# Patient Record
Sex: MALE | Race: WHITE | NOT HISPANIC OR LATINO | Employment: FULL TIME | URBAN - METROPOLITAN AREA
[De-identification: names, ages, dates, MRNs, and addresses within clinical notes are randomized per-mention and may not be internally consistent; named-entity substitution may affect disease eponyms.]

---

## 2017-05-05 ENCOUNTER — OFFICE VISIT (OUTPATIENT)
Dept: DERMATOLOGY | Facility: CLINIC | Age: 67
End: 2017-05-05
Payer: COMMERCIAL

## 2017-05-05 VITALS — WEIGHT: 170 LBS | BODY MASS INDEX: 24.39 KG/M2

## 2017-05-05 DIAGNOSIS — D48.5 NEOPLASM OF UNCERTAIN BEHAVIOR OF SKIN: Primary | ICD-10-CM

## 2017-05-05 DIAGNOSIS — L57.0 ACTINIC KERATOSIS: ICD-10-CM

## 2017-05-05 DIAGNOSIS — L82.1 SEBORRHEIC KERATOSES: ICD-10-CM

## 2017-05-05 DIAGNOSIS — C44.91 BCC (BASAL CELL CARCINOMA OF SKIN): ICD-10-CM

## 2017-05-05 PROCEDURE — 17000 DESTRUCT PREMALG LESION: CPT | Mod: 59,S$GLB,, | Performed by: DERMATOLOGY

## 2017-05-05 PROCEDURE — 1159F MED LIST DOCD IN RCRD: CPT | Mod: S$GLB,,, | Performed by: DERMATOLOGY

## 2017-05-05 PROCEDURE — 1160F RVW MEDS BY RX/DR IN RCRD: CPT | Mod: S$GLB,,, | Performed by: DERMATOLOGY

## 2017-05-05 PROCEDURE — 11100 PR BIOPSY OF SKIN LESION: CPT | Mod: 59,S$GLB,, | Performed by: DERMATOLOGY

## 2017-05-05 PROCEDURE — 88305 TISSUE EXAM BY PATHOLOGIST: CPT | Performed by: PATHOLOGY

## 2017-05-05 PROCEDURE — 11301 SHAVE SKIN LESION 0.6-1.0 CM: CPT | Mod: 59,S$GLB,, | Performed by: DERMATOLOGY

## 2017-05-05 PROCEDURE — 99213 OFFICE O/P EST LOW 20 MIN: CPT | Mod: 25,S$GLB,, | Performed by: DERMATOLOGY

## 2017-05-05 PROCEDURE — 99999 PR PBB SHADOW E&M-EST. PATIENT-LVL II: CPT | Mod: PBBFAC,,, | Performed by: DERMATOLOGY

## 2017-05-05 NOTE — MR AVS SNAPSHOT
[facility]   [facility]  [place] [address]  Phone: [phone]  Fax: [phone]                  Juan Hobson   2017 1:40 PM   Office Visit    Description:  Male : 1950   Provider:  Mary Jane Walker MD   Department:  [facility]           Reason for Visit     Lesion           Diagnoses this Visit        Comments    Neoplasm of uncertain behavior of skin    -  Primary     Actinic keratosis         Seborrheic keratoses                To Do List           Goals (5 Years of Data)     None      Follow-Up and Disposition     Return in about 3 months (around 2017).      [facility]     [facility] -  Assistance  Unless otherwise directed by your provider, please contact [facility], our nurse care line that is available for  assistance.     Registered nurses in the [facility] provide: appointment scheduling, clinical advisement, health education, and other advisory services.  Call: [phone] (toll free)               Medications           Message regarding Medications     Verify the changes and/or additions to your medication regime listed below are the same as discussed with your clinician today.  If any of these changes or additions are incorrect, please notify your healthcare provider.             Verify that the below list of medications is an accurate representation of the medications you are currently taking.  If none reported, the list may be blank. If incorrect, please contact your healthcare provider. Carry this list with you in case of emergency.           Current Medications     multivitamin capsule Take 1 capsule by mouth once daily.    ondansetron (ZOFRAN-ODT) 4 MG TbDL Take 1 tablet (4 mg total) by mouth every 6 (six) hours as needed.           Clinical Reference Information           Your Vitals Were     Weight BMI             77.1 kg (170 lb) 24.39 kg/m2         Allergies as of 2017      No Known Allergies      Immunizations Administered on Date of Encounter - 5/5/2017     None      Orders Placed During Today's Visit      Normal Orders This Visit    Tissue Specimen To Pathology, Dermatology     Tissue Specimen To Pathology, Dermatology       [facility] Sign-Up     Activating your [facility] account is as easy as 1-2-3!     1) Visit [email or web address], select Sign Up Now, enter this activation code and your date of birth, then select Next.  [email or web address]  Expires: 6/19/2017  1:22 PM      2) Create a username and password to use when you visit [facility] in the future and select a security question in case you lose your password and select Next.    3) Enter your e-mail address and click Sign Up!    Additional Information  If you have questions, please e-mail [email or web address] or call [phone] to talk to our [facility] staff. Remember, [facility] is NOT to be used for urgent needs. For medical emergencies, dial 911.         Language Assistance Services     ATTENTION: Language assistance services are available, free of charge. Please call [phone].      ATENCIÓN: Si habla español, tiene a ruggiero disposición servicios gratuitos de asistencia lingüística. Llame al [phone].     CHÚ Ý: N?u b?n nói Ti?ng Vi?t, có các d?ch v? h? tr? ngôn ng? mi?n phí dành cho b?n. G?i s? [phone].         [facility] complies with applicable Federal civil rights laws and does not discriminate on the basis of race, color, national origin, age, disability, or sex.

## 2017-05-05 NOTE — PROGRESS NOTES
"  Subjective:       Patient ID:  Juan Hobson is a 66 y.o. male who presents for   Chief Complaint   Patient presents with    Lesion     right ear     HPI Comments: History of Present Illness: The patient presents with chief complaint of lesion.  Location: right ear  Duration: " months"  Signs/Symptoms: bleeds    Prior treatments: none      Lesion         Review of Systems   Constitutional: Negative for fever.   Skin: Negative for itching and rash.   Hematologic/Lymphatic: Does not bruise/bleed easily.        Objective:    Physical Exam   Constitutional: He appears well-developed and well-nourished. No distress.   Neurological: He is alert and oriented to person, place, and time. He is not disoriented.   Psychiatric: He has a normal mood and affect.   Skin:   Areas Examined (abnormalities noted in diagram):   Scalp / Hair Palpated and Inspected  Head / Face Inspection Performed  Neck Inspection Performed  Chest / Axilla Inspection Performed  Abdomen Inspection Performed  Back Inspection Performed  RUE Inspected  LUE Inspection Performed                   Diagram Legend     Erythematous scaling macule/papule c/w actinic keratosis         Pigmented verrucoid papule/plaque c/w seborrheic keratosis         Pink pearly papule mid back  c/w basal cell carcinoma       See annotation      Assessment / Plan:      Pathology Orders:      Normal Orders This Visit    Tissue Specimen To Pathology, Dermatology     Questions:    Directional Terms:  Other(comment)    Clinical information:  scc vs bcc    Specific Site:  right ear    Tissue Specimen To Pathology, Dermatology     Questions:    Directional Terms:  Other(comment)    Clinical information:  bcc    Specific Site:  mid back        Neoplasm of uncertain behavior of skin  -     Tissue Specimen To Pathology, Dermatology  -     Tissue Specimen To Pathology, Dermatology\  Right ear  Shave biopsy performed after verbal consent including risk of infection, scar, recurrence, " need for additional treatment of site. Area prepped with alcohol, anesthetized with  1% lidocaine with epinephrine. . Hemostasis achieved with  monsels. No complications. Dressing applied. Wound care explained. Will need further rx if + ca        Shave removal procedure note:  Mid back  Shave removal performed after verbal consent including risk of infection, scar, recurrence, need for additional treatment of site. Area prepped with alcohol, anesthetized 1% lidocaine with epinephrine. Lesional tissue shaved  followed by cautery and hyfrecation x 3. Lesion defect size 7mm No complications. Dressing applied. Wound care explained.        Actinic keratosis   Cryosurgery Procedure Note    Verbal consent from the patient is obtained and the patient is aware of the precancerous quality and need for treatment of these lesions. Liquid nitrogen cryosurgery is applied to the 1 actinic keratoses, as detailed in the physical exam, to produce a freeze injury.    seborrheic keratoses  reassurance             No Follow-up on file.

## 2017-05-30 ENCOUNTER — INITIAL CONSULT (OUTPATIENT)
Dept: DERMATOLOGY | Facility: CLINIC | Age: 67
End: 2017-05-30
Payer: COMMERCIAL

## 2017-05-30 VITALS
HEART RATE: 65 BPM | HEIGHT: 70 IN | DIASTOLIC BLOOD PRESSURE: 84 MMHG | BODY MASS INDEX: 24.34 KG/M2 | WEIGHT: 170 LBS | SYSTOLIC BLOOD PRESSURE: 128 MMHG

## 2017-05-30 DIAGNOSIS — C44.212 BASAL CELL CARCINOMA, EAR, RIGHT: Primary | ICD-10-CM

## 2017-05-30 PROCEDURE — 99999 PR PBB SHADOW E&M-EST. PATIENT-LVL III: CPT | Mod: PBBFAC,,, | Performed by: DERMATOLOGY

## 2017-05-30 PROCEDURE — 1126F AMNT PAIN NOTED NONE PRSNT: CPT | Mod: S$GLB,,, | Performed by: DERMATOLOGY

## 2017-05-30 PROCEDURE — 99214 OFFICE O/P EST MOD 30 MIN: CPT | Mod: S$GLB,,, | Performed by: DERMATOLOGY

## 2017-05-30 PROCEDURE — 1159F MED LIST DOCD IN RCRD: CPT | Mod: S$GLB,,, | Performed by: DERMATOLOGY

## 2017-05-30 RX ORDER — AMOXICILLIN 500 MG
2 CAPSULE ORAL DAILY
Status: ON HOLD | COMMUNITY
End: 2020-09-03

## 2017-05-30 NOTE — PROGRESS NOTES
REFERRING MD:  Mary Jane Walker M.D.    CHIEF COMPLAINT:  New patient being consulted for Mohs' surgery evaluation.    HISTORY OF PRESENT ILLNESS:  66 y.o. male presents with a 1 year(s) history of growth on the R ear. Pt states it was removed 10 years ago here at [facility]. Upon researching his medical records in [employer] [facility] [employer], he last saw Dr. Walker on  11/2/2009 and at that office visit, Dr. Walker did a bx on his R superior helix with path as inflammatory crust and deeper biopsy recommended. A phone note on 11/13/2009 shows Dr. Walker's staff unable to reach pt and results letter mailed to pt with request for pt to contact the office.    Positive for scabbing.  Positive for crusting.  Positive for bleeding.  Negative for itching.    Biopsy consistent with basal cell carcinoma.     Prior treatment excision per patient    Pacemaker: No  Defibrillator: No  Artificial joints: No  Artificial heart valves: No    PAST MEDICAL HISTORY:  Past Medical History:   Diagnosis Date    Basal cell carcinoma     right helix    Skin cancer of arm        PAST SURGICAL HISTORY:  History reviewed. No pertinent surgical history.     SOCIAL HISTORY:  Dependencies:  never smoked    PERTINENT MEDICATIONS:  See medications list.  fish oil    ALLERGIES:  Review of patient's allergies indicates no known allergies.    ROS:  Skin: See HPI  Constitutional: No fatigue, fever, malaise, weight loss, or night sweats.  Cardiovascular: No chest pain, palpitations, or edema.  Respiratory: No coughing, wheezing, SOB, or sputum production.    Physical Exam   HENT:   Ears:          General: Mood and affect normal. Alert and orient X3. Normal appearance.  Eyelids:  no suspicious lesions  Head/Face: R superior helix with a 15 x 25 mm pink nodule.   Lips/Teeth/Gums:  no suspicious lesions     IMPRESSION:  Biopsy proven basal cell carcinoma, R ear, path# [id number].    PLAN:  The diagnosis and the pathology report were discussed in detail with the patient. Treatment  options were reviewed, including Mohs Micrographic Surgery, radiation, topical therapy, and standard excision.  After careful review of patient's history and physical exam, and after discussion of treatment options, the decision was made to perform Mohs micrographic surgery.    Will schedule patient for Mohs Micrographic Surgery and will coordinate reconstruction with Dr. Mcgarry in ENT Plastics. Risks, benefits, and alternatives of Mohs' surgery discussed with the patient. Discussed repair options including complex closure, skin flap, skin graft and second intention healing with the patient. Pre-operative instructions provided to the patient.     Spent 30 minutes in coordination of care and/or consultation with patient discussing diagnosis, treatment options, risks and benefits of each. All questions answered.

## 2017-05-31 ENCOUNTER — TELEPHONE (OUTPATIENT)
Dept: DERMATOLOGY | Facility: CLINIC | Age: 67
End: 2017-05-31

## 2017-05-31 NOTE — TELEPHONE ENCOUNTER
Called patient to schedule surgery for Mohs BCC R ear but was unable to leave message due to answering service not being set up.

## 2017-05-31 NOTE — TELEPHONE ENCOUNTER
----- Message from Osiris Orantes RN sent at 5/31/2017  9:49 AM CDT -----  Good morning,    I have been out for 2 days with the stomach flu so I apologize for the delay in getting back to you. Please see dates below as long as we do not have another mohs the same day with yall.    My current available dates are:  6/27 6/29 7/6 7/7 7/11 7/13 7/14 7/18 7/20 7/21

## 2017-05-31 NOTE — TELEPHONE ENCOUNTER
Spoke to patient about scheduling Mohs surgery for BCC right ear on 6/26 at 8:00 am.  Informed him Dr Loaiza office will be contacting him with the details of the repair surgery scheduled for  6/27.

## 2017-06-15 DIAGNOSIS — H61.111 MOHS DEFECT OF HELIX OF RIGHT EAR: Primary | ICD-10-CM

## 2017-06-15 DIAGNOSIS — C44.91 BASAL CELL CARCINOMA: ICD-10-CM

## 2017-06-15 DIAGNOSIS — Z01.818 PREOP TESTING: Primary | ICD-10-CM

## 2017-06-15 DIAGNOSIS — Z01.818 PREOP TESTING: ICD-10-CM

## 2017-06-15 DIAGNOSIS — Z98.890 STATUS POST MOHS SURGERY: ICD-10-CM

## 2017-06-26 ENCOUNTER — HOSPITAL ENCOUNTER (EMERGENCY)
Facility: HOSPITAL | Age: 67
Discharge: HOME | End: 2017-06-26
Attending: OTOLARYNGOLOGY | Admitting: OTOLARYNGOLOGY
Payer: COMMERCIAL

## 2017-06-26 ENCOUNTER — HOSPITAL ENCOUNTER (OUTPATIENT)
Dept: CARDIOLOGY | Facility: CLINIC | Age: 67
Discharge: HOME | End: 2017-06-26
Payer: COMMERCIAL

## 2017-06-26 ENCOUNTER — ANESTHESIA EVENT (OUTPATIENT)
Dept: SURGERY | Facility: HOSPITAL | Age: 67
End: 2017-06-26
Payer: COMMERCIAL

## 2017-06-26 ENCOUNTER — PROCEDURE VISIT (OUTPATIENT)
Dept: DERMATOLOGY | Facility: CLINIC | Age: 67
End: 2017-06-26
Payer: COMMERCIAL

## 2017-06-26 ENCOUNTER — OFFICE VISIT (OUTPATIENT)
Dept: OTOLARYNGOLOGY | Facility: CLINIC | Age: 67
End: 2017-06-26
Payer: COMMERCIAL

## 2017-06-26 ENCOUNTER — TELEPHONE (OUTPATIENT)
Dept: OTOLARYNGOLOGY | Facility: CLINIC | Age: 67
End: 2017-06-26

## 2017-06-26 VITALS
BODY MASS INDEX: 24.34 KG/M2 | WEIGHT: 170 LBS | HEIGHT: 70 IN | HEART RATE: 54 BPM | DIASTOLIC BLOOD PRESSURE: 83 MMHG | SYSTOLIC BLOOD PRESSURE: 144 MMHG

## 2017-06-26 VITALS
SYSTOLIC BLOOD PRESSURE: 151 MMHG | HEART RATE: 88 BPM | OXYGEN SATURATION: 100 % | HEIGHT: 70 IN | WEIGHT: 175 LBS | BODY MASS INDEX: 25.05 KG/M2 | TEMPERATURE: 98 F | DIASTOLIC BLOOD PRESSURE: 88 MMHG | RESPIRATION RATE: 20 BRPM

## 2017-06-26 DIAGNOSIS — H61.111 MOHS DEFECT OF HELIX OF RIGHT EAR: ICD-10-CM

## 2017-06-26 DIAGNOSIS — H95.89: Primary | ICD-10-CM

## 2017-06-26 DIAGNOSIS — C44.212 BASAL CELL CARCINOMA OF RIGHT EAR: Primary | ICD-10-CM

## 2017-06-26 DIAGNOSIS — Z85.828 HISTORY OF MOH'S MICROGRAPHIC SURGERY FOR SKIN CANCER: Primary | ICD-10-CM

## 2017-06-26 DIAGNOSIS — Z98.890 STATUS POST MOHS SURGERY: ICD-10-CM

## 2017-06-26 DIAGNOSIS — Z98.890 HISTORY OF MOH'S MICROGRAPHIC SURGERY FOR SKIN CANCER: Primary | ICD-10-CM

## 2017-06-26 DIAGNOSIS — Z01.818 PREOP TESTING: ICD-10-CM

## 2017-06-26 DIAGNOSIS — C44.91 BASAL CELL CARCINOMA: ICD-10-CM

## 2017-06-26 PROCEDURE — 17315 MOHS SURG ADDL BLOCK: CPT | Mod: S$GLB,,, | Performed by: DERMATOLOGY

## 2017-06-26 PROCEDURE — 93000 ELECTROCARDIOGRAM COMPLETE: CPT | Mod: S$GLB,,, | Performed by: INTERNAL MEDICINE

## 2017-06-26 PROCEDURE — 99204 OFFICE O/P NEW MOD 45 MIN: CPT | Mod: 57,S$GLB,, | Performed by: OTOLARYNGOLOGY

## 2017-06-26 PROCEDURE — 17312 MOHS ADDL STAGE: CPT | Mod: S$GLB,,, | Performed by: DERMATOLOGY

## 2017-06-26 PROCEDURE — 25000003 PHARM REV CODE 250: Performed by: NURSE PRACTITIONER

## 2017-06-26 PROCEDURE — 99283 EMERGENCY DEPT VISIT LOW MDM: CPT

## 2017-06-26 PROCEDURE — 99499 UNLISTED E&M SERVICE: CPT | Mod: S$GLB,,, | Performed by: DERMATOLOGY

## 2017-06-26 PROCEDURE — 1159F MED LIST DOCD IN RCRD: CPT | Mod: S$GLB,,, | Performed by: OTOLARYNGOLOGY

## 2017-06-26 PROCEDURE — 17311 MOHS 1 STAGE H/N/HF/G: CPT | Mod: S$GLB,,, | Performed by: DERMATOLOGY

## 2017-06-26 PROCEDURE — 99999 PR PBB SHADOW E&M-EST. PATIENT-LVL II: CPT | Mod: PBBFAC,,, | Performed by: OTOLARYNGOLOGY

## 2017-06-26 RX ORDER — OXYCODONE AND ACETAMINOPHEN 5; 325 MG/1; MG/1
1 TABLET ORAL
Qty: 20 TABLET | Refills: 0 | Status: ON HOLD | OUTPATIENT
Start: 2017-06-26 | End: 2017-06-27

## 2017-06-26 RX ADMIN — GELATIN ABSORBABLE SPONGE SIZE 100 1 APPLICATOR: MISC at 06:06

## 2017-06-26 NOTE — ED PROVIDER NOTES
"Encounter Date: 6/26/2017    SCRIBE #1 NOTE: I, Eloy Rod, am scribing for, and in the presence of,  Agnes Kee NP. I have scribed the following portions of the note - Other sections scribed: HPI and ROS.       History     Chief Complaint   Patient presents with    Post-op Problem     Pt reports bleeding from right ear x 30 minutes following surgery today. Pt was home for an hour then realized dressing was saturated.      CC: Post-op Problem     HPI: This 66 y.o M with basal cell carcinoma of the right helix and skin cancer of arm presents to the ED for emergent evaluation of a post-op problem. Pt reports intermittent R ear bleeding secondary to a pre-op appointment today with his Otolaryngologist at [facility]. Pt states "they carved my ear up, and today was the easy part." The pt states he noticed his dressing was saturated after being home for an hour. The pt states his bleeding stopped for about an hour, but then began again and worsened. Pt has saturated his dressing and a towel. Pt is currently bleeding. Pt denies taking blood thinners and lightheadedness. No prior tx.     The pt is scheduled to return surgery tomorrow at 1030.     Pt's Otolaryngology is [place] LUCAS Mcgarry III, MD.      The history is provided by the patient. No  was used.     Review of patient's allergies indicates:  No Known Allergies  Past Medical History:   Diagnosis Date    Basal cell carcinoma     right helix    Skin cancer of arm      History reviewed. No pertinent surgical history.  History reviewed. No pertinent family history.  Social History   Substance Use Topics    Smoking status: Never Smoker    Smokeless tobacco: Never Used    Alcohol use Yes      Comment: sometimes     Review of Systems   Constitutional: Negative for chills and fever.   HENT: Negative for facial swelling and sore throat.         (+) R ear bleeding/ post-op problem   Eyes: Negative for visual disturbance.   Respiratory: " Negative for shortness of breath.    Cardiovascular: Negative for chest pain.   Gastrointestinal: Negative for nausea and vomiting.   Genitourinary: Negative for dysuria.   Musculoskeletal: Negative for back pain and neck pain.   Skin: Negative for rash.   Neurological: Negative for weakness and light-headedness.       Physical Exam     Initial Vitals [06/26/17 1727]   BP Pulse Resp Temp SpO2   (!) 162/78 80 16 98 °F (36.7 °C) 98 %      MAP       106         Physical Exam    Nursing note and vitals reviewed.  Constitutional: He appears well-developed and well-nourished.   HENT:   Head: Normocephalic.   Right external ear with the superior auricle removed has mild active bleeding.   Eyes: Conjunctivae are normal.   Neck: Normal range of motion. Neck supple.   Musculoskeletal: Normal range of motion.   Neurological: He is alert and oriented to person, place, and time.   Skin: Skin is warm and dry. Capillary refill takes less than 2 seconds.   Psychiatric: He has a normal mood and affect.         ED Course   Procedures  Labs Reviewed - No data to display          Medical Decision Making:   Initial Assessment:   58-year-old male presents with continued bleeding status post basal cell cancer removal earlier today.  Differential Diagnosis:   Postop complication  Surgical site bleeding  Trauma  ED Management:  Pts exam was positive for mild active bleeding to the surgical site of the right ear.  pt does not appear ill or toxic, he has normal vital signs.  Gelfoam used for homeostasis and dressing applied.  I have wrapped with Coban and for added pressure.  I have instructed patient that after an hour he can loosen the dressing.    Patient been instructed to follow up as planned tomorrow with his ENT, he has been instructed to sleep sitting upright this evening in a recliner chair if possible.  He's been instructed not to remove the dressing, and to return to emergency department if the dressing becomes saturated.    Pt  verbalizes understanding of d/c instructions and will return for worsening condition.    Case discussed with attending who agrees with assessment and plan.               Scribe Attestation:   Scribe #1: I performed the above scribed service and the documentation accurately describes the services I performed. I attest to the accuracy of the note.    Attending Attestation:     Physician Attestation Statement for NP/PA:   I discussed this assessment and plan of this patient with the NP/PA, but I did not personally examine the patient. The face to face encounter was performed by the NP/PA.    Other NP/PA Attestation Additions:      Medical Decision Making: Agree with assessment and plan.       Physician Attestation for Scribe:  Physician Attestation Statement for Scribe #1: I, Agnes Kee NP, reviewed documentation, as scribed by Eloy Rod in my presence, and it is both accurate and complete.                 ED Course     Clinical Impression:   The primary encounter diagnosis was Postoperative surgical complication involving ear associated with ear procedure, unspecified complication. Diagnoses of Basal cell carcinoma, Preop testing, Status post Mohs surgery, and Mohs defect of helix of right ear were also pertinent to this visit.    Disposition:   Disposition: Discharged  Condition: Stable                        Agnes Kee NP  06/26/17 2112       Xavi Gibson MD  06/27/17 1110

## 2017-06-26 NOTE — PROGRESS NOTES
PROCEDURE: Mohs' Micrographic Surgery    INDICATION: Location in mask areas of face including central face, nose, eyelids, eyebrows, lips, chin, preauricular, temple, and ear. Size > 1.0 cm on face. Biopsy-proven skin cancer of cosmetically and functionally important areas, including head, neck, genital, hand, foot, or areas known for having difficulty in healing, such as the lower anterior legs. Tumors with aggressive clinical behavior (rapidly growing, greater than 1 cm in diameter). Tumor with ill-defined borders.    REFERRING MD: Mary Jane Walker M.D.    CASE NUMBER:     ANESTHETIC: 6.5 cc 0.5% Lidocaine with Epi 1:200,000 mixed 1:1 with 0.5% Bupivacaine    SURGICAL PREP: Betadine    SURGEON: Ines Salcedo MD    ASSISTANTS: Chana Mcnair PA-C and Alicia Lugo, Surg Tech    PREOPERATIVE DIAGNOSIS: basal cell carcinoma    POSTOPERATIVE DIAGNOSIS: basal cell carcinoma    PATHOLOGIC DIAGNOSIS: basal cell carcinoma- nodular, infiltrating    HISTOLOGY OF SPECIMENS IN FIRST STAGE:   Tumor Type: Tumor seen. Epidermal margin missing.   Depth of Invasion: epidermis, dermis, subcutaneous tissue, muscle and cartilage  Perineural Invasion: No    HISTOLOGY OF SPECIMENS IN SUBSEQUENT STAGES:  · Tumor Type: No tumor seen.    STAGES OF MOHS' SURGERY PERFORMED: 2    TUMOR-FREE PLANE ACHIEVED: Yes- with cartilage removal.    HEMOSTASIS: electrocoagulation and 4-0 vicryl suture ties     SPECIMENS: 10 (9 in stage A and 1 in stage B)    LOCATION: right ear (R superior helix). Patient verified location.    INITIAL LESION SIZE: 1.2 x 3.0 cm    FINAL DEFECT SIZE: 3.0 x 3.5 cm    WOUND REPAIR/DISPOSITION: The patient tolerated Mohs' Micrographic Surgery for a basal cell carcinoma very well. When the tumor was completely removed, the patient was referred to Dr. Mcgarry in ENT Plastics for repair of the surgical defect. Patient was placed on Percocet 5 prn postop pain.    Vitals:    06/26/17 0751 06/26/17 1108   BP: 122/79 (!) 144/83   BP  "Location: Right arm Left arm   Patient Position: Sitting Sitting   BP Method: Automatic Automatic   Pulse: 64 (!) 54   Weight: 77.1 kg (170 lb)    Height: 5' 10" (1.778 m)            "

## 2017-06-27 ENCOUNTER — ANESTHESIA (OUTPATIENT)
Dept: SURGERY | Facility: HOSPITAL | Age: 67
End: 2017-06-27
Payer: COMMERCIAL

## 2017-06-27 ENCOUNTER — HOSPITAL ENCOUNTER (OUTPATIENT)
Facility: HOSPITAL | Age: 67
Discharge: HOME | End: 2017-06-27
Attending: OTOLARYNGOLOGY | Admitting: OTOLARYNGOLOGY
Payer: COMMERCIAL

## 2017-06-27 VITALS
WEIGHT: 175 LBS | HEART RATE: 61 BPM | OXYGEN SATURATION: 97 % | SYSTOLIC BLOOD PRESSURE: 123 MMHG | TEMPERATURE: 98 F | BODY MASS INDEX: 25.05 KG/M2 | DIASTOLIC BLOOD PRESSURE: 60 MMHG | HEIGHT: 70 IN | RESPIRATION RATE: 18 BRPM

## 2017-06-27 DIAGNOSIS — H61.111 MOHS DEFECT OF AURICLE OF RIGHT EAR: Primary | ICD-10-CM

## 2017-06-27 DIAGNOSIS — C44.212 BASAL CELL CARCINOMA OF RIGHT EAR: ICD-10-CM

## 2017-06-27 PROCEDURE — 63600175 PHARM REV CODE 636 W HCPCS

## 2017-06-27 PROCEDURE — 25000003 PHARM REV CODE 250: Performed by: NURSE ANESTHETIST, CERTIFIED REGISTERED

## 2017-06-27 PROCEDURE — D9220A PRA ANESTHESIA: Mod: CRNA,,, | Performed by: NURSE ANESTHETIST, CERTIFIED REGISTERED

## 2017-06-27 PROCEDURE — 25000003 PHARM REV CODE 250: Performed by: OTOLARYNGOLOGY

## 2017-06-27 PROCEDURE — D9220A PRA ANESTHESIA: Mod: ANES,,, | Performed by: ANESTHESIOLOGY

## 2017-06-27 PROCEDURE — 63600175 PHARM REV CODE 636 W HCPCS: Performed by: NURSE ANESTHETIST, CERTIFIED REGISTERED

## 2017-06-27 PROCEDURE — 71000039 HC RECOVERY, EACH ADD'L HOUR: Performed by: OTOLARYNGOLOGY

## 2017-06-27 PROCEDURE — 37000008 HC ANESTHESIA 1ST 15 MINUTES: Performed by: OTOLARYNGOLOGY

## 2017-06-27 PROCEDURE — 37000009 HC ANESTHESIA EA ADD 15 MINS: Performed by: OTOLARYNGOLOGY

## 2017-06-27 PROCEDURE — 36000707: Performed by: OTOLARYNGOLOGY

## 2017-06-27 PROCEDURE — 71000015 HC POSTOP RECOV 1ST HR: Performed by: OTOLARYNGOLOGY

## 2017-06-27 PROCEDURE — 27000221 HC OXYGEN, UP TO 24 HOURS

## 2017-06-27 PROCEDURE — 71000033 HC RECOVERY, INTIAL HOUR: Performed by: OTOLARYNGOLOGY

## 2017-06-27 PROCEDURE — 36000706: Performed by: OTOLARYNGOLOGY

## 2017-06-27 RX ORDER — SODIUM CHLORIDE 9 MG/ML
INJECTION, SOLUTION INTRAVENOUS CONTINUOUS
Status: DISCONTINUED | OUTPATIENT
Start: 2017-06-27 | End: 2017-06-27 | Stop reason: HOSPADM

## 2017-06-27 RX ORDER — FENTANYL CITRATE 50 UG/ML
INJECTION, SOLUTION INTRAMUSCULAR; INTRAVENOUS
Status: DISCONTINUED | OUTPATIENT
Start: 2017-06-27 | End: 2017-06-27

## 2017-06-27 RX ORDER — PROPOFOL 10 MG/ML
VIAL (ML) INTRAVENOUS
Status: DISCONTINUED | OUTPATIENT
Start: 2017-06-27 | End: 2017-06-27

## 2017-06-27 RX ORDER — CEFAZOLIN SODIUM 2 G/50ML
2 SOLUTION INTRAVENOUS
Status: DISCONTINUED | OUTPATIENT
Start: 2017-06-27 | End: 2017-06-27 | Stop reason: HOSPADM

## 2017-06-27 RX ORDER — LIDOCAINE HCL/PF 100 MG/5ML
SYRINGE (ML) INTRAVENOUS
Status: DISCONTINUED | OUTPATIENT
Start: 2017-06-27 | End: 2017-06-27

## 2017-06-27 RX ORDER — NEOSTIGMINE METHYLSULFATE 1 MG/ML
INJECTION, SOLUTION INTRAVENOUS
Status: DISCONTINUED | OUTPATIENT
Start: 2017-06-27 | End: 2017-06-27

## 2017-06-27 RX ORDER — LIDOCAINE HYDROCHLORIDE 10 MG/ML
1 INJECTION, SOLUTION EPIDURAL; INFILTRATION; INTRACAUDAL; PERINEURAL ONCE
Status: COMPLETED | OUTPATIENT
Start: 2017-06-27 | End: 2017-06-27

## 2017-06-27 RX ORDER — LIDOCAINE HYDROCHLORIDE AND EPINEPHRINE 10; 10 MG/ML; UG/ML
INJECTION, SOLUTION INFILTRATION; PERINEURAL
Status: DISCONTINUED | OUTPATIENT
Start: 2017-06-27 | End: 2017-06-27 | Stop reason: HOSPADM

## 2017-06-27 RX ORDER — GLYCOPYRROLATE 0.2 MG/ML
INJECTION INTRAMUSCULAR; INTRAVENOUS
Status: DISCONTINUED | OUTPATIENT
Start: 2017-06-27 | End: 2017-06-27

## 2017-06-27 RX ORDER — BACITRACIN ZINC 500 UNIT/G
OINTMENT (GRAM) TOPICAL
Status: DISCONTINUED | OUTPATIENT
Start: 2017-06-27 | End: 2017-06-27 | Stop reason: HOSPADM

## 2017-06-27 RX ORDER — MIDAZOLAM HYDROCHLORIDE 5 MG/ML
INJECTION INTRAMUSCULAR; INTRAVENOUS
Status: DISCONTINUED | OUTPATIENT
Start: 2017-06-27 | End: 2017-06-27

## 2017-06-27 RX ORDER — ONDANSETRON 4 MG/1
4 TABLET, ORALLY DISINTEGRATING ORAL EVERY 6 HOURS PRN
Qty: 20 TABLET | Refills: 0 | Status: SHIPPED | OUTPATIENT
Start: 2017-06-27 | End: 2017-07-12

## 2017-06-27 RX ORDER — OXYCODONE AND ACETAMINOPHEN 5; 325 MG/1; MG/1
1 TABLET ORAL
Qty: 20 TABLET | Refills: 0 | Status: SHIPPED | OUTPATIENT
Start: 2017-06-27 | End: 2017-07-12

## 2017-06-27 RX ORDER — ONDANSETRON HYDROCHLORIDE 2 MG/ML
INJECTION, SOLUTION INTRAMUSCULAR; INTRAVENOUS
Status: DISCONTINUED | OUTPATIENT
Start: 2017-06-27 | End: 2017-06-27

## 2017-06-27 RX ORDER — ROCURONIUM BROMIDE 10 MG/ML
INJECTION, SOLUTION INTRAVENOUS
Status: DISCONTINUED | OUTPATIENT
Start: 2017-06-27 | End: 2017-06-27

## 2017-06-27 RX ORDER — CEPHALEXIN 500 MG/1
500 CAPSULE ORAL EVERY 12 HOURS
Qty: 14 CAPSULE | Refills: 0 | Status: SHIPPED | OUTPATIENT
Start: 2017-06-27 | End: 2017-07-04

## 2017-06-27 RX ADMIN — LIDOCAINE HYDROCHLORIDE 60 MG: 20 INJECTION, SOLUTION INTRAVENOUS at 02:06

## 2017-06-27 RX ADMIN — SODIUM CHLORIDE: 0.9 INJECTION, SOLUTION INTRAVENOUS at 01:06

## 2017-06-27 RX ADMIN — CEFAZOLIN SODIUM 2 G: 2 SOLUTION INTRAVENOUS at 02:06

## 2017-06-27 RX ADMIN — GLYCOPYRROLATE 0.6 MG: 0.2 INJECTION, SOLUTION INTRAMUSCULAR; INTRAVENOUS at 04:06

## 2017-06-27 RX ADMIN — NEOSTIGMINE METHYLSULFATE 5 MG: 1 INJECTION INTRAVENOUS at 04:06

## 2017-06-27 RX ADMIN — EPHEDRINE SULFATE 10 MG: 50 INJECTION, SOLUTION INTRAMUSCULAR; INTRAVENOUS; SUBCUTANEOUS at 03:06

## 2017-06-27 RX ADMIN — MIDAZOLAM 2 MG: 5 INJECTION INTRAMUSCULAR; INTRAVENOUS at 02:06

## 2017-06-27 RX ADMIN — EPHEDRINE SULFATE 5 MG: 50 INJECTION, SOLUTION INTRAMUSCULAR; INTRAVENOUS; SUBCUTANEOUS at 03:06

## 2017-06-27 RX ADMIN — ONDANSETRON 4 MG: 2 INJECTION, SOLUTION INTRAMUSCULAR; INTRAVENOUS at 04:06

## 2017-06-27 RX ADMIN — LIDOCAINE HYDROCHLORIDE 10 MG: 10 INJECTION, SOLUTION EPIDURAL; INFILTRATION; INTRACAUDAL; PERINEURAL at 01:06

## 2017-06-27 RX ADMIN — ROCURONIUM BROMIDE 40 MG: 10 INJECTION, SOLUTION INTRAVENOUS at 02:06

## 2017-06-27 RX ADMIN — PROPOFOL 100 MG: 10 INJECTION, EMULSION INTRAVENOUS at 02:06

## 2017-06-27 RX ADMIN — SODIUM CHLORIDE, SODIUM GLUCONATE, SODIUM ACETATE, POTASSIUM CHLORIDE, MAGNESIUM CHLORIDE, SODIUM PHOSPHATE, DIBASIC, AND POTASSIUM PHOSPHATE: .53; .5; .37; .037; .03; .012; .00082 INJECTION, SOLUTION INTRAVENOUS at 03:06

## 2017-06-27 RX ADMIN — FENTANYL CITRATE 200 MCG: 50 INJECTION, SOLUTION INTRAMUSCULAR; INTRAVENOUS at 02:06

## 2017-06-27 NOTE — TRANSFER OF CARE
"Anesthesia Transfer of Care Note    Patient: Juan Hobson    Procedure(s) Performed: Procedure(s) (LRB):  REPAIR-MOHS DEFECT (Right)    Patient location: PACU    Anesthesia Type: general    Transport from OR: Transported from OR on 6-10 L/min O2 by face mask with adequate spontaneous ventilation    Post pain: adequate analgesia    Post assessment: no apparent anesthetic complications and tolerated procedure well    Post vital signs: stable    Level of consciousness: awake and alert    Nausea/Vomiting: no nausea/vomiting    Complications: none    Transfer of care protocol was followed      Last vitals:   Visit Vitals  BP (!) 170/71 (BP Location: Left arm, Patient Position: Lying, BP Method: Automatic)   Pulse 70   Temp 36.8 °C (98.2 °F) (Axillary)   Resp 16   Ht 5' 10" (1.778 m)   Wt 79.4 kg (175 lb)   SpO2 98%   BMI 25.11 kg/m²     "

## 2017-06-27 NOTE — BRIEF OP NOTE
[facility]  Brief Operative Note     SUMMARY     Surgery Date: 6/27/2017     Surgeon(s) and Role:     * [place] LUCAS Mcgarry III, MD - Primary     * Geovani Figueroa MD - Resident - Assisting        Pre-op Diagnosis:  Basal cell carcinoma [C44.91]  Preop testing [Z01.818]  Status post Mohs surgery [Z98.890]  Mohs defect of helix of right ear [H61.111]    Post-op Diagnosis:  Post-Op Diagnosis Codes:     * Basal cell carcinoma [C44.91]     * Preop testing [Z01.818]     * Status post Mohs surgery [Z98.890]     * Mohs defect of helix of right ear [H61.111]    Procedure(s) (LRB):  REPAIR-MOHS DEFECT (Right)    Anesthesia: General    Description of the findings of the procedure: mohs repeair right periauricular defect with wide undermining and tissue arrangement    Findings/Key Components: see op note    Estimated Blood Loss: * No values recorded between 6/27/2017  3:10 PM and 6/27/2017  4:28 PM *         Specimens:   Specimen (12h ago through future)    None          Discharge Note    SUMMARY     Admit Date: 6/27/2017    Discharge Date and Time:  06/27/2017 4:29 PM    Hospital Course (synopsis of major diagnoses, care, treatment, and services provided during the course of the hospital stay): Following completion of an electively scheduled procedure, the patient was transferred to the PACU for postoperative monitoring.   His hospital course was uneventful and noted for adequate pain control and PO intake following surgery.  He is discharged home in good condition and will follow-up with Dr. Mcgarry as scheduled.          Final Diagnosis: Post-Op Diagnosis Codes:     * Basal cell carcinoma [C44.91]     * Preop testing [Z01.818]     * Status post Mohs surgery [Z98.890]     * Mohs defect of helix of right ear [H61.111]    Disposition: Home or Self Care    Follow Up/Patient Instructions: May remove dressing 6/28 afternoon; otherwise keep clean and dry and apply Bacitracin Antibiotic ointment three times a day to  help promote wound healing    Medications:  Reconciled Home Medications:   Current Discharge Medication List      START taking these medications    Details   cephALEXin (KEFLEX) 500 MG capsule Take 1 capsule (500 mg total) by mouth every 12 (twelve) hours.  Qty: 14 capsule, Refills: 0         CONTINUE these medications which have CHANGED    Details   ondansetron (ZOFRAN-ODT) 4 MG TbDL Take 1 tablet (4 mg total) by mouth every 6 (six) hours as needed.  Qty: 20 tablet, Refills: 0      oxycodone-acetaminophen (PERCOCET) 5-325 mg per tablet Take 1 tablet by mouth every 4 to 6 hours as needed for Pain.  Qty: 20 tablet, Refills: 0    Associated Diagnoses: Basal cell carcinoma of right ear         CONTINUE these medications which have NOT CHANGED    Details   fish oil-omega-3 fatty acids 300-1,000 mg capsule Take 2 g by mouth once daily.      multivitamin capsule Take 1 capsule by mouth once daily.             Discharge Procedure Orders  Diet general     Activity as tolerated   Order Comments: Light activity only. No heavy lifting, straining, stooping, exercising.    May remove dressing 6/28 afternoon; otherwise keep clean and dry and apply Bacitracin Antibiotic ointment three times a day to help promote wound healing     Call MD for:  temperature >100.4     Call MD for:  persistent nausea and vomiting or diarrhea     Call MD for:  severe uncontrolled pain     Call MD for:  redness, tenderness, or signs of infection (pain, swelling, redness, odor or green/yellow discharge around incision site)     Call MD for:  difficulty breathing or increased cough     Call MD for:  severe persistent headache     Call MD for:  worsening rash     Call MD for:  persistent dizziness, light-headedness, or visual disturbances     Call MD for:  increased confusion or weakness     Change dressing (specify)   Order Comments: May remove dressing 6/28 afternoon; otherwise keep clean and dry and apply Bacitracin Antibiotic ointment three times a  day to help promote wound healing       Follow-up Information     H Parviz Mcgarry MD On 7/5/2017.    Specialty:  Otolaryngology  Why:  For wound re-check and suture removal  Contact information:  [place] [address] HER[place]  [facility] [id number] [phone]

## 2017-06-27 NOTE — ANESTHESIA POSTPROCEDURE EVALUATION
"Anesthesia Post Evaluation    Patient: Juan Hobson    Procedure(s) Performed: Procedure(s) (LRB):  REPAIR-MOHS DEFECT (Right)    Final Anesthesia Type: general  Patient location during evaluation: PACU  Patient participation: Yes- Able to Participate  Level of consciousness: awake and alert  Post-procedure vital signs: reviewed and stable  Pain management: adequate  Airway patency: patent  PONV status at discharge: No PONV  Anesthetic complications: no      Cardiovascular status: blood pressure returned to baseline and hemodynamically stable  Respiratory status: unassisted, spontaneous ventilation and room air  Hydration status: euvolemic  Follow-up not needed.        Visit Vitals  /60   Pulse 64   Temp 36.8 °C (98.2 °F) (Axillary)   Resp 14   Ht 5' 10" (1.778 m)   Wt 79.4 kg (175 lb)   SpO2 96%   BMI 25.11 kg/m²       Pain/Margarito Score: Pain Assessment Performed: Yes (6/27/2017  4:45 PM)  Presence of Pain: denies (6/27/2017  4:45 PM)      "

## 2017-06-27 NOTE — DISCHARGE INSTRUCTIONS
Understanding Mohs Surgery  Mohs surgery is done to help treat skin cancer. During the surgery, a thin section of the tumor and a small area around it are removed. The removed tissue is looked at right away under a microscope. The process is then repeated. Sections of tissue continue to be removed and looked at under a microscope until no more cancer cells are found.  During standard tumor removal, the tumor and a margin of healthy tissue around it are removed all at once. Mohs surgery removes the tumor in portions that are examined. This makes it more likely that all the cancer is removed. There is a very low chance of it coming back. Also, less healthy tissue often needs to be removed than with standard tumor removal.  After the tumor has been removed, the surgeon can often fix the area during the same surgery.  Why Mohs surgery is done  Mohs surgery is most often used for skin cancer that is:  · Large or fast-growing  · Likely to spread  · Likely to come back, or has come back  · On an area where it is important to remove as little tissue as possible (such as the nose, lips, ears, eyelids, or hands)  · Very aggressive  How Mohs surgery is done  The surgery is most often done in an office or surgery center. It is done on an outpatient basis. This means you go home after the surgery is done. The procedure can take up to 4 hours or longer. During the surgery:  · You change into a patient gown or stay in your regular clothes.  · The area around the tumor is injected with medicine. This numbs the area and prevents pain.  · The surgeon removes the first layer of tissue. The area is covered with a small bandage.  · You move to a waiting area.  · The tissue is examined. This can take up to an hour or longer. During this time, you can read or watch TV.  · You return to the surgical room to have more of the tumor removed.  This process is repeated until the removed tissue shows no more cancer cells. Once the tumor is  removed, you and your surgeon can decide how best to repair the surgery site. The repair may be done right away. Or the repair surgery may be scheduled for another day.  Risks of Mohs surgery  · Bleeding  · Infection at the incision site  · Nerve damage  · Pain  · Problems with reconstruction  · Recurrence of the skin cancer and need for further treatment  · Reopening of the incision after surgery  · Scarring  · Severe bruising  Date Last Reviewed: 5/1/2016  © 1925-9795 [employer]. [address] [place] [address], [place] [address]. All rights reserved. This information is not intended as a substitute for professional medical care. Always follow your healthcare professional's instructions.      Procedural Sedation (Adult)  You have been given medicine by vein to make you sleep during your surgery. This may have included both a pain medicine and sleeping medicine. Most of the effects have worn off. But you may still have some drowsiness for the next 6 to 8 hours.  Home care  Follow these guidelines when you get home:  · For the next 8 hours, you should be watched by a responsible adult. This person should make sure your condition is not getting worse.  · Don't take any medicine by mouth for pain or for sleep during the next 4 hours. These might react with the medicines you were given in the hospital. This could cause a much stronger response than usual.  · Don't drink any alcohol for the next 24 hours.  · Don't drive, operate dangerous machinery, or make important business or personal decisions during the next 24 hours.  Follow-up care  Follow up with your healthcare provider if you are not alert and back to your usual level of activity within 12 hours.  When to seek medical advice  Call your healthcare provider right away if any of these occur:  · Drowsiness gets worse  · Weakness or dizziness gets worse  · Repeated vomiting  · You cannot be awakened   Date Last Reviewed: 10/18/2016  © 0488-2658 [employer] [address] [place] [address], [place] [address]. All rights reserved. This information is not intended as a substitute for professional medical care. Always follow your healthcare professional's instructions.

## 2017-06-27 NOTE — PLAN OF CARE
Patient states they are ready to be discharged. Instructions and prescriptions given to patient. Both verbalize understanding. Patient tolerating po liquids with no difficulty. Patient denies pain. Anesthesia consent and surgical consent in chart upon patient's discharge from [facility].

## 2017-06-27 NOTE — H&P
Mr. Hobson presents referred by Dr. Salcedo for consultation.     VITAL SIGNS:  Per nurses' notes.     CHIEF COMPLAINT:  Status post Mohs of right ear.     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  He presents now with an approximately 3.5 to 4 cm defect of the   skin of the upper preauricular region as well as the right anterior helix with   some cartilage loss.  He presents for information on reconstruction.  He has no   other medical issues.  He states he is remarkably healthy.  No previous   surgeries.     REVIEW OF SYSTEMS:  CONSTITUTIONAL: Weight loss or weight gain: Negative.  ALLERGY/IMMUNOLOGIC: Negative.  ENT/Mouth:  Hearing Loss/Dizziness/Tinnitus: Negative.  Ear Infections/Otalgia: Negative.  Rhinitis/Sinusitis/Epistaxis: Negative.  Headache/Facial Pain: Negative.  Nasal Obstruction/Snoring/RUBIN: Negative.  Throat: Infections/Pain: Negative.  Hoarseness/Speech Disturbance: Negative.  Salivary Glands Disorder: Negative.  Trauma: Hx: Negative.     Cardiovascular:  MI/Angina: Negative.  Hypertension: Negative.  Endo: DM/Steroids: Negative.  Eyes: Negative.  GI: Dysphagia/Reflux: Negative.  : GYN Pregnancy: Negative.                          Renal: Dialysis: Negative.  Lymph: Neck Mass/Lymphadenopathy: Negative.  Musculoskeletal: Negative.  Hem: Bleeding Disorders/Anemia: Negative.  Neuro: Cranial/Neuralgia: Negative.  Pulm: Asthma/SOB/Cough: Negative.  Skin/Breast: Negative.     PAST MEDICAL/FAMILY/SOCIAL HISTORY:     Past Medical History                       ENT Surgery: Negative.                       Occupational Exposure: Negative.                        Problems: Negative.                       Cancer: Negative.                       Only previous skin cancer of his arm as a surgical intervention.     Past Family History                       Family history  hearing loss: Negative.                       Family history cancer: Negative.                       No family history listed.     Past Social History                       Tobacco: He is a nonsmoker.                       Alcohol: Occasional social drinker.     MEDICATIONS:  Per Med[employer].     ALLERGIES:  Per Med[employer].     EXAMINATION:  General Appearance:  Well-developed, well-nourished 66-year-old white male in no   apparent distress.  Communication Ability:  Good.  EARS, NOSE, THROAT, MOUTH;  EARS:  He has the previously mentioned Mohs defect of the right superior   preauricular region extending onto his anterior and upper helix.                       External auditory canals: Clear.                       Hearing: Grossly Intact.                       Tympanic membranes: Clear.  NOSE:                       External: Grossly normal.                       Intranasal:  MOUTH:                       Intraorally: Lips, teeth and gums: Normal.                       Oropharynx: Normal.                       Mucosa: Normal.  THROAT:                       Tongue: Normal.                       Palate: Normal.                       Tonsils: Normal.                       Posterior pharynx: Normal.  HEAD/FACE INSPECTION: Normal and atraumatic.                       Palpation/Percussion:  Non tender.                       Facial Strength: Normal and symmetric.                       Salivary glands: Normal.     NECK: Supple.  THYROID: No masses.  LYMPHATICS: No nodes.  RESPIRATORY:                       Effort: Normal.  EYES:                       Ocular Mobility: Normal.                       Vision: Grossly intact.  NEURO/PSYCH:                       Cranial nerves: 2-12 grossly intact.                       Orientation: x3.                       Mood/Affect: Normal.     IMPRESSION:  A 66-year-old white male status post Mohs excision of skin   carcinoma of the right preauricular area and ear.     RECOMMENDATION:  I have  discussed my findings with him in detail as well as my   recommendations for treatment.  My recommendation would be for repair of this   with flap and/or graft and we have discussed this in detail.  We will proceed   with surgery scheduling for tomorrow.  __________________________________________________  H&P completed on 6/26/17 has been reviewed, the patient has been examined and:  I concur with the findings and no changes have occurred since H&P was written.    Active Hospital Problems    Diagnosis  POA    Mohs defect of auricle of right ear [H61.111]  Yes      Resolved Hospital Problems    Diagnosis Date Resolved POA   No resolved problems to display.

## 2017-06-27 NOTE — ANESTHESIA PREPROCEDURE EVALUATION
06/27/2017  Juan Hobson is a 66 y.o., male.    Anesthesia Evaluation    I have reviewed the Patient Summary Reports.     I have reviewed the Medications.     Review of Systems  Anesthesia Hx:  History of prior surgery of interest to airway management or planning:  Denies Personal Hx of Anesthesia complications.   Social:  Non-Smoker, No Alcohol Use    Hematology/Oncology:  Hematology Normal   Oncology Normal     EENT/Dental:EENT/Dental Normal   Cardiovascular:  Cardiovascular Normal Exercise tolerance: good     Pulmonary:  Pulmonary Normal    Renal/:  Renal/ Normal     Hepatic/GI:  Hepatic/GI Normal    Musculoskeletal:  Musculoskeletal Normal    Neurological:  Neurology Normal    Dermatological:   R ear defect, s/p Moh's   Psych:  Psychiatric Normal           Physical Exam  General:  Well nourished    Airway/Jaw/Neck:  Airway Findings: Mouth Opening: Normal Tongue: Normal  General Airway Assessment: Adult, Good  Mallampati: III  TM Distance: Normal, at least 6 cm     Eyes/Ears/Nose:  EYES/EARS/NOSE FINDINGS: Normal   Dental:  Dental Findings: In tact   Chest/Lungs:  Chest/Lungs Findings: Clear to auscultation, Normal Respiratory Rate     Heart/Vascular:  Heart Findings: Rate: Normal  Rhythm: Regular Rhythm  Sounds: Normal  Heart murmur: negative       Mental Status:  Mental Status Findings:  Cooperative, Alert and Oriented         Anesthesia Plan  Type of Anesthesia, risks & benefits discussed:  Anesthesia Type:  general  Patient's Preference:   Intra-op Monitoring Plan:   Intra-op Monitoring Plan Comments:   Post Op Pain Control Plan:   Post Op Pain Control Plan Comments:   Induction:   IV  Beta Blocker:  Patient is not currently on a Beta-Blocker (No further documentation required).       Informed Consent: Patient understands risks and agrees with Anesthesia plan.  Questions answered. Anesthesia  consent signed with patient.  ASA Score: 1     Day of Surgery Review of History & Physical:    H&P update referred to the surgeon.     Anesthesia Plan Notes:   66M healthy, R ear Moh's defect for repair under GETA        Ready For Surgery From Anesthesia Perspective.

## 2017-06-27 NOTE — CONSULTS
Mr. Hobson presents referred by Dr. Salcedo for consultation.    VITAL SIGNS:  Per nurses' notes.    CHIEF COMPLAINT:  Status post Mohs of right ear.    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  He presents now with an approximately 3.5 to 4 cm defect of the   skin of the upper preauricular region as well as the right anterior helix with   some cartilage loss.  He presents for information on reconstruction.  He has no   other medical issues.  He states he is remarkably healthy.  No previous   surgeries.    REVIEW OF SYSTEMS:  CONSTITUTIONAL: Weight loss or weight gain: Negative.  ALLERGY/IMMUNOLOGIC: Negative.  ENT/Mouth:  Hearing Loss/Dizziness/Tinnitus: Negative.  Ear Infections/Otalgia: Negative.  Rhinitis/Sinusitis/Epistaxis: Negative.  Headache/Facial Pain: Negative.  Nasal Obstruction/Snoring/RUBIN: Negative.  Throat: Infections/Pain: Negative.  Hoarseness/Speech Disturbance: Negative.  Salivary Glands Disorder: Negative.  Trauma: Hx: Negative.    Cardiovascular:  MI/Angina: Negative.  Hypertension: Negative.  Endo: DM/Steroids: Negative.  Eyes: Negative.  GI: Dysphagia/Reflux: Negative.  : GYN Pregnancy: Negative.      Renal: Dialysis: Negative.  Lymph: Neck Mass/Lymphadenopathy: Negative.  Musculoskeletal: Negative.  Hem: Bleeding Disorders/Anemia: Negative.  Neuro: Cranial/Neuralgia: Negative.  Pulm: Asthma/SOB/Cough: Negative.  Skin/Breast: Negative.    PAST MEDICAL/FAMILY/SOCIAL HISTORY:    Past Medical History   ENT Surgery: Negative.   Occupational Exposure: Negative.    Problems: Negative.   Cancer: Negative.   Only previous skin cancer of his arm as a surgical intervention.    Past Family History   Family history hearing loss: Negative.   Family history cancer: Negative.   No family history listed.    Past Social History   Tobacco: He is a nonsmoker.   Alcohol:  Occasional social drinker.    MEDICATIONS:  Per MedCard.    ALLERGIES:  Per MedCard.    EXAMINATION:  General Appearance:  Well-developed, well-nourished 66-year-old white male in no   apparent distress.  Communication Ability:  Good.  EARS, NOSE, THROAT, MOUTH;  EARS:  He has the previously mentioned Mohs defect of the right superior   preauricular region extending onto his anterior and upper helix.   External auditory canals: Clear.   Hearing: Grossly Intact.   Tympanic membranes: Clear.  NOSE:   External: Grossly normal.   Intranasal:  MOUTH:   Intraorally: Lips, teeth and gums: Normal.   Oropharynx: Normal.   Mucosa: Normal.  THROAT:   Tongue: Normal.   Palate: Normal.   Tonsils: Normal.   Posterior pharynx: Normal.  HEAD/FACE INSPECTION: Normal and atraumatic.   Palpation/Percussion:  Non tender.   Facial Strength: Normal and symmetric.   Salivary glands: Normal.    NECK: Supple.  THYROID: No masses.  LYMPHATICS: No nodes.  RESPIRATORY:   Effort: Normal.  EYES:   Ocular Mobility: Normal.   Vision: Grossly intact.  NEURO/PSYCH:   Cranial nerves: 2-12 grossly intact.   Orientation: x3.   Mood/Affect: Normal.    IMPRESSION:  A 66-year-old white male status post Mohs excision of skin   carcinoma of the right preauricular area and ear.    RECOMMENDATION:  I have discussed my findings with him in detail as well as my   recommendations for treatment.  My recommendation would be for repair of this   with flap and/or graft and we have discussed this in detail.  We will proceed   with surgery scheduling for tomorrow.      HG/HN  dd: 06/26/2017 14:54:43 (CDT)  td: 06/27/2017 06:25:31 (CDT)  Doc ID   #[id number]  Job ID #[id number]    CC:

## 2017-06-27 NOTE — OP NOTE
[facility]  ENT Department  Operative Note    SUMMARY     Date of Procedure: 6/27/2017     Procedure: Procedure(s) (LRB):  REPAIR-MOHS DEFECT (Right) ear with advancement flap 8 x 3 cm - CPT 58805  Site prep [id number]    Surgeon(s) and Role:     * Jeremías Mcgarry III, MD - Primary     * Geovani Figueroa MD - Resident - Assisting      Pre-Operative Diagnosis: Basal cell carcinoma [C44.91]  Preop testing [Z01.818]  Status post Mohs surgery [Z98.890]  Mohs defect of helix of right ear [H61.111]    Post-Operative Diagnosis: Post-Op Diagnosis Codes:     * Basal cell carcinoma [C44.91]     * Preop testing [Z01.818]     * Status post Mohs surgery [Z98.890]     * Mohs defect of helix of right ear [H61.111]    Anesthesia: General    Technical Procedures Used: Patient consent obtained preoperatively. He was taken to the operating room and remained on OR stretcher in supine position.  General endotracheal intubation was done without complications. Head of bed was rotated 90 degreed. The wound was prepped and draped in usual sterile fashion.5 cc Local lidocaine with epi 1:100,000 was injected around the periauricular moh's defect. Sharp debridement of devitalized and non-viable tissue was performed around the periphery of the surgical wound and wound bed in order to properly prepare the wound for approximation/repair. Wide undermining was then performed circumferentially approx 8 x 3 cm in total. A postauricular incision was made vertically and a separate back cut was made horizontally to allow proper rotation of surrounding tissue. These maneuvers allowed primary closure of the wound posteriorly and superiorly including the contact point of the helix and the post-auricular skin. The anterior portion of the wound was extended along a preauricular skin crease and burrow's triangle removed to allow for cosmetically appealing primary closure.  Closure of the deep layers of the incision was then carried out with a  combination of 3-0 and 5-0 monocryl for initial approximation which decreased wound tension.  This was followed by approximation of wound superficial edges using 6-0 nylon anteriorly in running locking fashion, as well as 6-0 absorbable suture near the auricle/post-auricular junction. The wound was coated in bacitracin and covered with telfa and small pressure dressing. He was handed over to anesthesia for extubation which was done without complications.    Description of the Findings of the Procedure: defect approx 2x3 cm; wide undermining involving 8 x 3 cm;       Complications: No    Estimated Blood Loss (EBL): * No values recorded between 6/27/2017  3:10 PM and 6/27/2017  4:30 PM *           Implants: * No implants in log *    Specimens:   Specimen (12h ago through future)    None                  Condition: Good    Disposition: PACU - hemodynamically stable.    Attestation: Dr. Mcgarry conducted the entirety of the procedure.

## 2017-06-27 NOTE — ANESTHESIA RELEASE NOTE
"Anesthesia Release from PACU Note    Patient: Juan Hobson    Procedure(s) Performed: Procedure(s) (LRB):  REPAIR-MOHS DEFECT (Right)    Anesthesia type: general    Post pain: Adequate analgesia    Post assessment: no apparent anesthetic complications, tolerated procedure well and no evidence of recall    Last Vitals:   Visit Vitals  /60   Pulse 64   Temp 36.8 °C (98.2 °F) (Axillary)   Resp 14   Ht 5' 10" (1.778 m)   Wt 79.4 kg (175 lb)   SpO2 96%   BMI 25.11 kg/m²       Post vital signs: stable    Level of consciousness: awake and alert     Nausea/Vomiting: no nausea/no vomiting    Complications: none    Airway Patency: patent    Respiratory: unassisted, spontaneous ventilation, room air    Cardiovascular: stable and blood pressure at baseline    Hydration: euvolemic  "

## 2017-06-28 ENCOUNTER — TELEPHONE (OUTPATIENT)
Dept: OTOLARYNGOLOGY | Facility: CLINIC | Age: 67
End: 2017-06-28

## 2017-07-03 ENCOUNTER — TELEPHONE (OUTPATIENT)
Dept: OTOLARYNGOLOGY | Facility: CLINIC | Age: 67
End: 2017-07-03

## 2017-07-03 ENCOUNTER — OFFICE VISIT (OUTPATIENT)
Dept: OTOLARYNGOLOGY | Facility: CLINIC | Age: 67
End: 2017-07-03
Payer: COMMERCIAL

## 2017-07-03 VITALS
HEIGHT: 70 IN | DIASTOLIC BLOOD PRESSURE: 76 MMHG | SYSTOLIC BLOOD PRESSURE: 117 MMHG | WEIGHT: 174.19 LBS | BODY MASS INDEX: 24.94 KG/M2

## 2017-07-03 DIAGNOSIS — C44.91 BASAL CELL CARCINOMA: ICD-10-CM

## 2017-07-03 DIAGNOSIS — H61.111 MOHS DEFECT OF HELIX OF RIGHT EAR: ICD-10-CM

## 2017-07-03 DIAGNOSIS — H61.111 MOHS DEFECT OF AURICLE OF RIGHT EAR: ICD-10-CM

## 2017-07-03 DIAGNOSIS — Z09 S/P EAR SURGERY, FOLLOW-UP EXAM: Primary | ICD-10-CM

## 2017-07-03 PROCEDURE — 99024 POSTOP FOLLOW-UP VISIT: CPT | Mod: S$GLB,,, | Performed by: NURSE PRACTITIONER

## 2017-07-03 PROCEDURE — 99999 PR PBB SHADOW E&M-EST. PATIENT-LVL III: CPT | Mod: PBBFAC,,, | Performed by: NURSE PRACTITIONER

## 2017-07-03 NOTE — PROGRESS NOTES
The patient presents back approximately one week S/P repair of Mohs defect of helix of right ear with flap repair.     Flap shows good viability and is healing well. Sutures are removed with out difficulty.     Wound care instructions are reviewed.    Plan: RTC in 1 week for follow up with Dr. Mcgarry.

## 2017-07-12 ENCOUNTER — OFFICE VISIT (OUTPATIENT)
Dept: OTOLARYNGOLOGY | Facility: CLINIC | Age: 67
End: 2017-07-12
Payer: COMMERCIAL

## 2017-07-12 DIAGNOSIS — Z85.828 HISTORY OF MOH'S MICROGRAPHIC SURGERY FOR SKIN CANCER: Primary | ICD-10-CM

## 2017-07-12 DIAGNOSIS — Z98.890 POST-OPERATIVE STATE: ICD-10-CM

## 2017-07-12 DIAGNOSIS — Z98.890 HISTORY OF MOH'S MICROGRAPHIC SURGERY FOR SKIN CANCER: Primary | ICD-10-CM

## 2017-07-12 PROCEDURE — 99024 POSTOP FOLLOW-UP VISIT: CPT | Mod: S$GLB,,, | Performed by: OTOLARYNGOLOGY

## 2017-07-12 PROCEDURE — 99999 PR PBB SHADOW E&M-EST. PATIENT-LVL II: CPT | Mod: PBBFAC,,, | Performed by: OTOLARYNGOLOGY

## 2017-07-12 NOTE — PROGRESS NOTES
The patient presents back approximately two weeks S/P repair of Mohs defect of helix of right ear with flap repair.      Flap shows good viability and is healing well.       Wound care instructions are reviewed again.     Plan: RTC in 6 weeks for reevaluation and possible plan for 2nd stage skin grafting.

## 2017-08-23 ENCOUNTER — OFFICE VISIT (OUTPATIENT)
Dept: OTOLARYNGOLOGY | Facility: CLINIC | Age: 67
End: 2017-08-23
Payer: COMMERCIAL

## 2017-08-23 VITALS — HEART RATE: 58 BPM | DIASTOLIC BLOOD PRESSURE: 85 MMHG | SYSTOLIC BLOOD PRESSURE: 144 MMHG

## 2017-08-23 DIAGNOSIS — Z98.890 HISTORY OF MOH'S MICROGRAPHIC SURGERY FOR SKIN CANCER: ICD-10-CM

## 2017-08-23 DIAGNOSIS — H61.111 MOHS DEFECT OF AURICLE OF RIGHT EAR: Primary | ICD-10-CM

## 2017-08-23 DIAGNOSIS — Z98.890 POST-OPERATIVE STATE: ICD-10-CM

## 2017-08-23 DIAGNOSIS — Z85.828 HISTORY OF MOH'S MICROGRAPHIC SURGERY FOR SKIN CANCER: ICD-10-CM

## 2017-08-23 PROCEDURE — 99024 POSTOP FOLLOW-UP VISIT: CPT | Mod: S$GLB,,, | Performed by: OTOLARYNGOLOGY

## 2017-08-23 PROCEDURE — 99999 PR PBB SHADOW E&M-EST. PATIENT-LVL II: CPT | Mod: PBBFAC,,, | Performed by: OTOLARYNGOLOGY

## 2017-08-23 NOTE — PROGRESS NOTES
Two months S/P repair of Mohs defect of helix of right ear with flap repair.    Flap shows good viability and has healed well.     Discussed options for second stage repair but he is happy with results now and has no problem wearing his glasses.  Plan: RTC @ 1 year.

## 2019-05-11 ENCOUNTER — HOSPITAL ENCOUNTER (EMERGENCY)
Facility: HOSPITAL | Age: 69
Discharge: HOME | End: 2019-05-11
Attending: EMERGENCY MEDICINE
Payer: COMMERCIAL

## 2019-05-11 VITALS
DIASTOLIC BLOOD PRESSURE: 95 MMHG | WEIGHT: 170 LBS | HEIGHT: 70 IN | HEART RATE: 93 BPM | SYSTOLIC BLOOD PRESSURE: 143 MMHG | TEMPERATURE: 98 F | OXYGEN SATURATION: 99 % | RESPIRATION RATE: 18 BRPM | BODY MASS INDEX: 24.34 KG/M2

## 2019-05-11 DIAGNOSIS — L03.319 CELLULITIS AND ABSCESS OF TRUNK: Primary | ICD-10-CM

## 2019-05-11 DIAGNOSIS — L02.219 CELLULITIS AND ABSCESS OF TRUNK: Primary | ICD-10-CM

## 2019-05-11 PROCEDURE — 25000003 PHARM REV CODE 250: Performed by: PHYSICIAN ASSISTANT

## 2019-05-11 PROCEDURE — 99283 EMERGENCY DEPT VISIT LOW MDM: CPT

## 2019-05-11 PROCEDURE — 10061 I&D ABSCESS COMP/MULTIPLE: CPT

## 2019-05-11 RX ORDER — LIDOCAINE HYDROCHLORIDE AND EPINEPHRINE 10; 10 MG/ML; UG/ML
10 INJECTION, SOLUTION INFILTRATION; PERINEURAL
Status: COMPLETED | OUTPATIENT
Start: 2019-05-11 | End: 2019-05-11

## 2019-05-11 RX ORDER — CLINDAMYCIN HYDROCHLORIDE 300 MG/1
300 CAPSULE ORAL 3 TIMES DAILY
Qty: 21 CAPSULE | Refills: 0 | Status: SHIPPED | OUTPATIENT
Start: 2019-05-11 | End: 2019-05-18

## 2019-05-11 RX ADMIN — BACITRACIN, NEOMYCIN, POLYMYXIN B 1 EACH: 400; 3.5; 5 OINTMENT TOPICAL at 11:05

## 2019-05-11 RX ADMIN — LIDOCAINE HYDROCHLORIDE AND EPINEPHRINE 10 ML: 10; 10 INJECTION, SOLUTION INFILTRATION; PERINEURAL at 11:05

## 2019-05-11 NOTE — DISCHARGE INSTRUCTIONS
Please keep your wound clean and dry. Do not submergeThe wound under water.  You will need to remove the gauze packing within 24 hr of your visit; around noon tomorrow (on Sunday 5/12/19).    Wash gently with soap and water and apply antibiotic ointment (bacitracin, neosporin, etc.) over the wound after washing.     Take antibiotics three times daily for 7 days as directed.    You can take Ibuprofen for pain.    Please watch for signs of infection including: increased\spreading redness or a fever greater than 100.4F uncontrolled by Tylenol and/or Ibuprofen. If you experience any of these, please contact your Primary Care Doctor or Return to the Emergency Department for a wound check.

## 2019-05-11 NOTE — ED PROVIDER NOTES
Encounter Date: 5/11/2019    SCRIBE #1 NOTE: Joanne CLARKE am scribing for, and in the presence of,  Amina Reed PA-C . I have scribed the following portions of the note - Other sections scribed: HPI, ROS.       History     Chief Complaint   Patient presents with    Abscess     CC: Abscess    HPI: This 69 y/o male with a medical history of Basal cell carcinoma and Skin cancer of arm presents to the ED for evaluation of abscess to L shoulder for x5 days. Patient reports he recently took Amoxicillin 500 mg for dental abscess x2 weeks ago but notes not completing the entire dosage once signs of improvement were shown. Patient also notes taking Ibuprofen 800 mg, with no improvments made. Patient denies history of DM or any known allergies. Patient also denies fever, chills, SOB, cognestion, leg swelling, or N/V/D. No alleviating or exacerbating factors reported.       The history is provided by the patient. No  was used.     Review of patient's allergies indicates:  No Known Allergies  Past Medical History:   Diagnosis Date    Basal cell carcinoma     right helix    Skin cancer of arm      Past Surgical History:   Procedure Laterality Date    REPAIR-MOHS DEFECT Right 6/27/2017    Performed by Jeremías Mcgarry III, MD at [facility] OR [address] [place] [address]     No family history on file.  Social History     Tobacco Use    Smoking status: Never Smoker    Smokeless tobacco: Never Used   Substance Use Topics    Alcohol use: Yes     Comment: sometimes    Drug use: No     Review of Systems   Constitutional: Negative for chills and fever.   HENT: Negative for congestion, ear discharge, ear pain, nosebleeds, rhinorrhea and sore throat.    Eyes: Negative for pain.   Respiratory: Negative for cough and chest tightness.    Cardiovascular: Negative for chest pain.   Gastrointestinal: Negative for abdominal pain, constipation, diarrhea, nausea and vomiting.   Genitourinary: Negative for dysuria, flank pain, hematuria  and urgency.   Musculoskeletal: Negative for back pain, myalgias and neck pain.   Skin: Negative for rash.        (+) abscess to L shoulder    Neurological: Negative for dizziness, weakness, light-headedness, numbness and headaches.   Psychiatric/Behavioral: Negative for agitation.       Physical Exam     Initial Vitals [05/11/19 1007]   BP Pulse Resp Temp SpO2   (!) 146/85 93 18 99.3 °F (37.4 °C) 99 %      MAP       --         Physical Exam    Nursing note and vitals reviewed.  Constitutional: Vital signs are normal. He appears well-developed and well-nourished. He is not diaphoretic. He is cooperative.  Non-toxic appearance. He does not have a sickly appearance. He does not appear ill. No distress.   HENT:   Head: Normocephalic and atraumatic.   Right Ear: External ear normal.   Left Ear: External ear normal.   Nose: Nose normal.   Mouth/Throat: Uvula is midline, oropharynx is clear and moist and mucous membranes are normal. No oral lesions. No trismus in the jaw. Abnormal dentition (several missing teeth). No dental abscesses or uvula swelling. No oropharyngeal exudate, posterior oropharyngeal edema, posterior oropharyngeal erythema or tonsillar abscesses.   Eyes: Conjunctivae, EOM and lids are normal. Pupils are equal, round, and reactive to light.   Neck: Trachea normal, normal range of motion, full passive range of motion without pain and phonation normal. Neck supple. No spinous process tenderness and no muscular tenderness present. Normal range of motion present. No neck rigidity.   Cardiovascular: Normal rate, regular rhythm, normal heart sounds and intact distal pulses. Exam reveals no gallop and no friction rub.    No murmur heard.  Pulmonary/Chest: Effort normal and breath sounds normal. No respiratory distress. He has no decreased breath sounds. He has no wheezes. He has no rhonchi. He has no rales.   Abdominal: Soft. Normal appearance and bowel sounds are normal. He exhibits no distension. There is no  tenderness.   Musculoskeletal: Normal range of motion.   Full ROM of all extremities. Patient ambulates without assistance.    Lymphadenopathy:     He has no cervical adenopathy.   Neurological: He is alert and oriented to person, place, and time. He has normal strength.   Skin: Skin is warm and dry. Capillary refill takes less than 2 seconds. Abscess noted. No abrasion, no bruising, no burn, no ecchymosis, no laceration, no lesion and no rash noted. No erythema.        Psychiatric: He has a normal mood and affect. His speech is normal and behavior is normal. Judgment and thought content normal. Cognition and memory are normal.         ED Course   I & D - Incision and Drainage  Date/Time: 5/11/2019 6:39 PM  Performed by: Amina Reed PA-C  Authorized by: Xavi Gibson MD   Consent Done: Yes  Consent: Verbal consent obtained.  Risks and benefits: risks, benefits and alternatives were discussed  Consent given by: patient  Patient understanding: patient states understanding of the procedure being performed  Patient consent: the patient's understanding of the procedure matches consent given  Patient identity confirmed: verbally with patient  Type: abscess  Body area: trunk  Location details: back  Anesthesia: local infiltration    Anesthesia:  Local Anesthetic: lidocaine 1% with epinephrine  Anesthetic total: 8 mL  Scalpel size: 11  Incision type: single straight  Complexity: complex  Drainage: purulent and  bloody  Drainage amount: copious  Wound treatment: incision,  drainage,  sebum removal,  removal of cyst capsule and  wound packed  Packing material: 1/4 in gauze  Patient tolerance: Patient tolerated the procedure well with no immediate complications        Labs Reviewed - No data to display       Imaging Results    None                APC / Resident Notes:   This is an emergent evaluation of a 68 y.o. male presenting to the ED for abscess. Denies further symptoms. Afebrile. Patient is non-toxic appearing  and in no acute distress. Presentation consistent with soft tissue abscess 2/2 inflamed sebaceous cyst. Cellulitis present. No symptoms for systemic infection or evidence of necrotizing fasciitis.     Fluid collection drained and packed. Discharged with antibiotics and wound care insturctions. Instructed patient to remove packing within the next 24 hours.    I discussed with the patient the diagnosis, treatment plan, indications for return to the emergency department, and for expected follow-up. The patient verbalized an understanding. The patient is asked if there are any questions or concerns. We discuss the case, until all issues are addressed to the patients satisfaction. Patient understands and is agreeable to the plan.     Amina Reed PA-C                 Scribe Attestation:   Scribe #1: I performed the above scribed service and the documentation accurately describes the services I performed. I attest to the accuracy of the note.    Attending Attestation:           Physician Attestation for Scribe:  Physician Attestation Statement for Scribe #1: I, Amina Reed PA-C , reviewed documentation, as scribed by Joanne Salgado in my presence, and it is both accurate and complete.                    Clinical Impression:       ICD-10-CM ICD-9-CM   1. Cellulitis and abscess of trunk L03.319 682.2    L02.219          Disposition:   Disposition: Discharged  Condition: Stable                        Amina Reed PA-C  05/11/19 1841

## 2019-05-11 NOTE — ED TRIAGE NOTES
Reports having pain to right side of mouth with reported swelling  To right side of neck . Reports having abscess to left  For past 5 days. Denies fever, an chills

## 2019-05-12 ENCOUNTER — HOSPITAL ENCOUNTER (EMERGENCY)
Facility: HOSPITAL | Age: 69
Discharge: HOME | End: 2019-05-12
Attending: EMERGENCY MEDICINE
Payer: COMMERCIAL

## 2019-05-12 VITALS
RESPIRATION RATE: 18 BRPM | OXYGEN SATURATION: 96 % | SYSTOLIC BLOOD PRESSURE: 143 MMHG | DIASTOLIC BLOOD PRESSURE: 74 MMHG | HEIGHT: 70 IN | HEART RATE: 84 BPM | TEMPERATURE: 100 F | BODY MASS INDEX: 23.62 KG/M2 | WEIGHT: 165 LBS

## 2019-05-12 DIAGNOSIS — Z51.89 VISIT FOR WOUND CHECK: Primary | ICD-10-CM

## 2019-05-12 PROCEDURE — 99283 EMERGENCY DEPT VISIT LOW MDM: CPT

## 2019-05-12 PROCEDURE — 25000003 PHARM REV CODE 250: Performed by: PHYSICIAN ASSISTANT

## 2019-05-12 RX ORDER — MUPIROCIN 20 MG/G
1 OINTMENT TOPICAL
Status: COMPLETED | OUTPATIENT
Start: 2019-05-12 | End: 2019-05-12

## 2019-05-12 RX ADMIN — MUPIROCIN 22 G: 20 OINTMENT TOPICAL at 12:05

## 2019-05-12 NOTE — DISCHARGE INSTRUCTIONS
Please keep your wound clean and dry. Do not submerge your wound under water until it is fully healed.  Wash gently with soap and water and apply antibiotic ointment (bacitracin, neosporin, etc.) over the wound after washing.     It it is important that you take the antibiotics as directed.     Please watch for signs of infection including: increased\spreading redness, swelling, pus-like discharge, or a fever greater than 100.4F. If you experience any of these, please contact your Primary Care Doctor or Return to the Emergency Department for a wound check.       Follow-up with your primary care doctor for a check-up.

## 2019-05-12 NOTE — ED PROVIDER NOTES
Encounter Date: 5/12/2019    SCRIBE #1 NOTE: IRamon am scribing for, and in the presence of,  Amina Reed PA-C. I have scribed the following portions of the note - Other sections scribed: HPI/ROS.       History     Chief Complaint   Patient presents with    Wound Check     Here to have packing removed from lt shoulder abscess.     CC: Wound Check      HPI: This 68 y.o. male presents to the ED for a wound check. Pt just needs packing removed. Reports he picked up antibiotic today from pharmacy but has not taken a dose yet. Denies fever, chills, n/v/d or further symptoms.     The history is provided by the patient. No  was used.     Review of patient's allergies indicates:  No Known Allergies  Past Medical History:   Diagnosis Date    Basal cell carcinoma     right helix    Skin cancer of arm      Past Surgical History:   Procedure Laterality Date    REPAIR-MOHS DEFECT Right 6/27/2017    Performed by Jeremías Mcgarry III, MD at [facility] OR [address] [place] [address]     History reviewed. No pertinent family history.  Social History     Tobacco Use    Smoking status: Never Smoker    Smokeless tobacco: Never Used   Substance Use Topics    Alcohol use: Yes     Comment: sometimes    Drug use: No     Review of Systems   Constitutional: Negative for chills and fever.   HENT: Negative for congestion, ear pain, rhinorrhea and sore throat.    Eyes: Negative for pain and visual disturbance.   Respiratory: Negative for cough and shortness of breath.    Cardiovascular: Negative for chest pain.   Gastrointestinal: Negative for abdominal pain, constipation, diarrhea, nausea and vomiting.   Genitourinary: Negative for decreased urine volume and dysuria.   Musculoskeletal: Negative for arthralgias, back pain, gait problem, joint swelling, myalgias, neck pain and neck stiffness.   Skin: Positive for wound (healing, left upper back). Negative for color change, pallor and rash.   Neurological: Negative for  headaches.   Psychiatric/Behavioral: Negative for confusion.   All other systems reviewed and are negative.      Physical Exam     Initial Vitals [05/12/19 1200]   BP Pulse Resp Temp SpO2   117/70 80 16 98.9 °F (37.2 °C) 97 %      MAP       --         Physical Exam    Nursing note and vitals reviewed.  Constitutional: Vital signs are normal. He appears well-developed and well-nourished. He is not diaphoretic. He is cooperative.  Non-toxic appearance. He does not have a sickly appearance. He does not appear ill. No distress.   HENT:   Head: Normocephalic and atraumatic.   Right Ear: External ear normal.   Left Ear: External ear normal.   Nose: Nose normal.   Mouth/Throat: Uvula is midline, oropharynx is clear and moist and mucous membranes are normal.   Eyes: Conjunctivae, EOM and lids are normal. Pupils are equal, round, and reactive to light.   Neck: Trachea normal, normal range of motion, full passive range of motion without pain and phonation normal. Neck supple.   Cardiovascular: Normal rate, regular rhythm, normal heart sounds and intact distal pulses. Exam reveals no gallop and no friction rub.    No murmur heard.  Pulmonary/Chest: Effort normal and breath sounds normal. No respiratory distress. He has no decreased breath sounds. He has no wheezes. He has no rhonchi. He has no rales.   Abdominal: Soft. Normal appearance and bowel sounds are normal. He exhibits no distension. There is no tenderness.   Musculoskeletal: Normal range of motion.   Full ROM of all extremities. Peripheral pulses intact. Peripheral sensation and strength intact. Patient ambulatory without assistance.   Neurological: He is alert and oriented to person, place, and time. He has normal strength. He displays no atrophy. No cranial nerve deficit or sensory deficit. Coordination and gait normal. GCS eye subscore is 4. GCS verbal subscore is 5. GCS motor subscore is 6.   Skin: Skin is warm and dry. Capillary refill takes less than 2 seconds.  No abrasion, no bruising, no burn, no ecchymosis, no laceration, no lesion, no rash and no abscess noted. No erythema.        2 cm linear wound which appears to be healing without discharge. Mild surrounding erythema and mild tenderness, without warmth or necrosis.    Psychiatric: He has a normal mood and affect. His speech is normal and behavior is normal. Judgment and thought content normal. Cognition and memory are normal.         ED Course   Procedures  Labs Reviewed - No data to display       Imaging Results    None                APC / Resident Notes:   68 y.o. Male presents for wound check. Patient has I&D of abscess of the left upper back performed by myself yesterday. Patient had Clindamycin which he picked up from pharmacy this morning, but has not taken a dose yet. The packing was removed; wound appears to be healing well without discharge or necrosis. Mild erythema appears to be improving when compared to yesterday. Mupirocin ointment and a new dressing was placed. I have encouraged patient to take Clindamycin as directed. I have also discussed wound care with patient, and he verbalized understanding. I will recommend follow-up with primary care. I feel this patient is stable for discharge. Instructional sheet given regarding diagnosis and treatment plan.    Amina Reed PA-C         Scribe Attestation:   Scribe #1: I performed the above scribed service and the documentation accurately describes the services I performed. I attest to the accuracy of the note.    Attending Attestation:           Physician Attestation for Scribe:  Physician Attestation Statement for Scribe #1: I, Amina Reed PA-C, reviewed documentation, as scribed by Ramon Zabala in my presence, and it is both accurate and complete.                    Clinical Impression:       ICD-10-CM ICD-9-CM   1. Visit for wound check Z51.89 V58.89         Disposition:   Disposition: Discharged  Condition: Stable                         Amina Reed PA-C  05/12/19 2711

## 2019-05-12 NOTE — ED TRIAGE NOTES
Patient here for packing removal from left shoulder. Seen on 5/12/19 for abscess to area, I&D performed.

## 2019-05-17 ENCOUNTER — HOSPITAL ENCOUNTER (EMERGENCY)
Facility: HOSPITAL | Age: 69
Discharge: HOME | End: 2019-05-17
Attending: EMERGENCY MEDICINE
Payer: COMMERCIAL

## 2019-05-17 VITALS
SYSTOLIC BLOOD PRESSURE: 154 MMHG | BODY MASS INDEX: 23.62 KG/M2 | DIASTOLIC BLOOD PRESSURE: 73 MMHG | TEMPERATURE: 98 F | RESPIRATION RATE: 18 BRPM | HEIGHT: 70 IN | HEART RATE: 61 BPM | WEIGHT: 165 LBS

## 2019-05-17 DIAGNOSIS — Z51.89 VISIT FOR WOUND CHECK: Primary | ICD-10-CM

## 2019-05-17 DIAGNOSIS — T14.90XD HEALING WOUND: ICD-10-CM

## 2019-05-17 PROCEDURE — 99281 EMR DPT VST MAYX REQ PHY/QHP: CPT

## 2019-05-17 NOTE — DISCHARGE INSTRUCTIONS
Please change dressing daily.  You may use the antibiotic cream on the site daily.  Finish the full course of antibiotics.  Watch for signs of worsening infection including redness, swelling, drainage, fever chills.  Follow up with family doctor provided this week.

## 2019-05-17 NOTE — ED PROVIDER NOTES
Encounter Date: 5/17/2019       History     Chief Complaint   Patient presents with    Abscess     Pt reports having an abscess drained on his left shoulder last week, today c/o the abscess still draining small amounts of blood, denies pain to the site, denies fever or chills     Patient is a 68-year-old male presenting to the ER for evaluation of an abscess.  Patient was recently seen in the ED 5/11/19 for incision drainage of abscess.  He was prescribed home on clindamycin which he states he is compliant with.  Patient states that the site has improved in regards to swelling and redness but he has noticed mild bleeding at site.  He had denies any fevers or chills at home.  Denies any worsening pain. States that he is changing the dressing daily.    The history is provided by the patient.     Review of patient's allergies indicates:  No Known Allergies  Past Medical History:   Diagnosis Date    Basal cell carcinoma     right helix    Skin cancer of arm      Past Surgical History:   Procedure Laterality Date    REPAIR-MOHS DEFECT Right 6/27/2017    Performed by Jeremías Mcgarry III, MD at [facility] OR [address] [place] [address]     History reviewed. No pertinent family history.  Social History     Tobacco Use    Smoking status: Never Smoker    Smokeless tobacco: Never Used   Substance Use Topics    Alcohol use: Yes     Comment: sometimes    Drug use: No     Review of Systems   Constitutional: Negative for chills and fever.   HENT: Negative for congestion.    Respiratory: Negative for shortness of breath.    Cardiovascular: Negative for chest pain.   Skin: Positive for wound.   Allergic/Immunologic: Negative for immunocompromised state.   Neurological: Negative for weakness.   Hematological: Does not bruise/bleed easily.   Psychiatric/Behavioral: Negative for confusion.       Physical Exam     Initial Vitals [05/17/19 1704]   BP Pulse Resp Temp SpO2   139/81 71 18 98.9 °F (37.2 °C) 99 %      MAP       --         Physical  Exam    Vitals reviewed.  Constitutional: He appears well-developed and well-nourished. He is not diaphoretic. No distress.   HENT:   Head: Normocephalic and atraumatic.   Eyes: Conjunctivae and EOM are normal.   Neck: Neck supple.   Cardiovascular: Normal rate, regular rhythm and normal heart sounds.   Pulmonary/Chest: Breath sounds normal.   Neurological: He is alert and oriented to person, place, and time.   Skin: Skin is warm and dry. There is erythema.              ED Course   Procedures  Labs Reviewed - No data to display       Imaging Results    None                APC / Resident Notes:   Patient seen in the ER promptly upon arrival.  He is afebrile, no acute distress.  Physical examination reveals an area, approximately 3 cm of erythema.  There is an old incision scar which appears to be healing well and scabbing over. There is no induration or fluctuance.  There is no evidence of necrosis.  There was serosanguineous fluid on the dressing but upon expressing the site there was no discharge or bleeding.  The wound appears to be healing well. There is no evidence of sepsis or septic joint.    Patient advised to finish the full course of antibiotics he was prescribed.  He was also advised to use the antibiotic cream daily with dressing changes.  Patient advised to follow up with family doctor this week for recheck of wound.  Patient agreeable to this treatment plan.  He was reassured and is stable for discharge at this time.                 Clinical Impression:       ICD-10-CM ICD-9-CM   1. Visit for wound check Z51.89 V58.89   2. Healing wound T14.90XD 959.9         Disposition:   Disposition: Discharged  Condition: Stable                        Viktoria Walker PA-C  05/17/19 8865

## 2019-05-17 NOTE — ED TRIAGE NOTES
Pt c/o abscess to LEFT shoulder x11 days. Pt seen in this ED on 5/11/2019 for same complaint and had abscess drained, packing removed the next day. Pt reports the swelling has gone down but there is small amount of drainage still coming out. Pt rx clindamycin and states he has been taking it at home

## 2019-08-06 ENCOUNTER — OFFICE VISIT (OUTPATIENT)
Dept: UROLOGY | Facility: CLINIC | Age: 69
End: 2019-08-06
Payer: COMMERCIAL

## 2019-08-06 VITALS
BODY MASS INDEX: 22.09 KG/M2 | DIASTOLIC BLOOD PRESSURE: 76 MMHG | SYSTOLIC BLOOD PRESSURE: 110 MMHG | WEIGHT: 154.31 LBS | HEART RATE: 85 BPM | HEIGHT: 70 IN

## 2019-08-06 DIAGNOSIS — N48.89 PENILE PAIN: ICD-10-CM

## 2019-08-06 DIAGNOSIS — N48.89 PENILE SWELLING: Primary | ICD-10-CM

## 2019-08-06 PROCEDURE — 1101F PT FALLS ASSESS-DOCD LE1/YR: CPT | Mod: CPTII,S$GLB,, | Performed by: NURSE PRACTITIONER

## 2019-08-06 PROCEDURE — 99999 PR PBB SHADOW E&M-EST. PATIENT-LVL III: CPT | Mod: PBBFAC,,, | Performed by: NURSE PRACTITIONER

## 2019-08-06 PROCEDURE — 99203 OFFICE O/P NEW LOW 30 MIN: CPT | Mod: S$GLB,,, | Performed by: NURSE PRACTITIONER

## 2019-08-06 PROCEDURE — 99203 PR OFFICE/OUTPT VISIT, NEW, LEVL III, 30-44 MIN: ICD-10-PCS | Mod: S$GLB,,, | Performed by: NURSE PRACTITIONER

## 2019-08-06 PROCEDURE — 99999 PR PBB SHADOW E&M-EST. PATIENT-LVL III: ICD-10-PCS | Mod: PBBFAC,,, | Performed by: NURSE PRACTITIONER

## 2019-08-06 PROCEDURE — 1101F PR PT FALLS ASSESS DOC 0-1 FALLS W/OUT INJ PAST YR: ICD-10-PCS | Mod: CPTII,S$GLB,, | Performed by: NURSE PRACTITIONER

## 2019-08-06 RX ORDER — SULFAMETHOXAZOLE AND TRIMETHOPRIM 800; 160 MG/1; MG/1
1 TABLET ORAL 2 TIMES DAILY
Qty: 28 TABLET | Refills: 0 | Status: SHIPPED | OUTPATIENT
Start: 2019-08-06 | End: 2019-08-20

## 2019-08-06 NOTE — PROGRESS NOTES
"CHIEF COMPLAINT:    Mr. Hobson is a 68 y.o. male presenting for penile pain and swelling.  PRESENTING ILLNESS:    Juan Hobson is a 68 y.o. male who presents for penile pain and swelling. This is his initial clinic visit.    Pt reports he was bit on penis ~ 2 months ago as a "friendly gesture". Initially he started having pain and swelling to penis that would come and go. He did not have it evaluated or treated. Pain and swelling have gradually worsened and he now has constant pain and worsening swelling and redness. He is uncircumcised and unable to retract foreskin. Pain is described as 5/10, aching pain but does worsen throughout the daytime. Denies any urinary complaints. FOS strong. Denies frequency, nocturia, urgency or incontinence. Denies scrotal swelling or testicle pain. Denies fever or chills. Denies dysuria, hematuria or flank pain.    REVIEW OF SYSTEMS:    Review of Systems    Constitutional: Negative for fever and chills.   HENT: Negative for hearing loss.   Eyes: Negative for visual disturbance.   Respiratory: Negative for shortness of breath.   Cardiovascular: Negative for chest pain.   Gastrointestinal: Negative for nausea, vomiting, and constipation.   Genitourinary:  See above  Neurological: Negative for dizziness.   Hematological: Does not bruise/bleed easily.   Psychiatric/Behavioral: Negative for confusion.       PATIENT HISTORY:    Past Medical History:   Diagnosis Date    Basal cell carcinoma     right helix    Skin cancer of arm        Past Surgical History:   Procedure Laterality Date    REPAIR-MOHS DEFECT Right 6/27/2017    Performed by Jeremías Mcgarry III, MD at [facility] OR [address] [place] [address]       History reviewed. No pertinent family history.    Social History     Socioeconomic History    Marital status: Single     Spouse name: Not on file    Number of children: Not on file    Years of education: Not on file    Highest education level: Not on file   Occupational History    Not on file "   Social Needs    Financial resource strain: Not on file    Food insecurity:     Worry: Not on file     Inability: Not on file    Transportation needs:     Medical: Not on file     Non-medical: Not on file   Tobacco Use    Smoking status: Never Smoker    Smokeless tobacco: Never Used   Substance and Sexual Activity    Alcohol use: Yes     Comment: sometimes    Drug use: No    Sexual activity: Not on file   Lifestyle    Physical activity:     Days per week: Not on file     Minutes per session: Not on file    Stress: Not on file   Relationships    Social connections:     Talks on phone: Not on file     Gets together: Not on file     Attends Church service: Not on file     Active member of club or organization: Not on file     Attends meetings of clubs or organizations: Not on file     Relationship status: Not on file   Other Topics Concern    Not on file   Social History Narrative    Not on file       Allergies:  Patient has no known allergies.    Medications:    Current Outpatient Medications:     fish oil-omega-3 fatty acids 300-1,000 mg capsule, Take 2 g by mouth once daily., Disp: , Rfl:     multivitamin capsule, Take 1 capsule by mouth once daily., Disp: , Rfl:     sulfamethoxazole-trimethoprim 800-160mg (BACTRIM DS) 800-160 mg Tab, Take 1 tablet by mouth 2 (two) times daily. for 14 days, Disp: 28 tablet, Rfl: 0    PHYSICAL EXAMINATION:    Constitutional: He is oriented to person, place, and time. He appears well-developed and well-nourished.  He is in no apparent distress.    Neck: Normal ROM.     Cardiovascular: Normal rate.      Pulmonary/Chest: Effort normal. No respiratory distress.     Abdominal:  He exhibits no distension.  There is no CVA tenderness.     Lymphadenopathy:        Right: No supraclavicular adenopathy present.        Left: No supraclavicular adenopathy present.     Neurological: He is alert and oriented to person, place, and time.     Skin: Skin is warm and dry.      Extremities: Normal ROM    Psych: Cooperative with normal affect.    Genitourinary: The penis is uncircumcised with evidence of phimosis. Erythema, warmth, and swelling noted to penis.    The testes, epididymides, and cord structures are normal in size and contour bilaterally. The scrotum is normal in size and contour.  The prostate is 35 g.  Prostate is smooth, non tender with no nodules noted.    Physical Exam      LABS:    N/A      IMPRESSION:    Encounter Diagnoses   Name Primary?    Penile swelling Yes    Penile pain          PLAN:  -Dr. Armando assessed patient during clinic visit.  -Will start patient on bactrim.   Discussed side effects, indications, and MOA for bactrim. Prescription sent to the pharmacy. Pt verbalized understanding.  -Need to keep penis elevated; have penis pointed toward abdomen to allow drainage  -If no improvement with bactrim, Dr. Armando recommended referral to ID. May need circumcision in future.  -RTC 1 week to reassess    I spent 35 minutes with the patient of which more than half was spent in coordinating the patient's care as well as in direct consultation with the patient in regards to our treatment and plan.

## 2019-08-13 ENCOUNTER — OFFICE VISIT (OUTPATIENT)
Dept: UROLOGY | Facility: CLINIC | Age: 69
End: 2019-08-13
Payer: COMMERCIAL

## 2019-08-13 VITALS
WEIGHT: 152.56 LBS | BODY MASS INDEX: 21.89 KG/M2 | SYSTOLIC BLOOD PRESSURE: 125 MMHG | DIASTOLIC BLOOD PRESSURE: 81 MMHG | HEART RATE: 67 BPM

## 2019-08-13 DIAGNOSIS — N48.89 PENILE PAIN: Primary | ICD-10-CM

## 2019-08-13 DIAGNOSIS — N48.89 PENILE SWELLING: ICD-10-CM

## 2019-08-13 PROCEDURE — 99999 PR PBB SHADOW E&M-EST. PATIENT-LVL II: ICD-10-PCS | Mod: PBBFAC,,, | Performed by: NURSE PRACTITIONER

## 2019-08-13 PROCEDURE — 99213 OFFICE O/P EST LOW 20 MIN: CPT | Mod: S$GLB,,, | Performed by: NURSE PRACTITIONER

## 2019-08-13 PROCEDURE — 1101F PR PT FALLS ASSESS DOC 0-1 FALLS W/OUT INJ PAST YR: ICD-10-PCS | Mod: CPTII,S$GLB,, | Performed by: NURSE PRACTITIONER

## 2019-08-13 PROCEDURE — 99999 PR PBB SHADOW E&M-EST. PATIENT-LVL II: CPT | Mod: PBBFAC,,, | Performed by: NURSE PRACTITIONER

## 2019-08-13 PROCEDURE — 99213 PR OFFICE/OUTPT VISIT, EST, LEVL III, 20-29 MIN: ICD-10-PCS | Mod: S$GLB,,, | Performed by: NURSE PRACTITIONER

## 2019-08-13 PROCEDURE — 1101F PT FALLS ASSESS-DOCD LE1/YR: CPT | Mod: CPTII,S$GLB,, | Performed by: NURSE PRACTITIONER

## 2019-08-13 NOTE — PROGRESS NOTES
"CHIEF COMPLAINT:    Mr. Hobson is a 68 y.o. male presenting for f/u penile pain and swelling.  PRESENTING ILLNESS:    Juan Hobson is a 68 y.o. male who presents for f/u penile pain and swelling. His last clinic visit was 8/6/19.    Pt was started on bactrim at last clinic visit for penile pain, erythema and swelling to penis. Pt reports huge improvement since being on bactrim. Swelling and erythema have reduced and he is no longer experiencing penile pain. He has not been elevating penis toward abdomen. He is not able to retract foreskin but it is not as tight as it was last clinic visit. He is not having any urinary issues. FOS strong. Denies frequency, nocturia, urgency or incontinence. Denies scrotal swelling or testicle pain. Denies fever or chills. Denies dysuria, hematuria or flank pain.    Initial visit:  Pt reports he was bit on penis ~ 2 months ago as a "friendly gesture". Initially he started having pain and swelling to penis that would come and go. He did not have it evaluated or treated. Pain and swelling have gradually worsened and he now has constant pain and worsening swelling and redness. He is uncircumcised and unable to retract foreskin. Pain is described as 5/10, aching pain but does worsen throughout the daytime. Denies any urinary complaints. FOS strong. Denies frequency, nocturia, urgency or incontinence. Denies scrotal swelling or testicle pain. Denies fever or chills. Denies dysuria, hematuria or flank pain.    REVIEW OF SYSTEMS:    Review of Systems    Constitutional: Negative for fever and chills.   HENT: Negative for hearing loss.   Eyes: Negative for visual disturbance.   Respiratory: Negative for shortness of breath.   Cardiovascular: Negative for chest pain.   Gastrointestinal: Negative for nausea, vomiting, and constipation.   Genitourinary:  See above  Neurological: Negative for dizziness.   Hematological: Does not bruise/bleed easily.   Psychiatric/Behavioral: Negative for " confusion.       PATIENT HISTORY:    Past Medical History:   Diagnosis Date    Basal cell carcinoma     right helix    Skin cancer of arm        Past Surgical History:   Procedure Laterality Date    REPAIR-MOHS DEFECT Right 6/27/2017    Performed by Jeremías Mcgarry III, MD at [facility] OR [address] [place] [address]       History reviewed. No pertinent family history.    Social History     Socioeconomic History    Marital status: Single     Spouse name: Not on file    Number of children: Not on file    Years of education: Not on file    Highest education level: Not on file   Occupational History    Not on file   Social Needs    Financial resource strain: Not on file    Food insecurity:     Worry: Not on file     Inability: Not on file    Transportation needs:     Medical: Not on file     Non-medical: Not on file   Tobacco Use    Smoking status: Never Smoker    Smokeless tobacco: Never Used   Substance and Sexual Activity    Alcohol use: Yes     Comment: sometimes    Drug use: No    Sexual activity: Not on file   Lifestyle    Physical activity:     Days per week: Not on file     Minutes per session: Not on file    Stress: Not on file   Relationships    Social connections:     Talks on phone: Not on file     Gets together: Not on file     Attends Yarsanism service: Not on file     Active member of club or organization: Not on file     Attends meetings of clubs or organizations: Not on file     Relationship status: Not on file   Other Topics Concern    Not on file   Social History Narrative    Not on file       Allergies:  Patient has no known allergies.    Medications:    Current Outpatient Medications:     multivitamin capsule, Take 1 capsule by mouth once daily., Disp: , Rfl:     sulfamethoxazole-trimethoprim 800-160mg (BACTRIM DS) 800-160 mg Tab, Take 1 tablet by mouth 2 (two) times daily. for 14 days, Disp: 28 tablet, Rfl: 0    fish oil-omega-3 fatty acids 300-1,000 mg capsule, Take 2 g by mouth once daily., Disp:  , Rfl:     PHYSICAL EXAMINATION:    Constitutional: He is oriented to person, place, and time. He appears well-developed and well-nourished.  He is in no apparent distress.    Neck: Normal ROM.     Cardiovascular: Normal rate.      Pulmonary/Chest: Effort normal. No respiratory distress.     Abdominal:  He exhibits no distension.  There is no CVA tenderness.     Lymphadenopathy:        Right: No supraclavicular adenopathy present.        Left: No supraclavicular adenopathy present.     Neurological: He is alert and oriented to person, place, and time.     Skin: Skin is warm and dry.     Extremities: Normal ROM    Psych: Cooperative with normal affect.    Genitourinary: The penis is uncircumcised with evidence of phimosis. Erythema and swelling have improved since last visit. No warmth.   The testes, epididymides, and cord structures are normal in size and contour bilaterally. The scrotum is normal in size and contour.      Physical Exam      LABS:    N/A      IMPRESSION:    Encounter Diagnoses   Name Primary?    Penile pain Yes    Penile swelling          PLAN:  -Continue bactrim   -Need to keep penis elevated; have penis pointed toward abdomen to allow drainage  -As swelling improves, should be able to retract foreskin easier.  -RTC 1 week to reassess.    I spent 20 minutes with the patient of which more than half was spent in coordinating the patient's care as well as in direct consultation with the patient in regards to our treatment and plan.

## 2019-08-22 ENCOUNTER — OFFICE VISIT (OUTPATIENT)
Dept: UROLOGY | Facility: CLINIC | Age: 69
End: 2019-08-22
Payer: COMMERCIAL

## 2019-08-22 VITALS
SYSTOLIC BLOOD PRESSURE: 117 MMHG | HEART RATE: 73 BPM | WEIGHT: 152.13 LBS | BODY MASS INDEX: 21.78 KG/M2 | HEIGHT: 70 IN | DIASTOLIC BLOOD PRESSURE: 73 MMHG

## 2019-08-22 DIAGNOSIS — S39.94XS INJURY TO PENIS, SEQUELA: ICD-10-CM

## 2019-08-22 DIAGNOSIS — N48.89 PENILE PAIN: ICD-10-CM

## 2019-08-22 DIAGNOSIS — N48.89 SWELLING OF PENIS: Primary | ICD-10-CM

## 2019-08-22 PROCEDURE — 99213 PR OFFICE/OUTPT VISIT, EST, LEVL III, 20-29 MIN: ICD-10-PCS | Mod: S$GLB,,, | Performed by: NURSE PRACTITIONER

## 2019-08-22 PROCEDURE — 99213 OFFICE O/P EST LOW 20 MIN: CPT | Mod: S$GLB,,, | Performed by: NURSE PRACTITIONER

## 2019-08-22 PROCEDURE — 1101F PT FALLS ASSESS-DOCD LE1/YR: CPT | Mod: CPTII,S$GLB,, | Performed by: NURSE PRACTITIONER

## 2019-08-22 PROCEDURE — 1101F PR PT FALLS ASSESS DOC 0-1 FALLS W/OUT INJ PAST YR: ICD-10-PCS | Mod: CPTII,S$GLB,, | Performed by: NURSE PRACTITIONER

## 2019-08-22 PROCEDURE — 99999 PR PBB SHADOW E&M-EST. PATIENT-LVL III: ICD-10-PCS | Mod: PBBFAC,,, | Performed by: NURSE PRACTITIONER

## 2019-08-22 PROCEDURE — 99999 PR PBB SHADOW E&M-EST. PATIENT-LVL III: CPT | Mod: PBBFAC,,, | Performed by: NURSE PRACTITIONER

## 2019-08-22 RX ORDER — SULFAMETHOXAZOLE AND TRIMETHOPRIM 800; 160 MG/1; MG/1
1 TABLET ORAL 2 TIMES DAILY
Qty: 28 TABLET | Refills: 0 | Status: SHIPPED | OUTPATIENT
Start: 2019-08-22 | End: 2019-09-05

## 2019-08-22 NOTE — PROGRESS NOTES
"CHIEF COMPLAINT:    Mr. Hobson is a 68 y.o. male presenting for f/u penile pain and swelling.  PRESENTING ILLNESS:    Juan Hobson is a 68 y.o. male who presents for f/u penile pain and swelling. His last clinic visit was 8/13/19.    Pt has complete 2 week course of bactrim for penile pain, erythema and swelling to penis. Pt reports pain has resolved completely. Swelling and erythema have improved and are almost resolved. He has not been elevating penis toward abdomen. He is not able to retract foreskin. He is not having any urinary issues. FOS strong. Denies frequency, nocturia, urgency or incontinence. Denies scrotal swelling or testicle pain. Denies fever or chills. Denies dysuria, hematuria or flank pain.    Initial visit:  Pt reports he was bit on penis ~ 2 months ago as a "friendly gesture". Initially he started having pain and swelling to penis that would come and go. He did not have it evaluated or treated. Pain and swelling have gradually worsened and he now has constant pain and worsening swelling and redness. He is uncircumcised and unable to retract foreskin. Pain is described as 5/10, aching pain but does worsen throughout the daytime. Denies any urinary complaints. FOS strong. Denies frequency, nocturia, urgency or incontinence. Denies scrotal swelling or testicle pain. Denies fever or chills. Denies dysuria, hematuria or flank pain.    REVIEW OF SYSTEMS:    Review of Systems    Constitutional: Negative for fever and chills.   HENT: Negative for hearing loss.   Eyes: Negative for visual disturbance.   Respiratory: Negative for shortness of breath.   Cardiovascular: Negative for chest pain.   Gastrointestinal: Negative for nausea, vomiting, and constipation.   Genitourinary:  See above  Neurological: Negative for dizziness.   Hematological: Does not bruise/bleed easily.   Psychiatric/Behavioral: Negative for confusion.       PATIENT HISTORY:    Past Medical History:   Diagnosis Date    Basal cell " carcinoma     right helix    Skin cancer of arm        Past Surgical History:   Procedure Laterality Date    REPAIR-MOHS DEFECT Right 6/27/2017    Performed by Magnolia LUCAS Mcgarry III, MD at [facility] OR [address] [place] [address]       History reviewed. No pertinent family history.    Social History     Socioeconomic History    Marital status: Single     Spouse name: Not on file    Number of children: Not on file    Years of education: Not on file    Highest education level: Not on file   Occupational History    Not on file   Social Needs    Financial resource strain: Not on file    Food insecurity:     Worry: Not on file     Inability: Not on file    Transportation needs:     Medical: Not on file     Non-medical: Not on file   Tobacco Use    Smoking status: Never Smoker    Smokeless tobacco: Never Used   Substance and Sexual Activity    Alcohol use: Yes     Comment: sometimes    Drug use: No    Sexual activity: Not on file   Lifestyle    Physical activity:     Days per week: Not on file     Minutes per session: Not on file    Stress: Not on file   Relationships    Social connections:     Talks on phone: Not on file     Gets together: Not on file     Attends Rastafarian service: Not on file     Active member of club or organization: Not on file     Attends meetings of clubs or organizations: Not on file     Relationship status: Not on file   Other Topics Concern    Not on file   Social History Narrative    Not on file       Allergies:  Patient has no known allergies.    Medications:    Current Outpatient Medications:     fish oil-omega-3 fatty acids 300-1,000 mg capsule, Take 2 g by mouth once daily., Disp: , Rfl:     multivitamin capsule, Take 1 capsule by mouth once daily., Disp: , Rfl:     sulfamethoxazole-trimethoprim 800-160mg (BACTRIM DS) 800-160 mg Tab, Take 1 tablet by mouth 2 (two) times daily. for 14 days, Disp: 28 tablet, Rfl: 0    PHYSICAL EXAMINATION:    Constitutional: He is oriented to person, place, and  time. He appears well-developed and well-nourished.  He is in no apparent distress.    Neck: Normal ROM.     Cardiovascular: Normal rate.      Pulmonary/Chest: Effort normal. No respiratory distress.     Abdominal:  He exhibits no distension.  There is no CVA tenderness.     Lymphadenopathy:        Right: No supraclavicular adenopathy present.        Left: No supraclavicular adenopathy present.     Neurological: He is alert and oriented to person, place, and time.     Skin: Skin is warm and dry.     Extremities: Normal ROM    Psych: Cooperative with normal affect.    Genitourinary: The penis is uncircumcised with evidence of phimosis. Erythema and swelling have improved since last visit. No warmth.   The testes, epididymides, and cord structures are normal in size and contour bilaterally. The scrotum is normal in size and contour.      Physical Exam      LABS:      IMPRESSION:    Encounter Diagnoses   Name Primary?    Swelling of penis Yes    Injury to penis, sequela     Penile pain          PLAN:  -Discussed patient with Dr. Armando who initially evaluated patient with me.  Will continue bactrim for another 2 weeks and f/u in 1 month.   If phimosis is present in 1 month, will proceed with circumcision.  -Pt agrees to plan  -RTC 1 month to reassess with Dr. Armando    I spent 20 minutes with the patient of which more than half was spent in coordinating the patient's care as well as in direct consultation with the patient in regards to our treatment and plan.

## 2019-09-19 ENCOUNTER — OFFICE VISIT (OUTPATIENT)
Dept: UROLOGY | Facility: CLINIC | Age: 69
End: 2019-09-19
Payer: COMMERCIAL

## 2019-09-19 VITALS
WEIGHT: 154.31 LBS | HEIGHT: 70 IN | SYSTOLIC BLOOD PRESSURE: 141 MMHG | BODY MASS INDEX: 22.09 KG/M2 | DIASTOLIC BLOOD PRESSURE: 84 MMHG | HEART RATE: 65 BPM

## 2019-09-19 DIAGNOSIS — N48.9 PENILE LESION: Primary | ICD-10-CM

## 2019-09-19 PROCEDURE — 99999 PR PBB SHADOW E&M-EST. PATIENT-LVL III: ICD-10-PCS | Mod: PBBFAC,,, | Performed by: UROLOGY

## 2019-09-19 PROCEDURE — 87077 CULTURE AEROBIC IDENTIFY: CPT

## 2019-09-19 PROCEDURE — 99214 OFFICE O/P EST MOD 30 MIN: CPT | Mod: S$GLB,,, | Performed by: UROLOGY

## 2019-09-19 PROCEDURE — 87186 SC STD MICRODIL/AGAR DIL: CPT

## 2019-09-19 PROCEDURE — 99214 PR OFFICE/OUTPT VISIT, EST, LEVL IV, 30-39 MIN: ICD-10-PCS | Mod: S$GLB,,, | Performed by: UROLOGY

## 2019-09-19 PROCEDURE — 99999 PR PBB SHADOW E&M-EST. PATIENT-LVL III: CPT | Mod: PBBFAC,,, | Performed by: UROLOGY

## 2019-09-19 PROCEDURE — 1101F PR PT FALLS ASSESS DOC 0-1 FALLS W/OUT INJ PAST YR: ICD-10-PCS | Mod: CPTII,S$GLB,, | Performed by: UROLOGY

## 2019-09-19 PROCEDURE — 87070 CULTURE OTHR SPECIMN AEROBIC: CPT

## 2019-09-19 PROCEDURE — 1101F PT FALLS ASSESS-DOCD LE1/YR: CPT | Mod: CPTII,S$GLB,, | Performed by: UROLOGY

## 2019-09-19 RX ORDER — AMOXICILLIN AND CLAVULANATE POTASSIUM 875; 125 MG/1; MG/1
1 TABLET, FILM COATED ORAL 2 TIMES DAILY
Qty: 60 TABLET | Refills: 1 | Status: SHIPPED | OUTPATIENT
Start: 2019-09-19 | End: 2019-11-27

## 2019-09-19 NOTE — PROGRESS NOTES
Subjective:       Patient ID: Juan Hobson is a 68 y.o. male.    Chief Complaint: No chief complaint on file.    HPI   Juan Hobson is a 68 y.o. male with a PMHx of basal cell carcinoma who presents to the clinic for evaluation of genital damage. He has recently been on Bactrim. He was bit on the penis about three months ago and started having penile pain and swelling. His AUA Sx score is a 0. He has been seeing Yulia Valle. He notes his swelling has gone down and symptoms have improved but are not completely gone. He also notes he can no longer pull back his foreskin. Patient is not allergic to penicillin.        Past Medical History:   Diagnosis Date    Basal cell carcinoma     right helix    Skin cancer of arm        Past Surgical History:   Procedure Laterality Date    REPAIR-MOHS DEFECT Right 6/27/2017    Performed by Jeremías Mcgarry III, MD at [facility] OR [address] [place] [address]       History reviewed. No pertinent family history.    Social History     Socioeconomic History    Marital status: Single     Spouse name: Not on file    Number of children: Not on file    Years of education: Not on file    Highest education level: Not on file   Occupational History    Not on file   Social Needs    Financial resource strain: Not on file    Food insecurity:     Worry: Not on file     Inability: Not on file    Transportation needs:     Medical: Not on file     Non-medical: Not on file   Tobacco Use    Smoking status: Never Smoker    Smokeless tobacco: Never Used   Substance and Sexual Activity    Alcohol use: Yes     Comment: sometimes    Drug use: No    Sexual activity: Not on file   Lifestyle    Physical activity:     Days per week: Not on file     Minutes per session: Not on file    Stress: Not on file   Relationships    Social connections:     Talks on phone: Not on file     Gets together: Not on file     Attends [employer] service: Not on file     Active member of club or organization: Not on file      Attends meetings of clubs or organizations: Not on file     Relationship status: Not on file   Other Topics Concern    Not on file   Social History Narrative    Not on file       Allergies:  Patient has no known allergies.    Medications:    Current Outpatient Medications:     fish oil-omega-3 fatty acids 300-1,000 mg capsule, Take 2 g by mouth once daily., Disp: , Rfl:     multivitamin capsule, Take 1 capsule by mouth once daily., Disp: , Rfl:     amoxicillin-clavulanate 875-125mg (AUGMENTIN) 875-125 mg per tablet, Take 1 tablet by mouth 2 (two) times daily., Disp: 60 tablet, Rfl: 1    Review of Systems   Constitutional: Negative for activity change, appetite change, chills, diaphoresis, fatigue, fever and unexpected weight change.   HENT: Negative for congestion, dental problem, hearing loss, mouth sores, postnasal drip, rhinorrhea, sinus pressure and trouble swallowing.    Eyes: Negative for pain, discharge and itching.   Respiratory: Negative for apnea, cough, choking, chest tightness, shortness of breath and wheezing.    Cardiovascular: Negative for chest pain, palpitations and leg swelling.   Gastrointestinal: Negative for abdominal distention, abdominal pain, anal bleeding, blood in stool, constipation, diarrhea, nausea, rectal pain and vomiting.   Endocrine: Negative for polydipsia and polyuria.   Genitourinary: Positive for penile pain and penile swelling. Negative for decreased urine volume, difficulty urinating, discharge, dysuria, enuresis, flank pain, frequency, genital sores, hematuria, scrotal swelling and urgency.   Musculoskeletal: Negative for arthralgias and back pain.   Skin: Negative for color change, rash and wound.   Neurological: Negative for dizziness, syncope, speech difficulty, light-headedness and headaches.   Hematological: Negative for adenopathy. Does not bruise/bleed easily.   Psychiatric/Behavioral: Negative for behavioral problems and confusion.       Objective:      Physical  Exam   Constitutional: He appears well-developed.   HENT:   Head: Normocephalic.   Neck: Neck supple.   Cardiovascular: Normal rate.    Pulmonary/Chest: Effort normal.   Abdominal: Soft.   Genitourinary:   Genitourinary Comments: 1 cm phlegmon observed; no pus but clear fluid draining   Neurological: He is alert.   Skin: Skin is warm.     Psychiatric: He has a normal mood and affect.       Assessment:       1. Penile lesion        Plan:       Diagnoses and all orders for this visit:    Penile lesion    Other orders  -     amoxicillin-clavulanate 875-125mg (AUGMENTIN) 875-125 mg per tablet; Take 1 tablet by mouth 2 (two) times daily.          Send phlegmon specimen for culture  Prescribe Augmentin  RTC in 4 weeks    ILanette, am acting as a scribe on this patient encounter in the presence and under the supervision of Dr. Armando.    09/19/2019 9:29 AM    I, Dr. Armando, personally performed the services described in this documentation.   All medical record entries made by the scribe were at my direction and in my presence.   I have reviewed the chart and agree that the record is accurate and complete.   Daniele Armando MD.  9:29 AM 09/19/2019

## 2019-09-19 NOTE — LETTER
September 19, 2019      Mehnaz Barillas MD  [address] [facility] [address]           [facility] - Urology 4th Floor  [address]  [place] [address]  Phone: [phone]          Patient: Juan Hobson   MR Number: [id number]   YOB: 1950   Date of Visit: 9/19/2019       Dear Dr. Mehnaz Barillas:    Thank you for referring Juan Hobson to me for evaluation. Attached you will find relevant portions of my assessment and plan of care.    If you have questions, please do not hesitate to call me. I look forward to following Juan Hobson along with you.    Sincerely,    Daniele Armando Jr., MD    Enclosure  CC:  No Recipients    If you would like to receive this communication electronically, please contact [email or web address] or [phone] to request more information on [employer] Link access.    For providers and/or their staff who would like to refer a patient to [facility], please contact us through our one-stop-shop provider referral line, [facility], at [phone].    If you feel you have received this communication in error or would no longer like to receive these types of communications, please e-mail [email or web address]

## 2019-09-24 LAB — BACTERIA SPEC AEROBE CULT: ABNORMAL

## 2019-09-25 ENCOUNTER — TELEPHONE (OUTPATIENT)
Dept: UROLOGY | Facility: CLINIC | Age: 69
End: 2019-09-25

## 2019-09-25 RX ORDER — CIPROFLOXACIN 500 MG/1
500 TABLET ORAL 2 TIMES DAILY
Qty: 60 TABLET | Refills: 1 | Status: SHIPPED | OUTPATIENT
Start: 2019-09-25 | End: 2019-10-25

## 2019-09-25 NOTE — TELEPHONE ENCOUNTER
----- Message from Daniele Armando Jr., MD sent at 9/25/2019  7:23 AM CDT -----  Poss cs  Switch to cipro

## 2019-11-27 ENCOUNTER — OFFICE VISIT (OUTPATIENT)
Dept: UROLOGY | Facility: CLINIC | Age: 69
End: 2019-11-27
Payer: COMMERCIAL

## 2019-11-27 VITALS
DIASTOLIC BLOOD PRESSURE: 79 MMHG | HEIGHT: 70 IN | WEIGHT: 162.94 LBS | HEART RATE: 67 BPM | SYSTOLIC BLOOD PRESSURE: 134 MMHG | BODY MASS INDEX: 23.33 KG/M2

## 2019-11-27 DIAGNOSIS — N48.9 PENILE LESION: Primary | ICD-10-CM

## 2019-11-27 LAB
BACTERIA #/AREA URNS AUTO: NORMAL /HPF
BILIRUB SERPL-MCNC: NORMAL MG/DL
BLOOD URINE, POC: 50
COLOR, POC UA: NORMAL
GLUCOSE UR QL STRIP: NORMAL
KETONES UR QL STRIP: NORMAL
LEUKOCYTE ESTERASE URINE, POC: NORMAL
MICROSCOPIC COMMENT: NORMAL
NITRITE, POC UA: NORMAL
PH, POC UA: 5
PROTEIN, POC: NORMAL
RBC #/AREA URNS AUTO: 3 /HPF (ref 0–4)
SPECIFIC GRAVITY, POC UA: 1.01
UROBILINOGEN, POC UA: NORMAL
WBC #/AREA URNS AUTO: 1 /HPF (ref 0–5)

## 2019-11-27 PROCEDURE — 99999 PR PBB SHADOW E&M-EST. PATIENT-LVL III: ICD-10-PCS | Mod: PBBFAC,,, | Performed by: NURSE PRACTITIONER

## 2019-11-27 PROCEDURE — 81002 POCT URINE DIPSTICK WITHOUT MICROSCOPE: ICD-10-PCS | Mod: S$GLB,,, | Performed by: NURSE PRACTITIONER

## 2019-11-27 PROCEDURE — 81002 URINALYSIS NONAUTO W/O SCOPE: CPT | Mod: S$GLB,,, | Performed by: NURSE PRACTITIONER

## 2019-11-27 PROCEDURE — 88112 PR  CYTOPATH, CELL ENHANCE TECH: ICD-10-PCS | Mod: 26,,, | Performed by: PATHOLOGY

## 2019-11-27 PROCEDURE — 88112 CYTOPATH CELL ENHANCE TECH: CPT | Performed by: PATHOLOGY

## 2019-11-27 PROCEDURE — 99999 PR PBB SHADOW E&M-EST. PATIENT-LVL III: CPT | Mod: PBBFAC,,, | Performed by: NURSE PRACTITIONER

## 2019-11-27 PROCEDURE — 1126F AMNT PAIN NOTED NONE PRSNT: CPT | Mod: S$GLB,,, | Performed by: NURSE PRACTITIONER

## 2019-11-27 PROCEDURE — 81001 URINALYSIS AUTO W/SCOPE: CPT

## 2019-11-27 PROCEDURE — 1101F PT FALLS ASSESS-DOCD LE1/YR: CPT | Mod: CPTII,S$GLB,, | Performed by: NURSE PRACTITIONER

## 2019-11-27 PROCEDURE — 1126F PR PAIN SEVERITY QUANTIFIED, NO PAIN PRESENT: ICD-10-PCS | Mod: S$GLB,,, | Performed by: NURSE PRACTITIONER

## 2019-11-27 PROCEDURE — 1159F PR MEDICATION LIST DOCUMENTED IN MEDICAL RECORD: ICD-10-PCS | Mod: S$GLB,,, | Performed by: NURSE PRACTITIONER

## 2019-11-27 PROCEDURE — 1159F MED LIST DOCD IN RCRD: CPT | Mod: S$GLB,,, | Performed by: NURSE PRACTITIONER

## 2019-11-27 PROCEDURE — 1101F PR PT FALLS ASSESS DOC 0-1 FALLS W/OUT INJ PAST YR: ICD-10-PCS | Mod: CPTII,S$GLB,, | Performed by: NURSE PRACTITIONER

## 2019-11-27 PROCEDURE — 88112 CYTOPATH CELL ENHANCE TECH: CPT | Mod: 26,,, | Performed by: PATHOLOGY

## 2019-11-27 PROCEDURE — 99214 PR OFFICE/OUTPT VISIT, EST, LEVL IV, 30-39 MIN: ICD-10-PCS | Mod: 25,S$GLB,, | Performed by: NURSE PRACTITIONER

## 2019-11-27 PROCEDURE — 99214 OFFICE O/P EST MOD 30 MIN: CPT | Mod: 25,S$GLB,, | Performed by: NURSE PRACTITIONER

## 2019-11-27 PROCEDURE — 87086 URINE CULTURE/COLONY COUNT: CPT

## 2019-11-27 NOTE — LETTER
November 27, 2019      Mehnaz Barillas MD  [address] [facility] [address]           [facility] - Urology 4th Floor  [address]  [place] [address]  Phone: [phone]          Patient: Juan Hobson   MR Number: [id number]   YOB: 1950   Date of Visit: 11/27/2019       Dear Dr. Mehnaz Barillas:    Thank you for referring Juan Hobson to me for evaluation. Attached you will find relevant portions of my assessment and plan of care.    If you have questions, please do not hesitate to call me. I look forward to following Juan Hobson along with you.    Sincerely,    Yulia Valle, AMY    Enclosure  CC:  No Recipients    If you would like to receive this communication electronically, please contact [email or web address] or [phone] to request more information on [employer] Link access.    For providers and/or their staff who would like to refer a patient to [facility], please contact us through our one-stop-shop provider referral line, [facility], at [phone].    If you feel you have received this communication in error or would no longer like to receive these types of communications, please e-mail [email or web address]

## 2019-11-27 NOTE — PROGRESS NOTES
CHIEF COMPLAINT:    Mr. Hobson is a 69 y.o. male presenting for penile lesion.  PRESENTING ILLNESS:    Juan Hobson is a 69 y.o. male with a PMH of basal cell carcinoma who presents for penile lesion. His last clinic visit was 9/19/19 with Dr. Armando.    Pt was recently treated for penile lesion with Bactrim and Cipro.    Today patient presents to clinic for penile lesion. Patient reports he continues to have lesion after completing cipro. He denies penile pain or swelling. No drainage from lesion. He continues to have issues retracting his foreskin. He is able to minimally retract foreskin enough to see urethral meatus but unable to retract further. Denies issues with urination. Denies dysuria, hematuria or flank pain. Denies fever or chills.      REVIEW OF SYSTEMS:    Review of Systems    Constitutional: Negative for fever and chills.   HENT: Negative for hearing loss.   Eyes: Negative for visual disturbance.   Respiratory: Negative for shortness of breath.   Cardiovascular: Negative for chest pain.   Gastrointestinal: Negative for nausea, vomiting, and constipation.   Genitourinary:  See HPI  Neurological: Negative for dizziness.   Hematological: Does not bruise/bleed easily.   Psychiatric/Behavioral: Negative for confusion.       PATIENT HISTORY:    Past Medical History:   Diagnosis Date    Basal cell carcinoma     right helix    Skin cancer of arm        History reviewed. No pertinent surgical history.    History reviewed. No pertinent family history.    Social History     Socioeconomic History    Marital status: Single     Spouse name: Not on file    Number of children: Not on file    Years of education: Not on file    Highest education level: Not on file   Occupational History    Not on file   Social Needs    Financial resource strain: Not on file    Food insecurity:     Worry: Not on file     Inability: Not on file    Transportation needs:     Medical: Not on file     Non-medical: Not on file    Tobacco Use    Smoking status: Never Smoker    Smokeless tobacco: Never Used   Substance and Sexual Activity    Alcohol use: Yes     Comment: sometimes    Drug use: No    Sexual activity: Not on file   Lifestyle    Physical activity:     Days per week: Not on file     Minutes per session: Not on file    Stress: Not on file   Relationships    Social connections:     Talks on phone: Not on file     Gets together: Not on file     Attends U[employer] service: Not on file     Active member of club or organization: Not on file     Attends meetings of clubs or organizations: Not on file     Relationship status: Not on file   Other Topics Concern    Not on file   Social History Narrative    Not on file       Allergies:  Patient has no known allergies.    Medications:    Current Outpatient Medications:     fish oil-omega-3 fatty acids 300-1,000 mg capsule, Take 2 g by mouth once daily., Disp: , Rfl:     multivitamin capsule, Take 1 capsule by mouth once daily., Disp: , Rfl:     PHYSICAL EXAMINATION:    Constitutional: He is oriented to person, place, and time. He appears well-developed and well-nourished.  He is in no apparent distress.    Neck: Normal ROM.     Cardiovascular: Normal rate.      Pulmonary/Chest: Effort normal. No respiratory distress.     Abdominal:  He exhibits no distension.  There is no CVA tenderness.     Lymphadenopathy:        Right: No supraclavicular adenopathy present.        Left: No supraclavicular adenopathy present.     Neurological: He is alert and oriented to person, place, and time.     Skin: Skin is warm and dry.     Extremities: Normal ROM    Psych: Cooperative with normal affect.    Genitourinary: The penis is uncircumcised with evidence of phimosis. The urethral meatus is normal. The testes, epididymides, and cord structures are normal in size and contour bilaterally. The scrotum is normal in size and contour.    Lesion to penis noted, healing. No drainage, erythema, or  swelling.    Physical Exam      LABS:    U/a: sp grav 1.015, pH 5, trace protein, 50 blood otherwise negative    No results found for: PSA, PSADIAG, PSATOTAL, PSAFREE, PSAFREEPCT  Lab Results   Component Value Date    CREATININE 0.8 06/26/2017         IMPRESSION:    Encounter Diagnoses   Name Primary?    Penile lesion Yes         PLAN:  -Will further discuss phimosis and lesion with Dr. Armando when he is available in clinic.  Will call patient after I discuss with Dr. Armando.  -Urine specimen sent for urine culture, urine cytology and microscopic UA  If UA results RBC 3 or more, will proceed with hematuria work up.  -RTC based on urine results    I spent 25 minutes with the patient of which more than half was spent in coordinating the patient's care as well as in direct consultation with the patient in regards to our treatment and plan.

## 2019-11-28 LAB — BACTERIA UR CULT: NO GROWTH

## 2019-12-02 LAB — FINAL PATHOLOGIC DIAGNOSIS: NORMAL

## 2019-12-03 ENCOUNTER — TELEPHONE (OUTPATIENT)
Dept: UROLOGY | Facility: CLINIC | Age: 69
End: 2019-12-03

## 2019-12-03 DIAGNOSIS — R31.29 MICROSCOPIC HEMATURIA: Primary | ICD-10-CM

## 2019-12-03 RX ORDER — LIDOCAINE HYDROCHLORIDE 20 MG/ML
JELLY TOPICAL ONCE
Status: CANCELLED | OUTPATIENT
Start: 2019-12-03 | End: 2019-12-03

## 2019-12-03 RX ORDER — CIPROFLOXACIN 250 MG/1
500 TABLET, FILM COATED ORAL ONCE
Status: CANCELLED | OUTPATIENT
Start: 2019-12-03 | End: 2019-12-03

## 2019-12-03 NOTE — PATIENT INSTRUCTIONS
Cystoscopy    Cystoscopy is a procedure that lets your doctor look directly inside your urethra and bladder. It can be used to:  · Help diagnose a problem with your urethra, bladder, or kidneys.  · Take a sample (biopsy) of bladder or urethral tissue.  · Treat certain problems (such as removing kidney stones).  · Place a stent to bypass an obstruction.  · Take special X-rays of the kidneys.  Based on the findings, your doctor may recommend other tests or treatments.  What is a cystoscope?  A cystoscope is a telescope-like instrument that contains lenses and fiberoptics (small glass wires that make bright light). The cystoscope may be straight and rigid, or flexible to bend around curves in the urethra. The doctor may look directly into the cystoscope, or project the image onto a monitor.  Getting ready  · Ask your doctor if you should stop taking any medicines before the procedure.  · Ask whether you should avoid eating or drinking anything after midnight before the procedure.  · Follow any other instructions your doctor gives you.  Tell your doctor before the exam if you:  · Take any medicines, such as aspirin or blood thinners  · Have allergies to any medicines  · Are pregnant   The procedure  Cystoscopy is done in the doctors office, surgery center, or hospital. The doctor and a nurse are present during the procedure. It takes only a few minutes, longer if a biopsy, X-ray, or treatment needs to be done.  During the procedure:  · You lie on an exam table on your back, knees bent and legs apart. You are covered with a drape.  · Your urethra and the area around it are washed. Anesthetic jelly may be applied to numb the urethra. Other pain medicine is usually not needed. In some cases, you may be offered a mild sedative to help you relax. If a more extensive procedure is to be done, such as a biopsy or kidney stone removal, general anesthesia may be needed.  · The cystoscope is inserted. A sterile fluid is put  into the bladder to expand it. You may feel pressure from this fluid.  · When the procedure is done, the cystoscope is removed.  After the procedure  If you had a sedative, general anesthesia, or spinal anesthesia, you must have someone drive you home. Once youre home:  · Drink plenty of fluids.  · You may have burning or light bleeding when you urinate--this is normal.  · Medicines may be prescribed to ease any discomfort or prevent infection. Take these as directed.  · Call your doctor if you have heavy bleeding or blood clots, burning that lasts more than a day, a fever over 100°F  (38° C), or trouble urinating.  Date Last Reviewed: 1/1/2017  © 8013-1848 [employer]. [address] [place] [address], [place] [address]. All rights reserved. This information is not intended as a substitute for professional medical care. Always follow your healthcare professional's instructions.

## 2019-12-03 NOTE — TELEPHONE ENCOUNTER
Spoke with pt and scheduled tests. Pt agreed to date and time of appts. Reminders mailed        ----- Message from Yulia Valle NP sent at 12/3/2019  8:25 AM CST -----  Regarding: hematuria work up  Please call and schedule pt for CT urogram, creat prior, and cysto with Dr. Armando.    Thanks

## 2019-12-03 NOTE — TELEPHONE ENCOUNTER
Spoke with patient regarding urine results.  Microscopic UA resulted RBC 3  No growth on urine culture  Urine cytology- Squamous cells and reactive urothelial cells with focally degenerative features and scattered acute inflammation    Discussed microscopic hematuria with patient. Will proceed with work up.  Patient will discuss circumcision with Dr. Armando since unable to retract foreskin.

## 2019-12-10 ENCOUNTER — HOSPITAL ENCOUNTER (OUTPATIENT)
Dept: RADIOLOGY | Facility: HOSPITAL | Age: 69
Discharge: HOME | End: 2019-12-10
Attending: NURSE PRACTITIONER
Payer: COMMERCIAL

## 2019-12-10 DIAGNOSIS — R31.29 MICROSCOPIC HEMATURIA: ICD-10-CM

## 2019-12-10 PROCEDURE — 74178 CT ABD&PLV WO CNTR FLWD CNTR: CPT | Mod: TC

## 2019-12-10 PROCEDURE — 25500020 PHARM REV CODE 255: Performed by: NURSE PRACTITIONER

## 2019-12-10 PROCEDURE — 74178 CT ABD&PLV WO CNTR FLWD CNTR: CPT | Mod: 26,,, | Performed by: RADIOLOGY

## 2019-12-10 PROCEDURE — 74178 CT UROGRAM ABD PELVIS W WO: ICD-10-PCS | Mod: 26,,, | Performed by: RADIOLOGY

## 2019-12-10 RX ADMIN — IOHEXOL 125 ML: 350 INJECTION, SOLUTION INTRAVENOUS at 03:12

## 2019-12-12 ENCOUNTER — TELEPHONE (OUTPATIENT)
Dept: UROLOGY | Facility: CLINIC | Age: 69
End: 2019-12-12

## 2019-12-12 NOTE — TELEPHONE ENCOUNTER
Called patient regarding CT urogram results.  No answer  Unable to leave voicemail, mailbox is full

## 2019-12-16 ENCOUNTER — HOSPITAL ENCOUNTER (OUTPATIENT)
Dept: UROLOGY | Facility: HOSPITAL | Age: 69
Discharge: HOME | End: 2019-12-16
Attending: UROLOGY
Payer: COMMERCIAL

## 2019-12-16 VITALS
TEMPERATURE: 98 F | BODY MASS INDEX: 22.91 KG/M2 | WEIGHT: 160.06 LBS | RESPIRATION RATE: 16 BRPM | DIASTOLIC BLOOD PRESSURE: 63 MMHG | HEIGHT: 70 IN | SYSTOLIC BLOOD PRESSURE: 134 MMHG | HEART RATE: 75 BPM

## 2019-12-16 DIAGNOSIS — R31.29 MICROSCOPIC HEMATURIA: ICD-10-CM

## 2019-12-16 PROCEDURE — 52000 CYSTOURETHROSCOPY: CPT | Mod: ,,, | Performed by: UROLOGY

## 2019-12-16 PROCEDURE — 52000 PR CYSTOURETHROSCOPY: ICD-10-PCS | Mod: ,,, | Performed by: UROLOGY

## 2019-12-16 PROCEDURE — 52000 CYSTOURETHROSCOPY: CPT

## 2019-12-16 RX ORDER — LIDOCAINE HYDROCHLORIDE 20 MG/ML
JELLY TOPICAL ONCE
Status: COMPLETED | OUTPATIENT
Start: 2019-12-16 | End: 2019-12-16

## 2019-12-16 RX ORDER — CIPROFLOXACIN 500 MG/1
500 TABLET ORAL ONCE
Status: COMPLETED | OUTPATIENT
Start: 2019-12-16 | End: 2019-12-16

## 2019-12-16 RX ADMIN — LIDOCAINE HYDROCHLORIDE: 20 JELLY TOPICAL at 01:12

## 2019-12-16 RX ADMIN — CIPROFLOXACIN 500 MG: 500 TABLET ORAL at 02:12

## 2019-12-16 NOTE — PROCEDURES
* No surgery found *     Procedures: Flexible cystourethroscopy      Pre Procedure Diagnosis: microhematuria   * No surgery found *     Post Procedure Diagnosis: BPH with OO    Surgeon: Daniele Armando MD    Anesthesia: 2% uro-jet lidocaine jelly for local analgesia     Flexible cysto-urethroscopy was performed after consent was obtained.  The risks and benefits were explained.    2% lidocaine urojet was used for local analgesia.  The genitalia was prepped and draped in the sterile fashion.  The flexible scope was advanced into the urethra and into the bladder.  Bilateral ureteral orifice were evaluated and noted to be normal with clear efflux.  The bladder was completely surveyed in a systematic fashion.   No bladder tumors or lesions were seen.  No strictures were noted.  The prostate showed Mild hypertrophy.  There was No median lobe present.    The patient tolerated the procedure well without complication.    They will follow up in 3 month(s)  Patient had an area of induration in the distal penis he is status post bite injury with subsequent infection which seems to be improving.  There was a slight narrowing of the distal urethra by external compression but the cystoscope went through easily

## 2019-12-16 NOTE — PATIENT INSTRUCTIONS
Cystoscopy    Cystoscopy is a procedure that lets your doctor look directly inside your urethra and bladder. It can be used to:  · Help diagnose a problem with your urethra, bladder, or kidneys.  · Take a sample (biopsy) of bladder or urethral tissue.  · Treat certain problems (such as removing kidney stones).  · Place a stent to bypass an obstruction.  · Take special X-rays of the kidneys.  Based on the findings, your doctor may recommend other tests or treatments.  What is a cystoscope?  A cystoscope is a telescope-like instrument that contains lenses and fiberoptics (small glass wires that make bright light). The cystoscope may be straight and rigid, or flexible to bend around curves in the urethra. The doctor may look directly into the cystoscope, or project the image onto a monitor.  Getting ready  · Ask your doctor if you should stop taking any medicines before the procedure.  · Ask whether you should avoid eating or drinking anything after midnight before the procedure.  · Follow any other instructions your doctor gives you.  Tell your doctor before the exam if you:  · Take any medicines, such as aspirin or blood thinners  · Have allergies to any medicines  · Are pregnant   The procedure  Cystoscopy is done in the doctors office, surgery center, or hospital. The doctor and a nurse are present during the procedure. It takes only a few minutes, longer if a biopsy, X-ray, or treatment needs to be done.  During the procedure:  · You lie on an exam table on your back, knees bent and legs apart. You are covered with a drape.  · Your urethra and the area around it are washed. Anesthetic jelly may be applied to numb the urethra. Other pain medicine is usually not needed. In some cases, you may be offered a mild sedative to help you relax. If a more extensive procedure is to be done, such as a biopsy or kidney stone removal, general anesthesia may be needed.  · The cystoscope is inserted. A sterile fluid is put  into the bladder to expand it. You may feel pressure from this fluid.  · When the procedure is done, the cystoscope is removed.  After the procedure  If you had a sedative, general anesthesia, or spinal anesthesia, you must have someone drive you home. Once youre home:  · Drink plenty of fluids.  · You may have burning or light bleeding when you urinate--this is normal.  · Medicines may be prescribed to ease any discomfort or prevent infection. Take these as directed.  · Call your doctor if you have heavy bleeding or blood clots, burning that lasts more than a day, a fever over 100°F  (38° C), or trouble urinating.  Date Last Reviewed: 1/1/2017  © 2892-5295 [employer], [facility]. [address] [place] [address], [place] [address]. All rights reserved. This information is not intended as a substitute for professional medical care. Always follow your healthcare professional's instructions.

## 2020-01-31 ENCOUNTER — OFFICE VISIT (OUTPATIENT)
Dept: INTERNAL MEDICINE | Facility: CLINIC | Age: 70
End: 2020-01-31
Payer: COMMERCIAL

## 2020-01-31 ENCOUNTER — LAB VISIT (OUTPATIENT)
Dept: LAB | Facility: HOSPITAL | Age: 70
End: 2020-01-31
Attending: INTERNAL MEDICINE
Payer: COMMERCIAL

## 2020-01-31 VITALS
HEART RATE: 68 BPM | OXYGEN SATURATION: 99 % | DIASTOLIC BLOOD PRESSURE: 80 MMHG | SYSTOLIC BLOOD PRESSURE: 140 MMHG | WEIGHT: 169.06 LBS | BODY MASS INDEX: 24.2 KG/M2 | HEIGHT: 70 IN

## 2020-01-31 DIAGNOSIS — Z11.59 NEED FOR HEPATITIS C SCREENING TEST: ICD-10-CM

## 2020-01-31 DIAGNOSIS — Z00.00 ROUTINE HISTORY AND PHYSICAL EXAMINATION OF ADULT: Primary | ICD-10-CM

## 2020-01-31 DIAGNOSIS — Z00.00 ROUTINE HISTORY AND PHYSICAL EXAMINATION OF ADULT: ICD-10-CM

## 2020-01-31 LAB
ALBUMIN SERPL BCP-MCNC: 4.4 G/DL (ref 3.5–5.2)
ALP SERPL-CCNC: 67 U/L (ref 55–135)
ALT SERPL W/O P-5'-P-CCNC: 25 U/L (ref 10–44)
ANION GAP SERPL CALC-SCNC: 10 MMOL/L (ref 8–16)
AST SERPL-CCNC: 25 U/L (ref 10–40)
BASOPHILS # BLD AUTO: 0.08 K/UL (ref 0–0.2)
BASOPHILS NFR BLD: 1.3 % (ref 0–1.9)
BILIRUB SERPL-MCNC: 0.5 MG/DL (ref 0.1–1)
BUN SERPL-MCNC: 15 MG/DL (ref 8–23)
CALCIUM SERPL-MCNC: 9.5 MG/DL (ref 8.7–10.5)
CHLORIDE SERPL-SCNC: 104 MMOL/L (ref 95–110)
CHOLEST SERPL-MCNC: 192 MG/DL (ref 120–199)
CHOLEST/HDLC SERPL: 2.6 {RATIO} (ref 2–5)
CO2 SERPL-SCNC: 27 MMOL/L (ref 23–29)
CREAT SERPL-MCNC: 0.9 MG/DL (ref 0.5–1.4)
DIFFERENTIAL METHOD: ABNORMAL
EOSINOPHIL # BLD AUTO: 0.2 K/UL (ref 0–0.5)
EOSINOPHIL NFR BLD: 3.7 % (ref 0–8)
ERYTHROCYTE [DISTWIDTH] IN BLOOD BY AUTOMATED COUNT: 12 % (ref 11.5–14.5)
EST. GFR  (AFRICAN AMERICAN): >60 ML/MIN/1.73 M^2
EST. GFR  (NON AFRICAN AMERICAN): >60 ML/MIN/1.73 M^2
GLUCOSE SERPL-MCNC: 84 MG/DL (ref 70–110)
HCT VFR BLD AUTO: 44.9 % (ref 40–54)
HDLC SERPL-MCNC: 74 MG/DL (ref 40–75)
HDLC SERPL: 38.5 % (ref 20–50)
HGB BLD-MCNC: 14.6 G/DL (ref 14–18)
IMM GRANULOCYTES # BLD AUTO: 0.01 K/UL (ref 0–0.04)
IMM GRANULOCYTES NFR BLD AUTO: 0.2 % (ref 0–0.5)
LDLC SERPL CALC-MCNC: 108.2 MG/DL (ref 63–159)
LYMPHOCYTES # BLD AUTO: 1.8 K/UL (ref 1–4.8)
LYMPHOCYTES NFR BLD: 29.5 % (ref 18–48)
MCH RBC QN AUTO: 32.1 PG (ref 27–31)
MCHC RBC AUTO-ENTMCNC: 32.5 G/DL (ref 32–36)
MCV RBC AUTO: 99 FL (ref 82–98)
MONOCYTES # BLD AUTO: 0.5 K/UL (ref 0.3–1)
MONOCYTES NFR BLD: 8.2 % (ref 4–15)
NEUTROPHILS # BLD AUTO: 3.6 K/UL (ref 1.8–7.7)
NEUTROPHILS NFR BLD: 57.1 % (ref 38–73)
NONHDLC SERPL-MCNC: 118 MG/DL
NRBC BLD-RTO: 0 /100 WBC
PLATELET # BLD AUTO: 254 K/UL (ref 150–350)
PMV BLD AUTO: 9.9 FL (ref 9.2–12.9)
POTASSIUM SERPL-SCNC: 4 MMOL/L (ref 3.5–5.1)
PROT SERPL-MCNC: 7.4 G/DL (ref 6–8.4)
RBC # BLD AUTO: 4.55 M/UL (ref 4.6–6.2)
SODIUM SERPL-SCNC: 141 MMOL/L (ref 136–145)
TRIGL SERPL-MCNC: 49 MG/DL (ref 30–150)
WBC # BLD AUTO: 6.23 K/UL (ref 3.9–12.7)

## 2020-01-31 PROCEDURE — 99999 PR PBB SHADOW E&M-EST. PATIENT-LVL III: ICD-10-PCS | Mod: PBBFAC,,, | Performed by: INTERNAL MEDICINE

## 2020-01-31 PROCEDURE — 83036 HEMOGLOBIN GLYCOSYLATED A1C: CPT

## 2020-01-31 PROCEDURE — 80053 COMPREHEN METABOLIC PANEL: CPT

## 2020-01-31 PROCEDURE — 85025 COMPLETE CBC W/AUTO DIFF WBC: CPT

## 2020-01-31 PROCEDURE — 99387 PR PREVENTIVE VISIT,NEW,65 & OVER: ICD-10-PCS | Mod: S$GLB,,, | Performed by: INTERNAL MEDICINE

## 2020-01-31 PROCEDURE — 99387 INIT PM E/M NEW PAT 65+ YRS: CPT | Mod: S$GLB,,, | Performed by: INTERNAL MEDICINE

## 2020-01-31 PROCEDURE — 86803 HEPATITIS C AB TEST: CPT

## 2020-01-31 PROCEDURE — 99999 PR PBB SHADOW E&M-EST. PATIENT-LVL III: CPT | Mod: PBBFAC,,, | Performed by: INTERNAL MEDICINE

## 2020-01-31 PROCEDURE — 36415 COLL VENOUS BLD VENIPUNCTURE: CPT

## 2020-01-31 PROCEDURE — 80061 LIPID PANEL: CPT

## 2020-01-31 NOTE — PROGRESS NOTES
"    CHIEF COMPLAINT     Chief Complaint   Patient presents with    [facility]    Annual Exam       HPI     Juan Hobson is a 69 y.o. male here today for [facility]    Patient has not seen many doctors over course of his life. However, had several acute medical issues that brought him back to care.   reports having episode of basal cell carcinoma that was removed, had an abscess that was I and D.     denies any acute medical complaints today   still works as a  at the   [employer]  He is working to improve his diet but reducing his meat consumption. Reports getting some physical activity. Lives by himself, maintains him home.    Currently following with urology for microscopic hematuria. He underwent CT urogram and cystoscopy without lesions concerning for malignancy.      Personally Reviewed Patient's Medical, surgical, family and social hx. Changes updated in [employer].  Care Team updated in Epic    Review of Systems:  Review of Systems   Constitutional: Negative for fever and unexpected weight change.   Respiratory: Negative for cough.    Gastrointestinal: Negative for blood in stool and diarrhea.       Health Maintenance:   Reviewed with patient  Due for the following:   colonoscopy, will see when his last 1    immunizations declined- aversion to needles    PHYSICAL EXAM     BP (!) 140/80 (BP Location: Left arm, Patient Position: Sitting, BP Method: Medium (Manual))   Pulse 68   Ht 5' 10" (1.778 m)   Wt 76.7 kg (169 lb 1.5 oz)   SpO2 99%   BMI 24.26 kg/m²     Gen: Well Appearing, NAD  HEENT: PERR, EOMI  Neck: FROM, no thyromegaly, no cervical adenopathy  CVD: RRR, no M/R/G  Pulm: Normal work of breathing, CTAB, no wheezing  Abd:  Soft, NT, ND non TTP, no mass  MSK: no LE edema  Neuro: A&Ox3, gait normal, speech normal  Mood; Mood normal, behavior normal, thought process linear   Skin sun damage skin face.    LABS     Labs reviewed; ordered today    ASSESSMENT     1. Routine history " and physical examination of adult  CBC auto differential    Comprehensive metabolic panel    Lipid panel    Lipid Panel (1 year)    Comprehensive metabolic panel (1 year)    CBC auto differential (1 year)    Hemoglobin A1c   2. Need for hepatitis C screening test  Hepatitis C antibody           Plan     Juan Hobson is a 69 y.o. male with without any chronic medical conditions, however, has not been engaged in care so have issues that have yet to be identified.  Will check basic labs screening for hyperlipidemia and diabetes.  Discussed health maintenance patient declined immunizations to needle a version and needs to double check when he had his last colonoscopy to see when he is due.  Will get labs today and reassess need.  1. Routine history and physical examination of adult  Updated problem list, medical history, care team and discussed HM.     - CBC auto differential; Future  - Comprehensive metabolic panel; Future  - Lipid panel; Future  - Lipid Panel (1 year); Future  - Comprehensive metabolic panel (1 year); Future  - CBC auto differential (1 year); Future  - Hemoglobin A1c; Future    2. Need for hepatitis C screening test  - Hepatitis C antibody; Future   one time screen based on birth year.    Stalin Joshi MD

## 2020-02-01 LAB
ESTIMATED AVG GLUCOSE: 94 MG/DL (ref 68–131)
HBA1C MFR BLD HPLC: 4.9 % (ref 4–5.6)

## 2020-02-03 LAB — HCV AB SERPL QL IA: NEGATIVE

## 2020-05-28 ENCOUNTER — OFFICE VISIT (OUTPATIENT)
Dept: DERMATOLOGY | Facility: CLINIC | Age: 70
End: 2020-05-28
Payer: COMMERCIAL

## 2020-05-28 DIAGNOSIS — Z85.828 PERSONAL HISTORY OF SKIN CANCER: ICD-10-CM

## 2020-05-28 DIAGNOSIS — D48.5 NEOPLASM OF UNCERTAIN BEHAVIOR OF SKIN: ICD-10-CM

## 2020-05-28 DIAGNOSIS — L82.1 SEBORRHEIC KERATOSES: ICD-10-CM

## 2020-05-28 DIAGNOSIS — L90.5 SCAR: ICD-10-CM

## 2020-05-28 DIAGNOSIS — L21.9 SEBORRHEIC DERMATITIS: Primary | ICD-10-CM

## 2020-05-28 PROCEDURE — 88305 TISSUE EXAM BY PATHOLOGIST: CPT | Performed by: PATHOLOGY

## 2020-05-28 PROCEDURE — 99999 PR PBB SHADOW E&M-EST. PATIENT-LVL III: ICD-10-PCS | Mod: PBBFAC,,, | Performed by: DERMATOLOGY

## 2020-05-28 PROCEDURE — 99999 PR PBB SHADOW E&M-EST. PATIENT-LVL III: CPT | Mod: PBBFAC,,, | Performed by: DERMATOLOGY

## 2020-05-28 PROCEDURE — 99214 PR OFFICE/OUTPT VISIT, EST, LEVL IV, 30-39 MIN: ICD-10-PCS | Mod: S$GLB,,, | Performed by: DERMATOLOGY

## 2020-05-28 PROCEDURE — 99214 OFFICE O/P EST MOD 30 MIN: CPT | Mod: S$GLB,,, | Performed by: DERMATOLOGY

## 2020-05-28 PROCEDURE — 88305 TISSUE EXAM BY PATHOLOGIST: ICD-10-PCS | Mod: 26,,, | Performed by: PATHOLOGY

## 2020-05-28 PROCEDURE — 88305 TISSUE EXAM BY PATHOLOGIST: CPT | Mod: 26,,, | Performed by: PATHOLOGY

## 2020-05-28 RX ORDER — KETOCONAZOLE 20 MG/ML
SHAMPOO, SUSPENSION TOPICAL
Qty: 120 ML | Refills: 5 | Status: ON HOLD | OUTPATIENT
Start: 2020-05-28 | End: 2020-09-03

## 2020-05-28 NOTE — PROGRESS NOTES
Subjective:       Patient ID:  Juan Hobson is a 69 y.o. male who presents for   Chief Complaint   Patient presents with    Dry Skin     face    Spot     face     HPI     Pt present today with dry skin and spots on face, X 1 month, brown in color, spreading, denies Tx  He reports the flaky skin is a nuisance and improves with washing with soap. He is concerned by the brown bumps around his neck. He feels well overall.     He has a crusty bump on the tip of his nose that has been present for a year. He notes it is always there and is not tender.    History of BCC on right ear s/p Mohs with Dr. Salcedo in 2017.     Review of Systems   Constitutional: Negative for fever, chills, weight loss, weight gain, fatigue, night sweats and malaise.   Skin: Negative for daily sunscreen use, activity-related sunscreen use and wears hat.      Objective:    Physical Exam   Constitutional: He appears well-developed and well-nourished.   Neurological: He is alert and oriented to person, place, and time.   Psychiatric: He has a normal mood and affect.   Skin:                   Diagram Legend     Erythematous scaling macule/papule c/w actinic keratosis       Vascular papule c/w angioma      Pigmented verrucoid papule/plaque c/w seborrheic keratosis      Yellow umbilicated papule c/w sebaceous hyperplasia      Irregularly shaped tan macule c/w lentigo     1-2 mm smooth white papules consistent with Milia      Movable subcutaneous cyst with punctum c/w epidermal inclusion cyst      Subcutaneous movable cyst c/w pilar cyst      Firm pink to brown papule c/w dermatofibroma      Pedunculated fleshy papule(s) c/w skin tag(s)      Evenly pigmented macule c/w junctional nevus     Mildly variegated pigmented, slightly irregular-bordered macule c/w mildly atypical nevus      Flesh colored to evenly pigmented papule c/w intradermal nevus       Pink pearly papule/plaque c/w basal cell carcinoma      Erythematous hyperkeratotic cursted plaque  c/w SCC      Surgical scar with no sign of skin cancer recurrence      Open and closed comedones      Inflammatory papules and pustules      Verrucoid papule consistent consistent with wart     Erythematous eczematous patches and plaques     Dystrophic onycholytic nail with subungual debris c/w onychomycosis     Umbilicated papule    Erythematous-base heme-crusted tan verrucoid plaque consistent with inflamed seborrheic keratosis     Erythematous Silvery Scaling Plaque c/w Psoriasis     See annotation      Assessment / Plan:      Seborrheic dermatitis  - Discussed chronic nature of the condition  - Start ketoconazole 2% shampoo 3-5x weekly to scalp, face and trunk    Seborrheic keratoses  - These are benign inherited growths without a malignant potential. Reassurance given to patient. No treatment is necessary.     Neoplasm of uncertain behavior of skin  - Patient with erythematous heme crusted papule with telangiectasias on the tip of the nose concerning for BCC. If neoplasm, will refer to Mohs.  - Shave biopsy procedure note:  Shave biopsy performed after verbal consent including risk of infection, scar, recurrence, need for additional treatment of site. Area prepped with alcohol, anesthetized with approximately 1.0cc of 1% lidocaine with epinephrine. Lesional tissue shaved with razor blade. Hemostasis achieved with application of aluminum chloride followed by hyfrecation. No complications. Dressing applied. Wound care explained.      Scar and hx of skin cancer to ear  -Previous scar examined without signs of reoccurrence of malignancy. Continue to monitor.              No follow-ups on file.

## 2020-05-28 NOTE — PATIENT INSTRUCTIONS

## 2020-06-02 LAB
FINAL PATHOLOGIC DIAGNOSIS: NORMAL
GROSS: NORMAL
MICROSCOPIC EXAM: NORMAL

## 2020-06-25 ENCOUNTER — PROCEDURE VISIT (OUTPATIENT)
Dept: DERMATOLOGY | Facility: CLINIC | Age: 70
End: 2020-06-25
Payer: COMMERCIAL

## 2020-06-25 VITALS
SYSTOLIC BLOOD PRESSURE: 137 MMHG | BODY MASS INDEX: 22.9 KG/M2 | DIASTOLIC BLOOD PRESSURE: 79 MMHG | WEIGHT: 160 LBS | HEIGHT: 70 IN | HEART RATE: 60 BPM

## 2020-06-25 DIAGNOSIS — C44.311 BASAL CELL CARCINOMA OF NASAL TIP: Primary | ICD-10-CM

## 2020-06-25 PROCEDURE — 17311 MOHS 1 STAGE H/N/HF/G: CPT | Mod: S$GLB,,, | Performed by: DERMATOLOGY

## 2020-06-25 PROCEDURE — 99499 NO LOS: ICD-10-PCS | Mod: S$GLB,,, | Performed by: DERMATOLOGY

## 2020-06-25 PROCEDURE — 17312: ICD-10-PCS | Mod: S$GLB,,, | Performed by: DERMATOLOGY

## 2020-06-25 PROCEDURE — 13152 CMPLX RPR E/N/E/L 2.6-7.5 CM: CPT | Mod: 51,S$GLB,, | Performed by: DERMATOLOGY

## 2020-06-25 PROCEDURE — 17311: ICD-10-PCS | Mod: S$GLB,,, | Performed by: DERMATOLOGY

## 2020-06-25 PROCEDURE — 13152 PR RECMPL WND LID,NOS,EAR 2.6-7.5 CM: ICD-10-PCS | Mod: 51,S$GLB,, | Performed by: DERMATOLOGY

## 2020-06-25 PROCEDURE — 17312 MOHS ADDL STAGE: CPT | Mod: S$GLB,,, | Performed by: DERMATOLOGY

## 2020-06-25 PROCEDURE — 99499 UNLISTED E&M SERVICE: CPT | Mod: S$GLB,,, | Performed by: DERMATOLOGY

## 2020-06-25 RX ORDER — CEPHALEXIN 500 MG/1
500 CAPSULE ORAL 3 TIMES DAILY
Qty: 21 CAPSULE | Refills: 0 | Status: SHIPPED | OUTPATIENT
Start: 2020-06-25 | End: 2020-07-02

## 2020-06-25 RX ORDER — HYDROCODONE BITARTRATE AND ACETAMINOPHEN 5; 325 MG/1; MG/1
1 TABLET ORAL EVERY 6 HOURS PRN
Qty: 10 TABLET | Refills: 0 | Status: ON HOLD | OUTPATIENT
Start: 2020-06-25 | End: 2020-09-03

## 2020-06-25 NOTE — PROGRESS NOTES
PROCEDURE: Mohs' Micrographic Surgery    INDICATION: Location in mask areas of face including central face, nose, eyelids, eyebrows, lips, chin, preauricular, temple, and ear. Biopsy-proven skin cancer of cosmetically and functionally important areas, including head, neck, genital, hand, foot, or areas known for having difficulty in healing, such as the lower anterior legs. Tumor with ill-defined borders. Aggressive histopathology including sclerosing, morpheaform/infiltrating, micronodular, superficial multicentric, poorly differentiated, basosquamous, or perineural invasion.    REFERRING MD: Tamiko Bray MD    CASE NUMBER:     ANESTHETIC: 4 cc 0.5% Lidocaine with Epi 1:200,000 mixed 1:1 with 0.5% Bupivacaine    SURGICAL PREP: Hibiclens    SURGEON: Ines Salcedo MD    ASSISTANTS: Chana Mcnair PA-C and Alicia Lugo, Surg Tech    PREOPERATIVE DIAGNOSIS: basal cell carcinoma    POSTOPERATIVE DIAGNOSIS: basal cell carcinoma    PATHOLOGIC DIAGNOSIS: basal cell carcinoma- nodular, infiltrating    HISTOLOGY OF SPECIMENS IN FIRST STAGE:   Tumor Type: Tumor seen. Nodular basal cell carcinoma: Nodular tumor in dermis composed of basaloid cells exhibiting peripheral palisading and retraction artifact.   Depth of Invasion: epidermis, dermis and subcutaneous tissue  Perineural Invasion: No    HISTOLOGY OF SPECIMENS IN SUBSEQUENT STAGES:  · Tumor Type: No tumor seen.    STAGES OF MOHS' SURGERY PERFORMED: 2    TUMOR-FREE PLANE ACHIEVED: Yes    HEMOSTASIS: electrocoagulation     SPECIMENS: 6 (2 in stage A and 4 in stage B)    LOCATION: tip of nose. Patient verified location with hand held mirror.    INITIAL LESION SIZE: 0.7 x 0.8 cm    FINAL DEFECT SIZE: 1.2 x 1.3 cm    WOUND REPAIR/DISPOSITION: The patient tolerated Mohs' Micrographic Surgery for a basal cell carcinoma very well. When the tumor was completely removed, a repair of the surgical defect was undertaken.       PROCEDURE: Complex Linear Repair    INDICATION:  "Status post Mohs' Micrographic Surgery for basal cell carcinoma.    CASE NUMBER:     SURGEON: Ines Salcedo MD    ASSISTANTS: Emily Waldrop    ANESTHETIC: 3 cc 1% Lidocaine with Epinephrine 1:100,000    SURGICAL PREP: Hibiclens, prepped by Emily Waldrop    LOCATION: tip of nose    DEFECT SIZE: 1.2 x 1.3 cm    WOUND REPAIR/DISPOSITION:  After the patient's carcinoma had been completely removed with Mohs' Micrographic Surgery, a repair of the surgical defect was undertaken. The patient was returned to the operating suite where the area of tip of nose was prepped, draped, and anesthetized in the usual sterile fashion. The wound was widely undermined in all directions. Then, electrocoagulation was used to obtain meticulous hemostasis. 4-0 Vicryl buried vertical mattress sutures were placed into the subcutaneous and dermal plane to close the wound and luann the cutaneous wound edge. Bilateral dog ears were identified and were removed by a standard Burow's triangle technique. The cutaneous wound edges were closed using interrupted 5-0 Prolene suture.    The patient tolerated the procedure well.    The area was cleaned and dressed appropriately and the patient was given wound care instructions, as well as appointment for follow-up evaluation. Patient was placed on Norco 5-325 mg prn postop pain and Keflex 500 mg TID x 7 days.    LENGTH OF REPAIR: 3.3 cm    Vitals:    06/25/20 0835 06/25/20 1128   BP: 139/79 137/79   BP Location: Left arm Left arm   Patient Position: Sitting Sitting   BP Method: Small (Automatic) Small (Automatic)   Pulse: 66 60   Weight: 72.6 kg (160 lb)    Height: 5' 10" (1.778 m)        "

## 2020-07-02 ENCOUNTER — OFFICE VISIT (OUTPATIENT)
Dept: DERMATOLOGY | Facility: CLINIC | Age: 70
End: 2020-07-02
Payer: COMMERCIAL

## 2020-07-02 ENCOUNTER — TELEPHONE (OUTPATIENT)
Dept: DERMATOLOGY | Facility: CLINIC | Age: 70
End: 2020-07-02

## 2020-07-02 DIAGNOSIS — Z09 POSTOP CHECK: Primary | ICD-10-CM

## 2020-07-02 PROCEDURE — 99024 PR POST-OP FOLLOW-UP VISIT: ICD-10-PCS | Mod: S$GLB,,, | Performed by: DERMATOLOGY

## 2020-07-02 PROCEDURE — 99024 POSTOP FOLLOW-UP VISIT: CPT | Mod: S$GLB,,, | Performed by: DERMATOLOGY

## 2020-07-02 NOTE — PROGRESS NOTES
69 y.o. male patient is here for suture removal following Mohs' surgery.    Patient reports no problems.    WOUND PE:  The tip of nose sutures intact. Wound healing well. Good skin edges. No signs or symptoms of infection.    IMPRESSION:  Healing operative site from Mohs' surgery BCC, tip of nose s/p Mohs with CLC, postop day # 7.    PLAN:  Sutures removed today. Steri-strips applied.  D/Continue wound care.  Keep moist with Aquaphor.  Call if any concern arises.     RTC:  In 3-6 months with Tamiko Bray for skin check or sooner if new concern arises.

## 2020-07-09 ENCOUNTER — TELEPHONE (OUTPATIENT)
Dept: NEUROLOGY | Facility: CLINIC | Age: 70
End: 2020-07-09

## 2020-07-09 NOTE — TELEPHONE ENCOUNTER
Called and spoke with pt to reschedule appt. He stated he has an appt he thinks outside of [facility]. He is going to have to find it. He requested I call him back in a week. Instructed I will keep the appt but will call him back.

## 2020-09-02 ENCOUNTER — HOSPITAL ENCOUNTER (OUTPATIENT)
Facility: HOSPITAL | Age: 70
Discharge: PSYCHIATRIC OR SUBSTANCE USE FACILITY | End: 2020-09-06
Attending: EMERGENCY MEDICINE | Admitting: INTERNAL MEDICINE
Payer: COMMERCIAL

## 2020-09-02 DIAGNOSIS — N48.9 PENILE LESION: ICD-10-CM

## 2020-09-02 DIAGNOSIS — R31.9 URINARY TRACT INFECTION WITH HEMATURIA, SITE UNSPECIFIED: ICD-10-CM

## 2020-09-02 DIAGNOSIS — N48.89 PENILE PAIN: ICD-10-CM

## 2020-09-02 DIAGNOSIS — N47.1 PHIMOSIS: ICD-10-CM

## 2020-09-02 DIAGNOSIS — T85.9XXA: ICD-10-CM

## 2020-09-02 DIAGNOSIS — N48.89 PENILE SWELLING: ICD-10-CM

## 2020-09-02 DIAGNOSIS — N39.0 URINARY TRACT INFECTION WITH HEMATURIA, SITE UNSPECIFIED: ICD-10-CM

## 2020-09-02 DIAGNOSIS — F23 ACUTE PSYCHOSIS: ICD-10-CM

## 2020-09-02 DIAGNOSIS — R94.31 QT PROLONGATION: ICD-10-CM

## 2020-09-02 DIAGNOSIS — R07.9 CHEST PAIN: ICD-10-CM

## 2020-09-02 DIAGNOSIS — R41.82 ALTERED MENTAL STATUS, UNSPECIFIED ALTERED MENTAL STATUS TYPE: Primary | ICD-10-CM

## 2020-09-02 DIAGNOSIS — N34.2 URETHRITIS: ICD-10-CM

## 2020-09-02 LAB
ALBUMIN SERPL BCP-MCNC: 3.3 G/DL (ref 3.5–5.2)
ALP SERPL-CCNC: 98 U/L (ref 55–135)
ALT SERPL W/O P-5'-P-CCNC: 22 U/L (ref 10–44)
AMORPH CRY UR QL COMP ASSIST: ABNORMAL
AMPHET+METHAMPHET UR QL: NEGATIVE
ANION GAP SERPL CALC-SCNC: 9 MMOL/L (ref 8–16)
APAP SERPL-MCNC: <3 UG/ML (ref 10–20)
AST SERPL-CCNC: 20 U/L (ref 10–40)
BACTERIA #/AREA URNS AUTO: ABNORMAL /HPF
BARBITURATES UR QL SCN>200 NG/ML: NEGATIVE
BASOPHILS # BLD AUTO: 0.03 K/UL (ref 0–0.2)
BASOPHILS NFR BLD: 0.3 % (ref 0–1.9)
BENZODIAZ UR QL SCN>200 NG/ML: NEGATIVE
BILIRUB SERPL-MCNC: 0.7 MG/DL (ref 0.1–1)
BILIRUB UR QL STRIP: NEGATIVE
BILIRUB UR QL STRIP: NEGATIVE
BUN SERPL-MCNC: 14 MG/DL (ref 8–23)
BZE UR QL SCN: NEGATIVE
CALCIUM SERPL-MCNC: 9.2 MG/DL (ref 8.7–10.5)
CANNABINOIDS UR QL SCN: NEGATIVE
CHLORIDE SERPL-SCNC: 101 MMOL/L (ref 95–110)
CLARITY UR REFRACT.AUTO: ABNORMAL
CLARITY UR REFRACT.AUTO: ABNORMAL
CO2 SERPL-SCNC: 27 MMOL/L (ref 23–29)
COLOR UR AUTO: ABNORMAL
COLOR UR AUTO: ABNORMAL
CREAT SERPL-MCNC: 0.9 MG/DL (ref 0.5–1.4)
CREAT UR-MCNC: 284 MG/DL (ref 23–375)
DIFFERENTIAL METHOD: ABNORMAL
EOSINOPHIL # BLD AUTO: 0 K/UL (ref 0–0.5)
EOSINOPHIL NFR BLD: 0.3 % (ref 0–8)
ERYTHROCYTE [DISTWIDTH] IN BLOOD BY AUTOMATED COUNT: 12.4 % (ref 11.5–14.5)
EST. GFR  (AFRICAN AMERICAN): >60 ML/MIN/1.73 M^2
EST. GFR  (NON AFRICAN AMERICAN): >60 ML/MIN/1.73 M^2
ETHANOL SERPL-MCNC: <10 MG/DL
GLUCOSE SERPL-MCNC: 110 MG/DL (ref 70–110)
GLUCOSE UR QL STRIP: NEGATIVE
GLUCOSE UR QL STRIP: NEGATIVE
GRAM STN SPEC: NORMAL
HCT VFR BLD AUTO: 40.2 % (ref 40–54)
HGB BLD-MCNC: 13.1 G/DL (ref 14–18)
HGB UR QL STRIP: ABNORMAL
HGB UR QL STRIP: ABNORMAL
HYALINE CASTS UR QL AUTO: 0 /LPF
IMM GRANULOCYTES # BLD AUTO: 0.1 K/UL (ref 0–0.04)
IMM GRANULOCYTES NFR BLD AUTO: 0.9 % (ref 0–0.5)
KETONES UR QL STRIP: NEGATIVE
KETONES UR QL STRIP: NEGATIVE
LEUKOCYTE ESTERASE UR QL STRIP: ABNORMAL
LEUKOCYTE ESTERASE UR QL STRIP: ABNORMAL
LYMPHOCYTES # BLD AUTO: 0.8 K/UL (ref 1–4.8)
LYMPHOCYTES NFR BLD: 7.2 % (ref 18–48)
MCH RBC QN AUTO: 31.3 PG (ref 27–31)
MCHC RBC AUTO-ENTMCNC: 32.6 G/DL (ref 32–36)
MCV RBC AUTO: 96 FL (ref 82–98)
METHADONE UR QL SCN>300 NG/ML: NEGATIVE
MICROSCOPIC COMMENT: ABNORMAL
MONOCYTES # BLD AUTO: 0.9 K/UL (ref 0.3–1)
MONOCYTES NFR BLD: 7.6 % (ref 4–15)
NEUTROPHILS # BLD AUTO: 9.8 K/UL (ref 1.8–7.7)
NEUTROPHILS NFR BLD: 83.7 % (ref 38–73)
NITRITE UR QL STRIP: NEGATIVE
NITRITE UR QL STRIP: NEGATIVE
NRBC BLD-RTO: 0 /100 WBC
OPIATES UR QL SCN: NEGATIVE
PCP UR QL SCN>25 NG/ML: NEGATIVE
PH UR STRIP: 5 [PH] (ref 5–8)
PH UR STRIP: 5 [PH] (ref 5–8)
PLATELET # BLD AUTO: 314 K/UL (ref 150–350)
PMV BLD AUTO: 9.8 FL (ref 9.2–12.9)
POTASSIUM SERPL-SCNC: 3.7 MMOL/L (ref 3.5–5.1)
PROT SERPL-MCNC: 7.4 G/DL (ref 6–8.4)
PROT UR QL STRIP: ABNORMAL
PROT UR QL STRIP: ABNORMAL
RBC # BLD AUTO: 4.18 M/UL (ref 4.6–6.2)
RBC #/AREA URNS AUTO: 25 /HPF (ref 0–4)
SALICYLATES SERPL-MCNC: <5 MG/DL (ref 15–30)
SARS-COV-2 RDRP RESP QL NAA+PROBE: NEGATIVE
SODIUM SERPL-SCNC: 137 MMOL/L (ref 136–145)
SP GR UR STRIP: 1.02 (ref 1–1.03)
SP GR UR STRIP: 1.02 (ref 1–1.03)
SQUAMOUS #/AREA URNS AUTO: 3 /HPF
TOXICOLOGY INFORMATION: NORMAL
TSH SERPL DL<=0.005 MIU/L-ACNC: 0.68 UIU/ML (ref 0.4–4)
URN SPEC COLLECT METH UR: ABNORMAL
URN SPEC COLLECT METH UR: ABNORMAL
VIT B12 SERPL-MCNC: 1150 PG/ML (ref 210–950)
WBC # BLD AUTO: 11.73 K/UL (ref 3.9–12.7)
WBC #/AREA URNS AUTO: >100 /HPF (ref 0–5)
WBC CLUMPS UR QL AUTO: ABNORMAL

## 2020-09-02 PROCEDURE — G0378 HOSPITAL OBSERVATION PER HR: HCPCS

## 2020-09-02 PROCEDURE — 63700000 PHARM REV CODE 250 ALT 637 W/O HCPCS: Performed by: STUDENT IN AN ORGANIZED HEALTH CARE EDUCATION/TRAINING PROGRAM

## 2020-09-02 PROCEDURE — 87070 CULTURE OTHR SPECIMN AEROBIC: CPT | Mod: 59

## 2020-09-02 PROCEDURE — 99285 EMERGENCY DEPT VISIT HI MDM: CPT | Mod: 25

## 2020-09-02 PROCEDURE — 84425 ASSAY OF VITAMIN B-1: CPT

## 2020-09-02 PROCEDURE — 80307 DRUG TEST PRSMV CHEM ANLYZR: CPT

## 2020-09-02 PROCEDURE — 99214 OFFICE O/P EST MOD 30 MIN: CPT | Mod: ,,, | Performed by: UROLOGY

## 2020-09-02 PROCEDURE — 93010 ELECTROCARDIOGRAM REPORT: CPT | Mod: ,,, | Performed by: INTERNAL MEDICINE

## 2020-09-02 PROCEDURE — 87529 HSV DNA AMP PROBE: CPT

## 2020-09-02 PROCEDURE — 87086 URINE CULTURE/COLONY COUNT: CPT

## 2020-09-02 PROCEDURE — 87102 FUNGUS ISOLATION CULTURE: CPT

## 2020-09-02 PROCEDURE — 25000003 PHARM REV CODE 250: Performed by: STUDENT IN AN ORGANIZED HEALTH CARE EDUCATION/TRAINING PROGRAM

## 2020-09-02 PROCEDURE — 87075 CULTR BACTERIA EXCEPT BLOOD: CPT | Mod: 59

## 2020-09-02 PROCEDURE — 99219 PR INITIAL OBSERVATION CARE,LEVL II: CPT | Mod: ,,, | Performed by: INTERNAL MEDICINE

## 2020-09-02 PROCEDURE — 99214 PR OFFICE/OUTPT VISIT, EST, LEVL IV, 30-39 MIN: ICD-10-PCS | Mod: ,,, | Performed by: UROLOGY

## 2020-09-02 PROCEDURE — 63600175 PHARM REV CODE 636 W HCPCS: Performed by: PHYSICIAN ASSISTANT

## 2020-09-02 PROCEDURE — 99223 1ST HOSP IP/OBS HIGH 75: CPT | Mod: ,,, | Performed by: PSYCHIATRY & NEUROLOGY

## 2020-09-02 PROCEDURE — U0002 COVID-19 LAB TEST NON-CDC: HCPCS

## 2020-09-02 PROCEDURE — 86780 TREPONEMA PALLIDUM: CPT

## 2020-09-02 PROCEDURE — 85025 COMPLETE CBC W/AUTO DIFF WBC: CPT

## 2020-09-02 PROCEDURE — 80053 COMPREHEN METABOLIC PANEL: CPT

## 2020-09-02 PROCEDURE — 80320 DRUG SCREEN QUANTALCOHOLS: CPT

## 2020-09-02 PROCEDURE — 96365 THER/PROPH/DIAG IV INF INIT: CPT

## 2020-09-02 PROCEDURE — 99285 PR EMERGENCY DEPT VISIT,LEVEL V: ICD-10-PCS | Mod: ,,, | Performed by: EMERGENCY MEDICINE

## 2020-09-02 PROCEDURE — 87491 CHLMYD TRACH DNA AMP PROBE: CPT

## 2020-09-02 PROCEDURE — 93005 ELECTROCARDIOGRAM TRACING: CPT

## 2020-09-02 PROCEDURE — 87591 N.GONORRHOEAE DNA AMP PROB: CPT

## 2020-09-02 PROCEDURE — 99285 EMERGENCY DEPT VISIT HI MDM: CPT | Mod: ,,, | Performed by: EMERGENCY MEDICINE

## 2020-09-02 PROCEDURE — 99223 PR INITIAL HOSPITAL CARE,LEVL III: ICD-10-PCS | Mod: ,,, | Performed by: PSYCHIATRY & NEUROLOGY

## 2020-09-02 PROCEDURE — 87076 CULTURE ANAEROBE IDENT EACH: CPT

## 2020-09-02 PROCEDURE — 82607 VITAMIN B-12: CPT

## 2020-09-02 PROCEDURE — 84443 ASSAY THYROID STIM HORMONE: CPT

## 2020-09-02 PROCEDURE — 99219 PR INITIAL OBSERVATION CARE,LEVL II: ICD-10-PCS | Mod: ,,, | Performed by: INTERNAL MEDICINE

## 2020-09-02 PROCEDURE — 80329 ANALGESICS NON-OPIOID 1 OR 2: CPT

## 2020-09-02 PROCEDURE — 93010 EKG 12-LEAD: ICD-10-PCS | Mod: ,,, | Performed by: INTERNAL MEDICINE

## 2020-09-02 PROCEDURE — 80074 ACUTE HEPATITIS PANEL: CPT

## 2020-09-02 PROCEDURE — 87205 SMEAR GRAM STAIN: CPT

## 2020-09-02 PROCEDURE — 86592 SYPHILIS TEST NON-TREP QUAL: CPT

## 2020-09-02 PROCEDURE — 81001 URINALYSIS AUTO W/SCOPE: CPT

## 2020-09-02 RX ORDER — IBUPROFEN 200 MG
24 TABLET ORAL
Status: DISCONTINUED | OUTPATIENT
Start: 2020-09-02 | End: 2020-09-06 | Stop reason: HOSPADM

## 2020-09-02 RX ORDER — IBUPROFEN 200 MG
16 TABLET ORAL
Status: DISCONTINUED | OUTPATIENT
Start: 2020-09-02 | End: 2020-09-06 | Stop reason: HOSPADM

## 2020-09-02 RX ORDER — SODIUM CHLORIDE 0.9 % (FLUSH) 0.9 %
10 SYRINGE (ML) INJECTION
Status: DISCONTINUED | OUTPATIENT
Start: 2020-09-02 | End: 2020-09-06 | Stop reason: HOSPADM

## 2020-09-02 RX ORDER — GLUCAGON 1 MG
1 KIT INJECTION
Status: DISCONTINUED | OUTPATIENT
Start: 2020-09-02 | End: 2020-09-06 | Stop reason: HOSPADM

## 2020-09-02 RX ORDER — AZITHROMYCIN 250 MG/1
1000 TABLET, FILM COATED ORAL ONCE
Status: COMPLETED | OUTPATIENT
Start: 2020-09-02 | End: 2020-09-02

## 2020-09-02 RX ORDER — ACETAMINOPHEN 325 MG/1
650 TABLET ORAL EVERY 6 HOURS PRN
Status: DISCONTINUED | OUTPATIENT
Start: 2020-09-02 | End: 2020-09-06 | Stop reason: HOSPADM

## 2020-09-02 RX ADMIN — CEFTRIAXONE 2 G: 2 INJECTION, SOLUTION INTRAVENOUS at 01:09

## 2020-09-02 RX ADMIN — ACETAMINOPHEN 650 MG: 325 TABLET ORAL at 11:09

## 2020-09-02 RX ADMIN — AZITHROMYCIN DIHYDRATE 1000 MG: 250 TABLET, FILM COATED ORAL at 08:09

## 2020-09-02 NOTE — ED NOTES
Pt resting comfortably and independently repositioned in stretcher with bed locked in lowest position for safety. NAD noted at this time. Respirations even and unlabored and visible chest rise noted.  Patient offered bathroom assistance and denies need at this time. Pt instructed to call if assistance is needed. Sitter at bedside maintaining 1:1 observation. No needs at this time. Will continue to monitor.

## 2020-09-02 NOTE — ED NOTES
Pt resting comfortably and independently repositioned in stretcher with bed locked in lowest position for safety. NAD noted at this time. Respirations even and unlabored and visible chest rise noted.  Patient offered bathroom assistance and denies need at this time. Urinal at bedside. Pt instructed to call if assistance is needed. Sitter at bedside maintain 1:1 observation. Pt updated on POC. No needs at this time. Will continue to monitor.

## 2020-09-02 NOTE — ED NOTES
Pt resting comfortably and independently repositioned in stretcher with bed locked in lowest position for safety. NAD noted at this time. Respirations even and unlabored and visible chest rise noted.  Patient offered bathroom assistance and denies need at this time. Pt instructed to call if assistance is needed. Call light within reach. Sitter at bedside maintaining 1:1 observation. No belongings at bedside. No needs at this time. Will continue to monitor.

## 2020-09-02 NOTE — ED NOTES
Pt belongings: 1 shirt, pants, belt, socks, underwear. In belongings closet.                          Security envelope # [id number] $ 281.00 in bills, $ 2.11 in loose change. 4 keys, debit card, [place] ID, work ID, and multiple insurance cards. Locked in [facility] ED safe.

## 2020-09-02 NOTE — SUBJECTIVE & OBJECTIVE
Past Medical History:   Diagnosis Date    Basal cell carcinoma     right helix    Hematuria     Seborrheic dermatitis     Skin cancer of arm        Past Surgical History:   Procedure Laterality Date    INCISION AND DRAINAGE INTRA ORAL ABSCESS         Review of patient's allergies indicates:  No Known Allergies    Family History     None          Tobacco Use    Smoking status: Never Smoker    Smokeless tobacco: Never Used   Substance and Sexual Activity    Alcohol use: Yes     Frequency: Monthly or less     Drinks per session: 1 or 2     Comment: sometimes    Drug use: Never    Sexual activity: Not Currently     Partners: Female       Review of Systems   Unable to perform ROS: Mental status change       Objective:     Temp:  [98.8 °F (37.1 °C)-98.9 °F (37.2 °C)] 98.8 °F (37.1 °C)  Pulse:  [94-99] 99  Resp:  [16] 16  SpO2:  [98 %-100 %] 100 %  BP: (118-120)/(80-82) 120/82     Body mass index is 21.45 kg/m².    Date 09/02/20 0700 - 09/03/20 0659   Shift 3284-1294 1576-4283 3121-1455 24 Hour Total   INTAKE   IV Piggyback 50   50   Shift Total(mL/kg) 50(0.7)   50(0.7)   OUTPUT   Shift Total(mL/kg)       Weight (kg) 67.8 67.8 67.8 67.8          Drains     None                 Physical Exam   Nursing note and vitals reviewed.  HENT:   Head: Normocephalic and atraumatic.   Mouth/Throat: Mucous membranes are moist.   Eyes: Pupils are equal, round, and reactive to light.   Cardiovascular: Normal rate.    Pulmonary/Chest: Effort normal.   Abdominal: Normal appearance. He exhibits no distension. There is no abdominal tenderness.   Genitourinary:    Genitourinary Comments: The penis is uncircumcised, unable to retract foreskin due to phimosis. The urethral meatus appears normal with purulent discharge. There is an ulcer on the right lateral aspect of the penile shaft midway down draining bloody purulent fluid with underlying induration, no fluctuance. There is nodularity along the penile shaft and glans. There is  erythema of the penis.    Right testis, epididymis, and cord structures are normal in size and contour with no tenderness. Left testis and cord structures are normal in size and contour with no tenderness. Left-sided spermatocele.    Bilateral inguinal adenopathy (R > L)     Neurological: He is alert. He is disoriented.   Skin: Skin is warm and dry.     Psychiatric: Mood normal.       Significant Labs:    BMP:  Recent Labs   Lab 09/02/20  1229      K 3.7      CO2 27   BUN 14   CREATININE 0.9   CALCIUM 9.2       CBC:  Recent Labs   Lab 09/02/20  1229   WBC 11.73   HGB 13.1*   HCT 40.2          All pertinent labs results from the past 24 hours have been reviewed.    Significant Imaging:  All pertinent imaging results/findings from the past 24 hours have been reviewed.

## 2020-09-02 NOTE — ED NOTES
Pt resting comfortably and independently repositioned in stretcher with bed locked in lowest position for safety. NAD noted at this time. Respirations even and unlabored and visible chest rise noted.  Patient offered bathroom assistance and denies need at this time. Pt instructed to call if assistance is needed. Sitter at bedside maintaining 1:1 observation. No belongings at bedside. No needs at this time. Will continue to monitor.

## 2020-09-02 NOTE — ED PROVIDER NOTES
"Encounter Date: 9/2/2020       History     Chief Complaint   Patient presents with    Groin Pain     Pt reports groin pain hematuria.     The patient is a 69 year old male, who has a past medical history of basal cell carcinoma, seborrheic dermatitis, and hematuria. 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 He lives alone. He drove himself to the ER today for an emergent evaluation due to penile pain, swelling, and bloody discharge. He states that "creatures attack his penis" in the middle of the night while he is in bed. He states that the first "attack" happened about 2 years ago and that the "creature" lacerated his penis. He describes the creatures as shadowy and hard to see clearly in the dark. He states that there are multiple creatures. He states that the attacks have been worse recently. He states that he has blood draining from his penis onto his bedding and clothing. He states that his penis is "very big and hot". He states that he does not have any testicle pain or swelling. He states that he does not have any trouble urinating. He denies any fever or chills. He denies using drugs or alcohol. He states that he has not been sexually active in over 2 years. He denies any history of mental illness.         Review of patient's allergies indicates:  No Known Allergies  Past Medical History:   Diagnosis Date    Basal cell carcinoma     right helix    Hematuria     Seborrheic dermatitis     Skin cancer of arm      Past Surgical History:   Procedure Laterality Date    INCISION AND DRAINAGE INTRA ORAL ABSCESS       No family history on file.  Social History     Tobacco Use    Smoking status: Never Smoker    Smokeless tobacco: Never Used   Substance Use Topics    Alcohol use: Yes     Frequency: Monthly or less     Drinks per session: 1 or 2     Comment: sometimes    Drug use: Never     Review of Systems   Constitutional: Negative for appetite change, " chills and fever.   Respiratory: Negative for cough and shortness of breath.    Cardiovascular: Negative for chest pain.   Gastrointestinal: Negative for abdominal distention, abdominal pain, diarrhea, nausea and vomiting.   Genitourinary: Positive for discharge, genital sores, penile pain and penile swelling. Negative for difficulty urinating, flank pain, frequency, scrotal swelling and urgency.   Musculoskeletal: Negative for arthralgias, back pain, gait problem and joint swelling.   Skin: Positive for color change.   Allergic/Immunologic: Negative for immunocompromised state.   Neurological: Negative for dizziness, tremors, seizures, syncope, facial asymmetry, speech difficulty, light-headedness, numbness and headaches.   Psychiatric/Behavioral: Positive for confusion and hallucinations. Negative for agitation and behavioral problems.       Physical Exam     Initial Vitals [09/02/20 1119]   BP Pulse Resp Temp SpO2   118/80 94 16 98.9 °F (37.2 °C) 98 %      MAP       --         Physical Exam    Nursing note and vitals reviewed.  Constitutional: He appears well-developed and well-nourished. He is not diaphoretic. No distress.   He is alert and ambulatory. He is dressed in his work uniform with name badge on, but states that he has not been to work this week.    HENT:   Head: Normocephalic and atraumatic.   Mouth/Throat: Oropharynx is clear and moist.   Eyes: Conjunctivae and EOM are normal. Pupils are equal, round, and reactive to light.   Neck: Neck supple.   Cardiovascular: Normal rate.   Pulmonary/Chest: No respiratory distress.   Abdominal: Soft. He exhibits no distension. There is no abdominal tenderness. There is no rebound.   Genitourinary:    Genitourinary Comments: No pain or swelling of scrotum or testicles. No inguinal adenopathy. No Doyle's gangrene.   Uncircumcised penis with pointing abscess or lesion to shaft - appears fluctuant. Prepuce edematous. Phimosis. There is copious bloody purulent  "discharge from meatus.        Musculoskeletal: Normal range of motion. No tenderness.   Neurological: He is alert.   Normal speech. Normal gait. No facial droop. 5/5 strength extremities x 4. He is disoriented to day, month, and year.    Skin: Skin is warm and dry.   Psychiatric:   Acute psychosis suspected. Pt reports seeing "creatures" at night that "attack his penis" while he is in bed. He is wearing his work uniform and name badge, but has not been to work this week.          ED Course   Procedures  Labs Reviewed   URINALYSIS, REFLEX TO URINE CULTURE - Abnormal; Notable for the following components:       Result Value    Appearance, UA Cloudy (*)     Protein, UA 1+ (*)     Occult Blood UA 3+ (*)     Leukocytes, UA 3+ (*)     All other components within normal limits    Narrative:     Specimen Source->Urine   CBC W/ AUTO DIFFERENTIAL - Abnormal; Notable for the following components:    RBC 4.18 (*)     Hemoglobin 13.1 (*)     Mean Corpuscular Hemoglobin 31.3 (*)     Immature Granulocytes 0.9 (*)     Gran # (ANC) 9.8 (*)     Immature Grans (Abs) 0.10 (*)     Lymph # 0.8 (*)     Gran% 83.7 (*)     Lymph% 7.2 (*)     All other components within normal limits   COMPREHENSIVE METABOLIC PANEL - Abnormal; Notable for the following components:    Albumin 3.3 (*)     All other components within normal limits   URINALYSIS, REFLEX TO URINE CULTURE - Abnormal; Notable for the following components:    Appearance, UA Cloudy (*)     Protein, UA 1+ (*)     Occult Blood UA 3+ (*)     Leukocytes, UA 3+ (*)     All other components within normal limits    Narrative:     Specimen Source->Urine   ACETAMINOPHEN LEVEL - Abnormal; Notable for the following components:    Acetaminophen (Tylenol), Serum <3.0 (*)     All other components within normal limits   SALICYLATE LEVEL - Abnormal; Notable for the following components:    Salicylate Lvl <5.0 (*)     All other components within normal limits   URINALYSIS MICROSCOPIC - Abnormal; " Notable for the following components:    RBC, UA 25 (*)     WBC, UA >100 (*)     All other components within normal limits    Narrative:     Specimen Source->Urine   VITAMIN B12 - Abnormal; Notable for the following components:    Vitamin B-12 1150 (*)     All other components within normal limits    Narrative:     Sources by Resulting Lab:->[facility]  ADD-ON B12 #[id number]; FTA #[id number] PER RAJAN DUTTA MD 13:22    09/02/2020    C. TRACHOMATIS/N. GONORRHOEAE BY AMP DNA   CULTURE, URINE   TSH   DRUG SCREEN PANEL, URINE EMERGENCY    Narrative:     Specimen Source->Urine   ALCOHOL,MEDICAL (ETHANOL)   SARS-COV-2 RNA AMPLIFICATION, QUAL   VITAMIN B12   FTA ANTIBODIES, IGG AND IGM   RPR   FTA ANTIBODIES, IGG AND IGM   VITAMIN B1     Results for orders placed or performed during the hospital encounter of 09/02/20   Urinalysis, Reflex to Urine Culture Urine, Clean Catch    Specimen: Urine   Result Value Ref Range    Specimen UA Urine, Clean Catch     Color, UA Winter Yellow, Straw, Winter    Appearance, UA Cloudy (A) Clear    pH, UA 5.0 5.0 - 8.0    Specific Gravity, UA 1.020 1.005 - 1.030    Protein, UA 1+ (A) Negative    Glucose, UA Negative Negative    Ketones, UA Negative Negative    Bilirubin (UA) Negative Negative    Occult Blood UA 3+ (A) Negative    Nitrite, UA Negative Negative    Leukocytes, UA 3+ (A) Negative   CBC auto differential   Result Value Ref Range    WBC 11.73 3.90 - 12.70 K/uL    RBC 4.18 (L) 4.60 - 6.20 M/uL    Hemoglobin 13.1 (L) 14.0 - 18.0 g/dL    Hematocrit 40.2 40.0 - 54.0 %    Mean Corpuscular Volume 96 82 - 98 fL    Mean Corpuscular Hemoglobin 31.3 (H) 27.0 - 31.0 pg    Mean Corpuscular Hemoglobin Conc 32.6 32.0 - 36.0 g/dL    RDW 12.4 11.5 - 14.5 %    Platelets 314 150 - 350 K/uL    MPV 9.8 9.2 - 12.9 fL    Immature Granulocytes 0.9 (H) 0.0 - 0.5 %    Gran # (ANC) 9.8 (H) 1.8 - 7.7 K/uL    Immature Grans (Abs) 0.10 (H) 0.00 - 0.04 K/uL    Lymph # 0.8 (L) 1.0 - 4.8 K/uL    Mono # 0.9 0.3 -  1.0 K/uL    Eos # 0.0 0.0 - 0.5 K/uL    Baso # 0.03 0.00 - 0.20 K/uL    nRBC 0 0 /100 WBC    Gran% 83.7 (H) 38.0 - 73.0 %    Lymph% 7.2 (L) 18.0 - 48.0 %    Mono% 7.6 4.0 - 15.0 %    Eosinophil% 0.3 0.0 - 8.0 %    Basophil% 0.3 0.0 - 1.9 %    Differential Method Automated    Comprehensive metabolic panel   Result Value Ref Range    Sodium 137 136 - 145 mmol/L    Potassium 3.7 3.5 - 5.1 mmol/L    Chloride 101 95 - 110 mmol/L    CO2 27 23 - 29 mmol/L    Glucose 110 70 - 110 mg/dL    BUN, Bld 14 8 - 23 mg/dL    Creatinine 0.9 0.5 - 1.4 mg/dL    Calcium 9.2 8.7 - 10.5 mg/dL    Total Protein 7.4 6.0 - 8.4 g/dL    Albumin 3.3 (L) 3.5 - 5.2 g/dL    Total Bilirubin 0.7 0.1 - 1.0 mg/dL    Alkaline Phosphatase 98 55 - 135 U/L    AST 20 10 - 40 U/L    ALT 22 10 - 44 U/L    Anion Gap 9 8 - 16 mmol/L    eGFR if African American >60.0 >60 mL/min/1.73 m^2    eGFR if non African American >60.0 >60 mL/min/1.73 m^2   TSH   Result Value Ref Range    TSH 0.684 0.400 - 4.000 uIU/mL   Urinalysis, Reflex to Urine Culture Urine, Clean Catch    Specimen: Urine   Result Value Ref Range    Specimen UA Urine, Clean Catch     Color, UA Winter Yellow, Straw, Winter    Appearance, UA Cloudy (A) Clear    pH, UA 5.0 5.0 - 8.0    Specific Gravity, UA 1.020 1.005 - 1.030    Protein, UA 1+ (A) Negative    Glucose, UA Negative Negative    Ketones, UA Negative Negative    Bilirubin (UA) Negative Negative    Occult Blood UA 3+ (A) Negative    Nitrite, UA Negative Negative    Leukocytes, UA 3+ (A) Negative   Drug screen panel, emergency   Result Value Ref Range    Benzodiazepines Negative     Methadone metabolites Negative     Cocaine (Metab.) Negative     Opiate Scrn, Ur Negative     Barbiturate Screen, Ur Negative     Amphetamine Screen, Ur Negative     THC Negative     Phencyclidine Negative     Creatinine, Random Ur 284.0 23.0 - 375.0 mg/dL    Toxicology Information SEE COMMENT    Ethanol   Result Value Ref Range    Alcohol, Medical, Serum <10 <10 mg/dL    Acetaminophen level   Result Value Ref Range    Acetaminophen (Tylenol), Serum <3.0 (L) 10.0 - 20.0 ug/mL   COVID-19 Rapid Screening   Result Value Ref Range    SARS-CoV-2 RNA, Amplification, Qual Negative Negative   Salicylate level   Result Value Ref Range    Salicylate Lvl <5.0 (L) 15.0 - 30.0 mg/dL   Urinalysis Microscopic   Result Value Ref Range    RBC, UA 25 (H) 0 - 4 /hpf    WBC, UA >100 (H) 0 - 5 /hpf    WBC Clumps, UA Rare None-Rare    Bacteria Occasional None-Occ /hpf    Squam Epithel, UA 3 /hpf    Hyaline Casts, UA 0 0-1/lpf /lpf    Amorphous, UA Few None-Moderate    Microscopic Comment SEE COMMENT    Vitamin B12   Result Value Ref Range    Vitamin B-12 1150 (H) 210 - 950 pg/mL            Imaging Results          CT Head Without Contrast (Final result)  Result time 09/02/20 15:10:24    Final result by Heath Nunez DO (09/02/20 15:10:24)                 Impression:      Unremarkable noncontrast CT head as detailed above specifically without evidence for acute intracranial hemorrhage.  Clinical correlation and further evaluation as warranted.    Electronically signed by resident: Eli Whaley  Date:    09/02/2020  Time:    14:32    Electronically signed by: Heath Nunez DO  Date:    09/02/2020  Time:    15:10             Narrative:    EXAMINATION:  CT HEAD WITHOUT CONTRAST    CLINICAL HISTORY:  Altered mental status;    TECHNIQUE:  Multiple sequential 5 mm axial images of the head without contrast.  Coronal and sagittal reformatted imaging from the axial acquisition.    COMPARISON:  None    FINDINGS:  Mild generalized cerebral volume loss. There is no evidence for acute intracranial hemorrhage or sulcal effacement.  The ventricles are normal in size without hydrocephalus.  There is no midline shift or mass effect.  Visualized paranasal sinuses and mastoid air cells are clear.                                 Medical Decision Making:   History:   Old Medical Records: I decided to obtain old medical  "records.  Initial Assessment:   Pt here c/o "night creatures attacking his penis" for the past 2 years. He is disoriented to day/date. No known previous psychiatric history. He does have penile swelling, penile lesion, phimosis, and purulent/bloody penile discharge on exam. He has urology notes in [facility], but denies ever having been to a urologist. He is wearing his work uniform and ID badge, but has not been to work this week.   Differential Diagnosis:   Psychosis, UTI, urethritis, balanitis, Penile abscess/cellulitis, Neuro-Syphilis, Drug abuse, electrolyte abnormality, Infection, Psychiatric illness, STD, Dementia, Delusions, CVA, Encephalopathy, etc   Clinical Tests:   Lab Tests: Ordered and Reviewed  Radiological Study: Ordered and Reviewed  ED Management:  I discussed the case in detail with the ER attending physician, who also examined the patient   Pt with vivid descriptions of "night creatures" that have been "attacking his penis". PEC filled out and signed.     Altered mental status  - acute symptoms of psychosis - CT brain unremarkable; labs unremarkable thus far - psychiatry consult ordered     Penile infection - there is copious bloody purulent discharge - due to phimosis unable to distinguish between balanitis and urethral discharge. The penis is moderately swollen - consistent with prepuce edema and no definite induration appreciated. Penile lesion is pointing and feels fluctuant with skin intact, but perhaps is a chancre. Abscess, cellulitis, balanitis, Syphilis, urethritis, in DDx. Urology consult ordered.       requests observation     I discussed the case with hospital medicine - requests admission to Dr Arroyo                                         Clinical Impression:       ICD-10-CM ICD-9-CM   1. Altered mental status, unspecified altered mental status type  R41.82 780.97   2. Urethritis  N34.2 597.80   3. Urinary tract infection with hematuria, site unspecified  N39.0 599.0    R31.9 " 599.70   4. Acute psychosis  F23 298.9   5. Penile pain  N48.89 607.9   6. Phimosis  N47.1 605   7. Penile lesion  N48.9 607.89   8. Penile swelling  N48.89 607.83             ED Disposition Condition    Observation                                  Isacc Forman PA-C  09/02/20 2268

## 2020-09-02 NOTE — ED TRIAGE NOTES
"Juan Hobson, a 69 y.o. male presents to the ED w/ complaint of groin pain. Pt reports "a creature assaulting" his penis starting 2 years ago. Pt has never seen the creature as it attacks at night while pt is sleeping. Last episode happened around 2 weeks ago per pt. Pt states when the "creature is done" he feels pain and "stuff comes out" of his penis. No psych hx. Has been seen by multiple MD's regarding  problems but does not remember being seen previously. Pt is speaking well but not oriented to place or time. Poor historian. Unable to report medical hx or medication use. Denies alcohol and drug use. Not sexually active. Pt states he still works at the [employer] but has not been to work in a week. Denies any other symptoms besides pain in penis.     Triage note:  Chief Complaint   Patient presents with    Groin Pain     Pt reports groin pain hematuria.     Review of patient's allergies indicates:  No Known Allergies  Past Medical History:   Diagnosis Date    Basal cell carcinoma     right helix    Skin cancer of arm      Patient identifiers verified and correct for Juan Hobson.    LOC: The patient is awake, alert and aware of environment with an appropriate affect, the patient is oriented to person but not place and time and speaking appropriately.  APPEARANCE: Patient resting comfortably and in no acute distress, patient is clean and well groomed, patient's clothing is properly fastened.  SKIN: The skin is warm and dry, color consistent with ethnicity, patient has normal skin turgor and moist mucus membranes, skin intact, no breakdown or bruising noted.  MUSCULOSKELETAL: Patient moving all extremities spontaneously, no obvious swelling or deformities noted.  RESPIRATORY: Airway is open and patent, respirations are spontaneous, patient has a normal effort and rate, no accessory muscle use noted.  CARDIAC: Patient has a normal rate and regular rhythm, no periphreal edema noted, capillary " refill < 3 seconds.  ABDOMEN: Soft and non tender to palpation, no distention noted, denies n/v/d  : Penis is swollen with serosanguinous discharge present, unable to retract foreskin d/t swelling. Denies dysuria but does report hematuria.   NEUROLOGIC: PERRL, 3 mm bilaterally, eyes open spontaneously, behavior appropriate to situation, follows commands, facial expression symmetrical, bilateral hand grasp equal and even, purposeful motor response noted, normal sensation in all extremities when touched with a finger.

## 2020-09-02 NOTE — ED NOTES
Dinner tray ordered. Pt resting comfortably and independently repositioned in stretcher with bed locked in lowest position for safety. NAD noted at this time. Respirations even and unlabored and visible chest rise noted.  Patient offered bathroom assistance and denies need at this time. Pt instructed to call if assistance is needed. Sitter at bedside maintaining 1:1 observation. No needs at this time. Will continue to monitor.

## 2020-09-02 NOTE — HPI
"Juan Hobson is a 69 y.o. male who [place] [facility] [place] due to a draining penile lesion. He does not have a psychiatric history but states that a "creature" attacked his penis while in bed last night. Patient states that he has had penile swelling coupled with erythema for the past 2 years. He was initially followed by ESSIE Valle NP and Dr. Armando in 2019.  Dr. Armando noted a 1 cm phlegmon on the penis draining clear fluid in 9/2019. Culture was obtained which grew Acinetobacter and patient completed a course of cipro. He states that the lesion has not healed since that time. He has had increasing penile pain coupled with purulent drainage from the lesion and urethra which brought him to the ED. He denies fevers, chills, or LUTS. He denies drainage of urine from the lesion while voiding. In the ED, he is afebrile and his vitals are stable. He has a normal WBC and Cr. No smoking history.  "

## 2020-09-02 NOTE — ED NOTES
Medicine team at bedside discussing POC. Pt resting comfortably and independently repositioned in stretcher with bed locked in lowest position for safety. NAD noted at this time. Respirations even and unlabored and visible chest rise noted.  Patient offered bathroom assistance and denies need at this time. Pt instructed to call if assistance is needed. Sitter at bedside maintaining 1:1 contact. No needs at this time. Will continue to monitor.

## 2020-09-02 NOTE — FIRST PROVIDER EVALUATION
" Emergency Department TeleTRIAGE Encounter Note      CHIEF COMPLAINT    Chief Complaint   Patient presents with    Groin Pain     Pt reports groin pain hematuria.       VITAL SIGNS   Initial Vitals [09/02/20 1119]   BP Pulse Resp Temp SpO2   118/80 94 16 98.9 °F (37.2 °C) 98 %      MAP       --            ALLERGIES    Review of patient's allergies indicates:  No Known Allergies    PROVIDER TRIAGE NOTE  Patient presents to the ER with chief complaint of wound on the groin due to a "creature getting into his room" while he was sleeping.  He says this happened 2 years ago.  He denies bleeding today.  Patient denies new symptoms starting today.    Will order UA pending ED provider evaluation.        ORDERS  Labs Reviewed - No data to display    ED Orders (720h ago, onward)    None            Virtual Visit Note: The provider triage portion of this emergency department evaluation and documentation was performed via [employer], a HIPAA-compliant telemedicine application, in concert with a tele-presenter in the room. A face to face patient evaluation with one of my colleagues will occur once the patient is placed in an emergency department room.      DISCLAIMER: This note was prepared with [employer] voice recognition transcription software. Garbled syntax, mangled pronouns, and other bizarre constructions may be attributed to that software system.  "

## 2020-09-03 LAB
ALBUMIN SERPL BCP-MCNC: 2.6 G/DL (ref 3.5–5.2)
ALP SERPL-CCNC: 124 U/L (ref 55–135)
ALT SERPL W/O P-5'-P-CCNC: 28 U/L (ref 10–44)
ANION GAP SERPL CALC-SCNC: 10 MMOL/L (ref 8–16)
AST SERPL-CCNC: 35 U/L (ref 10–40)
BACTERIA UR CULT: NORMAL
BACTERIA UR CULT: NORMAL
BASOPHILS # BLD AUTO: 0.05 K/UL (ref 0–0.2)
BASOPHILS NFR BLD: 0.4 % (ref 0–1.9)
BILIRUB SERPL-MCNC: 0.4 MG/DL (ref 0.1–1)
BUN SERPL-MCNC: 14 MG/DL (ref 8–23)
CALCIUM SERPL-MCNC: 8.8 MG/DL (ref 8.7–10.5)
CHLORIDE SERPL-SCNC: 105 MMOL/L (ref 95–110)
CO2 SERPL-SCNC: 24 MMOL/L (ref 23–29)
CREAT SERPL-MCNC: 0.7 MG/DL (ref 0.5–1.4)
DIFFERENTIAL METHOD: ABNORMAL
EOSINOPHIL # BLD AUTO: 0.1 K/UL (ref 0–0.5)
EOSINOPHIL NFR BLD: 1.2 % (ref 0–8)
ERYTHROCYTE [DISTWIDTH] IN BLOOD BY AUTOMATED COUNT: 12.5 % (ref 11.5–14.5)
EST. GFR  (AFRICAN AMERICAN): >60 ML/MIN/1.73 M^2
EST. GFR  (NON AFRICAN AMERICAN): >60 ML/MIN/1.73 M^2
GLUCOSE SERPL-MCNC: 88 MG/DL (ref 70–110)
HAV IGM SERPL QL IA: NEGATIVE
HBV CORE IGM SERPL QL IA: NEGATIVE
HBV SURFACE AG SERPL QL IA: NEGATIVE
HCT VFR BLD AUTO: 36.4 % (ref 40–54)
HCV AB SERPL QL IA: NEGATIVE
HGB BLD-MCNC: 11.7 G/DL (ref 14–18)
IMM GRANULOCYTES # BLD AUTO: 0.08 K/UL (ref 0–0.04)
IMM GRANULOCYTES NFR BLD AUTO: 0.7 % (ref 0–0.5)
LYMPHOCYTES # BLD AUTO: 1.6 K/UL (ref 1–4.8)
LYMPHOCYTES NFR BLD: 14.1 % (ref 18–48)
MAGNESIUM SERPL-MCNC: 2.2 MG/DL (ref 1.6–2.6)
MCH RBC QN AUTO: 31 PG (ref 27–31)
MCHC RBC AUTO-ENTMCNC: 32.1 G/DL (ref 32–36)
MCV RBC AUTO: 96 FL (ref 82–98)
MONOCYTES # BLD AUTO: 0.9 K/UL (ref 0.3–1)
MONOCYTES NFR BLD: 7.7 % (ref 4–15)
NEUTROPHILS # BLD AUTO: 8.5 K/UL (ref 1.8–7.7)
NEUTROPHILS NFR BLD: 75.9 % (ref 38–73)
NRBC BLD-RTO: 0 /100 WBC
PHOSPHATE SERPL-MCNC: 4.1 MG/DL (ref 2.7–4.5)
PLATELET # BLD AUTO: 324 K/UL (ref 150–350)
PLATELET BLD QL SMEAR: ABNORMAL
PMV BLD AUTO: 10.5 FL (ref 9.2–12.9)
POTASSIUM SERPL-SCNC: 4 MMOL/L (ref 3.5–5.1)
PROT SERPL-MCNC: 6.3 G/DL (ref 6–8.4)
RBC # BLD AUTO: 3.78 M/UL (ref 4.6–6.2)
RPR SER QL: NORMAL
SODIUM SERPL-SCNC: 139 MMOL/L (ref 136–145)
TOXIC GRANULES BLD QL SMEAR: PRESENT
WBC # BLD AUTO: 11.23 K/UL (ref 3.9–12.7)

## 2020-09-03 PROCEDURE — 80053 COMPREHEN METABOLIC PANEL: CPT

## 2020-09-03 PROCEDURE — 99214 OFFICE O/P EST MOD 30 MIN: CPT | Mod: ,,, | Performed by: PSYCHIATRY & NEUROLOGY

## 2020-09-03 PROCEDURE — 99214 PR OFFICE/OUTPT VISIT, EST, LEVL IV, 30-39 MIN: ICD-10-PCS | Mod: ,,, | Performed by: PSYCHIATRY & NEUROLOGY

## 2020-09-03 PROCEDURE — 63600175 PHARM REV CODE 636 W HCPCS: Performed by: STUDENT IN AN ORGANIZED HEALTH CARE EDUCATION/TRAINING PROGRAM

## 2020-09-03 PROCEDURE — G0378 HOSPITAL OBSERVATION PER HR: HCPCS

## 2020-09-03 PROCEDURE — 99226 PR SUBSEQUENT OBSERVATION CARE,LEVEL III: ICD-10-PCS | Mod: ,,, | Performed by: HOSPITALIST

## 2020-09-03 PROCEDURE — 84100 ASSAY OF PHOSPHORUS: CPT

## 2020-09-03 PROCEDURE — 99226 PR SUBSEQUENT OBSERVATION CARE,LEVEL III: CPT | Mod: ,,, | Performed by: HOSPITALIST

## 2020-09-03 PROCEDURE — 96376 TX/PRO/DX INJ SAME DRUG ADON: CPT

## 2020-09-03 PROCEDURE — 36415 COLL VENOUS BLD VENIPUNCTURE: CPT

## 2020-09-03 PROCEDURE — 83735 ASSAY OF MAGNESIUM: CPT

## 2020-09-03 PROCEDURE — 85025 COMPLETE CBC W/AUTO DIFF WBC: CPT

## 2020-09-03 PROCEDURE — 87491 CHLMYD TRACH DNA AMP PROBE: CPT

## 2020-09-03 RX ADMIN — CEFTRIAXONE 2 G: 2 INJECTION, SOLUTION INTRAVENOUS at 04:09

## 2020-09-03 NOTE — SUBJECTIVE & OBJECTIVE
"    Family History     None        Tobacco Use    Smoking status: Never Smoker    Smokeless tobacco: Never Used   Substance and Sexual Activity    Alcohol use: Yes     Frequency: Monthly or less     Drinks per session: 1 or 2     Comment: sometimes    Drug use: Never    Sexual activity: Not Currently     Partners: Female     Psychotherapeutics (From admission, onward)    None           Review of Systems  Objective:     Vital Signs (Most Recent):  Temp: 98.3 °F (36.8 °C) (09/03/20 1207)  Pulse: 71 (09/03/20 1207)  Resp: 18 (09/03/20 1207)  BP: 133/83 (09/03/20 1207)  SpO2: 97 % (09/03/20 1207) Vital Signs (24h Range):  Temp:  [97.4 °F (36.3 °C)-100.9 °F (38.3 °C)] 98.3 °F (36.8 °C)  Pulse:  [56-99] 71  Resp:  [16-18] 18  SpO2:  [95 %-100 %] 97 %  BP: (114-133)/(59-83) 133/83     Height: 5' 10" (177.8 cm)  Weight: 66.7 kg (147 lb 0.8 oz)  Body mass index is 21.1 kg/m².      Intake/Output Summary (Last 24 hours) at 9/3/2020 1241  Last data filed at 9/3/2020 1100  Gross per 24 hour   Intake 50 ml   Output --   Net 50 ml       Physical Exam  Psychiatric:      Comments: Mental Status Exam:  Appearance: white to grey hair, balding, moustache, no acute distress  Behavior/Cooperation: cooperative, calm, fair eye contact  Speech: normal rate, normal volume  Mood: "alright"  Affect: restricted  Thought Process: superficially linear, becomes perseverative at times  Thought Content: denies suicidal ideation,denies homicidal ideation, denies AVH, no paranoia elicited.  Continues to endorse fixed bizarre delusion regarding "small creatures," attacking him several years ago resulting in penile lesions and discharge   Orientation: person, place  Memory: impaired, able to register 1/3 and recall 0/3 at 5 minutes  Attention Span/Concentration: impaired  Insight: poor  Judgment: poor            Significant Labs: All pertinent labs within the past 24 hours have been reviewed.    Significant Imaging: MRI abdomen/pelvis to be " completed

## 2020-09-03 NOTE — SUBJECTIVE & OBJECTIVE
Interval History: Patient was placed on PEC overnight after wanting to leave. Amenable after discussion with overnight intern. Hemodynamically stable but had low-grade fever.     Review of Systems   Constitutional: Negative for appetite change, chills, fatigue and fever.   HENT: Negative for congestion, nosebleeds, rhinorrhea, sinus pain and sore throat.    Eyes: Negative for photophobia and visual disturbance.   Respiratory: Negative for cough, chest tightness and shortness of breath.    Cardiovascular: Negative for chest pain and leg swelling.   Gastrointestinal: Negative for abdominal distention, abdominal pain, anal bleeding, blood in stool, diarrhea, nausea and vomiting.   Genitourinary: Positive for discharge, genital sores, penile pain and penile swelling. Negative for dysuria, frequency, hematuria, scrotal swelling, testicular pain and urgency.   Musculoskeletal: Negative for back pain.   Skin: Positive for wound. Negative for rash.   Allergic/Immunologic: Negative for environmental allergies, food allergies and immunocompromised state.   Neurological: Negative for dizziness, weakness and headaches.   Hematological: Does not bruise/bleed easily.   Psychiatric/Behavioral: Positive for confusion, decreased concentration and hallucinations. Negative for suicidal ideas.     Objective:     Vital Signs (Most Recent):  Temp: 98.5 °F (36.9 °C) (09/03/20 1518)  Pulse: 66 (09/03/20 1518)  Resp: 20 (09/03/20 1518)  BP: 138/70 (09/03/20 1518)  SpO2: 98 % (09/03/20 1518) Vital Signs (24h Range):  Temp:  [97.4 °F (36.3 °C)-100.9 °F (38.3 °C)] 98.5 °F (36.9 °C)  Pulse:  [56-84] 66  Resp:  [16-20] 20  SpO2:  [95 %-98 %] 98 %  BP: (114-138)/(59-83) 138/70     Weight: 66.7 kg (147 lb 0.8 oz)  Body mass index is 21.1 kg/m².    Intake/Output Summary (Last 24 hours) at 9/3/2020 1632  Last data filed at 9/3/2020 1100  Gross per 24 hour   Intake 0 ml   Output --   Net 0 ml      Physical Exam  Constitutional:       General: He is  not in acute distress.     Appearance: Normal appearance. He is normal weight. He is not ill-appearing, toxic-appearing or diaphoretic.   HENT:      Head: Normocephalic and atraumatic.      Nose: No congestion or rhinorrhea.      Mouth/Throat:      Mouth: Mucous membranes are moist.      Pharynx: No oropharyngeal exudate or posterior oropharyngeal erythema.   Eyes:      General: No scleral icterus.        Right eye: No discharge.         Left eye: No discharge.   Cardiovascular:      Rate and Rhythm: Normal rate and regular rhythm.      Pulses: Normal pulses.      Heart sounds: Normal heart sounds. No murmur. No friction rub. No gallop.    Pulmonary:      Effort: Pulmonary effort is normal. No respiratory distress.      Breath sounds: Normal breath sounds. No stridor. No wheezing, rhonchi or rales.   Chest:      Chest wall: No tenderness.   Abdominal:      General: Abdomen is flat. Bowel sounds are normal. There is no distension.      Palpations: Abdomen is soft. There is no mass.      Tenderness: There is no abdominal tenderness. There is no guarding.   Genitourinary:     Comments: Penis with phimosis, penile shaft with erythematous ulceration draining bloody/purulent fluid when expressed. Fluctuance beneath ulcer.  Skin:     General: Skin is warm and dry.      Coloration: Skin is not jaundiced.   Neurological:      General: No focal deficit present.      Mental Status: He is alert and oriented to person, place, and time.         Significant Labs: All pertinent labs within the past 24 hours have been reviewed.    Significant Imaging: I have reviewed all pertinent imaging results/findings within the past 24 hours.

## 2020-09-03 NOTE — HOSPITAL COURSE
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 He is being treated with ceftriaxone and azithromycin. MRI pelvis pending. On admission patient endorsed delusions regarding his ulcer related to "creatures" which were "attacking him". He was seen by psychiatry and found that it is likely related to dementia after getting collateral from family/friends. Patient was PEC'd the night of admission after wanting to leave and concern for poor self care. Plan for discharge to geriatric psychiatry facility.   "

## 2020-09-03 NOTE — ASSESSMENT & PLAN NOTE
Juan Hobson is a 69 y.o. male who presents to the [facility] on 9/2/20 due to a draining penile lesion coupled with swelling and erythema. History and physical exam findings are concerning for possible penile cancer.     - Will obtain MRI abdomen/pelvis w/ and w/o IV contrast to delineate patient's anatomy.  - Culture swab obtained from purulent drainage from patient's penile lesion.   - Follow-up urine and wound cultures and tailor abx to susceptibilities.  - Recommend empiric ceftriaxone based on prior culture which grew Acinetobacter.  - Obtain post-void residual to ensure patient is emptying bladder.

## 2020-09-03 NOTE — CONSULTS
"[facility]  Psychiatry  Consult Note    Patient Name: Juan Hobson  MRN: [id number]   Code Status: Full Code  Admission Date: 9/2/2020  Hospital Length of Stay: 0 days  Attending Physician: No att. providers found  Primary Care Provider: Mehnaz Barillas MD    Current Legal Status: Uncontested    Patient information was obtained from patient and ER records.   Inpatient consult to Psychiatry  Consult performed by: Sky Jung MD  Consult ordered by: Isacc Forman PA-C        Subjective:     Principal Problem:<principal problem not specified>    Chief Complaint:  Penile lesion, delusion     HPI:   Emergency Psychiatry Consult Note     9/2/2020 6:15 PM  Juan Hobson  MRN: [id number]     Chief Complaint / Reason for Consult: Psychosis      SUBJECTIVE      History of Present Illness:   Juan Hobson is a 69 y.o. male with no past psychiatric history, medical hx of basal cell carcinoma, seborrheic dermatitis, penile lesion currently presenting with conintued complaints of penile lesion and delusions of "a creature assaulting his penis."   Emergency Psychiatry was originally consulted to address the patient's symptoms of psychosis.      Per ED RN(s):  Juan Hobson, a 69 y.o. male presents to the ED w/ complaint of groin pain. Pt reports "a creature assaulting" his penis starting 2 years ago. Pt has never seen the creature as it attacks at night while pt is sleeping. Last episode happened around 2 weeks ago per pt. Pt states when the "creature is done" he feels pain and "stuff comes out" of his penis. No psych hx. Has been seen by multiple MD's regarding  problems but does not remember being seen previously. Pt is speaking well but not oriented to place or time. Poor historian. Unable to report medical hx or medication use. Denies alcohol and drug use. Not sexually active. Pt states he still works at the [employer] but has not been to work in a week. Denies any other " "symptoms besides pain in penis.      Per ED MD:  The patient is a 69 year old male, who has a past medical history of basal cell carcinoma, seborrheic dermatitis, and hematuria. 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 He lives alone. He drove himself to the ER today for an emergent evaluation due to penile pain, swelling, and bloody discharge. He states that "creatures attack his penis" in the middle of the night while he is in bed. He states that the first "attack" happened about 2 years ago and that the "creature" lacerated his penis. He describes the creatures as shadowy and hard to see clearly in the dark. He states that there are multiple creatures. He states that the attacks have been worse recently. He states that he has blood draining from his penis onto his bedding and clothing. He states that his penis is "very big and hot". He states that he does not have any testicle pain or swelling. He states that he does not have any trouble urinating. He denies any fever or chills. He denies using drugs or alcohol. He states that he has not been sexually active in over 2 years. He denies any history of mental illness.      Per Psychiatry:  Upon initiation of interview, pt was resting in bed, calm, cooperative, pleasant.  Patient has obvious gross cognitive deficits.  He is only oriented to self, providing first and last name.  He is unable to state that he is in a hospital or the name of the hospital.  Reports year is "22022," unable to names month or day.  He is unable to provide names for any friends or family members whom we may contact.  He is unable to provide 911 as an option to call should he be in trouble at home.  Unable to name the president, [facility] and only able to immediately recall 1/3 on memory testing.  Pt admits he has been getting lost and forgetting things at home more frequently lately.         Patient reports driving himself to the hospital to " "get help with his penile lesion which has been present "off/on for 2 years."  [facility] the past for this issue (notes from 12/2019 with penile lesion and phimosis but no delusions mentioned).    Patient has fixed delusion regarding the cause of his lesion.  He reports "creatures" that come into his home at night and attack him which have caused the lesions on his penis.  This has been going on/off every few months for the past 2 years.  These "creatures" alive under his home and enter through holes in the walls and floors.  He frequently tries to hit them away but they are "usually too quick."  Majority of the time, they attack him while he is asleep.  Despite this, lamar feels safe at home and would like to return their once he is treated.       He lives home alone.  He reports never having been , no children of close family members.  Unable to provide names for any neighbors or friends.  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.  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  No psych history.  Denies previous medication trials or hospitalizations. Denies major head trauma, accidents or injuries.  His responses are largely appropriate and logical to the questions asked despite his memory deficits.  He denies any previous diagnosis of dementia.  He does not endorse any psychiatric symptoms otherwise, no complaints otherwise.  Does not endorse any further delusions or paranoia.  He is behaving appropriately at this time, no anger, hospitality or agitation.   He is not RIS.   He denies substance abuse, utox negative.         Psychiatric Review of Systems:  sleep: no  appetite: no  weight: no  energy/anergy: no  interest/pleasure/anhedonia: " no  somatic symptoms: no  libido: no  anxiety/panic: no  guilty/hopelessness: no  concentration: no  S.I.B.s/risky behavior: no  any drugs: no  alcohol: no      Medical Review Of Systems:  Pertinent items noted in HPI     Psychiatric History:  Diagnose(s): No  Previous Medication Trials: No  Previous Psychiatric Hospitalizations: No  Family Psychiatric History: No  Outpatient Psychiatrist: No  Outpatient Therapist: No     Suicide/Violence Risk Assessment:  Current/active suicidal ideation/plan/intent: No  Previous suicide attempts: No  Current/active homicidal ideation/plan/intent: No  History of threats/arrests associated with violent conduct - No  Access to firearms/lethal weapons - No     Social History:  Marital Status: single  Children: 0   Employment Status: currently employed  Education: college graduate  Special Ed: no  Housing Status: Yes - alone, house  Developmental History: No  History of Abuse: No     Substance Abuse History:  Recreational Drugs: denies  Use of Alcohol: denied  Rehab History: No  Tobacco Use: No  Use of Caffeine: No  Use of OTC: No  Is the patient aware of the biomedical complications associated with substance abuse and mental illness? yes  Legal consequences of chemical use: Yes      Legal History:  Past Charges/Incarcerations: No  Pending Charges: No     Psychosocial Factors:  Stressors: health.   Functioning Relationships: alone & isolated     Collateral:   No     Scheduled Meds:      Psychotherapeutics (From admission, onward)     None          PRN Meds:  sodium chloride 0.9%  Home Meds:          Prior to Admission medications    Medication Sig Start Date End Date Taking? Authorizing Provider   fish oil-omega-3 fatty acids 300-1,000 mg capsule Take 2 g by mouth once daily.       Historical Provider, MD   HYDROcodone-acetaminophen (NORCO) 5-325 mg per tablet Take 1 tablet by mouth every 6 (six) hours as needed for Pain. 6/25/20     Chana Mcnair PA-C   ketoconazole (NIZORAL) 2 %  "shampoo Apply to scalp and face 3-5x weekly. Let sit on scalp for 5-10 minutes before rinsing  Patient not taking: Reported on 6/25/2020 5/28/20     Tamiko Bray MD   multivitamin capsule Take 1 capsule by mouth once daily.       Historical Provider, MD          Psychotherapeutics (From admission, onward)     None          Allergies:  Patient has no known allergies.  Past Medical/Surgical History:       Past Medical History:   Diagnosis Date    Basal cell carcinoma       right helix    Hematuria      Seborrheic dermatitis      Skin cancer of arm              Past Surgical History:   Procedure Laterality Date    INCISION AND DRAINAGE INTRA ORAL ABSCESS              Hospital Course: No notes on file    OBJECTIVE      Vital Signs:  Temp:  [98.8 °F (37.1 °C)-98.9 °F (37.2 °C)]   Pulse:  [94-99]   Resp:  [16]   BP: (118-120)/(80-82)   SpO2:  [98 %-100 %]      Mental Status Exam:  Appearance: unremarkable, age appropriate, disheveled, thin & gaunt looking  Level of Consciousness: alert  Behavior/Cooperation: normal, cooperative  Psychomotor: unremarkable   Speech: normal tone, normal rate, normal pitch, normal volume  Language: english, fluid  Orientation: oriented to self only  Attention Span/Concentration: poor  Memory: Remote impaired, Recent impaired and immediate recall 1/3,  Delayed Recall 0/3  Mood: "fine"  Affect: normal  Thought Process: linear, concrete  Associations: intact  Thought Content: delusions: yes, no si/hi/avh.  Persecutory delusions   Fund of Knowledge: Impaired  Abstraction: proverbs were concrete  Insight: poor  Judgment: poor     Laboratory Data:  Recent Results         Recent Results (from the past 48 hour(s))   CBC auto differential     Collection Time: 09/02/20 12:29 PM   Result Value Ref Range     WBC 11.73 3.90 - 12.70 K/uL     RBC 4.18 (L) 4.60 - 6.20 M/uL     Hemoglobin 13.1 (L) 14.0 - 18.0 g/dL     Hematocrit 40.2 40.0 - 54.0 %     Mean Corpuscular Volume 96 82 - 98 fL     Mean " Corpuscular Hemoglobin 31.3 (H) 27.0 - 31.0 pg     Mean Corpuscular Hemoglobin Conc 32.6 32.0 - 36.0 g/dL     RDW 12.4 11.5 - 14.5 %     Platelets 314 150 - 350 K/uL     MPV 9.8 9.2 - 12.9 fL     Immature Granulocytes 0.9 (H) 0.0 - 0.5 %     Gran # (ANC) 9.8 (H) 1.8 - 7.7 K/uL     Immature Grans (Abs) 0.10 (H) 0.00 - 0.04 K/uL     Lymph # 0.8 (L) 1.0 - 4.8 K/uL     Mono # 0.9 0.3 - 1.0 K/uL     Eos # 0.0 0.0 - 0.5 K/uL     Baso # 0.03 0.00 - 0.20 K/uL     nRBC 0 0 /100 WBC     Gran% 83.7 (H) 38.0 - 73.0 %     Lymph% 7.2 (L) 18.0 - 48.0 %     Mono% 7.6 4.0 - 15.0 %     Eosinophil% 0.3 0.0 - 8.0 %     Basophil% 0.3 0.0 - 1.9 %     Differential Method Automated     Comprehensive metabolic panel     Collection Time: 09/02/20 12:29 PM   Result Value Ref Range     Sodium 137 136 - 145 mmol/L     Potassium 3.7 3.5 - 5.1 mmol/L     Chloride 101 95 - 110 mmol/L     CO2 27 23 - 29 mmol/L     Glucose 110 70 - 110 mg/dL     BUN, Bld 14 8 - 23 mg/dL     Creatinine 0.9 0.5 - 1.4 mg/dL     Calcium 9.2 8.7 - 10.5 mg/dL     Total Protein 7.4 6.0 - 8.4 g/dL     Albumin 3.3 (L) 3.5 - 5.2 g/dL     Total Bilirubin 0.7 0.1 - 1.0 mg/dL     Alkaline Phosphatase 98 55 - 135 U/L     AST 20 10 - 40 U/L     ALT 22 10 - 44 U/L     Anion Gap 9 8 - 16 mmol/L     eGFR if African American >60.0 >60 mL/min/1.73 m^2     eGFR if non African American >60.0 >60 mL/min/1.73 m^2   TSH     Collection Time: 09/02/20 12:29 PM   Result Value Ref Range     TSH 0.684 0.400 - 4.000 uIU/mL   Drug screen panel, emergency     Collection Time: 09/02/20 12:29 PM   Result Value Ref Range     Benzodiazepines Negative       Methadone metabolites Negative       Cocaine (Metab.) Negative       Opiate Scrn, Ur Negative       Barbiturate Screen, Ur Negative       Amphetamine Screen, Ur Negative       THC Negative       Phencyclidine Negative       Creatinine, Random Ur 284.0 23.0 - 375.0 mg/dL     Toxicology Information SEE COMMENT     Ethanol     Collection Time: 09/02/20  12:29 PM   Result Value Ref Range     Alcohol, Medical, Serum <10 <10 mg/dL   Acetaminophen level     Collection Time: 09/02/20 12:29 PM   Result Value Ref Range     Acetaminophen (Tylenol), Serum <3.0 (L) 10.0 - 20.0 ug/mL   Salicylate level     Collection Time: 09/02/20 12:29 PM   Result Value Ref Range     Salicylate Lvl <5.0 (L) 15.0 - 30.0 mg/dL   Vitamin B12     Collection Time: 09/02/20 12:29 PM   Result Value Ref Range     Vitamin B-12 1150 (H) 210 - 950 pg/mL   Urinalysis, Reflex to Urine Culture Urine, Clean Catch     Collection Time: 09/02/20 12:30 PM     Specimen: Urine   Result Value Ref Range     Specimen UA Urine, Clean Catch       Color, UA Winter Yellow, Straw, Winter     Appearance, UA Cloudy (A) Clear     pH, UA 5.0 5.0 - 8.0     Specific Gravity, UA 1.020 1.005 - 1.030     Protein, UA 1+ (A) Negative     Glucose, UA Negative Negative     Ketones, UA Negative Negative     Bilirubin (UA) Negative Negative     Occult Blood UA 3+ (A) Negative     Nitrite, UA Negative Negative     Leukocytes, UA 3+ (A) Negative   Urinalysis, Reflex to Urine Culture Urine, Clean Catch     Collection Time: 09/02/20 12:30 PM     Specimen: Urine   Result Value Ref Range     Specimen UA Urine, Clean Catch       Color, UA Winter Yellow, Straw, Winter     Appearance, UA Cloudy (A) Clear     pH, UA 5.0 5.0 - 8.0     Specific Gravity, UA 1.020 1.005 - 1.030     Protein, UA 1+ (A) Negative     Glucose, UA Negative Negative     Ketones, UA Negative Negative     Bilirubin (UA) Negative Negative     Occult Blood UA 3+ (A) Negative     Nitrite, UA Negative Negative     Leukocytes, UA 3+ (A) Negative   Urinalysis Microscopic     Collection Time: 09/02/20 12:30 PM   Result Value Ref Range     RBC, UA 25 (H) 0 - 4 /hpf     WBC, UA >100 (H) 0 - 5 /hpf     WBC Clumps, UA Rare None-Rare     Bacteria Occasional None-Occ /hpf     Squam Epithel, UA 3 /hpf     Hyaline Casts, UA 0 0-1/lpf /lpf     Amorphous, UA Few None-Moderate     Microscopic  "Comment SEE COMMENT     COVID-19 Rapid Screening     Collection Time: 09/02/20 12:37 PM   Result Value Ref Range     SARS-CoV-2 RNA, Amplification, Qual Negative Negative         No results found for: PHENYTOIN, PHENOBARB, VALPROATE, CBMZ  Imaging:      Imaging Results                   CT Head Without Contrast (Final result)  Result time 09/02/20 15:10:24                Final result by Heath Nunez DO (09/02/20 15:10:24)                               Impression:        Unremarkable noncontrast CT head as detailed above specifically without evidence for acute intracranial hemorrhage.  Clinical correlation and further evaluation as warranted.     Electronically signed by resident: Eli Whaley  Date:                                            09/02/2020  Time:                                            14:32     Electronically signed by:     Heath Nunez DO  Date:                                            09/02/2020  Time:                                            15:10                         Narrative:     EXAMINATION:  CT HEAD WITHOUT CONTRAST     CLINICAL HISTORY:  Altered mental status;     TECHNIQUE:  Multiple sequential 5 mm axial images of the head without contrast.  Coronal and sagittal reformatted imaging from the axial acquisition.     COMPARISON:  None     FINDINGS:  Mild generalized cerebral volume loss. There is no evidence for acute intracranial hemorrhage or sulcal effacement.  The ventricles are normal in size without hydrocephalus.  There is no midline shift or mass effect.  Visualized paranasal sinuses and mastoid air cells are clear.                                              Assessment/Plan:     Altered mental status     ASSESSMENT      Juan Hobson is a 69 y.o. male with no past psychiatric history, currently presenting for phimosis, penile lesion with fixed persecutory delusions regarding the cause of these lesions as being "attacks by creatures."  Emergency Psychiatry was " originally consulted to address the patient's symptoms of delusion.     Do not suspect patients delusion is psychiatric in nature.  Pt with gross cognitive deficits on memory testing.  Oriented to self only.  Unclear timeline regarding current delusions and cognitive decline as unable to reach collateral at this time.  May be difficult as patient lives alone with little to no family.  Patient without psychiatric history per interview and chart review.  Pt without any signs of psychiatric pathology with the exception of this single, fixed delusions revolving around his presenting medical complaints.       No indication for PEC at this time as he is not an acute danger to himself or others.  Given his gross memory deficits, suspect patient suffering from dementia.  Agree with continued workup by primary to rule out other medical causes.              IMPRESSION    Delusional Disorder  Major Neurocognitive Disorder with fixed delusion, dementia suspected  R/o Delusional Disorder 2/2 other medical condition      RECOMMENDATION(S)       Legal Status/Precaution(s):  Patient does not meet criteria for PEC or inpatient psychiatric admission at this time.  Patient is not currently an imminent danger to self or others.  [facility] psychiatry will continue to follow.        Medication Recommendations:  Zyprexa 2.5mg PO/IM q4h prn.        Recommend baseline EKG.  If patient begins requiring zyprexa prns, recommend follow-up EKG's for QTC monitoring.       Recommend delirium precautions to avoid sundowning.          In cases of emergency, daily coverage provided by Acute/ED Psych MD, NP, or SW, with associated contact numbers listed in the [facility] On Call Schedule.     Case discussed with emergency psychiatry staff: Dr. Palafox           Total Time:  60 minutes      Sky Jung MD   Psychiatry PGY-2  [facility]

## 2020-09-03 NOTE — PROGRESS NOTES
"[facility] Hospital Medicine  Progress Note    Patient Name: Juan Hobson  MRN: [id number]  Patient Class: OP- Observation   Admission Date: 9/2/2020  Length of Stay: 0 days  Attending Physician: Adriana Calabrese MD  Primary Care Provider: Mehnaz Barillas MD    [facility] Medicine Team: [facility] HOSP MED 1 Eddie Clay MD    Subjective:     Principal Problem:Penile lesion    HPI:  Juan Hobson is a 69 year old male with history of basal cell carcinoma and microscopic hematuria who presents with penile swelling and ulcer. He states he has had ongoing swelling of his penis for 2 years which began with significant swelling and subsequently developed an ulcer which is draining light red colored discharge. Discharge often soak through his clothing and onto his bedding. Patient denies pain at this time but states "it does hurt at times".  He states that "creatures attacked his penis" and wants us to "get them". He states that he these "creatures are difficult to see in the dark". When asked what prompted him to come in today, patient states "it has been going on for some time and has been painful, it was time to take care of it". He denies recent or new sexual contacts, history of sexually transmitted disease, scrotal or testicular pain, dysuria, changes in frequency or urgency, fevers, or chills. He denies history of psychiatric disease.    Patient was previously seen in urology clinic in 2019 with Dr. Armando for penile discharge and phimosis. He was treated with ciprofloxacin, augmentin, and bactrim. A 1 cm fluctuance was found to grow acinetobacter resistant to bactrim. He was seen in June 2020 for skin lesion found to be basal cell carcinoma s/p Moh's surgery.     Patient states he works at the [employer] as a  and lives alone. He states he is a nonsmoker and rarely uses alcohol.    Overview/Hospital Course:  Juan Hobson is a 69 year old male with history of basal cell " "carcinoma and microscopic hematuria who presents with penile swelling and ulcer for 2 years.Patient being seen by urology, concerns for penile cancer with longstanding ulcer. He is being treated with ceftriaxone and azithromycin. MRI pelvis pending. On admission patient endorsed delusions regarding his ulcer related to "creatures" which were "attacking him". He was seen by psychiatry and found that it is likely related to dementia after getting collateral from family/friends. Patient was PEC'd the night of admission after wanting to leave and concern for poor self care. Plan for discharge to geriatric psychiatry facility.     Interval History: Patient was placed on PEC overnight after wanting to leave. Amenable after discussion with overnight intern. Hemodynamically stable but had low-grade fever.     Review of Systems   Constitutional: Negative for appetite change, chills, fatigue and fever.   HENT: Negative for congestion, nosebleeds, rhinorrhea, sinus pain and sore throat.    Eyes: Negative for photophobia and visual disturbance.   Respiratory: Negative for cough, chest tightness and shortness of breath.    Cardiovascular: Negative for chest pain and leg swelling.   Gastrointestinal: Negative for abdominal distention, abdominal pain, anal bleeding, blood in stool, diarrhea, nausea and vomiting.   Genitourinary: Positive for discharge, genital sores, penile pain and penile swelling. Negative for dysuria, frequency, hematuria, scrotal swelling, testicular pain and urgency.   Musculoskeletal: Negative for back pain.   Skin: Positive for wound. Negative for rash.   Allergic/Immunologic: Negative for environmental allergies, food allergies and immunocompromised state.   Neurological: Negative for dizziness, weakness and headaches.   Hematological: Does not bruise/bleed easily.   Psychiatric/Behavioral: Positive for confusion, decreased concentration and hallucinations. Negative for suicidal ideas.     Objective: "     Vital Signs (Most Recent):  Temp: 98.5 °F (36.9 °C) (09/03/20 1518)  Pulse: 66 (09/03/20 1518)  Resp: 20 (09/03/20 1518)  BP: 138/70 (09/03/20 1518)  SpO2: 98 % (09/03/20 1518) Vital Signs (24h Range):  Temp:  [97.4 °F (36.3 °C)-100.9 °F (38.3 °C)] 98.5 °F (36.9 °C)  Pulse:  [56-84] 66  Resp:  [16-20] 20  SpO2:  [95 %-98 %] 98 %  BP: (114-138)/(59-83) 138/70     Weight: 66.7 kg (147 lb 0.8 oz)  Body mass index is 21.1 kg/m².    Intake/Output Summary (Last 24 hours) at 9/3/2020 1632  Last data filed at 9/3/2020 1100  Gross per 24 hour   Intake 0 ml   Output --   Net 0 ml      Physical Exam  Constitutional:       General: He is not in acute distress.     Appearance: Normal appearance. He is normal weight. He is not ill-appearing, toxic-appearing or diaphoretic.   HENT:      Head: Normocephalic and atraumatic.      Nose: No congestion or rhinorrhea.      Mouth/Throat:      Mouth: Mucous membranes are moist.      Pharynx: No oropharyngeal exudate or posterior oropharyngeal erythema.   Eyes:      General: No scleral icterus.        Right eye: No discharge.         Left eye: No discharge.   Cardiovascular:      Rate and Rhythm: Normal rate and regular rhythm.      Pulses: Normal pulses.      Heart sounds: Normal heart sounds. No murmur. No friction rub. No gallop.    Pulmonary:      Effort: Pulmonary effort is normal. No respiratory distress.      Breath sounds: Normal breath sounds. No stridor. No wheezing, rhonchi or rales.   Chest:      Chest wall: No tenderness.   Abdominal:      General: Abdomen is flat. Bowel sounds are normal. There is no distension.      Palpations: Abdomen is soft. There is no mass.      Tenderness: There is no abdominal tenderness. There is no guarding.   Genitourinary:     Comments: Penis with phimosis, penile shaft with erythematous ulceration draining bloody/purulent fluid when expressed. Fluctuance beneath ulcer.  Skin:     General: Skin is warm and dry.      Coloration: Skin is not  "jaundiced.   Neurological:      General: No focal deficit present.      Mental Status: He is alert and oriented to person, place, and time.         Significant Labs: All pertinent labs within the past 24 hours have been reviewed.    Significant Imaging: I have reviewed all pertinent imaging results/findings within the past 24 hours.      Assessment/Plan:      * Penile lesion  Jaun Hobson is a 69 year old male who presents with penile swelling and ulceration for the last two years. Although patient describes no sexual history over the last 2 years, there is concern for sexually transmitted disease ie syphilis particularly with his mental status change and delusion regarding "creatures" as the etiology of his ulcer. Additionally, nonhealing ulcer for this period of time is concerning for penile malignancy.     Plan:  - Started ceftriaxone and azithromycin  - Follow HIV, RPR, Chlamydia/Gonorrhea, HSV, cultures of drainage  - Urology consulted  - MRI pending      Altered mental status  Patient with delusions regarding the etiology of his penile ulcer and no known history of psychiatric illness. He was seen by psychiatry who found gross cognitive memory deficits and only a single fixed delusion. Patient PEC on 9/3.    Plan:  - Zyprexa 2.5 mg PRN      VTE Risk Mitigation (From admission, onward)         Ordered     IP VTE LOW RISK PATIENT  Once      09/02/20 1659                Discharge Planning   ALFRED: 9/3/2020     Code Status: Full Code   Is the patient medically ready for discharge?:     Reason for patient still in hospital (select all that apply): Treatment                     Eddie Clay MD  Department of Hospital Medicine   [facility]    "

## 2020-09-03 NOTE — ASSESSMENT & PLAN NOTE
"   ASSESSMENT      Juan Hobson is a 69 y.o. male with no past psychiatric history, currently presenting for phimosis, penile lesion with fixed bizarre delusions regarding the cause of these lesions as being "attacks by creatures."  Emergency Psychiatry was originally consulted to address the patient's symptoms of delusion.     Do not suspect patients delusion is psychiatric in nature.  Pt with gross cognitive deficits on memory testing.  Oriented to self only.  Unclear timeline regarding current delusions, though cognitive decline has been notable over the past year with most significant decline occurring over the past month, per collateral.  Patient has no known living family, with only confirmed support system being coworkers.  Patient without psychiatric history per interview, chart review, and collateral.  Pt without any signs of psychiatric pathology with the exception of this single, fixed delusions revolving around his presenting medical complaints.  Patient is gravely disabled and was PEC'd on 9/3/20 at 2:20 AM when he tried to leave the hospital AMA.                 IMPRESSION     Major Neurocognitive Disorder with fixed delusion, suspect dementia         RECOMMENDATION(S)       Legal Status/Precaution(s):  PEC'd 9/3/20 at 2:20 AM as patient is gravely disabled       Recommendations:    - Discussed patient with CM with their plan to interview patient and attempt to obtain further collateral today to address his needs.  CM also working towards having patient's cats at home fed/cared for while he is hospitalized  - Recommend zyprexa 2.5mg PO/IM q4h prn for nonredirectable agitation  - Will attempt to obtain MOCA tomorrow, if patient agreeable     - Recommend baseline EKG.  If patient begins requiring zyprexa prns, recommend follow-up EKG's for QTC monitoring.    - Recommend delirium precautions to avoid sundowning.    - Patient will need inpatient Geriatric Psychiatric Unit admission once medically " stabilized.  - [facility] Psychiatry will continue to follow.

## 2020-09-03 NOTE — PLAN OF CARE
Patient arrived to floor without incident.  Admission performed without issues.  Patient remained free of falls and injuries during shift.  Patient took medications without incident.  Patient attempted to leave the hospital to, in his words, check on his cats.  Medical team informed and patient placed on PEC.  No other concerns noted.

## 2020-09-03 NOTE — HPI
"Emergency Psychiatry Consult Note     9/2/2020 6:15 PM  Juan Hobson  MRN: [id number]     Chief Complaint / Reason for Consult: Psychosis      SUBJECTIVE      History of Present Illness:   Juan Hobson is a 69 y.o. male with no past psychiatric history, medical hx of basal cell carcinoma, seborrheic dermatitis, penile lesion currently presenting with conintued complaints of penile lesion and delusions of "a creature assaulting his penis."   Emergency Psychiatry was originally consulted to address the patient's symptoms of psychosis.      Per ED RN(s):  Juan Hobson, a 69 y.o. male presents to the ED w/ complaint of groin pain. Pt reports "a creature assaulting" his penis starting 2 years ago. Pt has never seen the creature as it attacks at night while pt is sleeping. Last episode happened around 2 weeks ago per pt. Pt states when the "creature is done" he feels pain and "stuff comes out" of his penis. No psych hx. Has been seen by multiple MD's regarding  problems but does not remember being seen previously. Pt is speaking well but not oriented to place or time. Poor historian. Unable to report medical hx or medication use. Denies alcohol and drug use. Not sexually active. Pt states he still works at the [employer] but has not been to work in a week. Denies any other symptoms besides pain in penis.      Per ED MD:  The patient is a 69 year old male, who has a past medical history of basal cell carcinoma, seborrheic dermatitis, and hematuria. 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 He lives alone. He drove himself to the ER today for an emergent evaluation due to penile pain, swelling, and bloody discharge. He states that "creatures attack his penis" in the middle of the night while he is in bed. He states that the first "attack" happened about 2 years ago and that the "creature" lacerated his penis. He describes the creatures as shadowy and " "hard to see clearly in the dark. He states that there are multiple creatures. He states that the attacks have been worse recently. He states that he has blood draining from his penis onto his bedding and clothing. He states that his penis is "very big and hot". He states that he does not have any testicle pain or swelling. He states that he does not have any trouble urinating. He denies any fever or chills. He denies using drugs or alcohol. He states that he has not been sexually active in over 2 years. He denies any history of mental illness.      Per Psychiatry:  Upon initiation of interview, pt was resting in bed, calm, cooperative, pleasant.  Patient has obvious gross cognitive deficits.  He is only oriented to self, providing first and last name.  He is unable to state that he is in a hospital or the name of the hospital.  Reports year is "[id number]," unable to names month or day.  He is unable to provide names for any friends or family members whom we may contact.  He is unable to provide 911 as an option to call should he be in trouble at home.  Unable to name the president, city and only able to immediately recall 1/3 on memory testing.  Pt admits he has been getting lost and forgetting things at home more frequently lately.         Patient reports driving himself to the hospital to get help with his penile lesion which has been present "off/on for 2 years."  He endorses seeing [facility] in the past for this issue (notes from 12/2019 with penile lesion and phimosis but no delusions mentioned).    Patient has fixed delusion regarding the cause of his lesion.  He reports "creatures" that come into his home at night and attack him which have caused the lesions on his penis.  This has been going on/off every few months for the past 2 years.  These "creatures" alive under his home and enter through holes in the walls and floors.  He frequently tries to hit them away but they are "usually too quick."  Majority " "of the time, they attack him while he is asleep.  Despite this, lamar feels safe at home and would like to return their once he is treated.       He lives home alone.  He reports never having been , no children of close family members.  Unable to provide names for any neighbors or friends.  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  No psych history.  Denies previous medication trials or hospitalizations. Denies major head trauma, accidents or injuries.  His responses are largely appropriate and logical to the questions asked despite his memory deficits.  He denies any previous diagnosis of dementia.  He does not endorse any psychiatric symptoms otherwise, no complaints otherwise.  Does not endorse any further delusions or paranoia.  He is behaving appropriately at this time, no anger, hospitality or agitation.   He is not RIS.   He denies substance abuse, utox negative.         Psychiatric Review of Systems:  sleep: no  appetite: no  weight: no  energy/anergy: no  interest/pleasure/anhedonia: no  somatic symptoms: no  libido: no  anxiety/panic: no  guilty/hopelessness: no  concentration: no  S.I.B.s/risky behavior: no  any drugs: no  alcohol: no      Medical Review Of Systems:  Pertinent items noted in HPI     Psychiatric History:  Diagnose(s): No  Previous Medication Trials: No  Previous Psychiatric Hospitalizations: No  Family Psychiatric History: No  Outpatient Psychiatrist: No  Outpatient Therapist: No     Suicide/Violence Risk Assessment:  Current/active suicidal ideation/plan/intent: No  Previous suicide attempts: No  Current/active homicidal ideation/plan/intent: No  History of threats/arrests associated with " violent conduct - No  Access to firearms/lethal weapons - No     Social History:  Marital Status: single  Children: 0   Employment Status: currently employed  Education: college graduate  Special Ed: no  Housing Status: Yes - alone, house  Developmental History: No  History of Abuse: No     Substance Abuse History:  Recreational Drugs: denies  Use of Alcohol: denied  Rehab History: No  Tobacco Use: No  Use of Caffeine: No  Use of OTC: No  Is the patient aware of the biomedical complications associated with substance abuse and mental illness? yes  Legal consequences of chemical use: Yes      Legal History:  Past Charges/Incarcerations: No  Pending Charges: No     Psychosocial Factors:  Stressors: health.   Functioning Relationships: alone & isolated     Collateral:   No     Scheduled Meds:      Psychotherapeutics (From admission, onward)     None          PRN Meds:  sodium chloride 0.9%  Home Meds:          Prior to Admission medications    Medication Sig Start Date End Date Taking? Authorizing Provider   fish oil-omega-3 fatty acids 300-1,000 mg capsule Take 2 g by mouth once daily.       Historical Provider, MD   HYDROcodone-acetaminophen (NORCO) 5-325 mg per tablet Take 1 tablet by mouth every 6 (six) hours as needed for Pain. 6/25/20     Chana Mcnair PA-C   ketoconazole (NIZORAL) 2 % shampoo Apply to scalp and face 3-5x weekly. Let sit on scalp for 5-10 minutes before rinsing  Patient not taking: Reported on 6/25/2020 5/28/20     Tamiko Bray MD   multivitamin capsule Take 1 capsule by mouth once daily.       Historical Provider, MD          Psychotherapeutics (From admission, onward)     None          Allergies:  Patient has no known allergies.  Past Medical/Surgical History:       Past Medical History:   Diagnosis Date    Basal cell carcinoma       right helix    Hematuria      Seborrheic dermatitis      Skin cancer of arm              Past Surgical History:   Procedure Laterality Date    INCISION  AND DRAINAGE INTRA ORAL ABSCESS

## 2020-09-03 NOTE — PROGRESS NOTES
"[facility]  Psychiatry  Progress Note    Patient Name: Juan Hobson  MRN: [id number]   Code Status: Full Code  Admission Date: 9/2/2020  Hospital Length of Stay: 0 days  Expected Discharge Date: 9/3/2020  Attending Physician: Adriana Calabrese MD  Primary Care Provider: Mehnaz Barillas MD    Current Legal Status: Physician's Emergency Certificate (PEC)    Patient information was obtained from patient, past medical records, friend/coworker and ER records.     Subjective:     Principal Problem:Penile lesion    Chief Complaint: memory deficits and fixed delusion    HPI:   Emergency Psychiatry Consult Note     9/2/2020 6:15 PM  Juan Hobson  MRN: [id number]     Chief Complaint / Reason for Consult: Psychosis      SUBJECTIVE      History of Present Illness:   Juan Hobson is a 69 y.o. male with no past psychiatric history, medical hx of basal cell carcinoma, seborrheic dermatitis, penile lesion currently presenting with conintued complaints of penile lesion and delusions of "a creature assaulting his penis."   Emergency Psychiatry was originally consulted to address the patient's symptoms of psychosis.      Per ED RN(s):  Juan Hobson, a 69 y.o. male presents to the ED w/ complaint of groin pain. Pt reports "a creature assaulting" his penis starting 2 years ago. Pt has never seen the creature as it attacks at night while pt is sleeping. Last episode happened around 2 weeks ago per pt. Pt states when the "creature is done" he feels pain and "stuff comes out" of his penis. No psych hx. Has been seen by multiple MD's regarding  problems but does not remember being seen previously. Pt is speaking well but not oriented to place or time. Poor historian. Unable to report medical hx or medication use. Denies alcohol and drug use. Not sexually active. Pt states he still works at the [employer] but has not been to work in a week. Denies any other symptoms besides pain in penis.      Per ED " "MD:  The patient is a 69 year old male, who has a past medical history of basal cell carcinoma, seborrheic dermatitis, and hematuria. 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 He lives alone. He drove himself to the ER today for an emergent evaluation due to penile pain, swelling, and bloody discharge. He states that "creatures attack his penis" in the middle of the night while he is in bed. He states that the first "attack" happened about 2 years ago and that the "creature" lacerated his penis. He describes the creatures as shadowy and hard to see clearly in the dark. He states that there are multiple creatures. He states that the attacks have been worse recently. He states that he has blood draining from his penis onto his bedding and clothing. He states that his penis is "very big and hot". He states that he does not have any testicle pain or swelling. He states that he does not have any trouble urinating. He denies any fever or chills. He denies using drugs or alcohol. He states that he has not been sexually active in over 2 years. He denies any history of mental illness.      Per Psychiatry:  Upon initiation of interview, pt was resting in bed, calm, cooperative, pleasant.  Patient has obvious gross cognitive deficits.  He is only oriented to self, providing first and last name.  He is unable to state that he is in a hospital or the name of the hospital.  Reports year is "22022," unable to names month or day.  He is unable to provide names for any friends or family members whom we may contact.  He is unable to provide 911 as an option to call should he be in trouble at home.  Unable to name the president, [facility] and only able to immediately recall 1/3 on memory testing.  Pt admits he has been getting lost and forgetting things at home more frequently lately.         Patient reports driving himself to the hospital to get help with his penile lesion which has " "been present "off/on for 2 years."  He endorses seeing [facility] in the past for this issue (notes from 12/2019 with penile lesion and phimosis but no delusions mentioned).    Patient has fixed delusion regarding the cause of his lesion.  He reports "creatures" that come into his home at night and attack him which have caused the lesions on his penis.  This has been going on/off every few months for the past 2 years.  These "creatures" alive under his home and enter through holes in the walls and floors.  He frequently tries to hit them away but they are "usually too quick."  Majority of the time, they attack him while he is asleep.  Despite this, lamar feels safe at home and would like to return their once he is treated.       He lives home alone.  He reports never having been , no children of close family members.  Unable to provide names for any neighbors or friends.  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  No psych history.  Denies previous medication trials or hospitalizations. Denies major head trauma, accidents or injuries.  His responses are largely appropriate and logical to the questions asked despite his memory deficits.  He denies any previous diagnosis of dementia.  He does not endorse any psychiatric symptoms otherwise, no complaints otherwise.  Does not endorse any further delusions or paranoia.  He is behaving appropriately at this time, no anger, hospitality or agitation.   He is not RIS.   He denies substance abuse, utox negative.         Psychiatric Review of Systems:  sleep: no  appetite: no  weight: no  energy/anergy: no  interest/pleasure/anhedonia: no  somatic symptoms: no  libido: " no  anxiety/panic: no  guilty/hopelessness: no  concentration: no  S.I.B.s/risky behavior: no  any drugs: no  alcohol: no      Medical Review Of Systems:  Pertinent items noted in HPI     Psychiatric History:  Diagnose(s): No  Previous Medication Trials: No  Previous Psychiatric Hospitalizations: No  Family Psychiatric History: No  Outpatient Psychiatrist: No  Outpatient Therapist: No     Suicide/Violence Risk Assessment:  Current/active suicidal ideation/plan/intent: No  Previous suicide attempts: No  Current/active homicidal ideation/plan/intent: No  History of threats/arrests associated with violent conduct - No  Access to firearms/lethal weapons - No     Social History:  Marital Status: single  Children: 0   Employment Status: currently employed  Education: college graduate  Special Ed: no  Housing Status: Yes - alone, house  Developmental History: No  History of Abuse: No     Substance Abuse History:  Recreational Drugs: denies  Use of Alcohol: denied  Rehab History: No  Tobacco Use: No  Use of Caffeine: No  Use of OTC: No  Is the patient aware of the biomedical complications associated with substance abuse and mental illness? yes  Legal consequences of chemical use: Yes      Legal History:  Past Charges/Incarcerations: No  Pending Charges: No     Psychosocial Factors:  Stressors: health.   Functioning Relationships: alone & isolated     Collateral:   No     Scheduled Meds:      Psychotherapeutics (From admission, onward)     None          PRN Meds:  sodium chloride 0.9%  Home Meds:          Prior to Admission medications    Medication Sig Start Date End Date Taking? Authorizing Provider   fish oil-omega-3 fatty acids 300-1,000 mg capsule Take 2 g by mouth once daily.       Historical Provider, MD   HYDROcodone-acetaminophen (NORCO) 5-325 mg per tablet Take 1 tablet by mouth every 6 (six) hours as needed for Pain. 6/25/20     Chana Mcnair PA-C   ketoconazole (NIZORAL) 2 % shampoo Apply to scalp and face 3-5x  "weekly. Let sit on scalp for 5-10 minutes before rinsing  Patient not taking: Reported on 6/25/2020 5/28/20     Tamiko Bray MD   multivitamin capsule Take 1 capsule by mouth once daily.       Historical Provider, MD          Psychotherapeutics (From admission, onward)     None          Allergies:  Patient has no known allergies.  Past Medical/Surgical History:       Past Medical History:   Diagnosis Date    Basal cell carcinoma       right helix    Hematuria      Seborrheic dermatitis      Skin cancer of arm              Past Surgical History:   Procedure Laterality Date    INCISION AND DRAINAGE INTRA ORAL ABSCESS              Hospital Course: 09/03/2020   Per chart review, patient attempted to leave AMA overnight claiming he needed to go home to care for his cats, and was PEC'd secondary to grave disability.  Patient did not receive any PRNs overnight for agitation, and has remained mostly calm and cooperative.  On interview this morning, patient found lying in bed wearing face mask with sitter at bedside.  States "when you get old you don't remember things as fast."  Repeatedly admits to having memory problems throughout interview.  Reports he is originally from [place], and attended college at [employer].  Patient cannot recall the reason he presented to the ED yesterday.  When reminded of his current discharge and penile lesions, patient then explains "these creatures made it [penis] bleed in 2 spots.  They did that to me when I was all covered up with blankets at home 2 or 3 years ago."  Patient claims "creatures" have never returned, but attributes his penile lesions and discharge to "those little monsters."  Denies suicidal ideation, denies homicidal ideation, and denies AVH.  Patient admits to only eating food at home when he "brings something back," typically from work or orders out.  States he has "friends," who look out for him.  States he has continued to work for [employer] as " "a .  Patient is oriented to self and place, but is disoriented to time and situation.  He states the year "has some 2s and 0s in it, but I can't remember, things get fuzzy."  When asked the month, he responds "we just started a new one."  He admits his work office is nearby the hospital, but does not recall the name of the hospital.  States he has lived in [place] for the past 30 years.      Collateral obtained from coworker Ms. Juice Martinez at [phone]  Ms. Martinez is patient's coworker, and has known patient for the past 6 years.  States patient has been working for [employer] as a  for the past 10-11 years.  Explains she and her coworkers are patient's only real support system as he "has nobody, no family, nothing."  Per coworker, patient has been showing signs of cognitive decline and memory deficits over the past year, with significant worsening over the past month.  States "we have all noticed his memory getting worse and worse for the past year, but he's so private, it could have been going on longer than that."  Reports secondary to his rapid decline, she scheduled an appointment with Neurology for him later this month.   States patient at times forgets "his days," thinking he is off of work, and does not show up.  Explains last week he forget how to clock out, so she helped him. Coworker becomes tearful during conversation, stating she has been doing everything she can to help as "he has nobody."  States he is private, and she has never intruded on his finances, but she worries that someone could easily take advantage of him.  States he at times shows up to work poorly groomed and with poor hygiene.  Admits she and coworkers have called him in the past to remind him to shower.  She expresses concern that he is not eating at home, as she constantly tries to send him home with extra from lunch at work.  Also reports 30-40 pound weight loss over the past 1 year, with " "all of patient's clothing being too big.  Also reports she brought patient to have his cell phone service turned on, as he "forgot how to pay the bill online."  States she is willing to do anything to help him as she feels "so sorry," for him, as she admits he is "really nice, but to himself."  Denies patient having any known psychiatric history.  Reports she has called around and discussed possible assisted living with patient in the past, but admits "he can be headstrong."  Claims "he has had some good days at work, but lately things are just getting worse."        Family History     None        Tobacco Use    Smoking status: Never Smoker    Smokeless tobacco: Never Used   Substance and Sexual Activity    Alcohol use: Yes     Frequency: Monthly or less     Drinks per session: 1 or 2     Comment: sometimes    Drug use: Never    Sexual activity: Not Currently     Partners: Female     Psychotherapeutics (From admission, onward)    None           Review of Systems  Objective:     Vital Signs (Most Recent):  Temp: 98.3 °F (36.8 °C) (09/03/20 1207)  Pulse: 71 (09/03/20 1207)  Resp: 18 (09/03/20 1207)  BP: 133/83 (09/03/20 1207)  SpO2: 97 % (09/03/20 1207) Vital Signs (24h Range):  Temp:  [97.4 °F (36.3 °C)-100.9 °F (38.3 °C)] 98.3 °F (36.8 °C)  Pulse:  [56-99] 71  Resp:  [16-18] 18  SpO2:  [95 %-100 %] 97 %  BP: (114-133)/(59-83) 133/83     Height: 5' 10" (177.8 cm)  Weight: 66.7 kg (147 lb 0.8 oz)  Body mass index is 21.1 kg/m².      Intake/Output Summary (Last 24 hours) at 9/3/2020 1241  Last data filed at 9/3/2020 1100  Gross per 24 hour   Intake 50 ml   Output --   Net 50 ml       Physical Exam  Psychiatric:      Comments: Mental Status Exam:  Appearance: white to grey hair, balding, moustache, no acute distress  Behavior/Cooperation: cooperative, calm, fair eye contact  Speech: normal rate, normal volume  Mood: "alright"  Affect: restricted  Thought Process: superficially linear, becomes perseverative at " "times  Thought Content: denies suicidal ideation,denies homicidal ideation, denies AVH, no paranoia elicited.  Continues to endorse fixed bizarre delusion regarding "small creatures," attacking him several years ago resulting in penile lesions and discharge   Orientation: person, place  Memory: impaired, able to register 1/3 and recall 0/3 at 5 minutes  Attention Span/Concentration: impaired  Insight: poor  Judgment: poor            Significant Labs: All pertinent labs within the past 24 hours have been reviewed.    Significant Imaging: MRI abdomen/pelvis to be completed    Assessment/Plan:     Altered mental status     ASSESSMENT      Juan Hobson is a 69 y.o. male with no past psychiatric history, currently presenting for phimosis, penile lesion with fixed bizarre delusions regarding the cause of these lesions as being "attacks by creatures."  Emergency Psychiatry was originally consulted to address the patient's symptoms of delusion.     Do not suspect patients delusion is psychiatric in nature.  Pt with gross cognitive deficits on memory testing.  Oriented to self only.  Unclear timeline regarding current delusions, though cognitive decline has been notable over the past year with most significant decline occurring over the past month, per collateral.  Patient has no known living family, with only confirmed support system being coworkers.  Patient without psychiatric history per interview, chart review, and collateral.  Pt without any signs of psychiatric pathology with the exception of this single, fixed delusions revolving around his presenting medical complaints.  Patient is gravely disabled and was PEC'd on 9/3/20 at 2:20 AM when he tried to leave the hospital AMA.                 IMPRESSION     Major Neurocognitive Disorder with fixed delusion, suspect dementia         RECOMMENDATION(S)       Legal Status/Precaution(s):  PEC'd 9/3/20 at 2:20 AM as patient is gravely disabled       Recommendations:    - " Discussed patient with CM with their plan to interview patient and attempt to obtain further collateral today to address his needs.  CM also working towards having patient's cats at home fed/cared for while he is hospitalized  - Recommend zyprexa 2.5mg PO/IM q4h prn for nonredirectable agitation  - Will attempt to obtain MOCA tomorrow, if patient agreeable     - Recommend baseline EKG.  If patient begins requiring zyprexa prns, recommend follow-up EKG's for QTC monitoring.    - Recommend delirium precautions to avoid sundowning.    - Patient will need inpatient Geriatric Psychiatric Unit admission once medically stabilized.  - [facility] Psychiatry will continue to follow.                   Anticipated Disposition: Inpatient Geriatric Psychiatry Unit once medically cleared    Total time:  35 with greater than 50% of this time spent in counseling and/or coordination of care.       Cuca Walker MD   Psychiatry  [facility]

## 2020-09-03 NOTE — HOSPITAL COURSE
"09/03/2020   Per chart review, patient attempted to leave AMA overnight claiming he needed to go home to care for his cats, and was PEC'd secondary to grave disability.  Patient did not receive any PRNs overnight for agitation, and has remained mostly calm and cooperative.  On interview this morning, patient found lying in bed wearing face mask with sitter at bedside.  States "when you get old you don't remember things as fast."  Repeatedly admits to having memory problems throughout interview.  Reports he is originally from [place], and attended college at [employer].  Patient cannot recall the reason he presented to the ED yesterday.  When reminded of his current discharge and penile lesions, patient then explains "these creatures made it [penis] bleed in 2 spots.  They did that to me when I was all covered up with blankets at home 2 or 3 years ago."  Patient claims "creatures" have never returned, but attributes his penile lesions and discharge to "those little monsters."  Denies suicidal ideation, denies homicidal ideation, and denies AVH.  Patient admits to only eating food at home when he "brings something back," typically from work or orders out.  States he has "friends," who look out for him.  States he has continued to work for [employer] as a .  Patient is oriented to self and place, but is disoriented to time and situation.  He states the year "has some 2s and 0s in it, but I can't remember, things get fuzzy."  When asked the month, he responds "we just started a new one."  He admits his work office is nearby the hospital, but does not recall the name of the hospital.  States he has lived in [place] for the past 30 years.      Collateral obtained from coworker MsAlessandra Juice Martinez at [phone]  Ms. Martinez is patient's coworker, and has known patient for the past 6 years.  States patient has been working for [employer] as a  for the past 10-11 years.  " "Explains she and her coworkers are patient's only real support system as he "has nobody, no family, nothing."  Per coworker, patient has been showing signs of cognitive decline and memory deficits over the past year, with significant worsening over the past month.  States "we have all noticed his memory getting worse and worse for the past year, but he's so private, it could have been going on longer than that."  Reports secondary to his rapid decline, she scheduled an appointment with Neurology for him later this month.   States patient at times forgets "his days," thinking he is off of work, and does not show up.  Explains last week he forget how to clock out, so she helped him. Coworker becomes tearful during conversation, stating she has been doing everything she can to help as "he has nobody."  States he is private, and she has never intruded on his finances, but she worries that someone could easily take advantage of him.  States he at times shows up to work poorly groomed and with poor hygiene.  Admits she and coworkers have called him in the past to remind him to shower.  She expresses concern that he is not eating at home, as she constantly tries to send him home with extra from lunch at work.  Also reports 30-40 pound weight loss over the past 1 year, with all of patient's clothing being too big.  Also reports she brought patient to have his cell phone service turned on, as he "forgot how to pay the bill online."  States she is willing to do anything to help him as she feels "so sorry," for him, as she admits he is "really nice, but to himself."  Denies patient having any known psychiatric history.  Reports she has called around and discussed possible assisted living with patient in the past, but admits "he can be headstrong."  Claims "he has had some good days at work, but lately things are just getting worse."    09/04/2020  Per chart review, patient continues to be cooperative with treatment team " "regarding continued workup of penile lesions.  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  Urology planning outpatient follow-up at this time.  Patient interviewed this afternoon.  He recalls having imaging completed this morning, stating "it was loud, but it was fun."  Patient does not recall the reason for the MRI of his pelvis.  He cannot recall the reason he initially presented to the hospital.  Continues to perseverate about going home and caring for his cats.  Patient is oriented to person, place (hospital), though is unable to name the city, [place], no name of hospital.  He is oriented to year, though claims it is "July 2020."  He is disoriented to situation.  Denies suicidal ideation, homicidal ideation, and AVH.  Patient continues to have fixed chronic delusion regarding creatures attacking/causing his penile lesions and discharge.  Reports feeling safe in the hospital.      09/04/2020  This morning patient is seen lying in bed. He is linear, organized, and cooperative and engaged in interview. He states his memory problems have been going on for about a year and he states that his friends and family have even noticed. He is well educated and a  so his memory problems are distressing and frustrating to him. He state his mood is "goood" and he has been eating and sleeping well. He is oriented to name, place, month, and president, but not year (1111). He often has increased latency of response with memory and concentration tasks and appear to be thinking hard and slightly frustrated at himself. He can spell WORLD forwards and backwards with some difficulty. He cannot perform math in a way that he should for his education level (can do 2x3 and 2x6, but no higher than that). CAM-ICU 2 (failed orientation and SAVEAHAART).  "

## 2020-09-03 NOTE — ASSESSMENT & PLAN NOTE
"   ASSESSMENT      Juan Hobson is a 69 y.o. male with no past psychiatric history, currently presenting for phimosis, penile lesion with fixed persecutory delusions regarding the cause of these lesions as being "attacks by creatures."  Emergency Psychiatry was originally consulted to address the patient's symptoms of delusion.     Do not suspect patients delusion is psychiatric in nature.  Pt with gross cognitive deficits on memory testing.  Oriented to self only.  Unclear timeline regarding current delusions and cognitive decline as unable to reach collateral at this time.  May be difficult as patient lives alone with little to no family.  Patient without psychiatric history per interview and chart review.  Pt without any signs of psychiatric pathology with the exception of this single, fixed delusions revolving around his presenting medical complaints.       No indication for PEC at this time as he is not an acute danger to himself or others.            IMPRESSION     Major Neurocognitive Disorder with fixed delusion, suspect dementia         RECOMMENDATION(S)       Legal Status/Precaution(s):  Patient does not meet criteria for PEC or inpatient psychiatric admission at this time.  Patient is not currently an imminent danger to self or others.  CL psychiatry will continue to follow.        Medication Recommendations:  Zyprexa 2.5mg PO/IM q4h prn.        Recommend baseline EKG.  If patient begins requiring zyprexa prns, recommend follow-up EKG's for QTC monitoring.       Recommend delirium precautions to avoid sundowning.          In cases of emergency, daily coverage provided by Acute/ED Psych MD, NP, or SW, with associated contact numbers listed in the [facility] On Call Schedule.     Case discussed with emergency psychiatry staff: Dr. Palafox    "

## 2020-09-03 NOTE — ASSESSMENT & PLAN NOTE
"Juan Hobson is a 69 year old male who presents with penile swelling and ulceration for the last two years. Although patient describes no sexual history over the last 2 years, there is concern for sexually transmitted disease ie syphilis particularly with his mental status change and delusion regarding "creatures" as the etiology of his ulcer. Additionally, nonhealing ulcer for this period of time is concerning for penile malignancy.     Plan:  - Started ceftriaxone and azithromycin  - Ordered HIV, RPR, Chlamydia/Gonorrhea, HSV, cultures of drainage  - Consulted urology for further assessment    "

## 2020-09-03 NOTE — HPI
"Juan Hobson is a 69 year old male with history of basal cell carcinoma and microscopic hematuria who presents with penile swelling and ulcer. He states he has had ongoing swelling of his penis for 2 years which began with significant swelling and subsequently developed an ulcer which is draining light red colored discharge. Discharge often soak through his clothing and onto his bedding. Patient denies pain at this time but states "it does hurt at times".  He states that "creatures attacked his penis" and wants us to "get them". He states that he these "creatures are difficult to see in the dark". When asked what prompted him to come in today, patient states "it has been going on for some time and has been painful, it was time to take care of it". He denies recent or new sexual contacts, history of sexually transmitted disease, scrotal or testicular pain, dysuria, changes in frequency or urgency, fevers, or chills. He denies history of psychiatric disease.    Patient was previously seen in urology clinic in 2019 with Dr. Armando for penile discharge and phimosis. He was treated with ciprofloxacin, augmentin, and bactrim. A 1 cm fluctuance was found to grow acinetobacter resistant to bactrim. He was seen in June 2020 for skin lesion found to be basal cell carcinoma s/p Moh's surgery.     Patient states he works at the [employer] as a  and lives alone. He states he is a nonsmoker and rarely uses alcohol.  "

## 2020-09-03 NOTE — SUBJECTIVE & OBJECTIVE
Past Medical History:   Diagnosis Date    Basal cell carcinoma     right helix    Hematuria     Seborrheic dermatitis     Skin cancer of arm        Past Surgical History:   Procedure Laterality Date    INCISION AND DRAINAGE INTRA ORAL ABSCESS         Review of patient's allergies indicates:  No Known Allergies    No current facility-administered medications on file prior to encounter.      Current Outpatient Medications on File Prior to Encounter   Medication Sig    fish oil-omega-3 fatty acids 300-1,000 mg capsule Take 2 g by mouth once daily.    HYDROcodone-acetaminophen (NORCO) 5-325 mg per tablet Take 1 tablet by mouth every 6 (six) hours as needed for Pain.    ketoconazole (NIZORAL) 2 % shampoo Apply to scalp and face 3-5x weekly. Let sit on scalp for 5-10 minutes before rinsing (Patient not taking: Reported on 6/25/2020)    multivitamin capsule Take 1 capsule by mouth once daily.     Family History     None        Tobacco Use    Smoking status: Never Smoker    Smokeless tobacco: Never Used   Substance and Sexual Activity    Alcohol use: Yes     Frequency: Monthly or less     Drinks per session: 1 or 2     Comment: sometimes    Drug use: Never    Sexual activity: Not Currently     Partners: Female     Review of Systems   Constitutional: Negative for appetite change, chills, fatigue and fever.   HENT: Negative for congestion, nosebleeds, rhinorrhea, sinus pain and sore throat.    Eyes: Negative for photophobia and visual disturbance.   Respiratory: Negative for cough, chest tightness and shortness of breath.    Cardiovascular: Negative for chest pain and leg swelling.   Gastrointestinal: Negative for abdominal distention, abdominal pain, anal bleeding, blood in stool, diarrhea, nausea and vomiting.   Genitourinary: Positive for discharge, genital sores, penile pain and penile swelling. Negative for dysuria, frequency, hematuria, scrotal swelling, testicular pain and urgency.   Musculoskeletal:  Negative for back pain.   Skin: Positive for wound. Negative for rash.   Allergic/Immunologic: Negative for environmental allergies, food allergies and immunocompromised state.   Neurological: Negative for dizziness, weakness and headaches.   Hematological: Does not bruise/bleed easily.   Psychiatric/Behavioral: Positive for confusion, decreased concentration and hallucinations. Negative for suicidal ideas.     Objective:     Vital Signs (Most Recent):  Temp: 98.3 °F (36.8 °C) (09/02/20 2048)  Pulse: 81 (09/02/20 2048)  Resp: 16 (09/02/20 2048)  BP: 126/61 (09/02/20 2048)  SpO2: 97 % (09/02/20 2048) Vital Signs (24h Range):  Temp:  [98.3 °F (36.8 °C)-98.9 °F (37.2 °C)] 98.3 °F (36.8 °C)  Pulse:  [81-99] 81  Resp:  [16] 16  SpO2:  [97 %-100 %] 97 %  BP: (118-126)/(61-82) 126/61     Weight: 67.8 kg (149 lb 7.6 oz)  Body mass index is 21.45 kg/m².    Physical Exam  Constitutional:       General: He is not in acute distress.     Appearance: Normal appearance. He is normal weight. He is not ill-appearing, toxic-appearing or diaphoretic.   HENT:      Head: Normocephalic and atraumatic.      Nose: No congestion or rhinorrhea.      Mouth/Throat:      Mouth: Mucous membranes are moist.      Pharynx: No oropharyngeal exudate or posterior oropharyngeal erythema.   Eyes:      General: No scleral icterus.        Right eye: No discharge.         Left eye: No discharge.   Cardiovascular:      Rate and Rhythm: Normal rate and regular rhythm.      Pulses: Normal pulses.      Heart sounds: Normal heart sounds. No murmur. No friction rub. No gallop.    Pulmonary:      Effort: Pulmonary effort is normal. No respiratory distress.      Breath sounds: Normal breath sounds. No stridor. No wheezing, rhonchi or rales.   Chest:      Chest wall: No tenderness.   Abdominal:      General: Abdomen is flat. Bowel sounds are normal. There is no distension.      Palpations: Abdomen is soft. There is no mass.      Tenderness: There is no abdominal  "tenderness. There is no guarding.   Genitourinary:     Comments: Penis with phimosis, penile shaft with erythematous ulceration draining bloody/purulent fluid when expressed. Fluctuance beneath ulcer.  Skin:     General: Skin is warm and dry.      Coloration: Skin is not jaundiced.   Neurological:      General: No focal deficit present.      Mental Status: He is alert.      Comments: Oriented to only self   Psychiatric:      Comments: Patient with hallucination/dellusions regarding "bugs attacking penis" which he believes caused his penile ulcer             Significant Labs: All pertinent labs within the past 24 hours have been reviewed.    Significant Imaging: I have reviewed all pertinent imaging results/findings within the past 24 hours.  "

## 2020-09-03 NOTE — PHARMACY MED REC
"Admission Medication Reconciliation - Pharmacy Consult Note    The home medication history was taken by Zakia Estrella, Pharmacy Technician.  Based on information gathered and subsequent review by the clinical pharmacist, the items below may need attention.    You may go to "Admission" then "Reconcile Home Medications" tabs to review and/or act upon these items.    No issues noted with the medication reconciliation.    Please address this information as you see fit.  Feel free to contact us if you have any questions or require assistance.    Ebony Kennedy, PharmD, BCPS  [id number]     No medications prior to admission.     .    .          "

## 2020-09-03 NOTE — ASSESSMENT & PLAN NOTE
Patient with delusions regarding the etiology of his penile ulcer and no known history of psychiatric illness. He was seen by psychiatry who found gross cognitive memory deficits and only a single fixed delusion. Patient PEC on 9/3.    Plan:  - Zyprexa 2.5 mg PRN

## 2020-09-03 NOTE — SUBJECTIVE & OBJECTIVE
Interval History: NAEON. AFVSS.  Patient resting comfortably in bed.  Gram stain from scrotal wound with GNR, GPC, and WBC.  On rocephin.  Has not received penile MRI yet.    Objective:     Temp:  [97.4 °F (36.3 °C)-100.9 °F (38.3 °C)] 97.4 °F (36.3 °C)  Pulse:  [56-99] 56  Resp:  [16-18] 18  SpO2:  [95 %-100 %] 95 %  BP: (114-126)/(59-82) 120/76     Body mass index is 21.1 kg/m².      Bladder Scan Volume (mL): (S) 0 mL(post void residual scan) (09/02/20 1931)    Drains     None                 Physical Exam   Nursing note and vitals reviewed.  HENT:   Head: Normocephalic and atraumatic.   Mouth/Throat: Mucous membranes are moist.   Eyes: Pupils are equal, round, and reactive to light.   Cardiovascular: Normal rate.    Pulmonary/Chest: Effort normal.   Abdominal: Normal appearance. He exhibits no distension. There is no abdominal tenderness.   Genitourinary:    Genitourinary Comments: The penis is uncircumcised, unable to retract foreskin due to phimosis. The urethral meatus appears normal with purulent discharge. There is an ulcer on the right lateral aspect of the penile shaft midway down draining bloody purulent fluid with underlying induration, no fluctuance. There is nodularity along the penile shaft and glans. There is erythema of the penis.    Right testis, epididymis, and cord structures are normal in size and contour with no tenderness. Left testis and cord structures are normal in size and contour with no tenderness. Left-sided spermatocele.    Bilateral inguinal adenopathy (R > L)     Neurological: He is alert. He is disoriented.   Skin: Skin is warm and dry.     Psychiatric: Mood normal.       Significant Labs:    BMP:  Recent Labs   Lab 09/02/20  1229 09/03/20  0435    139   K 3.7 4.0    105   CO2 27 24   BUN 14 14   CREATININE 0.9 0.7   CALCIUM 9.2 8.8       CBC:   Recent Labs   Lab 09/02/20  1229 09/03/20  0435   WBC 11.73 11.23   HGB 13.1* 11.7*   HCT 40.2 36.4*    324       Urine  Culture: No results for input(s): LABURIN in the last 168 hours.  Urine Studies:   Recent Labs   Lab 09/02/20  1230   COLORU Winter  Winter   APPEARANCEUA Cloudy*  Cloudy*   PHUR 5.0  5.0   SPECGRAV 1.020  1.020   PROTEINUA 1+*  1+*   GLUCUA Negative  Negative   KETONESU Negative  Negative   BILIRUBINUA Negative  Negative   OCCULTUA 3+*  3+*   NITRITE Negative  Negative   LEUKOCYTESUR 3+*  3+*   RBCUA 25*   WBCUA >100*   BACTERIA Occasional   SQUAMEPITHEL 3   HYALINECASTS 0       Significant Imaging:  All pertinent imaging results/findings from the past 24 hours have been reviewed.

## 2020-09-03 NOTE — ASSESSMENT & PLAN NOTE
Juan Hobson is a 69 y.o. male who presents to the [facility] [place] on 9/2/20 due to a draining penile lesion coupled with swelling and erythema. History and physical exam findings are concerning for possible penile cancer.     - Will obtain MRI abdomen/pelvis w/ and w/o IV contrast to delineate patient's anatomy.  - Culture swab obtained from purulent drainage from patient's penile lesion. Gram stain with GNR and GPC  - Follow-up urine and wound cultures and tailor abx to susceptibilities.  - Continue empiric ceftriaxone based on prior culture which grew Acinetobacter.  - Remainder of care per primary

## 2020-09-03 NOTE — ASSESSMENT & PLAN NOTE
Patient with delusions regarding the etiology of his penile ulcer and no known history of psychiatric illness. He was seen by psychiatry who found gross cognitive memory deficits and only a single fixed delusion. They found no indication at this time for PEC as he is amenable to treatment.    Plan:  - Zyprexa 2.5 mg PRN

## 2020-09-03 NOTE — CONSULTS
"[facility]  Urology  Consult Note    Patient Name: Juan Hobosn  MRN: [id number]  Admission Date: 9/2/2020  Hospital Length of Stay: 0   Code Status: Full Code   Attending Provider: Karen Amezcua MD   Consulting Provider: Jay Mackay MD  Primary Care Physician: Mehnaz Barillas MD  Principal Problem:<principal problem not specified>    Inpatient consult to Urology  Consult performed by: Jay Mackay MD  Consult ordered by: Isacc Forman PA-C          Subjective:     HPI:  Juan Hobson is a 69 y.o. male who presents to the [facility] [place] on 9/2/20 due to a draining penile lesion. He does not have a psychiatric history but states that a "creature" attacked his penis while in bed last night. Patient states that he has had penile swelling coupled with erythema for the past 2 years. He was initially followed by ESSIE Valle NP and Dr. Armando in 2019.  Dr. Armando noted a 1 cm phlegmon on the penis draining clear fluid in 9/2019. Culture was obtained which grew Acinetobacter and patient completed a course of cipro. He states that the lesion has not healed since that time. He has had increasing penile pain coupled with purulent drainage from the lesion and urethra which brought him to the ED. He denies fevers, chills, or LUTS. He denies drainage of urine from the lesion while voiding. In the ED, he is afebrile and his vitals are stable. He has a normal WBC and Cr. No smoking history.    Past Medical History:   Diagnosis Date    Basal cell carcinoma     right helix    Hematuria     Seborrheic dermatitis     Skin cancer of arm        Past Surgical History:   Procedure Laterality Date    INCISION AND DRAINAGE INTRA ORAL ABSCESS         Review of patient's allergies indicates:  No Known Allergies    Family History     None          Tobacco Use    Smoking status: Never Smoker    Smokeless tobacco: Never Used   Substance and Sexual Activity    Alcohol use: Yes     Frequency: " Monthly or less     Drinks per session: 1 or 2     Comment: sometimes    Drug use: Never    Sexual activity: Not Currently     Partners: Female       Review of Systems   Unable to perform ROS: Mental status change       Objective:     Temp:  [98.8 °F (37.1 °C)-98.9 °F (37.2 °C)] 98.8 °F (37.1 °C)  Pulse:  [94-99] 99  Resp:  [16] 16  SpO2:  [98 %-100 %] 100 %  BP: (118-120)/(80-82) 120/82     Body mass index is 21.45 kg/m².    Date 09/02/20 0700 - 09/03/20 0659   Shift 6417-9961 4720-3980 8952-7186 24 Hour Total   INTAKE   IV Piggyback 50   50   Shift Total(mL/kg) 50(0.7)   50(0.7)   OUTPUT   Shift Total(mL/kg)       Weight (kg) 67.8 67.8 67.8 67.8          Drains     None                 Physical Exam   Nursing note and vitals reviewed.  HENT:   Head: Normocephalic and atraumatic.   Mouth/Throat: Mucous membranes are moist.   Eyes: Pupils are equal, round, and reactive to light.   Cardiovascular: Normal rate.    Pulmonary/Chest: Effort normal.   Abdominal: Normal appearance. He exhibits no distension. There is no abdominal tenderness.   Genitourinary:    Genitourinary Comments: The penis is uncircumcised, unable to retract foreskin due to phimosis. The urethral meatus appears normal with purulent discharge. There is an ulcer on the right lateral aspect of the penile shaft midway down draining bloody purulent fluid with underlying induration, no fluctuance. There is nodularity along the penile shaft and glans. There is erythema of the penis.    Right testis, epididymis, and cord structures are normal in size and contour with no tenderness. Left testis and cord structures are normal in size and contour with no tenderness. Left-sided spermatocele.    Bilateral inguinal adenopathy (R > L)     Neurological: He is alert. He is disoriented.   Skin: Skin is warm and dry.     Psychiatric: Mood normal.       Significant Labs:    BMP:  Recent Labs   Lab 09/02/20  1229      K 3.7      CO2 27   BUN 14   CREATININE  0.9   CALCIUM 9.2       CBC:  Recent Labs   Lab 09/02/20  1229   WBC 11.73   HGB 13.1*   HCT 40.2          All pertinent labs results from the past 24 hours have been reviewed.    Significant Imaging:  All pertinent imaging results/findings from the past 24 hours have been reviewed.      Assessment and Plan:     Penile lesion  Juan Hobson is a 69 y.o. male who presents to the [facility] [place] on [place]2/20 due to a draining penile lesion coupled with swelling and erythema. History and physical exam findings are concerning for possible penile cancer.     - Will obtain MRI abdomen/pelvis w/ and w/o IV contrast to delineate patient's anatomy.  - Culture swab obtained from purulent drainage from patient's penile lesion.   - Follow-up urine and wound cultures and tailor abx to susceptibilities.  - Recommend empiric ceftriaxone based on prior culture which grew Acinetobacter.  - Obtain post-void residual to ensure patient is emptying bladder.  - NPO after midnight.        VTE Risk Mitigation (From admission, onward)         Ordered     IP VTE LOW RISK PATIENT  Once      09/02/20 8226                Thank you for your consult. I will follow-up with patient. Please contact us if you have any additional questions.    Jay Mackay MD  Urology  [facility]    I have reviewed the notes, assessments, and/or procedures performed by Dr. Mackay, I concur with her/his documentation of Juan Hobson.  Plan for MRI of pelvis to evaluate for possible penile cancer.

## 2020-09-03 NOTE — PLAN OF CARE
Patient medicated per orders. Patient free of falls. Patient awaiting MRI to be completed. Nurse called MRI for update on MRI, MRI staff voices he will get it tonight.

## 2020-09-03 NOTE — H&P
"[facility] Hospital Medicine  History & Physical    Patient Name: Juan Hobson  MRN: [id number]  Admission Date: 9/2/2020  Attending Physician: No att. providers found   Primary Care Provider: Mehnaz Barillas MD    [facility] Medicine Team: [facility] MED 1 Eddie Clay MD     Patient information was obtained from patient and ER records.     Subjective:     Principal Problem:Penile lesion    Chief Complaint:   Chief Complaint   Patient presents with    Groin Pain     Pt reports groin pain hematuria.        HPI: Juan Hobson is a 69 year old male with history of basal cell carcinoma and microscopic hematuria who presents with penile swelling and ulcer. He states he has had ongoing swelling of his penis for 2 years which began with significant swelling and subsequently developed an ulcer which is draining light red colored discharge. Discharge often soak through his clothing and onto his bedding. Patient denies pain at this time but states "it does hurt at times".  He states that "creatures attacked his penis" and wants us to "get them". He states that he these "creatures are difficult to see in the dark". When asked what prompted him to come in today, patient states "it has been going on for some time and has been painful, it was time to take care of it". He denies recent or new sexual contacts, history of sexually transmitted disease, scrotal or testicular pain, dysuria, changes in frequency or urgency, fevers, or chills. He denies history of psychiatric disease.    Patient was previously seen in urology clinic in 2019 with Dr. Armando for penile discharge and phimosis. He was treated with ciprofloxacin, augmentin, and bactrim. A 1 cm fluctuance was found to grow acinetobacter resistant to bactrim. He was seen in June 2020 for skin lesion found to be basal cell carcinoma s/p Moh's surgery.     Patient states he works at the [employer] as a  and lives alone. He states he " is a nonsmoker and rarely uses alcohol.                Past Medical History:   Diagnosis Date    Basal cell carcinoma     right helix    Hematuria     Seborrheic dermatitis     Skin cancer of arm        Past Surgical History:   Procedure Laterality Date    INCISION AND DRAINAGE INTRA ORAL ABSCESS         Review of patient's allergies indicates:  No Known Allergies    No current facility-administered medications on file prior to encounter.      Current Outpatient Medications on File Prior to Encounter   Medication Sig    fish oil-omega-3 fatty acids 300-1,000 mg capsule Take 2 g by mouth once daily.    HYDROcodone-acetaminophen (NORCO) 5-325 mg per tablet Take 1 tablet by mouth every 6 (six) hours as needed for Pain.    ketoconazole (NIZORAL) 2 % shampoo Apply to scalp and face 3-5x weekly. Let sit on scalp for 5-10 minutes before rinsing (Patient not taking: Reported on 6/25/2020)    multivitamin capsule Take 1 capsule by mouth once daily.     Family History     None        Tobacco Use    Smoking status: Never Smoker    Smokeless tobacco: Never Used   Substance and Sexual Activity    Alcohol use: Yes     Frequency: Monthly or less     Drinks per session: 1 or 2     Comment: sometimes    Drug use: Never    Sexual activity: Not Currently     Partners: Female     Review of Systems   Constitutional: Negative for appetite change, chills, fatigue and fever.   HENT: Negative for congestion, nosebleeds, rhinorrhea, sinus pain and sore throat.    Eyes: Negative for photophobia and visual disturbance.   Respiratory: Negative for cough, chest tightness and shortness of breath.    Cardiovascular: Negative for chest pain and leg swelling.   Gastrointestinal: Negative for abdominal distention, abdominal pain, anal bleeding, blood in stool, diarrhea, nausea and vomiting.   Genitourinary: Positive for discharge, genital sores, penile pain and penile swelling. Negative for dysuria, frequency, hematuria, scrotal  swelling, testicular pain and urgency.   Musculoskeletal: Negative for back pain.   Skin: Positive for wound. Negative for rash.   Allergic/Immunologic: Negative for environmental allergies, food allergies and immunocompromised state.   Neurological: Negative for dizziness, weakness and headaches.   Hematological: Does not bruise/bleed easily.   Psychiatric/Behavioral: Positive for confusion, decreased concentration and hallucinations. Negative for suicidal ideas.     Objective:     Vital Signs (Most Recent):  Temp: 98.3 °F (36.8 °C) (09/02/20 2048)  Pulse: 81 (09/02/20 2048)  Resp: 16 (09/02/20 2048)  BP: 126/61 (09/02/20 2048)  SpO2: 97 % (09/02/20 2048) Vital Signs (24h Range):  Temp:  [98.3 °F (36.8 °C)-98.9 °F (37.2 °C)] 98.3 °F (36.8 °C)  Pulse:  [81-99] 81  Resp:  [16] 16  SpO2:  [97 %-100 %] 97 %  BP: (118-126)/(61-82) 126/61     Weight: 67.8 kg (149 lb 7.6 oz)  Body mass index is 21.45 kg/m².    Physical Exam  Constitutional:       General: He is not in acute distress.     Appearance: Normal appearance. He is normal weight. He is not ill-appearing, toxic-appearing or diaphoretic.   HENT:      Head: Normocephalic and atraumatic.      Nose: No congestion or rhinorrhea.      Mouth/Throat:      Mouth: Mucous membranes are moist.      Pharynx: No oropharyngeal exudate or posterior oropharyngeal erythema.   Eyes:      General: No scleral icterus.        Right eye: No discharge.         Left eye: No discharge.   Cardiovascular:      Rate and Rhythm: Normal rate and regular rhythm.      Pulses: Normal pulses.      Heart sounds: Normal heart sounds. No murmur. No friction rub. No gallop.    Pulmonary:      Effort: Pulmonary effort is normal. No respiratory distress.      Breath sounds: Normal breath sounds. No stridor. No wheezing, rhonchi or rales.   Chest:      Chest wall: No tenderness.   Abdominal:      General: Abdomen is flat. Bowel sounds are normal. There is no distension.      Palpations: Abdomen is soft.  "There is no mass.      Tenderness: There is no abdominal tenderness. There is no guarding.   Genitourinary:     Comments: Penis with phimosis, penile shaft with erythematous ulceration draining bloody/purulent fluid when expressed. Fluctuance beneath ulcer.  Skin:     General: Skin is warm and dry.      Coloration: Skin is not jaundiced.   Neurological:      General: No focal deficit present.      Mental Status: He is alert.      Comments: Oriented to only self   Psychiatric:      Comments: Patient with hallucination/dellusions regarding "bugs attacking penis" which he believes caused his penile ulcer             Significant Labs: All pertinent labs within the past 24 hours have been reviewed.    Significant Imaging: I have reviewed all pertinent imaging results/findings within the past 24 hours.    Assessment/Plan:     * Penile lesion  Juan Hobson is a 69 year old male who presents with penile swelling and ulceration for the last two years. Although patient describes no sexual history over the last 2 years, there is concern for sexually transmitted disease ie syphilis particularly with his mental status change and delusion regarding "creatures" as the etiology of his ulcer. Additionally, nonhealing ulcer for this period of time is concerning for penile malignancy.     Plan:  - Started ceftriaxone and azithromycin  - Ordered HIV, RPR, Chlamydia/Gonorrhea, HSV, cultures of drainage  - Consulted urology for further assessment      Altered mental status  Patient with delusions regarding the etiology of his penile ulcer and no known history of psychiatric illness. He was seen by psychiatry who found gross cognitive memory deficits and only a single fixed delusion. They found no indication at this time for PEC as he is amenable to treatment.    Plan:  - Zyprexa 2.5 mg PRN      VTE Risk Mitigation (From admission, onward)         Ordered     IP VTE LOW RISK PATIENT  Once      09/02/20 Kassy Clay, " MD  Department of Hospital Medicine   [facility]

## 2020-09-03 NOTE — ASSESSMENT & PLAN NOTE
"Juan Hobson is a 69 year old male who presents with penile swelling and ulceration for the last two years. Although patient describes no sexual history over the last 2 years, there is concern for sexually transmitted disease ie syphilis particularly with his mental status change and delusion regarding "creatures" as the etiology of his ulcer. Additionally, nonhealing ulcer for this period of time is concerning for penile malignancy.     Plan:  - Started ceftriaxone and azithromycin  - Follow HIV, RPR, Chlamydia/Gonorrhea, HSV, cultures of drainage  - Urology consulted  - MRI pending    "

## 2020-09-03 NOTE — PROGRESS NOTES
"[facility]  Urology  Progress Note    Patient Name: Juan Hobson  MRN: [id number]  Admission Date: 9/2/2020  Hospital Length of Stay: 0 days  Code Status: Full Code   Attending Provider: Adriana Calabrese MD   Primary Care Physician: Mehnaz Barillas MD    Subjective:     HPI:  Juan Hobson is a 69 y.o. male who presents to the [facility] on [place]/20 due to a draining penile lesion. He does not have a psychiatric history but states that a "creature" attacked his penis while in bed last night. Patient states that he has had penile swelling coupled with erythema for the past 2 years. He was initially followed by ESSIE Valle NP and Dr. Armando in 2019.  Dr. Armando noted a 1 cm phlegmon on the penis draining clear fluid in 9/2019. Culture was obtained which grew Acinetobacter and patient completed a course of cipro. He states that the lesion has not healed since that time. He has had increasing penile pain coupled with purulent drainage from the lesion and urethra which brought him to the ED. He denies fevers, chills, or LUTS. He denies drainage of urine from the lesion while voiding. In the ED, he is afebrile and his vitals are stable. He has a normal WBC and Cr. No smoking history.    Interval History: NAEON. AFVSS.  Patient resting comfortably in bed.  Gram stain from scrotal wound with GNR, GPC, and WBC.  On rocephin.  Has not received penile MRI yet.    Objective:     Temp:  [97.4 °F (36.3 °C)-100.9 °F (38.3 °C)] 97.4 °F (36.3 °C)  Pulse:  [56-99] 56  Resp:  [16-18] 18  SpO2:  [95 %-100 %] 95 %  BP: (114-126)/(59-82) 120/76     Body mass index is 21.1 kg/m².      Bladder Scan Volume (mL): (S) 0 mL(post void residual scan) (09/02/20 1931)    Drains     None                 Physical Exam   Nursing note and vitals reviewed.  HENT:   Head: Normocephalic and atraumatic.   Mouth/Throat: Mucous membranes are moist.   Eyes: Pupils are equal, round, and reactive to light.   Cardiovascular: Normal rate.  "   Pulmonary/Chest: Effort normal.   Abdominal: Normal appearance. He exhibits no distension. There is no abdominal tenderness.   Genitourinary:    Genitourinary Comments: The penis is uncircumcised, unable to retract foreskin due to phimosis. The urethral meatus appears normal with purulent discharge. There is an ulcer on the right lateral aspect of the penile shaft midway down draining bloody purulent fluid with underlying induration, no fluctuance. There is nodularity along the penile shaft and glans. There is erythema of the penis.    Right testis, epididymis, and cord structures are normal in size and contour with no tenderness. Left testis and cord structures are normal in size and contour with no tenderness. Left-sided spermatocele.    Bilateral inguinal adenopathy (R > L)     Neurological: He is alert. He is disoriented.   Skin: Skin is warm and dry.     Psychiatric: Mood normal.       Significant Labs:    BMP:  Recent Labs   Lab 09/02/20  1229 09/03/20  0435    139   K 3.7 4.0    105   CO2 27 24   BUN 14 14   CREATININE 0.9 0.7   CALCIUM 9.2 8.8       CBC:   Recent Labs   Lab 09/02/20  1229 09/03/20  0435   WBC 11.73 11.23   HGB 13.1* 11.7*   HCT 40.2 36.4*    324       Urine Culture: No results for input(s): LABURIN in the last 168 hours.  Urine Studies:   Recent Labs   Lab 09/02/20  1230   COLORU Winter  Winter   APPEARANCEUA Cloudy*  Cloudy*   PHUR 5.0  5.0   SPECGRAV 1.020  1.020   PROTEINUA 1+*  1+*   GLUCUA Negative  Negative   KETONESU Negative  Negative   BILIRUBINUA Negative  Negative   OCCULTUA 3+*  3+*   NITRITE Negative  Negative   LEUKOCYTESUR 3+*  3+*   RBCUA 25*   WBCUA >100*   BACTERIA Occasional   SQUAMEPITHEL 3   HYALINECASTS 0       Significant Imaging:  All pertinent imaging results/findings from the past 24 hours have been reviewed.                  Assessment/Plan:     * Penile lesion  Juan Hobson is a 69 y.o. male who [facility] due  to a draining penile lesion coupled with swelling and erythema. History and physical exam findings are concerning for possible penile cancer.     - Will obtain MRI abdomen/pelvis w/ and w/o IV contrast to delineate patient's anatomy.  - Culture swab obtained from purulent drainage from patient's penile lesion. Gram stain with GNR and GPC  - Follow-up urine and wound cultures and tailor abx to susceptibilities.  - Continue empiric ceftriaxone based on prior culture which grew Acinetobacter.  - Remainder of care per primary        VTE Risk Mitigation (From admission, onward)         Ordered     IP VTE LOW RISK PATIENT  Once      09/02/20 1448                Dilan Mir MD  Urology  [facility]

## 2020-09-03 NOTE — ED NOTES
All patient belongings from closet and safe returned to patient. Seal intact on [employer] envelope #[id number].

## 2020-09-03 NOTE — SUBJECTIVE & OBJECTIVE
"OBJECTIVE      Vital Signs:  Temp:  [98.8 °F (37.1 °C)-98.9 °F (37.2 °C)]   Pulse:  [94-99]   Resp:  [16]   BP: (118-120)/(80-82)   SpO2:  [98 %-100 %]      Mental Status Exam:  Appearance: unremarkable, age appropriate, disheveled, thin & gaunt looking  Level of Consciousness: alert  Behavior/Cooperation: normal, cooperative  Psychomotor: unremarkable   Speech: normal tone, normal rate, normal pitch, normal volume  Language: english, fluid  Orientation: oriented to self only  Attention Span/Concentration: poor  Memory: Remote impaired, Recent impaired and immediate recall 1/3,  Delayed Recall 0/3  Mood: "fine"  Affect: normal  Thought Process: linear, concrete  Associations: intact  Thought Content: delusions: yes, no si/hi/avh.  Persecutory delusions   Fund of Knowledge: Impaired  Abstraction: proverbs were concrete  Insight: poor  Judgment: poor     Laboratory Data:  Recent Results         Recent Results (from the past 48 hour(s))   CBC auto differential     Collection Time: 09/02/20 12:29 PM   Result Value Ref Range     WBC 11.73 3.90 - 12.70 K/uL     RBC 4.18 (L) 4.60 - 6.20 M/uL     Hemoglobin 13.1 (L) 14.0 - 18.0 g/dL     Hematocrit 40.2 40.0 - 54.0 %     Mean Corpuscular Volume 96 82 - 98 fL     Mean Corpuscular Hemoglobin 31.3 (H) 27.0 - 31.0 pg     Mean Corpuscular Hemoglobin Conc 32.6 32.0 - 36.0 g/dL     RDW 12.4 11.5 - 14.5 %     Platelets 314 150 - 350 K/uL     MPV 9.8 9.2 - 12.9 fL     Immature Granulocytes 0.9 (H) 0.0 - 0.5 %     Gran # (ANC) 9.8 (H) 1.8 - 7.7 K/uL     Immature Grans (Abs) 0.10 (H) 0.00 - 0.04 K/uL     Lymph # 0.8 (L) 1.0 - 4.8 K/uL     Mono # 0.9 0.3 - 1.0 K/uL     Eos # 0.0 0.0 - 0.5 K/uL     Baso # 0.03 0.00 - 0.20 K/uL     nRBC 0 0 /100 WBC     Gran% 83.7 (H) 38.0 - 73.0 %     Lymph% 7.2 (L) 18.0 - 48.0 %     Mono% 7.6 4.0 - 15.0 %     Eosinophil% 0.3 0.0 - 8.0 %     Basophil% 0.3 0.0 - 1.9 %     Differential Method Automated     Comprehensive metabolic panel     Collection Time: " 09/02/20 12:29 PM   Result Value Ref Range     Sodium 137 136 - 145 mmol/L     Potassium 3.7 3.5 - 5.1 mmol/L     Chloride 101 95 - 110 mmol/L     CO2 27 23 - 29 mmol/L     Glucose 110 70 - 110 mg/dL     BUN, Bld 14 8 - 23 mg/dL     Creatinine 0.9 0.5 - 1.4 mg/dL     Calcium 9.2 8.7 - 10.5 mg/dL     Total Protein 7.4 6.0 - 8.4 g/dL     Albumin 3.3 (L) 3.5 - 5.2 g/dL     Total Bilirubin 0.7 0.1 - 1.0 mg/dL     Alkaline Phosphatase 98 55 - 135 U/L     AST 20 10 - 40 U/L     ALT 22 10 - 44 U/L     Anion Gap 9 8 - 16 mmol/L     eGFR if African American >60.0 >60 mL/min/1.73 m^2     eGFR if non African American >60.0 >60 mL/min/1.73 m^2   TSH     Collection Time: 09/02/20 12:29 PM   Result Value Ref Range     TSH 0.684 0.400 - 4.000 uIU/mL   Drug screen panel, emergency     Collection Time: 09/02/20 12:29 PM   Result Value Ref Range     Benzodiazepines Negative       Methadone metabolites Negative       Cocaine (Metab.) Negative       Opiate Scrn, Ur Negative       Barbiturate Screen, Ur Negative       Amphetamine Screen, Ur Negative       THC Negative       Phencyclidine Negative       Creatinine, Random Ur 284.0 23.0 - 375.0 mg/dL     Toxicology Information SEE COMMENT     Ethanol     Collection Time: 09/02/20 12:29 PM   Result Value Ref Range     Alcohol, Medical, Serum <10 <10 mg/dL   Acetaminophen level     Collection Time: 09/02/20 12:29 PM   Result Value Ref Range     Acetaminophen (Tylenol), Serum <3.0 (L) 10.0 - 20.0 ug/mL   Salicylate level     Collection Time: 09/02/20 12:29 PM   Result Value Ref Range     Salicylate Lvl <5.0 (L) 15.0 - 30.0 mg/dL   Vitamin B12     Collection Time: 09/02/20 12:29 PM   Result Value Ref Range     Vitamin B-12 1150 (H) 210 - 950 pg/mL   Urinalysis, Reflex to Urine Culture Urine, Clean Catch     Collection Time: 09/02/20 12:30 PM     Specimen: Urine   Result Value Ref Range     Specimen UA Urine, Clean Catch       Color, UA Winter Yellow, Straw, Winter     Appearance, UA Cloudy (A)  Clear     pH, UA 5.0 5.0 - 8.0     Specific Gravity, UA 1.020 1.005 - 1.030     Protein, UA 1+ (A) Negative     Glucose, UA Negative Negative     Ketones, UA Negative Negative     Bilirubin (UA) Negative Negative     Occult Blood UA 3+ (A) Negative     Nitrite, UA Negative Negative     Leukocytes, UA 3+ (A) Negative   Urinalysis, Reflex to Urine Culture Urine, Clean Catch     Collection Time: 09/02/20 12:30 PM     Specimen: Urine   Result Value Ref Range     Specimen UA Urine, Clean Catch       Color, UA Winter Yellow, Straw, Winter     Appearance, UA Cloudy (A) Clear     pH, UA 5.0 5.0 - 8.0     Specific Gravity, UA 1.020 1.005 - 1.030     Protein, UA 1+ (A) Negative     Glucose, UA Negative Negative     Ketones, UA Negative Negative     Bilirubin (UA) Negative Negative     Occult Blood UA 3+ (A) Negative     Nitrite, UA Negative Negative     Leukocytes, UA 3+ (A) Negative   Urinalysis Microscopic     Collection Time: 09/02/20 12:30 PM   Result Value Ref Range     RBC, UA 25 (H) 0 - 4 /hpf     WBC, UA >100 (H) 0 - 5 /hpf     WBC Clumps, UA Rare None-Rare     Bacteria Occasional None-Occ /hpf     Squam Epithel, UA 3 /hpf     Hyaline Casts, UA 0 0-1/lpf /lpf     Amorphous, UA Few None-Moderate     Microscopic Comment SEE COMMENT     COVID-19 Rapid Screening     Collection Time: 09/02/20 12:37 PM   Result Value Ref Range     SARS-CoV-2 RNA, Amplification, Qual Negative Negative         No results found for: PHENYTOIN, PHENOBARB, VALPROATE, CBMZ  Imaging:      Imaging Results                   CT Head Without Contrast (Final result)  Result time 09/02/20 15:10:24          Final result by Heath Nunez DO (09/02/20 15:10:24)                               Impression:        Unremarkable noncontrast CT head as detailed above specifically without evidence for acute intracranial hemorrhage.  Clinical correlation and further evaluation as warranted.     Electronically signed by resident: Eli Whaley  Date:                                             09/02/2020  Time:                                            14:32     Electronically signed by:     Heath Nunez DO  Date:                                            09/02/2020  Time:                                            15:10                         Narrative:     EXAMINATION:  CT HEAD WITHOUT CONTRAST     CLINICAL HISTORY:  Altered mental status;     TECHNIQUE:  Multiple sequential 5 mm axial images of the head without contrast.  Coronal and sagittal reformatted imaging from the axial acquisition.     COMPARISON:  None     FINDINGS:  Mild generalized cerebral volume loss. There is no evidence for acute intracranial hemorrhage or sulcal effacement.  The ventricles are normal in size without hydrocephalus.  There is no midline shift or mass effect.  Visualized paranasal sinuses and mastoid air cells are clear.

## 2020-09-03 NOTE — NURSING
Pt PEC again  faxed. Pants shirt belt shoes underwear keys Drivers license cell phone. 281 dollars in cash and 2 dollars in change in envelope [id number] from ER already sealed from previous PEC that was rescinded. Placed in PEC closet. Sitter at bedside.   [General Appearance - Well Developed] : well developed [General Appearance - Well Nourished] : well nourished [Normal Conjunctiva] : the conjunctiva exhibited no abnormalities [Elongated Uvula] : elongated uvula [Neck Appearance] : the appearance of the neck was normal [Jugular Venous Distention Increased] : there was no jugular-venous distention [Thyroid Diffuse Enlargement] : the thyroid was not enlarged [Heart Sounds] : normal S1 and S2 [Arterial Pulses Normal] : the arterial pulses were normal [Edema] : no peripheral edema present [Respiration, Rhythm And Depth] : normal respiratory rhythm and effort [Auscultation Breath Sounds / Voice Sounds] : lungs were clear to auscultation bilaterally [Lungs Percussion] : the lungs were normal to percussion [Bowel Sounds] : normal bowel sounds [Abdomen Soft] : soft [Abdomen Tenderness] : non-tender [Abnormal Walk] : normal gait [No Focal Deficits] : no focal deficits [Oriented To Time, Place, And Person] : oriented to person, place, and time [Impaired Insight] : insight and judgment were intact [Affect] : the affect was normal [Memory Recent] : recent memory was not impaired [Skin Turgor] : normal skin turgor [] : no rash

## 2020-09-03 NOTE — ED NOTES
Pt identifiers Juan Hobson checked and correct  LOC: The patient is awake, alert, aware of environment with an appropriate affect. Oriented to self, place. Disoriented to time, situation.   APPEARANCE: Pt resting comfortably, in no acute distress,   SKIN: Skin warm, dry and intact, normal skin turgor, moist mucus membranes  RESPIRATORY: Airway is open and patent, respirations are spontaneous, even and unlabored, normal effort and rate  CARDIAC: Normal rate and rhythm, no peripheral edema noted, capillary refill < 3 seconds, bilateral radial pulses 2+  ABDOMEN: Soft, nontender, nondistended.   : bloody drainage noted, + penile swelling and pain  NEUROLOGIC: PERRL, facial expression is symmetrical, patient moving all extremities spontaneously, normal sensation in all extremities when touched with a finger.  Follows all commands appropriately  MUSCULOSKELETAL: No obvious deformities.

## 2020-09-04 PROBLEM — C44.91 BASAL CELL CARCINOMA: Status: ACTIVE | Noted: 2017-06-26

## 2020-09-04 LAB
ALBUMIN SERPL BCP-MCNC: 2.9 G/DL (ref 3.5–5.2)
ALP SERPL-CCNC: 89 U/L (ref 55–135)
ALT SERPL W/O P-5'-P-CCNC: 23 U/L (ref 10–44)
ANION GAP SERPL CALC-SCNC: 12 MMOL/L (ref 8–16)
AST SERPL-CCNC: 21 U/L (ref 10–40)
BACTERIA SPEC AEROBE CULT: NORMAL
BASOPHILS # BLD AUTO: 0.07 K/UL (ref 0–0.2)
BASOPHILS NFR BLD: 1 % (ref 0–1.9)
BILIRUB SERPL-MCNC: 0.5 MG/DL (ref 0.1–1)
BUN SERPL-MCNC: 12 MG/DL (ref 8–23)
CALCIUM SERPL-MCNC: 9.1 MG/DL (ref 8.7–10.5)
CHLORIDE SERPL-SCNC: 107 MMOL/L (ref 95–110)
CO2 SERPL-SCNC: 22 MMOL/L (ref 23–29)
CREAT SERPL-MCNC: 0.7 MG/DL (ref 0.5–1.4)
DIFFERENTIAL METHOD: ABNORMAL
EOSINOPHIL # BLD AUTO: 0.2 K/UL (ref 0–0.5)
EOSINOPHIL NFR BLD: 2.9 % (ref 0–8)
ERYTHROCYTE [DISTWIDTH] IN BLOOD BY AUTOMATED COUNT: 12.3 % (ref 11.5–14.5)
EST. GFR  (AFRICAN AMERICAN): >60 ML/MIN/1.73 M^2
EST. GFR  (NON AFRICAN AMERICAN): >60 ML/MIN/1.73 M^2
GLUCOSE SERPL-MCNC: 83 MG/DL (ref 70–110)
HCT VFR BLD AUTO: 36.8 % (ref 40–54)
HGB BLD-MCNC: 12.5 G/DL (ref 14–18)
HSV-1 DNA BY PCR: NEGATIVE
HSV-2 DNA BY PCR: NEGATIVE
IMM GRANULOCYTES # BLD AUTO: 0.07 K/UL (ref 0–0.04)
IMM GRANULOCYTES NFR BLD AUTO: 1 % (ref 0–0.5)
LYMPHOCYTES # BLD AUTO: 1.7 K/UL (ref 1–4.8)
LYMPHOCYTES NFR BLD: 24.4 % (ref 18–48)
MAGNESIUM SERPL-MCNC: 2.1 MG/DL (ref 1.6–2.6)
MCH RBC QN AUTO: 31.6 PG (ref 27–31)
MCHC RBC AUTO-ENTMCNC: 34 G/DL (ref 32–36)
MCV RBC AUTO: 93 FL (ref 82–98)
MONOCYTES # BLD AUTO: 0.6 K/UL (ref 0.3–1)
MONOCYTES NFR BLD: 8.7 % (ref 4–15)
NEUTROPHILS # BLD AUTO: 4.2 K/UL (ref 1.8–7.7)
NEUTROPHILS NFR BLD: 62 % (ref 38–73)
NRBC BLD-RTO: 0 /100 WBC
PHOSPHATE SERPL-MCNC: 4 MG/DL (ref 2.7–4.5)
PLATELET # BLD AUTO: 434 K/UL (ref 150–350)
PMV BLD AUTO: 9.7 FL (ref 9.2–12.9)
POTASSIUM SERPL-SCNC: 3.9 MMOL/L (ref 3.5–5.1)
PROT SERPL-MCNC: 6.7 G/DL (ref 6–8.4)
RBC # BLD AUTO: 3.95 M/UL (ref 4.6–6.2)
SODIUM SERPL-SCNC: 141 MMOL/L (ref 136–145)
WBC # BLD AUTO: 6.8 K/UL (ref 3.9–12.7)

## 2020-09-04 PROCEDURE — 96376 TX/PRO/DX INJ SAME DRUG ADON: CPT

## 2020-09-04 PROCEDURE — 99225 PR SUBSEQUENT OBSERVATION CARE,LEVEL II: CPT | Mod: ,,, | Performed by: HOSPITALIST

## 2020-09-04 PROCEDURE — 97161 PT EVAL LOW COMPLEX 20 MIN: CPT

## 2020-09-04 PROCEDURE — 25500020 PHARM REV CODE 255: Performed by: HOSPITALIST

## 2020-09-04 PROCEDURE — 83735 ASSAY OF MAGNESIUM: CPT

## 2020-09-04 PROCEDURE — G0378 HOSPITAL OBSERVATION PER HR: HCPCS

## 2020-09-04 PROCEDURE — 97535 SELF CARE MNGMENT TRAINING: CPT

## 2020-09-04 PROCEDURE — A9585 GADOBUTROL INJECTION: HCPCS | Performed by: HOSPITALIST

## 2020-09-04 PROCEDURE — 99225 PR SUBSEQUENT OBSERVATION CARE,LEVEL II: ICD-10-PCS | Mod: ,,, | Performed by: HOSPITALIST

## 2020-09-04 PROCEDURE — 80053 COMPREHEN METABOLIC PANEL: CPT

## 2020-09-04 PROCEDURE — 85025 COMPLETE CBC W/AUTO DIFF WBC: CPT

## 2020-09-04 PROCEDURE — 63600175 PHARM REV CODE 636 W HCPCS: Performed by: STUDENT IN AN ORGANIZED HEALTH CARE EDUCATION/TRAINING PROGRAM

## 2020-09-04 PROCEDURE — 97165 OT EVAL LOW COMPLEX 30 MIN: CPT

## 2020-09-04 PROCEDURE — 36415 COLL VENOUS BLD VENIPUNCTURE: CPT

## 2020-09-04 PROCEDURE — 99214 PR OFFICE/OUTPT VISIT, EST, LEVL IV, 30-39 MIN: ICD-10-PCS | Mod: ,,, | Performed by: PSYCHIATRY & NEUROLOGY

## 2020-09-04 PROCEDURE — 84100 ASSAY OF PHOSPHORUS: CPT

## 2020-09-04 PROCEDURE — 99214 OFFICE O/P EST MOD 30 MIN: CPT | Mod: ,,, | Performed by: PSYCHIATRY & NEUROLOGY

## 2020-09-04 RX ORDER — GADOBUTROL 604.72 MG/ML
7 INJECTION INTRAVENOUS
Status: COMPLETED | OUTPATIENT
Start: 2020-09-04 | End: 2020-09-04

## 2020-09-04 RX ORDER — CEPHALEXIN 500 MG/1
500 CAPSULE ORAL EVERY 8 HOURS
Qty: 9 CAPSULE | Refills: 0
Start: 2020-09-04 | End: 2020-09-05 | Stop reason: HOSPADM

## 2020-09-04 RX ADMIN — GADOBUTROL 7 ML: 604.72 INJECTION INTRAVENOUS at 09:09

## 2020-09-04 RX ADMIN — CEFTRIAXONE 2 G: 2 INJECTION, SOLUTION INTRAVENOUS at 04:09

## 2020-09-04 NOTE — PT/OT/SLP EVAL
Occupational Therapy   Evaluation and Discharge Note    Name: Juan Hobson  MRN: [id number]  Admitting Diagnosis:  Penile lesion      Recommendations:     Discharge Recommendations: home (with supervision)  Discharge Equipment Recommendations:  none  Barriers to discharge:  None    Assessment:     Juan Hobson is a 69 y.o. male with a medical diagnosis of Penile lesion. At this time, patient is functioning at their prior level of function and does not require further acute OT services.     Plan:     During this hospitalization, patient does not require further acute OT services.  Please re-consult if situation changes.    · Plan of Care Reviewed with: patient    Subjective     Occupational Profile:  Living Environment: Pt lives alone in 1 story with slab entrance. Pt works full-time at [employer].   Prior to admission, patients level of function was Independent .  Equipment used at home: none.  DME owned (not currently used): none.  Upon discharge, patient will have assistance from no one.    Pain/Comfort:  · Pain Rating 1: 0/10    Patients cultural, spiritual, Sikhism conflicts given the current situation:      Objective:     Communicated with: rn prior to session.  Patient found supine with   upon OT entry to room.    General Precautions: Standard, fall   Orthopedic Precautions:    Braces:       Occupational Performance:    Bed Mobility:    · Independent    Functional Mobility/Transfers:  · Patient completed Sit <> Stand Transfer with supervision  with  no assistive device   · Functional Mobility: (S) with no AD for safety.    Activities of Daily Living:  · Setup - (I)  · (S) for oral care in standing    Cognitive/Visual Perceptual:  Cognitive/Psychosocial Skills:     -       Oriented to: Person   -       Safety awareness/insight to disability: impaired     Physical Exam:  BUE AROM/MMT: WNL    AMPAC 6 Click ADL:  AMPAC Total Score: 22    Treatment & Education:  UE ROM/MMT  Bed mobility  training / assessment  Functional mobility assessment  Sit/standing balance assessment  Educated on importance of sitting OOB in bedside chair to promote increased strength, endurance & breathing.  Discussed D/C of OT  Education:    Patient left supine with all lines intact, call button in reach and sitter present    GOALS:   Multidisciplinary Problems     Occupational Therapy Goals     Not on file          Multidisciplinary Problems (Resolved)        Problem: Occupational Therapy Goal    Goal Priority Disciplines Outcome Interventions   Occupational Therapy Goal   (Resolved)     OT, PT/OT Met                    History:     Past Medical History:   Diagnosis Date    Basal cell carcinoma     right helix    Hematuria     Seborrheic dermatitis     Skin cancer of arm        Past Surgical History:   Procedure Laterality Date    INCISION AND DRAINAGE INTRA ORAL ABSCESS         Time Tracking:     OT Date of Treatment: 09/04/20  OT Start Time: 1112  OT Stop Time: 1125  OT Total Time (min): 13 min    Billable Minutes:Evaluation 5  Self Care/Home Management 8    VANESSA Lee  9/4/2020

## 2020-09-04 NOTE — PLAN OF CARE
CM met with patient, confused at the time.  CM called patient friend, Juice Martinez [phone], for discharge planning.  Juice states the patient lives alone in a one story house with no steps to enter.  She states he has no spouse, no kids and only a half brother that she knows of that lives in [place], [place] and they are not on good terms.  Juice helps patient with his bills at times and she states they had been talking to patient about going to live in an [place].  Patient has enough leave at work to cover like 9 or 10 months per Juice.  Patient owns his home and mock with [employer].  Patient has been having great difficulty with memory according to Juice and she states there is no one to step in to take care of patient.  Patient normally ambulates and is independent with ADL's.  Plan is to discharge to [facility] facility.    Pharmacy:    [id number] [place] - [address] [facility] [address]  [address]  [place] [address]  Phone: [phone] Fax: [phone]    PCP:  Mehnaz Barillas MD    Payor: UNITED HEALTHCARE / Plan: UNITED HEALTHCARE CHOICE / Product Type: Commercial /      09/04/20 1403   Discharge Assessment   Assessment Type Discharge Planning Assessment   Confirmed/corrected address and phone number on facesheet? Yes   Assessment information obtained from? Other  (Friend Juice Martinez)   Prior to hospitilization cognitive status: Not Oriented to Place   Prior to hospitalization functional status: Independent   Current cognitive status: Not Oriented to Place   Current Functional Status: Assistive Equipment   Facility Arrived From: Emergency room   Lives With alone   Able to Return to Prior Arrangements no   Is patient able to care for self after discharge? Unable to determine at this time (comments)   Patient's perception of discharge disposition psychiatric hospital   Readmission Within the Last 30 Days no previous admission in last 30 days    Patient currently being followed by outpatient case management? No   Patient currently receives any other outside agency services? No   Equipment Currently Used at Home none   Do you have any problems affording any of your prescribed medications? No   Is the patient taking medications as prescribed? yes   Does the patient have transportation home? Yes   Transportation Anticipated family or friend will provide   Does the patient receive services at the [facility]? No   Discharge Plan A Psychiatric hospital   Discharge Plan B Skilled Nursing Facility   DME Needed Upon Discharge  none   Patient/Family in Agreement with Plan yes

## 2020-09-04 NOTE — NURSING
Departed unit by stretcher to MRI unit. AAOx4. Security and sitter accompanied. No acute issues present.

## 2020-09-04 NOTE — PT/OT/SLP EVAL
Physical Therapy Evaluation/Discharge    Patient Name:  Juan Hobson   MRN:  [id number]    Recommendations:     Discharge Recommendations:  (24 hour supervisioin due to decreased confusion and decreased cognition)   Discharge Equipment Recommendations: none   Barriers to discharge: pt is confused and has decreased cognition. Pt is a danger to himself to discharge without 24 hour supervision.     Assessment:     Juan Hobson is a 69 y.o. male admitted with a medical diagnosis of Penile lesion.  He presents with the following impairments/functional limitations:  impaired cognition, decreased safety awareness pt doreen treatment well physically but is confused and with decreased cognition. Pt does not need skilled PT and is safe to ambulate with RN staff. Pt will need 24 hours assistance for safety with discharge.     Rehab Prognosis: Good; patient would benefit from acute skilled PT services to address these deficits and reach maximum level of function.    Recent Surgery: * No surgery found *      Plan:     During this hospitalization, patient to be seen (pt is being discharged from PT) to address the identified rehab impairments via (pt is being discharged from PT) and progress toward the following goals:    · Plan of Care Expires:  09/04/20    Subjective     Chief Complaint: pt c/o slight pain during treatment.   Patient/Family Comments/goals: to get better  Pain/Comfort:  · Pain Rating 1: 1/10(penis)  · Pain Rating Post-Intervention 1: 1/10    Patients cultural, spiritual, Faith conflicts given the current situation: no    Living Environment:  Pt lives alone in 1 story with slab entrance. Pt works full-time at [employer].   Prior to admission, patients level of function was Independent .  Equipment used at home: none.  DME owned (not currently used): none.  Upon discharge, patient will have assistance from no one.    Objective:     Communicated with nurse prior to session.  Patient found  supine with (hep lock IV)  upon PT entry to room. Pt had hospital sitter in room.     General Precautions: Standard, fall   Orthopedic Precautions:    Braces:       Exams:  · Cognitive Exam:  Patient is oriented to Person and Situation  · RLE ROM: WFL  · RLE Strength: WFL  · LLE ROM: WFL  · LLE Strength: WFL    Functional Mobility:  · Bed Mobility:     · Rolling Right: independence  · Supine to Sit: independence  · Sit to Supine: independence  ·   · Transfers:     · Sit to Stand:  supervision with hand-held assist  ·   · Gait: pt received gait training ~ 250 ft with supervision and mask on.     Therapeutic Activities and Exercises:   pt received verbal instruction in role of PT and POC. Pt verbally expressed understanding of such.     AM-PAC 6 CLICK MOBILITY  Total Score:24     Patient left supine with all lines intact, call button in reach and hospital sitter  present.    GOALS:   Multidisciplinary Problems     Physical Therapy Goals     Not on file                History:     Past Medical History:   Diagnosis Date    Basal cell carcinoma     right helix    Hematuria     Seborrheic dermatitis     Skin cancer of arm        Past Surgical History:   Procedure Laterality Date    INCISION AND DRAINAGE INTRA ORAL ABSCESS         Time Tracking:     PT Received On: 09/04/20  PT Start Time: 1112     PT Stop Time: 1126  PT Total Time (min): 14 min     Billable Minutes: Evaluation 14 min      Jen Ramirez, PT  09/04/2020

## 2020-09-04 NOTE — PLAN OF CARE
Problem: Fall Injury Risk  Goal: Absence of Fall and Fall-Related Injury  Outcome: Ongoing, Progressing     Problem: Adult Inpatient Plan of Care  Goal: Plan of Care Review  Outcome: Ongoing, Progressing  Goal: Patient-Specific Goal (Individualization)  Outcome: Ongoing, Progressing   Pt alert and verbal; pt oriented to self only; all meds given as prescribed; PEC'd status continues and sitter remains at bedside

## 2020-09-04 NOTE — SUBJECTIVE & OBJECTIVE
Interval History: HDS, NAEO; reports drainage from penile ulcer improving. S/p MRI today with Urology following.     Review of Systems   Constitutional: Negative for appetite change, chills, fatigue and fever.   HENT: Negative for congestion, nosebleeds, rhinorrhea, sinus pain and sore throat.    Eyes: Negative for photophobia and visual disturbance.   Respiratory: Negative for cough, chest tightness and shortness of breath.    Cardiovascular: Negative for chest pain and leg swelling.   Gastrointestinal: Negative for abdominal distention, abdominal pain, anal bleeding, blood in stool, diarrhea, nausea and vomiting.   Genitourinary: Positive for discharge, genital sores, penile pain and penile swelling. Negative for dysuria, frequency, hematuria, scrotal swelling, testicular pain and urgency.   Musculoskeletal: Negative for back pain.   Skin: Positive for wound. Negative for rash.   Allergic/Immunologic: Negative for environmental allergies, food allergies and immunocompromised state.   Neurological: Negative for dizziness, weakness and headaches.   Hematological: Does not bruise/bleed easily.   Psychiatric/Behavioral: Positive for confusion, decreased concentration and hallucinations. Negative for suicidal ideas.     Objective:     Vital Signs (Most Recent):  Temp: 98.2 °F (36.8 °C) (09/04/20 1605)  Pulse: 75 (09/04/20 1605)  Resp: 18 (09/04/20 1605)  BP: 131/68 (09/04/20 1605)  SpO2: 97 % (09/04/20 1605) Vital Signs (24h Range):  Temp:  [97.8 °F (36.6 °C)-98.2 °F (36.8 °C)] 98.2 °F (36.8 °C)  Pulse:  [65-83] 75  Resp:  [16-19] 18  SpO2:  [96 %-98 %] 97 %  BP: (124-143)/(68-71) 131/68     Weight: 66.7 kg (147 lb 0.8 oz)  Body mass index is 21.1 kg/m².    Intake/Output Summary (Last 24 hours) at 9/4/2020 1650  Last data filed at 9/4/2020 0400  Gross per 24 hour   Intake 0 ml   Output 300 ml   Net -300 ml      Physical Exam  Constitutional:       General: He is not in acute distress.     Appearance: Normal appearance.  He is normal weight. He is not ill-appearing, toxic-appearing or diaphoretic.   HENT:      Head: Normocephalic and atraumatic.      Nose: No congestion or rhinorrhea.      Mouth/Throat:      Mouth: Mucous membranes are moist.      Pharynx: No oropharyngeal exudate or posterior oropharyngeal erythema.   Eyes:      General: No scleral icterus.        Right eye: No discharge.         Left eye: No discharge.   Cardiovascular:      Rate and Rhythm: Normal rate and regular rhythm.      Pulses: Normal pulses.      Heart sounds: Normal heart sounds. No murmur. No friction rub. No gallop.    Pulmonary:      Effort: Pulmonary effort is normal. No respiratory distress.      Breath sounds: Normal breath sounds. No stridor. No wheezing, rhonchi or rales.   Chest:      Chest wall: No tenderness.   Abdominal:      General: Abdomen is flat. Bowel sounds are normal. There is no distension.      Palpations: Abdomen is soft. There is no mass.      Tenderness: There is no abdominal tenderness. There is no guarding.   Genitourinary:     Comments: Penis with phimosis, penile shaft with erythematous ulceration draining bloody/purulent fluid when expressed. Fluctuance beneath ulcer.  Skin:     General: Skin is warm and dry.      Coloration: Skin is not jaundiced.   Neurological:      General: No focal deficit present.      Mental Status: He is alert and oriented to person, place, and time.         Significant Labs: All pertinent labs within the past 24 hours have been reviewed.    Significant Imaging: I have reviewed all pertinent imaging results/findings within the past 24 hours.

## 2020-09-04 NOTE — PROGRESS NOTES
"[facility] Hospital Medicine  Progress Note    Patient Name: Juan Hobson  MRN: [id number]  Patient Class: OP- Observation   Admission Date: 9/2/2020  Length of Stay: 0 days  Attending Physician: Adriana Calabrese MD  Primary Care Provider: Mehnaz Barillas MD    [facility] Medicine Team: [facility] 1 Ainsley Santacruz MD    Subjective:     Principal Problem:Penile lesion        HPI:  Juan Hobson is a 69 year old male with history of basal cell carcinoma and microscopic hematuria who presents with penile swelling and ulcer. He states he has had ongoing swelling of his penis for 2 years which began with significant swelling and subsequently developed an ulcer which is draining light red colored discharge. Discharge often soak through his clothing and onto his bedding. Patient denies pain at this time but states "it does hurt at times".  He states that "creatures attacked his penis" and wants us to "get them". He states that he these "creatures are difficult to see in the dark". When asked what prompted him to come in today, patient states "it has been going on for some time and has been painful, it was time to take care of it". He denies recent or new sexual contacts, history of sexually transmitted disease, scrotal or testicular pain, dysuria, changes in frequency or urgency, fevers, or chills. He denies history of psychiatric disease.    Patient was previously seen in urology clinic in 2019 with Dr. Armando for penile discharge and phimosis. He was treated with ciprofloxacin, augmentin, and bactrim. A 1 cm fluctuance was found to grow acinetobacter resistant to bactrim. He was seen in June 2020 for skin lesion found to be basal cell carcinoma s/p Moh's surgery.     Patient states he works at the [employer] as a  and lives alone. He states he is a nonsmoker and rarely uses alcohol.    Overview/Hospital Course:  Juan Hobson is a 69 year old male with history of basal cell " "carcinoma and microscopic hematuria who presents with penile swelling and ulcer for 2 years.Patient being seen by urology, concerns for penile cancer with longstanding ulcer. He is being treated with ceftriaxone and azithromycin. MRI pelvis pending. On admission patient endorsed delusions regarding his ulcer related to "creatures" which were "attacking him". He was seen by psychiatry and found that it is likely related to dementia after getting collateral from family/friends. Patient was PEC'd the night of admission after wanting to leave and concern for poor self care. Plan for discharge to geriatric psychiatry facility.     Interval History: HDS, NAEO; reports drainage from penile ulcer improving. S/p MRI today with Urology following.     Review of Systems   Constitutional: Negative for appetite change, chills, fatigue and fever.   HENT: Negative for congestion, nosebleeds, rhinorrhea, sinus pain and sore throat.    Eyes: Negative for photophobia and visual disturbance.   Respiratory: Negative for cough, chest tightness and shortness of breath.    Cardiovascular: Negative for chest pain and leg swelling.   Gastrointestinal: Negative for abdominal distention, abdominal pain, anal bleeding, blood in stool, diarrhea, nausea and vomiting.   Genitourinary: Positive for discharge, genital sores, penile pain and penile swelling. Negative for dysuria, frequency, hematuria, scrotal swelling, testicular pain and urgency.   Musculoskeletal: Negative for back pain.   Skin: Positive for wound. Negative for rash.   Allergic/Immunologic: Negative for environmental allergies, food allergies and immunocompromised state.   Neurological: Negative for dizziness, weakness and headaches.   Hematological: Does not bruise/bleed easily.   Psychiatric/Behavioral: Positive for confusion, decreased concentration and hallucinations. Negative for suicidal ideas.     Objective:     Vital Signs (Most Recent):  Temp: 98.2 °F (36.8 °C) (09/04/20 " 1605)  Pulse: 75 (09/04/20 1605)  Resp: 18 (09/04/20 1605)  BP: 131/68 (09/04/20 1605)  SpO2: 97 % (09/04/20 1605) Vital Signs (24h Range):  Temp:  [97.8 °F (36.6 °C)-98.2 °F (36.8 °C)] 98.2 °F (36.8 °C)  Pulse:  [65-83] 75  Resp:  [16-19] 18  SpO2:  [96 %-98 %] 97 %  BP: (124-143)/(68-71) 131/68     Weight: 66.7 kg (147 lb 0.8 oz)  Body mass index is 21.1 kg/m².    Intake/Output Summary (Last 24 hours) at 9/4/2020 1650  Last data filed at 9/4/2020 0400  Gross per 24 hour   Intake 0 ml   Output 300 ml   Net -300 ml      Physical Exam  Constitutional:       General: He is not in acute distress.     Appearance: Normal appearance. He is normal weight. He is not ill-appearing, toxic-appearing or diaphoretic.   HENT:      Head: Normocephalic and atraumatic.      Nose: No congestion or rhinorrhea.      Mouth/Throat:      Mouth: Mucous membranes are moist.      Pharynx: No oropharyngeal exudate or posterior oropharyngeal erythema.   Eyes:      General: No scleral icterus.        Right eye: No discharge.         Left eye: No discharge.   Cardiovascular:      Rate and Rhythm: Normal rate and regular rhythm.      Pulses: Normal pulses.      Heart sounds: Normal heart sounds. No murmur. No friction rub. No gallop.    Pulmonary:      Effort: Pulmonary effort is normal. No respiratory distress.      Breath sounds: Normal breath sounds. No stridor. No wheezing, rhonchi or rales.   Chest:      Chest wall: No tenderness.   Abdominal:      General: Abdomen is flat. Bowel sounds are normal. There is no distension.      Palpations: Abdomen is soft. There is no mass.      Tenderness: There is no abdominal tenderness. There is no guarding.   Genitourinary:     Comments: Penis with phimosis, penile shaft with erythematous ulceration draining bloody/purulent fluid when expressed. Fluctuance beneath ulcer.  Skin:     General: Skin is warm and dry.      Coloration: Skin is not jaundiced.   Neurological:      General: No focal deficit  "present.      Mental Status: He is alert and oriented to person, place, and time.         Significant Labs: All pertinent labs within the past 24 hours have been reviewed.    Significant Imaging: I have reviewed all pertinent imaging results/findings within the past 24 hours.      Assessment/Plan:      * Penile lesion  Juan Hobson is a 69 year old male who presents with penile swelling and ulceration for the last two years. Although patient describes no sexual history over the last 2 years, there is concern for sexually transmitted disease ie syphilis particularly with his mental status change and delusion regarding "creatures" as the etiology of his ulcer. Additionally, nonhealing ulcer for this period of time is concerning for penile malignancy. RPR negative.   S/p completion of Gonorrheal & chlamydial urethritis treatment.     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  Urethral involvement not definitively excluded given suspected invasion of the right dorsolateral corpus spongiosum.    Urology following and has discussed above findings with patient and scheduled followup. Appreciate help in his care.     Plan:  - Urology appointment scheduled for next Friday  - Follow HIV, Chlamydia/Gonorrhea, HSV, cultures of drainage  - MRI pending      Altered mental status  Patient with delusions regarding the etiology of his penile ulcer and no known history of psychiatric illness. He was seen by psychiatry who found gross cognitive memory deficits and only a single fixed delusion. Patient PEC on 9/3.    Plan:  - Zyprexa 2.5 mg PRN    Basal cell carcinoma  History of BCC        VTE Risk Mitigation (From admission, onward)         Ordered     IP VTE LOW RISK PATIENT  Once      09/02/20 5756                Discharge Planning   ALFRED: 9/4/2020     Code Status: Full Code   Is the " patient medically ready for discharge?:     Reason for patient still in hospital (select all that apply): Other (specify) Pending [facility] inpatient unit placement  Discharge Plan A: Psychiatric hospital            Ainsley Santacruz MD  Department of Hospital Medicine   [facility]

## 2020-09-04 NOTE — NURSING
[facility] Accompanied by security and sitter. No acute issues present.

## 2020-09-04 NOTE — PLAN OF CARE
Patient had no falls or injuries this shift. Bed remains locked and at its lowest point. Call bell within reach. Vital signs monitored per order. 1-1 sitter remains at bedside. Will continue to monitor.

## 2020-09-04 NOTE — ASSESSMENT & PLAN NOTE
"Juan Hobson is a 69 year old male who presents with penile swelling and ulceration for the last two years. Although patient describes no sexual history over the last 2 years, there is concern for sexually transmitted disease ie syphilis particularly with his mental status change and delusion regarding "creatures" as the etiology of his ulcer. Additionally, nonhealing ulcer for this period of time is concerning for penile malignancy. RPR negative.   S/p completion of Gonorrheal & chlamydial urethritis treatment.     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  Urethral involvement not definitively excluded given suspected invasion of the right dorsolateral corpus spongiosum.    Urology following and has discussed above findings with patient and scheduled followup. Appreciate help in his care.     Plan:  - Urology appointment scheduled for next Friday  - Follow HIV, Chlamydia/Gonorrhea, HSV, cultures of drainage  - MRI pending    "

## 2020-09-04 NOTE — PROGRESS NOTES
Patient supervisor from work called this nurse and was asking about patient status. This nurse notified patient supervisor she could not tell him any information about patient. Supervisor voices he understood and just wanted to let this nurse know some information about patient. Supervisor told this nurse patient lives alone and has gotten more confused in the past few months. Patient supervisor said he was really concerned for patient and would like patient to get help. Patient supervisor told this nurse patient lives alone, not , has no children, and has no family in this state. Patient supervisor also told this nurse patient mother  about 2 years ago. Patient supervisor gave this nurse his work phone if the MD or Social or  wanted to contact him about patient. Fay Go [phone].

## 2020-09-04 NOTE — PROGRESS NOTES
"[facility]  Psychiatry  Progress Note    Patient Name: Juan Hobson  MRN: [id number]   Code Status: Full Code  Admission Date: 9/2/2020  Hospital Length of Stay: 0 days  Expected Discharge Date: 9/4/2020  Attending Physician: Adriana Calabrese MD  Primary Care Provider: Mehnaz Barillas MD    Current Legal Status: Physician's Emergency Certificate (PEC)    Patient information was obtained from patient and ER records.     Subjective:     Principal Problem:Penile lesion    Chief Complaint: penile lesions, delusions    HPI:   Emergency Psychiatry Consult Note     9/2/2020 6:15 PM  Juan Hobson  MRN: [id number]     Chief Complaint / Reason for Consult: Psychosis      SUBJECTIVE      History of Present Illness:   Juan Hobson is a 69 y.o. male with no past psychiatric history, medical hx of basal cell carcinoma, seborrheic dermatitis, penile lesion currently presenting with conintued complaints of penile lesion and delusions of "a creature assaulting his penis."   Emergency Psychiatry was originally consulted to address the patient's symptoms of psychosis.      Per ED RN(s):  Juan Hobson, a 69 y.o. male presents to the ED w/ complaint of groin pain. Pt reports "a creature assaulting" his penis starting 2 years ago. Pt has never seen the creature as it attacks at night while pt is sleeping. Last episode happened around 2 weeks ago per pt. Pt states when the "creature is done" he feels pain and "stuff comes out" of his penis. No psych hx. Has been seen by multiple MD's regarding  problems but does not remember being seen previously. Pt is speaking well but not oriented to place or time. Poor historian. Unable to report medical hx or medication use. Denies alcohol and drug use. Not sexually active. Pt states he still works at the [employer] but has not been to work in a week. Denies any other symptoms besides pain in penis.      Per ED MD:  The patient is a 69 year old male, who has a " "past medical history of basal cell carcinoma, seborrheic dermatitis, and hematuria. 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 He lives alone. He drove himself to the ER today for an emergent evaluation due to penile pain, swelling, and bloody discharge. He states that "creatures attack his penis" in the middle of the night while he is in bed. He states that the first "attack" happened about 2 years ago and that the "creature" lacerated his penis. He describes the creatures as shadowy and hard to see clearly in the dark. He states that there are multiple creatures. He states that the attacks have been worse recently. He states that he has blood draining from his penis onto his bedding and clothing. He states that his penis is "very big and hot". He states that he does not have any testicle pain or swelling. He states that he does not have any trouble urinating. He denies any fever or chills. He denies using drugs or alcohol. He states that he has not been sexually active in over 2 years. He denies any history of mental illness.      Per Psychiatry:  Upon initiation of interview, pt was resting in bed, calm, cooperative, pleasant.  Patient has obvious gross cognitive deficits.  He is only oriented to self, providing first and last name.  He is unable to state that he is in a hospital or the name of the hospital.  Reports year is "22022," unable to names month or day.  He is unable to provide names for any friends or family members whom we may contact.  He is unable to provide 911 as an option to call should he be in trouble at home.  Unable to name the president, [facility] and only able to immediately recall 1/3 on memory testing.  Pt admits he has been getting lost and forgetting things at home more frequently lately.         Patient reports driving himself to the hospital to get help with his penile lesion which has been present "off/on for 2 years."  He endorses " "seeing [facility] in the past for this issue (notes from 12/2019 with penile lesion and phimosis but no delusions mentioned).    Patient has fixed delusion regarding the cause of his lesion.  He reports "creatures" that come into his home at night and attack him which have caused the lesions on his penis.  This has been going on/off every few months for the past 2 years.  These "creatures" alive under his home and enter through holes in the walls and floors.  He frequently tries to hit them away but they are "usually too quick."  Majority of the time, they attack him while he is asleep.  Despite this, lamar feels safe at home and would like to return their once he is treated.       He lives home alone.  He reports never having been , no children of close family members.  Unable to provide names for any neighbors or friends.  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  No psych history.  Denies previous medication trials or hospitalizations. Denies major head trauma, accidents or injuries.  His responses are largely appropriate and logical to the questions asked despite his memory deficits.  He denies any previous diagnosis of dementia.  He does not endorse any psychiatric symptoms otherwise, no complaints otherwise.  Does not endorse any further delusions or paranoia.  He is behaving appropriately at this time, no anger, hospitality or agitation.   He is not RIS.   He denies substance abuse, utox negative.         Psychiatric Review of Systems:  sleep: no  appetite: no  weight: no  energy/anergy: no  interest/pleasure/anhedonia: no  somatic symptoms: no  libido: no  anxiety/panic: no  guilty/hopelessness: " no  concentration: no  S.I.B.s/risky behavior: no  any drugs: no  alcohol: no      Medical Review Of Systems:  Pertinent items noted in HPI     Psychiatric History:  Diagnose(s): No  Previous Medication Trials: No  Previous Psychiatric Hospitalizations: No  Family Psychiatric History: No  Outpatient Psychiatrist: No  Outpatient Therapist: No     Suicide/Violence Risk Assessment:  Current/active suicidal ideation/plan/intent: No  Previous suicide attempts: No  Current/active homicidal ideation/plan/intent: No  History of threats/arrests associated with violent conduct - No  Access to firearms/lethal weapons - No     Social History:  Marital Status: single  Children: 0   Employment Status: currently employed  Education: college graduate  Special Ed: no  Housing Status: Yes - alone, house  Developmental History: No  History of Abuse: No     Substance Abuse History:  Recreational Drugs: denies  Use of Alcohol: denied  Rehab History: No  Tobacco Use: No  Use of Caffeine: No  Use of OTC: No  Is the patient aware of the biomedical complications associated with substance abuse and mental illness? yes  Legal consequences of chemical use: Yes      Legal History:  Past Charges/Incarcerations: No  Pending Charges: No     Psychosocial Factors:  Stressors: health.   Functioning Relationships: alone & isolated     Collateral:   No     Scheduled Meds:      Psychotherapeutics (From admission, onward)     None          PRN Meds:  sodium chloride 0.9%  Home Meds:          Prior to Admission medications    Medication Sig Start Date End Date Taking? Authorizing Provider   fish oil-omega-3 fatty acids 300-1,000 mg capsule Take 2 g by mouth once daily.       Historical Provider, MD   HYDROcodone-acetaminophen (NORCO) 5-325 mg per tablet Take 1 tablet by mouth every 6 (six) hours as needed for Pain. 6/25/20     Chana Mcnair PA-C   ketoconazole (NIZORAL) 2 % shampoo Apply to scalp and face 3-5x weekly. Let sit on scalp for 5-10 minutes  "before rinsing  Patient not taking: Reported on 6/25/2020 5/28/20     Tamiko Bray MD   multivitamin capsule Take 1 capsule by mouth once daily.       Historical Provider, MD          Psychotherapeutics (From admission, onward)     None          Allergies:  Patient has no known allergies.  Past Medical/Surgical History:       Past Medical History:   Diagnosis Date    Basal cell carcinoma       right helix    Hematuria      Seborrheic dermatitis      Skin cancer of arm              Past Surgical History:   Procedure Laterality Date    INCISION AND DRAINAGE INTRA ORAL ABSCESS              Hospital Course: 09/03/2020   Per chart review, patient attempted to leave AMA overnight claiming he needed to go home to care for his cats, and was PEC'd secondary to grave disability.  Patient did not receive any PRNs overnight for agitation, and has remained mostly calm and cooperative.  On interview this morning, patient found lying in bed wearing face mask with sitter at bedside.  States "when you get old you don't remember things as fast."  Repeatedly admits to having memory problems throughout interview.  Reports he is originally from [place], and attended college at [employer].  Patient cannot recall the reason he presented to the ED yesterday.  When reminded of his current discharge and penile lesions, patient then explains "these creatures made it [penis] bleed in 2 spots.  They did that to me when I was all covered up with blankets at home 2 or 3 years ago."  Patient claims "creatures" have never returned, but attributes his penile lesions and discharge to "those little monsters."  Denies suicidal ideation, denies homicidal ideation, and denies AVH.  Patient admits to only eating food at home when he "brings something back," typically from work or orders out.  States he has "friends," who look out for him.  States he has continued to work for [employer] as a .  Patient is oriented to self " "and place, but is disoriented to time and situation.  He states the year "has some 2s and 0s in it, but I can't remember, things get fuzzy."  When asked the month, he responds "we just started a new one."  He admits his work office is nearby the hospital, but does not recall the name of the hospital.  States he has lived in [place] for the past 30 years.      Collateral obtained from coworker Ms. Juice Martinez at [phone]  Ms. Martinez is patient's coworker, and has known patient for the past 6 years.  States patient has been working for [employer] as a  for the past 10-11 years.  Explains she and her coworkers are patient's only real support system as he "has nobody, no family, nothing."  Per coworker, patient has been showing signs of cognitive decline and memory deficits over the past year, with significant worsening over the past month.  States "we have all noticed his memory getting worse and worse for the past year, but he's so private, it could have been going on longer than that."  Reports secondary to his rapid decline, she scheduled an appointment with Neurology for him later this month.   States patient at times forgets "his days," thinking he is off of work, and does not show up.  Explains last week he forget how to clock out, so she helped him. Coworker becomes tearful during conversation, stating she has been doing everything she can to help as "he has nobody."  States he is private, and she has never intruded on his finances, but she worries that someone could easily take advantage of him.  States he at times shows up to work poorly groomed and with poor hygiene.  Admits she and coworkers have called him in the past to remind him to shower.  She expresses concern that he is not eating at home, as she constantly tries to send him home with extra from lunch at work.  Also reports 30-40 pound weight loss over the past 1 year, with all of patient's clothing being too big. " " Also reports she brought patient to have his cell phone service turned on, as he "forgot how to pay the bill online."  States she is willing to do anything to help him as she feels "so sorry," for him, as she admits he is "really nice, but to himself."  Denies patient having any known psychiatric history.  Reports she has called around and discussed possible assisted living with patient in the past, but admits "he can be headstrong."  Claims "he has had some good days at work, but lately things are just getting worse."    09/04/2020  Per chart review, patient continues to be cooperative with treatment team regarding continued workup of penile lesions.  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  Urology planning outpatient follow-up at this time.  Patient interviewed this afternoon.  He recalls having imaging completed this morning, stating "it was loud, but it was fun."  Patient does not recall the reason for the MRI of his pelvis.  He cannot recall the reason he initially presented to the hospital.  Continues to perseverate about going home and caring for his cats.  Patient is oriented to person, place (hospital), though is unable to name the city, [place], no name of hospital.  He is oriented to year, though claims it is "July 2020."  He is disoriented to situation.  Denies suicidal ideation, homicidal ideation, and AVH.  Patient continues to have fixed chronic delusion regarding creatures attacking/causing his penile lesions and discharge.  Reports feeling safe in the hospital.        Family History     None        Tobacco Use    Smoking status: Never Smoker    Smokeless tobacco: Never Used   Substance and Sexual Activity    Alcohol use: Yes     Frequency: Monthly or less     Drinks per session: 1 or 2     Comment: sometimes    Drug use: Never    Sexual activity: Not Currently     Partners: Female     Psychotherapeutics (From admission, onward)    None           Review of " "Systems  Objective:     Vital Signs (Most Recent):  Temp: 98.2 °F (36.8 °C) (09/04/20 1605)  Pulse: 75 (09/04/20 1605)  Resp: 18 (09/04/20 1605)  BP: 131/68 (09/04/20 1605)  SpO2: 97 % (09/04/20 1605) Vital Signs (24h Range):  Temp:  [97.8 °F (36.6 °C)-98.2 °F (36.8 °C)] 98.2 °F (36.8 °C)  Pulse:  [65-83] 75  Resp:  [16-19] 18  SpO2:  [96 %-98 %] 97 %  BP: (124-143)/(68-71) 131/68     Height: 5' 10" (177.8 cm)  Weight: 66.7 kg (147 lb 0.8 oz)  Body mass index is 21.1 kg/m².      Intake/Output Summary (Last 24 hours) at 9/4/2020 1705  Last data filed at 9/4/2020 0400  Gross per 24 hour   Intake 0 ml   Output 300 ml   Net -300 ml       Physical Exam  Psychiatric:      Comments: Mental Status Exam:  Appearance: age appropriate, white balding hair and moustache, poor hygiene, no acute distress, sitting upright in bed  Behavior/Cooperation: cooperative, polite, good eye contact, pleasantly confused  Speech: normal rate, normal volume  Mood: "good"  Affect: mood congruent, euthymic  Thought Process: superficially linear, concrete, perseverates  Thought Content: no suicidality, no homicidality or paranoia.  +fixed bizarre delusion regarding creatures causing his penile lesions and discharge   Orientation: person, place (hospital) year  Memory: impaired, registers 3/3, at 5 minutes recalls 0/3  Attention Span/Concentration: impaired  Insight: poor  Judgment: poor            Significant Labs: All pertinent labs within the past 24 hours have been reviewed.    Significant Imaging: I have reviewed all pertinent imaging results/findings within the past 24 hours.     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  Urethral involvement not definitively excluded given suspected invasion of the right dorsolateral corpus spongiosum.     Numerous normal size nonspecific inguinal lymph nodes, not " "significantly changed from prior CT dated December 2019.     Left hydrocele.    Assessment/Plan:     Altered mental status     ASSESSMENT      Juan Hobson is a 69 y.o. male with no past psychiatric history, currently presenting for phimosis, penile lesion with fixed bizarre delusions regarding the cause of these lesions as being "attacks by creatures."  Psychiatry was originally consulted to address the patient's symptoms of delusion.     Do not suspect patients delusion is psychiatric in nature.  Pt with gross cognitive deficits on memory testing.  Oriented to self only.  Unclear timeline regarding current delusions, though cognitive decline has been notable over the past year with most significant decline occurring over the past month, per collateral.  Patient has no known living family, with only confirmed support system being coworkers.  Patient without psychiatric history per interview, chart review, and collateral.  Pt without any signs of psychiatric pathology with the exception of this single, fixed delusions revolving around his presenting medical complaints.  Patient is gravely disabled and was PEC'd on 9/3/20 at 2:20 AM when he tried to leave the hospital AMA.                 IMPRESSION     Major Neurocognitive Disorder with fixed delusion, suspect dementia         RECOMMENDATION(S)       Legal Status/Precaution(s):  PEC'd 9/3/20 at 2:20 AM as patient is gravely disabled       Recommendations:    - Discussed patient with CM with their plan to interview patient and attempt to obtain further collateral today to address his needs.  CM also working towards having patient's cats at home fed/cared for while he is hospitalized  - Recommend zyprexa 2.5mg PO/IM q4h prn for nonredirectable agitation  - Will attempt to obtain MOCA, if patient agreeable     - Recommend baseline EKG.  If patient begins requiring zyprexa prns, recommend follow-up EKG's for QTC monitoring.    - Recommend delirium precautions to " avoid sundowning.    - Patient will need inpatient Geriatric Psychiatric Unit admission once medically stabilized.  Disposition depending on prognosis/treatment plan for newly diagnosed penile tumor.  Patient remains gravely disabled and is unsafe for discharge home.     - C-L Psychiatry will continue to follow.                  Total time:  25 with greater than 50% of this time spent in counseling and/or coordination of care.       Cuca Walker MD   Psychiatry  [facility]

## 2020-09-04 NOTE — SUBJECTIVE & OBJECTIVE
"  Family History     None        Tobacco Use    Smoking status: Never Smoker    Smokeless tobacco: Never Used   Substance and Sexual Activity    Alcohol use: Yes     Frequency: Monthly or less     Drinks per session: 1 or 2     Comment: sometimes    Drug use: Never    Sexual activity: Not Currently     Partners: Female     Psychotherapeutics (From admission, onward)    None           Review of Systems  Objective:     Vital Signs (Most Recent):  Temp: 98.2 °F (36.8 °C) (09/04/20 1605)  Pulse: 75 (09/04/20 1605)  Resp: 18 (09/04/20 1605)  BP: 131/68 (09/04/20 1605)  SpO2: 97 % (09/04/20 1605) Vital Signs (24h Range):  Temp:  [97.8 °F (36.6 °C)-98.2 °F (36.8 °C)] 98.2 °F (36.8 °C)  Pulse:  [65-83] 75  Resp:  [16-19] 18  SpO2:  [96 %-98 %] 97 %  BP: (124-143)/(68-71) 131/68     Height: 5' 10" (177.8 cm)  Weight: 66.7 kg (147 lb 0.8 oz)  Body mass index is 21.1 kg/m².      Intake/Output Summary (Last 24 hours) at 9/4/2020 1705  Last data filed at 9/4/2020 0400  Gross per 24 hour   Intake 0 ml   Output 300 ml   Net -300 ml       Physical Exam  Psychiatric:      Comments: Mental Status Exam:  Appearance: age appropriate, white balding hair and moustache, poor hygiene, no acute distress, sitting upright in bed  Behavior/Cooperation: cooperative, polite, good eye contact, pleasantly confused  Speech: normal rate, normal volume  Mood: "good"  Affect: mood congruent, euthymic  Thought Process: superficially linear, concrete, perseverates  Thought Content: no suicidality, no homicidality or paranoia.  +fixed bizarre delusion regarding creatures causing his penile lesions and discharge   Orientation: person, place (hospital) year  Memory: impaired, registers 3/3, at 5 minutes recalls 0/3  Attention Span/Concentration: impaired  Insight: poor  Judgment: poor            Significant Labs: All pertinent labs within the past 24 hours have been reviewed.    Significant Imaging: I have reviewed all pertinent imaging " results/findings within the past 24 hours.     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  Urethral involvement not definitively excluded given suspected invasion of the right dorsolateral corpus spongiosum.     Numerous normal size nonspecific inguinal lymph nodes, not significantly changed from prior CT dated December 2019.     Left hydrocele.

## 2020-09-04 NOTE — PROGRESS NOTES
Urology Note:    Primary to discharge patient today.   Talked to patient at length about his MRI findings and the highly concerning lesion likely to be malignant  Patient is scheduled for appointment with us next Friday.   We will follow up with him then to discuss management. Importance of coming to appointment conveyed.   He understands and all questions are answered.       Isela Alexander,   PGY2

## 2020-09-04 NOTE — ASSESSMENT & PLAN NOTE
"   ASSESSMENT      Juan Hobson is a 69 y.o. male with no past psychiatric history, currently presenting for phimosis, penile lesion with fixed bizarre delusions regarding the cause of these lesions as being "attacks by creatures."  Psychiatry was originally consulted to address the patient's symptoms of delusion.     Do not suspect patients delusion is psychiatric in nature.  Pt with gross cognitive deficits on memory testing.  Oriented to self only.  Unclear timeline regarding current delusions, though cognitive decline has been notable over the past year with most significant decline occurring over the past month, per collateral.  Patient has no known living family, with only confirmed support system being coworkers.  Patient without psychiatric history per interview, chart review, and collateral.  Pt without any signs of psychiatric pathology with the exception of this single, fixed delusions revolving around his presenting medical complaints.  Patient is gravely disabled and was PEC'd on 9/3/20 at 2:20 AM when he tried to leave the hospital AMA.                 IMPRESSION     Major Neurocognitive Disorder with fixed delusion, suspect dementia         RECOMMENDATION(S)       Legal Status/Precaution(s):  PEC'd 9/3/20 at 2:20 AM as patient is gravely disabled       Recommendations:    - Discussed patient with CM with their plan to interview patient and attempt to obtain further collateral today to address his needs.  CM also working towards having patient's cats at home fed/cared for while he is hospitalized  - Recommend zyprexa 2.5mg PO/IM q4h prn for nonredirectable agitation  - Will attempt to obtain MOCA, if patient agreeable     - Recommend baseline EKG.  If patient begins requiring zyprexa prns, recommend follow-up EKG's for QTC monitoring.    - Recommend delirium precautions to avoid sundowning.    - Patient will need inpatient Geriatric Psychiatric Unit admission once medically stabilized.  Disposition " depending on prognosis/treatment plan for newly diagnosed penile tumor.  Patient remains gravely disabled and is unsafe for discharge home.     - [facility] Psychiatry will continue to follow.

## 2020-09-05 LAB
ALBUMIN SERPL BCP-MCNC: 3 G/DL (ref 3.5–5.2)
ALP SERPL-CCNC: 86 U/L (ref 55–135)
ALT SERPL W/O P-5'-P-CCNC: 21 U/L (ref 10–44)
ANION GAP SERPL CALC-SCNC: 6 MMOL/L (ref 8–16)
AST SERPL-CCNC: 18 U/L (ref 10–40)
BASOPHILS # BLD AUTO: 0.07 K/UL (ref 0–0.2)
BASOPHILS NFR BLD: 1.2 % (ref 0–1.9)
BILIRUB SERPL-MCNC: 0.4 MG/DL (ref 0.1–1)
BUN SERPL-MCNC: 18 MG/DL (ref 8–23)
CALCIUM SERPL-MCNC: 9.1 MG/DL (ref 8.7–10.5)
CHLORIDE SERPL-SCNC: 105 MMOL/L (ref 95–110)
CO2 SERPL-SCNC: 28 MMOL/L (ref 23–29)
CREAT SERPL-MCNC: 0.7 MG/DL (ref 0.5–1.4)
DIFFERENTIAL METHOD: ABNORMAL
EOSINOPHIL # BLD AUTO: 0.1 K/UL (ref 0–0.5)
EOSINOPHIL NFR BLD: 2.3 % (ref 0–8)
ERYTHROCYTE [DISTWIDTH] IN BLOOD BY AUTOMATED COUNT: 12.4 % (ref 11.5–14.5)
EST. GFR  (AFRICAN AMERICAN): >60 ML/MIN/1.73 M^2
EST. GFR  (NON AFRICAN AMERICAN): >60 ML/MIN/1.73 M^2
GLUCOSE SERPL-MCNC: 91 MG/DL (ref 70–110)
HCT VFR BLD AUTO: 39 % (ref 40–54)
HGB BLD-MCNC: 12.9 G/DL (ref 14–18)
IMM GRANULOCYTES # BLD AUTO: 0.07 K/UL (ref 0–0.04)
IMM GRANULOCYTES NFR BLD AUTO: 1.2 % (ref 0–0.5)
LYMPHOCYTES # BLD AUTO: 1.2 K/UL (ref 1–4.8)
LYMPHOCYTES NFR BLD: 19.4 % (ref 18–48)
MAGNESIUM SERPL-MCNC: 2.2 MG/DL (ref 1.6–2.6)
MCH RBC QN AUTO: 31.2 PG (ref 27–31)
MCHC RBC AUTO-ENTMCNC: 33.1 G/DL (ref 32–36)
MCV RBC AUTO: 94 FL (ref 82–98)
MONOCYTES # BLD AUTO: 0.5 K/UL (ref 0.3–1)
MONOCYTES NFR BLD: 8 % (ref 4–15)
NEUTROPHILS # BLD AUTO: 4.1 K/UL (ref 1.8–7.7)
NEUTROPHILS NFR BLD: 67.9 % (ref 38–73)
NRBC BLD-RTO: 0 /100 WBC
PHOSPHATE SERPL-MCNC: 3.3 MG/DL (ref 2.7–4.5)
PLATELET # BLD AUTO: 539 K/UL (ref 150–350)
PMV BLD AUTO: 9.5 FL (ref 9.2–12.9)
POTASSIUM SERPL-SCNC: 3.8 MMOL/L (ref 3.5–5.1)
PROT SERPL-MCNC: 6.8 G/DL (ref 6–8.4)
RBC # BLD AUTO: 4.13 M/UL (ref 4.6–6.2)
SODIUM SERPL-SCNC: 139 MMOL/L (ref 136–145)
WBC # BLD AUTO: 6.03 K/UL (ref 3.9–12.7)

## 2020-09-05 PROCEDURE — 83735 ASSAY OF MAGNESIUM: CPT

## 2020-09-05 PROCEDURE — 99214 OFFICE O/P EST MOD 30 MIN: CPT | Mod: ,,, | Performed by: PSYCHIATRY & NEUROLOGY

## 2020-09-05 PROCEDURE — 99217 PR OBSERVATION CARE DISCHARGE: CPT | Mod: ,,, | Performed by: HOSPITALIST

## 2020-09-05 PROCEDURE — 36415 COLL VENOUS BLD VENIPUNCTURE: CPT

## 2020-09-05 PROCEDURE — 80053 COMPREHEN METABOLIC PANEL: CPT

## 2020-09-05 PROCEDURE — 85025 COMPLETE CBC W/AUTO DIFF WBC: CPT

## 2020-09-05 PROCEDURE — 99214 PR OFFICE/OUTPT VISIT, EST, LEVL IV, 30-39 MIN: ICD-10-PCS | Mod: ,,, | Performed by: PSYCHIATRY & NEUROLOGY

## 2020-09-05 PROCEDURE — G0378 HOSPITAL OBSERVATION PER HR: HCPCS

## 2020-09-05 PROCEDURE — 99217 PR OBSERVATION CARE DISCHARGE: ICD-10-PCS | Mod: ,,, | Performed by: HOSPITALIST

## 2020-09-05 PROCEDURE — 25000003 PHARM REV CODE 250: Performed by: STUDENT IN AN ORGANIZED HEALTH CARE EDUCATION/TRAINING PROGRAM

## 2020-09-05 PROCEDURE — 84100 ASSAY OF PHOSPHORUS: CPT

## 2020-09-05 PROCEDURE — 94761 N-INVAS EAR/PLS OXIMETRY MLT: CPT

## 2020-09-05 RX ORDER — CEPHALEXIN 500 MG/1
500 CAPSULE ORAL EVERY 8 HOURS
Qty: 15 CAPSULE | Refills: 0 | Status: SHIPPED | OUTPATIENT
Start: 2020-09-05 | End: 2020-09-10

## 2020-09-05 RX ORDER — CEPHALEXIN 250 MG/1
500 CAPSULE ORAL EVERY 6 HOURS
Status: DISCONTINUED | OUTPATIENT
Start: 2020-09-05 | End: 2020-09-05

## 2020-09-05 RX ORDER — CEPHALEXIN 250 MG/1
500 CAPSULE ORAL EVERY 8 HOURS
Status: DISCONTINUED | OUTPATIENT
Start: 2020-09-05 | End: 2020-09-06 | Stop reason: HOSPADM

## 2020-09-05 RX ADMIN — CEPHALEXIN 500 MG: 250 CAPSULE ORAL at 01:09

## 2020-09-05 RX ADMIN — CEPHALEXIN 500 MG: 250 CAPSULE ORAL at 09:09

## 2020-09-05 NOTE — PLAN OF CARE
Dr. Calabrese requested an update on the patient's [facility] placement. CM was informed by the Centralized Placement Dept ([phone]) that they have sent referrals & are awaiting acceptance. CM informed Dr. Calabrese of above.

## 2020-09-05 NOTE — PROGRESS NOTES
"[facility] Hospital Medicine  Progress Note    Patient Name: Juan Hobson  MRN: [id number]  Patient Class: OP- Observation   Admission Date: 9/2/2020  Length of Stay: 0 days  Attending Physician: Adriana Calabrese MD  Primary Care Provider: Mehnaz Barillas MD    [facility] Medicine Team: [facility] 1 Eddie Clay MD    Subjective:     Principal Problem:Penile lesion        HPI:  Juan Hobson is a 69 year old male with history of basal cell carcinoma and microscopic hematuria who presents with penile swelling and ulcer. He states he has had ongoing swelling of his penis for 2 years which began with significant swelling and subsequently developed an ulcer which is draining light red colored discharge. Discharge often soak through his clothing and onto his bedding. Patient denies pain at this time but states "it does hurt at times".  He states that "creatures attacked his penis" and wants us to "get them". He states that he these "creatures are difficult to see in the dark". When asked what prompted him to come in today, patient states "it has been going on for some time and has been painful, it was time to take care of it". He denies recent or new sexual contacts, history of sexually transmitted disease, scrotal or testicular pain, dysuria, changes in frequency or urgency, fevers, or chills. He denies history of psychiatric disease.    Patient was previously seen in urology clinic in 2019 with Dr. Armando for penile discharge and phimosis. He was treated with ciprofloxacin, augmentin, and bactrim. A 1 cm fluctuance was found to grow acinetobacter resistant to bactrim. He was seen in June 2020 for skin lesion found to be basal cell carcinoma s/p Moh's surgery.     Patient states he works at the [employer] as a  and lives alone. He states he is a nonsmoker and rarely uses alcohol.    Overview/Hospital Course:  Juan Hobson is a 69 year old male with history of basal cell " "carcinoma and microscopic hematuria who presents with penile swelling and ulcer for 2 years.Patient being seen by urology, concerns for penile cancer with longstanding ulcer. He is being treated with ceftriaxone and azithromycin. MRI pelvis pending. On admission patient endorsed delusions regarding his ulcer related to "creatures" which were "attacking him". He was seen by psychiatry and found that it is likely related to dementia after getting collateral from family/friends. Patient was PEC'd the night of admission after wanting to leave and concern for poor self care. Plan for discharge to geriatric psychiatry facility.     Interval History: No acute events overnight and hemodynamically stable. Patient seems more confused today than previous.    Review of Systems   Constitutional: Negative for appetite change, chills, fatigue and fever.   HENT: Negative for congestion, nosebleeds, rhinorrhea, sinus pain and sore throat.    Eyes: Negative for photophobia and visual disturbance.   Respiratory: Negative for cough, chest tightness and shortness of breath.    Cardiovascular: Negative for chest pain and leg swelling.   Gastrointestinal: Negative for abdominal distention, abdominal pain, anal bleeding, blood in stool, diarrhea, nausea and vomiting.   Genitourinary: Positive for discharge, genital sores, penile pain and penile swelling. Negative for dysuria, frequency, hematuria, scrotal swelling, testicular pain and urgency.   Musculoskeletal: Negative for back pain.   Skin: Positive for wound. Negative for rash.   Allergic/Immunologic: Negative for environmental allergies, food allergies and immunocompromised state.   Neurological: Negative for dizziness, weakness and headaches.   Hematological: Does not bruise/bleed easily.   Psychiatric/Behavioral: Positive for confusion, decreased concentration and hallucinations. Negative for suicidal ideas.     Objective:     Vital Signs (Most Recent):  Temp: 98.3 °F (36.8 °C) " (09/05/20 0837)  Pulse: 74 (09/05/20 0837)  Resp: 18 (09/05/20 0837)  BP: 138/80 (09/05/20 0837)  SpO2: 98 % (09/05/20 0837) Vital Signs (24h Range):  Temp:  [96.4 °F (35.8 °C)-98.3 °F (36.8 °C)] 98.3 °F (36.8 °C)  Pulse:  [66-83] 74  Resp:  [18] 18  SpO2:  [95 %-98 %] 98 %  BP: (104-143)/(64-80) 138/80     Weight: 66.7 kg (147 lb 0.8 oz)  Body mass index is 21.1 kg/m².    Intake/Output Summary (Last 24 hours) at 9/5/2020 0850  Last data filed at 9/4/2020 1700  Gross per 24 hour   Intake 240 ml   Output --   Net 240 ml      Physical Exam  Constitutional:       General: He is not in acute distress.     Appearance: Normal appearance. He is normal weight. He is not ill-appearing, toxic-appearing or diaphoretic.   HENT:      Head: Normocephalic and atraumatic.      Nose: No congestion or rhinorrhea.      Mouth/Throat:      Mouth: Mucous membranes are moist.      Pharynx: No oropharyngeal exudate or posterior oropharyngeal erythema.   Eyes:      General: No scleral icterus.        Right eye: No discharge.         Left eye: No discharge.   Cardiovascular:      Rate and Rhythm: Normal rate and regular rhythm.      Pulses: Normal pulses.      Heart sounds: Normal heart sounds. No murmur. No friction rub. No gallop.    Pulmonary:      Effort: Pulmonary effort is normal. No respiratory distress.      Breath sounds: Normal breath sounds. No stridor. No wheezing, rhonchi or rales.   Chest:      Chest wall: No tenderness.   Abdominal:      General: Abdomen is flat. Bowel sounds are normal. There is no distension.      Palpations: Abdomen is soft. There is no mass.      Tenderness: There is no abdominal tenderness. There is no guarding.   Genitourinary:     Comments: Penis with phimosis, penile shaft with erythematous ulceration draining bloody/purulent fluid when expressed. Fluctuance beneath ulcer.  Skin:     General: Skin is warm and dry.      Coloration: Skin is not jaundiced.   Neurological:      General: No focal deficit  "present.      Mental Status: He is alert and oriented to person, place, and time.         Significant Labs: All pertinent labs within the past 24 hours have been reviewed.    Significant Imaging: I have reviewed all pertinent imaging results/findings within the past 24 hours.      Assessment/Plan:      * Penile lesion  Juan Hobson is a 69 year old male who presents with penile swelling and ulceration for the last two years. Although patient describes no sexual history over the last 2 years, there is concern for sexually transmitted disease ie syphilis particularly with his mental status change and delusion regarding "creatures" as the etiology of his ulcer. Additionally, nonhealing ulcer for this period of time is concerning for penile malignancy. RPR, HIV, HSV negative. negativeS/p completion of Gonorrheal & chlamydial urethritis treatment.     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  Urethral involvement not definitively excluded given suspected invasion of the right dorsolateral corpus spongiosum.     Plan:  - Urology follow up scheduled for next Friday      Altered mental status  Patient with delusions regarding the etiology of his penile ulcer and no known history of psychiatric illness. He was seen by psychiatry who found gross cognitive memory deficits and only a single fixed delusion. Patient PEC on 9/3.    Plan:  - Zyprexa 2.5 mg PRN    Basal cell carcinoma  History of basal cell carcinoma on nose s/p Mohs surgery        VTE Risk Mitigation (From admission, onward)         Ordered     IP VTE LOW RISK PATIENT  Once      09/02/20 1659                Discharge Planning   ALFRED: 9/4/2020     Code Status: Full Code   Is the patient medically ready for discharge?:     Reason for patient still in hospital (select all that apply): Other (specify) Placement  Discharge " Plan A: Psychiatric hospital        Eddie Clay MD  Department of Hospital Medicine   [facility]

## 2020-09-05 NOTE — ASSESSMENT & PLAN NOTE
"Juan Hobson is a 69 year old male who presents with penile swelling and ulceration for the last two years. Although patient describes no sexual history over the last 2 years, there is concern for sexually transmitted disease ie syphilis particularly with his mental status change and delusion regarding "creatures" as the etiology of his ulcer. Additionally, nonhealing ulcer for this period of time is concerning for penile malignancy. RPR, HIV, HSV negative. negativeS/p completion of Gonorrheal & chlamydial urethritis treatment.     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  Urethral involvement not definitively excluded given suspected invasion of the right dorsolateral corpus spongiosum.     Plan:  - Urology follow up scheduled for next Friday    "

## 2020-09-05 NOTE — SUBJECTIVE & OBJECTIVE
Interval History: No acute events overnight and hemodynamically stable. Patient seems more confused today than previous.    Review of Systems   Constitutional: Negative for appetite change, chills, fatigue and fever.   HENT: Negative for congestion, nosebleeds, rhinorrhea, sinus pain and sore throat.    Eyes: Negative for photophobia and visual disturbance.   Respiratory: Negative for cough, chest tightness and shortness of breath.    Cardiovascular: Negative for chest pain and leg swelling.   Gastrointestinal: Negative for abdominal distention, abdominal pain, anal bleeding, blood in stool, diarrhea, nausea and vomiting.   Genitourinary: Positive for discharge, genital sores, penile pain and penile swelling. Negative for dysuria, frequency, hematuria, scrotal swelling, testicular pain and urgency.   Musculoskeletal: Negative for back pain.   Skin: Positive for wound. Negative for rash.   Allergic/Immunologic: Negative for environmental allergies, food allergies and immunocompromised state.   Neurological: Negative for dizziness, weakness and headaches.   Hematological: Does not bruise/bleed easily.   Psychiatric/Behavioral: Positive for confusion, decreased concentration and hallucinations. Negative for suicidal ideas.     Objective:     Vital Signs (Most Recent):  Temp: 98.3 °F (36.8 °C) (09/05/20 0837)  Pulse: 74 (09/05/20 0837)  Resp: 18 (09/05/20 0837)  BP: 138/80 (09/05/20 0837)  SpO2: 98 % (09/05/20 0837) Vital Signs (24h Range):  Temp:  [96.4 °F (35.8 °C)-98.3 °F (36.8 °C)] 98.3 °F (36.8 °C)  Pulse:  [66-83] 74  Resp:  [18] 18  SpO2:  [95 %-98 %] 98 %  BP: (104-143)/(64-80) 138/80     Weight: 66.7 kg (147 lb 0.8 oz)  Body mass index is 21.1 kg/m².    Intake/Output Summary (Last 24 hours) at 9/5/2020 0850  Last data filed at 9/4/2020 1700  Gross per 24 hour   Intake 240 ml   Output --   Net 240 ml      Physical Exam  Constitutional:       General: He is not in acute distress.     Appearance: Normal  appearance. He is normal weight. He is not ill-appearing, toxic-appearing or diaphoretic.   HENT:      Head: Normocephalic and atraumatic.      Nose: No congestion or rhinorrhea.      Mouth/Throat:      Mouth: Mucous membranes are moist.      Pharynx: No oropharyngeal exudate or posterior oropharyngeal erythema.   Eyes:      General: No scleral icterus.        Right eye: No discharge.         Left eye: No discharge.   Cardiovascular:      Rate and Rhythm: Normal rate and regular rhythm.      Pulses: Normal pulses.      Heart sounds: Normal heart sounds. No murmur. No friction rub. No gallop.    Pulmonary:      Effort: Pulmonary effort is normal. No respiratory distress.      Breath sounds: Normal breath sounds. No stridor. No wheezing, rhonchi or rales.   Chest:      Chest wall: No tenderness.   Abdominal:      General: Abdomen is flat. Bowel sounds are normal. There is no distension.      Palpations: Abdomen is soft. There is no mass.      Tenderness: There is no abdominal tenderness. There is no guarding.   Genitourinary:     Comments: Penis with phimosis, penile shaft with erythematous ulceration draining bloody/purulent fluid when expressed. Fluctuance beneath ulcer.  Skin:     General: Skin is warm and dry.      Coloration: Skin is not jaundiced.   Neurological:      General: No focal deficit present.      Mental Status: He is alert and oriented to person, place, and time.         Significant Labs: All pertinent labs within the past 24 hours have been reviewed.    Significant Imaging: I have reviewed all pertinent imaging results/findings within the past 24 hours.

## 2020-09-05 NOTE — PLAN OF CARE
Problem: Fall Injury Risk  Goal: Absence of Fall and Fall-Related Injury  Outcome: Ongoing, Not Progressing     Problem: Adult Inpatient Plan of Care  Goal: Plan of Care Review  Outcome: Ongoing, Not Progressing  Goal: Patient-Specific Goal (Individualization)  Outcome: Ongoing, Not Progressing  Goal: Absence of Hospital-Acquired Illness or Injury  Outcome: Ongoing, Not Progressing  Goal: Optimal Comfort and Wellbeing  Outcome: Ongoing, Not Progressing  Goal: Readiness for Transition of Care  Outcome: Ongoing, Not Progressing  Goal: Rounds/Family Conference  Outcome: Ongoing, Not Progressing   Pt remains free of falls, bed locked and in lowest position, call light within reach.

## 2020-09-05 NOTE — PROGRESS NOTES
"[facility]  Psychiatry  Progress Note    Patient Name: Juan Hobson  MRN: [id number]   Code Status: Full Code  Admission Date: 9/2/2020  Hospital Length of Stay: 0 days  Expected Discharge Date: 9/4/2020  Attending Physician: Adriana Calabrese MD  Primary Care Provider: Mehnaz Barillas MD    Current Legal Status: Physician's Emergency Certificate (PEC)    Patient information was obtained from patient.     Subjective:     Principal Problem:Penile lesion    Chief Complaint: "Memory problems"     SUBJECTIVE      History of Present Illness:   Juan Hobson is a 69 y.o. male with no past psychiatric history, medical hx of basal cell carcinoma, seborrheic dermatitis, penile lesion currently presenting with conintued complaints of penile lesion and delusions of "a creature assaulting his penis."   Emergency Psychiatry was originally consulted to address the patient's symptoms of psychosis.      Per ED RN(s):  Juan Hobson, a 69 y.o. male presents to the ED w/ complaint of groin pain. Pt reports "a creature assaulting" his penis starting 2 years ago. Pt has never seen the creature as it attacks at night while pt is sleeping. Last episode happened around 2 weeks ago per pt. Pt states when the "creature is done" he feels pain and "stuff comes out" of his penis. No psych hx. Has been seen by multiple MD's regarding  problems but does not remember being seen previously. Pt is speaking well but not oriented to place or time. Poor historian. Unable to report medical hx or medication use. Denies alcohol and drug use. Not sexually active. Pt states he still works at the [employer] but has not been to work in a week. Denies any other symptoms besides pain in penis.      Per ED MD:  The patient is a 69 year old male, who has a past medical history of basal cell carcinoma, seborrheic dermatitis, and hematuria. 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 He lives alone. He drove himself to the ER today for an emergent evaluation due to penile pain, swelling, and bloody discharge. He states that "creatures attack his penis" in the middle of the night while he is in bed. He states that the first "attack" happened about 2 years ago and that the "creature" lacerated his penis. He describes the creatures as shadowy and hard to see clearly in the dark. He states that there are multiple creatures. He states that the attacks have been worse recently. He states that he has blood draining from his penis onto his bedding and clothing. He states that his penis is "very big and hot". He states that he does not have any testicle pain or swelling. He states that he does not have any trouble urinating. He denies any fever or chills. He denies using drugs or alcohol. He states that he has not been sexually active in over 2 years. He denies any history of mental illness.      Per Psychiatry:  Upon initiation of interview, pt was resting in bed, calm, cooperative, pleasant.  Patient has obvious gross cognitive deficits.  He is only oriented to self, providing first and last name.  He is unable to state that he is in a hospital or the name of the hospital.  Reports year is "22022," unable to names month or day.  He is unable to provide names for any friends or family members whom we may contact.  He is unable to provide 911 as an option to call should he be in trouble at home.  Unable to name the president, city and only able to immediately recall 1/3 on memory testing.  Pt admits he has been getting lost and forgetting things at home more frequently lately.         Patient reports driving himself to the hospital to get help with his penile lesion which has been present "off/on for 2 years."  He endorses seeing [facility] in the past for this issue (notes from 12/2019 with penile lesion and phimosis but no delusions mentioned).    Patient has fixed delusion regarding the " "cause of his lesion.  He reports "creatures" that come into his home at night and attack him which have caused the lesions on his penis.  This has been going on/off every few months for the past 2 years.  These "creatures" alive under his home and enter through holes in the walls and floors.  He frequently tries to hit them away but they are "usually too quick."  Majority of the time, they attack him while he is asleep.  Despite this, lamar feels safe at home and would like to return their once he is treated.       He lives home alone.  He reports never having been , no children of close family members.  Unable to provide names for any neighbors or friends.  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  No psych history.  Denies previous medication trials or hospitalizations. Denies major head trauma, accidents or injuries.  His responses are largely appropriate and logical to the questions asked despite his memory deficits.  He denies any previous diagnosis of dementia.  He does not endorse any psychiatric symptoms otherwise, no complaints otherwise.  Does not endorse any further delusions or paranoia.  He is behaving appropriately at this time, no anger, hospitality or agitation.   He is not RIS.   He denies substance abuse, utox negative.         Psychiatric Review of Systems:  sleep: no  appetite: no  weight: no  energy/anergy: no  interest/pleasure/anhedonia: no  somatic symptoms: no  libido: no  anxiety/panic: no  guilty/hopelessness: no  concentration: no  S.I.B.s/risky behavior: no  any drugs: no  alcohol: no      Medical Review Of Systems:  Pertinent items noted in HPI     Psychiatric History:  Diagnose(s): " No  Previous Medication Trials: No  Previous Psychiatric Hospitalizations: No  Family Psychiatric History: No  Outpatient Psychiatrist: No  Outpatient Therapist: No     Suicide/Violence Risk Assessment:  Current/active suicidal ideation/plan/intent: No  Previous suicide attempts: No  Current/active homicidal ideation/plan/intent: No  History of threats/arrests associated with violent conduct - No  Access to firearms/lethal weapons - No     Social History:  Marital Status: single  Children: 0   Employment Status: currently employed  Education: college graduate  Special Ed: no  Housing Status: Yes - alone, house  Developmental History: No  History of Abuse: No     Substance Abuse History:  Recreational Drugs: denies  Use of Alcohol: denied  Rehab History: No  Tobacco Use: No  Use of Caffeine: No  Use of OTC: No  Is the patient aware of the biomedical complications associated with substance abuse and mental illness? yes  Legal consequences of chemical use: Yes      Legal History:  Past Charges/Incarcerations: No  Pending Charges: No     Psychosocial Factors:  Stressors: health.   Functioning Relationships: alone & isolated     Collateral:   No     Scheduled Meds:      Psychotherapeutics (From admission, onward)     None          PRN Meds:  sodium chloride 0.9%  Home Meds:          Prior to Admission medications    Medication Sig Start Date End Date Taking? Authorizing Provider   fish oil-omega-3 fatty acids 300-1,000 mg capsule Take 2 g by mouth once daily.       Historical Provider, MD   HYDROcodone-acetaminophen (NORCO) 5-325 mg per tablet Take 1 tablet by mouth every 6 (six) hours as needed for Pain. 6/25/20     Chana Mcnair PA-C   ketoconazole (NIZORAL) 2 % shampoo Apply to scalp and face 3-5x weekly. Let sit on scalp for 5-10 minutes before rinsing  Patient not taking: Reported on 6/25/2020 5/28/20     Tamiko Bray MD   multivitamin capsule Take 1 capsule by mouth once daily.       Historical Provider, MD  "         Psychotherapeutics (From admission, onward)     None          Allergies:  Patient has no known allergies.  Past Medical/Surgical History:       Past Medical History:   Diagnosis Date    Basal cell carcinoma       right helix    Hematuria      Seborrheic dermatitis      Skin cancer of arm              Past Surgical History:   Procedure Laterality Date    INCISION AND DRAINAGE INTRA ORAL ABSCESS              Hospital Course: 09/03/2020   Per chart review, patient attempted to leave AMA overnight claiming he needed to go home to care for his cats, and was PEC'd secondary to grave disability.  Patient did not receive any PRNs overnight for agitation, and has remained mostly calm and cooperative.  On interview this morning, patient found lying in bed wearing face mask with sitter at bedside.  States "when you get old you don't remember things as fast."  Repeatedly admits to having memory problems throughout interview.  Reports he is originally from [place], and attended college at [employer].  Patient cannot recall the reason he presented to the ED yesterday.  When reminded of his current discharge and penile lesions, patient then explains "these creatures made it [penis] bleed in 2 spots.  They did that to me when I was all covered up with blankets at home 2 or 3 years ago."  Patient claims "creatures" have never returned, but attributes his penile lesions and discharge to "those little monsters."  Denies suicidal ideation, denies homicidal ideation, and denies AVH.  Patient admits to only eating food at home when he "brings something back," typically from work or orders out.  States he has "friends," who look out for him.  States he has continued to work for [employer] as a .  Patient is oriented to self and place, but is disoriented to time and situation.  He states the year "has some 2s and 0s in it, but I can't remember, things get fuzzy."  When asked the month, he responds "we " "just started a new one."  He admits his work office is nearby the hospital, but does not recall the name of the hospital.  States he has lived in [place] for the past 30 years.      Collateral obtained from coworker Ms. Juice Martinez at [phone]  Ms. Martinez is patient's coworker, and has known patient for the past 6 years.  States patient has been working for [employer] as a  for the past 10-11 years.  Explains she and her coworkers are patient's only real support system as he "has nobody, no family, nothing."  Per coworker, patient has been showing signs of cognitive decline and memory deficits over the past year, with significant worsening over the past month.  States "we have all noticed his memory getting worse and worse for the past year, but he's so private, it could have been going on longer than that."  Reports secondary to his rapid decline, she scheduled an appointment with Neurology for him later this month.   States patient at times forgets "his days," thinking he is off of work, and does not show up.  Explains last week he forget how to clock out, so she helped him. Coworker becomes tearful during conversation, stating she has been doing everything she can to help as "he has nobody."  States he is private, and she has never intruded on his finances, but she worries that someone could easily take advantage of him.  States he at times shows up to work poorly groomed and with poor hygiene.  Admits she and coworkers have called him in the past to remind him to shower.  She expresses concern that he is not eating at home, as she constantly tries to send him home with extra from lunch at work.  Also reports 30-40 pound weight loss over the past 1 year, with all of patient's clothing being too big.  Also reports she brought patient to have his cell phone service turned on, as he "forgot how to pay the bill online."  States she is willing to do anything to help him as she " "feels "so sorry," for him, as she admits he is "really nice, but to himself."  Denies patient having any known psychiatric history.  Reports she has called around and discussed possible assisted living with patient in the past, but admits "he can be headstrong."  Claims "he has had some good days at work, but lately things are just getting worse."    09/04/2020  Per chart review, patient continues to be cooperative with treatment team regarding continued workup of penile lesions.  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  Urology planning outpatient follow-up at this time.  Patient interviewed this afternoon.  He recalls having imaging completed this morning, stating "it was loud, but it was fun."  Patient does not recall the reason for the MRI of his pelvis.  He cannot recall the reason he initially presented to the hospital.  Continues to perseverate about going home and caring for his cats.  Patient is oriented to person, place (hospital), though is unable to name the city, [place], no name of hospital.  He is oriented to year, though claims it is "July 2020."  He is disoriented to situation.  Denies suicidal ideation, homicidal ideation, and AVH.  Patient continues to have fixed chronic delusion regarding creatures attacking/causing his penile lesions and discharge.  Reports feeling safe in the hospital.      09/04/2020  This morning patient is seen lying in bed. He is linear, organized, and cooperative and engaged in interview. He states his memory problems have been going on for about a year and he states that his friends and family have even noticed. He is well educated and a  so his memory problems are distressing and frustrating to him. He state his mood is "goood" and he has been eating and sleeping well. He is oriented to name, place, month, and president, but not year (1111). He often has increased latency of response with memory and concentration tasks and appear to be thinking " "hard and slightly frustrated at himself. He can spell WORLD forwards and backwards with some difficulty. He cannot perform math in a way that he should for his education level (can do 2x3 and 2x6, but no higher than that). CAM-ICU 2 (failed orientation and SAVEAHAART).    No new subjective & objective note has been filed under this hospital service since the last note was generated.      ASSESSMENT      Juan Hobson is a 69 y.o. male with no past psychiatric history, currently presenting for phimosis, penile lesion with fixed bizarre delusions regarding the cause of these lesions as being "attacks by creatures."  Psychiatry was originally consulted to address the patient's symptoms of delusion.     Do not suspect patients delusion is psychiatric in nature.  Pt with gross cognitive deficits on memory testing.  Oriented to self only.  Unclear timeline regarding current delusions, though cognitive decline has been notable over the past year with most significant decline occurring over the past month, per collateral.  Patient has no known living family, with only confirmed support system being coworkers.  Patient without psychiatric history per interview, chart review, and collateral.  Pt without any signs of psychiatric pathology with the exception of this single, fixed delusions revolving around his presenting medical complaints.  Patient is gravely disabled and was PEC'd on 9/3/20 at 2:20 AM when he tried to leave the hospital AMA.                 IMPRESSION     Dementia     RECOMMENDATION(S)       Legal Status/Precaution(s):  PEC'd 9/3/20 at 2:20 AM as patient is gravely disabled       Recommendations:    - Discussed patient with CM with their plan to interview patient and attempt to obtain further collateral today to address his needs.  CM also working towards having patient's cats at home fed/cared for while he is hospitalized  - Recommend zyprexa 2.5mg PO/IM q4h prn for nonredirectable agitation  - Will " attempt to obtain MOCA, if patient agreeable     - Recommend baseline EKG.  If patient begins requiring zyprexa prns, recommend follow-up EKG's for QTC monitoring.    - Recommend delirium precautions to avoid sundowning.    - Patient will need inpatient Geriatric Psychiatric Unit admission once medically stabilized.  Disposition depending on prognosis/treatment plan for newly diagnosed penile tumor.  Patient remains gravely disabled and is unsafe for discharge home.     - [facility] Psychiatry will continue to follow.        Need for Continued Hospitalization:   Yes, patient remains gravely disabled    Anticipated Disposition: Geriatric Psychiatric Unit    Total time:  25 with greater than 50% of this time spent in counseling and/or coordination of care.       Nataliya Hills MD   Psychiatry  [facility]

## 2020-09-06 VITALS
RESPIRATION RATE: 18 BRPM | DIASTOLIC BLOOD PRESSURE: 83 MMHG | TEMPERATURE: 98 F | SYSTOLIC BLOOD PRESSURE: 138 MMHG | HEIGHT: 70 IN | BODY MASS INDEX: 21.05 KG/M2 | OXYGEN SATURATION: 96 % | WEIGHT: 147.06 LBS | HEART RATE: 106 BPM

## 2020-09-06 LAB
C TRACH RRNA SPEC QL NAA+PROBE: NEGATIVE
N GONORRHOEA RRNA SPEC QL NAA+PROBE: NEGATIVE

## 2020-09-06 PROCEDURE — 25000003 PHARM REV CODE 250: Performed by: STUDENT IN AN ORGANIZED HEALTH CARE EDUCATION/TRAINING PROGRAM

## 2020-09-06 PROCEDURE — G0378 HOSPITAL OBSERVATION PER HR: HCPCS

## 2020-09-06 RX ADMIN — CEPHALEXIN 500 MG: 250 CAPSULE ORAL at 05:09

## 2020-09-06 NOTE — PLAN OF CARE
"  Problem: Fall Injury Risk  Goal: Absence of Fall and Fall-Related Injury  Outcome: Ongoing, Progressing     Problem: Adult Inpatient Plan of Care  Goal: Plan of Care Review  Outcome: Ongoing, Progressing  Goal: Patient-Specific Goal (Individualization)  Outcome: Ongoing, Progressing  Goal: Absence of Hospital-Acquired Illness or Injury  Outcome: Ongoing, Progressing  Goal: Optimal Comfort and Wellbeing  Outcome: Ongoing, Progressing  Goal: Readiness for Transition of Care  Outcome: Ongoing, Progressing  Goal: Rounds/Family Conference  Outcome: Ongoing, Progressing    Pt AAO to self only. Pt free of falls/trauma/injuries. Vital signs are in stable condition. Bed in low position, wheels locked, and call light within reach. Skin integrity remains unchanged. Small discolored [place] on penis. Pt states Lesion on penis is "not hurting/ itching and it was caused by an animal attack 2 years ago." Pt turned/ambulated Q2 hours. Pt did have an problem at 3pm. Pt stated "he needed to go check on his house and make sure it was still there." Pt also stated that he "had been in the hospital for 3 months"; I explained to the pt he has been here for 3 days. Pt got annoyed with me and up the blankets over his head. Sitter at bedside. No distress noted/reported at this time. WCTM.      " 14-Jun-2018 13:51

## 2020-09-06 NOTE — ASSESSMENT & PLAN NOTE
"Jaun Hobson is a 69 year old male who presents with penile swelling and ulceration for the last two years. Although patient describes no sexual history over the last 2 years, there is concern for sexually transmitted disease ie syphilis particularly with his mental status change and delusion regarding "creatures" as the etiology of his ulcer. Additionally, nonhealing ulcer for this period of time is concerning for penile malignancy. RPR, HIV, HSV negative. negativeS/p completion of Gonorrheal & chlamydial urethritis treatment.     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  Urethral involvement not definitively excluded given suspected invasion of the right dorsolateral corpus spongiosum.     Plan:  - Urology follow up at [facility] on 9/11/2020   - Continue cephalexin 500 mg PO q8 for 5 days (end 9/10/20)  "

## 2020-09-06 NOTE — DISCHARGE SUMMARY
"[facility] Hospital Medicine  Discharge Summary      Patient Name: Juan Hobson  MRN: [id number]  Admission Date: 9/2/2020  Hospital Length of Stay: 0 days  Discharge Date and Time:  09/06/2020 6:33 AM  Attending Physician: Adriana Calabrese MD   Discharging Provider: Eddie Clay MD  Primary Care Provider: Mehnaz Barillas MD  [facility] Medicine Team: [facility] HOSP MED 1 Eddie Clay MD    HPI:   Juan Hobson is a 69 year old male with history of basal cell carcinoma and microscopic hematuria who presents with penile swelling and ulcer. He states he has had ongoing swelling of his penis for 2 years which began with significant swelling and subsequently developed an ulcer which is draining light red colored discharge. Discharge often soak through his clothing and onto his bedding. Patient denies pain at this time but states "it does hurt at times".  He states that "creatures attacked his penis" and wants us to "get them". He states that he these "creatures are difficult to see in the dark". When asked what prompted him to come in today, patient states "it has been going on for some time and has been painful, it was time to take care of it". He denies recent or new sexual contacts, history of sexually transmitted disease, scrotal or testicular pain, dysuria, changes in frequency or urgency, fevers, or chills. He denies history of psychiatric disease.    Patient was previously seen in urology clinic in 2019 with Dr. Armando for penile discharge and phimosis. He was treated with ciprofloxacin, augmentin, and bactrim. A 1 cm fluctuance was found to grow acinetobacter resistant to bactrim. He was seen in June 2020 for skin lesion found to be basal cell carcinoma s/p Moh's surgery.     Patient states he works at the [employer] as a  and lives alone. He states he is a nonsmoker and rarely uses alcohol.    * No surgery found *      Hospital Course:   Juan Hobson is a 69 year old " "male with history of basal cell carcinoma and microscopic hematuria who presents with penile swelling and ulcer for 2 years.Patient being seen by urology, concerns for penile cancer with longstanding ulcer. He is being treated with ceftriaxone and azithromycin. MRI pelvis pending. On admission patient endorsed delusions regarding his ulcer related to "creatures" which were "attacking him". He was seen by psychiatry and found that it is likely related to dementia after getting collateral from family/friends. Patient was PEC'd the night of admission after wanting to leave and concern for poor self care. Plan for discharge to geriatric psychiatry facility.    [facility] Hospital Medicine  Progress Note    Patient Name: Juan Hobson  MRN: [id number]  Patient Class: OP- Observation   Admission Date: 9/2/2020  Length of Stay: 0 days  Attending Physician: Adriana Calabrese MD  Primary Care Provider: Mehnaz Barillas MD    [facility] Medicine Team: [facility] MED 1 Eddie Clay MD    Subjective:     Principal Problem:Penile lesion        HPI:  Juan Hobson is a 69 year old male with history of basal cell carcinoma and microscopic hematuria who presents with penile swelling and ulcer. He states he has had ongoing swelling of his penis for 2 years which began with significant swelling and subsequently developed an ulcer which is draining light red colored discharge. Discharge often soak through his clothing and onto his bedding. Patient denies pain at this time but states "it does hurt at times".  He states that "creatures attacked his penis" and wants us to "get them". He states that he these "creatures are difficult to see in the dark". When asked what prompted him to come in today, patient states "it has been going on for some time and has been painful, it was time to take care of it". He denies recent or new sexual contacts, history of sexually transmitted disease, scrotal or testicular pain, dysuria, " "changes in frequency or urgency, fevers, or chills. He denies history of psychiatric disease.    Patient was previously seen in urology clinic in 2019 with Dr. Armando for penile discharge and phimosis. He was treated with ciprofloxacin, augmentin, and bactrim. A 1 cm fluctuance was found to grow acinetobacter resistant to bactrim. He was seen in June 2020 for skin lesion found to be basal cell carcinoma s/p Moh's surgery.     Patient states he works at the [employer] as a  and lives alone. He states he is a nonsmoker and rarely uses alcohol.    Overview/Hospital Course:  Juan Hobson is a 69 year old male with history of basal cell carcinoma and microscopic hematuria who presents with penile swelling and ulcer for 2 years.Patient being seen by urology, concerns for penile cancer with longstanding ulcer. He is being treated with ceftriaxone and azithromycin. MRI pelvis pending. On admission patient endorsed delusions regarding his ulcer related to "creatures" which were "attacking him". He was seen by psychiatry and found that it is likely related to dementia after getting collateral from family/friends. Patient was PEC'd the night of admission after wanting to leave and concern for poor self care. Plan for discharge to geriatric psychiatry facility.     Review of Systems   Constitutional: Negative for appetite change, chills, fatigue and fever.   HENT: Negative for congestion, nosebleeds, rhinorrhea, sinus pain and sore throat.    Eyes: Negative for photophobia and visual disturbance.   Respiratory: Negative for cough, chest tightness and shortness of breath.    Cardiovascular: Negative for chest pain and leg swelling.   Gastrointestinal: Negative for abdominal distention, abdominal pain, anal bleeding, blood in stool, diarrhea, nausea and vomiting.   Genitourinary: Positive for discharge, genital sores, penile pain and penile swelling. Negative for dysuria, frequency, hematuria, scrotal " swelling, testicular pain and urgency.   Musculoskeletal: Negative for back pain.   Skin: Positive for wound. Negative for rash.   Allergic/Immunologic: Negative for environmental allergies, food allergies and immunocompromised state.   Neurological: Negative for dizziness, weakness and headaches.   Hematological: Does not bruise/bleed easily.   Psychiatric/Behavioral: Positive for confusion, decreased concentration and hallucinations. Negative for suicidal ideas.      Objective:      Vitals:    09/06/20 0552   BP: 138/83   Pulse: 106   Resp: 18   Temp: 98.2 °F (36.8 °C)     Weight: 66.7 kg (147 lb 0.8 oz)  Body mass index is 21.1 kg/m².     Intake/Output Summary (Last 24 hours) at 9/5/2020 0850  Last data filed at 9/4/2020 1700      Gross per 24 hour   Intake 240 ml   Output --   Net 240 ml      Physical Exam  Constitutional:       General: He is not in acute distress.     Appearance: Normal appearance. He is normal weight. He is not ill-appearing, toxic-appearing or diaphoretic.   HENT:      Head: Normocephalic and atraumatic.      Nose: No congestion or rhinorrhea.      Mouth/Throat:      Mouth: Mucous membranes are moist.      Pharynx: No oropharyngeal exudate or posterior oropharyngeal erythema.   Eyes:      General: No scleral icterus.        Right eye: No discharge.         Left eye: No discharge.   Cardiovascular:      Rate and Rhythm: Normal rate and regular rhythm.      Pulses: Normal pulses.      Heart sounds: Normal heart sounds. No murmur. No friction rub. No gallop.    Pulmonary:      Effort: Pulmonary effort is normal. No respiratory distress.      Breath sounds: Normal breath sounds. No stridor. No wheezing, rhonchi or rales.   Chest:      Chest wall: No tenderness.   Abdominal:      General: Abdomen is flat. Bowel sounds are normal. There is no distension.      Palpations: Abdomen is soft. There is no mass.      Tenderness: There is no abdominal tenderness. There is no guarding.   Genitourinary:      "Comments: Penis with phimosis, penile shaft with erythematous ulceration draining bloody/purulent fluid when expressed. Fluctuance beneath ulcer.  Skin:     General: Skin is warm and dry.      Coloration: Skin is not jaundiced.   Neurological:      General: No focal deficit present.      Mental Status: He is alert and oriented to person, place, and time.            Significant Labs: All pertinent labs within the past 24 hours have been reviewed.     Significant Imaging: I have reviewed all pertinent imaging results/findings within the past 24 hours.       Assessment/Plan:      * Penile lesion  Juan Hobson is a 69 year old male who presents with penile swelling and ulceration for the last two years. Although patient describes no sexual history over the last 2 years, there is concern for sexually transmitted disease ie syphilis particularly with his mental status change and delusion regarding "creatures" as the etiology of his ulcer. Additionally, nonhealing ulcer for this period of time is concerning for penile malignancy. RPR, HIV, HSV negative. negativeS/p completion of Gonorrheal & chlamydial urethritis treatment.     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  Urethral involvement not definitively excluded given suspected invasion of the right dorsolateral corpus spongiosum.     Plan:  - Urology follow up at [facility] on 9/11/2020   - Continue cephalexin 500 mg PO q8 for 5 days (end 9/10/20)    Altered mental status  Patient with delusions regarding the etiology of his penile ulcer and no known history of psychiatric illness. He was seen by psychiatry who found gross cognitive memory deficits and only a single fixed delusion likely due to underlying dementia. Collateral obtained by psychiatry indicated patient has recent history of poor self care. Patient PEC on 9/3. " "Discharge to geriatric psychiatry.    Basal cell carcinoma  History of basal cell carcinoma on nose s/p Mohs surgery      VTE Risk Mitigation (From admission, onward)         Ordered     IP VTE LOW RISK PATIENT  Once      09/02/20 1659                Discharge Planning   ALFRED: 9/4/2020     Code Status: Full Code   Is the patient medically ready for discharge?:     Reason for patient still in hospital (select all that apply): Other (specify) Placement  Discharge Plan A: [facility]        Eddie Clay MD  Department of Hospital Medicine   [facility]  Consults:   Consults (From admission, onward)        Status Ordering Provider     Inpatient consult to Psychiatry  Once     Provider:  (Not yet assigned)    Completed LUIS ENRIQUE FARRIS     Inpatient consult to Urology  Once     Provider:  (Not yet assigned)    Completed LUIS ENRIQUE FARRIS          * Penile lesion  Juan Hobson is a 69 year old male who presents with penile swelling and ulceration for the last two years. Although patient describes no sexual history over the last 2 years, there is concern for sexually transmitted disease ie syphilis particularly with his mental status change and delusion regarding "creatures" as the etiology of his ulcer. Additionally, nonhealing ulcer for this period of time is concerning for penile malignancy. RPR, HIV, HSV negative. negativeS/p completion of Gonorrheal & chlamydial urethritis treatment.     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  Urethral involvement not definitively excluded given suspected invasion of the right dorsolateral corpus spongiosum.     Plan:  - Urology follow up at [facility] on 9/11/2020   - Continue cephalexin 500 mg PO q8 for 5 days (end 9/10/20)    Altered mental status  Patient with delusions regarding the etiology of his " penile ulcer and no known history of psychiatric illness. He was seen by psychiatry who found gross cognitive memory deficits and only a single fixed delusion likely due to underlying dementia. Collateral obtained by psychiatry indicated patient has recent history of poor self care. Patient PEC on 9/3. Discharge to geriatric psychiatry.    Basal cell carcinoma  History of basal cell carcinoma on nose s/p Mohs surgery      Final Active Diagnoses:    Diagnosis Date Noted POA    PRINCIPAL PROBLEM:  Penile lesion [N48.9] 09/02/2020 Yes    Altered mental status [R41.82] 09/02/2020 Yes    Basal cell carcinoma [C44.91] 06/26/2017 Yes      Problems Resolved During this Admission:       Discharged Condition: fair    Disposition: Psychiatric Hospital    Follow Up:    Patient Instructions:   No discharge procedures on file.    Significant Diagnostic Studies: Labs: All labs within the past 24 hours have been reviewed    Pending Diagnostic Studies:     Procedure Component Value Units Date/Time    Vitamin B1 [[id number]] Collected: 09/02/20 1453    Order Status: Sent Lab Status: In process Updated: 09/02/20 8634    Specimen: Blood          Medications:  Reconciled Home Medications:      Medication List      START taking these medications    cephALEXin 500 MG capsule  Commonly known as: KEFLEX  Take 1 capsule (500 mg total) by mouth every 8 (eight) hours. for 5 days            Indwelling Lines/Drains at time of discharge:   Lines/Drains/Airways     None               Time spent on the discharge of patient: 35 minutes  Patient was seen and examined on the date of discharge and determined to be suitable for discharge.    Eddie Clay MD  Department of Hospital Medicine  [facility]

## 2020-09-06 NOTE — ASSESSMENT & PLAN NOTE
Patient with delusions regarding the etiology of his penile ulcer and no known history of psychiatric illness. He was seen by psychiatry who found gross cognitive memory deficits and only a single fixed delusion likely due to underlying dementia. Collateral obtained by psychiatry indicated patient has recent history of poor self care. Patient PEC on 9/3. Discharge to geriatric psychiatry.

## 2020-09-08 NOTE — PLAN OF CARE
Patient discharged to [facility].       09/08/20 0724   Final Note   Assessment Type Final Discharge Note   Anticipated Discharge Disposition Psych   Right Care Referral Info   Post Acute Recommendation Other   Referral Type Psychiatric Hospital   Facility Name [facility]

## 2020-09-09 LAB
BACTERIA SPEC ANAEROBE CULT: NORMAL
T PALLIDUM AB SER QL IF: NORMAL

## 2020-09-14 LAB — VIT B1 BLD-MCNC: 43 UG/L (ref 38–122)

## 2020-09-28 ENCOUNTER — LAB VISIT (OUTPATIENT)
Dept: LAB | Facility: HOSPITAL | Age: 70
End: 2020-09-28
Attending: PSYCHIATRY & NEUROLOGY
Payer: COMMERCIAL

## 2020-09-28 ENCOUNTER — TELEPHONE (OUTPATIENT)
Dept: NEUROLOGY | Facility: CLINIC | Age: 70
End: 2020-09-28

## 2020-09-28 ENCOUNTER — OFFICE VISIT (OUTPATIENT)
Dept: NEUROLOGY | Facility: CLINIC | Age: 70
End: 2020-09-28
Payer: COMMERCIAL

## 2020-09-28 VITALS
WEIGHT: 147 LBS | HEART RATE: 64 BPM | HEIGHT: 70 IN | BODY MASS INDEX: 21.05 KG/M2 | DIASTOLIC BLOOD PRESSURE: 75 MMHG | SYSTOLIC BLOOD PRESSURE: 133 MMHG

## 2020-09-28 DIAGNOSIS — F02.80 FRONTOTEMPORAL DEMENTIA: Primary | ICD-10-CM

## 2020-09-28 DIAGNOSIS — G31.09 FRONTOTEMPORAL DEMENTIA: Primary | ICD-10-CM

## 2020-09-28 DIAGNOSIS — C60.9 PENILE CANCER: ICD-10-CM

## 2020-09-28 DIAGNOSIS — F02.80 FRONTOTEMPORAL DEMENTIA: ICD-10-CM

## 2020-09-28 DIAGNOSIS — G31.09 FRONTOTEMPORAL DEMENTIA: ICD-10-CM

## 2020-09-28 PROCEDURE — 1159F PR MEDICATION LIST DOCUMENTED IN MEDICAL RECORD: ICD-10-PCS | Mod: S$GLB,,, | Performed by: PSYCHIATRY & NEUROLOGY

## 2020-09-28 PROCEDURE — 3008F BODY MASS INDEX DOCD: CPT | Mod: CPTII,S$GLB,, | Performed by: PSYCHIATRY & NEUROLOGY

## 2020-09-28 PROCEDURE — 36415 COLL VENOUS BLD VENIPUNCTURE: CPT

## 2020-09-28 PROCEDURE — 99205 OFFICE O/P NEW HI 60 MIN: CPT | Mod: S$GLB,,, | Performed by: PSYCHIATRY & NEUROLOGY

## 2020-09-28 PROCEDURE — 1101F PR PT FALLS ASSESS DOC 0-1 FALLS W/OUT INJ PAST YR: ICD-10-PCS | Mod: CPTII,S$GLB,, | Performed by: PSYCHIATRY & NEUROLOGY

## 2020-09-28 PROCEDURE — 1101F PT FALLS ASSESS-DOCD LE1/YR: CPT | Mod: CPTII,S$GLB,, | Performed by: PSYCHIATRY & NEUROLOGY

## 2020-09-28 PROCEDURE — 99999 PR PBB SHADOW E&M-EST. PATIENT-LVL V: CPT | Mod: PBBFAC,,, | Performed by: PSYCHIATRY & NEUROLOGY

## 2020-09-28 PROCEDURE — 99205 PR OFFICE/OUTPT VISIT, NEW, LEVL V, 60-74 MIN: ICD-10-PCS | Mod: S$GLB,,, | Performed by: PSYCHIATRY & NEUROLOGY

## 2020-09-28 PROCEDURE — 83519 RIA NONANTIBODY: CPT | Mod: 59

## 2020-09-28 PROCEDURE — 1126F AMNT PAIN NOTED NONE PRSNT: CPT | Mod: S$GLB,,, | Performed by: PSYCHIATRY & NEUROLOGY

## 2020-09-28 PROCEDURE — 1159F MED LIST DOCD IN RCRD: CPT | Mod: S$GLB,,, | Performed by: PSYCHIATRY & NEUROLOGY

## 2020-09-28 PROCEDURE — 3008F PR BODY MASS INDEX (BMI) DOCUMENTED: ICD-10-PCS | Mod: CPTII,S$GLB,, | Performed by: PSYCHIATRY & NEUROLOGY

## 2020-09-28 PROCEDURE — 1126F PR PAIN SEVERITY QUANTIFIED, NO PAIN PRESENT: ICD-10-PCS | Mod: S$GLB,,, | Performed by: PSYCHIATRY & NEUROLOGY

## 2020-09-28 PROCEDURE — 99999 PR PBB SHADOW E&M-EST. PATIENT-LVL V: ICD-10-PCS | Mod: PBBFAC,,, | Performed by: PSYCHIATRY & NEUROLOGY

## 2020-09-28 PROCEDURE — 86255 FLUORESCENT ANTIBODY SCREEN: CPT | Mod: 59

## 2020-09-28 NOTE — Clinical Note
Gualberto Champion,  I'm completing some work up but this patient is new diagnosis of dementia and likely cancer as well who lives alone with no family and will need assistance with long term planning.  Fortunately, his two friends from work are very supportive but don't have POA or anything.  Would you be able to assist with long term planning?  CV

## 2020-09-28 NOTE — TELEPHONE ENCOUNTER
Spoke with BENNY Novoa at home health who said that they cannot move pt up any sooner because they have a waiting list until 10/23. Dr palmer notified.

## 2020-09-28 NOTE — PROGRESS NOTES
"[facility] - NEUROLOGY [facility]TH [place] [facility] [place]    Date: September 28, 2020   Patient Name: Juan Hobson   MRN: [id number]   PCP: Mehnaz Barillas  Referring Provider: Self, Aaareferral    Assessment:      This is Juan Hobson, 69 y.o. 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     Plan:      -  MRI brain  -  Paraneoplastic panel  -  Referral to urology, case management, and home health    Follow up 1-2 months       I discussed side effects of the medications. I asked the patient to stop the medication if he notices serious adverse effects as we discussed and to seek immediate medical attention at an ER.     Santos Ernandez MD  [facility]   Department of Neurology    Subjective:      HPI:   Mr. Juan Hobson is a 69 y.o. 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  Coworkers note that he has always been a very punctual and reliable employee but did not show up to work on Sept 2 and instead presented to [facility] reporting  "creatures attacked his penis".  He was noted to have enlarged, ulcerated penis with MRI and urology consult showing concern for penile cancer but not biopsy confirmed at this time.   He was treated for infection and discharged to geriatric psych facility where he was held for two weeks prior to discharge home with home health orders for an agency that does not accept his insurance.    When asked about memory difficulties, he cites difficulty connecting objects to words for them.  Co worker retrospectively note his clocking in/out has " "been inconsistent over the past several months.  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  Co workers note that over the past several months he has been making some errors in recording measurements.  He continues to drive and live in his own home.     PAST MEDICAL HISTORY:  Past Medical History:   Diagnosis Date    Basal cell carcinoma     right helix    Hematuria     Seborrheic dermatitis     Skin cancer of arm        PAST SURGICAL HISTORY:  Past Surgical History:   Procedure Laterality Date    INCISION AND DRAINAGE INTRA ORAL ABSCESS         CURRENT MEDS:  No current outpatient medications on file.     No current facility-administered medications for this visit.        ALLERGIES:  Review of patient's allergies indicates:  No Known Allergies    FAMILY HISTORY:  History reviewed. No pertinent family history.    SOCIAL HISTORY:  Social History     Tobacco Use    Smoking status: Never Smoker    Smokeless tobacco: Never Used   Substance Use Topics    Alcohol use: Yes     Frequency: Monthly or less     Drinks per session: 1 or 2     Comment: sometimes    Drug use: Never       Review of Systems:  12 review of systems is negative except for the symptoms mentioned in HPI.        Objective:     Vitals:    09/28/20 1407   BP: 133/75   Pulse: 64   Weight: 66.7 kg (147 lb)   Height: 5' 10" (1.778 m)       General: NAD, well nourished   Eyes: no tearing, discharge, no erythema   ENT: moist mucous membranes of the oral cavity, nares patent    Neck: Supple, full range of motion  Cardiovascular: Warm and well perfused, pulses equal and symmetrical  Lungs: Normal work of breathing, normal chest wall excursions  Skin: No rash, lesions, or breakdown on exposed skin  Psychiatry: Mood and affect are appropriate   Abdomen: soft, non tender, non distended  Extremeties: No cyanosis, clubbing or edema.    Neurological   MENTAL STATUS: MOCA 15/30, noted use of word " substitutions  CRANIAL NERVES: Visual fields intact. PERRL. EOMI. Facial sensation intact. Face symmetrical. Hearing grossly intact. Full shoulder shrug bilaterally. Tongue protrudes midline   SENSORY: Sensation is intact to light touch throughout.  Negative Romberg.   MOTOR: Normal bulk and tone. No pronator drift.    REFLEXES: Symmetric and 2+ throughout.   CEREBELLAR/COORDINATION/GAIT: Gait steady with normal arm swing and stride length.  Heel to shin intact. Finger to nose intact. Normal rapid alternating movements.

## 2020-09-29 ENCOUNTER — TELEPHONE (OUTPATIENT)
Dept: NEUROLOGY | Facility: CLINIC | Age: 70
End: 2020-09-29

## 2020-09-29 ENCOUNTER — TELEPHONE (OUTPATIENT)
Dept: UROLOGY | Facility: CLINIC | Age: 70
End: 2020-09-29

## 2020-09-29 ENCOUNTER — DOCUMENTATION ONLY (OUTPATIENT)
Dept: UROLOGY | Facility: CLINIC | Age: 70
End: 2020-09-29

## 2020-09-29 ENCOUNTER — SSC ENCOUNTER (OUTPATIENT)
Dept: ADMINISTRATIVE | Facility: OTHER | Age: 70
End: 2020-09-29

## 2020-09-29 NOTE — TELEPHONE ENCOUNTER
----- Message from Lianne Machado RN sent at 9/29/2020  3:12 PM CDT -----  Regarding: FW: Home Health  Hi Yvonne,  I am the new  oncology navigator in the [facility].  In my attempt to get this patient scheduled for urology referral, I noticed the multitude of barriers and thought I might reach out to the Cancer center  in the department for further advice. Please see below. Perhaps we could look into external referral for HH as another option?    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  We are attempting to schedule the patient this week for the resident's clinic and seek assistance from one of the additional contacts in his chart to bring him in for the appointment. Please keep us informed of any changes if possible.    Thank you,  Lianne Machado RN  ----- Message -----  From: Lianne German LCSW  Sent: 9/29/2020   2:51 PM CDT  To: Lianne Machado RN  Subject: Home Health                                      Gualberto Abdalla,    I reviewed some of the recent notes in the chart, and saw where this patient was discharged from [facility] to a [facility] and home health was set up for his discharge from there to home but with an agency that couldn't accept his insurance so Dr. Ernandez entered home health orders as an Internal Referral to [facility]. [facility] has been having staffing issues and hospital discharge patients take priority therefore clinic referrals have longer waits for start of care, which appears to be this case.  Dr. Ernandez/staff could call the [facility] liason nurse Mohini at ext. [phone] for assistance, and may want to ask to speak to the Director; or staff could search in the [facility] site for other in-network home health agencies and refer to these outside agencies to see if they can start care sooner. It would be best for this patient to be seen ASAP from what I've gathered on his case from my review today.   Thanks  for reaching out to me.     Lianne  ----- Message -----  From: Lianne Machado, LEYDI  Sent: 9/28/2020   4:49 PM CDT  To: Lianne German [facility]JJ Abdalla,  This patient has a referral in for urology to be seen for penile cancer. As I was reading through his chart, I saw this message regarding a greater than 2 week wait for HH services. I was wondering if you could assist as I will be attempting to have this patient seen by Dr. Higgins in the coming week.  Let's discuss further tomorrow.    Lianne      _______________________________________________________________________________  Conversation  (Newest Message First)  Yvonne Philip RN         9/28/20 4:20 PM  Note       Spoke with BENNY Novoa at [facility] who said that they cannot move pt up any sooner because they have a waiting list until 10/23. Dr ernandez notified.    Santos Ernandez MD  to Yvonne Philip RN          9/28/20 3:35 PM  Patient has a new diagnosis dementia and very limited support.  Could you see if they could expedite this at all?   Yvonne Philip, RN  to Santos Ernandez MD          9/28/20 3:14 PM   start of care date   Received: Today   Message Contents   BENNY Garcia MD; PAL Lewis           Juan Hobson   mrn: [id number]     Thank you for the referral for HH Services.     Our soonest Start of care date is:   ##10/22/2020     Please advise if this is okay.     BENNY Novoa   [facility]   Intake dept

## 2020-09-29 NOTE — TELEPHONE ENCOUNTER
I spoke with patient's co-worker/friend , who stated she will bring patient to his appt on 10-2-2020 to see the residents.

## 2020-09-29 NOTE — TELEPHONE ENCOUNTER
----- Message from Lianne Machado RN sent at 9/29/2020  2:28 PM CDT -----  This patient is scheduled for [facility] on 10/6. His previous appointment was scheduled with the residents clinic earlier last month which was canceled due to him being transferred to an inpatient facility. Are you able to reschedule with them? 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  Adding to the barriers, he has been diagnosed with new onset dementia so I've enlisted assistance from social work.   Lianne

## 2020-09-29 NOTE — PROGRESS NOTES
Please note the following patient's information has been forwarded to [facility] for case management or .    Please contact Outpatient Care Management with any questions (ext. [phone])    Thank you,    Jamila Mi, [facility]  Outpatient Case Mgmnt  [phone]

## 2020-09-29 NOTE — NURSING
Nurse navigator coordinated  referral for patient to be seen in resident's clinic for likely penile malignancy. Patient consulted by Dr. Isela Alexander while inpatient earlier in the month. Friend of patient to assist during scheduled visit. Confirmed and scheduled by Nurse Akers. Message sent to Dr. Ernandez's nurse concerning HH orders. Patient will likely need close follow up due to newly diagnosed dementia and lack of social support.

## 2020-09-30 ENCOUNTER — OUTPATIENT CASE MANAGEMENT (OUTPATIENT)
Dept: NEUROLOGY | Facility: CLINIC | Age: 70
End: 2020-09-30

## 2020-09-30 NOTE — TELEPHONE ENCOUNTER
----- Message from Karen Lerma sent at 9/30/2020 12:25 PM CDT -----  Regarding: FW: Home Health  Apologies for confusion on this pt.  A lot of cooks in the kitchen.  We'll follow up on dementia resources.  Could you put a new referral in to     [facility]  Fax [phone]    They do not have a wait list and his insurance is accepted    ----- Message -----  From: Jaycee Hurtado LCSW  Sent: 9/30/2020  11:20 AM CDT  To: Lianne German LCSW, Karen Lerma, #  Subject: RE: Home Health                                  I'm passing your question on to our [facility] staff, Karen Lerma and Amina Luong.  ----- Message -----  From: Lianne German LCSW  Sent: 9/30/2020  10:52 AM CDT  To: Lianne Machado RN, Jaycee Hurtado LCSW  Subject: RE: Home Health                                  Hi Jaycee,    Can you/the staff in [facility] that you referred patient to assist with the home health arrangements and other appropriate resources for this patient?   Please see below -  Dr. Ernandez ordered home health and the staff with [facility] replied back that 10/22 is the soonest start date.   Patient was referred by Dr. Ernandez for URO clinic and our Navigator Lianne Machado is following up on that and an appointment will be scheduled. She reached out to me for advice/suggestions to give to Dr. Ernandez and staff. However, we cannot arrange home services for him at this point.  He needs home health and resource information related to the dementia diagnosis.    Thanks,           Lianne    ----- Message -----  From: Lianne Machado RN  Sent: 9/30/2020  10:39 AM CDT  To: Lianne German LCSW  Subject: FW: Home Health                                    ----- Message -----  From: Lianne German LCSW  Sent: 9/29/2020   5:35 PM CDT  To: Lianne Machado RN  Subject: RE: Home Health                                  Great! Will follow with you.  ----- Message -----  From: Lianne Machado RN  Sent: 9/29/2020   3:12 PM CDT  To: Kayy HARVEY  BENNY Akers, Lianne German LCSW, #  Subject: FW: Home Health                                  Hi Yvonne,  I am the new  oncology navigator in the [facility].  In my attempt to get this patient scheduled for urology referral, I noticed the multitude of barriers and thought I might reach out to the Cancer center  in the department for further advice. Please see below. Perhaps we could look into external referral for HH as another option?    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  We are attempting to schedule the patient this week for the resident's clinic and seek assistance from one of the additional contacts in his chart to bring him in for the appointment. Please keep us informed of any changes if possible.    Thank you,  Lianne Machado RN  ----- Message -----  From: Lianne German LCSW  Sent: 9/29/2020   2:51 PM CDT  To: Lianne Machado RN  Subject: Home Health                                      Hi Lianne,    I reviewed some of the recent notes in the chart, and saw where this patient was discharged from [facility] to a [facility] and home health was set up for his discharge from there to home but with an agency that couldn't accept his insurance so Dr. Ernandez entered home health orders as an Internal Referral to [facility]. [facility] has been having staffing issues and hospital discharge patients take priority therefore clinic referrals have longer waits for start of care, which appears to be this case.  Dr. Ernandez/staff could call the [facility] liason nurse Mohini at ext. [phone] for assistance, and may want to ask to speak to the Director; or staff could search in the [facility] site for other in-network home health agencies and refer to these outside agencies to see if they can start care sooner. It would be best for this patient to be seen ASAP from what I've gathered on his case from my review today.   Thanks for  reaching out to me.     Lianne  ----- Message -----  From: Lianne Machado, LEYDI  Sent: 9/28/2020   4:49 PM CDT  To: Lianne German [facility] Abdalla,  This patient has a referral in for urology to be seen for penile cancer. As I was reading through his chart, I saw this message regarding a greater than 2 week wait for HH services. I was wondering if you could assist as I will be attempting to have this patient seen by Dr. Higgins in the coming week.  Let's discuss further tomorrow.    Lianne      _______________________________________________________________________________  Conversation  (Newest Message First)  Yvonne Philip RN         9/28/20 4:20 PM  Note       Spoke with BENNY Novoa at [facility] who said that they cannot move pt up any sooner because they have a waiting list until 10/23. Dr ernandez notified.    Santos Ernandez MD  to Yvonne Philip RN          9/28/20 3:35 PM  Patient has a new diagnosis dementia and very limited support.  Could you see if they could expedite this at all?   Yvonne Philip, RN  to Santos Ernandez MD          9/28/20 3:14 PM   start of care date   Received: Today   Message Contents   BENNY Garcia MD; PAL Lweis           Juan Hobson   mrn: [id number]     Thank you for the referral for HH Services.     Our soonest Start of care date is:   ##10/22/2020     Please advise if this is okay.     BENNY Novoa   [facility]   Intake dept

## 2020-09-30 NOTE — PROGRESS NOTES
Received referral for dementia care .  [facility] and [facility] are handling case.  Will follow up in one week to assist with managing dementia symptoms if needed.     9/30/20 follow up msg from SEVERIANO Abdalla stating pt needed help acquiring home health.  Call placed to [facility] and qualifying home health near pt.    [facility] in [place] has no waitlist  Ph:   Fax [phone]  Msg sent to Dr. Ernandez to send new referral    Option 2  [facility]  Ph: [phone]    Option 3  [facility]  Ph: [phone]    Will still f/u in one week to check on eligibility for care ecosystem research study for dementia management services following urology appt.

## 2020-10-02 ENCOUNTER — HOSPITAL ENCOUNTER (INPATIENT)
Facility: HOSPITAL | Age: 70
LOS: 48 days | Discharge: NURSING, REHAB OR LONG-TERM CARE | DRG: 989 | End: 2020-11-23
Attending: EMERGENCY MEDICINE | Admitting: INTERNAL MEDICINE
Payer: COMMERCIAL

## 2020-10-02 ENCOUNTER — PATIENT MESSAGE (OUTPATIENT)
Dept: NEUROLOGY | Facility: CLINIC | Age: 70
End: 2020-10-02

## 2020-10-02 ENCOUNTER — OFFICE VISIT (OUTPATIENT)
Dept: UROLOGY | Facility: CLINIC | Age: 70
End: 2020-10-02
Payer: COMMERCIAL

## 2020-10-02 ENCOUNTER — DOCUMENTATION ONLY (OUTPATIENT)
Dept: HEMATOLOGY/ONCOLOGY | Facility: CLINIC | Age: 70
End: 2020-10-02

## 2020-10-02 VITALS
HEIGHT: 70 IN | HEART RATE: 64 BPM | DIASTOLIC BLOOD PRESSURE: 82 MMHG | BODY MASS INDEX: 22.28 KG/M2 | WEIGHT: 155.63 LBS | SYSTOLIC BLOOD PRESSURE: 144 MMHG

## 2020-10-02 DIAGNOSIS — C60.9 PENILE CANCER: ICD-10-CM

## 2020-10-02 DIAGNOSIS — G93.41 ENCEPHALOPATHY, METABOLIC: ICD-10-CM

## 2020-10-02 DIAGNOSIS — R41.82 ALTERED MENTAL STATUS: ICD-10-CM

## 2020-10-02 DIAGNOSIS — Z71.89 ENCOUNTER FOR HOME SAFETY REVIEW FOR INJURY PREVENTION: ICD-10-CM

## 2020-10-02 DIAGNOSIS — R62.7 FAILURE TO THRIVE IN ADULT: ICD-10-CM

## 2020-10-02 DIAGNOSIS — G93.40 ACUTE ENCEPHALOPATHY: ICD-10-CM

## 2020-10-02 DIAGNOSIS — R41.0 CONFUSION: ICD-10-CM

## 2020-10-02 DIAGNOSIS — R41.89 COGNITIVE IMPAIRMENT: Primary | ICD-10-CM

## 2020-10-02 DIAGNOSIS — Z85.49 HISTORY OF PENILE CANCER: ICD-10-CM

## 2020-10-02 DIAGNOSIS — G93.40 ENCEPHALOPATHY: ICD-10-CM

## 2020-10-02 PROBLEM — F03.90 DEMENTIA WITHOUT BEHAVIORAL DISTURBANCE: Status: ACTIVE | Noted: 2020-10-02

## 2020-10-02 PROBLEM — R53.81 DEBILITY: Status: ACTIVE | Noted: 2020-10-02

## 2020-10-02 PROBLEM — Z01.818 PREOP TESTING: Status: RESOLVED | Noted: 2017-06-26 | Resolved: 2020-10-02

## 2020-10-02 LAB
ALBUMIN SERPL BCP-MCNC: 4.2 G/DL (ref 3.5–5.2)
ALP SERPL-CCNC: 59 U/L (ref 55–135)
ALT SERPL W/O P-5'-P-CCNC: 14 U/L (ref 10–44)
AMMONIA PLAS-SCNC: 37 UMOL/L (ref 10–50)
AMPHET+METHAMPHET UR QL: NEGATIVE
ANION GAP SERPL CALC-SCNC: 12 MMOL/L (ref 8–16)
AST SERPL-CCNC: 19 U/L (ref 10–40)
BARBITURATES UR QL SCN>200 NG/ML: NEGATIVE
BASOPHILS # BLD AUTO: 0.05 K/UL (ref 0–0.2)
BASOPHILS NFR BLD: 1 % (ref 0–1.9)
BENZODIAZ UR QL SCN>200 NG/ML: NEGATIVE
BILIRUB SERPL-MCNC: 0.5 MG/DL (ref 0.1–1)
BILIRUB UR QL STRIP: NEGATIVE
BUN SERPL-MCNC: 12 MG/DL (ref 8–23)
BZE UR QL SCN: NEGATIVE
CALCIUM SERPL-MCNC: 9.3 MG/DL (ref 8.7–10.5)
CANNABINOIDS UR QL SCN: NEGATIVE
CHLORIDE SERPL-SCNC: 105 MMOL/L (ref 95–110)
CLARITY UR REFRACT.AUTO: CLEAR
CO2 SERPL-SCNC: 26 MMOL/L (ref 23–29)
COLOR UR AUTO: YELLOW
CREAT SERPL-MCNC: 0.9 MG/DL (ref 0.5–1.4)
CREAT UR-MCNC: 211 MG/DL (ref 23–375)
CTP QC/QA: YES
DIFFERENTIAL METHOD: ABNORMAL
EOSINOPHIL # BLD AUTO: 0.2 K/UL (ref 0–0.5)
EOSINOPHIL NFR BLD: 3.9 % (ref 0–8)
ERYTHROCYTE [DISTWIDTH] IN BLOOD BY AUTOMATED COUNT: 13.2 % (ref 11.5–14.5)
EST. GFR  (AFRICAN AMERICAN): >60 ML/MIN/1.73 M^2
EST. GFR  (NON AFRICAN AMERICAN): >60 ML/MIN/1.73 M^2
ETHANOL UR-MCNC: <10 MG/DL
FOLATE SERPL-MCNC: 15.6 NG/ML (ref 4–24)
GLUCOSE SERPL-MCNC: 88 MG/DL (ref 70–110)
GLUCOSE UR QL STRIP: NEGATIVE
HCT VFR BLD AUTO: 38.8 % (ref 40–54)
HGB BLD-MCNC: 12.6 G/DL (ref 14–18)
HGB UR QL STRIP: ABNORMAL
HIV1+2 IGG SERPL QL IA.RAPID: NORMAL
IMM GRANULOCYTES # BLD AUTO: 0.01 K/UL (ref 0–0.04)
IMM GRANULOCYTES NFR BLD AUTO: 0.2 % (ref 0–0.5)
KETONES UR QL STRIP: ABNORMAL
LEUKOCYTE ESTERASE UR QL STRIP: NEGATIVE
LYMPHOCYTES # BLD AUTO: 1.7 K/UL (ref 1–4.8)
LYMPHOCYTES NFR BLD: 32.9 % (ref 18–48)
MCH RBC QN AUTO: 31.4 PG (ref 27–31)
MCHC RBC AUTO-ENTMCNC: 32.5 G/DL (ref 32–36)
MCV RBC AUTO: 97 FL (ref 82–98)
METHADONE UR QL SCN>300 NG/ML: NEGATIVE
MICROSCOPIC COMMENT: ABNORMAL
MONOCYTES # BLD AUTO: 0.5 K/UL (ref 0.3–1)
MONOCYTES NFR BLD: 8.9 % (ref 4–15)
NEUTROPHILS # BLD AUTO: 2.7 K/UL (ref 1.8–7.7)
NEUTROPHILS NFR BLD: 53.1 % (ref 38–73)
NITRITE UR QL STRIP: NEGATIVE
NRBC BLD-RTO: 0 /100 WBC
OPIATES UR QL SCN: NEGATIVE
PCP UR QL SCN>25 NG/ML: NEGATIVE
PH UR STRIP: 5 [PH] (ref 5–8)
PLATELET # BLD AUTO: 222 K/UL (ref 150–350)
PMV BLD AUTO: 10.4 FL (ref 9.2–12.9)
POCT GLUCOSE: 85 MG/DL (ref 70–110)
POTASSIUM SERPL-SCNC: 4 MMOL/L (ref 3.5–5.1)
PROT SERPL-MCNC: 7.2 G/DL (ref 6–8.4)
PROT UR QL STRIP: NEGATIVE
RBC # BLD AUTO: 4.01 M/UL (ref 4.6–6.2)
RBC #/AREA URNS AUTO: 19 /HPF (ref 0–4)
SARS-COV-2 RDRP RESP QL NAA+PROBE: NEGATIVE
SODIUM SERPL-SCNC: 143 MMOL/L (ref 136–145)
SP GR UR STRIP: 1.02 (ref 1–1.03)
SQUAMOUS #/AREA URNS AUTO: 1 /HPF
TOXICOLOGY INFORMATION: NORMAL
TSH SERPL DL<=0.005 MIU/L-ACNC: 0.91 UIU/ML (ref 0.4–4)
URN SPEC COLLECT METH UR: ABNORMAL
VIT B12 SERPL-MCNC: 540 PG/ML (ref 210–950)
WBC # BLD AUTO: 5.16 K/UL (ref 3.9–12.7)
WBC #/AREA URNS AUTO: 2 /HPF (ref 0–5)

## 2020-10-02 PROCEDURE — 3008F PR BODY MASS INDEX (BMI) DOCUMENTED: ICD-10-PCS | Mod: CPTII,S$GLB,, | Performed by: UROLOGY

## 2020-10-02 PROCEDURE — 99284 PR EMERGENCY DEPT VISIT,LEVEL IV: ICD-10-PCS | Mod: ,,, | Performed by: EMERGENCY MEDICINE

## 2020-10-02 PROCEDURE — 99285 EMERGENCY DEPT VISIT HI MDM: CPT | Mod: 25

## 2020-10-02 PROCEDURE — 25500020 PHARM REV CODE 255: Performed by: INTERNAL MEDICINE

## 2020-10-02 PROCEDURE — 1101F PR PT FALLS ASSESS DOC 0-1 FALLS W/OUT INJ PAST YR: ICD-10-PCS | Mod: CPTII,S$GLB,, | Performed by: UROLOGY

## 2020-10-02 PROCEDURE — 84425 ASSAY OF VITAMIN B-1: CPT

## 2020-10-02 PROCEDURE — 86592 SYPHILIS TEST NON-TREP QUAL: CPT

## 2020-10-02 PROCEDURE — 82962 GLUCOSE BLOOD TEST: CPT

## 2020-10-02 PROCEDURE — 1126F AMNT PAIN NOTED NONE PRSNT: CPT | Mod: S$GLB,,, | Performed by: UROLOGY

## 2020-10-02 PROCEDURE — 1101F PT FALLS ASSESS-DOCD LE1/YR: CPT | Mod: CPTII,S$GLB,, | Performed by: UROLOGY

## 2020-10-02 PROCEDURE — 99284 EMERGENCY DEPT VISIT MOD MDM: CPT | Mod: ,,, | Performed by: EMERGENCY MEDICINE

## 2020-10-02 PROCEDURE — 86140 C-REACTIVE PROTEIN: CPT

## 2020-10-02 PROCEDURE — 93010 EKG 12-LEAD: ICD-10-PCS | Mod: ,,, | Performed by: INTERNAL MEDICINE

## 2020-10-02 PROCEDURE — G0378 HOSPITAL OBSERVATION PER HR: HCPCS

## 2020-10-02 PROCEDURE — 99999 PR PBB SHADOW E&M-EST. PATIENT-LVL III: ICD-10-PCS | Mod: PBBFAC,,,

## 2020-10-02 PROCEDURE — 1126F PR PAIN SEVERITY QUANTIFIED, NO PAIN PRESENT: ICD-10-PCS | Mod: S$GLB,,, | Performed by: UROLOGY

## 2020-10-02 PROCEDURE — 84207 ASSAY OF VITAMIN B-6: CPT

## 2020-10-02 PROCEDURE — 99215 OFFICE O/P EST HI 40 MIN: CPT | Mod: S$GLB,,, | Performed by: UROLOGY

## 2020-10-02 PROCEDURE — 1159F PR MEDICATION LIST DOCUMENTED IN MEDICAL RECORD: ICD-10-PCS | Mod: S$GLB,,, | Performed by: UROLOGY

## 2020-10-02 PROCEDURE — 82607 VITAMIN B-12: CPT

## 2020-10-02 PROCEDURE — A9585 GADOBUTROL INJECTION: HCPCS | Performed by: INTERNAL MEDICINE

## 2020-10-02 PROCEDURE — 3008F BODY MASS INDEX DOCD: CPT | Mod: CPTII,S$GLB,, | Performed by: UROLOGY

## 2020-10-02 PROCEDURE — 99999 PR PBB SHADOW E&M-EST. PATIENT-LVL III: CPT | Mod: PBBFAC,,,

## 2020-10-02 PROCEDURE — 85025 COMPLETE CBC W/AUTO DIFF WBC: CPT

## 2020-10-02 PROCEDURE — 82140 ASSAY OF AMMONIA: CPT

## 2020-10-02 PROCEDURE — 84443 ASSAY THYROID STIM HORMONE: CPT

## 2020-10-02 PROCEDURE — 99220 PR INITIAL OBSERVATION CARE,LEVL III: CPT | Mod: ,,, | Performed by: HOSPITALIST

## 2020-10-02 PROCEDURE — 80307 DRUG TEST PRSMV CHEM ANLYZR: CPT

## 2020-10-02 PROCEDURE — 99215 PR OFFICE/OUTPT VISIT, EST, LEVL V, 40-54 MIN: ICD-10-PCS | Mod: S$GLB,,, | Performed by: UROLOGY

## 2020-10-02 PROCEDURE — 93010 ELECTROCARDIOGRAM REPORT: CPT | Mod: ,,, | Performed by: INTERNAL MEDICINE

## 2020-10-02 PROCEDURE — 82746 ASSAY OF FOLIC ACID SERUM: CPT

## 2020-10-02 PROCEDURE — U0002 COVID-19 LAB TEST NON-CDC: HCPCS | Performed by: NURSE PRACTITIONER

## 2020-10-02 PROCEDURE — 81001 URINALYSIS AUTO W/SCOPE: CPT

## 2020-10-02 PROCEDURE — 1159F MED LIST DOCD IN RCRD: CPT | Mod: S$GLB,,, | Performed by: UROLOGY

## 2020-10-02 PROCEDURE — 86703 HIV-1/HIV-2 1 RESULT ANTBDY: CPT

## 2020-10-02 PROCEDURE — 93005 ELECTROCARDIOGRAM TRACING: CPT

## 2020-10-02 PROCEDURE — 99220 PR INITIAL OBSERVATION CARE,LEVL III: ICD-10-PCS | Mod: ,,, | Performed by: HOSPITALIST

## 2020-10-02 PROCEDURE — 80053 COMPREHEN METABOLIC PANEL: CPT

## 2020-10-02 RX ORDER — SODIUM CHLORIDE 0.9 % (FLUSH) 0.9 %
5 SYRINGE (ML) INJECTION
Status: DISCONTINUED | OUTPATIENT
Start: 2020-10-02 | End: 2020-11-23 | Stop reason: HOSPADM

## 2020-10-02 RX ORDER — ONDANSETRON 2 MG/ML
8 INJECTION INTRAMUSCULAR; INTRAVENOUS EVERY 8 HOURS PRN
Status: DISCONTINUED | OUTPATIENT
Start: 2020-10-02 | End: 2020-11-23 | Stop reason: HOSPADM

## 2020-10-02 RX ORDER — ENOXAPARIN SODIUM 100 MG/ML
40 INJECTION SUBCUTANEOUS EVERY 24 HOURS
Status: DISCONTINUED | OUTPATIENT
Start: 2020-10-02 | End: 2020-10-12

## 2020-10-02 RX ORDER — TALC
6 POWDER (GRAM) TOPICAL NIGHTLY PRN
Status: DISCONTINUED | OUTPATIENT
Start: 2020-10-02 | End: 2020-11-23 | Stop reason: HOSPADM

## 2020-10-02 RX ORDER — AMOXICILLIN 250 MG
1 CAPSULE ORAL 2 TIMES DAILY PRN
Status: DISCONTINUED | OUTPATIENT
Start: 2020-10-02 | End: 2020-11-23 | Stop reason: HOSPADM

## 2020-10-02 RX ORDER — ACETAMINOPHEN 325 MG/1
650 TABLET ORAL EVERY 4 HOURS PRN
Status: DISCONTINUED | OUTPATIENT
Start: 2020-10-02 | End: 2020-11-23 | Stop reason: HOSPADM

## 2020-10-02 RX ORDER — GADOBUTROL 604.72 MG/ML
7 INJECTION INTRAVENOUS
Status: COMPLETED | OUTPATIENT
Start: 2020-10-02 | End: 2020-10-02

## 2020-10-02 RX ADMIN — GADOBUTROL 7 ML: 604.72 INJECTION INTRAVENOUS at 10:10

## 2020-10-02 NOTE — PROGRESS NOTES
Received consult from Dr. Montreroso re: assessing patient in clinic this afternoon and care recommendations as patient did not seem competent to make decisions and give consent. He has no family and no legal next of kin (POA or guardian).   Met individually with patient, and with his co-worker Janna Go ([phone], ext. [phone]) who brought him to his clinic appointment today. Patient unable to relate basic demographic information or clearly explain other information. For example, when asked his birth date he gave the year [birth year], then said 9, and then said the second to last, it begins with N; he said his street name but couldn't give the house number where he has lived for years; unable to say what type of work or where he worked prior to the [employer] in [facility] for the past 12 years; he described being taken in a truck to that other place (referring to the recent [facility] stay). He has awareness that he is having memory/cognitive issues but stated he can manage for himself at home and that he knows there is a lot of work that needs to be done in his home that he hasn't gotten to. He inherited the home after his mother .      Mr. Go and another coworker Juice Martinez ([phone]) and their supervisor Mei Bell ([phone]) have been trying to help him manage things with his work and personal matters as they have observed his deficits and know he has no one to help him. They have helped him keep up with recent medical appointments and accompanied and transported him.  Mr. Go reports concern for his safety and ability to care for himself after seeing his home with clutter throughout and things piled up, mold and old food in the refrigerator, mold in the toilet, clothes all over, etc. Patient has been driving himself to and from work and to get food at places near his home, but unable to navigate to unfamiliar places. They have observed his cognitive deficits at work  and he has made errors, so they have modified his job and his supervisor has been working with him to try to get FMLA and his leave/penitentiary in place so he doesn't lose his benefits. They have had to help him with paperwork, bill paying, etc., because he can't complete these on his own.     Called Elder[employer] Services ([employer]) at [phone] at 4:03 PM and left a detailed voice mail message; the recording indicated that the office hours are 8 AM to 4:30 PM Monday through Friday however the the staff are tele-working from home due to COVID-19 and to leave a message and they will return all calls during normal business hours; if an emergency to call the local authorities or 911. Unable to complete report for self-neglect/inability  It is unlikely that a return call will be received before next week.   Conferred with my supervisor on the situation.   Discussed with Dr. Monterroso and advised that she reach out to Neuro and IM patient for patient to be readmitted to the hospital.  For further work-up, determination of competency, legal guardianship if indicated, possible placement/alternate living arrangements/supports in place at home for a safe discharge plan.  Dr. Monterroso in agreement and working on hospital admission through the ED.   Discussed this plan with patient, as well as Mr. Go, and they are in agreement.

## 2020-10-02 NOTE — PROGRESS NOTES
Urology - [facility]  Resident Clinic  Staff - Mikki Monterroso MD     SUBJECTIVE:     Chief Complaint: penile cancer     History of Present Illness:  Juan Hobson is a 69 y.o. male who presents to clinic for discussion regarding new diagnosis of penile cancer. Referral from Santos Ernandez MD     He initially presented to the ED on 9/2/20 due to a draining penile lesion. Patient states that he has had penile swelling coupled with erythema for the past 2 years. He was initially followed by ESSIE Valle NP and Dr. Armando in 2019.  Dr. Armando noted a 1 cm phlegmon on the penis draining clear fluid in 9/2019. Culture was obtained which grew Acinetobacter and patient completed a course of cipro at that time. He states that the lesion has not healed since that time. He has had increasing penile pain coupled with purulent drainage from the lesion and urethra which brought him to the ED.    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  Urethral involvement not excluded. Suspected invasion of the right dorsolateral corpus spongiosum. Based on this imaging, he is stage [id number]. However, we do not have a biopsy specimen.     After his admission to the ED, he was discharged to a geriatric psychiatric facility where he stayed for 2 weeks. [facility] is in the process of being arranged.      A friend from work has accompanied him to his visit today.  Per his friend, the patient did not recall his visit today and had to be reminded to come.  He has no power of .  He has no family.  He is becoming increasingly forgetful and claims to be lucid when really is not.  He has noticed a 70 lb weight loss in the past year.  His home is in disarray.  He has mold growing in the fridge - and only old food and beverage.  He believes the patient has  "self neglect.      He presents to our clinic today to discuss his penile malignancy.        Review of patient's allergies indicates:  No Known Allergies    Past Medical History:   Diagnosis Date    Basal cell carcinoma     right helix    Hematuria     Seborrheic dermatitis     Skin cancer of arm      Past Surgical History:   Procedure Laterality Date    INCISION AND DRAINAGE INTRA ORAL ABSCESS       No family history on file.  Social History     Tobacco Use    Smoking status: Never Smoker    Smokeless tobacco: Never Used   Substance Use Topics    Alcohol use: Yes     Frequency: Monthly or less     Drinks per session: 1 or 2     Comment: sometimes    Drug use: Never        Review of Systems   Unable to perform ROS: Mental status change       OBJECTIVE:       Estimated body mass index is 21.09 kg/m² as calculated from the following:    Height as of 9/28/20: 5' 10" (1.778 m).    Weight as of 9/28/20: 66.7 kg (147 lb).    Vital Signs (Most Recent)       Physical Exam   Constitutional: He is oriented to person, place, and time. He appears well-developed. No distress.   HENT:   Head: Normocephalic and atraumatic.   Eyes: No scleral icterus.   Neck: No tracheal deviation present.   Cardiovascular: Normal rate.    Pulmonary/Chest: Effort normal. No respiratory distress.   Abdominal: Soft. He exhibits no distension. There is no abdominal tenderness.   Genitourinary:    Genitourinary Comments: Penis uncircumcised. Firm nodule 2-3cm palpated just proximal to the glans. No drainage apparent. Bilateral inguinal nodes palpated - 1 node on the right and 2 on the left - shoddy, not enlarged.   Scrotal/testicular exam unremarkable.        Musculoskeletal: Normal range of motion.   Neurological: He is alert and oriented to person, place, and time.   Skin: Skin is warm and dry.     Psychiatric: His behavior is normal.       BMP  Lab Results   Component Value Date     09/05/2020    K 3.8 09/05/2020     09/05/2020 "    CO2 28 09/05/2020    BUN 18 09/05/2020    CREATININE 0.7 09/05/2020    CALCIUM 9.1 09/05/2020    ANIONGAP 6 (L) 09/05/2020    ESTGFRAFRICA >60.0 09/05/2020    EGFRNONAA >60.0 09/05/2020       Lab Results   Component Value Date    WBC 6.03 09/05/2020    HGB 12.9 (L) 09/05/2020    HCT 39.0 (L) 09/05/2020    MCV 94 09/05/2020     (H) 09/05/2020       Imaging:  MRI 9/4/20:  Necrotic penile tumor centered within the right distal corpus cavernosa with invasion through the right lateral fascial planes and fistulous communication with the skin.  Urethral involvement not definitively excluded given suspected invasion of the right dorsolateral corpus spongiosum.   Numerous normal size nonspecific inguinal lymph nodes, not significantly changed from prior CT dated December 2019.    ASSESSMENT     1. Penile cancer        PLAN:     Social work was called to the clinic to evaluate the patient in clinic. It is important to have a patient advocate/POA before a biopsy or form of treatment can be initiated as the patient does not seem competent to make decisions in his own best interest. After thorough evaluation, it was deemed unsafe for patient to return to his house alone. Social work recommended admission to the hospital through the ER so that a COVID test could be obtained. Patient and his friend were agreeable to this plan.       Marisol Goff MD     Letter to Santos Ernandez MD     As above.

## 2020-10-02 NOTE — ED NOTES
..  Patient identifiers for Juan Hobson 69 y.o. male checked and correct.  Chief Complaint   Patient presents with    Altered Mental Status     sent from urology clinic for progressing dementia, arrived with friend who states mental status is progressively worsening, here for social work consult, possible placement     Past Medical History:   Diagnosis Date    Basal cell carcinoma     right helix    Hematuria     Seborrheic dermatitis     Skin cancer of arm      Allergies reported: Review of patient's allergies indicates:  No Known Allergies      LOC: Patient is awake, alert, and aware of environment with an appropriate affect.Appears confused. --delay in answering where he is at this time  Arrived with co worker ; APPEARANCE: Patient resting comfortably and in no acute distress. Patient is clean and well groomed, patient's clothing is properly fastened.  HEENT: **Alert --answers simple questions -Co worker brought pt into the ED due to memory loss. Dx with penile CA  SKIN: The skin is warm and dry. Patient has normal skin turgor and moist mucus membranes. Skin is intact; no bruising or breakdown noted.  MUSKULOSKELETAL: Patient is moving all extremities well, no obvious deformities noted. Pulses intact.   RESPIRATORY: Airway is open and patent. Respirations are spontaneous and non-labored with normal effort and rate, BBS=clear  CARDIAC: Patient has a normal rate and rhythm. ** on cardiac monitor,No peripheral edema noted.   ABDOMEN: No distention noted. Bowel sounds active in all 4 quadrants. Soft and non-tender upon palpation.  NEUROLOGICAL: pupils **mm, PERRL. Facial expression is symmetrical. Hand grasps are equal bilaterally. Normal sensation in all extremities when touched with finger.

## 2020-10-02 NOTE — FIRST PROVIDER EVALUATION
Emergency Department TeleTriage Encounter Note      CHIEF COMPLAINT    Chief Complaint   Patient presents with    Altered Mental Status     sent from urology clinic for progressing dementia, arrived with friend who states mental status is progressively worsening, here for social work consult, possible placement       VITAL SIGNS   Initial Vitals [10/02/20 1649]   BP Pulse Resp Temp SpO2   (!) 168/84 (!) 59 16 97.8 °F (36.6 °C) 99 %      MAP       --            ALLERGIES    Review of patient's allergies indicates:  No Known Allergies    PROVIDER TRIAGE NOTE  This is a teletriage evaluation of a 69 y.o. male presenting to the ED with concerns for worsening dementia.  Patient awake, alert.  Unable to place or time patient seen in urology clinic earlier today, so short consult was performed and felt it was necessary to have the patient come to the ER for workup for worsening mental status..  Initial orders will be placed and care will be transferred to an alternate provider when patient is roomed for a full evaluation. Any additional orders and the final disposition will be determined by that provider.         ORDERS  Labs Reviewed - No data to display    ED Orders (720h ago, onward)    Start Ordered     Status Ordering Provider    Unscheduled 10/02/20 1715  POCT COVID-19 Rapid Screening  Once      Ordered MEET FREY    Unscheduled 10/02/20 1715  POCT glucose  Once      Ordered MEET FREY    Unscheduled 10/02/20 1715  CBC auto differential  STAT      Ordered MEET FREY    Unscheduled 10/02/20 1715  Comprehensive metabolic panel  STAT      Ordered MEET FREY    Unscheduled 10/02/20 1715  Urinalysis, Reflex to Urine Culture Urine, Clean Catch  STAT      Ordered MEET FREY    Unscheduled 10/02/20 1715  EKG 12-lead  Once      Ordered MEET FREY            Virtual Visit Note: The provider triage portion of this emergency department evaluation and documentation was performed via  [employer], a HIPAA-compliant telemedicine application, in concert with a tele-presenter in the room. A face to face patient evaluation with one of my colleagues will occur once the patient is placed in an emergency department room.      DISCLAIMER: This note was prepared with [employer] voice recognition transcription software. Garbled syntax, mangled pronouns, and other bizarre constructions may be attributed to that software system.

## 2020-10-02 NOTE — ED PROVIDER NOTES
Encounter Date: 10/2/2020       History     Chief Complaint   Patient presents with    Altered Mental Status     sent from urology clinic for progressing dementia, arrived with friend who states mental status is progressively worsening, here for social work consult, possible placement     HPI   Juan Hobson is a 69-year-old male with a history of basal cell carcinoma and recent diagnosis of penile carcinoma presenting with confusion, progressive dementia and inability care for himself. He was seen today in urology clinic for penile cancer and during his evaluation he was deemed incompetent to make decisions in his best interest.  A co-worker was present with him provided much of his history.  Based on evaluation and discussion with this co-worker, patient lives at home alone in an unsafe environment.  Social work evaluation was conducted in the clinic setting and was deemed that his home was unsafe and was unsafe for him to return home alone.  Patient lives alone and without any family members.  Based on reports, patient has not been eating, has a 70 lb weight loss, living in an unsafe and unclean environment, and a mold growing on his toilets, sink and refrigerator.  Spoiled food in the refrigerator and no clean water.  Risk for falls with multiple obstacles in living spaces.    Review of patient's allergies indicates:  No Known Allergies  Past Medical History:   Diagnosis Date    Basal cell carcinoma     right helix    Hematuria     Seborrheic dermatitis     Skin cancer of arm      Past Surgical History:   Procedure Laterality Date    INCISION AND DRAINAGE INTRA ORAL ABSCESS       No family history on file.  Social History     Tobacco Use    Smoking status: Never Smoker    Smokeless tobacco: Never Used   Substance Use Topics    Alcohol use: Yes     Frequency: Monthly or less     Drinks per session: 1 or 2     Comment: sometimes    Drug use: Never     Review of Systems   Unable to perform ROS: Dementia        Physical Exam     Initial Vitals [10/02/20 1649]   BP Pulse Resp Temp SpO2   (!) 168/84 (!) 59 16 97.8 °F (36.6 °C) 99 %      MAP       --         Physical Exam    Nursing note and vitals reviewed.    Gen/Constitutional: Interactive. No acute distress  Head: Normocephalic, Atraumatic  Neck: supple, no masses or LAD  Eyes: PERRLA, conjunctiva clear  Ears, Nose and Throat: No rhinorrhea or stridor.  Cardiac: Reg Rhythm, No murmur  Pulmonary: CTA Bilat, no wheezes, rhonchi, rales.  GI: Abdomen soft, non-tender, non-distended; no rebound or guarding  : No CVA tenderness.  Musculoskeletal: Extremities warm, well perfused, no erythema, no edema  Skin: No rashes  Neuro: Alert and Oriented x 2; No focal motor or sensory deficits.    Psych: Normal mood and behavior.      ED Course   Procedures  Labs Reviewed   CBC W/ AUTO DIFFERENTIAL - Abnormal; Notable for the following components:       Result Value    RBC 4.01 (*)     Hemoglobin 12.6 (*)     Hematocrit 38.8 (*)     Mean Corpuscular Hemoglobin 31.4 (*)     All other components within normal limits   URINALYSIS, REFLEX TO URINE CULTURE - Abnormal; Notable for the following components:    Ketones, UA Trace (*)     Occult Blood UA 2+ (*)     All other components within normal limits    Narrative:     Specimen Source->Urine   URINALYSIS MICROSCOPIC - Abnormal; Notable for the following components:    RBC, UA 19 (*)     All other components within normal limits    Narrative:     Specimen Source->Urine   COMPREHENSIVE METABOLIC PANEL   VITAMIN B12   FOLATE   AMMONIA   TOXICOLOGY SCREEN, URINE, RANDOM (COMPLIANCE)   TSH   TOXICOLOGY SCREEN, URINE, RANDOM (COMPLIANCE)    Narrative:     Specimen Source->Urine  ADD ON [facility] [phone] PER DR. GALICIA.  10/02/2020  21:36    VITAMIN B1   VITAMIN B6   RPR   SARS-COV-2 RDRP GENE   POCT GLUCOSE   POCT GLUCOSE MONITORING CONTINUOUS     EKG Readings: (Independently Interpreted)   Initial Reading: No STEMI. Previous EKG: Compared with  most recent EKG Rhythm: Sinus Bradycardia. Heart Rate: 54. Conduction: RBBB. ST Segments: Non-Specific ST Segment Depression.     ECG Results          EKG 12-lead (Final result)  Result time 10/03/20 11:01:40    Final result by [facility] (10/03/20 11:01:40)                 Narrative:    Test Reason : R41.82,    Vent. Rate : 054 BPM     Atrial Rate : 054 BPM     P-R Int : 184 ms          QRS Dur : 142 ms      QT Int : 412 ms       P-R-T Axes : 071 067 051 degrees     QTc Int : 390 ms    Sinus bradycardia  Right bundle branch block  Abnormal ECG  When compared with ECG of 02-SEP-2020 20:36,  Vent. rate has decreased BY  34 BPM  QT has shortened  Confirmed by Ash SNOW MD ([id number]) on 10/3/2020 11:01:36 AM    Referred By: MAGNOLIA DAVID           Confirmed By:Ash SNOW MD                            Imaging Results           MRI Brain W WO Contrast (Final result)  Result time 10/03/20 02:09:40    Final result by Brenda Smith MD (10/03/20 02:09:40)                 Impression:      1. Possible subtle increased FLAIR hyperintensity involving the bilateral mesial temporal lobes which can be seen in the setting of autoimmune limbic encephalitis in the appropriate clinical setting.  Clinical correlation and short-term interval follow-up is advised.  2. No evidence of acute intracranial hemorrhage, acute infarct or abnormal enhancing lesion.  This report was flagged in Epic as abnormal.      Electronically signed by: Brenda Smith MD  Date:    10/03/2020  Time:    02:09             Narrative:    EXAMINATION:  MRI BRAIN W WO CONTRAST    CLINICAL HISTORY:  Altered mental status;.    TECHNIQUE:  Multiplanar multisequence MR imaging of the brain was performed before and after the administration of 7 mL Gadavist  intravenous contrast.    COMPARISON:  Head CT 09/02/2020    FINDINGS:  There is no abnormal restricted diffusion to suggest acute infarction.  There is generalized cerebral volume loss.  The ventricles are  within normal limits of size and configuration for age without evidence of hydrocephalus or midline shift.  There is possible subtle increased FLAIR hyperintensity in the bilateral mesial temporal lobes which can be seen in the setting of autoimmune limbic encephalitis in the appropriate clinical setting noting there is no associated restricted diffusion, hemorrhage or postcontrast enhancement within these regions.  Clinical correlation and short-term interval follow-up is advised.  There is no evidence of acute intraparenchymal hemorrhage.  No extra-axial hemorrhage or fluid collections are identified.  No abnormal enhancing lesion is identified.  The craniocervical junction and sellar region are within normal limits.                                 Medical Decision Making:   History:   I obtained history from: someone other than patient and another health care provider.       <> Summary of History: Co-worker and urology physician  Old Medical Records: I decided to obtain old medical records.  Old Records Summarized: records from clinic visits.  Initial Assessment:   Juan Hobson is a 69-year-old male with a history of basal cell carcinoma and recent diagnosis of penile carcinoma presenting with confusion, progressive dementia and inability care for himself.  Differential Diagnosis:   Failure to thrive, social work evaluation, home health evaluation, home safety evaluation  Independently Interpreted Test(s):   I have ordered and independently interpreted EKG Reading(s) - see prior notes  Clinical Tests:   Lab Tests: Ordered and Reviewed  Medical Tests: Ordered and Reviewed  Other:   I have discussed this case with another health care provider.       <> Summary of the Discussion: Urology and hospital medicine    Urgent evaluation of patient presenting with unclear etiology of progressive dementia, progressive decline and concern for encephalopathy.  The main concern from his primary care including social work was  change in cognition, particularly speech and difficulty taking care of himself as noted by the social work review.  He was sent to the emergency department for admission and home health including home safety evaluation.  It is felt that the patient is unable to care forms self and has impaired decision making.  He recently had new diagnosis of penile carcinoma.  I discussed his case with Urology attending who sent the patient to the emergency department, Dr. Monterroso including her account and discussion with social work.  I also discussed his case with hospital medicine who will admit for encephalopathy evaluation and workup.  At this time ECG with no signs of ischemia or STEMI on my read.  IV line was placed, labs were drawn, and MRI will be obtained upon admission.  Patient is admitted to observation status at this time for concern for progressive decline.  He is hemodynamically stable at this time of admission.    Complexity: High - level 5                             Clinical Impression:       ICD-10-CM ICD-9-CM   1. Confusion  R41.0 298.9   2. Altered mental status  R41.82 780.97   3. History of penile cancer  Z85.49 V10.49   4. Failure to thrive in adult  R62.7 783.7   5. Encounter for home safety review for injury prevention  Z71.89 V65.43   6. Acute encephalopathy  G93.40 348.30   7. Encephalopathy  G93.40 348.30                      Disposition:   Disposition: Placed in Observation  Condition: Stable     ED Disposition Condition    Observation              Jorge Alberto Colunga DO, [facility]  Emergency Staff Physician   Dept of Emergency Medicine   [facility]  Spectralink: [id number]                 Jorge Alberto Colunga DO  10/04/20 0646

## 2020-10-02 NOTE — TELEPHONE ENCOUNTER
Spoke with pt who says he doesn't recall calling dr palmer's office for any reason. Pt put dr rodriguez on the phone from urology. Encouraged pt to remain with the doctor there and let them take care of him. Verbalized understanding.

## 2020-10-03 ENCOUNTER — ANESTHESIA EVENT (OUTPATIENT)
Dept: OBSERVATION | Facility: HOSPITAL | Age: 70
DRG: 989 | End: 2020-10-03
Payer: COMMERCIAL

## 2020-10-03 ENCOUNTER — ANESTHESIA (OUTPATIENT)
Dept: OBSERVATION | Facility: HOSPITAL | Age: 70
DRG: 989 | End: 2020-10-03
Payer: COMMERCIAL

## 2020-10-03 PROBLEM — G31.84 MILD COGNITIVE IMPAIRMENT WITH MEMORY LOSS: Status: ACTIVE | Noted: 2020-10-03

## 2020-10-03 PROBLEM — Z78.9 IMPAIRED DECISION MAKING: Status: ACTIVE | Noted: 2020-10-03

## 2020-10-03 PROBLEM — G93.40 ENCEPHALOPATHY: Status: ACTIVE | Noted: 2020-10-03

## 2020-10-03 PROBLEM — R41.89 COGNITIVE DECLINE: Status: ACTIVE | Noted: 2020-10-03

## 2020-10-03 PROBLEM — G31.84 MILD COGNITIVE IMPAIRMENT WITH MEMORY LOSS: Status: RESOLVED | Noted: 2020-10-03 | Resolved: 2020-10-03

## 2020-10-03 LAB
ALBUMIN SERPL BCP-MCNC: 3.6 G/DL (ref 3.5–5.2)
ALP SERPL-CCNC: 55 U/L (ref 55–135)
ALT SERPL W/O P-5'-P-CCNC: 14 U/L (ref 10–44)
ANION GAP SERPL CALC-SCNC: 8 MMOL/L (ref 8–16)
AST SERPL-CCNC: 17 U/L (ref 10–40)
BASOPHILS # BLD AUTO: 0.03 K/UL (ref 0–0.2)
BASOPHILS NFR BLD: 0.7 % (ref 0–1.9)
BILIRUB SERPL-MCNC: 0.9 MG/DL (ref 0.1–1)
BUN SERPL-MCNC: 10 MG/DL (ref 8–23)
C TRACH DNA SPEC QL NAA+PROBE: NOT DETECTED
CALCIUM SERPL-MCNC: 9.1 MG/DL (ref 8.7–10.5)
CHLORIDE SERPL-SCNC: 107 MMOL/L (ref 95–110)
CO2 SERPL-SCNC: 26 MMOL/L (ref 23–29)
CREAT SERPL-MCNC: 0.8 MG/DL (ref 0.5–1.4)
CRP SERPL-MCNC: 0.9 MG/L (ref 0–8.2)
DIFFERENTIAL METHOD: ABNORMAL
EOSINOPHIL # BLD AUTO: 0.1 K/UL (ref 0–0.5)
EOSINOPHIL NFR BLD: 3.1 % (ref 0–8)
ERYTHROCYTE [DISTWIDTH] IN BLOOD BY AUTOMATED COUNT: 13.2 % (ref 11.5–14.5)
ERYTHROCYTE [SEDIMENTATION RATE] IN BLOOD BY WESTERGREN METHOD: <2 MM/HR (ref 0–23)
EST. GFR  (AFRICAN AMERICAN): >60 ML/MIN/1.73 M^2
EST. GFR  (NON AFRICAN AMERICAN): >60 ML/MIN/1.73 M^2
GLUCOSE SERPL-MCNC: 126 MG/DL (ref 70–110)
HCT VFR BLD AUTO: 38.7 % (ref 40–54)
HGB BLD-MCNC: 12.4 G/DL (ref 14–18)
IMM GRANULOCYTES # BLD AUTO: 0.02 K/UL (ref 0–0.04)
IMM GRANULOCYTES NFR BLD AUTO: 0.4 % (ref 0–0.5)
LACTATE SERPL-SCNC: 1.5 MMOL/L (ref 0.5–2.2)
LYMPHOCYTES # BLD AUTO: 0.9 K/UL (ref 1–4.8)
LYMPHOCYTES NFR BLD: 20.4 % (ref 18–48)
MAGNESIUM SERPL-MCNC: 2 MG/DL (ref 1.6–2.6)
MCH RBC QN AUTO: 31.9 PG (ref 27–31)
MCHC RBC AUTO-ENTMCNC: 32 G/DL (ref 32–36)
MCV RBC AUTO: 100 FL (ref 82–98)
MONOCYTES # BLD AUTO: 0.3 K/UL (ref 0.3–1)
MONOCYTES NFR BLD: 6 % (ref 4–15)
N GONORRHOEA DNA SPEC QL NAA+PROBE: NOT DETECTED
NEUTROPHILS # BLD AUTO: 3.1 K/UL (ref 1.8–7.7)
NEUTROPHILS NFR BLD: 69.4 % (ref 38–73)
NRBC BLD-RTO: 0 /100 WBC
PHOSPHATE SERPL-MCNC: 3.2 MG/DL (ref 2.7–4.5)
PLATELET # BLD AUTO: 241 K/UL (ref 150–350)
PMV BLD AUTO: 9.3 FL (ref 9.2–12.9)
POCT GLUCOSE: 102 MG/DL (ref 70–110)
POCT GLUCOSE: 94 MG/DL (ref 70–110)
POTASSIUM SERPL-SCNC: 3.9 MMOL/L (ref 3.5–5.1)
PROT SERPL-MCNC: 6.4 G/DL (ref 6–8.4)
RBC # BLD AUTO: 3.89 M/UL (ref 4.6–6.2)
SODIUM SERPL-SCNC: 141 MMOL/L (ref 136–145)
WBC # BLD AUTO: 4.52 K/UL (ref 3.9–12.7)

## 2020-10-03 PROCEDURE — 84100 ASSAY OF PHOSPHORUS: CPT

## 2020-10-03 PROCEDURE — 85025 COMPLETE CBC W/AUTO DIFF WBC: CPT

## 2020-10-03 PROCEDURE — 96372 THER/PROPH/DIAG INJ SC/IM: CPT | Performed by: EMERGENCY MEDICINE

## 2020-10-03 PROCEDURE — 95813 EEG EXTND MNTR 61-119 MIN: CPT | Mod: 26,,, | Performed by: PSYCHIATRY & NEUROLOGY

## 2020-10-03 PROCEDURE — G0378 HOSPITAL OBSERVATION PER HR: HCPCS

## 2020-10-03 PROCEDURE — 99226 PR SUBSEQUENT OBSERVATION CARE,LEVEL III: CPT | Mod: ,,, | Performed by: PHYSICIAN ASSISTANT

## 2020-10-03 PROCEDURE — 99226 PR SUBSEQUENT OBSERVATION CARE,LEVEL III: ICD-10-PCS | Mod: ,,, | Performed by: PHYSICIAN ASSISTANT

## 2020-10-03 PROCEDURE — 95813 EEG EXTND MNTR 61-119 MIN: CPT

## 2020-10-03 PROCEDURE — 80053 COMPREHEN METABOLIC PANEL: CPT

## 2020-10-03 PROCEDURE — 36415 COLL VENOUS BLD VENIPUNCTURE: CPT

## 2020-10-03 PROCEDURE — 95813 PR EEG, EXTENDED, 61-119 MINS: ICD-10-PCS | Mod: 26,,, | Performed by: PSYCHIATRY & NEUROLOGY

## 2020-10-03 PROCEDURE — 82962 GLUCOSE BLOOD TEST: CPT

## 2020-10-03 PROCEDURE — 63600175 PHARM REV CODE 636 W HCPCS: Performed by: HOSPITALIST

## 2020-10-03 PROCEDURE — 83735 ASSAY OF MAGNESIUM: CPT

## 2020-10-03 PROCEDURE — 83605 ASSAY OF LACTIC ACID: CPT

## 2020-10-03 PROCEDURE — 85652 RBC SED RATE AUTOMATED: CPT

## 2020-10-03 RX ADMIN — ENOXAPARIN SODIUM 40 MG: 40 INJECTION SUBCUTANEOUS at 05:10

## 2020-10-03 NOTE — SUBJECTIVE & OBJECTIVE
Past Medical History:   Diagnosis Date    Basal cell carcinoma     right helix    Hematuria     Seborrheic dermatitis     Skin cancer of arm        Past Surgical History:   Procedure Laterality Date    INCISION AND DRAINAGE INTRA ORAL ABSCESS         Review of patient's allergies indicates:  No Known Allergies      No current facility-administered medications on file prior to encounter.      No current outpatient medications on file prior to encounter.     Family History     None        Tobacco Use    Smoking status: Never Smoker    Smokeless tobacco: Never Used   Substance and Sexual Activity    Alcohol use: Yes     Frequency: Monthly or less     Drinks per session: 1 or 2     Comment: sometimes    Drug use: Never    Sexual activity: Not Currently     Partners: Female     Review of Systems   Constitutional: Positive for activity change, appetite change and unexpected weight change.   HENT: Negative.  Negative for trouble swallowing.    Eyes: Negative.    Respiratory: Negative.    Cardiovascular: Negative.    Gastrointestinal: Negative for nausea and vomiting.   Endocrine: Negative.    Genitourinary: Negative.    Musculoskeletal: Negative.    Skin: Negative.    Neurological: Positive for speech difficulty. Negative for weakness.   Hematological: Negative.    Psychiatric/Behavioral: Positive for behavioral problems, confusion and decreased concentration.     Objective:     Vital Signs (Most Recent):  Temp: 98 °F (36.7 °C) (10/03/20 1524)  Pulse: 61 (10/03/20 1524)  Resp: 16 (10/03/20 1524)  BP: 121/69 (10/03/20 1524)  SpO2: (!) 94 % (10/03/20 1524) Vital Signs (24h Range):  Temp:  [96.1 °F (35.6 °C)-98 °F (36.7 °C)] 98 °F (36.7 °C)  Pulse:  [54-64] 61  Resp:  [16-20] 16  SpO2:  [94 %-100 %] 94 %  BP: (117-168)/(67-76) 121/69     Weight: 70.4 kg (155 lb 3.3 oz)  Body mass index is 22.27 kg/m².    Physical Exam  Vitals signs and nursing note reviewed.   Constitutional:       Appearance: Normal appearance.    HENT:      Head: Normocephalic and atraumatic.   Eyes:      Extraocular Movements: Extraocular movements intact and EOM normal.      Pupils: Pupils are equal, round, and reactive to light.   Neck:      Musculoskeletal: Normal range of motion.   Cardiovascular:      Rate and Rhythm: Normal rate.      Pulses: Normal pulses.   Pulmonary:      Effort: Pulmonary effort is normal.   Abdominal:      General: Abdomen is flat.   Skin:     General: Skin is warm.   Neurological:      Mental Status: He is alert.      Deep Tendon Reflexes:      Reflex Scores:       Bicep reflexes are 1+ on the right side and 1+ on the left side.       Brachioradialis reflexes are 1+ on the right side and 1+ on the left side.       Patellar reflexes are 1+ on the right side and 1+ on the left side.       Achilles reflexes are 1+ on the right side and 1+ on the left side.  Psychiatric:         Speech: Speech normal.         NEUROLOGICAL EXAMINATION:     MENTAL STATUS   Oriented to person.   Oriented to place.   Disoriented to time. Disoriented to month, date, day and season. Oriented to year.   Follows 2 step commands.   Attention: normal. Concentration: normal.   Speech: speech is normal   Level of consciousness: alert  Knowledge: inconsistent with education. Unable to perform simple calculations.   Unable to name object. Able to repeat.        Patient w/ paraphasic errors, inability to identify everyday objects.   Some component of visual agnosia.      CRANIAL NERVES     CN II   Visual fields full to confrontation.     CN III, IV, VI   Pupils are equal, round, and reactive to light.  Extraocular motions are normal.     CN V   Facial sensation intact.     CN VII   Facial expression full, symmetric.     CN VIII   CN VIII normal.     MOTOR EXAM   Muscle bulk: normal  Overall muscle tone: normal  Right arm tone: normal  Left arm tone: normal  Right leg tone: normal  Left leg tone: normal    Strength   Right neck flexion: 5/5  Left neck flexion:  5/5  Right neck extension: 5/5  Left neck extension: 5/5  Right deltoid: 5/5  Left deltoid: 5/5  Right biceps: 5/5  Left biceps: 5/5  Right triceps: 5/5  Left triceps: 5/5  Right wrist flexion: 5/5  Left wrist flexion: 5/5  Right wrist extension: 5/5  Left wrist extension: 5/5  Right interossei: 5/5  Left interossei: 5/5  Right abdominals: 5/5  Left abdominals: 5/5  Right iliopsoas: 5/5  Left iliopsoas: 5/5  Right quadriceps: 5/5  Left quadriceps: 5/5  Right hamstrin/5  Left hamstrin/5  Right glutei: 5/  Left glutei: 5/5  Right anterior tibial: 5  Left anterior tibial: 5  Right posterior tibial: 5  Left posterior tibial: 5  Right peroneal: 5  Left peroneal: 5  Right gastroc: 5  Left gastroc: 5/5    REFLEXES     Reflexes   Right brachioradialis: 1+  Left brachioradialis: 1+  Right biceps: 1+  Left biceps: 1+  Right patellar: 1+  Left patellar: 1+  Right achilles: 1+  Left achilles: 1+    SENSORY EXAM   Light touch normal.     GAIT AND COORDINATION        Deferred        Significant Labs:   Hemoglobin A1c: No results for input(s): HGBA1C in the last 720 hours.  Blood Culture: No results for input(s): LABBLOO in the last 48 hours.  BMP:   Recent Labs   Lab 10/02/20  1842 10/03/20  0837   GLU 88 126*    141   K 4.0 3.9    107   CO2 26 26   BUN 12 10   CREATININE 0.9 0.8   CALCIUM 9.3 9.1   MG  --  2.0     CBC:   Recent Labs   Lab 10/02/20  1842 10/03/20  0837   WBC 5.16 4.52   HGB 12.6* 12.4*   HCT 38.8* 38.7*    241     CMP:   Recent Labs   Lab 10/02/20  1842 10/03/20  0837   GLU 88 126*    141   K 4.0 3.9    107   CO2 26 26   BUN 12 10   CREATININE 0.9 0.8   CALCIUM 9.3 9.1   MG  --  2.0   PROT 7.2 6.4   ALBUMIN 4.2 3.6   BILITOT 0.5 0.9   ALKPHOS 59 55   AST 19 17   ALT 14 14   ANIONGAP 12 8   EGFRNONAA >60.0 >60.0     CSF Culture: No results for input(s): CSFCULTURE in the last 48 hours.  CSF Studies: No results for input(s): ALIQUT, APPEARCSF, COLORCSF, CSFWBC,  CSFRBC, GLUCCSF, LDHCSF, PROTEINCSF, VDRLCSF in the last 48 hours.  Inflammatory Markers:   Recent Labs   Lab 10/02/20  2107 10/03/20  0837   SEDRATE  --  <2   CRP 0.9  --      POCT Glucose:   Recent Labs   Lab 10/02/20  1836 10/03/20  0748 10/03/20  1111   POCTGLUCOSE 85 94 102     Prealbumin: No results for input(s): PREALBUMIN in the last 48 hours.  Respiratory Culture: No results for input(s): GSRESP, RESPIRATORYC in the last 48 hours.  Urine Culture: No results for input(s): LABURIN in the last 48 hours.  Urine Studies:   Recent Labs   Lab 10/02/20  1826   COLORU Yellow   APPEARANCEUA Clear   PHUR 5.0   SPECGRAV 1.025   PROTEINUA Negative   GLUCUA Negative   KETONESU Trace*   BILIRUBINUA Negative   OCCULTUA 2+*   NITRITE Negative   LEUKOCYTESUR Negative   RBCUA 19*   WBCUA 2   SQUAMEPITHEL 1     All pertinent lab results from the past 24 hours have been reviewed.    Significant Imaging: I have reviewed all pertinent imaging results/findings within the past 24 hours.   MRI Brain w/ and w/o contrast  10/02/2020  Possible subtle increased FLAIR hyperintensity involving the bilateral mesial temporal lobes which can be seen in the setting of autoimmune limbic encephalitis in the appropriate clinical setting.  Clinical correlation and short-term interval follow-up is advised.  No evidence of acute intracranial hemorrhage, acute infarct or abnormal enhancing lesion.

## 2020-10-03 NOTE — CONSULTS
"[facility]  Neurology  Consult Note    Patient Name: Juan Hobson  MRN: [id number]  Admission Date: 10/2/2020  Hospital Length of Stay: 0 days  Code Status: Full Code   Attending Provider: Audi Arcos MD   Consulting Provider: Rosemary Ball MD  Primary Care Physician: Mehnaz Barillas MD  Principal Problem:Encephalopathy    Inpatient consult to Neurology  Consult performed by: Rosemary Ball MD  Consult ordered by: Bing Moore PA-C         Subjective:     Chief Complaint:  Progressive, subacute cognitive decline      HPI:   Patient is a 70 yo male w/ past medical history significant for BCC s/p Mohs procedure, recent Dx of penile carcinoma who presented to [facility] on 10/02 from Urology clinic w/ concern for progressive dementia and inability to care for self.  Patient was seen in clinic by Dr. Ernandez on 09/28 for memory complaints and at the time the diagnosis of fronto-temporal dementia was brought up. Per Dr. 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  Coworkers note that he has always been a very punctual and reliable employee but did not show up to work on Sept 2 and instead presented to [facility] reporting  "creatures attacked his penis".  He was noted to have enlarged, ulcerated penis with MRI and urology consult showing concern for penile cancer but not biopsy confirmed at this time.   He was treated for infection and discharged to geriatric [facility] facility where he was held for two weeks prior to discharge home with home health orders for an agency that does not accept his insurance.  When asked about memory difficulties, he cites difficulty connecting objects to words for them.  Co worker retrospectively note his clocking in/out has been inconsistent over the past several months.  His job previously requires operation of complex equipment but has since become " "more automated.  Co workers note that over the past several months he has been making some errors in recording measurements. He continues to drive and live in his own home.   Patient has recently had a 2 week stay in a geriatric psychiatric facility.   Per chart review, patient is a poor historian and some information was provided by the co-worker that accompanied the patient to the ED - patient lives at home alone in an unsafe environment.  Social work evaluation was conducted in the clinic setting and was deemed that his home was unsafe and was unsafe for him to return home alone.  Patient lives alone and without any family members.  Based on reports, patient has not been eating, has a 70 lb weight loss, living in an unsafe and unclean environment, and a mold growing on his toilets, sink and refrigerator.  Spoiled food in the refrigerator and no clean water. Risk for falls with multiple obstacles in living spaces.  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      No current outpatient medications on file prior to encounter.     Family History     None        Tobacco Use    Smoking status: Never Smoker    Smokeless tobacco: Never Used   Substance and Sexual Activity    Alcohol use: Yes     Frequency: Monthly or less     Drinks per session: 1 or 2     Comment: sometimes    Drug use: Never    Sexual activity: Not Currently     Partners: Female     Review of Systems   Constitutional: Positive for activity change, appetite change and " unexpected weight change.   HENT: Negative.  Negative for trouble swallowing.    Eyes: Negative.    Respiratory: Negative.    Cardiovascular: Negative.    Gastrointestinal: Negative for nausea and vomiting.   Endocrine: Negative.    Genitourinary: Negative.    Musculoskeletal: Negative.    Skin: Negative.    Neurological: Positive for speech difficulty. Negative for weakness.   Hematological: Negative.    Psychiatric/Behavioral: Positive for behavioral problems, confusion and decreased concentration.     Objective:     Vital Signs (Most Recent):  Temp: 98 °F (36.7 °C) (10/03/20 1524)  Pulse: 61 (10/03/20 1524)  Resp: 16 (10/03/20 1524)  BP: 121/69 (10/03/20 1524)  SpO2: (!) 94 % (10/03/20 1524) Vital Signs (24h Range):  Temp:  [96.1 °F (35.6 °C)-98 °F (36.7 °C)] 98 °F (36.7 °C)  Pulse:  [54-64] 61  Resp:  [16-20] 16  SpO2:  [94 %-100 %] 94 %  BP: (117-168)/(67-76) 121/69     Weight: 70.4 kg (155 lb 3.3 oz)  Body mass index is 22.27 kg/m².    Physical Exam  Vitals signs and nursing note reviewed.   Constitutional:       Appearance: Normal appearance.   HENT:      Head: Normocephalic and atraumatic.   Eyes:      Extraocular Movements: Extraocular movements intact and EOM normal.      Pupils: Pupils are equal, round, and reactive to light.   Neck:      Musculoskeletal: Normal range of motion.   Cardiovascular:      Rate and Rhythm: Normal rate.      Pulses: Normal pulses.   Pulmonary:      Effort: Pulmonary effort is normal.   Abdominal:      General: Abdomen is flat.   Skin:     General: Skin is warm.   Neurological:      Mental Status: He is alert.      Deep Tendon Reflexes:      Reflex Scores:       Bicep reflexes are 1+ on the right side and 1+ on the left side.       Brachioradialis reflexes are 1+ on the right side and 1+ on the left side.       Patellar reflexes are 1+ on the right side and 1+ on the left side.       Achilles reflexes are 1+ on the right side and 1+ on the left side.  Psychiatric:          Speech: Speech normal.         NEUROLOGICAL EXAMINATION:     MENTAL STATUS   Oriented to person.   Oriented to place.   Disoriented to time. Disoriented to month, date, day and season. Oriented to year.   Follows 2 step commands.   Attention: normal. Concentration: normal.   Speech: speech is normal   Level of consciousness: alert  Knowledge: inconsistent with education. Unable to perform simple calculations.   Unable to name object. Able to repeat.        Patient w/ paraphasic errors, inability to identify everyday objects.   Some component of visual agnosia.      CRANIAL NERVES     CN II   Visual fields full to confrontation.     CN III, IV, VI   Pupils are equal, round, and reactive to light.  Extraocular motions are normal.     CN V   Facial sensation intact.     CN VII   Facial expression full, symmetric.     CN VIII   CN VIII normal.     MOTOR EXAM   Muscle bulk: normal  Overall muscle tone: normal  Right arm tone: normal  Left arm tone: normal  Right leg tone: normal  Left leg tone: normal    Strength   Right neck flexion: 5/5  Left neck flexion: 5/5  Right neck extension: 5/5  Left neck extension: 5/5  Right deltoid: 5/5  Left deltoid: 5/5  Right biceps: 5/5  Left biceps: 5/5  Right triceps: 5/5  Left triceps: 5/5  Right wrist flexion: 5/5  Left wrist flexion: 5/5  Right wrist extension: 5/5  Left wrist extension: 5/5  Right interossei: 5/5  Left interossei: 5/5  Right abdominals: 5/5  Left abdominals: 5/5  Right iliopsoas: 5/5  Left iliopsoas: 5/5  Right quadriceps: 5/5  Left quadriceps: 5/5  Right hamstrin/5  Left hamstrin/5  Right glutei: 5/5  Left glutei: 5/5  Right anterior tibial: 5/5  Left anterior tibial: 5/5  Right posterior tibial: 5/5  Left posterior tibial: 5/5  Right peroneal: 5/5  Left peroneal: 5/5  Right gastroc: 5/5  Left gastroc: 5/5    REFLEXES     Reflexes   Right brachioradialis: 1+  Left brachioradialis: 1+  Right biceps: 1+  Left biceps: 1+  Right patellar: 1+  Left patellar:  1+  Right achilles: 1+  Left achilles: 1+    SENSORY EXAM   Light touch normal.     GAIT AND COORDINATION        Deferred        Significant Labs:   Hemoglobin A1c: No results for input(s): HGBA1C in the last 720 hours.  Blood Culture: No results for input(s): LABBLOO in the last 48 hours.  BMP:   Recent Labs   Lab 10/02/20  1842 10/03/20  0837   GLU 88 126*    141   K 4.0 3.9    107   CO2 26 26   BUN 12 10   CREATININE 0.9 0.8   CALCIUM 9.3 9.1   MG  --  2.0     CBC:   Recent Labs   Lab 10/02/20  1842 10/03/20  0837   WBC 5.16 4.52   HGB 12.6* 12.4*   HCT 38.8* 38.7*    241     CMP:   Recent Labs   Lab 10/02/20  1842 10/03/20  0837   GLU 88 126*    141   K 4.0 3.9    107   CO2 26 26   BUN 12 10   CREATININE 0.9 0.8   CALCIUM 9.3 9.1   MG  --  2.0   PROT 7.2 6.4   ALBUMIN 4.2 3.6   BILITOT 0.5 0.9   ALKPHOS 59 55   AST 19 17   ALT 14 14   ANIONGAP 12 8   EGFRNONAA >60.0 >60.0     CSF Culture: No results for input(s): CSFCULTURE in the last 48 hours.  CSF Studies: No results for input(s): ALIQUT, APPEARCSF, COLORCSF, CSFWBC, CSFRBC, GLUCCSF, LDHCSF, PROTEINCSF, VDRLCSF in the last 48 hours.  Inflammatory Markers:   Recent Labs   Lab 10/02/20  2107 10/03/20  0837   SEDRATE  --  <2   CRP 0.9  --      POCT Glucose:   Recent Labs   Lab 10/02/20  1836 10/03/20  0748 10/03/20  1111   POCTGLUCOSE 85 94 102     Prealbumin: No results for input(s): PREALBUMIN in the last 48 hours.  Respiratory Culture: No results for input(s): GSRESP, RESPIRATORYC in the last 48 hours.  Urine Culture: No results for input(s): LABURIN in the last 48 hours.  Urine Studies:   Recent Labs   Lab 10/02/20  1826   COLORU Yellow   APPEARANCEUA Clear   PHUR 5.0   SPECGRAV 1.025   PROTEINUA Negative   GLUCUA Negative   KETONESU Trace*   BILIRUBINUA Negative   OCCULTUA 2+*   NITRITE Negative   LEUKOCYTESUR Negative   RBCUA 19*   WBCUA 2   SQUAMEPITHEL 1     All pertinent lab results from the past 24 hours have been  reviewed.    Significant Imaging: I have reviewed all pertinent imaging results/findings within the past 24 hours.   MRI Brain w/ and w/o contrast  10/02/2020  Possible subtle increased FLAIR hyperintensity involving the bilateral mesial temporal lobes which can be seen in the setting of autoimmune limbic encephalitis in the appropriate clinical setting.  Clinical correlation and short-term interval follow-up is advised.  No evidence of acute intracranial hemorrhage, acute infarct or abnormal enhancing lesion.      Assessment and Plan:     * Encephalopathy  Patient is a 70 yo male w/ past medical history of BCC s/p Mohs' procedure and new diagnosis of penile malignancy who presents as an admission from Urology clinic for severe cognitive impairment and inability to care for self. The timeline of the events is uncertain as patient lives alone and does not have family who lives near by and the initial issue was noted in the beginning of September by his coworkers.   He has had a progressive decline when it comes to his cognition, particularly speech. He also has had difficulty w/ taking care of himself as was noted by the social work review.   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     - Agree with inpatient admission  - MRI completed - see above  - EEG completed, pending read  - LP attempted by Anesthesia at bedside, but due to DJD, unable to obtain fluid x 2 levels   - Patient will require an IR LP  - Paraneoplastic panel, serum in place from outpatient neurology visit on 09/28  - Pending LP to assess CSF for signs of inflammatory markers  - Possibility for Autoimmune vs Paraneoplastic process in the setting of active malignancy  - Appreciate consult, will follow up w/ patient  - Fall precautions   - Delirium precautions     Impaired decision making  - Appreciate SW and CM assistance with this case  - POA needs to be established     Penile carcinoma  -  Outpatient follow up w/ Urology        VTE Risk Mitigation (From admission, onward)         Ordered     enoxaparin injection 40 mg  Every 24 hours      10/02/20 2042     IP VTE HIGH RISK PATIENT  Once      10/02/20 2042                Thank you for your consult. I will follow-up with patient. Please contact us if you have any additional questions.    Rosemary Ball MD  Neurology  [facility]

## 2020-10-03 NOTE — PLAN OF CARE
CM met with patient at the bedside to discuss discharge planning assessment. Pt has some difficulty answering questions, but is able to verify information. Pt seems to be having memory issues.  Pt lives alone and will  transportation at discharge. Pt doesn't seem to have a network of help, besides some coworkers who have recently started assisting. Pt verified PCP and Pharmacy. CM will continue to follow for discharge needs.         10/03/20 0827   Discharge Assessment   Assessment Type Discharge Planning Assessment   Confirmed/corrected address and phone number on facesheet? Yes   Assessment information obtained from? Patient   Expected Length of Stay (days) 2   Communicated expected length of stay with patient/caregiver yes   Prior to hospitilization cognitive status: Alert/Oriented   Prior to hospitalization functional status: Independent   Current cognitive status: Alert/Oriented   Current Functional Status: Independent   Lives With alone   Able to Return to Prior Arrangements no   Is patient able to care for self after discharge? No   Patient's perception of discharge disposition home health   Readmission Within the Last 30 Days no previous admission in last 30 days   Patient currently being followed by outpatient case management? No   Patient currently receives any other outside agency services? No   Equipment Currently Used at Home none   Do you have any problems affording any of your prescribed medications? No   Is the patient taking medications as prescribed? yes   Does the patient have transportation home? No   Does the patient receive services at the Coumadin Clinic? No   Discharge Plan A Home Health   Discharge Plan B New Nursing Home placement - senior living care facility   DME Needed Upon Discharge  none   Patient/Family in Agreement with Plan unable to assess   Does the patient have transportation to healthcare appointments? Yes

## 2020-10-03 NOTE — CARE UPDATE
Attempted lumbar puncture at L3/L4 and L4/L5 levels without CSF return.      Regina Gelter   Anesthesia

## 2020-10-03 NOTE — HOSPITAL COURSE
Admitted to hospital medicine for worsening cognitive decline and inability to care for self in setting of concern for penile cancer.  In pursuit of workup for altered mental status - labs/infectious work-up grossly unremarkable. Neurology consulted and MRI obtained on admission with concern for encephalitis (increased FLAIR hyperintensity involving the bilateral mesial temporal lobes which can be seen in the setting of autoimmune limbic encephalitis in the appropriate clinical setting). LP recommended and attempted at bedside however not successful 2/2 to D. Interventional radiology consulted, LP performed 10/5 with CSF (1 WBC, 190 RBC, 71 protein, 52 glucose). CSF HSV, ACE negative. EEG 10/3 (1 hr 40 min) with mild regional dysfunction in bilateral temporal lobes, but no electrographic seizures. Serum paraneoplastic panel from 9/28 has resulted negative. MRI brain W WO contrast showing increased FLAIR hyperintensity involving the bilateral mesial temporal lobes. Completed 5 days of empiric IV Solumedrol 1 g qd without notable clinical improvement. Presentation most concerning for dementia   Psychiatry consulted for formal capacity evaluation and concluded that he does not have capacity to make decisions regarding his long term care disposition however does not require PEC at this time as his stay has been non-contested. He has no family, children and have relied on coworkers for assistance. The case was brought to the attention of EPS who have no plans to intervene at this time and recommend since the patient presented to the hospital it is a safe discharge planning issue that would involve [facility] Legal department. The Legal department intend to pursue [place] [employer] to assist with managing affairs. Patient re-engaged with care for likely penile carcinoma with urology.  A determination was made that appropriate treatment for penile tumor was penectomy - patient proceeded to OR on 10/19 with partial  penectomy completed.  Figueredo catheter placed and traumatically self discontinued by patient.  Urology is following.

## 2020-10-03 NOTE — NURSING
Received to room 741 from ED; awake and alert to person and place.  Slightly confused to time but easily reoriented.  Admission assessment initiated; condition stable.

## 2020-10-03 NOTE — H&P
Hospital Medicine  History and Physical  [facility]      Patient Name: Juan Hobson  MRN:  [id number]  [facility] Medicine Team: [facility]OSP MED Y Adriana Badillo MD  Date of Admission:  10/2/2020     Principal Problem:  Acute encephalopathy   Primary Care Physician: Mehnaz Barillas MD       History of Present Illness:    Mr. Juan Hobson is a 69 y.o. male with recently diagnosed penile cancer, who presents to the ER from Urology clinic with concern for self neglect, and need for Neurology evaluation of dementia and possible placement.  The patient mentions that he has been having some issues with his memory.  He mostly orders takeout for meals because it is easier, and still drives to work.  He feels like he is able to take care of himself, and does not need to go to a facility.     Per note from Lianne German LCSW on 10/2:  Received consult from Dr. Monterroso re: assessing patient in clinic this afternoon and care recommendations as patient did not seem competent to make decisions and give consent. He has no family and no legal next of kin (POA or guardian).   Met individually with patient, and with his co-worker Janna Go ([phone], ext. [phone]) who brought him to his clinic appointment today. Patient unable to relate basic demographic information or clearly explain other information. For example, when asked his birth date he gave the year [birth year], then said 9, and then said the second to last, it begins with N; he said his street name but couldn't give the house number where he has lived for years; unable to say what type of work or where he worked prior to the [employer] in [facility] for the past 12 years; he described being taken in a truck to that other place (referring to the recent [facility] stay). He has awareness that he is having memory/cognitive issues but stated he can manage for himself at home and that he knows there is a lot of work that needs to be  done in his home that he hasn't gotten to. He inherited the home after his mother .      Mr. Go and another coworker Juice Martinez ([phone]) and their supervisor Mei Bell ([phone]) have been trying to help him manage things with his work and personal matters as they have observed his deficits and know he has no one to help him. They have helped him keep up with recent medical appointments and accompanied and transported him.  Mr. Go reports concern for his safety and ability to care for himself after seeing his home with clutter throughout and things piled up, mold and old food in the refrigerator, mold in the toilet, clothes all over, etc. Patient has been driving himself to and from work and to get food at places near his home, but unable to navigate to unfamiliar places. They have observed his cognitive deficits at work and he has made errors, so they have modified his job and his supervisor has been working with him to try to get FMLA and his leave/FCI in place so he doesn't lose his benefits. They have had to help him with paperwork, bill paying, etc., because he can't complete these on his own.        Upon arrival to the ER, vitals were temp 97.8F, HR 59 and /84.  Labs were unremarkable  He was admitted to [facility] Medicine for Neurology and SW evaluation.        Review of Systems:  Constitutional: Negative for chills, fever, fatigue, weakness  HENT: Negative for sore throat, trouble swallowing.    Eyes: Negative for photophobia, visual disturbance.   Respiratory: Negative for cough, shortness of breath.    Cardiovascular: Negative for chest pain, palpitations, leg swelling.   Gastrointestinal: Negative for abdominal pain, nausea, vomiting, diarrhea, constipation  Endocrine: Negative for cold intolerance, heat intolerance.   Genitourinary: Negative for dysuria, frequency.   Musculoskeletal: Negative for arthralgias, myalgias.   Skin: Negative for rash, wound, erythema    Neurological: Negative for numbness, paresthesias  Psychiatric/Behavioral: + confusion  All other systems reviewed and are negative.      Past Medical History: Patient has a past medical history of Basal cell carcinoma, Hematuria, Seborrheic dermatitis, and Skin cancer of arm.      Past Surgical History: Patient has a past surgical history that includes Incision and drainage intra oral abscess.      Social History: Patient reports that he has never smoked. He has never used smokeless tobacco. He reports current alcohol use. He reports that he does not use drugs.      Family History: Patient's family history is not on file.      Medications: Scheduled Meds:   enoxaparin  40 mg Subcutaneous Q24H     Continuous Infusions:  PRN Meds:.acetaminophen, melatonin, ondansetron, promethazine (PHENERGAN) IVPB, senna-docusate 8.6-50 mg, sodium chloride 0.9%      Allergies: Patient has No Known Allergies.      Physical Exam:    Temp:  [97.8 °F (36.6 °C)]   Pulse:  [59-64]   Resp:  [16]   BP: (144-168)/(82-84)   SpO2:  [99 %]     Constitutional: Appears well developed and well nourished  Head: Normocephalic and atraumatic.   Mouth/Throat: Oropharynx is clear and moist.   Eyes: EOM are normal. Pupils are equal, round, and reactive to light. No scleral icterus.   Neck: Normal range of motion. Neck supple.   Cardiovascular: Normal heart rate.  Regular heart rhythm.  No murmur heard.  Pulmonary/Chest: Effort normal. No respiratory distress. No wheezes, rales, or rhonchi  Abdominal: Soft. Bowel sounds are normal.  No distension.  No tenderness  Musculoskeletal: Normal range of motion. No edema.   Neurological: Alert and oriented to person, place, and time. He says his birthday is [birth year], but doesn't know the year.  He can not recall the President, but picks him from a list.  Skin: Skin is warm and dry.   Psychiatric: Normal mood and affect. Behavior is normal.       No intake or output data in the 24 hours ending 10/02/20  2052  Recent Labs   Lab 10/02/20  1842   WBC 5.16   HGB 12.6*   HCT 38.8*        Recent Labs   Lab 10/02/20  1842      K 4.0      CO2 26   BUN 12   CREATININE 0.9   GLU 88   CALCIUM 9.3     Recent Labs   Lab 10/02/20  1842   ALKPHOS 59   ALT 14   AST 19   ALBUMIN 4.2   PROT 7.2   BILITOT 0.5      No results for input(s): LACTATE in the last 72 hours.   No results for input(s): CPK, CPKMB, MB, TROPONINI in the last 72 hours.      Assessment and Plan:    Mr. Juan Hobson is a 69 y.o. male who presented to [facility] on 10/2/2020 with acute encephalopathy.    Acute Encephalopathy  Dementia without Behavioral Disturbance  Debility  · Concern that the patient is unable to care for himself and make his own decisions, however the patient feels like he is able to care for himself  · Check MRI brain, Vitamin B1, Vitamin B6, Vitamin B12, HIV, RPR, TSH  · Neurology consult  · CM/SW consult  · Pending evaluation, might need Psych to determine if he is gravely disabled and requiring a PEC plus/minus CXR and PPD    Penile Carcinoma  · Follows with Urology      Diet:  Regular  VTE PPx:  Lovenox  Goals of Care:  Full      Disposition:  Pending Neurology and CM/SW might need placememnt  Discharge Needs:  BLANE Badillo MD  [facility] Medicine  Medical Director, [facility]  Cell:  [phone]  Spectra:  [phone]

## 2020-10-03 NOTE — ASSESSMENT & PLAN NOTE
69 y.o. with progressive cognitive decline and inability to adequately care for self. Presented in September 2020, to the ED stating there were creatures attacking his penis. 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 Prompted  evaluation by psychiatry and found that it is likely related to dementia after getting collateral from family/friends. He was PEC'd the night of admission after wanting to leave and then discharged to a geropsychiatry facility for 2 weeks. Seen in neuro clinic where there was mention of suspected frontotemporal dementia and further work-up ensued. Presented to a urology appt on 10/2 and was advised to present to the ED due to concern for competency and need for patient advocate/POA before a biopsy or form of treatment can be initiated.     - Concern that the patient is unable to care for himself and make his own decisions, however the patient feels like he is able to care for himself  - MRI brain with Possible subtle increased FLAIR hyperintensity involving the bilateral mesial temporal lobes which can be seen in the setting of autoimmune limbic encephalitis  - consulted anesthesia for LP; neuro to place CSF orders   - EEG ordered   - Vitamin B1, Vitamin B6, Vitamin B12, HIV, RPR, TSH ordered   - afebrile, without leukocytosis -- infectious work-up unremarkable   - Neurology consulted  - CM/SW consult  - Pending evaluation, might need Psych to determine if he is gravely disabled and requiring a PEC plus/minus CXR and PPD         DELIRIUM BEHAVIOR MANAGEMENT  - Minimize use of restraints; if physical restraints necessary, please utilize medical/chemical prns for agitation.  - Keep shades open and room lit during day and room dim at night in order to promote healthy circadian rhythms.  - Encourage family at bedside  - Keep whiteboard in patient's room current  with the date and name of the members of patient's team for easy patient self re-orientation.  - Avoid benzodiazepines, antihistamines, anticholinergics, hypnotics, and minimize opiates while controlling for pain as these medications may exacerbate delirium.

## 2020-10-03 NOTE — ASSESSMENT & PLAN NOTE
Patient is a 68 yo male w/ past medical history of BCC s/p Mohs' procedure and new diagnosis of penile malignancy who presents as an admission from Urology clinic for severe cognitive impairment and inability to care for self. The timeline of the events is uncertain as patient lives alone and does not have family who lives near by and the initial issue was noted in the beginning of September by his coworkers.   He has had a progressive decline when it comes to his cognition, particularly speech. He also has had difficulty w/ taking care of himself as was noted by the social work review.   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     - Agree with inpatient admission  - MRI completed - see above  - EEG completed, pending read  - LP attempted by Anesthesia at bedside, but due to DJD, unable to obtain fluid x 2 levels   - Patient will require an IR LP  - Paraneoplastic panel, serum in place from outpatient neurology visit on 09/28  - Pending LP to assess CSF for signs of inflammatory markers  - Possibility for Autoimmune vs Paraneoplastic process in the setting of active malignancy  - Appreciate consult, will follow up w/ patient  - Fall precautions   - Delirium precautions

## 2020-10-03 NOTE — PLAN OF CARE
Problem: Adult Inpatient Plan of Care  Goal: Plan of Care Review  Outcome: Ongoing, Progressing  Flowsheets (Taken 10/3/2020 0622)  Plan of Care Reviewed With: patient   New admission last pm; increasing confusion per pt co-workers report.  Here for evaluation of cognitive status.  Awake and alert but confused at times.  Bed locked and in low position with bed alarm in use for pt safety.  Instructed to call with needs/assistance to bathroom; does not call despite reinforcement.  Plan of care reviewed with pt; reinforcement needed.

## 2020-10-03 NOTE — ASSESSMENT & PLAN NOTE
Presented to a urology appt on 10/2 and was advised to present to the ED due to concern for competency and need for patient advocate/POA before a biopsy or form of treatment can be initiated.     Gina KEVIN 40:[id number]: (9) Upon the inability of any adult to consent for himself and in the absence of any person listed in Paragraphs (2) through (8) of this Subsection, an adult friend of the patient. For purposes of this Subsection to consent, adult friend means an adult who has exhibited special care and concern for the patient, who is generally familiar with the patient's health care views and desires, and who is willing and able to become involved in the patient's health care decisions and to act in the patient's best interest. The adult friend shall sign and date an acknowledgment form provided by the hospital or other health care facility in which the patient is located for placement in the patient's records certifying that he or she meets such criteria.    - Juice Martinez ([phone]) and their supervisor Mei Bell ([phone]) have been trying to help him manage things with his work and personal matters as they have observed his deficits and know he has no one to help him - these individuals may be used for consent   - PEC'd for grave disability during last admission and discharged to [facility] however no formal eval by in-patient psychiatry  - will need formal evaluation

## 2020-10-03 NOTE — PLAN OF CARE
Plan of care reviewed with patient.  Patient verbalized understanding and had no further questions.  Patient had EEG completed during shift.  Patient able to clearly follow commands, but oriented to person only.  Patient now resting comfortably in bed locked in lowest position, side rails up x3, and call bell in reach.  Will continue to monitor.

## 2020-10-03 NOTE — HPI
Per Adriana Badillo MD:   Mr. Juan Hobson is a 69 y.o. male with recently diagnosed penile cancer, who presents to the ER from Urology clinic with concern for self neglect, and need for Neurology evaluation of dementia and possible placement.  The patient mentions that he has been having some issues with his memory.  He mostly orders takeout for meals because it is easier, and still drives to work.  He feels like he is able to take care of himself, and does not need to go to a facility.      Per note from Lianne German LCSW on 10/2:  Received consult from Dr. Monterroso re: assessing patient in clinic this afternoon and care recommendations as patient did not seem competent to make decisions and give consent. He has no family and no legal next of kin (POA or guardian).   Met individually with patient, and with his co-worker Janna Go ([phone] ext. [phone]) who brought him to his clinic appointment today. Patient unable to relate basic demographic information or clearly explain other information. For example, when asked his birth date he gave the year [birth year], then said [birth year], and then said the second to last, it begins with N; he said his street name but couldn't give the house number where he has lived for years; unable to say what type of work or where he worked prior to the [employer] in [facility] for the past 12 years; he described being taken in a truck to that other place (referring to the recent [facility] stay). He has awareness that he is having memory/cognitive issues but stated he can manage for himself at home and that he knows there is a lot of work that needs to be done in his home that he hasn't gotten to. He inherited the home after his mother .      Mr. Go and another coworker Juice Martinez ([phone]) and their supervisor Mei Bell ([phone]) have been trying to help him manage things with his work and personal matters as they have observed his deficits  and know he has no one to help him. They have helped him keep up with recent medical appointments and accompanied and transported him.  Mr. Go reports concern for his safety and ability to care for himself after seeing his home with clutter throughout and things piled up, mold and old food in the refrigerator, mold in the toilet, clothes all over, etc. Patient has been driving himself to and from work and to get food at places near his home, but unable to navigate to unfamiliar places. They have observed his cognitive deficits at work and he has made errors, so they have modified his job and his supervisor has been working with him to try to get FMLA and his leave/correction in place so he doesn't lose his benefits. They have had to help him with paperwork, bill paying, etc., because he can't complete these on his own.         Upon arrival to the ER, vitals were temp 97.8F, HR 59 and /84.  Labs were unremarkable  He was admitted to Hospital Medicine for Neurology and SW evaluation.

## 2020-10-03 NOTE — SUBJECTIVE & OBJECTIVE
"Interval History: Patient resting comfortably in bed, no distress noted. Reports his body is fine but "things have changed in my head." When asked his location he stated "a large building" and was unable to recall they year. He stated the president "was his best friend" who he does not know personally but has orange hair and has "done a lot for the country in the last 4 years." We discussed his MRI findings and need for lumbar puncture. He could recall our discussion and stated he was ok with it. Discussed case with anesthesia who will perform the procedure today.     Review of Systems   Unable to perform ROS: Dementia     Objective:     Vital Signs (Most Recent):  Temp: 98 °F (36.7 °C) (10/03/20 1524)  Pulse: 61 (10/03/20 1524)  Resp: 16 (10/03/20 1524)  BP: 121/69 (10/03/20 1524)  SpO2: (!) 94 % (10/03/20 1524) Vital Signs (24h Range):  Temp:  [96.1 °F (35.6 °C)-98 °F (36.7 °C)] 98 °F (36.7 °C)  Pulse:  [54-64] 61  Resp:  [16-20] 16  SpO2:  [94 %-100 %] 94 %  BP: (117-168)/(67-84) 121/69     Weight: 70.4 kg (155 lb 3.3 oz)  Body mass index is 22.27 kg/m².    Intake/Output Summary (Last 24 hours) at 10/3/2020 1600  Last data filed at 10/3/2020 1300  Gross per 24 hour   Intake 720 ml   Output --   Net 720 ml      Physical Exam  Vitals signs and nursing note reviewed.   Constitutional:       General: He is not in acute distress.     Appearance: He is not ill-appearing.   HENT:      Head: Normocephalic and atraumatic.      Nose: Nose normal.      Mouth/Throat:      Mouth: Mucous membranes are moist.   Eyes:      Extraocular Movements: Extraocular movements intact.      Pupils: Pupils are equal, round, and reactive to light.   Cardiovascular:      Rate and Rhythm: Normal rate and regular rhythm.   Pulmonary:      Effort: Pulmonary effort is normal. No respiratory distress.   Abdominal:      General: Abdomen is flat. There is no distension.      Palpations: Abdomen is soft.   Musculoskeletal: Normal range of motion.    "      General: No swelling.   Skin:     General: Skin is warm and dry.      Coloration: Skin is not jaundiced.   Neurological:      General: No focal deficit present.      Mental Status: He is alert. He is disoriented.      Sensory: No sensory deficit.      Motor: No weakness.   Psychiatric:         Mood and Affect: Mood normal.         Speech: Speech normal.         Behavior: Behavior is cooperative.         Cognition and Memory: Cognition is impaired. Memory is impaired. He exhibits impaired recent memory.         Significant Labs: All pertinent labs within the past 24 hours have been reviewed.    Significant Imaging: I have reviewed all pertinent imaging results/findings within the past 24 hours.

## 2020-10-03 NOTE — ANESTHESIA PROCEDURE NOTES
Spinal    Diagnosis: AMS  Patient location during procedure: floor  Start time: 10/3/2020 4:25 PM  Timeout: 10/3/2020 4:25 PM  End time: 10/3/2020 4:38 PM    Staffing  Authorizing Provider: Regina Rodriguez DO  Performing Provider: Regina Rodriguez DO    Spinal Block  Patient position: sitting  Prep: Betadine  Approach: midline  Location: L3-4  Injection technique: lumbar puncture  Needle  Needle localization: anatomical landmarks  Additional Notes  Attempted lumbar puncture at L3-L4 and L4-L5.  Unfortunately, unable to obtain CSF at either level.

## 2020-10-03 NOTE — PROGRESS NOTES
[facility] Hospital Medicine  Progress Note    Patient Name: Juan Hobson  MRN: [id number]  Patient Class: OP- Observation   Admission Date: 10/2/2020  Length of Stay: 0 days  Attending Physician: Audi Arcos MD  Primary Care Provider: Mehnaz Barillas MD    [facility] Medicine Team: [facility] MED Y Bing Moore PA-C    Subjective:     Principal Problem:Mild cognitive impairment with memory loss        HPI:  Per Adriana Badillo MD:   Mr. Juan Hobson is a 69 y.o. male with recently diagnosed penile cancer, who presents to the ER from Urology clinic with concern for self neglect, and need for Neurology evaluation of dementia and possible placement.  The patient mentions that he has been having some issues with his memory.  He mostly orders takeout for meals because it is easier, and still drives to work.  He feels like he is able to take care of himself, and does not need to go to a facility.      Per note from Lianne German LCSW on 10/2:  Received consult from Dr. Monterroso re: assessing patient in clinic this afternoon and care recommendations as patient did not seem competent to make decisions and give consent. He has no family and no legal next of kin (POA or guardian).   Met individually with patient, and with his co-worker Janna Go ([phone]. [phone]) who brought him to his clinic appointment today. Patient unable to relate basic demographic information or clearly explain other information. For example, when asked his birth date he gave the year [birth year], then said [birth year], and then said the second to last, it begins with N; he said his street name but couldn't give the house number where he has lived for years; unable to say what type of work or where he worked prior to the [employer] in [facility] for the past 12 years; he described being taken in a truck to that other place (referring to the recent [facility] stay). He has awareness that he is having  "memory/cognitive issues but stated he can manage for himself at home and that he knows there is a lot of work that needs to be done in his home that he hasn't gotten to. He inherited the home after his mother .      Mr. Go and another coworker Juice Martinez ([phone]) and their supervisor Mei Bell ([phone]) have been trying to help him manage things with his work and personal matters as they have observed his deficits and know he has no one to help him. They have helped him keep up with recent medical appointments and accompanied and transported him.  Mr. Go reports concern for his safety and ability to care for himself after seeing his home with clutter throughout and things piled up, mold and old food in the refrigerator, mold in the toilet, clothes all over, etc. Patient has been driving himself to and from work and to get food at places near his home, but unable to navigate to unfamiliar places. They have observed his cognitive deficits at work and he has made errors, so they have modified his job and his supervisor has been working with him to try to get FMLA and his leave/MCFP in place so he doesn't lose his benefits. They have had to help him with paperwork, bill paying, etc., because he can't complete these on his own.         Upon arrival to the ER, vitals were temp 97.8F, HR 59 and /84.  Labs were unremarkable  He was admitted to Hospital Medicine for Neurology and SW evaluation.    Overview/Hospital Course:  69 y.o. who was admitted to hospital medicine for worsening cognitive decline and inability to care for self. Neurology consulted and MRI obtained on admission with concern for encephalitis. LP recommended and anesthesia to perform today.     Interval History: Patient resting comfortably in bed, no distress noted. Reports his body is fine but "things have changed in my head." When asked his location he stated "a large building" and was unable to recall they year. " "He stated the president "was his best friend" who he does not know personally but has orange hair and has "done a lot for the country in the last 4 years." We discussed his MRI findings and need for lumbar puncture. He could recall our discussion and stated he was ok with it. Discussed case with anesthesia who will perform the procedure today.     Review of Systems   Unable to perform ROS: Dementia     Objective:     Vital Signs (Most Recent):  Temp: 98 °F (36.7 °C) (10/03/20 1524)  Pulse: 61 (10/03/20 1524)  Resp: 16 (10/03/20 1524)  BP: 121/69 (10/03/20 1524)  SpO2: (!) 94 % (10/03/20 1524) Vital Signs (24h Range):  Temp:  [96.1 °F (35.6 °C)-98 °F (36.7 °C)] 98 °F (36.7 °C)  Pulse:  [54-64] 61  Resp:  [16-20] 16  SpO2:  [94 %-100 %] 94 %  BP: (117-168)/(67-84) 121/69     Weight: 70.4 kg (155 lb 3.3 oz)  Body mass index is 22.27 kg/m².    Intake/Output Summary (Last 24 hours) at 10/3/2020 1600  Last data filed at 10/3/2020 1300  Gross per 24 hour   Intake 720 ml   Output --   Net 720 ml      Physical Exam  Vitals signs and nursing note reviewed.   Constitutional:       General: He is not in acute distress.     Appearance: He is not ill-appearing.   HENT:      Head: Normocephalic and atraumatic.      Nose: Nose normal.      Mouth/Throat:      Mouth: Mucous membranes are moist.   Eyes:      Extraocular Movements: Extraocular movements intact.      Pupils: Pupils are equal, round, and reactive to light.   Cardiovascular:      Rate and Rhythm: Normal rate and regular rhythm.   Pulmonary:      Effort: Pulmonary effort is normal. No respiratory distress.   Abdominal:      General: Abdomen is flat. There is no distension.      Palpations: Abdomen is soft.   Musculoskeletal: Normal range of motion.         General: No swelling.   Skin:     General: Skin is warm and dry.      Coloration: Skin is not jaundiced.   Neurological:      General: No focal deficit present.      Mental Status: He is alert. He is disoriented.      " Sensory: No sensory deficit.      Motor: No weakness.   Psychiatric:         Mood and Affect: Mood normal.         Speech: Speech normal.         Behavior: Behavior is cooperative.         Cognition and Memory: Cognition is impaired. Memory is impaired. He exhibits impaired recent memory.         Significant Labs: All pertinent labs within the past 24 hours have been reviewed.    Significant Imaging: I have reviewed all pertinent imaging results/findings within the past 24 hours.      Assessment/Plan:      * Mild cognitive impairment with memory loss  69 y.o. with progressive cognitive decline and inability to adequately care for self. Presented in September 2020, to the ED stating there were creatures attacking his penis. 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 Prompted  evaluation by psychiatry and found that it is likely related to dementia after getting collateral from family/friends. He was PEC'd the night of admission after wanting to leave and then discharged to a geropsychiatry facility for 2 weeks. Seen in neuro clinic where there was mention of suspected frontotemporal dementia and further work-up ensued. Presented to a urology appt on 10/2 and was advised to present to the ED due to concern for competency and need for patient advocate/POA before a biopsy or form of treatment can be initiated.     - Concern that the patient is unable to care for himself and make his own decisions, however the patient feels like he is able to care for himself  - MRI brain with Possible subtle increased FLAIR hyperintensity involving the bilateral mesial temporal lobes which can be seen in the setting of autoimmune limbic encephalitis  - consulted anesthesia for LP; neuro to place CSF orders   - EEG ordered   - Vitamin B1, Vitamin B6, Vitamin B12, HIV, RPR, TSH ordered   - afebrile,  without leukocytosis -- infectious work-up unremarkable   - Neurology consulted  - CM/SW consult  - Pending evaluation, might need Psych to determine if he is gravely disabled and requiring a PEC plus/minus CXR and PPD         DELIRIUM BEHAVIOR MANAGEMENT  - Minimize use of restraints; if physical restraints necessary, please utilize medical/chemical prns for agitation.  - Keep shades open and room lit during day and room dim at night in order to promote healthy circadian rhythms.  - Encourage family at bedside  - Keep whiteboard in patient's room current with the date and name of the members of patient's team for easy patient self re-orientation.  - Avoid benzodiazepines, antihistamines, anticholinergics, hypnotics, and minimize opiates while controlling for pain as these medications may exacerbate delirium.      Impaired decision making  Presented to a urology appt on 10/2 and was advised to present to the ED due to concern for competency and need for patient advocate/POA before a biopsy or form of treatment can be initiated.     [place] R.S. 40:1299.53: (9) Upon the inability of any adult to consent for himself and in the absence of any person listed in Paragraphs (2) through (8) of this Subsection, an adult friend of the patient. For purposes of this Subsection to consent, adult friend means an adult who has exhibited special care and concern for the patient, who is generally familiar with the patient's health care views and desires, and who is willing and able to become involved in the patient's health care decisions and to act in the patient's best interest. The adult friend shall sign and date an acknowledgment form provided by the hospital or other health care facility in which the patient is located for placement in the patient's records certifying that he or she meets such criteria.    - Juice Martinez ([phone]) and their supervisor Mei Bell ([phone]) have been trying to help him manage  things with his work and personal matters as they have observed his deficits and know he has no one to help him - these individuals may be used for consent   - PEC'd for grave disability during last admission and discharged to [facility] however no formal eval by in-patient psychiatry  - will need formal evaluation       Penile carcinoma  - follows with urology       VTE Risk Mitigation (From admission, onward)         Ordered     enoxaparin injection 40 mg  Every 24 hours      10/02/20 2042     IP VTE HIGH RISK PATIENT  Once      10/02/20 2042                Discharge Planning   ALFRED: 10/4/2020     Code Status: Full Code   Is the patient medically ready for discharge?:     Reason for patient still in hospital (select all that apply): Patient trending condition, Laboratory test, Treatment, Consult recommendations and Pending disposition  Discharge Plan A: Home Health                  Bing Moore PA-C  Department of Hospital Medicine   [facility]

## 2020-10-03 NOTE — HPI
"Patient is a 70 yo male w/ past medical history significant for BCC s/p Mohs procedure, recent Dx of penile carcinoma who [place] [facility] [place]02 from Urology clinic w/ concern for progressive dementia and inability to care for self.  Patient was seen in clinic by Dr. Ernandez on 09/28 for memory complaints and at the time the diagnosis of fronto-temporal dementia was brought up. Per Dr. 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  Coworkers note that he has always been a very punctual and reliable employee but did not show up to work on Sept 2 and instead presented to [facility] [place]  "creatures attacked his penis".  He was noted to have enlarged, ulcerated penis with MRI and urology consult showing concern for penile cancer but not biopsy confirmed at this time.   He was treated for infection and discharged to geriatric psych facility where he was held for two weeks prior to discharge home with home health orders for an agency that does not accept his insurance.  When asked about memory difficulties, he cites difficulty connecting objects to words for them.  Co worker retrospectively note his clocking in/out has been inconsistent over the past several months.  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  Co workers note that over the past several months he has been making some errors in recording measurements. He continues to drive and live in his own home.   Patient has recently had a 2 week stay in a geriatric psychiatric facility.   Per chart review, patient is a poor historian and some information was provided by the co-worker that accompanied the patient to the ED - patient lives at home alone in an unsafe environment.  Social work evaluation was conducted in the clinic setting and was deemed that his home was unsafe and was unsafe " "for him to return home alone.  Patient lives alone and without any family members.  Based on reports, patient has not been eating, has a 70 lb weight loss, living in an unsafe and unclean environment, and a mold growing on his toilets, sink and refrigerator.  Spoiled food in the refrigerator and no clean water. Risk for falls with multiple obstacles in living spaces.  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

## 2020-10-04 PROBLEM — G93.40 ACUTE ENCEPHALOPATHY: Status: RESOLVED | Noted: 2020-10-02 | Resolved: 2020-10-04

## 2020-10-04 PROBLEM — G93.41 ENCEPHALOPATHY, METABOLIC: Status: ACTIVE | Noted: 2020-10-03

## 2020-10-04 PROBLEM — G93.40 ENCEPHALOPATHY: Status: RESOLVED | Noted: 2020-10-03 | Resolved: 2020-10-04

## 2020-10-04 LAB
ALBUMIN SERPL BCP-MCNC: 3.3 G/DL (ref 3.5–5.2)
ALP SERPL-CCNC: 51 U/L (ref 55–135)
ALT SERPL W/O P-5'-P-CCNC: 12 U/L (ref 10–44)
ANION GAP SERPL CALC-SCNC: 7 MMOL/L (ref 8–16)
AST SERPL-CCNC: 14 U/L (ref 10–40)
BASOPHILS # BLD AUTO: 0.05 K/UL (ref 0–0.2)
BASOPHILS NFR BLD: 1 % (ref 0–1.9)
BILIRUB SERPL-MCNC: 0.4 MG/DL (ref 0.1–1)
BUN SERPL-MCNC: 14 MG/DL (ref 8–23)
CALCIUM SERPL-MCNC: 8.2 MG/DL (ref 8.7–10.5)
CHLORIDE SERPL-SCNC: 107 MMOL/L (ref 95–110)
CO2 SERPL-SCNC: 25 MMOL/L (ref 23–29)
CREAT SERPL-MCNC: 0.7 MG/DL (ref 0.5–1.4)
DIFFERENTIAL METHOD: ABNORMAL
EOSINOPHIL # BLD AUTO: 0.2 K/UL (ref 0–0.5)
EOSINOPHIL NFR BLD: 3.5 % (ref 0–8)
ERYTHROCYTE [DISTWIDTH] IN BLOOD BY AUTOMATED COUNT: 13.3 % (ref 11.5–14.5)
EST. GFR  (AFRICAN AMERICAN): >60 ML/MIN/1.73 M^2
EST. GFR  (NON AFRICAN AMERICAN): >60 ML/MIN/1.73 M^2
GLUCOSE SERPL-MCNC: 84 MG/DL (ref 70–110)
HCT VFR BLD AUTO: 36.3 % (ref 40–54)
HGB BLD-MCNC: 11.9 G/DL (ref 14–18)
IMM GRANULOCYTES # BLD AUTO: 0.01 K/UL (ref 0–0.04)
IMM GRANULOCYTES NFR BLD AUTO: 0.2 % (ref 0–0.5)
LYMPHOCYTES # BLD AUTO: 1.7 K/UL (ref 1–4.8)
LYMPHOCYTES NFR BLD: 34.8 % (ref 18–48)
MAGNESIUM SERPL-MCNC: 1.9 MG/DL (ref 1.6–2.6)
MCH RBC QN AUTO: 31.5 PG (ref 27–31)
MCHC RBC AUTO-ENTMCNC: 32.8 G/DL (ref 32–36)
MCV RBC AUTO: 96 FL (ref 82–98)
MONOCYTES # BLD AUTO: 0.4 K/UL (ref 0.3–1)
MONOCYTES NFR BLD: 7.6 % (ref 4–15)
NEUTROPHILS # BLD AUTO: 2.6 K/UL (ref 1.8–7.7)
NEUTROPHILS NFR BLD: 52.9 % (ref 38–73)
NRBC BLD-RTO: 0 /100 WBC
PHOSPHATE SERPL-MCNC: 3.5 MG/DL (ref 2.7–4.5)
PLATELET # BLD AUTO: 224 K/UL (ref 150–350)
PMV BLD AUTO: 9.7 FL (ref 9.2–12.9)
POCT GLUCOSE: 83 MG/DL (ref 70–110)
POTASSIUM SERPL-SCNC: 3.8 MMOL/L (ref 3.5–5.1)
PROT SERPL-MCNC: 5.9 G/DL (ref 6–8.4)
RBC # BLD AUTO: 3.78 M/UL (ref 4.6–6.2)
SODIUM SERPL-SCNC: 139 MMOL/L (ref 136–145)
WBC # BLD AUTO: 4.86 K/UL (ref 3.9–12.7)

## 2020-10-04 PROCEDURE — 99215 OFFICE O/P EST HI 40 MIN: CPT | Mod: ,,, | Performed by: PSYCHIATRY & NEUROLOGY

## 2020-10-04 PROCEDURE — 99226 PR SUBSEQUENT OBSERVATION CARE,LEVEL III: CPT | Mod: ,,, | Performed by: PHYSICIAN ASSISTANT

## 2020-10-04 PROCEDURE — 83735 ASSAY OF MAGNESIUM: CPT

## 2020-10-04 PROCEDURE — 96372 THER/PROPH/DIAG INJ SC/IM: CPT | Performed by: EMERGENCY MEDICINE

## 2020-10-04 PROCEDURE — 99215 PR OFFICE/OUTPT VISIT, EST, LEVL V, 40-54 MIN: ICD-10-PCS | Mod: ,,, | Performed by: PSYCHIATRY & NEUROLOGY

## 2020-10-04 PROCEDURE — 99226 PR SUBSEQUENT OBSERVATION CARE,LEVEL III: ICD-10-PCS | Mod: ,,, | Performed by: PHYSICIAN ASSISTANT

## 2020-10-04 PROCEDURE — 84100 ASSAY OF PHOSPHORUS: CPT

## 2020-10-04 PROCEDURE — 36415 COLL VENOUS BLD VENIPUNCTURE: CPT

## 2020-10-04 PROCEDURE — G0378 HOSPITAL OBSERVATION PER HR: HCPCS

## 2020-10-04 PROCEDURE — 85025 COMPLETE CBC W/AUTO DIFF WBC: CPT

## 2020-10-04 PROCEDURE — 80053 COMPREHEN METABOLIC PANEL: CPT

## 2020-10-04 PROCEDURE — 63600175 PHARM REV CODE 636 W HCPCS: Performed by: HOSPITALIST

## 2020-10-04 RX ADMIN — ENOXAPARIN SODIUM 40 MG: 40 INJECTION SUBCUTANEOUS at 05:10

## 2020-10-04 NOTE — PLAN OF CARE
No acute events overnight. Patient pending LP on 10/05.   EEG read pending.   Full note to follow on 10/05 once more clinical information is available for review.     Rosemary Ball MD  PGY-IV  General Neurology

## 2020-10-04 NOTE — SUBJECTIVE & OBJECTIVE
"Interval History: NAEON. Patient resting in bed, no complaints. He appears saddened as he has learned that his best friend contracted COVID-19 and he is not sure he is doing well (Teddy Sloares). The subject was immediately changed and we discussed his care plan here in the hospital. His repetitive questions regarding his care plan were answered. He reported that he did not like what happened the last time after he was here as he was "just sent through the city" and his "house was left for anyone to take." He states it was a bizarre experience with "big ladies" who gave shots and crazy people that kept yelling. He recalls having his bread cut and was afraid the screaming lady would move and cut his throat. We dicussed who he would want to make his medical decisions and he stated "the lady at work who runs the place." IR contacted for LP and will complete at 9AM tomorrow.    Review of Systems   Constitutional: Positive for activity change, appetite change and unexpected weight change.   HENT: Negative for trouble swallowing.    Eyes: Negative for photophobia and visual disturbance.   Respiratory: Negative for cough and shortness of breath.    Gastrointestinal: Negative for nausea and vomiting.   Genitourinary: Negative for difficulty urinating and dysuria.   Neurological: Positive for speech difficulty. Negative for weakness.   Psychiatric/Behavioral: Positive for behavioral problems, confusion and decreased concentration. Negative for hallucinations and sleep disturbance. The patient is not hyperactive.      Objective:     Vital Signs (Most Recent):  Temp: 98 °F (36.7 °C) (10/04/20 1140)  Pulse: 67 (10/04/20 1140)  Resp: 18 (10/04/20 1140)  BP: 122/67 (10/04/20 1140)  SpO2: (!) 94 % (10/04/20 1140) Vital Signs (24h Range):  Temp:  [96.8 °F (36 °C)-98 °F (36.7 °C)] 98 °F (36.7 °C)  Pulse:  [55-67] 67  Resp:  [16-20] 18  SpO2:  [94 %-97 %] 94 %  BP: (119-128)/(67-72) 122/67     Weight: 70.4 kg (155 lb 3.3 oz)  Body " mass index is 22.27 kg/m².    Intake/Output Summary (Last 24 hours) at 10/4/2020 1345  Last data filed at 10/3/2020 1716  Gross per 24 hour   Intake 480 ml   Output --   Net 480 ml      Physical Exam  Vitals signs and nursing note reviewed.   Constitutional:       General: He is not in acute distress.     Appearance: He is not ill-appearing.   HENT:      Head: Normocephalic and atraumatic.      Nose: Nose normal.      Mouth/Throat:      Mouth: Mucous membranes are moist.   Eyes:      Extraocular Movements: Extraocular movements intact.      Pupils: Pupils are equal, round, and reactive to light.   Cardiovascular:      Rate and Rhythm: Normal rate and regular rhythm.   Pulmonary:      Effort: Pulmonary effort is normal. No respiratory distress.   Abdominal:      General: Abdomen is flat. There is no distension.      Palpations: Abdomen is soft.   Musculoskeletal: Normal range of motion.         General: No swelling.   Skin:     General: Skin is warm and dry.      Coloration: Skin is not jaundiced.   Neurological:      General: No focal deficit present.      Mental Status: He is alert. He is disoriented.      Sensory: No sensory deficit.      Motor: No weakness.   Psychiatric:         Mood and Affect: Mood normal.         Speech: Speech normal.         Behavior: Behavior is cooperative.         Cognition and Memory: Cognition is impaired. Memory is impaired. He exhibits impaired recent memory.         Significant Labs: All pertinent labs within the past 24 hours have been reviewed.    Significant Imaging: I have reviewed all pertinent imaging results/findings within the past 24 hours.

## 2020-10-04 NOTE — PROGRESS NOTES
[facility] Hospital Medicine  Progress Note    Patient Name: Juan Hobson  MRN: [id number]  Patient Class: OP- Observation   Admission Date: 10/2/2020  Length of Stay: 0 days  Attending Physician: Audi Arcos MD  Primary Care Provider: Mehnaz Barillas MD    [facility] Medicine Team: [facility] HOSP MED Y Bing Moore PA-C    Subjective:     Principal Problem:Encephalopathy        HPI:  Per Adriana Badillo MD:   Mr. Juan Hobson is a 69 y.o. male with recently diagnosed penile cancer, who presents to the ER from Urology clinic with concern for self neglect, and need for Neurology evaluation of dementia and possible placement.  The patient mentions that he has been having some issues with his memory.  He mostly orders takeout for meals because it is easier, and still drives to work.  He feels like he is able to take care of himself, and does not need to go to a facility.      Per note from Lianne German LCSW on 10/2:  Received consult from Dr. Monterroso re: assessing patient in clinic this afternoon and care recommendations as patient did not seem competent to make decisions and give consent. He has no family and no legal next of kin (POA or guardian).   Met individually with patient, and with his co-worker Janna Go ([phone], ext. [phone]) who brought him to his clinic appointment today. Patient unable to relate basic demographic information or clearly explain other information. For example, when asked his birth date he gave the year [birth year], then said 9, and then said the second to last, it begins with N; he said his street name but couldn't give the house number where he has lived for years; unable to say what type of work or where he worked prior to the [employer] in [facility] for the past 12 years; he described being taken in a truck to that other place (referring to the recent [facility] stay). He has awareness that he is having memory/cognitive issues but stated  he can manage for himself at home and that he knows there is a lot of work that needs to be done in his home that he hasn't gotten to. He inherited the home after his mother .      Mr. Go and another coworker Juice Martinez ([phone]) and their supervisor Mei Bell ([phone]) have been trying to help him manage things with his work and personal matters as they have observed his deficits and know he has no one to help him. They have helped him keep up with recent medical appointments and accompanied and transported him.  Mr. Go reports concern for his safety and ability to care for himself after seeing his home with clutter throughout and things piled up, mold and old food in the refrigerator, mold in the toilet, clothes all over, etc. Patient has been driving himself to and from work and to get food at places near his home, but unable to navigate to unfamiliar places. They have observed his cognitive deficits at work and he has made errors, so they have modified his job and his supervisor has been working with him to try to get FMLA and his leave/FCI in place so he doesn't lose his benefits. They have had to help him with paperwork, bill paying, etc., because he can't complete these on his own.         Upon arrival to the ER, vitals were temp 97.8F, HR 59 and /84.  Labs were unremarkable  He was admitted to Hospital Medicine for Neurology and SW evaluation.    Overview/Hospital Course:  69 y.o. who was admitted to hospital medicine for worsening cognitive decline and inability to care for self. Labs/infectious work-up grossly unremarkable. Neurology consulted and MRI obtained on admission with concern for encephalitis (increased FLAIR hyperintensity involving the bilateral mesial temporal lobes which can be seen in the setting of autoimmune limbic encephalitis in the appropriate clinical setting). LP recommended and attempted at bedside however not successful 2/2 to KEMI.  "Interventional radiology consulted, procedure scheduled for 9AM tomorrow morning.     Interval History: NAEON. Patient resting in bed, no complaints. He appears saddened as he has learned that his best friend contracted COVID-19 and he is not sure he is doing well (Teddy Solares). The subject was immediately changed and we discussed his care plan here in the hospital. His repetitive questions regarding his care plan were answered. He reported that he did not like what happened the last time after he was here as he was "just sent through the city" and his "house was left for anyone to take." He states it was a bizarre experience with "big ladies" who gave shots and crazy people that kept yelling. He recalls having his bread cut and was afraid the screaming lady would move and cut his throat. We dicussed who he would want to make his medical decisions and he stated "the lady at work who runs the place." IR contacted for LP and will complete at 9AM tomorrow.    Review of Systems   Constitutional: Positive for activity change, appetite change and unexpected weight change.   HENT: Negative for trouble swallowing.    Eyes: Negative for photophobia and visual disturbance.   Respiratory: Negative for cough and shortness of breath.    Gastrointestinal: Negative for nausea and vomiting.   Genitourinary: Negative for difficulty urinating and dysuria.   Neurological: Positive for speech difficulty. Negative for weakness.   Psychiatric/Behavioral: Positive for behavioral problems, confusion and decreased concentration. Negative for hallucinations and sleep disturbance. The patient is not hyperactive.      Objective:     Vital Signs (Most Recent):  Temp: 98 °F (36.7 °C) (10/04/20 1140)  Pulse: 67 (10/04/20 1140)  Resp: 18 (10/04/20 1140)  BP: 122/67 (10/04/20 1140)  SpO2: (!) 94 % (10/04/20 1140) Vital Signs (24h Range):  Temp:  [96.8 °F (36 °C)-98 °F (36.7 °C)] 98 °F (36.7 °C)  Pulse:  [55-67] 67  Resp:  [16-20] 18  SpO2:  [94 " %-97 %] 94 %  BP: (119-128)/(67-72) 122/67     Weight: 70.4 kg (155 lb 3.3 oz)  Body mass index is 22.27 kg/m².    Intake/Output Summary (Last 24 hours) at 10/4/2020 1345  Last data filed at 10/3/2020 1716  Gross per 24 hour   Intake 480 ml   Output --   Net 480 ml      Physical Exam  Vitals signs and nursing note reviewed.   Constitutional:       General: He is not in acute distress.     Appearance: He is not ill-appearing.   HENT:      Head: Normocephalic and atraumatic.      Nose: Nose normal.      Mouth/Throat:      Mouth: Mucous membranes are moist.   Eyes:      Extraocular Movements: Extraocular movements intact.      Pupils: Pupils are equal, round, and reactive to light.   Cardiovascular:      Rate and Rhythm: Normal rate and regular rhythm.   Pulmonary:      Effort: Pulmonary effort is normal. No respiratory distress.   Abdominal:      General: Abdomen is flat. There is no distension.      Palpations: Abdomen is soft.   Musculoskeletal: Normal range of motion.         General: No swelling.   Skin:     General: Skin is warm and dry.      Coloration: Skin is not jaundiced.   Neurological:      General: No focal deficit present.      Mental Status: He is alert. He is disoriented.      Sensory: No sensory deficit.      Motor: No weakness.   Psychiatric:         Mood and Affect: Mood normal.         Speech: Speech normal.         Behavior: Behavior is cooperative.         Cognition and Memory: Cognition is impaired. Memory is impaired. He exhibits impaired recent memory.         Significant Labs: All pertinent labs within the past 24 hours have been reviewed.    Significant Imaging: I have reviewed all pertinent imaging results/findings within the past 24 hours.      Assessment/Plan:      * Encephalopathy, metabolic  69 y.o. with progressive cognitive decline and inability to adequately care for self. Presented in September 2020, to the ED stating there were creatures attacking his penis. During that admission  the was diagnosed with penile cancer however his repeated delusions and reports from coworkers regarding his inability to care for self, competed ADLs, etc. Prompted  evaluation by psychiatry and found that it is likely related to dementia after getting collateral from family/friends. He was PEC'd the night of admission after wanting to leave and then discharged to a geropsychiatry facility for 2 weeks. Seen in neuro clinic where there was mention of suspected frontotemporal dementia and further work-up ensued. Presented to a urology appt on 10/2 and was advised to present to the ED due to concern for competency and need for patient advocate/POA before a biopsy or form of treatment can be initiated.     LP tomorrow at 9AM  - Concern that the patient is unable to care for himself and make his own decisions, however the patient feels like he is able to care for himself  - MRI brain with Possible subtle increased FLAIR hyperintensity involving the bilateral mesial temporal lobes which can be seen in the setting of autoimmune limbic encephalitis  - consulted anesthesia for LP; neuro to place CSF orders   - EEG, pending read  - Vitamin B1, Vitamin B6, Vitamin B12, HIV, RPR, TSH ordered   - afebrile, without leukocytosis -- infectious work-up unremarkable   - Neurology consulted  - LP attempted by Anesthesia at bedside, but due to DJD, unable to obtain fluid x 2 levels   - Patient will require an IR LP  - Paraneoplastic panel, serum in place from outpatient neurology visit on 09/28  - Pending LP to assess CSF for signs of inflammatory markers  - Possibility for Autoimmune vs Paraneoplastic process in the setting of active malignancy  - CM/SW consult  - Pending evaluation, might need Psych to determine if he is gravely disabled and requiring a PEC plus/minus CXR and PPD         DELIRIUM BEHAVIOR MANAGEMENT  - Minimize use of restraints; if physical restraints necessary, please utilize medical/chemical prns for  agitation.  - Keep shades open and room lit during day and room dim at night in order to promote healthy circadian rhythms.  - Encourage family at bedside  - Keep whiteboard in patient's room current with the date and name of the members of patient's team for easy patient self re-orientation.  - Avoid benzodiazepines, antihistamines, anticholinergics, hypnotics, and minimize opiates while controlling for pain as these medications may exacerbate delirium.      Impaired decision making  Presented to a urology appt on 10/2 and was advised to present to the ED due to concern for competency and need for patient advocate/POA before a biopsy or form of treatment can be initiated.     [place] R.S. 40:[id number]: (9) Upon the inability of any adult to consent for himself and in the absence of any person listed in Paragraphs (2) through (8) of this Subsection, an adult friend of the patient. For purposes of this Subsection to consent, adult friend means an adult who has exhibited special care and concern for the patient, who is generally familiar with the patient's health care views and desires, and who is willing and able to become involved in the patient's health care decisions and to act in the patient's best interest. The adult friend shall sign and date an acknowledgment form provided by the hospital or other health care facility in which the patient is located for placement in the patient's records certifying that he or she meets such criteria.    -  Juice Martinez ([phone]) does not wish to make decisions on his behalf  - supervisor Mei Bell ([phone]) have been trying to help him manage things with his work and personal matters as they have observed his deficits and know he has no one to help him - these individuals may be used for consent   - PEC'd for grave disability during last admission and discharged to [facility] however no formal eval by in-patient psychiatry  - will need formal evaluation        Penile carcinoma  - follows with urology     VTE Risk Mitigation (From admission, onward)         Ordered     enoxaparin injection 40 mg  Every 24 hours      10/02/20 2042     IP VTE HIGH RISK PATIENT  Once      10/02/20 2042                Discharge Planning   ALFRED: 10/5/2020     Code Status: Full Code   Is the patient medically ready for discharge?:     Reason for patient still in hospital (select all that apply): Patient trending condition, Laboratory test, Imaging, Consult recommendations and Pending disposition  Discharge Plan A: [facility]                  Bing Moore PA-C  Department of Hospital Medicine   [facility]

## 2020-10-04 NOTE — ASSESSMENT & PLAN NOTE
69 y.o. with progressive cognitive decline and inability to adequately care for self. Presented in September 2020, to the ED stating there were creatures attacking his penis. 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 Prompted  evaluation by psychiatry and found that it is likely related to dementia after getting collateral from family/friends. He was PEC'd the night of admission after wanting to leave and then discharged to a geropsychiatry facility for 2 weeks. Seen in neuro clinic where there was mention of suspected frontotemporal dementia and further work-up ensued. Presented to a urology appt on 10/2 and was advised to present to the ED due to concern for competency and need for patient advocate/POA before a biopsy or form of treatment can be initiated.     LP tomorrow at 9AM  - Concern that the patient is unable to care for himself and make his own decisions, however the patient feels like he is able to care for himself  - MRI brain with Possible subtle increased FLAIR hyperintensity involving the bilateral mesial temporal lobes which can be seen in the setting of autoimmune limbic encephalitis  - consulted anesthesia for LP; neuro to place CSF orders   - EEG, pending read  - Vitamin B1, Vitamin B6, Vitamin B12, HIV, RPR, TSH ordered   - afebrile, without leukocytosis -- infectious work-up unremarkable   - Neurology consulted  - LP attempted by Anesthesia at bedside, but due to DJD, unable to obtain fluid x 2 levels   - Patient will require an IR LP  - Paraneoplastic panel, serum in place from outpatient neurology visit on 09/28  - Pending LP to assess CSF for signs of inflammatory markers  - Possibility for Autoimmune vs Paraneoplastic process in the setting of active malignancy  - CM/SW consult  - Pending evaluation, might need Psych to determine if he is gravely disabled  and requiring a PEC plus/minus CXR and PPD         DELIRIUM BEHAVIOR MANAGEMENT  - Minimize use of restraints; if physical restraints necessary, please utilize medical/chemical prns for agitation.  - Keep shades open and room lit during day and room dim at night in order to promote healthy circadian rhythms.  - Encourage family at bedside  - Keep whiteboard in patient's room current with the date and name of the members of patient's team for easy patient self re-orientation.  - Avoid benzodiazepines, antihistamines, anticholinergics, hypnotics, and minimize opiates while controlling for pain as these medications may exacerbate delirium.

## 2020-10-04 NOTE — ASSESSMENT & PLAN NOTE
Presented to a urology appt on 10/2 and was advised to present to the ED due to concern for competency and need for patient advocate/POA before a biopsy or form of treatment can be initiated.     Gina KEVIN 40:[id number]: (9) Upon the inability of any adult to consent for himself and in the absence of any person listed in Paragraphs (2) through (8) of this Subsection, an adult friend of the patient. For purposes of this Subsection to consent, adult friend means an adult who has exhibited special care and concern for the patient, who is generally familiar with the patient's health care views and desires, and who is willing and able to become involved in the patient's health care decisions and to act in the patient's best interest. The adult friend shall sign and date an acknowledgment form provided by the hospital or other health care facility in which the patient is located for placement in the patient's records certifying that he or she meets such criteria.    -  Juice Martinez ([phone]) does not wish to make decisions on his behalf  - supervisor Mei Bell ([phone]) have been trying to help him manage things with his work and personal matters as they have observed his deficits and know he has no one to help him - these individuals may be used for consent   - PEC'd for grave disability during last admission and discharged to [facility] however no formal eval by in-patient psychiatry  - will need formal evaluation

## 2020-10-04 NOTE — PLAN OF CARE
Pt AAOx1; Disoriented to time, place and situation. Plan of care reviewed with patient.    Medications reviewed and administered as ordered. Rounding for safety and patient care per policy.  Safety precautions maintained. Call light within reach, bed wheels locked, bed in lowest position, bed alarm on, side rails ^x2, safety maintained. NADN, Will continue monitor.    Problem: Adult Inpatient Plan of Care  Goal: Plan of Care Review  Outcome: Ongoing, Progressing  Flowsheets (Taken 10/4/2020 3023)  Plan of Care Reviewed With: patient  Goal: Absence of Hospital-Acquired Illness or Injury  Outcome: Ongoing, Progressing  Goal: Optimal Comfort and Wellbeing  Outcome: Ongoing, Progressing  Goal: Readiness for Transition of Care  Outcome: Ongoing, Progressing  Goal: Rounds/Family Conference  Outcome: Ongoing, Progressing

## 2020-10-05 ENCOUNTER — DOCUMENTATION ONLY (OUTPATIENT)
Dept: HEMATOLOGY/ONCOLOGY | Facility: CLINIC | Age: 70
End: 2020-10-05

## 2020-10-05 LAB
ALBUMIN SERPL BCP-MCNC: 3.4 G/DL (ref 3.5–5.2)
ALP SERPL-CCNC: 52 U/L (ref 55–135)
ALT SERPL W/O P-5'-P-CCNC: 13 U/L (ref 10–44)
AMPHIPHYSIN AB TITR SER: NEGATIVE TITER
ANION GAP SERPL CALC-SCNC: 10 MMOL/L (ref 8–16)
AST SERPL-CCNC: 15 U/L (ref 10–40)
BASOPHILS # BLD AUTO: 0.04 K/UL (ref 0–0.2)
BASOPHILS NFR BLD: 1.1 % (ref 0–1.9)
BILIRUB SERPL-MCNC: 0.6 MG/DL (ref 0.1–1)
BUN SERPL-MCNC: 15 MG/DL (ref 8–23)
CALCIUM SERPL-MCNC: 8.3 MG/DL (ref 8.7–10.5)
CHLORIDE SERPL-SCNC: 106 MMOL/L (ref 95–110)
CLARITY CSF: CLEAR
CO2 SERPL-SCNC: 22 MMOL/L (ref 23–29)
COLOR CSF: COLORLESS
CREAT SERPL-MCNC: 0.7 MG/DL (ref 0.5–1.4)
CRYPTOC AG CSF QL LA: NEGATIVE
CV2 IGG TITR SER: NEGATIVE TITER
DIFFERENTIAL METHOD: ABNORMAL
EOSINOPHIL # BLD AUTO: 0.1 K/UL (ref 0–0.5)
EOSINOPHIL NFR BLD: 3.2 % (ref 0–8)
ERYTHROCYTE [DISTWIDTH] IN BLOOD BY AUTOMATED COUNT: 13.2 % (ref 11.5–14.5)
EST. GFR  (AFRICAN AMERICAN): >60 ML/MIN/1.73 M^2
EST. GFR  (NON AFRICAN AMERICAN): >60 ML/MIN/1.73 M^2
FUNGUS SPEC CULT: NORMAL
GLIAL NUC TYPE 1 AB TITR SER: NEGATIVE TITER
GLUCOSE CSF-MCNC: 52 MG/DL (ref 40–70)
GLUCOSE SERPL-MCNC: 84 MG/DL (ref 70–110)
HCT VFR BLD AUTO: 38.5 % (ref 40–54)
HGB BLD-MCNC: 12.5 G/DL (ref 14–18)
HU1 AB TITR SER: NEGATIVE TITER
HU2 AB TITR SER IF: NEGATIVE TITER
HU3 AB TITR SER: NEGATIVE TITER
IMM GRANULOCYTES # BLD AUTO: 0.01 K/UL (ref 0–0.04)
IMM GRANULOCYTES NFR BLD AUTO: 0.3 % (ref 0–0.5)
IMMUNOLOGIST REVIEW: NORMAL
LYMPHOCYTES # BLD AUTO: 1.1 K/UL (ref 1–4.8)
LYMPHOCYTES NFR BLD: 29.4 % (ref 18–48)
LYMPHOCYTES NFR CSF MANUAL: 50 % (ref 40–80)
MAGNESIUM SERPL-MCNC: 1.9 MG/DL (ref 1.6–2.6)
MCH RBC QN AUTO: 31.2 PG (ref 27–31)
MCHC RBC AUTO-ENTMCNC: 32.5 G/DL (ref 32–36)
MCV RBC AUTO: 96 FL (ref 82–98)
MONOCYTES # BLD AUTO: 0.3 K/UL (ref 0.3–1)
MONOCYTES NFR BLD: 8.4 % (ref 4–15)
MONOS+MACROS NFR CSF MANUAL: 35 % (ref 15–45)
NACHR AB SER-SCNC: 0 NMOL/L
NEUTROPHILS # BLD AUTO: 2.1 K/UL (ref 1.8–7.7)
NEUTROPHILS NFR BLD: 57.6 % (ref 38–73)
NEUTROPHILS NFR CSF MANUAL: 15 % (ref 0–6)
NRBC BLD-RTO: 0 /100 WBC
PAVAL REFLEX TEST ADDED: NORMAL
PCA-1 AB TITR SER: NEGATIVE TITER
PCA-2 AB TITR SER: NEGATIVE TITER
PCA-TR AB TITR SER: NEGATIVE TITER
PHOSPHATE SERPL-MCNC: 3.3 MG/DL (ref 2.7–4.5)
PLATELET # BLD AUTO: 236 K/UL (ref 150–350)
PMV BLD AUTO: 9.8 FL (ref 9.2–12.9)
POTASSIUM SERPL-SCNC: 3.9 MMOL/L (ref 3.5–5.1)
PROT CSF-MCNC: 71 MG/DL (ref 15–40)
PROT SERPL-MCNC: 6 G/DL (ref 6–8.4)
RBC # BLD AUTO: 4.01 M/UL (ref 4.6–6.2)
RBC # CSF: 190 /CU MM
RPR SER QL: NORMAL
SODIUM SERPL-SCNC: 138 MMOL/L (ref 136–145)
SPECIMEN VOL CSF: 0.5 ML
STRIA MUS AB TITR SER: NEGATIVE TITER
VGCC-N BIND AB SER-SCNC: 0 NMOL/L
VGCC-P/Q BIND AB SER-SCNC: 0 NMOL/L
VGKC AB SER-SCNC: 0 NMOL/L
WBC # BLD AUTO: 3.71 K/UL (ref 3.9–12.7)
WBC # CSF: 1 /CU MM (ref 0–5)

## 2020-10-05 PROCEDURE — 80053 COMPREHEN METABOLIC PANEL: CPT

## 2020-10-05 PROCEDURE — 89051 BODY FLUID CELL COUNT: CPT

## 2020-10-05 PROCEDURE — 83735 ASSAY OF MAGNESIUM: CPT

## 2020-10-05 PROCEDURE — 84100 ASSAY OF PHOSPHORUS: CPT

## 2020-10-05 PROCEDURE — 36415 COLL VENOUS BLD VENIPUNCTURE: CPT

## 2020-10-05 PROCEDURE — 87529 HSV DNA AMP PROBE: CPT

## 2020-10-05 PROCEDURE — 87498 ENTEROVIRUS PROBE&REVRS TRNS: CPT

## 2020-10-05 PROCEDURE — 99223 1ST HOSP IP/OBS HIGH 75: CPT | Mod: ,,, | Performed by: PSYCHIATRY & NEUROLOGY

## 2020-10-05 PROCEDURE — 96372 THER/PROPH/DIAG INJ SC/IM: CPT | Performed by: EMERGENCY MEDICINE

## 2020-10-05 PROCEDURE — 85025 COMPLETE CBC W/AUTO DIFF WBC: CPT

## 2020-10-05 PROCEDURE — 87070 CULTURE OTHR SPECIMN AEROBIC: CPT

## 2020-10-05 PROCEDURE — G0378 HOSPITAL OBSERVATION PER HR: HCPCS

## 2020-10-05 PROCEDURE — 99226 PR SUBSEQUENT OBSERVATION CARE,LEVEL III: ICD-10-PCS | Mod: ,,, | Performed by: PHYSICIAN ASSISTANT

## 2020-10-05 PROCEDURE — 99226 PR SUBSEQUENT OBSERVATION CARE,LEVEL III: CPT | Mod: ,,, | Performed by: PHYSICIAN ASSISTANT

## 2020-10-05 PROCEDURE — 82945 GLUCOSE OTHER FLUID: CPT

## 2020-10-05 PROCEDURE — 87205 SMEAR GRAM STAIN: CPT

## 2020-10-05 PROCEDURE — 82164 ANGIOTENSIN I ENZYME TEST: CPT

## 2020-10-05 PROCEDURE — 84157 ASSAY OF PROTEIN OTHER: CPT

## 2020-10-05 PROCEDURE — 63600175 PHARM REV CODE 636 W HCPCS: Performed by: HOSPITALIST

## 2020-10-05 PROCEDURE — 86403 PARTICLE AGGLUT ANTBDY SCRN: CPT

## 2020-10-05 PROCEDURE — 86255 FLUORESCENT ANTIBODY SCREEN: CPT

## 2020-10-05 PROCEDURE — 99223 PR INITIAL HOSPITAL CARE,LEVL III: ICD-10-PCS | Mod: ,,, | Performed by: PSYCHIATRY & NEUROLOGY

## 2020-10-05 PROCEDURE — 25000003 PHARM REV CODE 250: Performed by: INTERNAL MEDICINE

## 2020-10-05 RX ORDER — LIDOCAINE HYDROCHLORIDE 10 MG/ML
2 INJECTION INFILTRATION; PERINEURAL ONCE
Status: COMPLETED | OUTPATIENT
Start: 2020-10-05 | End: 2020-10-05

## 2020-10-05 RX ADMIN — ENOXAPARIN SODIUM 40 MG: 40 INJECTION SUBCUTANEOUS at 04:10

## 2020-10-05 RX ADMIN — LIDOCAINE HYDROCHLORIDE 2 ML: 10 INJECTION, SOLUTION INFILTRATION; PERINEURAL at 10:10

## 2020-10-05 NOTE — HPI
"Consultation-Liaison Psychiatry Consult Note     10/5/2020 4:32 PM  Juan Hobson  MRN: [id number]     Chief Complaint / Reason for Consult: capacity to refuse long term placement     SUBJECTIVE      History of Present Illness:   Juan Hobson is a 69 y.o. male with no past psychiatric history currently presenting for of dementia, inability to care for self.  Psychiatry was originally consulted to address the patient's symptoms of capacity to refuse long term placement.     Per Primary MD:  Mr. Juan Hobson is a 69 y.o. male with recently diagnosed penile cancer, who presents to the ER from Urology clinic with concern for self neglect, and need for Neurology evaluation of dementia and possible placement.  The patient mentions that he has been having some issues with his memory.  He mostly orders takeout for meals because it is easier, and still drives to work.  He feels like he is able to take care of himself, and does not need to go to a facility.      Per Neurology:  Patient is a 70 yo male w/ past medical history significant for BCC s/p Mohs procedure, recent Dx of penile carcinoma who presented to [facility] on 10/02 from Urology clinic w/ concern for progressive dementia and inability to care for self.  Patient was seen in clinic by Dr. Ernandez on 09/28 for memory complaints and at the time the diagnosis of fronto-temporal dementia was brought up. Per Dr. 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  Coworkers note that he has always been a very punctual and reliable employee but did not show up to work on Sept 2 and instead presented to [facility] reporting  "creatures attacked his penis".  He was noted to have enlarged, ulcerated penis with MRI and urology consult showing concern for penile cancer but not biopsy confirmed at this time.   He was treated for infection and discharged to geriatric " "psych facility where he was held for two weeks prior to discharge home with home health orders for an agency that does not accept his insurance.  When asked about memory difficulties, he cites difficulty connecting objects to words for them.  Co worker retrospectively note his clocking in/out has been inconsistent over the past several months.  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  Co workers note that over the past several months he has been making some errors in recording measurements. He continues to drive and live in his own home.   Patient has recently had a 2 week stay in a geriatric psychiatric facility.   Per chart review, patient is a poor historian and some information was provided by the co-worker that accompanied the patient to the ED - patient lives at home alone in an unsafe environment.  Social work evaluation was conducted in the clinic setting and was deemed that his home was unsafe and was unsafe for him to return home alone.  Patient lives alone and without any family members.  Based on reports, patient has not been eating, has a 70 lb weight loss, living in an unsafe and unclean environment, and a mold growing on his toilets, sink and refrigerator.  Spoiled food in the refrigerator and no clean water. Risk for falls with multiple obstacles in living spaces.  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  Reports he is here to get help regarding his memory problems.  Pt display gross memory deficits.  He is unable to remember the names of various objects such as a pen.  On memory testing, he explains that he's never heard of such things before when asked to explain the similarities between an apple and an orange.  He is unable to name any presidents of the [place]  He speaks in generalities " "and is unable to provide specifics.  He cannot remember then names of any of his doctors or "lumbar puncture" as name of the procedure he had today despite just being reminded on the name of the procedure 2 minutes ago.   0/3 objects remembered on memory testing.       Per chart review, patients decline has been ongoing for over a year.  He scored 15/30 on MOCA at outpatient neurology appointment on 9/28/2020.  Pt has no family or POA to help make decisions for him.  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       Patient denies psychiatric symptoms at this time.  No objective signs of psychosis, damian or paranoia.  He does not openly endorse any delusional thought content although topics regarding his penile lesions/cancer were not broached.  Pt reports he is hoping the LP would help with his memory problems.  He is able to recognize that he is having memory difficulty and having trouble putting his thoughts into words.  He states that he would like to return home and try to go back to work to see if he can manage before deciding on any long term placement.              Psychiatric Review Of Systems - Is patient experiencing or having changes in:  sleep: no  appetite: no  weight: no  energy/anergy: no  interest/pleasure/anhedonia: no  somatic symptoms: no  guilty/hopelessness: no  concentration: no  S.I.B.s/risky behavior: no  SI/SA:  no     anxiety/panic: no  Agoraphobia:  no  Social phobia:  no  Recurrent nightmares:  no  hyper startle response:  no  Avoidance: no  Recurrent thoughts:  no  Recurrent behaviors:  no     Irritability: no  Racing thoughts: no  Impulsive behaviors: no  Pressured speech:  no     Paranoia:no  Delusions: yes  AVH:no     Pulled from chart review, updated where appropriate:  Psychiatric History:  Diagnose(s): No  Previous Medication Trials: No  Previous Psychiatric Hospitalizations: No  Family Psychiatric History: No  Outpatient " Psychiatrist: No  Outpatient Therapist: No     Suicide/Violence Risk Assessment:  Current/active suicidal ideation/plan/intent: No  Previous suicide attempts: No  Current/active homicidal ideation/plan/intent: No  History of threats/arrests associated with violent conduct - No  Access to firearms/lethal weapons - No     Social History:  Marital Status: single  Children: 0   Employment Status: currently employed  Education: college graduate  Special Ed: no  Housing Status: Yes - alone, house  Developmental History: No  History of Abuse: No     Substance Abuse History:  Recreational Drugs: denies  Use of Alcohol: denied  Rehab History: No  Tobacco Use: No  Use of Caffeine: No  Use of OTC: No  Is the patient aware of the biomedical complications associated with substance abuse and mental illness? yes  Legal consequences of chemical use: Yes      Legal History:  Past Charges/Incarcerations: No  Pending Charges: No     Psychosocial Factors:  Stressors: health.   Functioning Relationships: alone & isolated     Collateral:   No           Collateral:   No     Medical Review Of Systems:  Pertinent items noted in HPI     Scheduled Meds:   enoxaparin  40 mg Subcutaneous Q24H      acetaminophen, melatonin, ondansetron, promethazine (PHENERGAN) IVPB, senna-docusate 8.6-50 mg, sodium chloride 0.9%      Psychotherapeutics (From admission, onward)     None          PRN Meds:  acetaminophen, melatonin, ondansetron, promethazine (PHENERGAN) IVPB, senna-docusate 8.6-50 mg, sodium chloride 0.9%  Home Meds:  Prior to Admission medications    Not on File      Allergies:  Patient has no known allergies.  Past Medical/Surgical History:       Past Medical History:   Diagnosis Date    Basal cell carcinoma       right helix    Hematuria      Seborrheic dermatitis      Skin cancer of arm              Past Surgical History:   Procedure Laterality Date    INCISION AND DRAINAGE INTRA ORAL ABSCESS

## 2020-10-05 NOTE — PROCEDURES
Radiology Post-Procedure Note    Pre Op Diagnosis: suspected encephalitis     Post Op Diagnosis: Same    Procedure: lumbar puncture    Procedure performed by: Huyen Kelly MD    Radiology staff: Lavell Dixon MD    Written Informed Consent Obtained: Yes    Specimen Removed: 15 mL CSF    Estimated Blood Loss: Minimal    Findings: Following written informed consent and sterile prep and drape, a 22 gauge spinal needle was inserted at L5-S1 intralaminar space under fluoroscopic surveillance.  15 mL clear CSF removed and sent to the lab for further analysis.  There were no complications.    Patient tolerated procedure well.    Huyen Kelly  Diagnostic and interventional radiology  [phone]

## 2020-10-05 NOTE — PROGRESS NOTES
Spoke with patient's assigned  Sharon Diaz re: patient's case and confirmed that she reads my clinic note from Friday PM. She has spoken with the physician and is following for discharge planning on this patient.

## 2020-10-05 NOTE — H&P
Inpatient Radiology Pre-procedure Note    History of Present Illness:  Juan Hobson is a 69 y.o. male who presents for LP for suspected encephalitis.    Admission H&P reviewed.  Past Medical History:   Diagnosis Date    Basal cell carcinoma     right helix    Hematuria     Seborrheic dermatitis     Skin cancer of arm      Past Surgical History:   Procedure Laterality Date    INCISION AND DRAINAGE INTRA ORAL ABSCESS         Review of Systems:   As documented in primary team H&P    Home Meds:   Prior to Admission medications    Not on File     Scheduled Meds:    enoxaparin  40 mg Subcutaneous Q24H     Continuous Infusions:   PRN Meds:acetaminophen, melatonin, ondansetron, promethazine (PHENERGAN) IVPB, senna-docusate 8.6-50 mg, sodium chloride 0.9%  Anticoagulants/Antiplatelets: no anticoagulation    Allergies: Review of patient's allergies indicates:  No Known Allergies  Sedation Hx: have not been any systemic reactions    Labs:  No results for input(s): INR in the last 168 hours.    Invalid input(s):  PT,  PTT    Recent Labs   Lab 10/05/20  0705   WBC 3.71*   HGB 12.5*   HCT 38.5*   MCV 96         Recent Labs   Lab 10/05/20  0705   GLU 84      K 3.9      CO2 22*   BUN 15   CREATININE 0.7   CALCIUM 8.3*   MG 1.9   ALT 13   AST 15   ALBUMIN 3.4*   BILITOT 0.6         Vitals:  Temp: 97.1 °F (36.2 °C) (10/05/20 0714)  Pulse: (!) 57 (10/05/20 0714)  Resp: 16 (10/05/20 0714)  BP: 138/67 (10/05/20 0714)  SpO2: 96 % (10/05/20 0714)     Physical Exam:  ASA: 2  Mallampati: 2    General: no acute distress  Mental Status: alert and oriented to person, place and time  HEENT: normocephalic, atraumatic  Chest: unlabored breathing  Heart: regular heart rate  Abdomen: nondistended  Extremity: moves all extremities    Plan: LP  Sedation Plan: None    Huyne Kelly  Diagnostic and interventional radiology  [phone]

## 2020-10-05 NOTE — ASSESSMENT & PLAN NOTE
-SW/CM following.  Appreciate SW and CM assistance with this case  - POA needs to be established

## 2020-10-05 NOTE — PLAN OF CARE
Notified of pt's situation by , EDITA Diaz RN, CM.  CM supervisor called legal and spoke with , CANDICE Samayoa.  Informed her of situation, patient with confusion, no family, no POA, and already reported to EPS.  Advised to reach out to patient's colleagues/ friends to see if anyone would be comfortable and able to become involved in patient's health care decisions and act in his best interest.  Also, suggested follow-up with state/ EPS referral on status.  Informed pt's CM of above.   Will continue to follow.

## 2020-10-05 NOTE — PROGRESS NOTES
Received voice mail message left by staff with EPS at 9:51 AM while I was on another call. The unidentified staff member said that she was returning my call and I could call back at [phone]. Called EPS and left a voice mail message.

## 2020-10-05 NOTE — ASSESSMENT & PLAN NOTE
"   ASSESSMENT      Juan Hobson is a 69 y.o. male with history of dementia, currently presenting with Encephalopathy, metabolic.  Psychiatry was originally consulted to address the patient's symptoms of capacity to refuse placement.     Pt display gross memory deficits.  He is unable to remember the names of various objects such as a pen.  On memory testing, he explains that he's never heard of such things before when asked to explain the similarities between an apple and an orange.  He is unable to name any presidents of the [place]  He speaks in generalities and is unable to provide specifics.  He cannot remember then names of any of his doctors or "lumbar puncture" as name of the procedure he had today despite just being reminded on the name of the procedure 2 minutes ago.   0/3 objects remembered on memory testing.       Per chart review, patients decline has been ongoing for over a year.  He scored 15/30 on MOCA at outpatient neurology appointment on 9/28/2020.  Pt has no family or POA to help make decisions for him.  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      IMPRESSION     Major Neurocognitive Disorder - Dementia        RECOMMENDATION(S)       1. Scheduled Medication(s):  none     2. PRN Medication(s):  none     3. Legal Status/Precaution(s):  No indication for PEC/CEC at this time.       5. Capacity:  CAPACITY  Pt at this time doesn't have full medical decision making capacity to refused proposed disposition plan.   Pt doesn't have next of kin unfortunately other than his coworkers who take care of him essentially.     Capacity Evaluation      Capacity question:  · Does the patient possess the ability to make an informed decision, regarding the proposed option for long term care?     Capacity for a given decision requires:  · Understanding - the ability to state the meaning of the relevant information (eg, diagnosis, risks and benefits of a treatment or " procedure, indications, and options of care).  ? The patient must be able to understand the proposed treatment, and/or options for his care.  · Appreciation - the ability to explain how information (eg, diagnosis, benefit, and risk) applies to oneself.  ? The patient must be able to appreciate his own medical situation, and abstract, as to how the treatments/proposed options for care, apply to his medical situation.  · Reasoning - the ability to compare information and infer consequences of choice.  ? The patient must be able to understand the consequences of his medical decision, in a reasonable manner, supported by the facts, and his own values, in a consistent fashion.  · Expressing a choice - the ability to state a decision.  ? The patient must demonstrate the ability to communicate a choice, clearly and consistently.     Assessment of capacity:  · The patient understands the clinical condition relation to this evaluation: No.  · The patient understands the indication and nature of/reasoning behind the proposed treatment(s) and/or options for his care: No.  · The patient understands and appreciates the risks and benefits of the proposed treatment(s) and/or options for his care: No.  · The patient understands and appreciates the risks and benefits of refusing the proposed treatment(s) and/or options for his care: No.  · The patient is in not in agreement with the assessment and does not consent) to long term placement.    · The patient has evidence of impaired insight and/or judgment: Yes.     Based upon my evaluation of Juan Hobson regarding his medical decision-making capacity for refusing long term placement, I found with reasonable certainty that Juan Hobson does not have capacity to make this decision.

## 2020-10-05 NOTE — PLAN OF CARE
Pt resting well in room. VSS, no acute distress noted. No falls or injures occurred during shift. POC was reviewed, he verbalized understanding. Will continue to monitor.

## 2020-10-05 NOTE — ASSESSMENT & PLAN NOTE
* Encephalopathy  Patient is a 70 yo male w/ past medical history of BCC s/p Mohs' procedure and new diagnosis of penile malignancy who presents as an admission from Urology clinic for severe cognitive impairment and inability to care for self. The timeline of the events is uncertain as patient lives alone and does not have family who lives near by and the initial issue was noted in the beginning of September by his coworkers.   He has had a progressive decline when it comes to his cognition, particularly speech. He also has had difficulty w/ taking care of himself as was noted by the social work review.   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   LP performed by IR today; CSF studies including paraneoplastic panel ordered.   - Agree with inpatient admission  - MRI completed - see above  - EEG completed, pending read  - LP performed by IR today; CSF studies ordered including paraneoplastic panel and inflammatory markers, results pending  - Paraneoplastic panel, serum in place from outpatient neurology visit on 09/28  - Possibility for Autoimmune vs Paraneoplastic process in the setting of active malignancy  - Appreciate consult, will follow up w/ patient  - Fall precautions   - Delirium precautions         Penile carcinoma  - Outpatient follow up w/ Urology

## 2020-10-05 NOTE — ASSESSMENT & PLAN NOTE
69 y.o. with progressive cognitive decline and inability to adequately care for self. Presented in September 2020, to the ED stating there were creatures attacking his penis. 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 Prompted  evaluation by psychiatry and found that it is likely related to dementia after getting collateral from family/friends. He was PEC'd the night of admission after wanting to leave and then discharged to a geropsychiatry facility for 2 weeks. Seen in neuro clinic where there was mention of suspected frontotemporal dementia and further work-up ensued. Presented to a urology appt on 10/2 and was advised to present to the ED due to concern for competency and need for patient advocate/POA before a biopsy or form of treatment can be initiated.     LP performed by IR today; CSF studies ordered including paraneoplastic panel and inflammatory markers, results pending  - Concern that the patient is unable to care for himself and make his own decisions, however the patient feels like he is able to care for himself  - MRI brain with Possible subtle increased FLAIR hyperintensity involving the bilateral mesial temporal lobes which can be seen in the setting of autoimmune limbic encephalitis  - consulted anesthesia for LP; neuro to place CSF orders   - EEG, pending read  - Vitamin B1, Vitamin B6, Vitamin B12, HIV, RPR, TSH ordered   - afebrile, without leukocytosis -- infectious work-up unremarkable   - Neurology consulted  - LP attempted by Anesthesia at bedside, but due to DJD, unable to obtain fluid x 2 levels   - Patient will require an IR LP  - Paraneoplastic panel, serum in place from outpatient neurology visit on 09/28  - Pending LP to assess CSF for signs of inflammatory markers  - Possibility for Autoimmune vs Paraneoplastic process in the setting of active  malignancy  - CM/SW consult  - Pending evaluation, might need Psych to determine if he is gravely disabled and requiring a PEC plus/minus CXR and PPD         DELIRIUM BEHAVIOR MANAGEMENT  - Minimize use of restraints; if physical restraints necessary, please utilize medical/chemical prns for agitation.  - Keep shades open and room lit during day and room dim at night in order to promote healthy circadian rhythms.  - Encourage family at bedside  - Keep whiteboard in patient's room current with the date and name of the members of patient's team for easy patient self re-orientation.  - Avoid benzodiazepines, antihistamines, anticholinergics, hypnotics, and minimize opiates while controlling for pain as these medications may exacerbate delirium.

## 2020-10-05 NOTE — PROGRESS NOTES
Received call this afternoon from Jessica Herrera with EPS, and completed report with her. Informed her that since I had called on Friday PM when patient was in the clinic he was subsequently taken to the ED and admitted, and work up is in progress. Informed her of the room number that patient is in and the name and number of the assigned . She said that this referral will be sent to the Director for review if the case will be accepted or denied; the hospital staff and medical legal team would follow up and arrange for a safe discharge for him .

## 2020-10-05 NOTE — PROGRESS NOTES
[facility] Hospital Medicine  Progress Note    Patient Name: Juan Hobson  MRN: [id number]  Patient Class: OP- Observation   Admission Date: 10/2/2020  Length of Stay: 0 days  Attending Physician: Audi Arcos MD  Primary Care Provider: Mehnaz Barillas MD    [facility] Medicine Team: [facility] HOSP MED Y Bing Moore PA-C    Subjective:     Principal Problem:Encephalopathy, metabolic        HPI:  Per Adriana Badillo MD:   Mr. Juan Hobson is a 69 y.o. male with recently diagnosed penile cancer, who presents to the ER from Urology clinic with concern for self neglect, and need for Neurology evaluation of dementia and possible placement.  The patient mentions that he has been having some issues with his memory.  He mostly orders takeout for meals because it is easier, and still drives to work.  He feels like he is able to take care of himself, and does not need to go to a facility.      Per note from Lianne German LCSW on 10/2:  Received consult from Dr. Monterroso re: assessing patient in clinic this afternoon and care recommendations as patient did not seem competent to make decisions and give consent. He has no family and no legal next of kin (POA or guardian).   Met individually with patient, and with his co-worker Janna Go ([phone], ext. [phone]) who brought him to his clinic appointment today. Patient unable to relate basic demographic information or clearly explain other information. For example, when asked his birth date he gave the year [birth year], then said 9, and then said the second to last, it begins with N; he said his street name but couldn't give the house number where he has lived for years; unable to say what type of work or where he worked prior to the [employer] in [facility] for the past 12 years; he described being taken in a truck to that other place (referring to the recent [facility] stay). He has awareness that he is having memory/cognitive issues  but stated he can manage for himself at home and that he knows there is a lot of work that needs to be done in his home that he hasn't gotten to. He inherited the home after his mother .      Mr. Go and another coworker Juice Martinez ([phone]) and their supervisor Mei Bell ([phone]) have been trying to help him manage things with his work and personal matters as they have observed his deficits and know he has no one to help him. They have helped him keep up with recent medical appointments and accompanied and transported him.  Mr. Go reports concern for his safety and ability to care for himself after seeing his home with clutter throughout and things piled up, mold and old food in the refrigerator, mold in the toilet, clothes all over, etc. Patient has been driving himself to and from work and to get food at places near his home, but unable to navigate to unfamiliar places. They have observed his cognitive deficits at work and he has made errors, so they have modified his job and his supervisor has been working with him to try to get FMLA and his leave/intermediate in place so he doesn't lose his benefits. They have had to help him with paperwork, bill paying, etc., because he can't complete these on his own.         Upon arrival to the ER, vitals were temp 97.8F, HR 59 and /84.  Labs were unremarkable  He was admitted to Hospital Medicine for Neurology and SW evaluation.    Overview/Hospital Course:  69 y.o. who was admitted to hospital medicine for worsening cognitive decline and inability to care for self. Labs/infectious work-up grossly unremarkable. Neurology consulted and MRI obtained on admission with concern for encephalitis (increased FLAIR hyperintensity involving the bilateral mesial temporal lobes which can be seen in the setting of autoimmune limbic encephalitis in the appropriate clinical setting). LP recommended and attempted at bedside however not successful 2/2 to  "DJD. Interventional radiology consulted, LP performed today; CSF studies ordered including paraneoplastic panel and inflammatory markers, results pending. Psychiatry consulted for formal capacity evaluation.     Interval History: No reported events overnight. Patient resting in bed following procedure, results pending. He expresses his desire to go home as he "is the only one there." Psychiatry consulted for formal capacity evaluation, appreciate assistance.     Review of Systems   Constitutional: Positive for activity change, appetite change and unexpected weight change.   HENT: Negative for trouble swallowing.    Eyes: Negative for photophobia and visual disturbance.   Respiratory: Negative for cough and shortness of breath.    Gastrointestinal: Negative for nausea and vomiting.   Genitourinary: Negative for difficulty urinating and dysuria.   Neurological: Positive for speech difficulty. Negative for weakness.   Psychiatric/Behavioral: Positive for behavioral problems, confusion and decreased concentration. Negative for hallucinations and sleep disturbance. The patient is not hyperactive.      Objective:     Vital Signs (Most Recent):  Temp: 98.7 °F (37.1 °C) (10/05/20 1603)  Pulse: 61 (10/05/20 1603)  Resp: 18 (10/05/20 1603)  BP: 119/75 (10/05/20 1603)  SpO2: 97 % (10/05/20 1603) Vital Signs (24h Range):  Temp:  [96.4 °F (35.8 °C)-98.7 °F (37.1 °C)] 98.7 °F (37.1 °C)  Pulse:  [57-70] 61  Resp:  [16-18] 18  SpO2:  [94 %-97 %] 97 %  BP: (119-141)/(67-75) 119/75     Weight: 70.4 kg (155 lb 3.3 oz)  Body mass index is 22.27 kg/m².  No intake or output data in the 24 hours ending 10/05/20 1829   Physical Exam  Vitals signs and nursing note reviewed.   Constitutional:       General: He is not in acute distress.     Appearance: He is not ill-appearing.   HENT:      Head: Normocephalic and atraumatic.      Nose: Nose normal.      Mouth/Throat:      Mouth: Mucous membranes are moist.   Eyes:      Extraocular Movements: " Extraocular movements intact.      Pupils: Pupils are equal, round, and reactive to light.   Cardiovascular:      Rate and Rhythm: Normal rate and regular rhythm.   Pulmonary:      Effort: Pulmonary effort is normal. No respiratory distress.   Abdominal:      General: Abdomen is flat. There is no distension.      Palpations: Abdomen is soft.   Musculoskeletal: Normal range of motion.         General: No swelling.   Skin:     General: Skin is warm and dry.      Coloration: Skin is not jaundiced.   Neurological:      General: No focal deficit present.      Mental Status: He is alert. He is disoriented.      Sensory: No sensory deficit.      Motor: No weakness.   Psychiatric:         Mood and Affect: Mood normal.         Speech: Speech normal.         Behavior: Behavior is cooperative.         Cognition and Memory: Cognition is impaired. Memory is impaired. He exhibits impaired recent memory.         Significant Labs: All pertinent labs within the past 24 hours have been reviewed.    Significant Imaging: I have reviewed all pertinent imaging results/findings within the past 24 hours.      Assessment/Plan:      * Encephalopathy, metabolic  69 y.o. with progressive cognitive decline and inability to adequately care for self. Presented in September 2020, to the ED stating there were creatures attacking his penis. 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 Prompted  evaluation by psychiatry and found that it is likely related to dementia after getting collateral from family/friends. He was PEC'd the night of admission after wanting to leave and then discharged to a geropsychiatry facility for 2 weeks. Seen in neuro clinic where there was mention of suspected frontotemporal dementia and further work-up ensued. Presented to a urology appt on 10/2 and was advised to present to the ED due to  concern for competency and need for patient advocate/POA before a biopsy or form of treatment can be initiated.     LP performed by IR today; CSF studies ordered including paraneoplastic panel and inflammatory markers, results pending  - Concern that the patient is unable to care for himself and make his own decisions, however the patient feels like he is able to care for himself  - MRI brain with Possible subtle increased FLAIR hyperintensity involving the bilateral mesial temporal lobes which can be seen in the setting of autoimmune limbic encephalitis  - consulted anesthesia for LP; neuro to place CSF orders   - EEG, pending read  - Vitamin B1, Vitamin B6, Vitamin B12, HIV, RPR, TSH ordered   - afebrile, without leukocytosis -- infectious work-up unremarkable   - Neurology consulted  - LP attempted by Anesthesia at bedside, but due to DJD, unable to obtain fluid x 2 levels   - Patient will require an IR LP  - Paraneoplastic panel, serum in place from outpatient neurology visit on 09/28  - Pending LP to assess CSF for signs of inflammatory markers  - Possibility for Autoimmune vs Paraneoplastic process in the setting of active malignancy  - CM/SW consult  - Pending evaluation, might need Psych to determine if he is gravely disabled and requiring a PEC plus/minus CXR and PPD         DELIRIUM BEHAVIOR MANAGEMENT  - Minimize use of restraints; if physical restraints necessary, please utilize medical/chemical prns for agitation.  - Keep shades open and room lit during day and room dim at night in order to promote healthy circadian rhythms.  - Encourage family at bedside  - Keep whiteboard in patient's room current with the date and name of the members of patient's team for easy patient self re-orientation.  - Avoid benzodiazepines, antihistamines, anticholinergics, hypnotics, and minimize opiates while controlling for pain as these medications may exacerbate delirium.      Impaired decision making  Presented to a  urology appt on 10/2 and was advised to present to the ED due to concern for competency and need for patient advocate/POA before a biopsy or form of treatment can be initiated.     Gina BERKOWITZ. 40:[id number]: (9) Upon the inability of any adult to consent for himself and in the absence of any person listed in Paragraphs (2) through (8) of this Subsection, an adult friend of the patient. For purposes of this Subsection to consent, adult friend means an adult who has exhibited special care and concern for the patient, who is generally familiar with the patient's health care views and desires, and who is willing and able to become involved in the patient's health care decisions and to act in the patient's best interest. The adult friend shall sign and date an acknowledgment form provided by the hospital or other health care facility in which the patient is located for placement in the patient's records certifying that he or she meets such criteria.    -  Juice Martinez ([phone]) does not wish to make decisions on his behalf  - supervisor Mei Bell ([phone]) have been trying to help him manage things with his work and personal matters as they have observed his deficits and know he has no one to help him - these individuals may be used for consent   - PEC'd for grave disability during last admission and discharged to [facility] however no formal eval by in-patient psychiatry  - will need formal evaluation       Penile carcinoma  - follows with urology     VTE Risk Mitigation (From admission, onward)         Ordered     enoxaparin injection 40 mg  Every 24 hours      10/02/20 2042     IP VTE HIGH RISK PATIENT  Once      10/02/20 2042                Discharge Planning   ALFRED: 10/9/2020     Code Status: Full Code   Is the patient medically ready for discharge?: No    Reason for patient still in hospital (select all that apply): Patient trending condition, Laboratory test, Consult recommendations and  Pending disposition  Discharge Plan A: Home Health                  Bing Moore PA-C  Department of Hospital Medicine   [facility]

## 2020-10-05 NOTE — PROGRESS NOTES
"[facility]  Neurology  Progress Note    Patient Name: Juan Hobson  MRN: [id number]  Admission Date: 10/2/2020  Hospital Length of Stay: 0 days  Code Status: Full Code   Attending Provider: Audi Arcos MD  Primary Care Physician: Mehnaz Barillas MD   Principal Problem:Encephalopathy, metabolic    HPI:   Patient is a 70 yo male w/ past medical history significant for BCC s/p Mohs procedure, recent Dx of penile carcinoma who presented to [facility] on 10/02 from Urology clinic w/ concern for progressive dementia and inability to care for self.  Patient was seen in clinic by Dr. Ernandez on 09/28 for memory complaints and at the time the diagnosis of fronto-temporal dementia was brought up. Per Dr. Ernandez's note - Patient has a master's degree in chemistry and works with the [employer], he has been in this position for 12 years.  Coworkers note that he has always been a very punctual and reliable employee but did not show up to work on Sept 2 and instead presented to [facility] reporting  "creatures attacked his penis".  He was noted to have enlarged, ulcerated penis with MRI and urology consult showing concern for penile cancer but not biopsy confirmed at this time.   He was treated for infection and discharged to geriatric psych facility where he was held for two weeks prior to discharge home with home health orders for an agency that does not accept his insurance.  When asked about memory difficulties, he cites difficulty connecting objects to words for them.  Co worker retrospectively note his clocking in/out has been inconsistent over the past several months.  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  Co workers note that over the past several months he has been making some errors in recording measurements. He continues to drive and live in his own home.   Patient has recently had a 2 week stay in a geriatric " "psychiatric facility.   Per chart review, patient is a poor historian and some information was provided by the co-worker that accompanied the patient to the ED - patient lives at home alone in an unsafe environment.  Social work evaluation was conducted in the clinic setting and was deemed that his home was unsafe and was unsafe for him to return home alone.  Patient lives alone and without any family members.  Based on reports, patient has not been eating, has a 70 lb weight loss, living in an unsafe and unclean environment, and a mold growing on his toilets, sink and refrigerator.  Spoiled food in the refrigerator and no clean water. Risk for falls with multiple obstacles in living spaces.  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 Vitals reviewed and stable. Tolerating PO diet well with no issues. Patient reports doing well overall this am, though continues to display difficulty with word-finding and frequently makes paraphasic errors on interview. Oriented to person, but not date/month/place/situation. Unable to state names of current or last president. Difficulty with object naming. States that he is having difficulty with "my brain" but unable to elaborate on specifics. Reports mood is neutral. LP scheduled for today with CSF studies ordered, including paraneoplastic panel.     Current Neurological Medications:     Current Facility-Administered Medications   Medication Dose Route Frequency Provider Last Rate Last Dose    acetaminophen tablet 650 mg  650 mg Oral Q4H PRN Adriana Badillo MD        enoxaparin injection 40 mg  40 mg Subcutaneous Q24H Adriana Badillo MD   40 mg at 10/04/20 1705    melatonin tablet 6 mg  6 mg Oral Nightly PRN Adriana Badillo MD        ondansetron injection 8 mg  8 mg Intravenous Q8H PRN Adriana AGUILLON. " MD Aby        promethazine (PHENERGAN) 25 mg in dextrose 5 % 50 mL IVPB  25 mg Intravenous Q6H PRN Adriana Badillo MD        senna-docusate 8.6-50 mg per tablet 1 tablet  1 tablet Oral BID PRN Adriana Badillo MD        sodium chloride 0.9% flush 5 mL  5 mL Intravenous PRN Adriana Badillo MD           Review of Systems   Constitutional: Negative for appetite change and fever.   Respiratory: Negative for shortness of breath.    Gastrointestinal: Negative for abdominal pain.   Neurological: Negative for tremors, seizures, facial asymmetry, speech difficulty, weakness, light-headedness, numbness and headaches.   Psychiatric/Behavioral: Positive for confusion. Negative for agitation, dysphoric mood, hallucinations, self-injury and sleep disturbance.     Objective:     Vital Signs (Most Recent):  Temp: 98.4 °F (36.9 °C) (10/05/20 1109)  Pulse: 70 (10/05/20 1109)  Resp: 16 (10/05/20 1109)  BP: 135/74 (10/05/20 1109)  SpO2: 97 % (10/05/20 1109) Vital Signs (24h Range):  Temp:  [96.4 °F (35.8 °C)-98.5 °F (36.9 °C)] 98.4 °F (36.9 °C)  Pulse:  [57-70] 70  Resp:  [16-18] 16  SpO2:  [94 %-97 %] 97 %  BP: (119-141)/(62-74) 135/74     Weight: 70.4 kg (155 lb 3.3 oz)  Body mass index is 22.27 kg/m².    Physical Exam  NEUROLOGICAL EXAMINATION:      MENTAL STATUS   Oriented to person.   Disoriented to place   Disoriented to time. Disoriented to month, date, day and season. Oriented to year.   Follows 2 step commands.   Attention: normal. Concentration: normal.   Speech: paraphasic errors  Level of consciousness: alert  Knowledge: inconsistent with education. Unable to perform simple calculations.   Difficulty with object naming. Able to repeat.        Patient w/ paraphasic errors, inability to identify everyday objects.   Some component of visual agnosia.         CRANIAL NERVES      CN II   Visual fields full to confrontation.      CN III, IV, VI   Pupils are equal, round, and reactive to light.  Extraocular motions are  normal.      CN V   Facial sensation intact.      CN VII   Facial expression full, symmetric.      CN VIII   CN VIII normal.      MOTOR EXAM   Muscle bulk: normal  Overall muscle tone: normal  Right arm tone: normal  Left arm tone: normal  Right leg tone: normal  Left leg tone: normal     Strength   Right neck flexion: 5/5  Left neck flexion: 5/5  Right neck extension: 5/5  Left neck extension: 5/5  Right deltoid: 5/5  Left deltoid: 5/5  Right biceps: 5/5  Left biceps: 5/5  Right triceps: 5/5  Left triceps: 5/5  Right wrist flexion: 5/5  Left wrist flexion: 5/5  Right wrist extension: 5/5  Left wrist extension: 5/5  Right interossei: 5/5  Left interossei: 5/5  Right abdominals: 5/5  Left abdominals: 5/5  Right iliopsoas: 5/5  Left iliopsoas: 5/5  Right quadriceps: 5/5  Left quadriceps: 5/5  Right hamstrin/5  Left hamstrin/5  Right glutei: 5/5  Left glutei: 5/5  Right anterior tibial: 5/5  Left anterior tibial: 5/5  Right posterior tibial: 5/5  Left posterior tibial: 5/5  Right peroneal: 5/5  Left peroneal: 5/5  Right gastroc: 5/5  Left gastroc: 5/5     REFLEXES      Reflexes   Right brachioradialis: 1+  Left brachioradialis: 1+  Right biceps: 1+  Left biceps: 1+  Right patellar: 1+  Left patellar: 1+  Right achilles: 1+  Left achilles: 1+     SENSORY EXAM   Light touch normal.      GAIT AND COORDINATION        Deferred         Significant Labs: All pertinent lab results from the past 24 hours have been reviewed.    Significant Imaging: I have reviewed all pertinent imaging results/findings within the past 24 hours.    Assessment and Plan:     * Encephalopathy, metabolic     * Encephalopathy  Patient is a 70 yo male w/ past medical history of BCC s/p Mohs' procedure and new diagnosis of penile malignancy who presents as an admission from Urology clinic for severe cognitive impairment and inability to care for self. The timeline of the events is uncertain as patient lives alone and does not have family who  lives near by and the initial issue was noted in the beginning of September by his coworkers.   He has had a progressive decline when it comes to his cognition, particularly speech. He also has had difficulty w/ taking care of himself as was noted by the social work review.   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   LP performed by IR today; CSF studies including paraneoplastic panel ordered.   - Agree with inpatient admission  - MRI completed - see above  - EEG completed, pending read  - LP performed by IR today; CSF studies ordered including paraneoplastic panel and inflammatory markers, results pending  - Paraneoplastic panel, serum in place from outpatient neurology visit on 09/28  - Possibility for Autoimmune vs Paraneoplastic process in the setting of active malignancy  - Appreciate consult, will follow up w/ patient  - Fall precautions   - Delirium precautions         Penile carcinoma  - Outpatient follow up w/ Urology       Impaired decision making  -SW/CM following.  Appreciate SW and CM assistance with this case  - POA needs to be established       Penile carcinoma  - Outpatient follow up w/ Urology        VTE Risk Mitigation (From admission, onward)         Ordered     enoxaparin injection 40 mg  Every 24 hours      10/02/20 2042     IP VTE HIGH RISK PATIENT  Once      10/02/20 2042                Trent Matthew Wiedemann, MD  Neurology  [facility]

## 2020-10-05 NOTE — PROGRESS NOTES
Received call this morning from patient's coworker Mr. Janna Go ([phone], [phone]. [phone]) re: update on patient. Informed him that patient is hospitalized on the Observation Unit in room 741A and I had spoken with the  assigned to patient's case, Sharon Diaz this morning. Provided him with her number. Informed him that the necessary work up is in progress and that the assigned  will assist with the referral for guardianship if indicated and safe discharge plans for patient. EPS referral is still pending; awaiting return call from staff.

## 2020-10-05 NOTE — SUBJECTIVE & OBJECTIVE
"  Subjective:     Interval History: NAEON. Vitals reviewed and stable. Tolerating PO diet well with no issues. Patient reports doing well overall this am, though continues to display difficulty with word-finding and frequently makes paraphasic errors on interview. Oriented to person, but not date/month/place/situation. Unable to state names of current or last president. Difficulty with object naming. States that he is having difficulty with "my brain" but unable to elaborate on specifics. Reports mood is neutral. LP scheduled for today with CSF studies ordered, including paraneoplastic panel.     Current Neurological Medications:     Current Facility-Administered Medications   Medication Dose Route Frequency Provider Last Rate Last Dose    acetaminophen tablet 650 mg  650 mg Oral Q4H PRN Adriana Badillo MD        enoxaparin injection 40 mg  40 mg Subcutaneous Q24H Adriana Badillo MD   40 mg at 10/04/20 1705    melatonin tablet 6 mg  6 mg Oral Nightly PRN Adriana Badillo MD        ondansetron injection 8 mg  8 mg Intravenous Q8H PRN Adriana Badillo MD        promethazine (PHENERGAN) 25 mg in dextrose 5 % 50 mL IVPB  25 mg Intravenous Q6H PRN Adriana Badillo MD        senna-docusate 8.6-50 mg per tablet 1 tablet  1 tablet Oral BID PRN Adriana Badillo MD        sodium chloride 0.9% flush 5 mL  5 mL Intravenous PRN Adriana Badillo MD           Review of Systems   Constitutional: Negative for appetite change and fever.   Respiratory: Negative for shortness of breath.    Gastrointestinal: Negative for abdominal pain.   Neurological: Negative for tremors, seizures, facial asymmetry, speech difficulty, weakness, light-headedness, numbness and headaches.   Psychiatric/Behavioral: Positive for confusion. Negative for agitation, dysphoric mood, hallucinations, self-injury and sleep disturbance.     Objective:     Vital Signs (Most Recent):  Temp: 98.4 °F (36.9 °C) (10/05/20 1109)  Pulse: 70 (10/05/20 1109)  Resp: " 16 (10/05/20 1109)  BP: 135/74 (10/05/20 1109)  SpO2: 97 % (10/05/20 1109) Vital Signs (24h Range):  Temp:  [96.4 °F (35.8 °C)-98.5 °F (36.9 °C)] 98.4 °F (36.9 °C)  Pulse:  [57-70] 70  Resp:  [16-18] 16  SpO2:  [94 %-97 %] 97 %  BP: (119-141)/(62-74) 135/74     Weight: 70.4 kg (155 lb 3.3 oz)  Body mass index is 22.27 kg/m².    Physical Exam  NEUROLOGICAL EXAMINATION:      MENTAL STATUS   Oriented to person.   Disoriented to place   Disoriented to time. Disoriented to month, date, day and season. Oriented to year.   Follows 2 step commands.   Attention: normal. Concentration: normal.   Speech: paraphasic errors  Level of consciousness: alert  Knowledge: inconsistent with education. Unable to perform simple calculations.   Difficulty with object naming. Able to repeat.        Patient w/ paraphasic errors, inability to identify everyday objects.   Some component of visual agnosia.         CRANIAL NERVES      CN II   Visual fields full to confrontation.      CN III, IV, VI   Pupils are equal, round, and reactive to light.  Extraocular motions are normal.      CN V   Facial sensation intact.      CN VII   Facial expression full, symmetric.      CN VIII   CN VIII normal.      MOTOR EXAM   Muscle bulk: normal  Overall muscle tone: normal  Right arm tone: normal  Left arm tone: normal  Right leg tone: normal  Left leg tone: normal     Strength   Right neck flexion: 5/5  Left neck flexion: 5/5  Right neck extension: 5/5  Left neck extension: 5/5  Right deltoid: 5/5  Left deltoid: 5/5  Right biceps: 5/5  Left biceps: 5/5  Right triceps: 5/5  Left triceps: 5/5  Right wrist flexion: 5/5  Left wrist flexion: 5/5  Right wrist extension: 5/5  Left wrist extension: 5/5  Right interossei: 5/5  Left interossei: 5/5  Right abdominals: 5/5  Left abdominals: 5/5  Right iliopsoas: 5/5  Left iliopsoas: 5/5  Right quadriceps: 5/5  Left quadriceps: 5/5  Right hamstrin/5  Left hamstrin/5  Right glutei: 5/5  Left glutei: 5/5  Right  anterior tibial: 5/5  Left anterior tibial: 5/5  Right posterior tibial: 5/5  Left posterior tibial: 5/5  Right peroneal: 5/5  Left peroneal: 5/5  Right gastroc: 5/5  Left gastroc: 5/5     REFLEXES      Reflexes   Right brachioradialis: 1+  Left brachioradialis: 1+  Right biceps: 1+  Left biceps: 1+  Right patellar: 1+  Left patellar: 1+  Right achilles: 1+  Left achilles: 1+     SENSORY EXAM   Light touch normal.      GAIT AND COORDINATION        Deferred         Significant Labs: All pertinent lab results from the past 24 hours have been reviewed.    Significant Imaging: I have reviewed all pertinent imaging results/findings within the past 24 hours.

## 2020-10-05 NOTE — SUBJECTIVE & OBJECTIVE
"Interval History: No reported events overnight. Patient resting in bed following procedure, results pending. He expresses his desire to go home as he "is the only one there." Psychiatry consulted for formal capacity evaluation, appreciate assistance.     Review of Systems   Constitutional: Positive for activity change, appetite change and unexpected weight change.   HENT: Negative for trouble swallowing.    Eyes: Negative for photophobia and visual disturbance.   Respiratory: Negative for cough and shortness of breath.    Gastrointestinal: Negative for nausea and vomiting.   Genitourinary: Negative for difficulty urinating and dysuria.   Neurological: Positive for speech difficulty. Negative for weakness.   Psychiatric/Behavioral: Positive for behavioral problems, confusion and decreased concentration. Negative for hallucinations and sleep disturbance. The patient is not hyperactive.      Objective:     Vital Signs (Most Recent):  Temp: 98.7 °F (37.1 °C) (10/05/20 1603)  Pulse: 61 (10/05/20 1603)  Resp: 18 (10/05/20 1603)  BP: 119/75 (10/05/20 1603)  SpO2: 97 % (10/05/20 1603) Vital Signs (24h Range):  Temp:  [96.4 °F (35.8 °C)-98.7 °F (37.1 °C)] 98.7 °F (37.1 °C)  Pulse:  [57-70] 61  Resp:  [16-18] 18  SpO2:  [94 %-97 %] 97 %  BP: (119-141)/(67-75) 119/75     Weight: 70.4 kg (155 lb 3.3 oz)  Body mass index is 22.27 kg/m².  No intake or output data in the 24 hours ending 10/05/20 1829   Physical Exam  Vitals signs and nursing note reviewed.   Constitutional:       General: He is not in acute distress.     Appearance: He is not ill-appearing.   HENT:      Head: Normocephalic and atraumatic.      Nose: Nose normal.      Mouth/Throat:      Mouth: Mucous membranes are moist.   Eyes:      Extraocular Movements: Extraocular movements intact.      Pupils: Pupils are equal, round, and reactive to light.   Cardiovascular:      Rate and Rhythm: Normal rate and regular rhythm.   Pulmonary:      Effort: Pulmonary effort is " normal. No respiratory distress.   Abdominal:      General: Abdomen is flat. There is no distension.      Palpations: Abdomen is soft.   Musculoskeletal: Normal range of motion.         General: No swelling.   Skin:     General: Skin is warm and dry.      Coloration: Skin is not jaundiced.   Neurological:      General: No focal deficit present.      Mental Status: He is alert. He is disoriented.      Sensory: No sensory deficit.      Motor: No weakness.   Psychiatric:         Mood and Affect: Mood normal.         Speech: Speech normal.         Behavior: Behavior is cooperative.         Cognition and Memory: Cognition is impaired. Memory is impaired. He exhibits impaired recent memory.         Significant Labs: All pertinent labs within the past 24 hours have been reviewed.    Significant Imaging: I have reviewed all pertinent imaging results/findings within the past 24 hours.

## 2020-10-05 NOTE — CONSULTS
10/5/2020 5:06 PM   Juan Hobson   1950   [id number]      PSYCHIATRY CONSULTATION BRIEF PLAN OF CARE NOTE (FULL note to follow later)  Pt seen and staffed Full note to follow      BRIEF HPI  Juan Hobson is a 69 y.o. male with No known past psychiatric history other than his recent admission due to pts cognitive dysfunction and delusions [place] [facility] [place]   Psychiatry consulted for capacity    I am familiar with this pt at his previous admission 1 month ago. At that time pts mentation was much similar to today. Pt is very pleasant and easily redirectable.  Pt continues to have word finding difficulties and unable to give any specifics of the what he is talking about pt unable to answer most open ended questions. His recall was 0/3 after 3 minutes. He is unable to abstract and unable to guess 0/4 presidents.  Per collateral from pts coworkers it seems pts cognitive function has been gradually declining over past couple of years. But what is new is pts bizarre delusions about his penile lesions over the past month or so.    IMPRESSION:   Major neurocognitive d/o consistent with FTD?  (unlikely delirium as he is not waxing and waning and is at his baseline.)  Delusions      RECOMMENDATIONS:   CAPACITY  Pt at this time doesn't have full medical decision making capacity to refused proposed disposition plan.   Pt doesn't have next of kin unfortunately other than his coworkers who take care of him essentially.    PSYCHIATRIC MEDICATIONS  · None needed at this time    LEGAL   No PEC needed    DISPO  This pt is gravely disabled at this time and suspecting is unable to care for himself and needs structured environment like a Nursing facility. Pt also cannot be driving as this endangers his life and others.   No psych meds indicated at this time and no inpt psych admission warranted as pt has no acute psych concerns other than benign delusions.  Will sign off    -Please contact ON  CALL psychiatry service for any acute issues that may arise.    Sonali Palafox  Department of Psychiatry   [facility]  10/5/2020 5:06 PM

## 2020-10-05 NOTE — SUBJECTIVE & OBJECTIVE
OBJECTIVE      Vital Signs:  Temp:  [96.4 °F (35.8 °C)-98.7 °F (37.1 °C)]   Pulse:  [57-70]   Resp:  [16-18]   BP: (119-141)/(67-75)   SpO2:  [94 %-97 %]         Mental Status Exam:  Appearance: lying in bed, thin & gaunt looking  Level of Consciousness: oriented to self only  Behavior/Cooperation: friendly and cooperative  Psychomotor: within normal limits   Speech: normal tone, normal rate, normal pitch, normal volume  Language: english, fluid  Orientation: person  Attention Span/Concentration: unable to spell world  Memory: Registers 3/3, recalls 0/3 at 5 minutes.     Mood: ok  Affect: constricted  Thought Process: superficially linear, concrete, perseverates  Thought Content: no suicidality, no homicidality or paranoia.  Fund of Knowledge: Impaired  Abstraction: proverbs were concrete  Insight: poor  Judgment: poor     Laboratory Data:  Recent Results         Recent Results (from the past 48 hour(s))   CBC auto differential     Collection Time: 10/04/20  4:10 AM   Result Value Ref Range     WBC 4.86 3.90 - 12.70 K/uL     RBC 3.78 (L) 4.60 - 6.20 M/uL     Hemoglobin 11.9 (L) 14.0 - 18.0 g/dL     Hematocrit 36.3 (L) 40.0 - 54.0 %     Mean Corpuscular Volume 96 82 - 98 fL     Mean Corpuscular Hemoglobin 31.5 (H) 27.0 - 31.0 pg     Mean Corpuscular Hemoglobin Conc 32.8 32.0 - 36.0 g/dL     RDW 13.3 11.5 - 14.5 %     Platelets 224 150 - 350 K/uL     MPV 9.7 9.2 - 12.9 fL     Immature Granulocytes 0.2 0.0 - 0.5 %     Gran # (ANC) 2.6 1.8 - 7.7 K/uL     Immature Grans (Abs) 0.01 0.00 - 0.04 K/uL     Lymph # 1.7 1.0 - 4.8 K/uL     Mono # 0.4 0.3 - 1.0 K/uL     Eos # 0.2 0.0 - 0.5 K/uL     Baso # 0.05 0.00 - 0.20 K/uL     nRBC 0 0 /100 WBC     Gran% 52.9 38.0 - 73.0 %     Lymph% 34.8 18.0 - 48.0 %     Mono% 7.6 4.0 - 15.0 %     Eosinophil% 3.5 0.0 - 8.0 %     Basophil% 1.0 0.0 - 1.9 %     Differential Method Automated     Comprehensive metabolic panel     Collection Time: 10/04/20  4:10 AM   Result Value Ref Range      Sodium 139 136 - 145 mmol/L     Potassium 3.8 3.5 - 5.1 mmol/L     Chloride 107 95 - 110 mmol/L     CO2 25 23 - 29 mmol/L     Glucose 84 70 - 110 mg/dL     BUN, Bld 14 8 - 23 mg/dL     Creatinine 0.7 0.5 - 1.4 mg/dL     Calcium 8.2 (L) 8.7 - 10.5 mg/dL     Total Protein 5.9 (L) 6.0 - 8.4 g/dL     Albumin 3.3 (L) 3.5 - 5.2 g/dL     Total Bilirubin 0.4 0.1 - 1.0 mg/dL     Alkaline Phosphatase 51 (L) 55 - 135 U/L     AST 14 10 - 40 U/L     ALT 12 10 - 44 U/L     Anion Gap 7 (L) 8 - 16 mmol/L     eGFR if African American >60.0 >60 mL/min/1.73 m^2     eGFR if non African American >60.0 >60 mL/min/1.73 m^2   Magnesium     Collection Time: 10/04/20  4:10 AM   Result Value Ref Range     Magnesium 1.9 1.6 - 2.6 mg/dL   Phosphorus     Collection Time: 10/04/20  4:10 AM   Result Value Ref Range     Phosphorus 3.5 2.7 - 4.5 mg/dL   POCT glucose     Collection Time: 10/04/20 11:40 AM   Result Value Ref Range     POCT Glucose 83 70 - 110 mg/dL   CBC auto differential     Collection Time: 10/05/20  7:05 AM   Result Value Ref Range     WBC 3.71 (L) 3.90 - 12.70 K/uL     RBC 4.01 (L) 4.60 - 6.20 M/uL     Hemoglobin 12.5 (L) 14.0 - 18.0 g/dL     Hematocrit 38.5 (L) 40.0 - 54.0 %     Mean Corpuscular Volume 96 82 - 98 fL     Mean Corpuscular Hemoglobin 31.2 (H) 27.0 - 31.0 pg     Mean Corpuscular Hemoglobin Conc 32.5 32.0 - 36.0 g/dL     RDW 13.2 11.5 - 14.5 %     Platelets 236 150 - 350 K/uL     MPV 9.8 9.2 - 12.9 fL     Immature Granulocytes 0.3 0.0 - 0.5 %     Gran # (ANC) 2.1 1.8 - 7.7 K/uL     Immature Grans (Abs) 0.01 0.00 - 0.04 K/uL     Lymph # 1.1 1.0 - 4.8 K/uL     Mono # 0.3 0.3 - 1.0 K/uL     Eos # 0.1 0.0 - 0.5 K/uL     Baso # 0.04 0.00 - 0.20 K/uL     nRBC 0 0 /100 WBC     Gran% 57.6 38.0 - 73.0 %     Lymph% 29.4 18.0 - 48.0 %     Mono% 8.4 4.0 - 15.0 %     Eosinophil% 3.2 0.0 - 8.0 %     Basophil% 1.1 0.0 - 1.9 %     Differential Method Automated     Comprehensive metabolic panel     Collection Time: 10/05/20  7:05 AM    Result Value Ref Range     Sodium 138 136 - 145 mmol/L     Potassium 3.9 3.5 - 5.1 mmol/L     Chloride 106 95 - 110 mmol/L     CO2 22 (L) 23 - 29 mmol/L     Glucose 84 70 - 110 mg/dL     BUN, Bld 15 8 - 23 mg/dL     Creatinine 0.7 0.5 - 1.4 mg/dL     Calcium 8.3 (L) 8.7 - 10.5 mg/dL     Total Protein 6.0 6.0 - 8.4 g/dL     Albumin 3.4 (L) 3.5 - 5.2 g/dL     Total Bilirubin 0.6 0.1 - 1.0 mg/dL     Alkaline Phosphatase 52 (L) 55 - 135 U/L     AST 15 10 - 40 U/L     ALT 13 10 - 44 U/L     Anion Gap 10 8 - 16 mmol/L     eGFR if African American >60.0 >60 mL/min/1.73 m^2     eGFR if non African American >60.0 >60 mL/min/1.73 m^2   Magnesium     Collection Time: 10/05/20  7:05 AM   Result Value Ref Range     Magnesium 1.9 1.6 - 2.6 mg/dL   Phosphorus     Collection Time: 10/05/20  7:05 AM   Result Value Ref Range     Phosphorus 3.3 2.7 - 4.5 mg/dL   CSF cell count with differential     Collection Time: 10/05/20 10:00 AM   Result Value Ref Range     Heme Aliquot 0.5 mL     Appearance, CSF Clear Clear     Color, CSF Colorless Colorless     WBC, CSF 1 0 - 5 /cu mm     RBC,  (A) 0 /cu mm     Segmented Neutrophils, CSF 15 (H) 0 - 6 %     Lymphs, CSF 50 40 - 80 %     Mono/Macrophage, CSF 35 15 - 45 %   Protein, CSF     Collection Time: 10/05/20 10:00 AM   Result Value Ref Range     Protein, CSF 71 (H) 15 - 40 mg/dL   Glucose, CSF     Collection Time: 10/05/20 10:00 AM   Result Value Ref Range     Glucose, CSF 52 40 - 70 mg/dL   CSF culture     Collection Time: 10/05/20 10:10 AM     Specimen: CSF Tap, Tube 3; CSF (Spinal Fluid)   Result Value Ref Range     Gram Stain Result No WBC's       Gram Stain Result No organisms seen     Cryptococcal antigen, CSF     Collection Time: 10/05/20 10:10 AM     Specimen: Cerebrospinal fluid (CSF); CSF (Spinal Fluid)   Result Value Ref Range     Crypto Ag, CSF Negative Negative         No results found for: PHENYTOIN, PHENOBARB, VALPROATE, CBMZ  Imaging:      Imaging Results                     MRI Brain W WO Contrast (Final result)  Result time 10/03/20 02:09:40                Final result by Brenda Smith MD (10/03/20 02:09:40)                           Impression:        1. Possible subtle increased FLAIR hyperintensity involving the bilateral mesial temporal lobes which can be seen in the setting of autoimmune limbic encephalitis in the appropriate clinical setting.  Clinical correlation and short-term interval follow-up is advised.  2. No evidence of acute intracranial hemorrhage, acute infarct or abnormal enhancing lesion.  This report was flagged in Epic as abnormal.        Electronically signed by:     Brenda Smith MD  Date:                                            10/03/2020  Time:                                            02:09                         Narrative:     EXAMINATION:  MRI BRAIN W WO CONTRAST     CLINICAL HISTORY:  Altered mental status;.     TECHNIQUE:  Multiplanar multisequence MR imaging of the brain was performed before and after the administration of 7 mL Gadavist  intravenous contrast.     COMPARISON:  Head CT 09/02/2020     FINDINGS:  There is no abnormal restricted diffusion to suggest acute infarction.  There is generalized cerebral volume loss.  The ventricles are within normal limits of size and configuration for age without evidence of hydrocephalus or midline shift.  There is possible subtle increased FLAIR hyperintensity in the bilateral mesial temporal lobes which can be seen in the setting of autoimmune limbic encephalitis in the appropriate clinical setting noting there is no associated restricted diffusion, hemorrhage or postcontrast enhancement within these regions.  Clinical correlation and short-term interval follow-up is advised.  There is no evidence of acute intraparenchymal hemorrhage.  No extra-axial hemorrhage or fluid collections are identified.  No abnormal enhancing lesion is identified.  The craniocervical junction and sellar region are within  normal limits.

## 2020-10-05 NOTE — CONSULTS
[facility]  Psychiatry  Consult Note    Patient Name: Juan Hobson  MRN: [id number]   Code Status: Full Code  Admission Date: 10/2/2020  Hospital Length of Stay: 0 days  Attending Physician: Audi Arcos MD  Primary Care Provider: Mehnaz Barillas MD    Current Legal Status: Uncontested    Patient information was obtained from patient, past medical records and ER records.   Consults  Subjective:     Principal Problem:Encephalopathy, metabolic    Chief Complaint:  AMS     HPI:   Consultation-Liaison Psychiatry Consult Note     10/5/2020 4:32 PM  Juan Hobson  MRN: [id number]     Chief Complaint / Reason for Consult: capacity to refuse long term placement     SUBJECTIVE      History of Present Illness:   Juan Hobson is a 69 y.o. male with no past psychiatric history currently presenting for of dementia, inability to care for self.  Psychiatry was originally consulted to address the patient's symptoms of capacity to refuse long term placement.     Per Primary MD:  Mr. Juan Hobson is a 69 y.o. male with recently diagnosed penile cancer, who presents to the ER from Urology clinic with concern for self neglect, and need for Neurology evaluation of dementia and possible placement.  The patient mentions that he has been having some issues with his memory.  He mostly orders takeout for meals because it is easier, and still drives to work.  He feels like he is able to take care of himself, and does not need to go to a facility.      Per Neurology:  Patient is a 70 yo male w/ past medical history significant for BCC s/p Mohs procedure, recent Dx of penile carcinoma who presented to [facility] on 10/02 from Urology clinic w/ concern for progressive dementia and inability to care for self.  Patient was seen in clinic by Dr. Ernandez on 09/28 for memory complaints and at the time the diagnosis of fronto-temporal dementia was brought up. Per Dr. 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  Coworkers note that he has always been a very punctual and reliable employee but did not show up to work on Sept 2 and instead presented to [facility] reporting  "creatures attacked his penis".  He was noted to have enlarged, ulcerated penis with MRI and urology consult showing concern for penile cancer but not biopsy confirmed at this time.   He was treated for infection and discharged to geriatric psych facility where he was held for two weeks prior to discharge home with home health orders for an agency that does not accept his insurance.  When asked about memory difficulties, he cites difficulty connecting objects to words for them.  Co worker retrospectively note his clocking in/out has been inconsistent over the past several months.  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  Co workers note that over the past several months he has been making some errors in recording measurements. He continues to drive and live in his own home.   Patient has recently had a 2 week stay in a geriatric psychiatric facility.   Per chart review, patient is a poor historian and some information was provided by the co-worker that accompanied the patient to the ED - patient lives at home alone in an unsafe environment.  Social work evaluation was conducted in the clinic setting and was deemed that his home was unsafe and was unsafe for him to return home alone.  Patient lives alone and without any family members.  Based on reports, patient has not been eating, has a 70 lb weight loss, living in an unsafe and unclean environment, and a mold growing on his toilets, sink and refrigerator.  Spoiled food in the refrigerator and no clean water. Risk for falls with multiple obstacles in living spaces.  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  Reports he is here to get help regarding his memory problems.  Pt display gross memory deficits.  He is unable to remember the names of various objects such as a pen.  On memory testing, he explains that he's never heard of such things before when asked to explain the similarities between an apple and an orange.  He is unable to name any presidents of the [place]  He speaks in generalities and is unable to provide specifics.  He cannot remember then names of any of his doctors or "lumbar puncture" as name of the procedure he had today despite just being reminded on the name of the procedure 2 minutes ago.   0/3 objects remembered on memory testing.       Per chart review, patients decline has been ongoing for over a year.  He scored 15/30 on MOCA at outpatient neurology appointment on 9/28/2020.  Pt has no family or POA to help make decisions for him.  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       Patient denies psychiatric symptoms at this time.  No objective signs of psychosis, damian or paranoia.  He does not openly endorse any delusional thought content although topics regarding his penile lesions/cancer were not broached.  Pt reports he is hoping the LP would help with his memory problems.  He is able to recognize that he is having memory difficulty and having trouble putting his thoughts into words.  He states that he would like to return home and try to go back to work to see if he can manage before deciding on any long term placement.              Psychiatric Review Of Systems - Is patient experiencing or having changes in:  sleep: no  appetite: no  weight: no  energy/anergy: no  interest/pleasure/anhedonia: no  somatic symptoms: no  guilty/hopelessness: no  concentration: no  S.I.B.s/risky behavior: no  SI/SA:  no     anxiety/panic: " no  Agoraphobia:  no  Social phobia:  no  Recurrent nightmares:  no  hyper startle response:  no  Avoidance: no  Recurrent thoughts:  no  Recurrent behaviors:  no     Irritability: no  Racing thoughts: no  Impulsive behaviors: no  Pressured speech:  no     Paranoia:no  Delusions: yes  AVH:no     Pulled from chart review, updated where appropriate:  Psychiatric History:  Diagnose(s): No  Previous Medication Trials: No  Previous Psychiatric Hospitalizations: No  Family Psychiatric History: No  Outpatient Psychiatrist: No  Outpatient Therapist: No     Suicide/Violence Risk Assessment:  Current/active suicidal ideation/plan/intent: No  Previous suicide attempts: No  Current/active homicidal ideation/plan/intent: No  History of threats/arrests associated with violent conduct - No  Access to firearms/lethal weapons - No     Social History:  Marital Status: single  Children: 0   Employment Status: currently employed  Education: college graduate  Special Ed: no  Housing Status: Yes - alone, house  Developmental History: No  History of Abuse: No     Substance Abuse History:  Recreational Drugs: denies  Use of Alcohol: denied  Rehab History: No  Tobacco Use: No  Use of Caffeine: No  Use of OTC: No  Is the patient aware of the biomedical complications associated with substance abuse and mental illness? yes  Legal consequences of chemical use: Yes      Legal History:  Past Charges/Incarcerations: No  Pending Charges: No     Psychosocial Factors:  Stressors: health.   Functioning Relationships: alone & isolated     Collateral:   No           Collateral:   No     Medical Review Of Systems:  Pertinent items noted in HPI     Scheduled Meds:   enoxaparin  40 mg Subcutaneous Q24H      acetaminophen, melatonin, ondansetron, promethazine (PHENERGAN) IVPB, senna-docusate 8.6-50 mg, sodium chloride 0.9%      Psychotherapeutics (From admission, onward)     None          PRN Meds:  acetaminophen, melatonin, ondansetron, promethazine  (PHENERGAN) IVPB, senna-docusate 8.6-50 mg, sodium chloride 0.9%  Home Meds:  Prior to Admission medications    Not on File      Allergies:  Patient has no known allergies.  Past Medical/Surgical History:       Past Medical History:   Diagnosis Date    Basal cell carcinoma       right helix    Hematuria      Seborrheic dermatitis      Skin cancer of arm              Past Surgical History:   Procedure Laterality Date    INCISION AND DRAINAGE INTRA ORAL ABSCESS              Hospital Course: No notes on file    OBJECTIVE      Vital Signs:  Temp:  [96.4 °F (35.8 °C)-98.7 °F (37.1 °C)]   Pulse:  [57-70]   Resp:  [16-18]   BP: (119-141)/(67-75)   SpO2:  [94 %-97 %]         Mental Status Exam:  Appearance: lying in bed, thin & gaunt looking  Level of Consciousness: oriented to self only  Behavior/Cooperation: friendly and cooperative  Psychomotor: within normal limits   Speech: normal tone, normal rate, normal pitch, normal volume  Language: english, fluid  Orientation: person  Attention Span/Concentration: unable to spell world  Memory: Registers 3/3, recalls 0/3 at 5 minutes.     Mood: ok  Affect: constricted  Thought Process: superficially linear, concrete, perseverates  Thought Content: no suicidality, no homicidality or paranoia.  Fund of Knowledge: Impaired  Abstraction: proverbs were concrete  Insight: poor  Judgment: poor     Laboratory Data:  Recent Results         Recent Results (from the past 48 hour(s))   CBC auto differential     Collection Time: 10/04/20  4:10 AM   Result Value Ref Range     WBC 4.86 3.90 - 12.70 K/uL     RBC 3.78 (L) 4.60 - 6.20 M/uL     Hemoglobin 11.9 (L) 14.0 - 18.0 g/dL     Hematocrit 36.3 (L) 40.0 - 54.0 %     Mean Corpuscular Volume 96 82 - 98 fL     Mean Corpuscular Hemoglobin 31.5 (H) 27.0 - 31.0 pg     Mean Corpuscular Hemoglobin Conc 32.8 32.0 - 36.0 g/dL     RDW 13.3 11.5 - 14.5 %     Platelets 224 150 - 350 K/uL     MPV 9.7 9.2 - 12.9 fL     Immature Granulocytes 0.2 0.0 -  0.5 %     Gran # (ANC) 2.6 1.8 - 7.7 K/uL     Immature Grans (Abs) 0.01 0.00 - 0.04 K/uL     Lymph # 1.7 1.0 - 4.8 K/uL     Mono # 0.4 0.3 - 1.0 K/uL     Eos # 0.2 0.0 - 0.5 K/uL     Baso # 0.05 0.00 - 0.20 K/uL     nRBC 0 0 /100 WBC     Gran% 52.9 38.0 - 73.0 %     Lymph% 34.8 18.0 - 48.0 %     Mono% 7.6 4.0 - 15.0 %     Eosinophil% 3.5 0.0 - 8.0 %     Basophil% 1.0 0.0 - 1.9 %     Differential Method Automated     Comprehensive metabolic panel     Collection Time: 10/04/20  4:10 AM   Result Value Ref Range     Sodium 139 136 - 145 mmol/L     Potassium 3.8 3.5 - 5.1 mmol/L     Chloride 107 95 - 110 mmol/L     CO2 25 23 - 29 mmol/L     Glucose 84 70 - 110 mg/dL     BUN, Bld 14 8 - 23 mg/dL     Creatinine 0.7 0.5 - 1.4 mg/dL     Calcium 8.2 (L) 8.7 - 10.5 mg/dL     Total Protein 5.9 (L) 6.0 - 8.4 g/dL     Albumin 3.3 (L) 3.5 - 5.2 g/dL     Total Bilirubin 0.4 0.1 - 1.0 mg/dL     Alkaline Phosphatase 51 (L) 55 - 135 U/L     AST 14 10 - 40 U/L     ALT 12 10 - 44 U/L     Anion Gap 7 (L) 8 - 16 mmol/L     eGFR if African American >60.0 >60 mL/min/1.73 m^2     eGFR if non African American >60.0 >60 mL/min/1.73 m^2   Magnesium     Collection Time: 10/04/20  4:10 AM   Result Value Ref Range     Magnesium 1.9 1.6 - 2.6 mg/dL   Phosphorus     Collection Time: 10/04/20  4:10 AM   Result Value Ref Range     Phosphorus 3.5 2.7 - 4.5 mg/dL   POCT glucose     Collection Time: 10/04/20 11:40 AM   Result Value Ref Range     POCT Glucose 83 70 - 110 mg/dL   CBC auto differential     Collection Time: 10/05/20  7:05 AM   Result Value Ref Range     WBC 3.71 (L) 3.90 - 12.70 K/uL     RBC 4.01 (L) 4.60 - 6.20 M/uL     Hemoglobin 12.5 (L) 14.0 - 18.0 g/dL     Hematocrit 38.5 (L) 40.0 - 54.0 %     Mean Corpuscular Volume 96 82 - 98 fL     Mean Corpuscular Hemoglobin 31.2 (H) 27.0 - 31.0 pg     Mean Corpuscular Hemoglobin Conc 32.5 32.0 - 36.0 g/dL     RDW 13.2 11.5 - 14.5 %     Platelets 236 150 - 350 K/uL     MPV 9.8 9.2 - 12.9 fL      Immature Granulocytes 0.3 0.0 - 0.5 %     Gran # (ANC) 2.1 1.8 - 7.7 K/uL     Immature Grans (Abs) 0.01 0.00 - 0.04 K/uL     Lymph # 1.1 1.0 - 4.8 K/uL     Mono # 0.3 0.3 - 1.0 K/uL     Eos # 0.1 0.0 - 0.5 K/uL     Baso # 0.04 0.00 - 0.20 K/uL     nRBC 0 0 /100 WBC     Gran% 57.6 38.0 - 73.0 %     Lymph% 29.4 18.0 - 48.0 %     Mono% 8.4 4.0 - 15.0 %     Eosinophil% 3.2 0.0 - 8.0 %     Basophil% 1.1 0.0 - 1.9 %     Differential Method Automated     Comprehensive metabolic panel     Collection Time: 10/05/20  7:05 AM   Result Value Ref Range     Sodium 138 136 - 145 mmol/L     Potassium 3.9 3.5 - 5.1 mmol/L     Chloride 106 95 - 110 mmol/L     CO2 22 (L) 23 - 29 mmol/L     Glucose 84 70 - 110 mg/dL     BUN, Bld 15 8 - 23 mg/dL     Creatinine 0.7 0.5 - 1.4 mg/dL     Calcium 8.3 (L) 8.7 - 10.5 mg/dL     Total Protein 6.0 6.0 - 8.4 g/dL     Albumin 3.4 (L) 3.5 - 5.2 g/dL     Total Bilirubin 0.6 0.1 - 1.0 mg/dL     Alkaline Phosphatase 52 (L) 55 - 135 U/L     AST 15 10 - 40 U/L     ALT 13 10 - 44 U/L     Anion Gap 10 8 - 16 mmol/L     eGFR if African American >60.0 >60 mL/min/1.73 m^2     eGFR if non African American >60.0 >60 mL/min/1.73 m^2   Magnesium     Collection Time: 10/05/20  7:05 AM   Result Value Ref Range     Magnesium 1.9 1.6 - 2.6 mg/dL   Phosphorus     Collection Time: 10/05/20  7:05 AM   Result Value Ref Range     Phosphorus 3.3 2.7 - 4.5 mg/dL   CSF cell count with differential     Collection Time: 10/05/20 10:00 AM   Result Value Ref Range     Heme Aliquot 0.5 mL     Appearance, CSF Clear Clear     Color, CSF Colorless Colorless     WBC, CSF 1 0 - 5 /cu mm     RBC,  (A) 0 /cu mm     Segmented Neutrophils, CSF 15 (H) 0 - 6 %     Lymphs, CSF 50 40 - 80 %     Mono/Macrophage, CSF 35 15 - 45 %   Protein, CSF     Collection Time: 10/05/20 10:00 AM   Result Value Ref Range     Protein, CSF 71 (H) 15 - 40 mg/dL   Glucose, CSF     Collection Time: 10/05/20 10:00 AM   Result Value Ref Range     Glucose,  CSF 52 40 - 70 mg/dL   CSF culture     Collection Time: 10/05/20 10:10 AM     Specimen: CSF Tap, Tube 3; CSF (Spinal Fluid)   Result Value Ref Range     Gram Stain Result No WBC's       Gram Stain Result No organisms seen     Cryptococcal antigen, CSF     Collection Time: 10/05/20 10:10 AM     Specimen: Cerebrospinal fluid (CSF); CSF (Spinal Fluid)   Result Value Ref Range     Crypto Ag, CSF Negative Negative         No results found for: PHENYTOIN, PHENOBARB, VALPROATE, CBMZ  Imaging:      Imaging Results                    MRI Brain W WO Contrast (Final result)  Result time 10/03/20 02:09:40                Final result by Brenda Smith MD (10/03/20 02:09:40)                               Impression:        1. Possible subtle increased FLAIR hyperintensity involving the bilateral mesial temporal lobes which can be seen in the setting of autoimmune limbic encephalitis in the appropriate clinical setting.  Clinical correlation and short-term interval follow-up is advised.  2. No evidence of acute intracranial hemorrhage, acute infarct or abnormal enhancing lesion.  This report was flagged in Epic as abnormal.        Electronically signed by:     Brenda Smith MD  Date:                                            10/03/2020  Time:                                            02:09                         Narrative:     EXAMINATION:  MRI BRAIN W WO CONTRAST     CLINICAL HISTORY:  Altered mental status;.     TECHNIQUE:  Multiplanar multisequence MR imaging of the brain was performed before and after the administration of 7 mL Gadavist  intravenous contrast.     COMPARISON:  Head CT 09/02/2020     FINDINGS:  There is no abnormal restricted diffusion to suggest acute infarction.  There is generalized cerebral volume loss.  The ventricles are within normal limits of size and configuration for age without evidence of hydrocephalus or midline shift.  There is possible subtle increased FLAIR hyperintensity in the bilateral  "mesial temporal lobes which can be seen in the setting of autoimmune limbic encephalitis in the appropriate clinical setting noting there is no associated restricted diffusion, hemorrhage or postcontrast enhancement within these regions.  Clinical correlation and short-term interval follow-up is advised.  There is no evidence of acute intraparenchymal hemorrhage.  No extra-axial hemorrhage or fluid collections are identified.  No abnormal enhancing lesion is identified.  The craniocervical junction and sellar region are within normal limits.                                             Assessment/Plan:     Altered mental status     ASSESSMENT      Juan Hobson is a 69 y.o. male with history of dementia, currently presenting with Encephalopathy, metabolic.  Psychiatry was originally consulted to address the patient's symptoms of capacity to refuse placement.     Pt display gross memory deficits.  He is unable to remember the names of various objects such as a pen.  On memory testing, he explains that he's never heard of such things before when asked to explain the similarities between an apple and an orange.  He is unable to name any presidents of the [place]  He speaks in generalities and is unable to provide specifics.  He cannot remember then names of any of his doctors or "lumbar puncture" as name of the procedure he had today despite just being reminded on the name of the procedure 2 minutes ago.   0/3 objects remembered on memory testing.       Per chart review, patients decline has been ongoing for over a year.  He scored 15/30 on MOCA at outpatient neurology appointment on 9/28/2020.  Pt has no family or POA to help make decisions for him.  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 Scheduled Medication(s):  none     2. PRN " Medication(s):  none     3. Legal Status/Precaution(s):  No indication for PEC/CEC at this time.       5. Capacity:  CAPACITY  Pt at this time doesn't have full medical decision making capacity to refused proposed disposition plan.   Pt doesn't have next of kin unfortunately other than his coworkers who take care of him essentially.     Capacity Evaluation      Capacity question:  · Does the patient possess the ability to make an informed decision, regarding the proposed option for long term care?     Capacity for a given decision requires:  · Understanding - the ability to state the meaning of the relevant information (eg, diagnosis, risks and benefits of a treatment or procedure, indications, and options of care).  ? The patient must be able to understand the proposed treatment, and/or options for his care.  · Appreciation - the ability to explain how information (eg, diagnosis, benefit, and risk) applies to oneself.  ? The patient must be able to appreciate his own medical situation, and abstract, as to how the treatments/proposed options for care, apply to his medical situation.  · Reasoning - the ability to compare information and infer consequences of choice.  ? The patient must be able to understand the consequences of his medical decision, in a reasonable manner, supported by the facts, and his own values, in a consistent fashion.  · Expressing a choice - the ability to state a decision.  ? The patient must demonstrate the ability to communicate a choice, clearly and consistently.     Assessment of capacity:  · The patient understands the clinical condition relation to this evaluation: No.  · The patient understands the indication and nature of/reasoning behind the proposed treatment(s) and/or options for his care: No.  · The patient understands and appreciates the risks and benefits of the proposed treatment(s) and/or options for his care: No.  · The patient understands and appreciates the risks and  benefits of refusing the proposed treatment(s) and/or options for his care: No.  · The patient is in not in agreement with the assessment and does not consent) to long term placement.    · The patient has evidence of impaired insight and/or judgment: Yes.     Based upon my evaluation of Juan Hobson regarding his medical decision-making capacity for refusing long term placement, I found with reasonable certainty that Juan Hobson does not have capacity to make this decision.             Total Time:  60 minutes      Sky Jung MD   Psychiatry PGY-2  [facility]

## 2020-10-06 PROBLEM — G93.40 ACUTE ENCEPHALOPATHY: Status: ACTIVE | Noted: 2020-10-06

## 2020-10-06 PROBLEM — R41.89 COGNITIVE IMPAIRMENT: Status: ACTIVE | Noted: 2020-10-03

## 2020-10-06 LAB
ALBUMIN SERPL BCP-MCNC: 3.5 G/DL (ref 3.5–5.2)
ALP SERPL-CCNC: 54 U/L (ref 55–135)
ALT SERPL W/O P-5'-P-CCNC: 13 U/L (ref 10–44)
ANION GAP SERPL CALC-SCNC: 7 MMOL/L (ref 8–16)
AST SERPL-CCNC: 15 U/L (ref 10–40)
BASOPHILS # BLD AUTO: 0.04 K/UL (ref 0–0.2)
BASOPHILS NFR BLD: 0.7 % (ref 0–1.9)
BILIRUB SERPL-MCNC: 0.6 MG/DL (ref 0.1–1)
BUN SERPL-MCNC: 17 MG/DL (ref 8–23)
CALCIUM SERPL-MCNC: 8.7 MG/DL (ref 8.7–10.5)
CHLORIDE SERPL-SCNC: 106 MMOL/L (ref 95–110)
CO2 SERPL-SCNC: 29 MMOL/L (ref 23–29)
CREAT SERPL-MCNC: 0.9 MG/DL (ref 0.5–1.4)
DIFFERENTIAL METHOD: ABNORMAL
EOSINOPHIL # BLD AUTO: 0.2 K/UL (ref 0–0.5)
EOSINOPHIL NFR BLD: 3.7 % (ref 0–8)
ERYTHROCYTE [DISTWIDTH] IN BLOOD BY AUTOMATED COUNT: 13.1 % (ref 11.5–14.5)
EST. GFR  (AFRICAN AMERICAN): >60 ML/MIN/1.73 M^2
EST. GFR  (NON AFRICAN AMERICAN): >60 ML/MIN/1.73 M^2
GLUCOSE SERPL-MCNC: 90 MG/DL (ref 70–110)
HCT VFR BLD AUTO: 38.4 % (ref 40–54)
HGB BLD-MCNC: 12.6 G/DL (ref 14–18)
HSV1, PCR, CSF: NEGATIVE
HSV2, PCR, CSF: NEGATIVE
IMM GRANULOCYTES # BLD AUTO: 0.01 K/UL (ref 0–0.04)
IMM GRANULOCYTES NFR BLD AUTO: 0.2 % (ref 0–0.5)
LYMPHOCYTES # BLD AUTO: 1.7 K/UL (ref 1–4.8)
LYMPHOCYTES NFR BLD: 31.6 % (ref 18–48)
MAGNESIUM SERPL-MCNC: 1.9 MG/DL (ref 1.6–2.6)
MCH RBC QN AUTO: 31.7 PG (ref 27–31)
MCHC RBC AUTO-ENTMCNC: 32.8 G/DL (ref 32–36)
MCV RBC AUTO: 97 FL (ref 82–98)
MONOCYTES # BLD AUTO: 0.5 K/UL (ref 0.3–1)
MONOCYTES NFR BLD: 9.4 % (ref 4–15)
NEUTROPHILS # BLD AUTO: 2.9 K/UL (ref 1.8–7.7)
NEUTROPHILS NFR BLD: 54.4 % (ref 38–73)
NRBC BLD-RTO: 0 /100 WBC
PHOSPHATE SERPL-MCNC: 3.9 MG/DL (ref 2.7–4.5)
PLATELET # BLD AUTO: 233 K/UL (ref 150–350)
PMV BLD AUTO: 9.7 FL (ref 9.2–12.9)
POTASSIUM SERPL-SCNC: 4.5 MMOL/L (ref 3.5–5.1)
PROT SERPL-MCNC: 6 G/DL (ref 6–8.4)
PYRIDOXAL SERPL-MCNC: 20 UG/L (ref 5–50)
RBC # BLD AUTO: 3.97 M/UL (ref 4.6–6.2)
SODIUM SERPL-SCNC: 142 MMOL/L (ref 136–145)
VIT B1 BLD-MCNC: 49 UG/L (ref 38–122)
WBC # BLD AUTO: 5.41 K/UL (ref 3.9–12.7)

## 2020-10-06 PROCEDURE — 11000001 HC ACUTE MED/SURG PRIVATE ROOM

## 2020-10-06 PROCEDURE — 99233 PR SUBSEQUENT HOSPITAL CARE,LEVL III: ICD-10-PCS | Mod: ,,, | Performed by: PHYSICIAN ASSISTANT

## 2020-10-06 PROCEDURE — 84100 ASSAY OF PHOSPHORUS: CPT

## 2020-10-06 PROCEDURE — 83735 ASSAY OF MAGNESIUM: CPT

## 2020-10-06 PROCEDURE — 80053 COMPREHEN METABOLIC PANEL: CPT

## 2020-10-06 PROCEDURE — 99233 PR SUBSEQUENT HOSPITAL CARE,LEVL III: ICD-10-PCS | Mod: ,,, | Performed by: PSYCHIATRY & NEUROLOGY

## 2020-10-06 PROCEDURE — 36415 COLL VENOUS BLD VENIPUNCTURE: CPT

## 2020-10-06 PROCEDURE — 99233 SBSQ HOSP IP/OBS HIGH 50: CPT | Mod: ,,, | Performed by: PHYSICIAN ASSISTANT

## 2020-10-06 PROCEDURE — 99233 SBSQ HOSP IP/OBS HIGH 50: CPT | Mod: ,,, | Performed by: PSYCHIATRY & NEUROLOGY

## 2020-10-06 PROCEDURE — 85025 COMPLETE CBC W/AUTO DIFF WBC: CPT

## 2020-10-06 PROCEDURE — 63600175 PHARM REV CODE 636 W HCPCS: Performed by: HOSPITALIST

## 2020-10-06 RX ADMIN — ENOXAPARIN SODIUM 40 MG: 40 INJECTION SUBCUTANEOUS at 06:10

## 2020-10-06 NOTE — PLAN OF CARE
Patient required frequent redirecting and reorienting to surroundings and plan of care. Pleasantly confused. No distress noted.     Problem: Adult Inpatient Plan of Care  Goal: Plan of Care Review  Outcome: Ongoing, Progressing     Problem: Thought Process Alteration  Goal: Optimal Thought Clarity  Outcome: Ongoing, Progressing     Problem: Fall Injury Risk  Goal: Absence of Fall and Fall-Related Injury  Outcome: Ongoing, Progressing

## 2020-10-06 NOTE — ASSESSMENT & PLAN NOTE
69 y.o. with progressive cognitive decline and inability to adequately care for self. Presented in September 2020, to the ED stating there were creatures attacking his penis. 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 Prompted  evaluation by psychiatry and found that it is likely related to dementia after getting collateral from family/friends. He was PEC'd the night of admission after wanting to leave and then discharged to a geropsychiatry facility for 2 weeks. Seen in neuro clinic where there was mention of suspected frontotemporal dementia and further work-up ensued. Presented to a urology appt on 10/2 and was advised to present to the ED due to concern for competency and need for patient advocate/POA before a biopsy or form of treatment can be initiated.     Presentation concerning for frontotemporal dementia vs. autoimmune vs paraneoplastic process in the setting of malignancy   - LP performed by IR 10/5/2020  - Concern that the patient is unable to care for himself and make his own decisions, however the patient feels like he is able to care for himself  - MRI brain with Possible subtle increased FLAIR hyperintensity involving the bilateral mesial temporal lobes which can be seen in the setting of autoimmune limbic encephalitis  - consulted anesthesia for LP; neuro to place CSF orders   - EEG, pending read  - Vitamin B1, Vitamin B6, Vitamin B12, HIV, RPR, TSH ordered   - afebrile, without leukocytosis -- infectious work-up unremarkable   - Neurology consulted  - LP attempted by Anesthesia at bedside, but due to DJD, unable to obtain fluid x 2 levels   - Patient will require an IR LP  - Paraneoplastic panel, serum in place from outpatient neurology visit on 09/28  - Pending LP to assess CSF for signs of inflammatory markers  - Possibility for Autoimmune vs Paraneoplastic  process in the setting of active malignancy  - CM/SW consult  - Pending evaluation, might need Psych to determine if he is gravely disabled and requiring a PEC plus/minus CXR and PPD         DELIRIUM BEHAVIOR MANAGEMENT  - Minimize use of restraints; if physical restraints necessary, please utilize medical/chemical prns for agitation.  - Keep shades open and room lit during day and room dim at night in order to promote healthy circadian rhythms.  - Encourage family at bedside  - Keep whiteboard in patient's room current with the date and name of the members of patient's team for easy patient self re-orientation.  - Avoid benzodiazepines, antihistamines, anticholinergics, hypnotics, and minimize opiates while controlling for pain as these medications may exacerbate delirium.

## 2020-10-06 NOTE — ASSESSMENT & PLAN NOTE
Sent to ED 10/2 from Urology clinic to have mental capacity evaluation   Pt deemed unsafe to return home and Psych team feels pt does not have full medical decision making capacity at this time   Pt does not have next of kin    --primary team working on placement and medical POA

## 2020-10-06 NOTE — SUBJECTIVE & OBJECTIVE
"  Subjective:     Interval History:   LP completed with IR, preliminary CSF with elevated RBC and protein, awaiting send out studies  EEG from 10/3 reviewed "mild, non-specific regional dysfunction in bilateral temporal lobes. No electrographic seizures or indications of seizure tendency." Primary team coordinating with coworkers to obtain consent for possible urologic procedure  Will need placement as home deemed unsafe and not mentally capable to care for himself    Current Facility-Administered Medications   Medication Dose Route Frequency Provider Last Rate Last Dose    acetaminophen tablet 650 mg  650 mg Oral Q4H PRN Adriana Badillo MD        enoxaparin injection 40 mg  40 mg Subcutaneous Q24H Adriana Badillo MD   40 mg at 10/05/20 1651    melatonin tablet 6 mg  6 mg Oral Nightly PRN Adriana Badillo MD        ondansetron injection 8 mg  8 mg Intravenous Q8H PRN Adriana Badillo MD        promethazine (PHENERGAN) 25 mg in dextrose 5 % 50 mL IVPB  25 mg Intravenous Q6H PRN Adriana Badillo MD        senna-docusate 8.6-50 mg per tablet 1 tablet  1 tablet Oral BID PRN Adriana Badillo MD        sodium chloride 0.9% flush 5 mL  5 mL Intravenous PRN Adriana Badillo MD         Review of Systems   Constitutional: Positive for activity change and fatigue.   HENT: Negative for voice change.    Eyes: Negative for visual disturbance.   Respiratory: Negative for shortness of breath.    Cardiovascular: Negative for chest pain.   Gastrointestinal: Negative for vomiting.   Musculoskeletal: Negative for neck stiffness.   Neurological: Negative for dizziness, tremors, syncope, facial asymmetry, speech difficulty, weakness, light-headedness, numbness and headaches.   Psychiatric/Behavioral: Positive for confusion and decreased concentration. Negative for hallucinations.     Objective:     Vital Signs (Most Recent):  Temp: 97 °F (36.1 °C) (10/06/20 1211)  Pulse: 66 (10/06/20 1211)  Resp: 16 (10/06/20 1211)  BP: 124/71 " (10/06/20 1211)  SpO2: 99 % (10/06/20 1211) Vital Signs (24h Range):  Temp:  [97 °F (36.1 °C)-98.7 °F (37.1 °C)] 97 °F (36.1 °C)  Pulse:  [59-66] 66  Resp:  [16-18] 16  SpO2:  [95 %-99 %] 99 %  BP: (119-145)/(66-75) 124/71     Weight: 70.4 kg (155 lb 3.3 oz)  Body mass index is 22.27 kg/m².    Physical Exam  Constitutional:       Appearance: Normal appearance.   HENT:      Head: Normocephalic and atraumatic.   Eyes:      Extraocular Movements: EOM normal.      Pupils: Pupils are equal, round, and reactive to light.   Neck:      Musculoskeletal: Normal range of motion.   Pulmonary:      Effort: Pulmonary effort is normal.   Musculoskeletal: Normal range of motion.   Skin:     Findings: No rash.   Neurological:      Mental Status: He is alert.      Cranial Nerves: No cranial nerve deficit.      Motor: No weakness.      Coordination: Finger-Nose-Finger Test normal.   Psychiatric:         Speech: Speech normal.       NEUROLOGICAL EXAMINATION:     MENTAL STATUS   Oriented to person.   Disoriented to city. (Said we are at his house this morning, but later able to name [facility] for location)  Oriented to year and month.   Recall of objects at 5 minutes: 0/3. Follows 1 step commands.   Attention: decreased. Concentration: decreased.   Speech: speech is normal   Level of consciousness: alert       Unable to say Halloween is in October without prompting    Mentions working for [employer], but unable to name street where facility is located    Says everything is fine with him besides his head and mouth      CRANIAL NERVES     CN III, IV, VI   Pupils are equal, round, and reactive to light.  Extraocular motions are normal.   Nystagmus: none   Diplopia: none  Ophthalmoparesis: none    CN V   Facial sensation intact.     CN VII   Facial expression full, symmetric.     CN VIII   Hearing: intact    CN IX, X   Palate: symmetric    CN XI   CN XI normal.     CN XII   CN XII normal.     MOTOR EXAM   Muscle  bulk: normal  Overall muscle tone: normal  Right arm pronator drift: absent  Left arm pronator drift: absent    Strength   Right deltoid: 5/5  Left deltoid: 5/5  Right biceps: 5/5  Left biceps: 5/5  Right triceps: 5/5  Left triceps: 5/5  Right interossei: 5/5  Left interossei: 5/5  Right iliopsoas: 5/5  Left iliopsoas: 5/5  Right quadriceps: 5/5  Left quadriceps: 5/5  Right hamstrin/5  Left hamstrin/5  Right anterior tibial: 5/5  Left anterior tibial: 5/5  Right posterior tibial: 5/5  Left posterior tibial: 5/5  Right peroneal: 5/5  Left peroneal: 5/5    REFLEXES     Reflexes   Right ankle clonus: absent  Left ankle clonus: absent       1+ throughout     SENSORY EXAM   Right arm light touch: normal  Left arm light touch: normal  Right leg light touch: normal  Left leg light touch: normal    GAIT AND COORDINATION     Gait  Gait: (deferred)     Coordination   Finger to nose coordination: normal    Tremor   Resting tremor: absent    Significant Labs:   CBC:   Recent Labs   Lab 10/05/20  0705 10/06/20  0416   WBC 3.71* 5.41   HGB 12.5* 12.6*   HCT 38.5* 38.4*    233     CMP:   Recent Labs   Lab 10/05/20  0705 10/06/20  0416   GLU 84 90    142   K 3.9 4.5    106   CO2 22* 29   BUN 15 17   CREATININE 0.7 0.9   CALCIUM 8.3* 8.7   MG 1.9 1.9   PROT 6.0 6.0   ALBUMIN 3.4* 3.5   BILITOT 0.6 0.6   ALKPHOS 52* 54*   AST 15 15   ALT 13 13   ANIONGAP 10 7*   EGFRNONAA >60.0 >60.0     Inflammatory Markers: No results for input(s): SEDRATE, CRP, PROCAL in the last 48 hours.  Urine Culture: No results for input(s): LABURIN in the last 48 hours.  Urine Studies: No results for input(s): COLORU, APPEARANCEUA, PHUR, SPECGRAV, PROTEINUA, GLUCUA, KETONESU, BILIRUBINUA, OCCULTUA, NITRITE, UROBILINOGEN, LEUKOCYTESUR, RBCUA, WBCUA, BACTERIA, SQUAMEPITHEL, HYALINECASTS in the last 48 hours.    Invalid input(s): WRIGHTSUR      Ref. Range 10/5/2020 10:00 10/5/2020 10:10   COLOR CSF Latest Ref Range: Colorless   Colorless    Heme Aliquot Latest Units: mL 0.5    Appearance, CSF Latest Ref Range: Clear  Clear    Lymphs, CSF Latest Ref Range: 40 - 80 % 50    Mono/Macrophage, CSF Latest Ref Range: 15 - 45 % 35    Segmented Neutrophils, CSF Latest Ref Range: 0 - 6 % 15 (H)    RBC, CSF Latest Ref Range: 0 /cu mm 190 (A)    WBC, CSF Latest Ref Range: 0 - 5 /cu mm 1    Glucose, CSF Latest Ref Range: 40 - 70 mg/dL 52    Protein, CSF Latest Ref Range: 15 - 40 mg/dL 71 (H)    Crypto Ag, CSF Latest Ref Range: Negative   Negative     Pending CSF:  --HSV, ACE, paraneoplastic panel     Unremarkable:  --TSH  --HIV   --RPR  --Ammonia  --Folate  --B6  --B12 540  --B1 49  --Serum paraneoplastic panel (9/28)    All pertinent lab results from the past 24 hours have been reviewed.    EEG (10/3/20): 1 hr 40 min  Abnormal EEG due to mild, non-specific regional dysfunction in bilateral temporal lobes. There are no electrographic seizures or indications of seizure tendency.    Significant Imaging: I have reviewed and interpreted all pertinent imaging results/findings within the past 24 hours.     MRI brain W WO contrast (10/2/20):  FINDINGS: There is no abnormal restricted diffusion to suggest acute infarction.  There is generalized cerebral volume loss.  The ventricles are within normal limits of size and configuration for age without evidence of hydrocephalus or midline shift.  There is possible subtle increased FLAIR hyperintensity in the bilateral mesial temporal lobes which can be seen in the setting of autoimmune limbic encephalitis in the appropriate clinical setting noting there is no associated restricted diffusion, hemorrhage or postcontrast enhancement within these regions.  Clinical correlation and short-term interval follow-up is advised.  There is no evidence of acute intraparenchymal hemorrhage.  No extra-axial hemorrhage or fluid collections are identified.  No abnormal enhancing lesion is identified.  The craniocervical junction and  sellar region are within normal limits.     Impression: Possible subtle increased FLAIR hyperintensity involving the bilateral mesial temporal lobes which can be seen in the setting of autoimmune limbic encephalitis in the appropriate clinical setting. No evidence of acute intracranial hemorrhage, acute infarct or abnormal enhancing lesion.

## 2020-10-06 NOTE — PLAN OF CARE
CM received a call Jessica with EPS, the state does not see a current reason to intervene in this case. Pt has medical reasons for decline. Jessica informed the CM that if the patient would not have presented to the hospital first then they would have brought him to the hospital and continued to follow the case, but since the patient presented to the hospital it is a safe discharge planning issue that would involve [facility] department. CM updated updated Gato Abdalla RN Supervisor.     Sharon Diaz RN Case Manager   #[phone]

## 2020-10-06 NOTE — ASSESSMENT & PLAN NOTE
- follows with urology   - will discuss need for further intervention while inpatient, awaiting call back

## 2020-10-06 NOTE — PROCEDURES
EEG REPORT      Juan Hobson  [id number]  1950    DATE OF SERVICE: 10/3/2020         METHODOLOGY      Extended electroencephalographic recording is made while the patient is ambulatory and continuing normal daily activities.  Electrodes are placed according to the International 10-20 placement system and included T1 and T2 electrode placement.  Twenty four (24) channels of digital signal (sampling rate of 512/sec) was simultaneously recorded from the scalp including EKG and eye monitors.  Recording band pass was 0.1 to 100 hz and all data was stored digitally on the recorder.  The patient is instructed to press an event button when clinical symptoms occur and write the symptoms into a diary. Activation procedures which include photic stimulation, hyperventilation and instructing patients to perform simple task are done in selected patients.        The EEG is displayed on a monitor screen and can be reformatted into different montages for evaluation.  The entire recoding is submitted for computer assisted analysis to detect spike and electrographic seizure activity.  The entire recording is visually reviewed and the times identified by computer analysis as being spikes or seizures are reviewed again.  Compresses spectral analysis (CSA) is also performed on the activity recorded from each individual channel.  This is displayed as a power display of frequencies from 0 to 30 Hz over time.   The CSA analysis is done and displayed continuously.  This is reviewed for asymmetries in power between homologous areas of the scalp and for presence of changes in power which canbe seen when seizures occur.  Sections of suspected abnormalities on the CSA is then compared with the original EEG recording.  .     [employer] software was also utilized in the review of this study.  This software suite analyzes the EEG recording in multiple domains.  Coherence and rhythmicity is computed to identify EEG sections which may  contain organized seizures.  Each channel undergoes analysis to detect presence of spike and sharp waves which have special and morphological characteristic of epileptic activity.  The routine EEG recording is converted from spacial into frequency domain.  This is then displayed comparing homologous areas to identify areas of significant asymmetry.  Algorithm to identify non-cortically generated artifact is used to separate eye movement, EMG and other artifact from the EEG     Recording Times    A total of 1:40:53 hours of EEG was recorded.      EEG FINDINGS:  Background activity:   The background rhythm was characterized by alpha and anterior dominant beta activity with a 10Hz posterior dominant alpha rhythm at 30-70 microvolts.  There is some intrusion of theta frequencies into the normal waking rhythms of the bilateral temporal lobes.  Symmetry and continuity: the background was continuous and symmetric     Sleep:   There is occasional loss of PDR with K complexes but the patient does not fully transition into sleep.    Activation procedures:   Patient is able to answer questions appropriately    Abnormal activity:   No epileptiform discharges, periodic discharges, lateralized rhythmic delta activity or electrographic seizures were seen.    IMPRESSION:   Abnormal EEG due to mild, non-specific regional dysfunction in bilateral temporal lobes.  There are no electrographic seizures or indications of seizure tendency.      Santos Ernandez MD  Neurology-Epilepsy.  [facility].

## 2020-10-06 NOTE — PLAN OF CARE
Pt resting well. Pt had no falls or injures during shift. VSS, no acute distress. Will continue to monitor.

## 2020-10-06 NOTE — ASSESSMENT & PLAN NOTE
70 yo male with history of BCC s/p Mohs' procedure and new diagnosis of penile malignancy was sent to ED from Urology clinic for cognitive impairment and inability to care for himself. Pt lives alone and does not have next of kin. Coworkers noted cognitive decline around early September and he was seen in clinic by Dr. Ernandez 9/28 for memory issues felt to be due to FTD. This admission, workup has been remarkable for MRI brain W WO contrast showing increased FLAIR hyperintensity involving the bilateral mesial temporal lobes. LP 10/5 with CSF (1 WBC, 190 RBC, 71 protein, 52 glucose). CSF HSV, ACE and paraneoplastic pending. EEG 10/3 (1 hr 40 min) with mild regional dysfunction in bilateral temporal lobes, but no electrographic seizures. Serum paraneoplastic panel from 9/28 has resulted negative.     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 autoimmune vs paraneoplastic process in the setting of malignancy     --will f/u pending labs  --outpatient formal cognitive assessment with neuropsych    --continue strict delirium precautions

## 2020-10-06 NOTE — PROGRESS NOTES
"[facility]  Neurology  Progress Note    Patient Name: Juan Hobson  MRN: [id number]  Admission Date: 10/2/2020  Hospital Length of Stay: 0 days  Code Status: Full Code   Attending Provider: Audi Arcos MD  Primary Care Physician: Mehnaz Barillas MD   Principal Problem:Encephalopathy, metabolic    HPI:   68 yo male w/ past medical history significant for BCC s/p Mohs procedure, recent Dx of penile carcinoma who presented [place] [facility] [place] on [place]02 from Urology clinic w/ concern for progressive dementia and inability to care for self. Patient was seen in clinic by Dr. Ernandez on 09/28 for memory complaints and at the time the diagnosis of fronto-temporal dementia was brought up. Per Dr. 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  Coworkers note that he has always been a very punctual and reliable employee but did not show up to work on Sept 2 and instead presented to [facility] [place] reporting  "creatures attacked his penis".  He was noted to have enlarged, ulcerated penis with MRI and urology consult showing concern for penile cancer but not biopsy confirmed at this time.   He was treated for infection and discharged to geriatric psych facility where he was held for two weeks prior to discharge home with home health orders for an agency that does not accept his insurance. When asked about memory difficulties, he cites difficulty connecting objects to words for them.  Co worker retrospectively note his clocking in/out has been inconsistent over the past several months.  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  Co workers note that over the past several months he has been making some errors in recording measurements. He continues to drive and live in his own home. Patient has recently had a 2 week stay in a geriatric psychiatric " "facility. Per chart review, patient is a poor historian and some information was provided by the co-worker that accompanied the patient to the ED - patient lives at home alone in an unsafe environment.  Social work evaluation was conducted in the clinic setting and was deemed that his home was unsafe and was unsafe for him to return home alone.  Patient lives alone and without any family members.  Based on reports, patient has not been eating, has a 70 lb weight loss, living in an unsafe and unclean environment, and a mold growing on his toilets, sink and refrigerator.  Spoiled food in the refrigerator and no clean water. Risk for falls with multiple obstacles in living spaces. 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 No electrographic seizures or indications of seizure tendency." Primary team coordinating with coworkers to obtain consent for possible urologic procedure  Will need placement as home deemed unsafe and not mentally capable to care for himself    Current Facility-Administered Medications   Medication Dose Route Frequency Provider Last Rate Last Dose    acetaminophen tablet 650 mg  650 mg Oral Q4H PRN Adriana Badillo MD        enoxaparin injection 40 mg  40 mg Subcutaneous Q24H Adriana Badillo MD   40 mg at 10/05/20 1651    melatonin tablet 6 mg  6 mg Oral Nightly PRN Adriana Badillo MD        ondansetron injection 8 mg  8 mg Intravenous Q8H PRN Adriana Badillo MD        promethazine (PHENERGAN) 25 mg in dextrose 5 % 50 mL IVPB  25 mg Intravenous Q6H PRN Adriana Badillo MD        senna-docusate 8.6-50 mg per tablet 1 tablet  1 tablet Oral BID PRN Adriana Badillo MD        sodium " chloride 0.9% flush 5 mL  5 mL Intravenous PRN Adriana Badillo MD         Review of Systems   Constitutional: Positive for activity change and fatigue.   HENT: Negative for voice change.    Eyes: Negative for visual disturbance.   Respiratory: Negative for shortness of breath.    Cardiovascular: Negative for chest pain.   Gastrointestinal: Negative for vomiting.   Musculoskeletal: Negative for neck stiffness.   Neurological: Negative for dizziness, tremors, syncope, facial asymmetry, speech difficulty, weakness, light-headedness, numbness and headaches.   Psychiatric/Behavioral: Positive for confusion and decreased concentration. Negative for hallucinations.     Objective:     Vital Signs (Most Recent):  Temp: 97 °F (36.1 °C) (10/06/20 1211)  Pulse: 66 (10/06/20 1211)  Resp: 16 (10/06/20 1211)  BP: 124/71 (10/06/20 1211)  SpO2: 99 % (10/06/20 1211) Vital Signs (24h Range):  Temp:  [97 °F (36.1 °C)-98.7 °F (37.1 °C)] 97 °F (36.1 °C)  Pulse:  [59-66] 66  Resp:  [16-18] 16  SpO2:  [95 %-99 %] 99 %  BP: (119-145)/(66-75) 124/71     Weight: 70.4 kg (155 lb 3.3 oz)  Body mass index is 22.27 kg/m².    Physical Exam  Constitutional:       Appearance: Normal appearance.   HENT:      Head: Normocephalic and atraumatic.   Eyes:      Extraocular Movements: EOM normal.      Pupils: Pupils are equal, round, and reactive to light.   Neck:      Musculoskeletal: Normal range of motion.   Pulmonary:      Effort: Pulmonary effort is normal.   Musculoskeletal: Normal range of motion.   Skin:     Findings: No rash.   Neurological:      Mental Status: He is alert.      Cranial Nerves: No cranial nerve deficit.      Motor: No weakness.      Coordination: Finger-Nose-Finger Test normal.   Psychiatric:         Speech: Speech normal.       NEUROLOGICAL EXAMINATION:     MENTAL STATUS   Oriented to person.   Disoriented to city. (Said we are at his house this morning, but later able to name [facility] for location)  Oriented to year and  month.   Recall of objects at 5 minutes: 0/3. Follows 1 step commands.   Attention: decreased. Concentration: decreased.   Speech: speech is normal   Level of consciousness: alert       Unable to say [place] is in [place] without prompting    Mentions working for [employer], but unable to name street where facility is located    Says everything is fine with him besides his head and mouth      CRANIAL NERVES     CN III, IV, VI   Pupils are equal, round, and reactive to light.  Extraocular motions are normal.   Nystagmus: none   Diplopia: none  Ophthalmoparesis: none    CN V   Facial sensation intact.     CN VII   Facial expression full, symmetric.     CN VIII   Hearing: intact    CN IX, X   Palate: symmetric    CN XI   CN XI normal.     CN XII   CN XII normal.     MOTOR EXAM   Muscle bulk: normal  Overall muscle tone: normal  Right arm pronator drift: absent  Left arm pronator drift: absent    Strength   Right deltoid: 5/5  Left deltoid: 5/5  Right biceps: 5/5  Left biceps: 5/5  Right triceps: 5/5  Left triceps: 5/5  Right interossei: 5/5  Left interossei: 5/5  Right iliopsoas: 5/5  Left iliopsoas: 5/5  Right quadriceps: 5/5  Left quadriceps: 5/5  Right hamstrin/5  Left hamstrin/5  Right anterior tibial: 5/5  Left anterior tibial: 5/5  Right posterior tibial: 5/5  Left posterior tibial: 5/5  Right peroneal: 5/5  Left peroneal: 5/5    REFLEXES     Reflexes   Right ankle clonus: absent  Left ankle clonus: absent       1+ throughout     SENSORY EXAM   Right arm light touch: normal  Left arm light touch: normal  Right leg light touch: normal  Left leg light touch: normal    GAIT AND COORDINATION     Gait  Gait: (deferred)     Coordination   Finger to nose coordination: normal    Tremor   Resting tremor: absent    Significant Labs:   CBC:   Recent Labs   Lab 10/05/20  0705 10/06/20  0416   WBC 3.71* 5.41   HGB 12.5* 12.6*   HCT 38.5* 38.4*    233     CMP:   Recent Labs   Lab  10/05/20  0705 10/06/20  0416   GLU 84 90    142   K 3.9 4.5    106   CO2 22* 29   BUN 15 17   CREATININE 0.7 0.9   CALCIUM 8.3* 8.7   MG 1.9 1.9   PROT 6.0 6.0   ALBUMIN 3.4* 3.5   BILITOT 0.6 0.6   ALKPHOS 52* 54*   AST 15 15   ALT 13 13   ANIONGAP 10 7*   EGFRNONAA >60.0 >60.0     Inflammatory Markers: No results for input(s): SEDRATE, CRP, PROCAL in the last 48 hours.  Urine Culture: No results for input(s): LABURIN in the last 48 hours.  Urine Studies: No results for input(s): COLORU, APPEARANCEUA, PHUR, SPECGRAV, PROTEINUA, GLUCUA, KETONESU, BILIRUBINUA, OCCULTUA, NITRITE, UROBILINOGEN, LEUKOCYTESUR, RBCUA, WBCUA, BACTERIA, SQUAMEPITHEL, HYALINECASTS in the last 48 hours.    Invalid input(s): WRIGHTSUR      Ref. Range 10/5/2020 10:00 10/5/2020 10:10   COLOR CSF Latest Ref Range: Colorless  Colorless    Heme Aliquot Latest Units: mL 0.5    Appearance, CSF Latest Ref Range: Clear  Clear    Lymphs, CSF Latest Ref Range: 40 - 80 % 50    Mono/Macrophage, CSF Latest Ref Range: 15 - 45 % 35    Segmented Neutrophils, CSF Latest Ref Range: 0 - 6 % 15 (H)    RBC, CSF Latest Ref Range: 0 /cu mm 190 (A)    WBC, CSF Latest Ref Range: 0 - 5 /cu mm 1    Glucose, CSF Latest Ref Range: 40 - 70 mg/dL 52    Protein, CSF Latest Ref Range: 15 - 40 mg/dL 71 (H)    Crypto Ag, CSF Latest Ref Range: Negative   Negative     Pending CSF:  --HSV, ACE, paraneoplastic panel     Unremarkable:  --TSH  --HIV   --RPR  --Ammonia  --Folate  --B6  --B12 540  --B1 49  --Serum paraneoplastic panel (9/28)    All pertinent lab results from the past 24 hours have been reviewed.    EEG (10/3/20): 1 hr 40 min  Abnormal EEG due to mild, non-specific regional dysfunction in bilateral temporal lobes. There are no electrographic seizures or indications of seizure tendency.    Significant Imaging: I have reviewed and interpreted all pertinent imaging results/findings within the past 24 hours.     MRI brain W WO contrast (10/2/20):  FINDINGS:  There is no abnormal restricted diffusion to suggest acute infarction.  There is generalized cerebral volume loss.  The ventricles are within normal limits of size and configuration for age without evidence of hydrocephalus or midline shift.  There is possible subtle increased FLAIR hyperintensity in the bilateral mesial temporal lobes which can be seen in the setting of autoimmune limbic encephalitis in the appropriate clinical setting noting there is no associated restricted diffusion, hemorrhage or postcontrast enhancement within these regions.  Clinical correlation and short-term interval follow-up is advised.  There is no evidence of acute intraparenchymal hemorrhage.  No extra-axial hemorrhage or fluid collections are identified.  No abnormal enhancing lesion is identified.  The craniocervical junction and sellar region are within normal limits.     Impression: Possible subtle increased FLAIR hyperintensity involving the bilateral mesial temporal lobes which can be seen in the setting of autoimmune limbic encephalitis in the appropriate clinical setting. No evidence of acute intracranial hemorrhage, acute infarct or abnormal enhancing lesion.    Assessment and Plan:     * Encephalopathy  70 yo male with history of BCC s/p Mohs' procedure and new diagnosis of penile malignancy was sent to ED from Urology clinic for cognitive impairment and inability to care for himself. Pt lives alone and does not have next of kin. Coworkers noted cognitive decline around early September and he was seen in clinic by Dr. Ernandez 9/28 for memory issues felt to be due to FTD. This admission, workup has been remarkable for MRI brain W WO contrast showing increased FLAIR hyperintensity involving the bilateral mesial temporal lobes. LP 10/5 with CSF (1 WBC, 190 RBC, 71 protein, 52 glucose). CSF HSV, ACE and paraneoplastic pending. EEG 10/3 (1 hr 40 min) with mild regional dysfunction in bilateral temporal lobes, but no  "electrographic seizures. Serum paraneoplastic panel from 9/28 has resulted negative.     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 autoimmune vs paraneoplastic process in the setting of malignancy     --will f/u pending labs  --outpatient formal cognitive assessment with neuropsych   --consider outpatient PET scan   --continue strict delirium precautions    Impaired decision making  Sent to ED 10/2 from Urology clinic to have mental capacity evaluation   Pt deemed unsafe to return home and Psych team feels pt does not have full medical decision making capacity at this time   Pt does not have next of kin    --primary team working on placement and medical POA    Penile carcinoma  Urology visit 10/2, per chart review " ED 9/2/20 due to a draining penile lesion. Penile swelling coupled with erythema for the past 2 years. MRI was done which showed a necrotic penile tumor within the right distal corpus cavernosa with invasion through the right lateral fascial planes and fistulous communication with the skin.  Urethral involvement not excluded. Suspected invasion of the right dorsolateral corpus spongiosum. Based on this imaging, he is stage [id number]. However, we do not have a biopsy specimen."      --continue management per Urology team     VTE Risk Mitigation (From admission, onward)         Ordered     enoxaparin injection 40 mg  Every 24 hours      10/02/20 2042     IP VTE HIGH RISK PATIENT  Once      10/02/20 2042              Bri Pelaez PA-C  General Neurology Consult  Neuro Consult [facility] # [id number]  "

## 2020-10-06 NOTE — SUBJECTIVE & OBJECTIVE
"Interval History: NAEON. EEG revealed mild, non-specific regional dysfunction in bilateral temporal lobes without seizure activity. Neurology suspects presentation may be more chronic than acute raising suspicion of frontotemporal dementia.  He will require further outpatient testing with a neuropsychologist eventually. Per CM note, his friend Marcela reports noticing the memory loss "for years now". Currently awaiting word from [facility] regarding their involvement in the case and if they will intervene. Discussed concerns with patient regarding the concern for his return home and that this is likely a progressive decline that will require a "new normal" for him. He expressed his desire to return home and stated "I have friends who can help me out."       Review of Systems   Constitutional: Positive for activity change, appetite change and unexpected weight change.   HENT: Negative for trouble swallowing.    Eyes: Negative for photophobia and visual disturbance.   Respiratory: Negative for cough and shortness of breath.    Gastrointestinal: Negative for nausea and vomiting.   Genitourinary: Negative for difficulty urinating and dysuria.   Neurological: Positive for speech difficulty. Negative for weakness.   Psychiatric/Behavioral: Positive for behavioral problems, confusion and decreased concentration. Negative for hallucinations and sleep disturbance. The patient is not hyperactive.      Objective:     Vital Signs (Most Recent):  Temp: 97 °F (36.1 °C) (10/06/20 1211)  Pulse: 66 (10/06/20 1211)  Resp: 16 (10/06/20 1211)  BP: 124/71 (10/06/20 1211)  SpO2: 99 % (10/06/20 1211) Vital Signs (24h Range):  Temp:  [97 °F (36.1 °C)-98.7 °F (37.1 °C)] 97 °F (36.1 °C)  Pulse:  [59-66] 66  Resp:  [16-18] 16  SpO2:  [95 %-99 %] 99 %  BP: (119-145)/(66-75) 124/71     Weight: 70.4 kg (155 lb 3.3 oz)  Body mass index is 22.27 kg/m².  No intake or output data in the 24 hours ending 10/06/20 1426   Physical Exam  Vitals signs and nursing " note reviewed.   Constitutional:       General: He is not in acute distress.     Appearance: He is not ill-appearing.   HENT:      Head: Normocephalic and atraumatic.      Nose: Nose normal.      Mouth/Throat:      Mouth: Mucous membranes are moist.   Eyes:      Extraocular Movements: Extraocular movements intact.      Pupils: Pupils are equal, round, and reactive to light.   Cardiovascular:      Rate and Rhythm: Normal rate and regular rhythm.   Pulmonary:      Effort: Pulmonary effort is normal. No respiratory distress.   Abdominal:      General: Abdomen is flat. There is no distension.      Palpations: Abdomen is soft.   Musculoskeletal: Normal range of motion.         General: No swelling.   Skin:     General: Skin is warm and dry.      Coloration: Skin is not jaundiced.   Neurological:      General: No focal deficit present.      Mental Status: He is alert. He is disoriented.      Sensory: No sensory deficit.      Motor: No weakness.   Psychiatric:         Mood and Affect: Mood normal.         Speech: Speech normal.         Behavior: Behavior is cooperative.         Cognition and Memory: Cognition is impaired. Memory is impaired. He exhibits impaired recent memory.         Significant Labs: All pertinent labs within the past 24 hours have been reviewed.    Significant Imaging: I have reviewed all pertinent imaging results/findings within the past 24 hours.

## 2020-10-06 NOTE — PROGRESS NOTES
[facility] Hospital Medicine  Progress Note    Patient Name: Juan Hobson  MRN: [id number]  Patient Class: IP- Inpatient   Admission Date: 10/2/2020  Length of Stay: 0 days  Attending Physician: Audi Arcos MD  Primary Care Provider: Mehnaz Barillas MD    [facility] Medicine Team: [facility]P MED Y Bing Moore PA-C    Subjective:     Principal Problem:Encephalopathy, metabolic        HPI:  Per Adriana Badillo MD:   Mr. Juan Hobson is a 69 y.o. male with recently diagnosed penile cancer, who presents to the ER from Urology clinic with concern for self neglect, and need for Neurology evaluation of dementia and possible placement.  The patient mentions that he has been having some issues with his memory.  He mostly orders takeout for meals because it is easier, and still drives to work.  He feels like he is able to take care of himself, and does not need to go to a facility.      Per note from Lianne German LCSW on 10/2:  Received consult from Dr. Monterroso re: assessing patient in clinic this afternoon and care recommendations as patient did not seem competent to make decisions and give consent. He has no family and no legal next of kin (POA or guardian).   Met individually with patient, and with his co-worker Janna Go ([phone], ext. [phone]) who brought him to his clinic appointment today. Patient unable to relate basic demographic information or clearly explain other information. For example, when asked his birth date he gave the year [birth year], then said 9, and then said the second to last, it begins with N; he said his street name but couldn't give the house number where he has lived for years; unable to say what type of work or where he worked prior to the [employer] in [facility] for the past 12 years; he described being taken in a truck to that other place (referring to the recent [facility] stay). He has awareness that he is having memory/cognitive issues  but stated he can manage for himself at home and that he knows there is a lot of work that needs to be done in his home that he hasn't gotten to. He inherited the home after his mother .      Mr. Go and another coworker Juice Martinez ([phone]) and their supervisor Mei Bell ([phone]) have been trying to help him manage things with his work and personal matters as they have observed his deficits and know he has no one to help him. They have helped him keep up with recent medical appointments and accompanied and transported him.  Mr. Go reports concern for his safety and ability to care for himself after seeing his home with clutter throughout and things piled up, mold and old food in the refrigerator, mold in the toilet, clothes all over, etc. Patient has been driving himself to and from work and to get food at places near his home, but unable to navigate to unfamiliar places. They have observed his cognitive deficits at work and he has made errors, so they have modified his job and his supervisor has been working with him to try to get FMLA and his leave/correction in place so he doesn't lose his benefits. They have had to help him with paperwork, bill paying, etc., because he can't complete these on his own.         Upon arrival to the ER, vitals were temp 97.8F, HR 59 and /84.  Labs were unremarkable  He was admitted to Hospital Medicine for Neurology and SW evaluation.    Overview/Hospital Course:  69 y.o. who was admitted to hospital medicine for worsening cognitive decline and inability to care for self. Labs/infectious work-up grossly unremarkable. Neurology consulted and MRI obtained on admission with concern for encephalitis (increased FLAIR hyperintensity involving the bilateral mesial temporal lobes which can be seen in the setting of autoimmune limbic encephalitis in the appropriate clinical setting). LP recommended and attempted at bedside however not successful 2/2 to  "DJD. Interventional radiology consulted, LP performed today; CSF studies ordered including paraneoplastic panel and inflammatory markers, results pending. Psychiatry consulted for formal capacity evaluation and concluded that he does not have capacity to make decisions regarding his long term care disposition.     Interval History: NAEON. EEG revealed mild, non-specific regional dysfunction in bilateral temporal lobes without seizure activity. Neurology suspects presentation may be more chronic than acute raising suspicion of frontotemporal dementia.  He will require further outpatient testing with a neuropsychologist eventually. Per CM note, his friend Marcela reports noticing the memory loss "for years now". Currently awaiting word from EPS regarding their involvement in the case and if they will intervene. Discussed concerns with patient regarding the concern for his return home and that this is likely a progressive decline that will require a "new normal" for him. He expressed his desire to return home and stated "I have friends who can help me out."       Review of Systems   Constitutional: Positive for activity change, appetite change and unexpected weight change.   HENT: Negative for trouble swallowing.    Eyes: Negative for photophobia and visual disturbance.   Respiratory: Negative for cough and shortness of breath.    Gastrointestinal: Negative for nausea and vomiting.   Genitourinary: Negative for difficulty urinating and dysuria.   Neurological: Positive for speech difficulty. Negative for weakness.   Psychiatric/Behavioral: Positive for behavioral problems, confusion and decreased concentration. Negative for hallucinations and sleep disturbance. The patient is not hyperactive.      Objective:     Vital Signs (Most Recent):  Temp: 97 °F (36.1 °C) (10/06/20 1211)  Pulse: 66 (10/06/20 1211)  Resp: 16 (10/06/20 1211)  BP: 124/71 (10/06/20 1211)  SpO2: 99 % (10/06/20 1211) Vital Signs (24h Range):  Temp:  [97 " °F (36.1 °C)-98.7 °F (37.1 °C)] 97 °F (36.1 °C)  Pulse:  [59-66] 66  Resp:  [16-18] 16  SpO2:  [95 %-99 %] 99 %  BP: (119-145)/(66-75) 124/71     Weight: 70.4 kg (155 lb 3.3 oz)  Body mass index is 22.27 kg/m².  No intake or output data in the 24 hours ending 10/06/20 1426   Physical Exam  Vitals signs and nursing note reviewed.   Constitutional:       General: He is not in acute distress.     Appearance: He is not ill-appearing.   HENT:      Head: Normocephalic and atraumatic.      Nose: Nose normal.      Mouth/Throat:      Mouth: Mucous membranes are moist.   Eyes:      Extraocular Movements: Extraocular movements intact.      Pupils: Pupils are equal, round, and reactive to light.   Cardiovascular:      Rate and Rhythm: Normal rate and regular rhythm.   Pulmonary:      Effort: Pulmonary effort is normal. No respiratory distress.   Abdominal:      General: Abdomen is flat. There is no distension.      Palpations: Abdomen is soft.   Musculoskeletal: Normal range of motion.         General: No swelling.   Skin:     General: Skin is warm and dry.      Coloration: Skin is not jaundiced.   Neurological:      General: No focal deficit present.      Mental Status: He is alert. He is disoriented.      Sensory: No sensory deficit.      Motor: No weakness.   Psychiatric:         Mood and Affect: Mood normal.         Speech: Speech normal.         Behavior: Behavior is cooperative.         Cognition and Memory: Cognition is impaired. Memory is impaired. He exhibits impaired recent memory.         Significant Labs: All pertinent labs within the past 24 hours have been reviewed.    Significant Imaging: I have reviewed all pertinent imaging results/findings within the past 24 hours.      Assessment/Plan:      * Cognitive impairment  69 y.o. with progressive cognitive decline and inability to adequately care for self. Presented in September 2020, to the ED stating there were creatures attacking his penis. During that admission  the was diagnosed with penile cancer however his repeated delusions and reports from coworkers regarding his inability to care for self, competed ADLs, etc. Prompted  evaluation by psychiatry and found that it is likely related to dementia after getting collateral from family/friends. He was PEC'd the night of admission after wanting to leave and then discharged to a geropsychiatry facility for 2 weeks. Seen in neuro clinic where there was mention of suspected frontotemporal dementia and further work-up ensued. Presented to a urology appt on 10/2 and was advised to present to the ED due to concern for competency and need for patient advocate/POA before a biopsy or form of treatment can be initiated.     Presentation concerning for frontotemporal dementia vs. autoimmune vs paraneoplastic process in the setting of malignancy   - LP performed by IR 10/5/2020  - Concern that the patient is unable to care for himself and make his own decisions, however the patient feels like he is able to care for himself  - MRI brain with Possible subtle increased FLAIR hyperintensity involving the bilateral mesial temporal lobes which can be seen in the setting of autoimmune limbic encephalitis  - consulted anesthesia for LP; neuro to place CSF orders   - EEG, pending read  - Vitamin B1, Vitamin B6, Vitamin B12, HIV, RPR, TSH ordered   - afebrile, without leukocytosis -- infectious work-up unremarkable   - Neurology consulted  - LP attempted by Anesthesia at bedside, but due to DJD, unable to obtain fluid x 2 levels   - Patient will require an IR LP  - Paraneoplastic panel, serum in place from outpatient neurology visit on 09/28  - Pending LP to assess CSF for signs of inflammatory markers  - Possibility for Autoimmune vs Paraneoplastic process in the setting of active malignancy  - CM/SW consult  - Pending evaluation, might need Psych to determine if he is gravely disabled and requiring a PEC plus/minus CXR and PPD         DELIRIUM  BEHAVIOR MANAGEMENT  - Minimize use of restraints; if physical restraints necessary, please utilize medical/chemical prns for agitation.  - Keep shades open and room lit during day and room dim at night in order to promote healthy circadian rhythms.  - Encourage family at bedside  - Keep whiteboard in patient's room current with the date and name of the members of patient's team for easy patient self re-orientation.  - Avoid benzodiazepines, antihistamines, anticholinergics, hypnotics, and minimize opiates while controlling for pain as these medications may exacerbate delirium.      Impaired decision making  Presented to a urology appt on 10/2 and was advised to present to the ED due to concern for competency and need for patient advocate/POA before a biopsy or form of treatment can be initiated.     [place] R[place] 40:1299.53: (9) Upon the inability of any adult to consent for himself and in the absence of any person listed in Paragraphs (2) through (8) of this Subsection, an adult friend of the patient. For purposes of this Subsection to consent, adult friend means an adult who has exhibited special care and concern for the patient, who is generally familiar with the patient's health care views and desires, and who is willing and able to become involved in the patient's health care decisions and to act in the patient's best interest. The adult friend shall sign and date an acknowledgment form provided by the hospital or other health care facility in which the patient is located for placement in the patient's records certifying that he or she meets such criteria.    -  Juice Martinez ([phone]) does not wish to make decisions on his behalf  - supervisor Mei Bell ([phone]) have been trying to help him manage things with his work and personal matters as they have observed his deficits and know he has no one to help him - these individuals may be used for consent   - PEC'd for grave disability during last  admission and discharged to [facility] however no formal eval by in-patient psychiatry  - will need formal evaluation       Penile carcinoma  - follows with urology   - will discuss need for further intervention while inpatient, awaiting call back    VTE Risk Mitigation (From admission, onward)         Ordered     enoxaparin injection 40 mg  Every 24 hours      10/02/20 2042     IP VTE HIGH RISK PATIENT  Once      10/02/20 2042                Discharge Planning   ALFRED: 10/9/2020     Code Status: Full Code   Is the patient medically ready for discharge?: No    Reason for patient still in hospital (select all that apply): Patient trending condition, Consult recommendations and Pending disposition  Discharge Plan A: Home Health                  Bing Moore PA-C  Department of Hospital Medicine   [facility]

## 2020-10-07 LAB
ACE CSF-CCNC: 1.9 U/L (ref 0–2.5)
ALBUMIN SERPL BCP-MCNC: 3.4 G/DL (ref 3.5–5.2)
ALP SERPL-CCNC: 64 U/L (ref 55–135)
ALT SERPL W/O P-5'-P-CCNC: 17 U/L (ref 10–44)
ANION GAP SERPL CALC-SCNC: 9 MMOL/L (ref 8–16)
AST SERPL-CCNC: 19 U/L (ref 10–40)
BILIRUB SERPL-MCNC: 0.7 MG/DL (ref 0.1–1)
BUN SERPL-MCNC: 12 MG/DL (ref 8–23)
CALCIUM SERPL-MCNC: 8.7 MG/DL (ref 8.7–10.5)
CHLORIDE SERPL-SCNC: 105 MMOL/L (ref 95–110)
CO2 SERPL-SCNC: 27 MMOL/L (ref 23–29)
CREAT SERPL-MCNC: 0.8 MG/DL (ref 0.5–1.4)
EST. GFR  (AFRICAN AMERICAN): >60 ML/MIN/1.73 M^2
EST. GFR  (NON AFRICAN AMERICAN): >60 ML/MIN/1.73 M^2
EV RNA SPEC QL NAA+PROBE: NEGATIVE
GLUCOSE SERPL-MCNC: 83 MG/DL (ref 70–110)
MAGNESIUM SERPL-MCNC: 1.9 MG/DL (ref 1.6–2.6)
PHOSPHATE SERPL-MCNC: 3.7 MG/DL (ref 2.7–4.5)
POTASSIUM SERPL-SCNC: 3.9 MMOL/L (ref 3.5–5.1)
PROT SERPL-MCNC: 6 G/DL (ref 6–8.4)
SODIUM SERPL-SCNC: 141 MMOL/L (ref 136–145)
SPECIMEN SOURCE: NORMAL

## 2020-10-07 PROCEDURE — 99233 PR SUBSEQUENT HOSPITAL CARE,LEVL III: ICD-10-PCS | Mod: ,,, | Performed by: PSYCHIATRY & NEUROLOGY

## 2020-10-07 PROCEDURE — 84100 ASSAY OF PHOSPHORUS: CPT

## 2020-10-07 PROCEDURE — 83735 ASSAY OF MAGNESIUM: CPT

## 2020-10-07 PROCEDURE — 99233 SBSQ HOSP IP/OBS HIGH 50: CPT | Mod: ,,, | Performed by: PSYCHIATRY & NEUROLOGY

## 2020-10-07 PROCEDURE — 80053 COMPREHEN METABOLIC PANEL: CPT

## 2020-10-07 PROCEDURE — 36415 COLL VENOUS BLD VENIPUNCTURE: CPT

## 2020-10-07 PROCEDURE — 11000001 HC ACUTE MED/SURG PRIVATE ROOM

## 2020-10-07 PROCEDURE — 63600175 PHARM REV CODE 636 W HCPCS: Performed by: PHYSICIAN ASSISTANT

## 2020-10-07 PROCEDURE — 99233 PR SUBSEQUENT HOSPITAL CARE,LEVL III: ICD-10-PCS | Mod: ,,, | Performed by: PHYSICIAN ASSISTANT

## 2020-10-07 PROCEDURE — 99233 SBSQ HOSP IP/OBS HIGH 50: CPT | Mod: ,,, | Performed by: PHYSICIAN ASSISTANT

## 2020-10-07 RX ORDER — METHYLPREDNISOLONE SOD SUCC 125 MG
1000 VIAL (EA) INJECTION DAILY
Status: DISCONTINUED | OUTPATIENT
Start: 2020-10-07 | End: 2020-10-09

## 2020-10-07 RX ADMIN — METHYLPREDNISOLONE SODIUM SUCCINATE 1000 MG: 125 INJECTION, POWDER, FOR SOLUTION INTRAMUSCULAR; INTRAVENOUS at 07:10

## 2020-10-07 NOTE — SUBJECTIVE & OBJECTIVE
Subjective:     Interval History:   No acute events overnight. CSF HSV negative, still awaiting ACE and paraneoplastic   working with legal department to file for guardianship to help manage affairs    Current Facility-Administered Medications   Medication Dose Route Frequency Provider Last Rate Last Dose    acetaminophen tablet 650 mg  650 mg Oral Q4H PRN Adriana Badillo MD        enoxaparin injection 40 mg  40 mg Subcutaneous Q24H Adriana Badillo MD   40 mg at 10/06/20 1812    melatonin tablet 6 mg  6 mg Oral Nightly PRN Adriana Badillo MD        ondansetron injection 8 mg  8 mg Intravenous Q8H PRN Adriana Badillo MD        promethazine (PHENERGAN) 25 mg in dextrose 5 % 50 mL IVPB  25 mg Intravenous Q6H PRN Adriana Badillo MD        senna-docusate 8.6-50 mg per tablet 1 tablet  1 tablet Oral BID PRN Adriana Badillo MD        sodium chloride 0.9% flush 5 mL  5 mL Intravenous PRN Adriana Badillo MD         Review of Systems   Constitutional: Positive for activity change and fatigue. Negative for fever.   HENT: Negative for trouble swallowing and voice change.    Eyes: Negative for visual disturbance.   Respiratory: Negative for shortness of breath.    Cardiovascular: Negative for chest pain.   Gastrointestinal: Negative for nausea and vomiting.   Musculoskeletal: Negative for neck stiffness.   Neurological: Negative for dizziness, seizures, facial asymmetry, speech difficulty, weakness and headaches.   Psychiatric/Behavioral: Positive for confusion and decreased concentration. Negative for agitation, behavioral problems and hallucinations. The patient is not hyperactive.      Objective:     Vital Signs (Most Recent):  Temp: 96.3 °F (35.7 °C) (10/07/20 1130)  Pulse: 60 (10/07/20 1130)  Resp: 18 (10/07/20 1130)  BP: 119/64 (10/07/20 1130)  SpO2: 97 % (10/07/20 1130) Vital Signs (24h Range):  Temp:  [96.3 °F (35.7 °C)-97.9 °F (36.6 °C)] 96.3 °F (35.7 °C)  Pulse:  [53-69] 60  Resp:  [18]  18  SpO2:  [96 %-100 %] 97 %  BP: (119-152)/(60-78) 119/64     Weight: 70.4 kg (155 lb 3.3 oz)  Body mass index is 22.27 kg/m².    Physical Exam  Constitutional:       Appearance: Normal appearance. He is not diaphoretic.   HENT:      Head: Normocephalic and atraumatic.   Eyes:      Extraocular Movements: Extraocular movements intact and EOM normal.      Conjunctiva/sclera: Conjunctivae normal.      Pupils: Pupils are equal, round, and reactive to light.   Neck:      Musculoskeletal: Normal range of motion. No neck rigidity.   Pulmonary:      Effort: Pulmonary effort is normal. No respiratory distress.   Musculoskeletal: Normal range of motion.   Neurological:      General: No focal deficit present.      Mental Status: He is alert. He is disoriented.      Coordination: Finger-Nose-Finger Test normal.   Psychiatric:         Speech: Speech normal.       NEUROLOGICAL EXAMINATION:     MENTAL STATUS   Oriented to person.   Oriented to place. Disoriented to city, area and street.   Disoriented to date and day. Oriented to year and month.   Recall of objects at 5 minutes: 0/3 with prompting  Follows 1 step commands.   Attention: decreased. Concentration: decreased.   Speech: speech is normal   Level of consciousness: alert  Knowledge: poor.   Able to repeat.     CRANIAL NERVES     CN III, IV, VI   Pupils are equal, round, and reactive to light.  Extraocular motions are normal.   Nystagmus: none   Diplopia: none  Ophthalmoparesis: none    CN V   Facial sensation intact.     CN VII   Facial expression full, symmetric.     CN VIII   Hearing: intact    CN IX, X   Palate: symmetric    CN XI   CN XI normal.     CN XII   CN XII normal.     MOTOR EXAM   Muscle bulk: normal  Overall muscle tone: normal  Right arm pronator drift: absent  Left arm pronator drift: absent    Strength   Right biceps: 5/5  Left biceps: 5/5  Right triceps: 5/5  Left triceps: 5/5  Right interossei: 5/5  Left interossei: 5/5  Right anterior tibial:  5/5  Left anterior tibial: 5/5  Right posterior tibial: 5/5  Left posterior tibial: 5/5  Right peroneal: 5/5  Left peroneal: 5/5    SENSORY EXAM   Right arm light touch: normal  Left arm light touch: normal  Right leg light touch: normal  Left leg light touch: normal    GAIT AND COORDINATION     Gait  Gait: (deferred)     Coordination   Finger to nose coordination: normal    Tremor   Resting tremor: absent    Significant Labs:   Hemoglobin A1c: No results for input(s): HGBA1C in the last 720 hours.  Blood Culture: No results for input(s): LABBLOO in the last 48 hours.  CBC:   Recent Labs   Lab 10/06/20  0416   WBC 5.41   HGB 12.6*   HCT 38.4*        CMP:   Recent Labs   Lab 10/06/20  0416 10/07/20  0512   GLU 90 83    141   K 4.5 3.9    105   CO2 29 27   BUN 17 12   CREATININE 0.9 0.8   CALCIUM 8.7 8.7   MG 1.9 1.9   PROT 6.0 6.0   ALBUMIN 3.5 3.4*   BILITOT 0.6 0.7   ALKPHOS 54* 64   AST 15 19   ALT 13 17   ANIONGAP 7* 9   EGFRNONAA >60.0 >60.0     Inflammatory Markers: No results for input(s): SEDRATE, CRP, PROCAL in the last 48 hours.  Urine Culture: No results for input(s): LABURIN in the last 48 hours.  Urine Studies: No results for input(s): COLORU, APPEARANCEUA, PHUR, SPECGRAV, PROTEINUA, GLUCUA, KETONESU, BILIRUBINUA, OCCULTUA, NITRITE, UROBILINOGEN, LEUKOCYTESUR, RBCUA, WBCUA, BACTERIA, SQUAMEPITHEL, HYALINECASTS in the last 48 hours.    Invalid input(s): WRIGHTSUR       Ref. Range 10/5/2020 10:00 10/5/2020 10:10   COLOR CSF Latest Ref Range: Colorless  Colorless     RBC, CSF Latest Ref Range: 0 /cu mm 190 (A)     WBC, CSF Latest Ref Range: 0 - 5 /cu mm 1     Glucose, CSF Latest Ref Range: 40 - 70 mg/dL 52     Protein, CSF Latest Ref Range: 15 - 40 mg/dL 71 (H)       CSF HSV negative  Awaiting ACE and paraneoplastic panel    Unremarkable:  --TSH  --HIV   --RPR  --Ammonia  --Folate  --B6  --B12 540  --B1 49  --Serum paraneoplastic panel (9/28)    All pertinent lab results from the past 24  hours have been reviewed.     EEG (10/3/20): 1 hr 40 min  Abnormal EEG due to mild, non-specific regional dysfunction in bilateral temporal lobes. There are no electrographic seizures or indications of seizure tendency.    Significant Imaging: I have reviewed and interpreted all pertinent imaging results/findings within the past 24 hours.     MRI brain W WO contrast (10/2/20):  FINDINGS: There is no abnormal restricted diffusion to suggest acute infarction.  There is generalized cerebral volume loss.  The ventricles are within normal limits of size and configuration for age without evidence of hydrocephalus or midline shift.  There is possible subtle increased FLAIR hyperintensity in the bilateral mesial temporal lobes which can be seen in the setting of autoimmune limbic encephalitis in the appropriate clinical setting noting there is no associated restricted diffusion, hemorrhage or postcontrast enhancement within these regions.  Clinical correlation and short-term interval follow-up is advised.  There is no evidence of acute intraparenchymal hemorrhage.  No extra-axial hemorrhage or fluid collections are identified.  No abnormal enhancing lesion is identified.  The craniocervical junction and sellar region are within normal limits.     Impression: Possible subtle increased FLAIR hyperintensity involving the bilateral mesial temporal lobes which can be seen in the setting of autoimmune limbic encephalitis in the appropriate clinical setting. No evidence of acute intracranial hemorrhage, acute infarct or abnormal enhancing lesion.

## 2020-10-07 NOTE — PLAN OF CARE
Patient's VSS, AAOx3, no pain or discomfort noted per pt. Patient aware of cognitive changes. Continuing to monitor patient for cognitive changes. Patient resting in bed & call light in reach. Ambulated within room to toilet with no dizziness or SOB noted.

## 2020-10-07 NOTE — PLAN OF CARE
"CM met with the patient at the bedside to get any additional information about next of Kin. Per patient he does not have any children. Pt knows he has a half brother, but when CM asked for name or additional information. The patient does not know his name and has no contact information for him. Pt recalls knowing this half-brother a "long long long time ago". He believes he has nieces and nephews, but does not know who they are or any contact information for them. He recalls his mother moving him to [place] when he was young, and her  "didn't stick around long", so patient grew up with his mother only. He knows his mothers name is Gabriella Hobson and they originally came from [place]. CM asked the patient has he done any preparation for long-term and he relates that he has. Pt could not name his bank. He stated that it is " big bank and it starts with an S". He has some mail at his bedside but he does not have a banking statement. He says he has more than 200,000 thousand dollars saved for long-term. ANNIKA had a 1015am call with the Legal department and they will start the process to see if the patient can get a [place] Guardian to assist with managing affairs.     Sharon Diaz RN Case Manager   #[phone]    "

## 2020-10-07 NOTE — PLAN OF CARE
Patient had an uneventful night. Tried to convince patient to change into hospital gown but refused. Able to make basic needs known such as wanting food and water. Also able to have a general conversation about his job duties. No distress noted.     Problem: Adult Inpatient Plan of Care  Goal: Plan of Care Review  Outcome: Ongoing, Progressing     Problem: Thought Process Alteration  Goal: Optimal Thought Clarity  Outcome: Ongoing, Progressing     Problem: Fall Injury Risk  Goal: Absence of Fall and Fall-Related Injury  Outcome: Ongoing, Progressing

## 2020-10-07 NOTE — PLAN OF CARE
Pt pleasantly confused, resting in room, no acute distress noted. VSS, no falls or injures during shift. Will continue to monitor.

## 2020-10-07 NOTE — ASSESSMENT & PLAN NOTE
68 yo male with history of BCC s/p Mohs' procedure and new diagnosis of penile malignancy was sent to ED from Urology clinic for cognitive impairment and inability to care for himself. Pt lives alone and does not have next of kin. Coworkers noted cognitive decline around early September and he was seen in clinic by Dr. Ernandez 9/28 for memory issues felt to be due to FTD. This admission, workup has been remarkable for MRI brain W WO contrast showing increased FLAIR hyperintensity involving the bilateral mesial temporal lobes. LP 10/5 with CSF (1 WBC, 190 RBC, 71 protein, 52 glucose). CSF HSV, ACE and paraneoplastic pending. EEG 10/3 (1 hr 40 min) with mild regional dysfunction in bilateral temporal lobes, but no electrographic seizures. Serum paraneoplastic panel from 9/28 has resulted negative.     >>Presentation concerning for frontotemporal dementia vs. autoimmune vs paraneoplastic process in the setting of malignancy     --please send CSF to [place] for Alzheimer panel (ADEVL test code)   --start empiric IV Solumedrol 1 g qd x 5 days  --formal cognitive assessment with neuropsych    --consider PET scan  --continue strict delirium precautions

## 2020-10-07 NOTE — PROGRESS NOTES
"[facility]  Neurology  Progress Note    Patient Name: Juan Hobson  MRN: [id number]  Admission Date: 10/2/2020  Hospital Length of Stay: 1 days  Code Status: Full Code   Attending Provider: Audi Arcos MD  Primary Care Physician: Mehnaz Barillas MD   Principal Problem:Cognitive impairment    HPI:   Patient is a 70 yo male w/ past medical history significant for BCC s/p Mohs procedure, recent Dx of penile carcinoma who presented to [facility] [place]-ED on 10/02 from Urology clinic w/ concern for progressive dementia and inability to care for self.Patient was seen in clinic by Dr. Ernandez on 09/28 for memory complaints and at the time the diagnosis of fronto-temporal dementia was brought up. Per Dr. 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  Coworkers note that he has always been a very punctual and reliable employee but did not show up to work on Sept 2 and instead presented to [facility] [place] reporting  "creatures attacked his penis".  He was noted to have enlarged, ulcerated penis with MRI and urology consult showing concern for penile cancer but not biopsy confirmed at this time.   He was treated for infection and discharged to geriatric psych facility where he was held for two weeks prior to discharge home with home health orders for an agency that does not accept his insurance.  When asked about memory difficulties, he cites difficulty connecting objects to words for them.  Co worker retrospectively note his clocking in/out has been inconsistent over the past several months.  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  Co workers note that over the past several months he has been making some errors in recording measurements. He continues to drive and live in his own home. Patient has recently had a 2 week stay in a geriatric psychiatric " "facility. Per chart review, patient is a poor historian and some information was provided by the co-worker that accompanied the patient to the ED - patient lives at home alone in an unsafe environment.  Social work evaluation was conducted in the clinic setting and was deemed that his home was unsafe and was unsafe for him to return home alone.  Patient lives alone and without any family members.  Based on reports, patient has not been eating, has a 70 lb weight loss, living in an unsafe and unclean environment, and a mold growing on his toilets, sink and refrigerator.  Spoiled food in the refrigerator and no clean water. Risk for falls with multiple obstacles in living spaces. 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 CSF HSV negative, still awaiting ACE and paraneoplastic   working with legal department to file for guardianship to help manage affairs    Current Facility-Administered Medications   Medication Dose Route Frequency Provider Last Rate Last Dose    acetaminophen tablet 650 mg  650 mg Oral Q4H PRN Adriana Badillo MD        enoxaparin injection 40 mg  40 mg Subcutaneous Q24H Adriana Badillo MD   40 mg at 10/06/20 1812    melatonin tablet 6 mg  6 mg Oral Nightly PRN Adriana Badillo MD        ondansetron injection 8 mg  8 mg Intravenous Q8H PRN Adriana Badillo MD        promethazine (PHENERGAN) 25 mg in dextrose 5 % 50 mL IVPB  25 mg Intravenous Q6H PRN Adriana Badillo MD        senna-docusate 8.6-50 mg per tablet 1 tablet  1 tablet Oral BID PRN Adriana Badillo MD        sodium chloride 0.9% flush 5 mL  5 mL Intravenous PRN Adriana Badillo MD         Review of Systems   Constitutional: Positive for activity change and fatigue. Negative for fever.   HENT: Negative for trouble swallowing and voice change.    Eyes: Negative for visual " disturbance.   Respiratory: Negative for shortness of breath.    Cardiovascular: Negative for chest pain.   Gastrointestinal: Negative for nausea and vomiting.   Musculoskeletal: Negative for neck stiffness.   Neurological: Negative for dizziness, seizures, facial asymmetry, speech difficulty, weakness and headaches.   Psychiatric/Behavioral: Positive for confusion and decreased concentration. Negative for agitation, behavioral problems and hallucinations. The patient is not hyperactive.      Objective:     Vital Signs (Most Recent):  Temp: 96.3 °F (35.7 °C) (10/07/20 1130)  Pulse: 60 (10/07/20 1130)  Resp: 18 (10/07/20 1130)  BP: 119/64 (10/07/20 1130)  SpO2: 97 % (10/07/20 1130) Vital Signs (24h Range):  Temp:  [96.3 °F (35.7 °C)-97.9 °F (36.6 °C)] 96.3 °F (35.7 °C)  Pulse:  [53-69] 60  Resp:  [18] 18  SpO2:  [96 %-100 %] 97 %  BP: (119-152)/(60-78) 119/64     Weight: 70.4 kg (155 lb 3.3 oz)  Body mass index is 22.27 kg/m².    Physical Exam  Constitutional:       Appearance: Normal appearance. He is not diaphoretic.   HENT:      Head: Normocephalic and atraumatic.   Eyes:      Extraocular Movements: Extraocular movements intact and EOM normal.      Conjunctiva/sclera: Conjunctivae normal.      Pupils: Pupils are equal, round, and reactive to light.   Neck:      Musculoskeletal: Normal range of motion. No neck rigidity.   Pulmonary:      Effort: Pulmonary effort is normal. No respiratory distress.   Musculoskeletal: Normal range of motion.   Neurological:      General: No focal deficit present.      Mental Status: He is alert. He is disoriented.      Coordination: Finger-Nose-Finger Test normal.   Psychiatric:         Speech: Speech normal.       NEUROLOGICAL EXAMINATION:     MENTAL STATUS   Oriented to person.   Oriented to place. Disoriented to city, area and street.   Disoriented to date and day. Oriented to year and month.   Recall of objects at 5 minutes: 0/3 with prompting  Follows 1 step commands.    Attention: decreased. Concentration: decreased.   Speech: speech is normal   Level of consciousness: alert  Knowledge: poor.   Able to repeat.     CRANIAL NERVES     CN III, IV, VI   Pupils are equal, round, and reactive to light.  Extraocular motions are normal.   Nystagmus: none   Diplopia: none  Ophthalmoparesis: none    CN V   Facial sensation intact.     CN VII   Facial expression full, symmetric.     CN VIII   Hearing: intact    CN IX, X   Palate: symmetric    CN XI   CN XI normal.     CN XII   CN XII normal.     MOTOR EXAM   Muscle bulk: normal  Overall muscle tone: normal  Right arm pronator drift: absent  Left arm pronator drift: absent    Strength   Right biceps: 5/5  Left biceps: 5/5  Right triceps: 5/5  Left triceps: 5/5  Right interossei: 5/5  Left interossei: 5/5  Right anterior tibial: 5/5  Left anterior tibial: 5/5  Right posterior tibial: 5/5  Left posterior tibial: 5/5  Right peroneal: 5/5  Left peroneal: 5/5    SENSORY EXAM   Right arm light touch: normal  Left arm light touch: normal  Right leg light touch: normal  Left leg light touch: normal    GAIT AND COORDINATION     Gait  Gait: (deferred)     Coordination   Finger to nose coordination: normal    Tremor   Resting tremor: absent    Significant Labs:   Hemoglobin A1c: No results for input(s): HGBA1C in the last 720 hours.  Blood Culture: No results for input(s): LABBLOO in the last 48 hours.  CBC:   Recent Labs   Lab 10/06/20  0416   WBC 5.41   HGB 12.6*   HCT 38.4*        CMP:   Recent Labs   Lab 10/06/20  0416 10/07/20  0512   GLU 90 83    141   K 4.5 3.9    105   CO2 29 27   BUN 17 12   CREATININE 0.9 0.8   CALCIUM 8.7 8.7   MG 1.9 1.9   PROT 6.0 6.0   ALBUMIN 3.5 3.4*   BILITOT 0.6 0.7   ALKPHOS 54* 64   AST 15 19   ALT 13 17   ANIONGAP 7* 9   EGFRNONAA >60.0 >60.0     Inflammatory Markers: No results for input(s): SEDRATE, CRP, PROCAL in the last 48 hours.  Urine Culture: No results for input(s): LABURIN in the last  48 hours.  Urine Studies: No results for input(s): COLORU, APPEARANCEUA, PHUR, SPECGRAV, PROTEINUA, GLUCUA, KETONESU, BILIRUBINUA, OCCULTUA, NITRITE, UROBILINOGEN, LEUKOCYTESUR, RBCUA, WBCUA, BACTERIA, SQUAMEPITHEL, HYALINECASTS in the last 48 hours.    Invalid input(s): WRIGHTSUR       Ref. Range 10/5/2020 10:00 10/5/2020 10:10   COLOR CSF Latest Ref Range: Colorless  Colorless     RBC, CSF Latest Ref Range: 0 /cu mm 190 (A)     WBC, CSF Latest Ref Range: 0 - 5 /cu mm 1     Glucose, CSF Latest Ref Range: 40 - 70 mg/dL 52     Protein, CSF Latest Ref Range: 15 - 40 mg/dL 71 (H)       CSF HSV negative  Awaiting ACE and paraneoplastic panel    Unremarkable:  --TSH  --HIV   --RPR  --Ammonia  --Folate  --B6  --B12 540  --B1 49  --Serum paraneoplastic panel (9/28)    All pertinent lab results from the past 24 hours have been reviewed.     EEG (10/3/20): 1 hr 40 min  Abnormal EEG due to mild, non-specific regional dysfunction in bilateral temporal lobes. There are no electrographic seizures or indications of seizure tendency.    Significant Imaging: I have reviewed and interpreted all pertinent imaging results/findings within the past 24 hours.     MRI brain W WO contrast (10/2/20):  FINDINGS: There is no abnormal restricted diffusion to suggest acute infarction.  There is generalized cerebral volume loss.  The ventricles are within normal limits of size and configuration for age without evidence of hydrocephalus or midline shift.  There is possible subtle increased FLAIR hyperintensity in the bilateral mesial temporal lobes which can be seen in the setting of autoimmune limbic encephalitis in the appropriate clinical setting noting there is no associated restricted diffusion, hemorrhage or postcontrast enhancement within these regions.  Clinical correlation and short-term interval follow-up is advised.  There is no evidence of acute intraparenchymal hemorrhage.  No extra-axial hemorrhage or fluid collections are  identified.  No abnormal enhancing lesion is identified.  The craniocervical junction and sellar region are within normal limits.     Impression: Possible subtle increased FLAIR hyperintensity involving the bilateral mesial temporal lobes which can be seen in the setting of autoimmune limbic encephalitis in the appropriate clinical setting. No evidence of acute intracranial hemorrhage, acute infarct or abnormal enhancing lesion.    Assessment and Plan:     * Cognitive impairment  70 yo male with history of BCC s/p Mohs' procedure and new diagnosis of penile malignancy was sent to ED from Urology clinic for cognitive impairment and inability to care for himself. Pt lives alone and does not have next of kin. Coworkers noted cognitive decline around early September and he was seen in clinic by Dr. Ernandez 9/28 for memory issues felt to be due to FTD. This admission, workup has been remarkable for MRI brain W WO contrast showing increased FLAIR hyperintensity involving the bilateral mesial temporal lobes. LP 10/5 with CSF (1 WBC, 190 RBC, 71 protein, 52 glucose). CSF HSV negative. ACE and paraneoplastic pending. EEG 10/3 (1 hr 40 min) with mild regional dysfunction in bilateral temporal lobes, but no electrographic seizures. Serum paraneoplastic panel from 9/28 has resulted negative.     >>Presentation concerning for dementia vs. autoimmune vs paraneoplastic process in the setting of malignancy     --please send CSF to [facility] for Alzheimer panel (ADEVL test code)   --start empiric IV Solumedrol 1 g qd x 5 days  --formal cognitive assessment with neuropsych    --consider PET scan  --continue strict delirium precautions    Impaired decision making  Sent to ED 10/2 from Urology clinic to have mental capacity evaluation   Pt deemed unsafe to return home and Psych team feels pt does not have full medical decision making capacity at this time   Pt does not have next of kin    --primary team working on placement and legal  "guardianship    Penile carcinoma  Urology visit 10/2, per chart review " ED 9/2/20 due to a draining penile lesion. Penile swelling coupled with erythema for the past 2 years. MRI was done which showed a necrotic penile tumor within the right distal corpus cavernosa with invasion through the right lateral fascial planes and fistulous communication with the skin.  Urethral involvement not excluded. Suspected invasion of the right dorsolateral corpus spongiosum. Based on this imaging, he is stage [id number]. However, we do not have a biopsy specimen."      --continue management per Urology team   ?biopsy while inpatient     VTE Risk Mitigation (From admission, onward)         Ordered     enoxaparin injection 40 mg  Every 24 hours      10/02/20 2042     IP VTE HIGH RISK PATIENT  Once      10/02/20 2042              Bri Pelaez PA-C  General Neurology Consult  Neuro Consult [facility] # [id number]  "

## 2020-10-08 LAB
ALBUMIN SERPL BCP-MCNC: 3.7 G/DL (ref 3.5–5.2)
ALP SERPL-CCNC: 63 U/L (ref 55–135)
ALT SERPL W/O P-5'-P-CCNC: 25 U/L (ref 10–44)
ANION GAP SERPL CALC-SCNC: 11 MMOL/L (ref 8–16)
AST SERPL-CCNC: 21 U/L (ref 10–40)
BILIRUB SERPL-MCNC: 0.6 MG/DL (ref 0.1–1)
BUN SERPL-MCNC: 18 MG/DL (ref 8–23)
CALCIUM SERPL-MCNC: 9.2 MG/DL (ref 8.7–10.5)
CHLORIDE SERPL-SCNC: 105 MMOL/L (ref 95–110)
CO2 SERPL-SCNC: 24 MMOL/L (ref 23–29)
CREAT SERPL-MCNC: 0.8 MG/DL (ref 0.5–1.4)
EST. GFR  (AFRICAN AMERICAN): >60 ML/MIN/1.73 M^2
EST. GFR  (NON AFRICAN AMERICAN): >60 ML/MIN/1.73 M^2
GLUCOSE SERPL-MCNC: 167 MG/DL (ref 70–110)
MAGNESIUM SERPL-MCNC: 1.9 MG/DL (ref 1.6–2.6)
PHOSPHATE SERPL-MCNC: 3.3 MG/DL (ref 2.7–4.5)
POTASSIUM SERPL-SCNC: 4.3 MMOL/L (ref 3.5–5.1)
PROT SERPL-MCNC: 6.8 G/DL (ref 6–8.4)
SODIUM SERPL-SCNC: 140 MMOL/L (ref 136–145)

## 2020-10-08 PROCEDURE — 83735 ASSAY OF MAGNESIUM: CPT

## 2020-10-08 PROCEDURE — 99233 SBSQ HOSP IP/OBS HIGH 50: CPT | Mod: ,,, | Performed by: HOSPITALIST

## 2020-10-08 PROCEDURE — 84100 ASSAY OF PHOSPHORUS: CPT

## 2020-10-08 PROCEDURE — 99233 PR SUBSEQUENT HOSPITAL CARE,LEVL III: ICD-10-PCS | Mod: ,,, | Performed by: PSYCHIATRY & NEUROLOGY

## 2020-10-08 PROCEDURE — 36415 COLL VENOUS BLD VENIPUNCTURE: CPT

## 2020-10-08 PROCEDURE — 99233 SBSQ HOSP IP/OBS HIGH 50: CPT | Mod: ,,, | Performed by: PSYCHIATRY & NEUROLOGY

## 2020-10-08 PROCEDURE — 63600175 PHARM REV CODE 636 W HCPCS: Performed by: HOSPITALIST

## 2020-10-08 PROCEDURE — 11000001 HC ACUTE MED/SURG PRIVATE ROOM

## 2020-10-08 PROCEDURE — 63600175 PHARM REV CODE 636 W HCPCS: Performed by: PHYSICIAN ASSISTANT

## 2020-10-08 PROCEDURE — 25000003 PHARM REV CODE 250: Performed by: STUDENT IN AN ORGANIZED HEALTH CARE EDUCATION/TRAINING PROGRAM

## 2020-10-08 PROCEDURE — 80053 COMPREHEN METABOLIC PANEL: CPT

## 2020-10-08 PROCEDURE — 99233 PR SUBSEQUENT HOSPITAL CARE,LEVL III: ICD-10-PCS | Mod: ,,, | Performed by: HOSPITALIST

## 2020-10-08 RX ORDER — PANTOPRAZOLE SODIUM 40 MG/1
40 TABLET, DELAYED RELEASE ORAL DAILY
Status: DISCONTINUED | OUTPATIENT
Start: 2020-10-08 | End: 2020-11-23 | Stop reason: HOSPADM

## 2020-10-08 RX ADMIN — METHYLPREDNISOLONE SODIUM SUCCINATE 1000 MG: 125 INJECTION, POWDER, FOR SOLUTION INTRAMUSCULAR; INTRAVENOUS at 09:10

## 2020-10-08 RX ADMIN — ENOXAPARIN SODIUM 40 MG: 40 INJECTION SUBCUTANEOUS at 04:10

## 2020-10-08 RX ADMIN — PANTOPRAZOLE SODIUM 40 MG: 40 TABLET, DELAYED RELEASE ORAL at 09:10

## 2020-10-08 NOTE — SUBJECTIVE & OBJECTIVE
Past Medical History:   Diagnosis Date    Basal cell carcinoma     right helix    Hematuria     Seborrheic dermatitis     Skin cancer of arm        Past Surgical History:   Procedure Laterality Date    INCISION AND DRAINAGE INTRA ORAL ABSCESS         Review of patient's allergies indicates:  No Known Allergies    No current facility-administered medications on file prior to encounter.      No current outpatient medications on file prior to encounter.     Family History     None        Tobacco Use    Smoking status: Never Smoker    Smokeless tobacco: Never Used   Substance and Sexual Activity    Alcohol use: Yes     Frequency: Monthly or less     Drinks per session: 1 or 2     Comment: sometimes    Drug use: Never    Sexual activity: Not Currently     Partners: Female     Review of Systems   Constitutional: Positive for activity change, appetite change and unexpected weight change. Negative for fever.   HENT: Negative for trouble swallowing.    Eyes: Negative for photophobia and visual disturbance.   Respiratory: Negative for cough and shortness of breath.    Gastrointestinal: Negative for nausea and vomiting.   Genitourinary: Negative for difficulty urinating and dysuria.   Neurological: Positive for speech difficulty. Negative for weakness.   Psychiatric/Behavioral: Positive for behavioral problems, confusion and decreased concentration. Negative for hallucinations and sleep disturbance. The patient is not hyperactive.      Objective:     Vital Signs (Most Recent):  Temp: 97.3 °F (36.3 °C) (10/07/20 1534)  Pulse: 67 (10/07/20 1534)  Resp: 18 (10/07/20 1534)  BP: 132/63 (10/07/20 1534)  SpO2: 97 % (10/07/20 1534) Vital Signs (24h Range):  Temp:  [96.3 °F (35.7 °C)-97.9 °F (36.6 °C)] 97.3 °F (36.3 °C)  Pulse:  [53-68] 67  Resp:  [18] 18  SpO2:  [96 %-97 %] 97 %  BP: (119-132)/(60-71) 132/63     Weight: 70.4 kg (155 lb 3.3 oz)  Body mass index is 22.27 kg/m².    Physical Exam  Vitals signs and nursing note  reviewed.   Constitutional:       General: He is not in acute distress.     Appearance: He is not ill-appearing.   HENT:      Head: Normocephalic and atraumatic.      Nose: Nose normal.      Mouth/Throat:      Mouth: Mucous membranes are moist.   Eyes:      Extraocular Movements: Extraocular movements intact.      Pupils: Pupils are equal, round, and reactive to light.   Cardiovascular:      Rate and Rhythm: Normal rate and regular rhythm.   Pulmonary:      Effort: Pulmonary effort is normal. No respiratory distress.   Abdominal:      General: Abdomen is flat. There is no distension.      Palpations: Abdomen is soft.   Musculoskeletal: Normal range of motion.         General: No swelling.   Skin:     General: Skin is warm and dry.      Coloration: Skin is not jaundiced.   Neurological:      General: No focal deficit present.      Mental Status: He is alert. He is disoriented.      Sensory: No sensory deficit.      Motor: No weakness.   Psychiatric:         Mood and Affect: Mood normal.         Speech: Speech normal.         Behavior: Behavior is cooperative.         Cognition and Memory: Cognition is impaired. Memory is impaired. He exhibits impaired recent memory.           CRANIAL NERVES     CN III, IV, VI   Pupils are equal, round, and reactive to light.       Significant Labs: All pertinent labs within the past 24 hours have been reviewed.    Significant Imaging: I have reviewed all pertinent imaging results/findings within the past 24 hours.

## 2020-10-08 NOTE — PROGRESS NOTES
[facility] Hospital Medicine  Progress Note    Patient Name: Juan Hobson  MRN: [id number]  Patient Class: IP- Inpatient   Admission Date: 10/2/2020  Length of Stay: 1 days  Attending Physician: Audi rAcos MD  Primary Care Provider: Mehnaz Barillas MD    [facility] Medicine Team: [facility] HOSP MED Y Bing Moore PA-C    Subjective:     Principal Problem:Cognitive impairment        HPI:  Per Adriana Badillo MD:   Mr. Juan Hobson is a 69 y.o. male with recently diagnosed penile cancer, who presents to the ER from Urology clinic with concern for self neglect, and need for Neurology evaluation of dementia and possible placement.  The patient mentions that he has been having some issues with his memory.  He mostly orders takeout for meals because it is easier, and still drives to work.  He feels like he is able to take care of himself, and does not need to go to a facility.      Per note from Lianne German LCSW on 10/2:  Received consult from Dr. Monterroso re: assessing patient in clinic this afternoon and care recommendations as patient did not seem competent to make decisions and give consent. He has no family and no legal next of kin (POA or guardian).   Met individually with patient, and with his co-worker Janna Go ([phone], ext. [phone]) who brought him to his clinic appointment today. Patient unable to relate basic demographic information or clearly explain other information. For example, when asked his birth date he gave the year [birth year], then said 9, and then said the second to last, it begins with N; he said his street name but couldn't give the house number where he has lived for years; unable to say what type of work or where he worked prior to the [employer] in [facility] for the past 12 years; he described being taken in a truck to that other place (referring to the recent [facility] stay). He has awareness that he is having memory/cognitive issues but  stated he can manage for himself at home and that he knows there is a lot of work that needs to be done in his home that he hasn't gotten to. He inherited the home after his mother .      Mr. Go and another coworker Juice Martinez ([phone]) and their supervisor Mei Bell ([phone]) have been trying to help him manage things with his work and personal matters as they have observed his deficits and know he has no one to help him. They have helped him keep up with recent medical appointments and accompanied and transported him.  Mr. Go reports concern for his safety and ability to care for himself after seeing his home with clutter throughout and things piled up, mold and old food in the refrigerator, mold in the toilet, clothes all over, etc. Patient has been driving himself to and from work and to get food at places near his home, but unable to navigate to unfamiliar places. They have observed his cognitive deficits at work and he has made errors, so they have modified his job and his supervisor has been working with him to try to get FMLA and his leave/assisted in place so he doesn't lose his benefits. They have had to help him with paperwork, bill paying, etc., because he can't complete these on his own.         Upon arrival to the ER, vitals were temp 97.8F, HR 59 and /84.  Labs were unremarkable  He was admitted to Hospital Medicine for Neurology and SW evaluation.    Overview/Hospital Course:  69 y.o. who was admitted to hospital medicine for worsening cognitive decline and inability to care for self. Labs/infectious work-up grossly unremarkable. Neurology consulted and MRI obtained on admission with concern for encephalitis (increased FLAIR hyperintensity involving the bilateral mesial temporal lobes which can be seen in the setting of autoimmune limbic encephalitis in the appropriate clinical setting). LP recommended and attempted at bedside however not successful 2/2 to KEMI.  Interventional radiology consulted, LP performed 10/5; CSF studies ordered including paraneoplastic panel and inflammatory markers, results pending. EEG revealed mild, non-specific regional dysfunction in bilateral temporal lobes without seizure activity. Neurology suspects presentation may be more chronic than acute raising suspicion of frontotemporal dementia.  He will require further outpatient testing with a neuropsychologist eventually.  Psychiatry consulted for formal capacity evaluation and concluded that he does not have capacity to make decisions regarding his long term care disposition however does not require PEC at this time as his stay has been non-contested. He has no family, children and have relied on coworkers for assistance. The case was brought to the attention of EPS who have no plans to intervene at this time and recommend since the patient presented to the hospital it is a safe discharge planning issue that would involve [facility] department. The Legal department intend to pursue [place] [employer] to assist with managing affairs.     Past Medical History:   Diagnosis Date    Basal cell carcinoma     right helix    Hematuria     Seborrheic dermatitis     Skin cancer of arm        Past Surgical History:   Procedure Laterality Date    INCISION AND DRAINAGE INTRA ORAL ABSCESS         Review of patient's allergies indicates:  No Known Allergies    No current facility-administered medications on file prior to encounter.      No current outpatient medications on file prior to encounter.     Family History     None        Tobacco Use    Smoking status: Never Smoker    Smokeless tobacco: Never Used   Substance and Sexual Activity    Alcohol use: Yes     Frequency: Monthly or less     Drinks per session: 1 or 2     Comment: sometimes    Drug use: Never    Sexual activity: Not Currently     Partners: Female     Review of Systems   Constitutional: Positive for activity change, appetite  change and unexpected weight change. Negative for fever.   HENT: Negative for trouble swallowing.    Eyes: Negative for photophobia and visual disturbance.   Respiratory: Negative for cough and shortness of breath.    Gastrointestinal: Negative for nausea and vomiting.   Genitourinary: Negative for difficulty urinating and dysuria.   Neurological: Positive for speech difficulty. Negative for weakness.   Psychiatric/Behavioral: Positive for behavioral problems, confusion and decreased concentration. Negative for hallucinations and sleep disturbance. The patient is not hyperactive.      Objective:     Vital Signs (Most Recent):  Temp: 97.3 °F (36.3 °C) (10/07/20 1534)  Pulse: 67 (10/07/20 1534)  Resp: 18 (10/07/20 1534)  BP: 132/63 (10/07/20 1534)  SpO2: 97 % (10/07/20 1534) Vital Signs (24h Range):  Temp:  [96.3 °F (35.7 °C)-97.9 °F (36.6 °C)] 97.3 °F (36.3 °C)  Pulse:  [53-68] 67  Resp:  [18] 18  SpO2:  [96 %-97 %] 97 %  BP: (119-132)/(60-71) 132/63     Weight: 70.4 kg (155 lb 3.3 oz)  Body mass index is 22.27 kg/m².    Physical Exam  Vitals signs and nursing note reviewed.   Constitutional:       General: He is not in acute distress.     Appearance: He is not ill-appearing.   HENT:      Head: Normocephalic and atraumatic.      Nose: Nose normal.      Mouth/Throat:      Mouth: Mucous membranes are moist.   Eyes:      Extraocular Movements: Extraocular movements intact.      Pupils: Pupils are equal, round, and reactive to light.   Cardiovascular:      Rate and Rhythm: Normal rate and regular rhythm.   Pulmonary:      Effort: Pulmonary effort is normal. No respiratory distress.   Abdominal:      General: Abdomen is flat. There is no distension.      Palpations: Abdomen is soft.   Musculoskeletal: Normal range of motion.         General: No swelling.   Skin:     General: Skin is warm and dry.      Coloration: Skin is not jaundiced.   Neurological:      General: No focal deficit present.      Mental Status: He is  alert. He is disoriented.      Sensory: No sensory deficit.      Motor: No weakness.   Psychiatric:         Mood and Affect: Mood normal.         Speech: Speech normal.         Behavior: Behavior is cooperative.         Cognition and Memory: Cognition is impaired. Memory is impaired. He exhibits impaired recent memory.           CRANIAL NERVES     CN III, IV, VI   Pupils are equal, round, and reactive to light.       Significant Labs: All pertinent labs within the past 24 hours have been reviewed.    Significant Imaging: I have reviewed all pertinent imaging results/findings within the past 24 hours.      Assessment/Plan:      * Cognitive impairment  69 y.o. with progressive cognitive decline and inability to adequately care for self. Presented in September 2020, to the ED stating there were creatures attacking his penis. 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 Prompted  evaluation by psychiatry and found that it is likely related to dementia after getting collateral from family/friends. He was PEC'd the night of admission after wanting to leave and then discharged to a geropsychiatry facility for 2 weeks. Seen in neuro clinic where there was mention of suspected frontotemporal dementia and further work-up ensued. Presented to a urology appt on 10/2 and was advised to present to the ED due to concern for competency and need for patient advocate/POA before a biopsy or form of treatment can be initiated.     Presentation concerning for frontotemporal dementia vs. autoimmune vs paraneoplastic process in the setting of malignancy   - LP performed by IR 10/5/2020: elevated protein, neutrophils  - Concern that the patient is unable to care for himself and make his own decisions, however the patient feels like he is able to care for himself  - MRI brain with Possible subtle increased  FLAIR hyperintensity involving the bilateral mesial temporal lobes which can be seen in the setting of autoimmune limbic encephalitis  - EEG without seizure activity   - Vitamin B1, Vitamin B6, Vitamin B12, HIV, RPR, TSH unremarkable   - afebrile, without leukocytosis -- infectious work-up unremarkable   - Neurology consulted   --please send CSF to [place] for Alzheimer panel (ADEVL test code)    --start empiric IV Solumedrol 1 g qd x 5 days   --formal cognitive assessment with neuropsych     --consider PET scan   --continue strict delirium precautions  - Paraneoplastic panel, serum in place from outpatient neurology visit on 09/28  - Possibility for Autoimmune vs Paraneoplastic process in the setting of active malignancy  - CM/SW consult  - Pending evaluation, might need Psych to determine if he is gravely disabled and requiring a PEC plus/minus CXR and PPD         DELIRIUM BEHAVIOR MANAGEMENT  - Minimize use of restraints; if physical restraints necessary, please utilize medical/chemical prns for agitation.  - Keep shades open and room lit during day and room dim at night in order to promote healthy circadian rhythms.  - Encourage family at bedside  - Keep whiteboard in patient's room current with the date and name of the members of patient's team for easy patient self re-orientation.  - Avoid benzodiazepines, antihistamines, anticholinergics, hypnotics, and minimize opiates while controlling for pain as these medications may exacerbate delirium.      Impaired decision making  Presented to a urology appt on 10/2 and was advised to present to the ED due to concern for competency and need for patient advocate/POA before a biopsy or form of treatment can be initiated.     [place] R[place] 40:1299.53: (9) Upon the inability of any adult to consent for himself and in the absence of any person listed in Paragraphs (2) through (8) of this Subsection, an adult friend of the patient. For purposes of this Subsection to consent, adult  friend means an adult who has exhibited special care and concern for the patient, who is generally familiar with the patient's health care views and desires, and who is willing and able to become involved in the patient's health care decisions and to act in the patient's best interest. The adult friend shall sign and date an acknowledgment form provided by the hospital or other health care facility in which the patient is located for placement in the patient's records certifying that he or she meets such criteria.    -  Juice Martinez ([phone]) does not wish to make decisions on his behalf  - supervisor Mei Bell ([phone]) have been trying to help him manage things with his work and personal matters as they have observed his deficits and know he has no one to help him - these individuals may be used for consent   - PEC'd for grave disability during last admission and discharged to [facility] however no formal eval by in-patient psychiatry  - will need formal evaluation       Penile carcinoma  - follows with urology   - will discuss need for further intervention while inpatient, awaiting call back    VTE Risk Mitigation (From admission, onward)         Ordered     enoxaparin injection 40 mg  Every 24 hours      10/02/20 2042     IP VTE HIGH RISK PATIENT  Once      10/02/20 2042                Discharge Planning   ALFRED: 10/9/2020     Code Status: Full Code   Is the patient medically ready for discharge?: No    Reason for patient still in hospital (select all that apply): Consult recommendations and Pending disposition  Discharge Plan A: Home Health                  Bing Moore PA-C  Department of Hospital Medicine   [facility]

## 2020-10-08 NOTE — ASSESSMENT & PLAN NOTE
70 yo male with history of BCC s/p Mohs' procedure and new diagnosis of penile malignancy was sent to ED from Urology clinic for cognitive impairment and inability to care for himself. Pt lives alone and does not have next of kin. Coworkers noted cognitive decline around early September and he was seen in clinic by Dr. Ernandez 9/28 for memory issues felt to be due to FTD. This admission, workup has been remarkable for MRI brain W WO contrast showing increased FLAIR hyperintensity involving the bilateral mesial temporal lobes. LP 10/5 with CSF (1 WBC, 190 RBC, 71 protein, 52 glucose). CSF HSV, ACE negative. CSF paraneoplastic pending. EEG 10/3 (1 hr 40 min) with mild regional dysfunction in bilateral temporal lobes, but no electrographic seizures. Serum paraneoplastic panel from 9/28 has resulted negative.     >>Presentation concerning for dementia vs. autoimmune vs paraneoplastic process in the setting of malignancy     --would need to repeat LP with IR to obtain CSF specimen to send to [place] for Alzheimer panel (ADEVL test code)   If done, needs to be collected in special tube per sendout lab   Alternatively, could obtain amyloid PET scan   --continue empiric IV Solumedrol 1 g qd x 5 days (today is day 2), tolerating well thus far  --formal cognitive assessment with neuropsych    --continue strict delirium precautions

## 2020-10-08 NOTE — SUBJECTIVE & OBJECTIVE
Subjective:     Interval History:   Confirmed with sendout lab that there is no frozen CSF to send ADEVL to [place]  Case management working on legal paperwork   No complaints today  Tolerating IV Solumedrol well so far    Current Neurological Medications:  IV Solumedrol 1 g qd x 5 days    Current Facility-Administered Medications   Medication Dose Route Frequency Provider Last Rate Last Dose    acetaminophen tablet 650 mg  650 mg Oral Q4H PRN Adriana Badillo MD        enoxaparin injection 40 mg  40 mg Subcutaneous Q24H Adriana Badillo MD   40 mg at 10/06/20 1812    melatonin tablet 6 mg  6 mg Oral Nightly PRN Adriana Badillo MD        methylPREDNISolone sodium succinate injection 1,000 mg  1,000 mg Intravenous Daily Bing Moore PA-C   1,000 mg at 10/08/20 0954    ondansetron injection 8 mg  8 mg Intravenous Q8H PRN Adriana Badillo MD        pantoprazole EC tablet 40 mg  40 mg Oral Daily Rosemary Ball MD   40 mg at 10/08/20 0955    promethazine (PHENERGAN) 25 mg in dextrose 5 % 50 mL IVPB  25 mg Intravenous Q6H PRN Adriana Badillo MD        senna-docusate 8.6-50 mg per tablet 1 tablet  1 tablet Oral BID PRN Adriana Badillo MD        sodium chloride 0.9% flush 5 mL  5 mL Intravenous PRN Adriana Badillo MD         Review of Systems   Constitutional: Positive for activity change and fatigue. Negative for fever.   HENT: Negative for trouble swallowing and voice change.    Eyes: Negative for visual disturbance.   Respiratory: Negative for shortness of breath.    Cardiovascular: Negative for chest pain and leg swelling.   Gastrointestinal: Negative for nausea and vomiting.   Musculoskeletal: Negative for neck stiffness.   Neurological: Negative for dizziness, tremors, seizures, facial asymmetry, speech difficulty and headaches.   Psychiatric/Behavioral: Positive for confusion, decreased concentration and sleep disturbance. Negative for agitation and behavioral problems.     Objective:     Vital Signs  (Most Recent):  Temp: 98.8 °F (37.1 °C) (10/08/20 1522)  Pulse: 71 (10/08/20 1522)  Resp: 18 (10/08/20 1522)  BP: (!) 123/58 (10/08/20 1522)  SpO2: (!) 93 % (10/08/20 1522) Vital Signs (24h Range):  Temp:  [96.2 °F (35.7 °C)-98.8 °F (37.1 °C)] 98.8 °F (37.1 °C)  Pulse:  [65-72] 71  Resp:  [16-18] 18  SpO2:  [90 %-95 %] 93 %  BP: (118-129)/(58-74) 123/58     Weight: 70.4 kg (155 lb 3.3 oz)  Body mass index is 22.27 kg/m².    Physical Exam  Eyes:      Extraocular Movements: EOM normal.      Pupils: Pupils are equal, round, and reactive to light.   Neurological:      Coordination: Finger-Nose-Finger Test normal.   Psychiatric:         Speech: Speech normal.         NEUROLOGICAL EXAMINATION:     MENTAL STATUS   Oriented to person.   Disoriented to place. Disoriented to city and area.   Disoriented to year, date and day. Oriented to month.   Follows 1 step commands.   Attention: decreased. Concentration: decreased.   Speech: speech is normal   Level of consciousness: alert  Unable to name object: unable to name key, watch, identification badge. Able to repeat.        Speaks in generalities      CRANIAL NERVES     CN III, IV, VI   Pupils are equal, round, and reactive to light.  Extraocular motions are normal.   Nystagmus: none   Diplopia: none  Ophthalmoparesis: none    CN V   Facial sensation intact.     CN VIII   Hearing: intact    CN IX, X   Palate: symmetric    CN XI   CN XI normal.     CN XII   CN XII normal.     MOTOR EXAM   Muscle bulk: normal  Overall muscle tone: normal       Moves all extremities against gravity spontaneously without abnormal movements      SENSORY EXAM   Right arm light touch: normal  Left arm light touch: normal  Right leg light touch: normal  Left leg light touch: normal    GAIT AND COORDINATION     Gait  Gait: (deferred)     Coordination   Finger to nose coordination: normal    Tremor   Resting tremor: absent    Significant Labs:   CBC: No results for input(s): WBC, HGB, HCT, PLT in the  last 48 hours.  CMP:   Recent Labs   Lab 10/07/20  0512 10/08/20  0641   GLU 83 167*    140   K 3.9 4.3    105   CO2 27 24   BUN 12 18   CREATININE 0.8 0.8   CALCIUM 8.7 9.2   MG 1.9 1.9   PROT 6.0 6.8   ALBUMIN 3.4* 3.7   BILITOT 0.7 0.6   ALKPHOS 64 63   AST 19 21   ALT 17 25   ANIONGAP 9 11   EGFRNONAA >60.0 >60.0       Ref. Range 10/5/2020 10:00 10/5/2020 10:10   COLOR CSF Latest Ref Range: Colorless  Colorless     RBC, CSF Latest Ref Range: 0 /cu mm 190 (A)     WBC, CSF Latest Ref Range: 0 - 5 /cu mm 1     Glucose, CSF Latest Ref Range: 40 - 70 mg/dL 52     Protein, CSF Latest Ref Range: 15 - 40 mg/dL 71 (H)        CSF HSV and ACE negative  Awaiting CSF paraneoplastic panel     Unremarkable:  --TSH  --HIV   --RPR  --Ammonia  --Folate  --B6  --B12 540  --B1 49  --Serum paraneoplastic panel (9/28) negative    All pertinent lab results from the past 24 hours have been reviewed.    Significant Imaging: I have reviewed and interpreted all pertinent imaging results/findings within the past 24 hours.     MRI brain W WO contrast (10/2/20):  FINDINGS: There is no abnormal restricted diffusion to suggest acute infarction.  There is generalized cerebral volume loss.  The ventricles are within normal limits of size and configuration for age without evidence of hydrocephalus or midline shift.  There is possible subtle increased FLAIR hyperintensity in the bilateral mesial temporal lobes which can be seen in the setting of autoimmune limbic encephalitis in the appropriate clinical setting noting there is no associated restricted diffusion, hemorrhage or postcontrast enhancement within these regions.  Clinical correlation and short-term interval follow-up is advised.  There is no evidence of acute intraparenchymal hemorrhage.  No extra-axial hemorrhage or fluid collections are identified.  No abnormal enhancing lesion is identified.  The craniocervical junction and sellar region are within normal  limits.     Impression: Possible subtle increased FLAIR hyperintensity involving the bilateral mesial temporal lobes which can be seen in the setting of autoimmune limbic encephalitis in the appropriate clinical setting. No evidence of acute intracranial hemorrhage, acute infarct or abnormal enhancing lesion

## 2020-10-08 NOTE — PROGRESS NOTES
"[facility]  Neurology  Progress Note    Patient Name: Juan Hobson  MRN: [id number]  Admission Date: 10/2/2020  Hospital Length of Stay: 2 days  Code Status: Full Code   Attending Provider: Brayan Castillo MD  Primary Care Physician: Mehnaz Barillas MD   Principal Problem:Cognitive impairment    HPI:   Patient is a 70 yo male w/ past medical history significant for BCC s/p Mohs procedure, recent Dx of penile carcinoma who presented to [facility] on 10/02 from Urology clinic w/ concern for progressive dementia and inability to care for self.Patient was seen in clinic by Dr. Ernandez on 09/28 for memory complaints and at the time the diagnosis of fronto-temporal dementia was brought up. Per Dr. 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  Coworkers note that he has always been a very punctual and reliable employee but did not show up to work on Sept 2 and instead presented to [facility] reporting  "creatures attacked his penis".  He was noted to have enlarged, ulcerated penis with MRI and urology consult showing concern for penile cancer but not biopsy confirmed at this time.   He was treated for infection and discharged to geriatric psych facility where he was held for two weeks prior to discharge home with home health orders for an agency that does not accept his insurance.  When asked about memory difficulties, he cites difficulty connecting objects to words for them.  Co worker retrospectively note his clocking in/out has been inconsistent over the past several months.  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  Co workers note that over the past several months he has been making some errors in recording measurements. He continues to drive and live in his own home. Patient has recently had a 2 week stay in a geriatric " "psychiatric facility. Per chart review, patient is a poor historian and some information was provided by the co-worker that accompanied the patient to the ED - patient lives at home alone in an unsafe environment.  Social work evaluation was conducted in the clinic setting and was deemed that his home was unsafe and was unsafe for him to return home alone.  Patient lives alone and without any family members.  Based on reports, patient has not been eating, has a 70 lb weight loss, living in an unsafe and unclean environment, and a mold growing on his toilets, sink and refrigerator.  Spoiled food in the refrigerator and no clean water. 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; management recs/further diagnostic work-up"      Subjective:     Interval History:   Confirmed with sendout lab that there is no frozen CSF to send ADEVL to [place]  Case management working on legal paperwork   No complaints today  Tolerating IV Solumedrol well so far    Current Neurological Medications:  IV Solumedrol 1 g qd x 5 days    Current Facility-Administered Medications   Medication Dose Route Frequency Provider Last Rate Last Dose    acetaminophen tablet 650 mg  650 mg Oral Q4H PRN Adriana Badillo MD        enoxaparin injection 40 mg  40 mg Subcutaneous Q24H Adriana Badillo MD   40 mg at 10/06/20 1812    melatonin tablet 6 mg  6 mg Oral Nightly PRN Adriana Badillo MD        methylPREDNISolone sodium succinate injection 1,000 mg  1,000 mg Intravenous Daily Bing Moore PA-C   1,000 mg at 10/08/20 0954    ondansetron injection 8 mg  8 mg Intravenous Q8H PRN Adriana Badillo MD        pantoprazole EC tablet 40 mg  40 mg Oral Daily Rosemary Ball MD   40 mg at 10/08/20 0955    promethazine (PHENERGAN) 25 mg in dextrose 5 % 50 mL IVPB  25 mg Intravenous Q6H PRN Adriana Badillo MD        senna-docusate 8.6-50 mg per tablet 1 tablet  " 1 tablet Oral BID PRN Adriana Badillo MD        sodium chloride 0.9% flush 5 mL  5 mL Intravenous PRN Adriana Badillo MD         Review of Systems   Constitutional: Positive for activity change and fatigue. Negative for fever.   HENT: Negative for trouble swallowing and voice change.    Eyes: Negative for visual disturbance.   Respiratory: Negative for shortness of breath.    Cardiovascular: Negative for chest pain and leg swelling.   Gastrointestinal: Negative for nausea and vomiting.   Musculoskeletal: Negative for neck stiffness.   Neurological: Negative for dizziness, tremors, seizures, facial asymmetry, speech difficulty and headaches.   Psychiatric/Behavioral: Positive for confusion, decreased concentration and sleep disturbance. Negative for agitation and behavioral problems.     Objective:     Vital Signs (Most Recent):  Temp: 98.8 °F (37.1 °C) (10/08/20 1522)  Pulse: 71 (10/08/20 1522)  Resp: 18 (10/08/20 1522)  BP: (!) 123/58 (10/08/20 1522)  SpO2: (!) 93 % (10/08/20 1522) Vital Signs (24h Range):  Temp:  [96.2 °F (35.7 °C)-98.8 °F (37.1 °C)] 98.8 °F (37.1 °C)  Pulse:  [65-72] 71  Resp:  [16-18] 18  SpO2:  [90 %-95 %] 93 %  BP: (118-129)/(58-74) 123/58     Weight: 70.4 kg (155 lb 3.3 oz)  Body mass index is 22.27 kg/m².    Physical Exam  Eyes:      Extraocular Movements: EOM normal.      Pupils: Pupils are equal, round, and reactive to light.   Neurological:      Coordination: Finger-Nose-Finger Test normal.   Psychiatric:         Speech: Speech normal.         NEUROLOGICAL EXAMINATION:     MENTAL STATUS   Oriented to person.   Disoriented to place. Disoriented to city and area.   Disoriented to year, date and day. Oriented to month.   Follows 1 step commands.   Attention: decreased. Concentration: decreased.   Speech: speech is normal   Level of consciousness: alert  Unable to name object: unable to name key, watch, identification badge. Able to repeat.        Speaks in generalities      CRANIAL NERVES      CN III, IV, VI   Pupils are equal, round, and reactive to light.  Extraocular motions are normal.   Nystagmus: none   Diplopia: none  Ophthalmoparesis: none    CN V   Facial sensation intact.     CN VIII   Hearing: intact    CN IX, X   Palate: symmetric    CN XI   CN XI normal.     CN XII   CN XII normal.     MOTOR EXAM   Muscle bulk: normal  Overall muscle tone: normal       Moves all extremities against gravity spontaneously without abnormal movements      SENSORY EXAM   Right arm light touch: normal  Left arm light touch: normal  Right leg light touch: normal  Left leg light touch: normal    GAIT AND COORDINATION     Gait  Gait: (deferred)     Coordination   Finger to nose coordination: normal    Tremor   Resting tremor: absent    Significant Labs:   CBC: No results for input(s): WBC, HGB, HCT, PLT in the last 48 hours.  CMP:   Recent Labs   Lab 10/07/20  0512 10/08/20  0641   GLU 83 167*    140   K 3.9 4.3    105   CO2 27 24   BUN 12 18   CREATININE 0.8 0.8   CALCIUM 8.7 9.2   MG 1.9 1.9   PROT 6.0 6.8   ALBUMIN 3.4* 3.7   BILITOT 0.7 0.6   ALKPHOS 64 63   AST 19 21   ALT 17 25   ANIONGAP 9 11   EGFRNONAA >60.0 >60.0       Ref. Range 10/5/2020 10:00 10/5/2020 10:10   COLOR CSF Latest Ref Range: Colorless  Colorless     RBC, CSF Latest Ref Range: 0 /cu mm 190 (A)     WBC, CSF Latest Ref Range: 0 - 5 /cu mm 1     Glucose, CSF Latest Ref Range: 40 - 70 mg/dL 52     Protein, CSF Latest Ref Range: 15 - 40 mg/dL 71 (H)        CSF HSV and ACE negative  Awaiting CSF paraneoplastic panel     Unremarkable:  --TSH  --HIV   --RPR  --Ammonia  --Folate  --B6  --B12 540  --B1 49  --Serum paraneoplastic panel (9/28) negative    All pertinent lab results from the past 24 hours have been reviewed.    Significant Imaging: I have reviewed and interpreted all pertinent imaging results/findings within the past 24 hours.     MRI brain W WO contrast (10/2/20):  FINDINGS: There is no abnormal restricted diffusion to  suggest acute infarction.  There is generalized cerebral volume loss.  The ventricles are within normal limits of size and configuration for age without evidence of hydrocephalus or midline shift.  There is possible subtle increased FLAIR hyperintensity in the bilateral mesial temporal lobes which can be seen in the setting of autoimmune limbic encephalitis in the appropriate clinical setting noting there is no associated restricted diffusion, hemorrhage or postcontrast enhancement within these regions.  Clinical correlation and short-term interval follow-up is advised.  There is no evidence of acute intraparenchymal hemorrhage.  No extra-axial hemorrhage or fluid collections are identified.  No abnormal enhancing lesion is identified.  The craniocervical junction and sellar region are within normal limits.     Impression: Possible subtle increased FLAIR hyperintensity involving the bilateral mesial temporal lobes which can be seen in the setting of autoimmune limbic encephalitis in the appropriate clinical setting. No evidence of acute intracranial hemorrhage, acute infarct or abnormal enhancing lesion    Assessment and Plan:     * Cognitive impairment  70 yo male with history of BCC s/p Mohs' procedure and new diagnosis of penile malignancy was sent to ED from Urology clinic for cognitive impairment and inability to care for himself. Pt lives alone and does not have next of kin. Coworkers noted cognitive decline around early September and he was seen in clinic by Dr. Ernandez 9/28 for memory issues felt to be due to FTD. This admission, workup has been remarkable for MRI brain W WO contrast showing increased FLAIR hyperintensity involving the bilateral mesial temporal lobes. LP 10/5 with CSF (1 WBC, 190 RBC, 71 protein, 52 glucose). CSF HSV, ACE negative. CSF paraneoplastic pending. EEG 10/3 (1 hr 40 min) with mild regional dysfunction in bilateral temporal lobes, but no electrographic seizures. Serum  "paraneoplastic panel from 9/28 has resulted negative.     >>Presentation concerning for dementia vs. autoimmune vs paraneoplastic process in the setting of malignancy     --would need to repeat LP with IR to obtain CSF specimen to send to [place] for Alzheimer panel (ADEVL test code)   If done, needs to be collected in special tube per sendout lab   Alternatively, could obtain amyloid PET scan instead of LP   --continue empiric IV Solumedrol 1 g qd x 5 days (today is day 2), tolerating well thus far  --formal cognitive assessment with neuropsych    --continue strict delirium precautions    Impaired decision making  Sent to ED 10/2 from Urology clinic to have mental capacity evaluation   Pt deemed unsafe to return home and Psych team feels pt does not have full medical decision making capacity at this time   Pt does not have next of kin    --primary team working on placement and medical POA    Penile carcinoma  Urology visit 10/2, per chart review " ED 9/2/20 due to a draining penile lesion. Penile swelling coupled with erythema for the past 2 years. MRI was done which showed a necrotic penile tumor within the right distal corpus cavernosa with invasion through the right lateral fascial planes and fistulous communication with the skin.  Urethral involvement not excluded. Suspected invasion of the right dorsolateral corpus spongiosum. Based on this imaging, he is stage [id number]. However, we do not have a biopsy specimen."      --continue management per Urology team   ?biopsy while inpatient     VTE Risk Mitigation (From admission, onward)         Ordered     enoxaparin injection 40 mg  Every 24 hours      10/02/20 2042     IP VTE HIGH RISK PATIENT  Once      10/02/20 2042              Bri Pelaez PA-C  General Neurology Consult  Neuro Consult [facility] # [id number]  "

## 2020-10-08 NOTE — PLAN OF CARE
Pt. needs met. VSS throughout shift, Pt presented with education, needs reinfocrcement. POC reviewed will continue to monitor and adjust POC.   Problem: Adult Inpatient Plan of Care  Goal: Plan of Care Review  Outcome: Ongoing, Not Progressing  Goal: Patient-Specific Goal (Individualization)  Outcome: Ongoing, Not Progressing  Goal: Absence of Hospital-Acquired Illness or Injury  Outcome: Ongoing, Not Progressing  Goal: Optimal Comfort and Wellbeing  Outcome: Ongoing, Not Progressing  Goal: Readiness for Transition of Care  Outcome: Ongoing, Not Progressing  Goal: Rounds/Family Conference  Outcome: Ongoing, Not Progressing     Problem: Fall Injury Risk  Goal: Absence of Fall and Fall-Related Injury  Outcome: Ongoing, Not Progressing     Problem: Thought Process Alteration  Goal: Optimal Thought Clarity  Outcome: Ongoing, Not Progressing

## 2020-10-08 NOTE — ASSESSMENT & PLAN NOTE
69 y.o. with progressive cognitive decline and inability to adequately care for self. Presented in September 2020, to the ED stating there were creatures attacking his penis. 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 Prompted  evaluation by psychiatry and found that it is likely related to dementia after getting collateral from family/friends. He was PEC'd the night of admission after wanting to leave and then discharged to a geropsychiatry facility for 2 weeks. Seen in neuro clinic where there was mention of suspected frontotemporal dementia and further work-up ensued. Presented to a urology appt on 10/2 and was advised to present to the ED due to concern for competency and need for patient advocate/POA before a biopsy or form of treatment can be initiated.     Presentation concerning for frontotemporal dementia vs. autoimmune vs paraneoplastic process in the setting of malignancy   - LP performed by IR 10/5/2020: elevated protein, neutrophils  - Concern that the patient is unable to care for himself and make his own decisions, however the patient feels like he is able to care for himself  - MRI brain with Possible subtle increased FLAIR hyperintensity involving the bilateral mesial temporal lobes which can be seen in the setting of autoimmune limbic encephalitis  - EEG without seizure activity   - Vitamin B1, Vitamin B6, Vitamin B12, HIV, RPR, TSH unremarkable   - afebrile, without leukocytosis -- infectious work-up unremarkable   - Neurology consulted   --please send CSF to [place] for Alzheimer panel (ADEVL test code)    --start empiric IV Solumedrol 1 g qd x 5 days   --formal cognitive assessment with neuropsych     --consider PET scan   --continue strict delirium precautions  - Paraneoplastic panel, serum in place from outpatient neurology visit on 09/28  - Possibility for  Autoimmune vs Paraneoplastic process in the setting of active malignancy  - CM/SW consult  - Pending evaluation, might need Psych to determine if he is gravely disabled and requiring a PEC plus/minus CXR and PPD         DELIRIUM BEHAVIOR MANAGEMENT  - Minimize use of restraints; if physical restraints necessary, please utilize medical/chemical prns for agitation.  - Keep shades open and room lit during day and room dim at night in order to promote healthy circadian rhythms.  - Encourage family at bedside  - Keep whiteboard in patient's room current with the date and name of the members of patient's team for easy patient self re-orientation.  - Avoid benzodiazepines, antihistamines, anticholinergics, hypnotics, and minimize opiates while controlling for pain as these medications may exacerbate delirium.

## 2020-10-08 NOTE — PROGRESS NOTES
Progress Note  Hospital Medicine    Provider team:    [facility]UROLOGY  [facility] HOSP MED O  Admit Date: 10/2/2020  Encounter Date: 10/08/2020     SUBJECTIVE:     Follow-up Visit for: Cognitive impairment    HPI (See H&P for complete P,F,SHx):   Per Adriana Badillo MD:   Mr. Juan Hobson is a 69 y.o. male with recently diagnosed penile cancer, who presents to the ER from Urology clinic with concern for self neglect, and need for Neurology evaluation of dementia and possible placement.  The patient mentions that he has been having some issues with his memory.  He mostly orders takeout for meals because it is easier, and still drives to work.  He feels like he is able to take care of himself, and does not need to go to a facility.      Per note from Lianne German LCSW on 10/2:  Received consult from Dr. Monterroso re: assessing patient in clinic this afternoon and care recommendations as patient did not seem competent to make decisions and give consent. He has no family and no legal next of kin (POA or guardian).   Met individually with patient, and with his co-worker Janna Donavan ([phone], ext. [phone]) who brought him to his clinic appointment today. Patient unable to relate basic demographic information or clearly explain other information. For example, when asked his birth date he gave the year [birth year], then said 9, and then said the second to last, it begins with N; he said his street name but couldn't give the house number where he has lived for years; unable to say what type of work or where he worked prior to the [employer] in [facility] for the past 12 years; he described being taken in a truck to that other place (referring to the recent [facility] stay). He has awareness that he is having memory/cognitive issues but stated he can manage for himself at home and that he knows there is a lot of work that needs to be done in his home that he hasn't gotten to. He inherited the home after  his mother .      Mr. Go and another coworker Juice Martinez ([phone]) and their supervisor Meiirma Meanso ([phone]) have been trying to help him manage things with his work and personal matters as they have observed his deficits and know he has no one to help him. They have helped him keep up with recent medical appointments and accompanied and transported him.  Mr. Go reports concern for his safety and ability to care for himself after seeing his home with clutter throughout and things piled up, mold and old food in the refrigerator, mold in the toilet, clothes all over, etc. Patient has been driving himself to and from work and to get food at places near his home, but unable to navigate to unfamiliar places. They have observed his cognitive deficits at work and he has made errors, so they have modified his job and his supervisor has been working with him to try to get FMLA and his leave/alf in place so he doesn't lose his benefits. They have had to help him with paperwork, bill paying, etc., because he can't complete these on his own.         Upon arrival to the ER, vitals were temp 97.8F, HR 59 and /84.  Labs were unremarkable  He was admitted to Hospital Medicine for Neurology and SW evaluation.     Overview/Hospital Course:  69 y.o. who was admitted to hospital medicine for worsening cognitive decline and inability to care for self. Labs/infectious work-up grossly unremarkable. Neurology consulted and MRI obtained on admission with concern for encephalitis (increased FLAIR hyperintensity involving the bilateral mesial temporal lobes which can be seen in the setting of autoimmune limbic encephalitis in the appropriate clinical setting). LP recommended and attempted at bedside however not successful 2/ to KEMI. Interventional radiology consulted, LP performed 10/5; CSF studies ordered including paraneoplastic panel and inflammatory markers, results pending. EEG revealed mild,  non-specific regional dysfunction in bilateral temporal lobes without seizure activity. Neurology suspects presentation may be more chronic than acute raising suspicion of frontotemporal dementia.  He will require further outpatient testing with a neuropsychologist eventually.  Psychiatry consulted for formal capacity evaluation and concluded that he does not have capacity to make decisions regarding his long term care disposition however does not require PEC at this time as his stay has been non-contested. He has no family, children and have relied on coworkers for assistance. The case was brought to the attention of EPS who have no plans to intervene at this time and recommend since the patient presented to the hospital it is a safe discharge planning issue that would involve [facility] Legal department. The Legal department intend to pursue [place] [employer]hip to assist with managing affairs.     Interval history:  Patient doing well and offers no complaints. Neurology has been following with presentation c/f  dementia vs. autoimmune vs paraneoplastic process in the setting of malignancy. Empiric solumedrol x 5 days started.       Review of Systems:  Review of Systems   Constitutional: Positive for activity change, appetite change and unexpected weight change. Negative for fever.   HENT: Negative for trouble swallowing.    Eyes: Negative for photophobia and visual disturbance.   Respiratory: Negative for cough and shortness of breath.    Gastrointestinal: Negative for nausea and vomiting.   Genitourinary: Negative for difficulty urinating and dysuria.   Neurological: Positive for speech difficulty. Negative for weakness.   Psychiatric/Behavioral: Positive for behavioral problems, confusion and decreased concentration. Negative for hallucinations and sleep disturbance. The patient is not hyperactive.      OBJECTIVE:   No intake or output data in the 24 hours ending 10/08/20 1434  Vital Signs Range (Last  "24H):  Temp:  [96.2 °F (35.7 °C)-98 °F (36.7 °C)]   Pulse:  [65-72]   Resp:  [16-18]   BP: (118-132)/(59-74)   SpO2:  [90 %-97 %]   Body mass index is 22.27 kg/m².    Objective:  Vital signs: (most recent): Blood pressure (!) 129/59, pulse 72, temperature 98 °F (36.7 °C), resp. rate 18, height 5' 10" (1.778 m), weight 70.4 kg (155 lb 3.3 oz), SpO2 (!) 94 %.  No fever.      Physical Exam  Vitals signs and nursing note reviewed.   Constitutional:       General: He is not in acute distress.     Appearance: He is not ill-appearing.   HENT:      Head: Normocephalic and atraumatic.      Nose: Nose normal.      Mouth/Throat:      Mouth: Mucous membranes are moist.   Eyes:      Extraocular Movements: Extraocular movements intact.      Pupils: Pupils are equal, round, and reactive to light.   Cardiovascular:      Rate and Rhythm: Normal rate and regular rhythm.   Pulmonary:      Effort: Pulmonary effort is normal. No respiratory distress.   Abdominal:      General: Abdomen is flat. There is no distension.      Palpations: Abdomen is soft.   Musculoskeletal: Normal range of motion.         General: No swelling.   Skin:     General: Skin is warm and dry.      Coloration: Skin is not jaundiced.   Neurological:      General: No focal deficit present.      Mental Status: He is alert. He is disoriented.      Sensory: No sensory deficit.      Motor: No weakness.   Psychiatric:         Mood and Affect: Mood normal.         Speech: Speech normal.         Behavior: Behavior is cooperative.         Cognition and Memory: Cognition is impaired. Memory is impaired. He exhibits impaired recent memory.     Medications:  Medication list was reviewed in EPIC and changes noted under Assessment/Plan and MAR.    Laboratory:  Recent Labs     10/06/20  0416   WBC 5.41   RBC 3.97*   HGB 12.6*   HCT 38.4*      MCV 97   MCH 31.7*   MCHC 32.8   GRAN 54.4  2.9   LYMPH 31.6  1.7   MONO 9.4  0.5   EOS 0.2      Recent Labs     10/08/20  0641 "   *      K 4.3      CO2 24   BUN 18   CREATININE 0.8   CALCIUM 9.2   ANIONGAP 11   MG 1.9   PHOS 3.3       ASSESSMENT/PLAN:     Active Hospital Problems    Diagnosis  POA    *Cognitive impairment [R41.89]  Yes    Acute encephalopathy [G93.40]  Yes    Cognitive decline [R41.89]  Yes    Impaired decision making [Z78.9]  Yes    Confusion [R41.0]  Unknown    Debility [R53.81]  Yes    Penile carcinoma [C60.9]  Yes    Altered mental status [R41.82]  Unknown      Resolved Hospital Problems    Diagnosis Date Resolved POA    Encephalopathy [G93.40] 10/04/2020 Unknown      * Cognitive impairment  69 y.o. with progressive cognitive decline and inability to adequately care for self. Presented in September 2020, to the ED stating there were creatures attacking his penis. 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 Prompted  evaluation by psychiatry and found that it is likely related to dementia after getting collateral from family/friends. He was PEC'd the night of admission after wanting to leave and then discharged to a geropsychiatry facility for 2 weeks. Seen in neuro clinic where there was mention of suspected frontotemporal dementia and further work-up ensued. Presented to a urology appt on 10/2 and was advised to present to the ED due to concern for competency and need for patient advocate/POA before a biopsy or form of treatment can be initiated.      Presentation concerning for frontotemporal dementia vs. autoimmune vs paraneoplastic process in the setting of malignancy   - LP performed by ANDREA 10/5/2020: elevated protein, neutrophils  - Concern that the patient is unable to care for himself and make his own decisions, however the patient feels like he is able to care for himself  - MRI brain with Possible subtle increased FLAIR hyperintensity involving the  bilateral mesial temporal lobes which can be seen in the setting of autoimmune limbic encephalitis  - EEG without seizure activity   - Vitamin B1, Vitamin B6, Vitamin B12, HIV, RPR, TSH unremarkable   - afebrile, without leukocytosis -- infectious work-up unremarkable   - Neurology consulted          --please send CSF to [facility] for Alzheimer panel (ADEVL test code)           --start empiric IV Solumedrol 1 g qd x 5 days          --formal cognitive assessment with neuropsych            --consider PET scan          --continue strict delirium precautions  - Paraneoplastic panel, serum in place from outpatient neurology visit on 09/28  - Possibility for Autoimmune vs Paraneoplastic process in the setting of active malignancy  - CM/SW consult  - Pending evaluation, might need Psych to determine if he is gravely disabled and requiring a PEC plus/minus CXR and PPD           DELIRIUM BEHAVIOR MANAGEMENT  - Minimize use of restraints; if physical restraints necessary, please utilize medical/chemical prns for agitation.  - Keep shades open and room lit during day and room dim at night in order to promote healthy circadian rhythms.  - Encourage family at bedside  - Keep whiteboard in patient's room current with the date and name of the members of patient's team for easy patient self re-orientation.  - Avoid benzodiazepines, antihistamines, anticholinergics, hypnotics, and minimize opiates while controlling for pain as these medications may exacerbate delirium.        Impaired decision making  Presented to a urology appt on 10/2 and was advised to present to the ED due to concern for competency and need for patient advocate/POA before a biopsy or form of treatment can be initiated.      [place] R[place] 40:1299.53: (9) Upon the inability of any adult to consent for himself and in the absence of any person listed in Paragraphs (2) through (8) of this Subsection, an adult friend of the patient. For purposes of this Subsection to consent,  adult friend means an adult who has exhibited special care and concern for the patient, who is generally familiar with the patient's health care views and desires, and who is willing and able to become involved in the patient's health care decisions and to act in the patient's best interest. The adult friend shall sign and date an acknowledgment form provided by the hospital or other health care facility in which the patient is located for placement in the patient's records certifying that he or she meets such criteria.     -  Omarjony Brownmae ([phone]) does not wish to make decisions on his behalf  - supervisor Mei Bell ([phone]) have been trying to help him manage things with his work and personal matters as they have observed his deficits and know he has no one to help him - these individuals may be used for consent   - PEC'd for grave disability during last admission and discharged to [facility] however no formal eval by in-patient psychiatry  - will need formal evaluation     Anticipated discharge date and disposition:   Pending legal guardianship establishment.  Brayan Castillo MD  [facility]

## 2020-10-09 LAB
ALBUMIN SERPL BCP-MCNC: 3.7 G/DL (ref 3.5–5.2)
ALP SERPL-CCNC: 67 U/L (ref 55–135)
ALT SERPL W/O P-5'-P-CCNC: 26 U/L (ref 10–44)
ANION GAP SERPL CALC-SCNC: 12 MMOL/L (ref 8–16)
AST SERPL-CCNC: 19 U/L (ref 10–40)
BILIRUB SERPL-MCNC: 0.6 MG/DL (ref 0.1–1)
BUN SERPL-MCNC: 22 MG/DL (ref 8–23)
CALCIUM SERPL-MCNC: 8.9 MG/DL (ref 8.7–10.5)
CHLORIDE SERPL-SCNC: 106 MMOL/L (ref 95–110)
CO2 SERPL-SCNC: 23 MMOL/L (ref 23–29)
CREAT SERPL-MCNC: 0.8 MG/DL (ref 0.5–1.4)
EST. GFR  (AFRICAN AMERICAN): >60 ML/MIN/1.73 M^2
EST. GFR  (NON AFRICAN AMERICAN): >60 ML/MIN/1.73 M^2
GLUCOSE SERPL-MCNC: 138 MG/DL (ref 70–110)
MAGNESIUM SERPL-MCNC: 2.1 MG/DL (ref 1.6–2.6)
PHOSPHATE SERPL-MCNC: 3.3 MG/DL (ref 2.7–4.5)
POTASSIUM SERPL-SCNC: 3.8 MMOL/L (ref 3.5–5.1)
PROT SERPL-MCNC: 6.6 G/DL (ref 6–8.4)
SODIUM SERPL-SCNC: 141 MMOL/L (ref 136–145)

## 2020-10-09 PROCEDURE — 25000003 PHARM REV CODE 250: Performed by: STUDENT IN AN ORGANIZED HEALTH CARE EDUCATION/TRAINING PROGRAM

## 2020-10-09 PROCEDURE — 99233 SBSQ HOSP IP/OBS HIGH 50: CPT | Mod: ,,, | Performed by: HOSPITALIST

## 2020-10-09 PROCEDURE — 99233 PR SUBSEQUENT HOSPITAL CARE,LEVL III: ICD-10-PCS | Mod: ,,, | Performed by: PSYCHIATRY & NEUROLOGY

## 2020-10-09 PROCEDURE — 84100 ASSAY OF PHOSPHORUS: CPT

## 2020-10-09 PROCEDURE — 11000001 HC ACUTE MED/SURG PRIVATE ROOM

## 2020-10-09 PROCEDURE — 80053 COMPREHEN METABOLIC PANEL: CPT

## 2020-10-09 PROCEDURE — 83735 ASSAY OF MAGNESIUM: CPT

## 2020-10-09 PROCEDURE — 63600175 PHARM REV CODE 636 W HCPCS: Performed by: HOSPITALIST

## 2020-10-09 PROCEDURE — 99233 PR SUBSEQUENT HOSPITAL CARE,LEVL III: ICD-10-PCS | Mod: ,,, | Performed by: HOSPITALIST

## 2020-10-09 PROCEDURE — 25000003 PHARM REV CODE 250: Performed by: HOSPITALIST

## 2020-10-09 PROCEDURE — 36415 COLL VENOUS BLD VENIPUNCTURE: CPT

## 2020-10-09 PROCEDURE — 99233 SBSQ HOSP IP/OBS HIGH 50: CPT | Mod: ,,, | Performed by: PSYCHIATRY & NEUROLOGY

## 2020-10-09 RX ADMIN — METHYLPREDNISOLONE SODIUM SUCCINATE: 1 INJECTION, POWDER, LYOPHILIZED, FOR SOLUTION INTRAMUSCULAR; INTRAVENOUS at 02:10

## 2020-10-09 RX ADMIN — PANTOPRAZOLE SODIUM 40 MG: 40 TABLET, DELAYED RELEASE ORAL at 12:10

## 2020-10-09 NOTE — ASSESSMENT & PLAN NOTE
68 yo male with history of BCC s/p Mohs' procedure and new diagnosis of penile malignancy was sent to ED from Urology clinic for cognitive impairment and inability to care for himself. Pt lives alone and does not have next of kin. Coworkers noted cognitive decline around early September and he was seen in clinic by Dr. Ernandez 9/28 for memory issues felt to be due to FTD. This admission, workup has been remarkable for MRI brain W WO contrast showing increased FLAIR hyperintensity involving the bilateral mesial temporal lobes. LP 10/5 with CSF (1 WBC, 190 RBC, 71 protein, 52 glucose). CSF HSV, ACE negative. CSF paraneoplastic pending. EEG 10/3 (1 hr 40 min) with mild regional dysfunction in bilateral temporal lobes, but no electrographic seizures. Serum paraneoplastic panel from 9/28 has resulted negative.     Presentation concerning for dementia vs. autoimmune vs paraneoplastic process in the setting of malignancy     --Pending repeat LP with IR to obtain CSF specimen to send to [facility] for Alzheimer panel (ADEVL test code)    -- LP scheduled on 10/13 w/ IR.    -- Appreciate primary team's assistance with this case   -- Unable to obtain amyloid PET scan while inpatient   -- Continue empiric IV Solumedrol 1 g qd x 5 days (today is day 3), tolerating well thus far, although no improvement noted   -- Formal cognitive assessment with neuropsych    -- Continue strict delirium precautions

## 2020-10-09 NOTE — PLAN OF CARE
Pt VSS A/O to name only. NAD, pt calm, cooperative, and pleasant. Pt ambulatory; no falls or injuries occurred. Will continue to monitor.

## 2020-10-09 NOTE — NURSING
Patient awake, alert and responsive.  Vitals stable.   Able to ambulate with stable gait. No incidents of fall.  No distress noted, no complaints of pain.  Transferred to [address] [place] [address] via wheelchair, report given to nurse.

## 2020-10-09 NOTE — SUBJECTIVE & OBJECTIVE
Subjective:     Interval History:   No complaints today; remains tangential w/o improvement.   Pending repeat LP; planned for 10/13 w/ IR.   Unable to get PET while inpatient  Tolerating IV Solumedrol well so far    Current Neurological Medications:  IV Solumedrol 1 g qd x 5 days - Day 3 on 10/09    Current Facility-Administered Medications   Medication Dose Route Frequency Provider Last Rate Last Dose    acetaminophen tablet 650 mg  650 mg Oral Q4H PRN Adriana Badillo MD        enoxaparin injection 40 mg  40 mg Subcutaneous Q24H Adriana Badillo MD   40 mg at 10/08/20 1656    melatonin tablet 6 mg  6 mg Oral Nightly PRN Adriana Badillo MD        methylPREDNISolone sodium succinate (SOLU-MEDROL) 1,000 mg in dextrose 5 % 100 mL IVPB   Intravenous Daily Brayan H. MD Jonathan 200 mL/hr at 10/09/20 1407      ondansetron injection 8 mg  8 mg Intravenous Q8H PRN Adriana Badillo MD        pantoprazole EC tablet 40 mg  40 mg Oral Daily Rosemary Ball MD   40 mg at 10/09/20 1251    promethazine (PHENERGAN) 25 mg in dextrose 5 % 50 mL IVPB  25 mg Intravenous Q6H PRN Adriana aBdillo MD        senna-docusate 8.6-50 mg per tablet 1 tablet  1 tablet Oral BID PRN Adriana Badillo MD        sodium chloride 0.9% flush 5 mL  5 mL Intravenous PRN Adriana Badillo MD         Review of Systems   Constitutional: Positive for activity change and fatigue. Negative for fever.   HENT: Negative for trouble swallowing and voice change.    Eyes: Negative for visual disturbance.   Respiratory: Negative for shortness of breath.    Cardiovascular: Negative for chest pain and leg swelling.   Gastrointestinal: Negative for nausea and vomiting.   Musculoskeletal: Negative for neck stiffness.   Neurological: Negative for dizziness, tremors, seizures, facial asymmetry, speech difficulty and headaches.   Psychiatric/Behavioral: Positive for confusion, decreased concentration and sleep disturbance. Negative for agitation and behavioral  problems.     Objective:     Vital Signs (Most Recent):  Temp: 96.8 °F (36 °C) (10/09/20 1545)  Pulse: 64 (10/09/20 1545)  Resp: 18 (10/09/20 1545)  BP: (!) 115/57 (10/09/20 1545)  SpO2: (!) 92 % (10/09/20 1545) Vital Signs (24h Range):  Temp:  [96.6 °F (35.9 °C)-97.8 °F (36.6 °C)] 96.8 °F (36 °C)  Pulse:  [57-77] 64  Resp:  [16-20] 18  SpO2:  [91 %-97 %] 92 %  BP: (106-126)/(57-67) 115/57     Weight: 70.4 kg (155 lb 3.3 oz)  Body mass index is 22.27 kg/m².    Physical Exam  Eyes:      Extraocular Movements: EOM normal.      Pupils: Pupils are equal, round, and reactive to light.   Neurological:      Coordination: Finger-Nose-Finger Test normal.   Psychiatric:         Speech: Speech normal.         NEUROLOGICAL EXAMINATION:     MENTAL STATUS   Oriented to person.   Disoriented to place. Disoriented to city and area.   Disoriented to year, date and day. Oriented to month.   Follows 1 step commands.   Attention: decreased. Concentration: decreased.   Speech: speech is normal   Level of consciousness: alert  Unable to name object: able to name thumb and knuckles, unable to name ring, phone, watch  Able to repeat.     CRANIAL NERVES     CN III, IV, VI   Pupils are equal, round, and reactive to light.  Extraocular motions are normal.   Nystagmus: none   Diplopia: none  Ophthalmoparesis: none    CN V   Facial sensation intact.     CN VIII   Hearing: intact    CN IX, X   Palate: symmetric    CN XI   CN XI normal.     CN XII   CN XII normal.     MOTOR EXAM   Muscle bulk: normal  Overall muscle tone: normal       Moves all extremities against gravity spontaneously without abnormal movements      SENSORY EXAM   Right arm light touch: normal  Left arm light touch: normal  Right leg light touch: normal  Left leg light touch: normal    GAIT AND COORDINATION     Gait  Gait: (deferred)     Coordination   Finger to nose coordination: normal    Tremor   Resting tremor: absent    Significant Labs:   CBC: No results for input(s): WBC,  HGB, HCT, PLT in the last 48 hours.  CMP:   Recent Labs   Lab 10/08/20  0641 10/09/20  0557   * 138*    141   K 4.3 3.8    106   CO2 24 23   BUN 18 22   CREATININE 0.8 0.8   CALCIUM 9.2 8.9   MG 1.9 2.1   PROT 6.8 6.6   ALBUMIN 3.7 3.7   BILITOT 0.6 0.6   ALKPHOS 63 67   AST 21 19   ALT 25 26   ANIONGAP 11 12   EGFRNONAA >60.0 >60.0       Ref. Range 10/5/2020 10:00 10/5/2020 10:10   COLOR CSF Latest Ref Range: Colorless  Colorless     RBC, CSF Latest Ref Range: 0 /cu mm 190 (A)     WBC, CSF Latest Ref Range: 0 - 5 /cu mm 1     Glucose, CSF Latest Ref Range: 40 - 70 mg/dL 52     Protein, CSF Latest Ref Range: 15 - 40 mg/dL 71 (H)        CSF HSV and ACE negative  Awaiting CSF paraneoplastic panel     Unremarkable:  --TSH  --HIV   --RPR  --Ammonia  --Folate  --B6  --B12 540  --B1 49  --Serum paraneoplastic panel (9/28) negative    All pertinent lab results from the past 24 hours have been reviewed.    Significant Imaging: I have reviewed and interpreted all pertinent imaging results/findings within the past 24 hours.     MRI brain W WO contrast (10/2/20):  FINDINGS: There is no abnormal restricted diffusion to suggest acute infarction.  There is generalized cerebral volume loss.  The ventricles are within normal limits of size and configuration for age without evidence of hydrocephalus or midline shift.  There is possible subtle increased FLAIR hyperintensity in the bilateral mesial temporal lobes which can be seen in the setting of autoimmune limbic encephalitis in the appropriate clinical setting noting there is no associated restricted diffusion, hemorrhage or postcontrast enhancement within these regions.  Clinical correlation and short-term interval follow-up is advised.  There is no evidence of acute intraparenchymal hemorrhage.  No extra-axial hemorrhage or fluid collections are identified.  No abnormal enhancing lesion is identified.  The craniocervical junction and sellar region are within  normal limits.     Impression: Possible subtle increased FLAIR hyperintensity involving the bilateral mesial temporal lobes which can be seen in the setting of autoimmune limbic encephalitis in the appropriate clinical setting. No evidence of acute intracranial hemorrhage, acute infarct or abnormal enhancing lesion

## 2020-10-09 NOTE — PROGRESS NOTES
"[facility]  Neurology  Progress Note    Patient Name: Juan Hobson  MRN: [id number]  Admission Date: 10/2/2020  Hospital Length of Stay: 3 days  Code Status: Full Code   Attending Provider: Brayan Castillo MD  Primary Care Physician: Mehnaz Barillas MD   Principal Problem:Cognitive impairment    HPI:   Patient is a 70 yo male w/ past medical history significant for BCC s/p Mohs procedure, recent Dx of penile carcinoma who [facility] [place] [facility] on [facility]02 from Urology clinic w/ concern for progressive dementia and inability to care for self.  Patient was seen in clinic by Dr. Ernandez on 09/28 for memory complaints and at the time the diagnosis of fronto-temporal dementia was brought up. Per Dr. 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  Coworkers note that he has always been a very punctual and reliable employee but did not show up to work on Sept 2 and instead presented [facility] [place] [facility] reporting  "creatures attacked his penis".  He was noted to have enlarged, ulcerated penis with MRI and urology consult showing concern for penile cancer but not biopsy confirmed at this time.   He was treated for infection and discharged to geriatric psych facility where he was held for two weeks prior to discharge home with home health orders for an agency that does not accept his insurance.  When asked about memory difficulties, he cites difficulty connecting objects to words for them.  Co worker retrospectively note his clocking in/out has been inconsistent over the past several months.  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  Co workers note that over the past several months he has been making some errors in recording measurements. He continues to drive and live in his own home.   Patient has recently had a 2 week stay in a geriatric " "psychiatric facility.   Per chart review, patient is a poor historian and some information was provided by the co-worker that accompanied the patient to the ED - patient lives at home alone in an unsafe environment.  Social work evaluation was conducted in the clinic setting and was deemed that his home was unsafe and was unsafe for him to return home alone.  Patient lives alone and without any family members.  Based on reports, patient has not been eating, has a 70 lb weight loss, living in an unsafe and unclean environment, and a mold growing on his toilets, sink and refrigerator.  Spoiled food in the refrigerator and no clean water. Risk for falls with multiple obstacles in living spaces.  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   Pending repeat LP; planned for 10/13 w/ IR.   Unable to get PET while inpatient  Tolerating IV Solumedrol well so far    Current Neurological Medications:  IV Solumedrol 1 g qd x 5 days - Day 3 on 10/09    Current Facility-Administered Medications   Medication Dose Route Frequency Provider Last Rate Last Dose    acetaminophen tablet 650 mg  650 mg Oral Q4H PRN Adriana Badillo MD        enoxaparin injection 40 mg  40 mg Subcutaneous Q24H Adriana Badillo MD   40 mg at 10/08/20 1656    melatonin tablet 6 mg  6 mg Oral Nightly PRN Adriana Badillo MD        methylPREDNISolone sodium succinate (SOLU-MEDROL) 1,000 mg in dextrose 5 % 100 mL IVPB   Intravenous Daily Brayan Castillo  mL/hr at 10/09/20 1407      ondansetron injection 8 mg  8 mg Intravenous Q8H PRN Adriana Badillo MD        pantoprazole EC tablet 40 mg  40 mg Oral Daily Rosemary Ball MD   40 mg at 10/09/20 1251    promethazine (PHENERGAN) 25 mg in dextrose 5 % 50 mL IVPB  25 mg " Intravenous Q6H PRN Adriana Badillo MD        senna-docusate 8.6-50 mg per tablet 1 tablet  1 tablet Oral BID PRN Adriana Badillo MD        sodium chloride 0.9% flush 5 mL  5 mL Intravenous PRN Adriana Badillo MD         Review of Systems   Constitutional: Positive for activity change and fatigue. Negative for fever.   HENT: Negative for trouble swallowing and voice change.    Eyes: Negative for visual disturbance.   Respiratory: Negative for shortness of breath.    Cardiovascular: Negative for chest pain and leg swelling.   Gastrointestinal: Negative for nausea and vomiting.   Musculoskeletal: Negative for neck stiffness.   Neurological: Negative for dizziness, tremors, seizures, facial asymmetry, speech difficulty and headaches.   Psychiatric/Behavioral: Positive for confusion, decreased concentration and sleep disturbance. Negative for agitation and behavioral problems.     Objective:     Vital Signs (Most Recent):  Temp: 96.8 °F (36 °C) (10/09/20 1545)  Pulse: 64 (10/09/20 1545)  Resp: 18 (10/09/20 1545)  BP: (!) 115/57 (10/09/20 1545)  SpO2: (!) 92 % (10/09/20 1545) Vital Signs (24h Range):  Temp:  [96.6 °F (35.9 °C)-97.8 °F (36.6 °C)] 96.8 °F (36 °C)  Pulse:  [57-77] 64  Resp:  [16-20] 18  SpO2:  [91 %-97 %] 92 %  BP: (106-126)/(57-67) 115/57     Weight: 70.4 kg (155 lb 3.3 oz)  Body mass index is 22.27 kg/m².    Physical Exam  Eyes:      Extraocular Movements: EOM normal.      Pupils: Pupils are equal, round, and reactive to light.   Neurological:      Coordination: Finger-Nose-Finger Test normal.   Psychiatric:         Speech: Speech normal.         NEUROLOGICAL EXAMINATION:     MENTAL STATUS   Oriented to person.   Disoriented to place. Disoriented to city and area.   Disoriented to year, date and day. Oriented to month.   Follows 1 step commands.   Attention: decreased. Concentration: decreased.   Speech: speech is normal   Level of consciousness: alert  Unable to name object: able to name thumb and  knuckles, unable to name ring, phone, watch  Able to repeat.     CRANIAL NERVES     CN III, IV, VI   Pupils are equal, round, and reactive to light.  Extraocular motions are normal.   Nystagmus: none   Diplopia: none  Ophthalmoparesis: none    CN V   Facial sensation intact.     CN VIII   Hearing: intact    CN IX, X   Palate: symmetric    CN XI   CN XI normal.     CN XII   CN XII normal.     MOTOR EXAM   Muscle bulk: normal  Overall muscle tone: normal       Moves all extremities against gravity spontaneously without abnormal movements      SENSORY EXAM   Right arm light touch: normal  Left arm light touch: normal  Right leg light touch: normal  Left leg light touch: normal    GAIT AND COORDINATION     Gait  Gait: (deferred)     Coordination   Finger to nose coordination: normal    Tremor   Resting tremor: absent    Significant Labs:   CBC: No results for input(s): WBC, HGB, HCT, PLT in the last 48 hours.  CMP:   Recent Labs   Lab 10/08/20  0641 10/09/20  0557   * 138*    141   K 4.3 3.8    106   CO2 24 23   BUN 18 22   CREATININE 0.8 0.8   CALCIUM 9.2 8.9   MG 1.9 2.1   PROT 6.8 6.6   ALBUMIN 3.7 3.7   BILITOT 0.6 0.6   ALKPHOS 63 67   AST 21 19   ALT 25 26   ANIONGAP 11 12   EGFRNONAA >60.0 >60.0       Ref. Range 10/5/2020 10:00 10/5/2020 10:10   COLOR CSF Latest Ref Range: Colorless  Colorless     RBC, CSF Latest Ref Range: 0 /cu mm 190 (A)     WBC, CSF Latest Ref Range: 0 - 5 /cu mm 1     Glucose, CSF Latest Ref Range: 40 - 70 mg/dL 52     Protein, CSF Latest Ref Range: 15 - 40 mg/dL 71 (H)        CSF HSV and ACE negative  Awaiting CSF paraneoplastic panel     Unremarkable:  --TSH  --HIV   --RPR  --Ammonia  --Folate  --B6  --B12 540  --B1 49  --Serum paraneoplastic panel (9/28) negative    All pertinent lab results from the past 24 hours have been reviewed.    Significant Imaging: I have reviewed and interpreted all pertinent imaging results/findings within the past 24 hours.     MRI brain W  WO contrast (10/2/20):  FINDINGS: There is no abnormal restricted diffusion to suggest acute infarction.  There is generalized cerebral volume loss.  The ventricles are within normal limits of size and configuration for age without evidence of hydrocephalus or midline shift.  There is possible subtle increased FLAIR hyperintensity in the bilateral mesial temporal lobes which can be seen in the setting of autoimmune limbic encephalitis in the appropriate clinical setting noting there is no associated restricted diffusion, hemorrhage or postcontrast enhancement within these regions.  Clinical correlation and short-term interval follow-up is advised.  There is no evidence of acute intraparenchymal hemorrhage.  No extra-axial hemorrhage or fluid collections are identified.  No abnormal enhancing lesion is identified.  The craniocervical junction and sellar region are within normal limits.     Impression: Possible subtle increased FLAIR hyperintensity involving the bilateral mesial temporal lobes which can be seen in the setting of autoimmune limbic encephalitis in the appropriate clinical setting. No evidence of acute intracranial hemorrhage, acute infarct or abnormal enhancing lesion    Assessment and Plan:     * Cognitive impairment  68 yo male with history of BCC s/p Mohs' procedure and new diagnosis of penile malignancy was sent to ED from Urology clinic for cognitive impairment and inability to care for himself. Pt lives alone and does not have next of kin. Coworkers noted cognitive decline around early September and he was seen in clinic by Dr. Ernandez 9/28 for memory issues felt to be due to FTD. This admission, workup has been remarkable for MRI brain W WO contrast showing increased FLAIR hyperintensity involving the bilateral mesial temporal lobes. LP 10/5 with CSF (1 WBC, 190 RBC, 71 protein, 52 glucose). CSF HSV, ACE negative. CSF paraneoplastic pending. EEG 10/3 (1 hr 40 min) with mild regional dysfunction  in bilateral temporal lobes, but no electrographic seizures. Serum paraneoplastic panel from 9/28 has resulted negative.     Presentation concerning for dementia vs. autoimmune vs paraneoplastic process in the setting of malignancy     --Pending repeat LP with IR to obtain CSF specimen to send to [place] for Alzheimer panel (ADEVL test code)    -- LP scheduled on 10/13 w/ IR.    -- Appreciate primary team's assistance with this case   -- Unable to obtain amyloid PET scan while inpatient   -- Continue empiric IV Solumedrol 1 g qd x 5 days (today is day 3), tolerating well thus far, although no improvement noted   -- Formal cognitive assessment with neuropsych    -- Continue strict delirium precautions         Impaired decision making  Sent to ED 10/2 from Urology clinic to have mental capacity evaluation   Pt deemed unsafe to return home and Psych team feels pt does not have full medical decision making capacity at this time   Pt does not have next of kin    -- Primary team working on placement and medical POA      Penile carcinoma  - Outpatient follow up w/ Urology        VTE Risk Mitigation (From admission, onward)         Ordered     enoxaparin injection 40 mg  Every 24 hours      10/02/20 2042     IP VTE HIGH RISK PATIENT  Once      10/02/20 2042                Rosemary Ball MD  Neurology  [facility]

## 2020-10-09 NOTE — ASSESSMENT & PLAN NOTE
Sent to ED 10/2 from Urology clinic to have mental capacity evaluation   Pt deemed unsafe to return home and Psych team feels pt does not have full medical decision making capacity at this time   Pt does not have next of kin    -- Primary team working on placement and medical POA     Dementia

## 2020-10-09 NOTE — PROGRESS NOTES
"Progress Note  Hospital Medicine    Provider team:    [facility]  [facility]  Admit Date: 10/2/2020  Encounter Date: 10/09/2020     SUBJECTIVE:     Follow-up Visit for: Cognitive impairment    HPI (See H&P for complete P,F,SHx):   Per Adriana Badillo MD:   Mr. Juan Hobson is a 69 y.o. male with recently diagnosed penile cancer, who presents to the ER from Urology clinic with concern for self neglect, and need for Neurology evaluation of dementia and possible placement.  The patient mentions that he has been having some issues with his memory.  He mostly orders takeout for meals because it is easier, and still drives to work.  He feels like he is able to take care of himself, and does not need to go to a facility."     Per note from Lianne German LCSW on 10/2:  Received consult from Dr. Monterroso re: assessing patient in clinic this afternoon and care recommendations as patient did not seem competent to make decisions and give consent. He has no family and no legal next of kin (POA or guardian).  Met individually with patient, and with his co-worker Janna Go ([phone], ext. [phone]) who brought him to his clinic appointment today. Patient unable to relate basic demographic information or clearly explain other information. For example, when asked his birth date he gave the year [birth year], then said 9, and then said the second to last, it begins with N; he said his street name but couldn't give the house number where he has lived for years; unable to say what type of work or where he worked prior to the [employer] in [facility] for the past 12 years; he described being taken in a truck to that other place (referring to the recent [facility] stay). He has awareness that he is having memory/cognitive issues but stated he can manage for himself at home and that he knows there is a lot of work that needs to be done in his home that he hasn't gotten to. He inherited the home after " his mother .      Mr. Go and another coworker Juice Martinez ([phone]) and their supervisor Meiirma Meanso ([phone]) have been trying to help him manage things with his work and personal matters as they have observed his deficits and know he has no one to help him. They have helped him keep up with recent medical appointments and accompanied and transported him.  Mr. Go reports concern for his safety and ability to care for himself after seeing his home with clutter throughout and things piled up, mold and old food in the refrigerator, mold in the toilet, clothes all over, etc. Patient has been driving himself to and from work and to get food at places near his home, but unable to navigate to unfamiliar places. They have observed his cognitive deficits at work and he has made errors, so they have modified his job and his supervisor has been working with him to try to get FMLA and his leave/jail in place so he doesn't lose his benefits. They have had to help him with paperwork, bill paying, etc., because he can't complete these on his own.        Upon arrival to the ER, vitals were temp 97.8F, HR 59 and /84.  Labs were unremarkable  He was admitted to Hospital Medicine for Neurology and SW evaluation.     Overview/Hospital Course:  69 y.o. who was admitted to hospital medicine for worsening cognitive decline and inability to care for self. Labs/infectious work-up grossly unremarkable. Neurology consulted and MRI obtained on admission with concern for encephalitis (increased FLAIR hyperintensity involving the bilateral mesial temporal lobes which can be seen in the setting of autoimmune limbic encephalitis in the appropriate clinical setting). LP recommended and attempted at bedside however not successful 2/ to KEMI. Interventional radiology consulted, LP performed 10/5; CSF studies ordered including paraneoplastic panel and inflammatory markers, results pending. EEG revealed mild,  non-specific regional dysfunction in bilateral temporal lobes without seizure activity. Neurology suspects presentation may be more chronic than acute raising suspicion of frontotemporal dementia.  He will require further outpatient testing with a neuropsychologist eventually.  Psychiatry consulted for formal capacity evaluation and concluded that he does not have capacity to make decisions regarding his long term care disposition however does not require PEC at this time as his stay has been non-contested. He has no family, children and have relied on coworkers for assistance. The case was brought to the attention of EPS who have no plans to intervene at this time and recommend since the patient presented to the hospital it is a safe discharge planning issue that would involve [facility] Legal department. The Legal department intend to pursue [place] Guardianship to assist with managing affairs.     Interval history:  Patient doing well and offers no complaints. Neurology has been following with presentation c/f  dementia vs. autoimmune vs paraneoplastic process in the setting of malignancy. Empiric solumedrol x 5 days started. The patient has not improved. Plan is for LP on 10/13/2020 with special send-out for ADEVL; d/w radiology fluoro. Patient without complaints. His thought process is tangential and inappropriate.     Review of Systems:  Review of Systems   Constitutional: Positive for activity change, appetite change and unexpected weight change. Negative for fever.   HENT: Negative for trouble swallowing.    Eyes: Negative for photophobia and visual disturbance.   Respiratory: Negative for cough and shortness of breath.    Gastrointestinal: Negative for nausea and vomiting.   Genitourinary: Negative for difficulty urinating and dysuria.   Neurological: Positive for speech difficulty. Negative for weakness.   Psychiatric/Behavioral: Positive for behavioral problems, confusion and decreased concentration.  "Negative for hallucinations and sleep disturbance. The patient is not hyperactive.      OBJECTIVE:   No intake or output data in the 24 hours ending 10/09/20 1627  Vital Signs Range (Last 24H):  Temp:  [96.6 °F (35.9 °C)-97.8 °F (36.6 °C)]   Pulse:  [57-77]   Resp:  [16-20]   BP: (106-126)/(57-67)   SpO2:  [91 %-97 %]   Body mass index is 22.27 kg/m².    Objective:  Vital signs: (most recent): Blood pressure (!) 115/57, pulse 64, temperature 96.8 °F (36 °C), temperature source Oral, resp. rate 18, height 5' 10" (1.778 m), weight 70.4 kg (155 lb 3.3 oz), SpO2 (!) 92 %.  No fever.      Physical Exam  Vitals signs and nursing note reviewed.   Constitutional:       General: He is not in acute distress.     Appearance: He is not ill-appearing.   HENT:      Head: Normocephalic and atraumatic.      Nose: Nose normal.      Mouth/Throat:      Mouth: Mucous membranes are moist.   Eyes:      Extraocular Movements: Extraocular movements intact.      Pupils: Pupils are equal, round, and reactive to light.   Cardiovascular:      Rate and Rhythm: Normal rate and regular rhythm.   Pulmonary:      Effort: Pulmonary effort is normal. No respiratory distress.   Abdominal:      General: Abdomen is flat. There is no distension.      Palpations: Abdomen is soft.   Musculoskeletal: Normal range of motion.         General: No swelling.   Skin:     General: Skin is warm and dry.      Coloration: Skin is not jaundiced.   Neurological:      General: No focal deficit present.      Mental Status: He is alert. He is disoriented.      Sensory: No sensory deficit.      Motor: No weakness.   Psychiatric:         Mood and Affect: Mood normal.         Speech: Speech normal.         Behavior: Behavior is cooperative.         Cognition and Memory: Cognition is impaired. Memory is impaired. He exhibits impaired recent memory.     Medications:  Medication list was reviewed in EPIC and changes noted under Assessment/Plan and MAR.    Laboratory:  No " results for input(s): WBC, RBC, HGB, HCT, PLT, MCV, MCH, MCHC, GRAN, BAND, LYMPH, MONO, EOS in the last 72 hours.   Recent Labs     10/09/20  0557   *      K 3.8      CO2 23   BUN 22   CREATININE 0.8   CALCIUM 8.9   ANIONGAP 12   MG 2.1   PHOS 3.3       ASSESSMENT/PLAN:     Active Hospital Problems    Diagnosis  POA    *Cognitive impairment [R41.89]  Yes    Acute encephalopathy [G93.40]  Yes    Cognitive decline [R41.89]  Yes    Impaired decision making [Z78.9]  Yes    Confusion [R41.0]  Unknown    Debility [R53.81]  Yes    Penile carcinoma [C60.9]  Yes    Altered mental status [R41.82]  Unknown      Resolved Hospital Problems    Diagnosis Date Resolved POA    Encephalopathy [G93.40] 10/04/2020 Unknown      * Cognitive impairment  69 y.o. with progressive cognitive decline and inability to adequately care for self. Presented in September 2020, to the ED stating there were creatures attacking his penis. 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 Prompted  evaluation by psychiatry and found that it is likely related to dementia after getting collateral from family/friends. He was PEC'd the night of admission after wanting to leave and then discharged to a geropsychiatry facility for 2 weeks. Seen in neuro clinic where there was mention of suspected frontotemporal dementia and further work-up ensued. Presented to a urology appt on 10/2 and was advised to present to the ED due to concern for competency and need for patient advocate/POA before a biopsy or form of treatment can be initiated.      Presentation concerning for frontotemporal dementia vs. autoimmune vs paraneoplastic process in the setting of malignancy   - LP performed by IR 10/5/2020: elevated protein, neutrophils  - Concern that the patient is unable to care for himself and make his own  decisions, however the patient feels like he is able to care for himself  - MRI brain with Possible subtle increased FLAIR hyperintensity involving the bilateral mesial temporal lobes which can be seen in the setting of autoimmune limbic encephalitis  - EEG without seizure activity   - Vitamin B1, Vitamin B6, Vitamin B12, HIV, RPR, TSH unremarkable   - afebrile, without leukocytosis -- infectious work-up unremarkable   - Neurology consulted          --please send CSF to [facility] for Alzheimer panel (ADEVL test code)           --start empiric IV Solumedrol 1 g qd x 5 days          --formal cognitive assessment with neuropsych            --consider PET scan          --continue strict delirium precautions  - Paraneoplastic panel, serum in place from outpatient neurology visit on 09/28  - Possibility for Autoimmune vs Paraneoplastic process in the setting of active malignancy  - CM/SW consult  - Pending evaluation, might need Psych to determine if he is gravely disabled and requiring a PEC plus/minus CXR and PPD           DELIRIUM BEHAVIOR MANAGEMENT  - Minimize use of restraints; if physical restraints necessary, please utilize medical/chemical prns for agitation.  - Keep shades open and room lit during day and room dim at night in order to promote healthy circadian rhythms.  - Encourage family at bedside  - Keep whiteboard in patient's room current with the date and name of the members of patient's team for easy patient self re-orientation.  - Avoid benzodiazepines, antihistamines, anticholinergics, hypnotics, and minimize opiates while controlling for pain as these medications may exacerbate delirium.        Impaired decision making  Presented to a urology appt on 10/2 and was advised to present to the ED due to concern for competency and need for patient advocate/POA before a biopsy or form of treatment can be initiated.      [place] R[place] 40:1299.53: (9) Upon the inability of any adult to consent for himself and in the  absence of any person listed in Paragraphs (2) through (8) of this Subsection, an adult friend of the patient. For purposes of this Subsection to consent, adult friend means an adult who has exhibited special care and concern for the patient, who is generally familiar with the patient's health care views and desires, and who is willing and able to become involved in the patient's health care decisions and to act in the patient's best interest. The adult friend shall sign and date an acknowledgment form provided by the hospital or other health care facility in which the patient is located for placement in the patient's records certifying that he or she meets such criteria.     -  Juice Martinez ([phone]) does not wish to make decisions on his behalf  - supervisor Mei Bell ([phone]) have been trying to help him manage things with his work and personal matters as they have observed his deficits and know he has no one to help him - these individuals may be used for consent   - PEC'd for grave disability during last admission and discharged to [facility] however no formal eval by in-patient psychiatry  - will need formal evaluation     Anticipated discharge date and disposition:   Pending legal guardianship establishment.  Brayan Castillo MD  [facility] Medicine

## 2020-10-10 LAB
ALBUMIN SERPL BCP-MCNC: 3.1 G/DL (ref 3.5–5.2)
ALP SERPL-CCNC: 62 U/L (ref 55–135)
ALT SERPL W/O P-5'-P-CCNC: 32 U/L (ref 10–44)
ANION GAP SERPL CALC-SCNC: 9 MMOL/L (ref 8–16)
AST SERPL-CCNC: 20 U/L (ref 10–40)
BACTERIA CSF CULT: NO GROWTH
BASOPHILS # BLD AUTO: 0.01 K/UL (ref 0–0.2)
BASOPHILS NFR BLD: 0.1 % (ref 0–1.9)
BILIRUB SERPL-MCNC: 0.4 MG/DL (ref 0.1–1)
BUN SERPL-MCNC: 24 MG/DL (ref 8–23)
CALCIUM SERPL-MCNC: 8.4 MG/DL (ref 8.7–10.5)
CHLORIDE SERPL-SCNC: 109 MMOL/L (ref 95–110)
CO2 SERPL-SCNC: 25 MMOL/L (ref 23–29)
CREAT SERPL-MCNC: 0.7 MG/DL (ref 0.5–1.4)
DIFFERENTIAL METHOD: ABNORMAL
EOSINOPHIL # BLD AUTO: 0 K/UL (ref 0–0.5)
EOSINOPHIL NFR BLD: 0 % (ref 0–8)
ERYTHROCYTE [DISTWIDTH] IN BLOOD BY AUTOMATED COUNT: 13.3 % (ref 11.5–14.5)
EST. GFR  (AFRICAN AMERICAN): >60 ML/MIN/1.73 M^2
EST. GFR  (NON AFRICAN AMERICAN): >60 ML/MIN/1.73 M^2
GLUCOSE SERPL-MCNC: 140 MG/DL (ref 70–110)
GRAM STN SPEC: NORMAL
GRAM STN SPEC: NORMAL
HCT VFR BLD AUTO: 38.1 % (ref 40–54)
HGB BLD-MCNC: 12 G/DL (ref 14–18)
IMM GRANULOCYTES # BLD AUTO: 0.1 K/UL (ref 0–0.04)
IMM GRANULOCYTES NFR BLD AUTO: 0.7 % (ref 0–0.5)
LYMPHOCYTES # BLD AUTO: 0.4 K/UL (ref 1–4.8)
LYMPHOCYTES NFR BLD: 2.5 % (ref 18–48)
MAGNESIUM SERPL-MCNC: 2.1 MG/DL (ref 1.6–2.6)
MCH RBC QN AUTO: 31.2 PG (ref 27–31)
MCHC RBC AUTO-ENTMCNC: 31.5 G/DL (ref 32–36)
MCV RBC AUTO: 99 FL (ref 82–98)
MONOCYTES # BLD AUTO: 0.2 K/UL (ref 0.3–1)
MONOCYTES NFR BLD: 1.2 % (ref 4–15)
NEUTROPHILS # BLD AUTO: 14.6 K/UL (ref 1.8–7.7)
NEUTROPHILS NFR BLD: 95.5 % (ref 38–73)
NRBC BLD-RTO: 0 /100 WBC
PHOSPHATE SERPL-MCNC: 3.1 MG/DL (ref 2.7–4.5)
PLATELET # BLD AUTO: 253 K/UL (ref 150–350)
PMV BLD AUTO: 10.5 FL (ref 9.2–12.9)
POTASSIUM SERPL-SCNC: 4.2 MMOL/L (ref 3.5–5.1)
PROT SERPL-MCNC: 5.7 G/DL (ref 6–8.4)
RBC # BLD AUTO: 3.85 M/UL (ref 4.6–6.2)
SODIUM SERPL-SCNC: 143 MMOL/L (ref 136–145)
WBC # BLD AUTO: 15.27 K/UL (ref 3.9–12.7)

## 2020-10-10 PROCEDURE — 80053 COMPREHEN METABOLIC PANEL: CPT

## 2020-10-10 PROCEDURE — 25000003 PHARM REV CODE 250: Performed by: HOSPITALIST

## 2020-10-10 PROCEDURE — 85025 COMPLETE CBC W/AUTO DIFF WBC: CPT

## 2020-10-10 PROCEDURE — 99232 SBSQ HOSP IP/OBS MODERATE 35: CPT | Mod: ,,, | Performed by: HOSPITALIST

## 2020-10-10 PROCEDURE — 11000001 HC ACUTE MED/SURG PRIVATE ROOM

## 2020-10-10 PROCEDURE — 99233 PR SUBSEQUENT HOSPITAL CARE,LEVL III: ICD-10-PCS | Mod: ,,, | Performed by: PSYCHIATRY & NEUROLOGY

## 2020-10-10 PROCEDURE — 36415 COLL VENOUS BLD VENIPUNCTURE: CPT

## 2020-10-10 PROCEDURE — 63600175 PHARM REV CODE 636 W HCPCS: Performed by: HOSPITALIST

## 2020-10-10 PROCEDURE — 99232 PR SUBSEQUENT HOSPITAL CARE,LEVL II: ICD-10-PCS | Mod: ,,, | Performed by: HOSPITALIST

## 2020-10-10 PROCEDURE — 99233 SBSQ HOSP IP/OBS HIGH 50: CPT | Mod: ,,, | Performed by: PSYCHIATRY & NEUROLOGY

## 2020-10-10 PROCEDURE — 84100 ASSAY OF PHOSPHORUS: CPT

## 2020-10-10 PROCEDURE — 83735 ASSAY OF MAGNESIUM: CPT

## 2020-10-10 PROCEDURE — 25000003 PHARM REV CODE 250: Performed by: STUDENT IN AN ORGANIZED HEALTH CARE EDUCATION/TRAINING PROGRAM

## 2020-10-10 RX ADMIN — PANTOPRAZOLE SODIUM 40 MG: 40 TABLET, DELAYED RELEASE ORAL at 09:10

## 2020-10-10 RX ADMIN — METHYLPREDNISOLONE SODIUM SUCCINATE: 1 INJECTION, POWDER, LYOPHILIZED, FOR SOLUTION INTRAMUSCULAR; INTRAVENOUS at 09:10

## 2020-10-10 RX ADMIN — ENOXAPARIN SODIUM 40 MG: 40 INJECTION SUBCUTANEOUS at 04:10

## 2020-10-10 NOTE — SUBJECTIVE & OBJECTIVE
Subjective:     Interval History: No new or worsening symptoms. No acute events overnight. Still with some confusion.     Current Neurological Medications:  IV Solumedrol 1 g qd x 5 days - Day 4 on 10/10    Current Facility-Administered Medications   Medication Dose Route Frequency Provider Last Rate Last Dose    acetaminophen tablet 650 mg  650 mg Oral Q4H PRN Adriana Badillo MD        enoxaparin injection 40 mg  40 mg Subcutaneous Q24H Adriana Badillo MD   40 mg at 10/08/20 1656    melatonin tablet 6 mg  6 mg Oral Nightly PRN Adriana Badillo MD        methylPREDNISolone sodium succinate (SOLU-MEDROL) 1,000 mg in dextrose 5 % 100 mL IVPB   Intravenous Daily Brayan H. MD Jonathan 200 mL/hr at 10/10/20 0941      ondansetron injection 8 mg  8 mg Intravenous Q8H PRN Adriana Badillo MD        pantoprazole EC tablet 40 mg  40 mg Oral Daily Rosemary Ball MD   40 mg at 10/10/20 0941    promethazine (PHENERGAN) 25 mg in dextrose 5 % 50 mL IVPB  25 mg Intravenous Q6H PRN Adriana Badillo MD        senna-docusate 8.6-50 mg per tablet 1 tablet  1 tablet Oral BID PRN Adriana Badillo MD        sodium chloride 0.9% flush 5 mL  5 mL Intravenous PRN Adriana Badillo MD         Review of Systems   Constitutional: Positive for activity change and fatigue. Negative for fever.   HENT: Negative for trouble swallowing and voice change.    Eyes: Negative for visual disturbance.   Respiratory: Negative for shortness of breath.    Cardiovascular: Negative for chest pain and leg swelling.   Gastrointestinal: Negative for nausea and vomiting.   Musculoskeletal: Negative for neck stiffness.   Neurological: Negative for dizziness, tremors, seizures, facial asymmetry, speech difficulty and headaches.   Psychiatric/Behavioral: Positive for confusion, decreased concentration and sleep disturbance. Negative for agitation and behavioral problems.     Objective:     Vital Signs (Most Recent):  Temp: 97.9 °F (36.6 °C) (10/10/20  1203)  Pulse: (!) 54 (10/10/20 1203)  Resp: 19 (10/10/20 1203)  BP: 130/66 (10/10/20 1203)  SpO2: (!) 94 % (10/10/20 1203) Vital Signs (24h Range):  Temp:  [96.8 °F (36 °C)-98.7 °F (37.1 °C)] 97.9 °F (36.6 °C)  Pulse:  [54-70] 54  Resp:  [14-19] 19  SpO2:  [92 %-97 %] 94 %  BP: (115-138)/(56-68) 130/66     Weight: 72 kg (158 lb 11.7 oz)  Body mass index is 22.78 kg/m².    Physical Exam  Eyes:      Extraocular Movements: EOM normal.      Pupils: Pupils are equal, round, and reactive to light.   Neurological:      Coordination: Finger-Nose-Finger Test normal.   Psychiatric:         Speech: Speech normal.         NEUROLOGICAL EXAMINATION:     MENTAL STATUS   Oriented to person.   Disoriented to place. Disoriented to city and area.   Disoriented to year, date and day. Oriented to month.   Follows 1 step commands.   Attention: decreased. Concentration: decreased.   Speech: speech is normal   Level of consciousness: alert  Unable to name object: able to name thumb and knuckles, unable to name ring, phone, watch  Able to repeat.     CRANIAL NERVES     CN III, IV, VI   Pupils are equal, round, and reactive to light.  Extraocular motions are normal.   Nystagmus: none   Diplopia: none  Ophthalmoparesis: none    CN V   Facial sensation intact.     CN VIII   Hearing: intact    CN IX, X   Palate: symmetric    CN XI   CN XI normal.     CN XII   CN XII normal.     MOTOR EXAM   Muscle bulk: normal  Overall muscle tone: normal       Moves all extremities against gravity spontaneously without abnormal movements      SENSORY EXAM   Right arm light touch: normal  Left arm light touch: normal  Right leg light touch: normal  Left leg light touch: normal    GAIT AND COORDINATION     Gait  Gait: (deferred)     Coordination   Finger to nose coordination: normal    Tremor   Resting tremor: absent    Significant Labs:   CBC:   Recent Labs   Lab 10/10/20  0418   WBC 15.27*   HGB 12.0*   HCT 38.1*        CMP:   Recent Labs   Lab  10/09/20  0557 10/10/20  0418   * 140*    143   K 3.8 4.2    109   CO2 23 25   BUN 22 24*   CREATININE 0.8 0.7   CALCIUM 8.9 8.4*   MG 2.1 2.1   PROT 6.6 5.7*   ALBUMIN 3.7 3.1*   BILITOT 0.6 0.4   ALKPHOS 67 62   AST 19 20   ALT 26 32   ANIONGAP 12 9   EGFRNONAA >60.0 >60.0       Ref. Range 10/5/2020 10:00 10/5/2020 10:10   COLOR CSF Latest Ref Range: Colorless  Colorless     RBC, CSF Latest Ref Range: 0 /cu mm 190 (A)     WBC, CSF Latest Ref Range: 0 - 5 /cu mm 1     Glucose, CSF Latest Ref Range: 40 - 70 mg/dL 52     Protein, CSF Latest Ref Range: 15 - 40 mg/dL 71 (H)        CSF HSV and ACE negative  Awaiting CSF paraneoplastic panel     Unremarkable:  --TSH  --HIV   --RPR  --Ammonia  --Folate  --B6  --B12 540  --B1 49  --Serum paraneoplastic panel (9/28) negative    All pertinent lab results from the past 24 hours have been reviewed.    Significant Imaging: I have reviewed and interpreted all pertinent imaging results/findings within the past 24 hours.     MRI brain W WO contrast (10/2/20):  FINDINGS: There is no abnormal restricted diffusion to suggest acute infarction.  There is generalized cerebral volume loss.  The ventricles are within normal limits of size and configuration for age without evidence of hydrocephalus or midline shift.  There is possible subtle increased FLAIR hyperintensity in the bilateral mesial temporal lobes which can be seen in the setting of autoimmune limbic encephalitis in the appropriate clinical setting noting there is no associated restricted diffusion, hemorrhage or postcontrast enhancement within these regions.  Clinical correlation and short-term interval follow-up is advised.  There is no evidence of acute intraparenchymal hemorrhage.  No extra-axial hemorrhage or fluid collections are identified.  No abnormal enhancing lesion is identified.  The craniocervical junction and sellar region are within normal limits.     Impression: Possible subtle increased  FLAIR hyperintensity involving the bilateral mesial temporal lobes which can be seen in the setting of autoimmune limbic encephalitis in the appropriate clinical setting. No evidence of acute intracranial hemorrhage, acute infarct or abnormal enhancing lesion

## 2020-10-10 NOTE — ASSESSMENT & PLAN NOTE
70 yo male with history of BCC s/p Mohs' procedure and new diagnosis of penile malignancy was sent to ED from Urology clinic for cognitive impairment and inability to care for himself. Pt lives alone and does not have next of kin. Coworkers noted cognitive decline around early September and he was seen in clinic by Dr. Ernandez 9/28 for memory issues felt to be due to FTD. This admission, workup has been remarkable for MRI brain W WO contrast showing increased FLAIR hyperintensity involving the bilateral mesial temporal lobes. LP 10/5 with CSF (1 WBC, 190 RBC, 71 protein, 52 glucose). CSF HSV, ACE negative. CSF paraneoplastic pending. EEG 10/3 (1 hr 40 min) with mild regional dysfunction in bilateral temporal lobes, but no electrographic seizures. Serum paraneoplastic panel from 9/28 has resulted negative.     Presentation concerning for dementia vs. autoimmune vs paraneoplastic process in the setting of malignancy     --Pending repeat LP with IR to obtain CSF specimen to send to [facility] for Alzheimer panel (ADEVL test code)    -- LP scheduled on 10/13 w/ IR.    -- Glucose, Protein, Cell count x2 with freeze/hold ordered thus far   -- Appreciate primary team's assistance with this case   -- Unable to obtain amyloid PET scan while inpatient   -- Continue empiric IV Solumedrol 1 g qd x 5 days (today is day 4), tolerating well thus far, although no improvement noted   -- Formal cognitive assessment with neuropsych    -- Continue strict delirium precautions

## 2020-10-10 NOTE — PLAN OF CARE
Problem: Adult Inpatient Plan of Care  Goal: Plan of Care Review  Outcome: Ongoing, Progressing  Goal: Patient-Specific Goal (Individualization)  Outcome: Ongoing, Progressing  Goal: Absence of Hospital-Acquired Illness or Injury  Outcome: Ongoing, Progressing  Goal: Optimal Comfort and Wellbeing  Outcome: Ongoing, Progressing  Goal: Readiness for Transition of Care  Outcome: Ongoing, Progressing  Goal: Rounds/Family Conference  Outcome: Ongoing, Progressing     Problem: Fall Injury Risk  Goal: Absence of Fall and Fall-Related Injury  Outcome: Ongoing, Progressing     Problem: Thought Process Alteration  Goal: Optimal Thought Clarity  Outcome: Ongoing, Progressing

## 2020-10-10 NOTE — PROGRESS NOTES
"[facility]  Neurology  Progress Note    Patient Name: Juan Hobson  MRN: [id number]  Admission Date: 10/2/2020  Hospital Length of Stay: 4 days  Code Status: Full Code   Attending Provider: Brayan Castillo MD  Primary Care Physician: Mehnaz Barillas MD   Principal Problem:Cognitive impairment    HPI:   Patient is a 68 yo male w/ past medical history significant for BCC s/p Mohs procedure, recent Dx of penile carcinoma who presented to [facility] on 10/02 from Urology clinic w/ concern for progressive dementia and inability to care for self.  Patient was seen in clinic by Dr. Ernandez on 09/28 for memory complaints and at the time the diagnosis of fronto-temporal dementia was brought up. Per Dr. 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  Coworkers note that he has always been a very punctual and reliable employee but did not show up to work on Sept 2 and instead presented to [facility] reporting  "creatures attacked his penis".  He was noted to have enlarged, ulcerated penis with MRI and urology consult showing concern for penile cancer but not biopsy confirmed at this time.   He was treated for infection and discharged to geriatric psych facility where he was held for two weeks prior to discharge home with home health orders for an agency that does not accept his insurance.  When asked about memory difficulties, he cites difficulty connecting objects to words for them.  Co worker retrospectively note his clocking in/out has been inconsistent over the past several months.  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  Co workers note that over the past several months he has been making some errors in recording measurements. He continues to drive and live in his own home.   Patient has recently had a 2 week stay in a geriatric " "psychiatric facility.   Per chart review, patient is a poor historian and some information was provided by the co-worker that accompanied the patient to the ED - patient lives at home alone in an unsafe environment.  Social work evaluation was conducted in the clinic setting and was deemed that his home was unsafe and was unsafe for him to return home alone.  Patient lives alone and without any family members.  Based on reports, patient has not been eating, has a 70 lb weight loss, living in an unsafe and unclean environment, and a mold growing on his toilets, sink and refrigerator.  Spoiled food in the refrigerator and no clean water. Risk for falls with multiple obstacles in living spaces.  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 No acute events overnight. Still with some confusion.     Current Neurological Medications:  IV Solumedrol 1 g qd x 5 days - Day 4 on 10/10    Current Facility-Administered Medications   Medication Dose Route Frequency Provider Last Rate Last Dose    acetaminophen tablet 650 mg  650 mg Oral Q4H PRN Adriana Badillo MD        enoxaparin injection 40 mg  40 mg Subcutaneous Q24H Adriana Badillo MD   40 mg at 10/08/20 1656    melatonin tablet 6 mg  6 mg Oral Nightly PRN Adriana Badillo MD        methylPREDNISolone sodium succinate (SOLU-MEDROL) 1,000 mg in dextrose 5 % 100 mL IVPB   Intravenous Daily Brayan Castillo  mL/hr at 10/10/20 0941      ondansetron injection 8 mg  8 mg Intravenous Q8H PRN dAriana Badillo MD        pantoprazole EC tablet 40 mg  40 mg Oral Daily Rosemary Ball MD   40 mg at 10/10/20 0941    promethazine (PHENERGAN) 25 mg in dextrose 5 % 50 mL IVPB  25 mg Intravenous Q6H PRN Adriana Badillo MD        senna-docusate 8.6-50 mg per tablet 1 tablet  1 " tablet Oral BID PRN Adriana Badillo MD        sodium chloride 0.9% flush 5 mL  5 mL Intravenous PRN Adriana Badillo MD         Review of Systems   Constitutional: Positive for activity change and fatigue. Negative for fever.   HENT: Negative for trouble swallowing and voice change.    Eyes: Negative for visual disturbance.   Respiratory: Negative for shortness of breath.    Cardiovascular: Negative for chest pain and leg swelling.   Gastrointestinal: Negative for nausea and vomiting.   Musculoskeletal: Negative for neck stiffness.   Neurological: Negative for dizziness, tremors, seizures, facial asymmetry, speech difficulty and headaches.   Psychiatric/Behavioral: Positive for confusion, decreased concentration and sleep disturbance. Negative for agitation and behavioral problems.     Objective:     Vital Signs (Most Recent):  Temp: 97.9 °F (36.6 °C) (10/10/20 1203)  Pulse: (!) 54 (10/10/20 1203)  Resp: 19 (10/10/20 1203)  BP: 130/66 (10/10/20 1203)  SpO2: (!) 94 % (10/10/20 1203) Vital Signs (24h Range):  Temp:  [96.8 °F (36 °C)-98.7 °F (37.1 °C)] 97.9 °F (36.6 °C)  Pulse:  [54-70] 54  Resp:  [14-19] 19  SpO2:  [92 %-97 %] 94 %  BP: (115-138)/(56-68) 130/66     Weight: 72 kg (158 lb 11.7 oz)  Body mass index is 22.78 kg/m².    Physical Exam  Eyes:      Extraocular Movements: EOM normal.      Pupils: Pupils are equal, round, and reactive to light.   Neurological:      Coordination: Finger-Nose-Finger Test normal.   Psychiatric:         Speech: Speech normal.         NEUROLOGICAL EXAMINATION:     MENTAL STATUS   Oriented to person.   Disoriented to place. Disoriented to city and area.   Disoriented to year, date and day. Oriented to month.   Follows 1 step commands.   Attention: decreased. Concentration: decreased.   Speech: speech is normal   Level of consciousness: alert  Unable to name object: able to name thumb and knuckles, unable to name ring, phone, watch  Able to repeat.     CRANIAL NERVES     CN III, IV,  VI   Pupils are equal, round, and reactive to light.  Extraocular motions are normal.   Nystagmus: none   Diplopia: none  Ophthalmoparesis: none    CN V   Facial sensation intact.     CN VIII   Hearing: intact    CN IX, X   Palate: symmetric    CN XI   CN XI normal.     CN XII   CN XII normal.     MOTOR EXAM   Muscle bulk: normal  Overall muscle tone: normal       Moves all extremities against gravity spontaneously without abnormal movements      SENSORY EXAM   Right arm light touch: normal  Left arm light touch: normal  Right leg light touch: normal  Left leg light touch: normal    GAIT AND COORDINATION     Gait  Gait: (deferred)     Coordination   Finger to nose coordination: normal    Tremor   Resting tremor: absent    Significant Labs:   CBC:   Recent Labs   Lab 10/10/20  0418   WBC 15.27*   HGB 12.0*   HCT 38.1*        CMP:   Recent Labs   Lab 10/09/20  0557 10/10/20  0418   * 140*    143   K 3.8 4.2    109   CO2 23 25   BUN 22 24*   CREATININE 0.8 0.7   CALCIUM 8.9 8.4*   MG 2.1 2.1   PROT 6.6 5.7*   ALBUMIN 3.7 3.1*   BILITOT 0.6 0.4   ALKPHOS 67 62   AST 19 20   ALT 26 32   ANIONGAP 12 9   EGFRNONAA >60.0 >60.0       Ref. Range 10/5/2020 10:00 10/5/2020 10:10   COLOR CSF Latest Ref Range: Colorless  Colorless     RBC, CSF Latest Ref Range: 0 /cu mm 190 (A)     WBC, CSF Latest Ref Range: 0 - 5 /cu mm 1     Glucose, CSF Latest Ref Range: 40 - 70 mg/dL 52     Protein, CSF Latest Ref Range: 15 - 40 mg/dL 71 (H)        CSF HSV and ACE negative  Awaiting CSF paraneoplastic panel     Unremarkable:  --TSH  --HIV   --RPR  --Ammonia  --Folate  --B6  --B12 540  --B1 49  --Serum paraneoplastic panel (9/28) negative    All pertinent lab results from the past 24 hours have been reviewed.    Significant Imaging: I have reviewed and interpreted all pertinent imaging results/findings within the past 24 hours.     MRI brain W WO contrast (10/2/20):  FINDINGS: There is no abnormal restricted diffusion  to suggest acute infarction.  There is generalized cerebral volume loss.  The ventricles are within normal limits of size and configuration for age without evidence of hydrocephalus or midline shift.  There is possible subtle increased FLAIR hyperintensity in the bilateral mesial temporal lobes which can be seen in the setting of autoimmune limbic encephalitis in the appropriate clinical setting noting there is no associated restricted diffusion, hemorrhage or postcontrast enhancement within these regions.  Clinical correlation and short-term interval follow-up is advised.  There is no evidence of acute intraparenchymal hemorrhage.  No extra-axial hemorrhage or fluid collections are identified.  No abnormal enhancing lesion is identified.  The craniocervical junction and sellar region are within normal limits.     Impression: Possible subtle increased FLAIR hyperintensity involving the bilateral mesial temporal lobes which can be seen in the setting of autoimmune limbic encephalitis in the appropriate clinical setting. No evidence of acute intracranial hemorrhage, acute infarct or abnormal enhancing lesion    Assessment and Plan:     * Cognitive impairment  68 yo male with history of BCC s/p Mohs' procedure and new diagnosis of penile malignancy was sent to ED from Urology clinic for cognitive impairment and inability to care for himself. Pt lives alone and does not have next of kin. Coworkers noted cognitive decline around early September and he was seen in clinic by Dr. Ernandez 9/28 for memory issues felt to be due to FTD. This admission, workup has been remarkable for MRI brain W WO contrast showing increased FLAIR hyperintensity involving the bilateral mesial temporal lobes. LP 10/5 with CSF (1 WBC, 190 RBC, 71 protein, 52 glucose). CSF HSV, ACE negative. CSF paraneoplastic pending. EEG 10/3 (1 hr 40 min) with mild regional dysfunction in bilateral temporal lobes, but no electrographic seizures. Serum  paraneoplastic panel from 9/28 has resulted negative.     Presentation concerning for dementia vs. autoimmune vs paraneoplastic process in the setting of malignancy     --Pending repeat LP with IR to obtain CSF specimen to send to [place] for Alzheimer panel (ADEVL test code)    -- LP scheduled on 10/13 w/ IR.    -- Glucose, Protein, Cell count x2, gram stain, CSF culture with freeze/hold ordered thus far for  scheduled LP on Tuesday   -- Appreciate primary team's assistance with this case   -- Unable to obtain amyloid PET scan while inpatient   -- Continue empiric IV Solumedrol 1 g qd x 5 days (today is day 4), tolerating well thus far, although no improvement noted   -- Formal cognitive assessment with neuropsych    -- Continue strict delirium precautions         Impaired decision making  Sent to ED 10/2 from Urology clinic to have mental capacity evaluation   Pt deemed unsafe to return home and Psych team feels pt does not have full medical decision making capacity at this time   Pt does not have next of kin    -- Primary team working on placement and medical POA      Penile carcinoma  - Outpatient follow up w/ Urology      VTE Risk Mitigation (From admission, onward)         Ordered     enoxaparin injection 40 mg  Every 24 hours      10/02/20 2042     IP VTE HIGH RISK PATIENT  Once      10/02/20 2042                Jorge Alberto Turner MD  Neurology  [facility]

## 2020-10-10 NOTE — ASSESSMENT & PLAN NOTE
70 yo male with history of BCC s/p Mohs' procedure and new diagnosis of penile malignancy was sent to ED from Urology clinic for cognitive impairment and inability to care for himself. Pt lives alone and does not have next of kin. Coworkers noted cognitive decline around early September and he was seen in clinic by Dr. Ernandez 9/28 for memory issues felt to be due to FTD. This admission, workup has been remarkable for MRI brain W WO contrast showing increased FLAIR hyperintensity involving the bilateral mesial temporal lobes. LP 10/5 with CSF (1 WBC, 190 RBC, 71 protein, 52 glucose). CSF HSV, ACE negative. EEG 10/3 (1 hr 40 min) with mild regional dysfunction in bilateral temporal lobes, but no electrographic seizures. Serum paraneoplastic panel from 9/28 has resulted negative. Completed 5 days of empiric IV Solumedrol 1 g qd without notable clinical improvement.    Presentation concerning for dementia vs. autoimmune vs paraneoplastic process in the setting of malignancy     Recommendations:  --Pending repeat LP with IR 10/13 at 2 pm to obtain CSF specimen to send to [place] for Alzheimer panel (ADEVL test code)   CSF specimen must be collected in special tube for [place] test  Cell ct with diff, glu, protein, Cx, paraneoplastic, autoimmune panel and freeze hold  -- Unable to obtain amyloid PET scan while inpatient   -- Formal cognitive assessment with neuropsych pending   -- Continue strict delirium precautions

## 2020-10-11 LAB
ALBUMIN SERPL BCP-MCNC: 2.9 G/DL (ref 3.5–5.2)
ALP SERPL-CCNC: 63 U/L (ref 55–135)
ALT SERPL W/O P-5'-P-CCNC: 66 U/L (ref 10–44)
ANION GAP SERPL CALC-SCNC: 7 MMOL/L (ref 8–16)
AST SERPL-CCNC: 35 U/L (ref 10–40)
BASOPHILS # BLD AUTO: 0 K/UL (ref 0–0.2)
BASOPHILS NFR BLD: 0 % (ref 0–1.9)
BILIRUB SERPL-MCNC: 0.3 MG/DL (ref 0.1–1)
BUN SERPL-MCNC: 26 MG/DL (ref 8–23)
CALCIUM SERPL-MCNC: 8.2 MG/DL (ref 8.7–10.5)
CHLORIDE SERPL-SCNC: 109 MMOL/L (ref 95–110)
CO2 SERPL-SCNC: 24 MMOL/L (ref 23–29)
CREAT SERPL-MCNC: 0.8 MG/DL (ref 0.5–1.4)
DIFFERENTIAL METHOD: ABNORMAL
EOSINOPHIL # BLD AUTO: 0 K/UL (ref 0–0.5)
EOSINOPHIL NFR BLD: 0 % (ref 0–8)
ERYTHROCYTE [DISTWIDTH] IN BLOOD BY AUTOMATED COUNT: 13.2 % (ref 11.5–14.5)
EST. GFR  (AFRICAN AMERICAN): >60 ML/MIN/1.73 M^2
EST. GFR  (NON AFRICAN AMERICAN): >60 ML/MIN/1.73 M^2
GLUCOSE SERPL-MCNC: 151 MG/DL (ref 70–110)
HCT VFR BLD AUTO: 36.6 % (ref 40–54)
HGB BLD-MCNC: 12 G/DL (ref 14–18)
IMM GRANULOCYTES # BLD AUTO: 0.04 K/UL (ref 0–0.04)
IMM GRANULOCYTES NFR BLD AUTO: 0.3 % (ref 0–0.5)
LYMPHOCYTES # BLD AUTO: 0.3 K/UL (ref 1–4.8)
LYMPHOCYTES NFR BLD: 2.6 % (ref 18–48)
MAGNESIUM SERPL-MCNC: 2 MG/DL (ref 1.6–2.6)
MCH RBC QN AUTO: 31.5 PG (ref 27–31)
MCHC RBC AUTO-ENTMCNC: 32.8 G/DL (ref 32–36)
MCV RBC AUTO: 96 FL (ref 82–98)
MONOCYTES # BLD AUTO: 0.4 K/UL (ref 0.3–1)
MONOCYTES NFR BLD: 2.9 % (ref 4–15)
NEUTROPHILS # BLD AUTO: 11.9 K/UL (ref 1.8–7.7)
NEUTROPHILS NFR BLD: 94.2 % (ref 38–73)
NRBC BLD-RTO: 0 /100 WBC
PHOSPHATE SERPL-MCNC: 2.8 MG/DL (ref 2.7–4.5)
PLATELET # BLD AUTO: 259 K/UL (ref 150–350)
PMV BLD AUTO: 9.9 FL (ref 9.2–12.9)
POTASSIUM SERPL-SCNC: 4.3 MMOL/L (ref 3.5–5.1)
PROT SERPL-MCNC: 5.5 G/DL (ref 6–8.4)
RBC # BLD AUTO: 3.81 M/UL (ref 4.6–6.2)
SODIUM SERPL-SCNC: 140 MMOL/L (ref 136–145)
WBC # BLD AUTO: 12.6 K/UL (ref 3.9–12.7)

## 2020-10-11 PROCEDURE — 11000001 HC ACUTE MED/SURG PRIVATE ROOM

## 2020-10-11 PROCEDURE — 25000003 PHARM REV CODE 250: Performed by: STUDENT IN AN ORGANIZED HEALTH CARE EDUCATION/TRAINING PROGRAM

## 2020-10-11 PROCEDURE — 36415 COLL VENOUS BLD VENIPUNCTURE: CPT

## 2020-10-11 PROCEDURE — 63600175 PHARM REV CODE 636 W HCPCS: Performed by: HOSPITALIST

## 2020-10-11 PROCEDURE — 85025 COMPLETE CBC W/AUTO DIFF WBC: CPT

## 2020-10-11 PROCEDURE — 99231 SBSQ HOSP IP/OBS SF/LOW 25: CPT | Mod: ,,, | Performed by: HOSPITALIST

## 2020-10-11 PROCEDURE — 83735 ASSAY OF MAGNESIUM: CPT

## 2020-10-11 PROCEDURE — 84100 ASSAY OF PHOSPHORUS: CPT

## 2020-10-11 PROCEDURE — 80053 COMPREHEN METABOLIC PANEL: CPT

## 2020-10-11 PROCEDURE — 99231 PR SUBSEQUENT HOSPITAL CARE,LEVL I: ICD-10-PCS | Mod: ,,, | Performed by: HOSPITALIST

## 2020-10-11 PROCEDURE — 25000003 PHARM REV CODE 250: Performed by: HOSPITALIST

## 2020-10-11 RX ADMIN — PANTOPRAZOLE SODIUM 40 MG: 40 TABLET, DELAYED RELEASE ORAL at 09:10

## 2020-10-11 RX ADMIN — METHYLPREDNISOLONE SODIUM SUCCINATE: 1 INJECTION, POWDER, LYOPHILIZED, FOR SOLUTION INTRAMUSCULAR; INTRAVENOUS at 09:10

## 2020-10-11 RX ADMIN — ENOXAPARIN SODIUM 40 MG: 40 INJECTION SUBCUTANEOUS at 05:10

## 2020-10-11 NOTE — PROGRESS NOTES
"Progress Note  Hospital Medicine    Provider team:    [facility] GENERAL NEUROLOGY  [facility] HOSP MED O  Admit Date: 10/2/2020  Encounter Date: 10/11/2020     SUBJECTIVE:     Follow-up Visit for: Cognitive impairment    HPI (See H&P for complete P,F,SHx):   Per Adriana Badillo MD:   Mr. Juan Hobson is a 69 y.o. male with recently diagnosed penile cancer, who presents to the ER from Urology clinic with concern for self neglect, and need for Neurology evaluation of dementia and possible placement.  The patient mentions that he has been having some issues with his memory.  He mostly orders takeout for meals because it is easier, and still drives to work.  He feels like he is able to take care of himself, and does not need to go to a facility."     Per note from Lianne German LCSW on 10/2:  Received consult from Dr. Monterroso re: assessing patient in clinic this afternoon and care recommendations as patient did not seem competent to make decisions and give consent. He has no family and no legal next of kin (POA or guardian).  Met individually with patient, and with his co-worker Janna Go ([phone], ext. [phone]) who brought him to his clinic appointment today. Patient unable to relate basic demographic information or clearly explain other information. For example, when asked his birth date he gave the year [birth year], then said 9, and then said the second to last, it begins with N; he said his street name but couldn't give the house number where he has lived for years; unable to say what type of work or where he worked prior to the [employer] in [facility] for the past 12 years; he described being taken in a truck to that other place (referring to the recent [facility] stay). He has awareness that he is having memory/cognitive issues but stated he can manage for himself at home and that he knows there is a lot of work that needs to be done in his home that he hasn't gotten to. He inherited the home after " his mother .      Mr. Go and another coworker Juice Martinez ([phone]) and their supervisor Meiirma Meanso ([phone]) have been trying to help him manage things with his work and personal matters as they have observed his deficits and know he has no one to help him. They have helped him keep up with recent medical appointments and accompanied and transported him.  Mr. Go reports concern for his safety and ability to care for himself after seeing his home with clutter throughout and things piled up, mold and old food in the refrigerator, mold in the toilet, clothes all over, etc. Patient has been driving himself to and from work and to get food at places near his home, but unable to navigate to unfamiliar places. They have observed his cognitive deficits at work and he has made errors, so they have modified his job and his supervisor has been working with him to try to get FMLA and his leave/group home in place so he doesn't lose his benefits. They have had to help him with paperwork, bill paying, etc., because he can't complete these on his own.        Upon arrival to the ER, vitals were temp 97.8F, HR 59 and /84.  Labs were unremarkable  He was admitted to Hospital Medicine for Neurology and SW evaluation.     Overview/Hospital Course:  69 y.o. who was admitted to hospital medicine for worsening cognitive decline and inability to care for self. Labs/infectious work-up grossly unremarkable. Neurology consulted and MRI obtained on admission with concern for encephalitis (increased FLAIR hyperintensity involving the bilateral mesial temporal lobes which can be seen in the setting of autoimmune limbic encephalitis in the appropriate clinical setting). LP recommended and attempted at bedside however not successful 2/ to KEMI. Interventional radiology consulted, LP performed 10/5; CSF studies ordered including paraneoplastic panel and inflammatory markers, results pending. EEG revealed mild,  non-specific regional dysfunction in bilateral temporal lobes without seizure activity. Neurology suspects presentation may be more chronic than acute raising suspicion of frontotemporal dementia.  He will require further outpatient testing with a neuropsychologist eventually.  Psychiatry consulted for formal capacity evaluation and concluded that he does not have capacity to make decisions regarding his long term care disposition however does not require PEC at this time as his stay has been non-contested. He has no family, children and have relied on coworkers for assistance. The case was brought to the attention of EPS who have no plans to intervene at this time and recommend since the patient presented to the hospital it is a safe discharge planning issue that would involve [facility] Legal department. The Legal department intend to pursue [place] [employer]hip to assist with managing affairs.     Interval history:  Patient doing well and offers no complaints. Neurology has been following with presentation c/f  dementia vs. autoimmune vs paraneoplastic process in the setting of malignancy. Empiric solumedrol continues with day 4.The patient has not improved. Plan is for LP on 10/13/2020 with special send-out for ADEVL; d/w radiology fluoro. Patient without complaints. His thought process is tangential and inappropriate. I do not sense much improvement with solumedrol.     Review of Systems:  Review of Systems   Constitutional: Positive for activity change, appetite change and unexpected weight change. Negative for fever.   HENT: Negative for trouble swallowing.    Eyes: Negative for photophobia and visual disturbance.   Respiratory: Negative for cough and shortness of breath.    Gastrointestinal: Negative for nausea and vomiting.   Genitourinary: Negative for difficulty urinating and dysuria.   Neurological: Positive for speech difficulty. Negative for weakness.   Psychiatric/Behavioral: Positive for behavioral  "problems, confusion and decreased concentration. Negative for hallucinations and sleep disturbance. The patient is not hyperactive.      OBJECTIVE:   No intake or output data in the 24 hours ending 10/11/20 1010  Vital Signs Range (Last 24H):  Temp:  [97.5 °F (36.4 °C)-98.1 °F (36.7 °C)]   Pulse:  [54-66]   Resp:  [14-19]   BP: (128-154)/(60-73)   SpO2:  [92 %-94 %]   Body mass index is 22.78 kg/m².    Objective:  Vital signs: (most recent): Blood pressure 128/73, pulse (!) 57, temperature 98.1 °F (36.7 °C), temperature source Oral, resp. rate 14, height 5' 10" (1.778 m), weight 72 kg (158 lb 11.7 oz), SpO2 (!) 92 %.  No fever.      Physical Exam  Vitals signs and nursing note reviewed.   Constitutional:       General: He is not in acute distress.     Appearance: He is not ill-appearing.   HENT:      Head: Normocephalic and atraumatic.      Nose: Nose normal.      Mouth/Throat:      Mouth: Mucous membranes are moist.   Eyes:      Extraocular Movements: Extraocular movements intact.      Pupils: Pupils are equal, round, and reactive to light.   Cardiovascular:      Rate and Rhythm: Normal rate and regular rhythm.   Pulmonary:      Effort: Pulmonary effort is normal. No respiratory distress.   Abdominal:      General: Abdomen is flat. There is no distension.      Palpations: Abdomen is soft.   Musculoskeletal: Normal range of motion.         General: No swelling.   Skin:     General: Skin is warm and dry.      Coloration: Skin is not jaundiced.   Neurological:      General: No focal deficit present.      Mental Status: He is alert. He is disoriented.      Sensory: No sensory deficit.      Motor: No weakness.   Psychiatric:         Mood and Affect: Mood normal.         Speech: Speech normal.         Behavior: Behavior is cooperative.         Cognition and Memory: Cognition is impaired. Memory is impaired. He exhibits impaired recent memory.     Medications:  Medication list was reviewed in EPIC and changes noted under " Assessment/Plan and MAR.    Laboratory:  Recent Labs     10/11/20  0119   WBC 12.60   RBC 3.81*   HGB 12.0*   HCT 36.6*      MCV 96   MCH 31.5*   MCHC 32.8   GRAN 94.2*  11.9*   LYMPH 2.6*  0.3*   MONO 2.9*  0.4   EOS 0.0      Recent Labs     10/11/20  0119   *      K 4.3      CO2 24   BUN 26*   CREATININE 0.8   CALCIUM 8.2*   ANIONGAP 7*   MG 2.0   PHOS 2.8       ASSESSMENT/PLAN:     Active Hospital Problems    Diagnosis  POA    *Cognitive impairment [R41.89]  Yes    Acute encephalopathy [G93.40]  Yes    Cognitive decline [R41.89]  Yes    Impaired decision making [Z78.9]  Yes    Confusion [R41.0]  Unknown    Debility [R53.81]  Yes    Penile carcinoma [C60.9]  Yes    Altered mental status [R41.82]  Unknown      Resolved Hospital Problems    Diagnosis Date Resolved POA    Encephalopathy [G93.40] 10/04/2020 Unknown      * Cognitive impairment  69 y.o. with progressive cognitive decline and inability to adequately care for self. Presented in September 2020, to the ED stating there were creatures attacking his penis. 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 Prompted  evaluation by psychiatry and found that it is likely related to dementia after getting collateral from family/friends. He was PEC'd the night of admission after wanting to leave and then discharged to a geropsychiatry facility for 2 weeks. Seen in neuro clinic where there was mention of suspected frontotemporal dementia and further work-up ensued. Presented to a urology appt on 10/2 and was advised to present to the ED due to concern for competency and need for patient advocate/POA before a biopsy or form of treatment can be initiated.      Presentation concerning for frontotemporal dementia vs. autoimmune vs paraneoplastic process in the setting of malignancy   - LP performed by ANDREA  10/5/2020: elevated protein, neutrophils  - Concern that the patient is unable to care for himself and make his own decisions, however the patient feels like he is able to care for himself  - MRI brain with Possible subtle increased FLAIR hyperintensity involving the bilateral mesial temporal lobes which can be seen in the setting of autoimmune limbic encephalitis  - EEG without seizure activity   - Vitamin B1, Vitamin B6, Vitamin B12, HIV, RPR, TSH unremarkable   - afebrile, without leukocytosis -- infectious work-up unremarkable   - Neurology consulted          --please send CSF to [place] for Alzheimer panel (ADEVL test code)           --start empiric IV Solumedrol 1 g qd x 5 days          --formal cognitive assessment with neuropsych            --consider PET scan          --continue strict delirium precautions  - Paraneoplastic panel, serum in place from outpatient neurology visit on 09/28  - Possibility for Autoimmune vs Paraneoplastic process in the setting of active malignancy  - CM/SW following  - Plan for repeat LP on 10/13     DELIRIUM BEHAVIOR MANAGEMENT  - Minimize use of restraints; if physical restraints necessary, please utilize medical/chemical prns for agitation.  - Keep shades open and room lit during day and room dim at night in order to promote healthy circadian rhythms.  - Encourage family at bedside  - Keep whiteboard in patient's room current with the date and name of the members of patient's team for easy patient self re-orientation.  - Avoid benzodiazepines, antihistamines, anticholinergics, hypnotics, and minimize opiates while controlling for pain as these medications may exacerbate delirium.        Impaired decision making  Presented to a urology appt on 10/2 and was advised to present to the ED due to concern for competency and need for patient advocate/POA before a biopsy or form of treatment can be initiated.      [place] R[place] 40:1299.53: (9) Upon the inability of any adult to consent for  himself and in the absence of any person listed in Paragraphs (2) through (8) of this Subsection, an adult friend of the patient. For purposes of this Subsection to consent, adult friend means an adult who has exhibited special care and concern for the patient, who is generally familiar with the patient's health care views and desires, and who is willing and able to become involved in the patient's health care decisions and to act in the patient's best interest. The adult friend shall sign and date an acknowledgment form provided by the hospital or other health care facility in which the patient is located for placement in the patient's records certifying that he or she meets such criteria.     -  Juice Martinez ([phone]) will make decisions on his behalf  - supervisor Mei Bell ([phone]) have been trying to help him manage things with his work and personal matters as they have observed his deficits and know he has no one to help him - these individuals may be used for consent   - PEC'd for grave disability during last admission and discharged to [facility] however no formal eval by in-patient psychiatry  - psychiatry has consulted    Penile cancer  - Urology requests involvement of ethics committee to determine inpatient w/u and treatment appropriateness   - Will aim to involve 10/12 if feasible  - Patient does have friend that can consent for procedures; this has been cleared with legal per CM/SW    Anticipated discharge date and disposition:   Pending legal guardianship establishment.  Brayan Castillo MD  [facility] Medicine

## 2020-10-11 NOTE — PROGRESS NOTES
"Progress Note  Hospital Medicine    Provider team:    [facility]  [facility]  Admit Date: 10/2/2020  Encounter Date: 10/10/2020     SUBJECTIVE:     Follow-up Visit for: Cognitive impairment    HPI (See H&P for complete P,F,SHx):   Per Adriana Badillo MD:   Mr. Juan Hobson is a 69 y.o. male with recently diagnosed penile cancer, who presents to the ER from Urology clinic with concern for self neglect, and need for Neurology evaluation of dementia and possible placement.  The patient mentions that he has been having some issues with his memory.  He mostly orders takeout for meals because it is easier, and still drives to work.  He feels like he is able to take care of himself, and does not need to go to a facility."     Per note from Lianne German LCSW on 10/2:  Received consult from Dr. Monterroso re: assessing patient in clinic this afternoon and care recommendations as patient did not seem competent to make decisions and give consent. He has no family and no legal next of kin (POA or guardian).  Met individually with patient, and with his co-worker Janna Go ([phone], ext. [phone]) who brought him to his clinic appointment today. Patient unable to relate basic demographic information or clearly explain other information. For example, when asked his birth date he gave the year [birth year], then said 9, and then said the second to last, it begins with N; he said his street name but couldn't give the house number where he has lived for years; unable to say what type of work or where he worked prior to the [employer] in [facility] for the past 12 years; he described being taken in a truck to that other place (referring to the recent [facility] stay). He has awareness that he is having memory/cognitive issues but stated he can manage for himself at home and that he knows there is a lot of work that needs to be done in his home that he hasn't gotten to. He inherited the home after " his mother .      Mr. Go and another coworker Juice Martinez ([phone]) and their supervisor Meiirma Meanso ([phone]) have been trying to help him manage things with his work and personal matters as they have observed his deficits and know he has no one to help him. They have helped him keep up with recent medical appointments and accompanied and transported him.  Mr. Go reports concern for his safety and ability to care for himself after seeing his home with clutter throughout and things piled up, mold and old food in the refrigerator, mold in the toilet, clothes all over, etc. Patient has been driving himself to and from work and to get food at places near his home, but unable to navigate to unfamiliar places. They have observed his cognitive deficits at work and he has made errors, so they have modified his job and his supervisor has been working with him to try to get FMLA and his leave/halfway in place so he doesn't lose his benefits. They have had to help him with paperwork, bill paying, etc., because he can't complete these on his own.        Upon arrival to the ER, vitals were temp 97.8F, HR 59 and /84.  Labs were unremarkable  He was admitted to Hospital Medicine for Neurology and SW evaluation.     Overview/Hospital Course:  69 y.o. who was admitted to hospital medicine for worsening cognitive decline and inability to care for self. Labs/infectious work-up grossly unremarkable. Neurology consulted and MRI obtained on admission with concern for encephalitis (increased FLAIR hyperintensity involving the bilateral mesial temporal lobes which can be seen in the setting of autoimmune limbic encephalitis in the appropriate clinical setting). LP recommended and attempted at bedside however not successful 2/ to KEMI. Interventional radiology consulted, LP performed 10/5; CSF studies ordered including paraneoplastic panel and inflammatory markers, results pending. EEG revealed mild,  non-specific regional dysfunction in bilateral temporal lobes without seizure activity. Neurology suspects presentation may be more chronic than acute raising suspicion of frontotemporal dementia.  He will require further outpatient testing with a neuropsychologist eventually.  Psychiatry consulted for formal capacity evaluation and concluded that he does not have capacity to make decisions regarding his long term care disposition however does not require PEC at this time as his stay has been non-contested. He has no family, children and have relied on coworkers for assistance. The case was brought to the attention of EPS who have no plans to intervene at this time and recommend since the patient presented to the hospital it is a safe discharge planning issue that would involve [facility] Legal department. The Legal department intend to pursue [place] Guardianship to assist with managing affairs.     Interval history:  Patient doing well and offers no complaints. Neurology has been following with presentation c/f  dementia vs. autoimmune vs paraneoplastic process in the setting of malignancy. Empiric solumedrol continues with day 4.The patient has not improved. Plan is for LP on 10/13/2020 with special send-out for ADEVL; d/w radiology fluoro. Patient without complaints. His thought process is tangential and inappropriate. I do not sense much improvement with solumedrol.     Review of Systems:  Review of Systems   Constitutional: Positive for activity change, appetite change and unexpected weight change. Negative for fever.   HENT: Negative for trouble swallowing.    Eyes: Negative for photophobia and visual disturbance.   Respiratory: Negative for cough and shortness of breath.    Gastrointestinal: Negative for nausea and vomiting.   Genitourinary: Negative for difficulty urinating and dysuria.   Neurological: Positive for speech difficulty. Negative for weakness.   Psychiatric/Behavioral: Positive for behavioral  "problems, confusion and decreased concentration. Negative for hallucinations and sleep disturbance. The patient is not hyperactive.      OBJECTIVE:   No intake or output data in the 24 hours ending 10/10/20 2035  Vital Signs Range (Last 24H):  Temp:  [97.7 °F (36.5 °C)-98.7 °F (37.1 °C)]   Pulse:  [54-66]   Resp:  [14-19]   BP: (121-154)/(56-70)   SpO2:  [92 %-96 %]   Body mass index is 22.78 kg/m².    Objective:  Vital signs: (most recent): Blood pressure (!) 154/70, pulse 66, temperature 97.7 °F (36.5 °C), temperature source Oral, resp. rate 18, height 5' 10" (1.778 m), weight 72 kg (158 lb 11.7 oz), SpO2 (!) 94 %.  No fever.      Physical Exam  Vitals signs and nursing note reviewed.   Constitutional:       General: He is not in acute distress.     Appearance: He is not ill-appearing.   HENT:      Head: Normocephalic and atraumatic.      Nose: Nose normal.      Mouth/Throat:      Mouth: Mucous membranes are moist.   Eyes:      Extraocular Movements: Extraocular movements intact.      Pupils: Pupils are equal, round, and reactive to light.   Cardiovascular:      Rate and Rhythm: Normal rate and regular rhythm.   Pulmonary:      Effort: Pulmonary effort is normal. No respiratory distress.   Abdominal:      General: Abdomen is flat. There is no distension.      Palpations: Abdomen is soft.   Musculoskeletal: Normal range of motion.         General: No swelling.   Skin:     General: Skin is warm and dry.      Coloration: Skin is not jaundiced.   Neurological:      General: No focal deficit present.      Mental Status: He is alert. He is disoriented.      Sensory: No sensory deficit.      Motor: No weakness.   Psychiatric:         Mood and Affect: Mood normal.         Speech: Speech normal.         Behavior: Behavior is cooperative.         Cognition and Memory: Cognition is impaired. Memory is impaired. He exhibits impaired recent memory.     Medications:  Medication list was reviewed in EPIC and changes noted under " Assessment/Plan and MAR.    Laboratory:  Recent Labs     10/10/20  0418   WBC 15.27*   RBC 3.85*   HGB 12.0*   HCT 38.1*      MCV 99*   MCH 31.2*   MCHC 31.5*   GRAN 95.5*  14.6*   LYMPH 2.5*  0.4*   MONO 1.2*  0.2*   EOS 0.0      Recent Labs     10/10/20  0418   *      K 4.2      CO2 25   BUN 24*   CREATININE 0.7   CALCIUM 8.4*   ANIONGAP 9   MG 2.1   PHOS 3.1       ASSESSMENT/PLAN:     Active Hospital Problems    Diagnosis  POA    *Cognitive impairment [R41.89]  Yes    Acute encephalopathy [G93.40]  Yes    Cognitive decline [R41.89]  Yes    Impaired decision making [Z78.9]  Yes    Confusion [R41.0]  Unknown    Debility [R53.81]  Yes    Penile carcinoma [C60.9]  Yes    Altered mental status [R41.82]  Unknown      Resolved Hospital Problems    Diagnosis Date Resolved POA    Encephalopathy [G93.40] 10/04/2020 Unknown      * Cognitive impairment  69 y.o. with progressive cognitive decline and inability to adequately care for self. Presented in September 2020, to the ED stating there were creatures attacking his penis. 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 Prompted  evaluation by psychiatry and found that it is likely related to dementia after getting collateral from family/friends. He was PEC'd the night of admission after wanting to leave and then discharged to a geropsychiatry facility for 2 weeks. Seen in neuro clinic where there was mention of suspected frontotemporal dementia and further work-up ensued. Presented to a urology appt on 10/2 and was advised to present to the ED due to concern for competency and need for patient advocate/POA before a biopsy or form of treatment can be initiated.      Presentation concerning for frontotemporal dementia vs. autoimmune vs paraneoplastic process in the setting of malignancy   - LP performed by  IR 10/5/2020: elevated protein, neutrophils  - Concern that the patient is unable to care for himself and make his own decisions, however the patient feels like he is able to care for himself  - MRI brain with Possible subtle increased FLAIR hyperintensity involving the bilateral mesial temporal lobes which can be seen in the setting of autoimmune limbic encephalitis  - EEG without seizure activity   - Vitamin B1, Vitamin B6, Vitamin B12, HIV, RPR, TSH unremarkable   - afebrile, without leukocytosis -- infectious work-up unremarkable   - Neurology consulted          --please send CSF to [place] for Alzheimer panel (ADEVL test code)           --start empiric IV Solumedrol 1 g qd x 5 days          --formal cognitive assessment with neuropsych            --consider PET scan          --continue strict delirium precautions  - Paraneoplastic panel, serum in place from outpatient neurology visit on 09/28  - Possibility for Autoimmune vs Paraneoplastic process in the setting of active malignancy  - CM/SW following  - Plan for repeat LP on 10/13     DELIRIUM BEHAVIOR MANAGEMENT  - Minimize use of restraints; if physical restraints necessary, please utilize medical/chemical prns for agitation.  - Keep shades open and room lit during day and room dim at night in order to promote healthy circadian rhythms.  - Encourage family at bedside  - Keep whiteboard in patient's room current with the date and name of the members of patient's team for easy patient self re-orientation.  - Avoid benzodiazepines, antihistamines, anticholinergics, hypnotics, and minimize opiates while controlling for pain as these medications may exacerbate delirium.        Impaired decision making  Presented to a urology appt on 10/2 and was advised to present to the ED due to concern for competency and need for patient advocate/POA before a biopsy or form of treatment can be initiated.      [place] R[place] 40:1299.53: (9) Upon the inability of any adult to consent for  himself and in the absence of any person listed in Paragraphs (2) through (8) of this Subsection, an adult friend of the patient. For purposes of this Subsection to consent, adult friend means an adult who has exhibited special care and concern for the patient, who is generally familiar with the patient's health care views and desires, and who is willing and able to become involved in the patient's health care decisions and to act in the patient's best interest. The adult friend shall sign and date an acknowledgment form provided by the hospital or other health care facility in which the patient is located for placement in the patient's records certifying that he or she meets such criteria.     -  Juice Martinez ([phone]) will make decisions on his behalf  - supervisor Mei Bell ([phone]) have been trying to help him manage things with his work and personal matters as they have observed his deficits and know he has no one to help him - these individuals may be used for consent   - PEC'd for grave disability during last admission and discharged to [facility] however no formal eval by in-patient psychiatry  - psychiatry has consulted    Penile cancer  - Urology requests involvement of ethics committee to determine inpatient w/u and treatment appropriateness   - Will aim to involve 10/12 if feasible  - Patient does have friend that can consent for procedures; this has been cleared with legal per CM/SW    Anticipated discharge date and disposition:   Pending legal guardianship establishment.  Brayan Castillo MD  [facility]

## 2020-10-11 NOTE — PLAN OF CARE
Problem: Adult Inpatient Plan of Care  Goal: Plan of Care Review  Outcome: Ongoing, Progressing  Goal: Patient-Specific Goal (Individualization)  Outcome: Ongoing, Progressing  Goal: Absence of Hospital-Acquired Illness or Injury  Outcome: Ongoing, Progressing  Goal: Optimal Comfort and Wellbeing  Outcome: Ongoing, Progressing  Goal: Readiness for Transition of Care  Outcome: Ongoing, Progressing     Problem: Fall Injury Risk  Goal: Absence of Fall and Fall-Related Injury  Outcome: Ongoing, Progressing     Problem: Thought Process Alteration  Goal: Optimal Thought Clarity  Outcome: Ongoing, Progressing

## 2020-10-12 LAB
ALBUMIN SERPL BCP-MCNC: 2.8 G/DL (ref 3.5–5.2)
ALP SERPL-CCNC: 62 U/L (ref 55–135)
ALT SERPL W/O P-5'-P-CCNC: 172 U/L (ref 10–44)
AMPHIPHYSIN AB TITR CSF: NEGATIVE TITER
ANION GAP SERPL CALC-SCNC: 9 MMOL/L (ref 8–16)
AST SERPL-CCNC: 68 U/L (ref 10–40)
BASOPHILS # BLD AUTO: 0 K/UL (ref 0–0.2)
BASOPHILS NFR BLD: 0 % (ref 0–1.9)
BILIRUB SERPL-MCNC: 0.5 MG/DL (ref 0.1–1)
BUN SERPL-MCNC: 25 MG/DL (ref 8–23)
CALCIUM SERPL-MCNC: 8.1 MG/DL (ref 8.7–10.5)
CHLORIDE SERPL-SCNC: 106 MMOL/L (ref 95–110)
CO2 SERPL-SCNC: 25 MMOL/L (ref 23–29)
CREAT SERPL-MCNC: 0.7 MG/DL (ref 0.5–1.4)
CV2 IGG TITR CSF: NEGATIVE TITER
DIFFERENTIAL METHOD: ABNORMAL
EOSINOPHIL # BLD AUTO: 0 K/UL (ref 0–0.5)
EOSINOPHIL NFR BLD: 0 % (ref 0–8)
ERYTHROCYTE [DISTWIDTH] IN BLOOD BY AUTOMATED COUNT: 13 % (ref 11.5–14.5)
EST. GFR  (AFRICAN AMERICAN): >60 ML/MIN/1.73 M^2
EST. GFR  (NON AFRICAN AMERICAN): >60 ML/MIN/1.73 M^2
GLIAL NUC TYPE 1 AB TITR CSF: NEGATIVE TITER
GLUCOSE SERPL-MCNC: 146 MG/DL (ref 70–110)
HCT VFR BLD AUTO: 38.8 % (ref 40–54)
HGB BLD-MCNC: 12.6 G/DL (ref 14–18)
HU1 AB TITR CSF IF: NEGATIVE TITER
HU2 AB TITR CSF IF: NEGATIVE TITER
HU3 AB TITR CSF: NEGATIVE TITER
IMM GRANULOCYTES # BLD AUTO: 0.05 K/UL (ref 0–0.04)
IMM GRANULOCYTES NFR BLD AUTO: 0.5 % (ref 0–0.5)
LYMPHOCYTES # BLD AUTO: 0.4 K/UL (ref 1–4.8)
LYMPHOCYTES NFR BLD: 3.5 % (ref 18–48)
MAGNESIUM SERPL-MCNC: 2.1 MG/DL (ref 1.6–2.6)
MCH RBC QN AUTO: 31.7 PG (ref 27–31)
MCHC RBC AUTO-ENTMCNC: 32.5 G/DL (ref 32–36)
MCV RBC AUTO: 98 FL (ref 82–98)
MONOCYTES # BLD AUTO: 0.3 K/UL (ref 0.3–1)
MONOCYTES NFR BLD: 3 % (ref 4–15)
NEUTROPHILS # BLD AUTO: 9.8 K/UL (ref 1.8–7.7)
NEUTROPHILS NFR BLD: 93 % (ref 38–73)
NRBC BLD-RTO: 0 /100 WBC
PARANEOPLASTIC INTERPRETATION,CSF: NORMAL
PCA-2 AB TITR CSF: NEGATIVE TITER
PCA-TR AB TITR CSF: NEGATIVE TITER
PHOSPHATE SERPL-MCNC: 2.8 MG/DL (ref 2.7–4.5)
PLATELET # BLD AUTO: 259 K/UL (ref 150–350)
PMV BLD AUTO: 10.8 FL (ref 9.2–12.9)
PNEOE REFLEX TEST ADDED: NORMAL
POTASSIUM SERPL-SCNC: 4.1 MMOL/L (ref 3.5–5.1)
PROT SERPL-MCNC: 5.2 G/DL (ref 6–8.4)
PURKINJE CELLS AB TITR CSF IF: NEGATIVE TITER
RBC # BLD AUTO: 3.97 M/UL (ref 4.6–6.2)
SODIUM SERPL-SCNC: 140 MMOL/L (ref 136–145)
WBC # BLD AUTO: 10.5 K/UL (ref 3.9–12.7)

## 2020-10-12 PROCEDURE — 83735 ASSAY OF MAGNESIUM: CPT

## 2020-10-12 PROCEDURE — 99233 SBSQ HOSP IP/OBS HIGH 50: CPT | Mod: ,,, | Performed by: PSYCHIATRY & NEUROLOGY

## 2020-10-12 PROCEDURE — 99233 PR SUBSEQUENT HOSPITAL CARE,LEVL III: ICD-10-PCS | Mod: ,,, | Performed by: PSYCHIATRY & NEUROLOGY

## 2020-10-12 PROCEDURE — 80053 COMPREHEN METABOLIC PANEL: CPT

## 2020-10-12 PROCEDURE — 99232 PR SUBSEQUENT HOSPITAL CARE,LEVL II: ICD-10-PCS | Mod: ,,, | Performed by: HOSPITALIST

## 2020-10-12 PROCEDURE — 99232 SBSQ HOSP IP/OBS MODERATE 35: CPT | Mod: ,,, | Performed by: HOSPITALIST

## 2020-10-12 PROCEDURE — 25000003 PHARM REV CODE 250: Performed by: STUDENT IN AN ORGANIZED HEALTH CARE EDUCATION/TRAINING PROGRAM

## 2020-10-12 PROCEDURE — 85025 COMPLETE CBC W/AUTO DIFF WBC: CPT

## 2020-10-12 PROCEDURE — 36415 COLL VENOUS BLD VENIPUNCTURE: CPT

## 2020-10-12 PROCEDURE — 84100 ASSAY OF PHOSPHORUS: CPT

## 2020-10-12 PROCEDURE — 11000001 HC ACUTE MED/SURG PRIVATE ROOM

## 2020-10-12 RX ADMIN — PANTOPRAZOLE SODIUM 40 MG: 40 TABLET, DELAYED RELEASE ORAL at 09:10

## 2020-10-12 NOTE — PLAN OF CARE
Problem: Fall Injury Risk  Goal: Absence of Fall and Fall-Related Injury  Outcome: Ongoing, Progressing     Problem: Thought Process Alteration  Goal: Optimal Thought Clarity  Outcome: Ongoing, Progressing  Pt is forgetful and finding difficulty finding the correct words to express himself, he has been ambulating in the room back and forth to the BR he has been continent, his appetite is good feeds himself. He has been kept safe, free from falls and injury, his bed is in the low position and locked for safety.He has no complaints

## 2020-10-12 NOTE — PLAN OF CARE
10/12/20 1008   Post-Acute Status   Post-Acute Authorization Placement;Hospice   Hospice Status Family Barriers

## 2020-10-12 NOTE — SUBJECTIVE & OBJECTIVE
Subjective:     Interval History:   No events reported overnight  Awaiting repeat LP with IR tomorrow to send for Alzheimer panel   Pt denies complaints besides memory difficulty     Current Facility-Administered Medications   Medication Dose Route Frequency Provider Last Rate Last Dose    acetaminophen tablet 650 mg  650 mg Oral Q4H PRN Adriana Badillo MD        melatonin tablet 6 mg  6 mg Oral Nightly PRN Adriana Badillo MD        ondansetron injection 8 mg  8 mg Intravenous Q8H PRN Adriana Badillo MD        pantoprazole EC tablet 40 mg  40 mg Oral Daily Rosemary Ball MD   40 mg at 10/12/20 0923    promethazine (PHENERGAN) 25 mg in dextrose 5 % 50 mL IVPB  25 mg Intravenous Q6H PRN Adriana Badillo MD        senna-docusate 8.6-50 mg per tablet 1 tablet  1 tablet Oral BID PRN Adriana Badillo MD        sodium chloride 0.9% flush 5 mL  5 mL Intravenous PRN Adriana Badillo MD         Review of Systems   Constitutional: Negative for fever.   HENT: Negative for trouble swallowing and voice change.    Eyes: Negative for visual disturbance.   Respiratory: Negative for shortness of breath.    Cardiovascular: Negative for chest pain.   Gastrointestinal: Negative for nausea and vomiting.   Musculoskeletal: Negative for gait problem and neck stiffness.   Neurological: Negative for dizziness, tremors, seizures, facial asymmetry, speech difficulty, numbness and headaches.   Psychiatric/Behavioral: Positive for confusion and decreased concentration. Negative for agitation and hallucinations. The patient is not hyperactive.      Objective:     Vital Signs (Most Recent):  Temp: 98.4 °F (36.9 °C) (10/12/20 1219)  Pulse: 61 (10/12/20 1219)  Resp: 18 (10/12/20 1219)  BP: 121/66 (10/12/20 1219)  SpO2: (!) 91 % (10/12/20 1219) Vital Signs (24h Range):  Temp:  [97.2 °F (36.2 °C)-98.4 °F (36.9 °C)] 98.4 °F (36.9 °C)  Pulse:  [51-61] 61  Resp:  [15-20] 18  SpO2:  [91 %-96 %] 91 %  BP: (121-150)/(60-74) 121/66     Weight: 72  kg (158 lb 11.7 oz)  Body mass index is 22.78 kg/m².    Physical Exam  Eyes:      Extraocular Movements: EOM normal.      Pupils: Pupils are equal, round, and reactive to light.   Psychiatric:         Speech: Speech normal.       NEUROLOGICAL EXAMINATION:     MENTAL STATUS   Oriented to person.   Disoriented to place. Disoriented to city and area.   Disoriented to year, month, date and day.   Follows 1 step commands.   Attention: decreased. Concentration: decreased.   Speech: speech is normal   Level of consciousness: alert  Unable to name object: difficulty with lower frequency words. Able to repeat.     CRANIAL NERVES     CN III, IV, VI   Pupils are equal, round, and reactive to light.  Extraocular motions are normal.   Nystagmus: none   Diplopia: none  Ophthalmoparesis: none    CN V   Facial sensation intact.     CN VII   Facial expression full, symmetric.     CN VIII   Hearing: intact    CN IX, X   Palate: symmetric    CN XI   CN XI normal.     CN XII   CN XII normal.     MOTOR EXAM   Muscle bulk: normal  Overall muscle tone: normal       Moves all extremities against gravity spontaneously      SENSORY EXAM   Right arm light touch: normal  Left arm light touch: normal  Right leg light touch: normal  Left leg light touch: normal    GAIT AND COORDINATION     Gait  Gait: (deferred)    Tremor   Resting tremor: absent    Significant Labs:   CBC:   Recent Labs   Lab 10/11/20  0119 10/12/20  0235   WBC 12.60 10.50   HGB 12.0* 12.6*   HCT 36.6* 38.8*    259     CMP:   Recent Labs   Lab 10/11/20  0119 10/12/20  0235   * 146*    140   K 4.3 4.1    106   CO2 24 25   BUN 26* 25*   CREATININE 0.8 0.7   CALCIUM 8.2* 8.1*   MG 2.0 2.1   PROT 5.5* 5.2*   ALBUMIN 2.9* 2.8*   BILITOT 0.3 0.5   ALKPHOS 63 62   AST 35 68*   ALT 66* 172*   ANIONGAP 7* 9   EGFRNONAA >60.0 >60.0     Inflammatory Markers: No results for input(s): SEDRATE, CRP, PROCAL in the last 48 hours.     CSF 10/5/20:    Ref. Range  10/5/2020 10:00 10/5/2020 10:10   COLOR CSF Latest Ref Range: Colorless  Colorless     RBC, CSF Latest Ref Range: 0 /cu mm 190 (A)     WBC, CSF Latest Ref Range: 0 - 5 /cu mm 1     Glucose, CSF Latest Ref Range: 40 - 70 mg/dL 52     Protein, CSF Latest Ref Range: 15 - 40 mg/dL 71 (H)      CSF HSV and ACE negative    Unremarkable:  --TSH  --HIV   --RPR  --Ammonia  --Folate  --B6 (20)  --B12 (540)  --B1 (49)  --Serum paraneoplastic panel (9/28) negative    All pertinent lab results from the past 24 hours have been reviewed.    Significant Imaging: I have reviewed and interpreted all pertinent imaging results/findings within the past 24 hours.     MRI brain W WO contrast (10/2/20):  Possible subtle increased FLAIR hyperintensity involving the bilateral mesial temporal lobes which can be seen in the setting of autoimmune limbic encephalitis in the appropriate clinical setting. No evidence of acute intracranial hemorrhage, acute infarct or abnormal enhancing lesion.

## 2020-10-12 NOTE — PROGRESS NOTES
"Progress Note  Hospital Medicine    Provider team:    [facility] GENERAL NEUROLOGY  [facility] HOSP MED O  Admit Date: 10/2/2020  Encounter Date: 10/12/2020     SUBJECTIVE:     Follow-up Visit for: Cognitive impairment    HPI (See H&P for complete P,F,SHx):   Per Adriana Badillo MD:   Mr. Juan Hobson is a 69 y.o. male with recently diagnosed penile cancer, who presents to the ER from Urology clinic with concern for self neglect, and need for Neurology evaluation of dementia and possible placement.  The patient mentions that he has been having some issues with his memory.  He mostly orders takeout for meals because it is easier, and still drives to work.  He feels like he is able to take care of himself, and does not need to go to a facility."     Per note from Lianne German LCSW on 10/2:  Received consult from Dr. Monterroso re: assessing patient in clinic this afternoon and care recommendations as patient did not seem competent to make decisions and give consent. He has no family and no legal next of kin (POA or guardian).  Met individually with patient, and with his co-worker Janna Go ([phone], ext. [phone]) who brought him to his clinic appointment today. Patient unable to relate basic demographic information or clearly explain other information. For example, when asked his birth date he gave the year [birth year], then said 9, and then said the second to last, it begins with N; he said his street name but couldn't give the house number where he has lived for years; unable to say what type of work or where he worked prior to the [employer] in [facility] for the past 12 years; he described being taken in a truck to that other place (referring to the recent [facility] stay). He has awareness that he is having memory/cognitive issues but stated he can manage for himself at home and that he knows there is a lot of work that needs to be done in his home that he hasn't gotten to. He inherited the home after " his mother .      Mr. Go and another coworker Jucie Martinez ([phone]) and their supervisor Meiirma Meanso ([phone]) have been trying to help him manage things with his work and personal matters as they have observed his deficits and know he has no one to help him. They have helped him keep up with recent medical appointments and accompanied and transported him.  Mr. Go reports concern for his safety and ability to care for himself after seeing his home with clutter throughout and things piled up, mold and old food in the refrigerator, mold in the toilet, clothes all over, etc. Patient has been driving himself to and from work and to get food at places near his home, but unable to navigate to unfamiliar places. They have observed his cognitive deficits at work and he has made errors, so they have modified his job and his supervisor has been working with him to try to get FMLA and his leave/long-term in place so he doesn't lose his benefits. They have had to help him with paperwork, bill paying, etc., because he can't complete these on his own.        Upon arrival to the ER, vitals were temp 97.8F, HR 59 and /84.  Labs were unremarkable  He was admitted to Hospital Medicine for Neurology and SW evaluation.     Overview/Hospital Course:  69 y.o. who was admitted to hospital medicine for worsening cognitive decline and inability to care for self. Labs/infectious work-up grossly unremarkable. Neurology consulted and MRI obtained on admission with concern for encephalitis (increased FLAIR hyperintensity involving the bilateral mesial temporal lobes which can be seen in the setting of autoimmune limbic encephalitis in the appropriate clinical setting). LP recommended and attempted at bedside however not successful 2/ to KEMI. Interventional radiology consulted, LP performed 10/5; CSF studies ordered including paraneoplastic panel and inflammatory markers, results pending. EEG revealed mild,  non-specific regional dysfunction in bilateral temporal lobes without seizure activity. Neurology suspects presentation may be more chronic than acute raising suspicion of frontotemporal dementia.  He will require further outpatient testing with a neuropsychologist eventually.  Psychiatry consulted for formal capacity evaluation and concluded that he does not have capacity to make decisions regarding his long term care disposition however does not require PEC at this time as his stay has been non-contested. He has no family, children and have relied on coworkers for assistance. The case was brought to the attention of EPS who have no plans to intervene at this time and recommend since the patient presented to the hospital it is a safe discharge planning issue that would involve [facility] Legal department. The Legal department intend to pursue [place] [employer]ianship to assist with managing affairs.     Interval history:  Patient doing well and offers no complaints. Neurology has been following with presentation c/f  dementia vs. autoimmune vs paraneoplastic process in the setting of malignancy. Empiric solumedrol continues with day 4.The patient has not improved. Plan is for LP on 10/13/2020 with special send-out for ADEVL; d/w radiology fluoro. Patient without complaints. His thought process is tangential and inappropriate. I do not sense much improvement with solumedrol.     Review of Systems:  Review of Systems   Constitutional: Positive for activity change, appetite change and unexpected weight change. Negative for fever.   HENT: Negative for trouble swallowing.    Eyes: Negative for photophobia and visual disturbance.   Respiratory: Negative for cough and shortness of breath.    Gastrointestinal: Negative for nausea and vomiting.   Genitourinary: Negative for difficulty urinating and dysuria.   Neurological: Positive for speech difficulty. Negative for weakness.   Psychiatric/Behavioral: Positive for behavioral  "problems, confusion and decreased concentration. Negative for hallucinations and sleep disturbance. The patient is not hyperactive.      OBJECTIVE:       Intake/Output Summary (Last 24 hours) at 10/12/2020 0854  Last data filed at 10/11/2020 1400  Gross per 24 hour   Intake 840 ml   Output 2 ml   Net 838 ml     Vital Signs Range (Last 24H):  Temp:  [97.2 °F (36.2 °C)-97.9 °F (36.6 °C)]   Pulse:  [51-62]   Resp:  [15-20]   BP: (126-150)/(57-74)   SpO2:  [92 %-96 %]   Body mass index is 22.78 kg/m².    Objective:  Vital signs: (most recent): Blood pressure (!) 150/74, pulse (!) 54, temperature 97.8 °F (36.6 °C), resp. rate 16, height 5' 10" (1.778 m), weight 72 kg (158 lb 11.7 oz), SpO2 (!) 94 %.  No fever.      Physical Exam  Vitals signs and nursing note reviewed.   Constitutional:       General: He is not in acute distress.     Appearance: He is not ill-appearing.   HENT:      Head: Normocephalic and atraumatic.      Nose: Nose normal.      Mouth/Throat:      Mouth: Mucous membranes are moist.   Eyes:      Extraocular Movements: Extraocular movements intact.      Pupils: Pupils are equal, round, and reactive to light.   Cardiovascular:      Rate and Rhythm: Normal rate and regular rhythm.   Pulmonary:      Effort: Pulmonary effort is normal. No respiratory distress.   Abdominal:      General: Abdomen is flat. There is no distension.      Palpations: Abdomen is soft.   Musculoskeletal: Normal range of motion.         General: No swelling.   Skin:     General: Skin is warm and dry.      Coloration: Skin is not jaundiced.   Neurological:      General: No focal deficit present.      Mental Status: He is alert. He is disoriented.      Sensory: No sensory deficit.      Motor: No weakness.   Psychiatric:         Mood and Affect: Mood normal.         Speech: Speech normal.         Behavior: Behavior is cooperative.         Cognition and Memory: Cognition is impaired. Memory is impaired. He exhibits impaired recent memory. "     Medications:  Medication list was reviewed in EPIC and changes noted under Assessment/Plan and MAR.    Laboratory:  Recent Labs     10/12/20  0235   WBC 10.50   RBC 3.97*   HGB 12.6*   HCT 38.8*      MCV 98   MCH 31.7*   MCHC 32.5   GRAN 93.0*  9.8*   LYMPH 3.5*  0.4*   MONO 3.0*  0.3   EOS 0.0      Recent Labs     10/12/20  0235   *      K 4.1      CO2 25   BUN 25*   CREATININE 0.7   CALCIUM 8.1*   ANIONGAP 9   MG 2.1   PHOS 2.8       ASSESSMENT/PLAN:     Active Hospital Problems    Diagnosis  POA    *Cognitive impairment [R41.89]  Yes    Acute encephalopathy [G93.40]  Yes    Cognitive decline [R41.89]  Yes    Impaired decision making [Z78.9]  Yes    Confusion [R41.0]  Unknown    Debility [R53.81]  Yes    Penile carcinoma [C60.9]  Yes    Altered mental status [R41.82]  Unknown      Resolved Hospital Problems    Diagnosis Date Resolved POA    Encephalopathy [G93.40] 10/04/2020 Unknown      * Cognitive impairment  69 y.o. with progressive cognitive decline and inability to adequately care for self. Presented in September 2020, to the ED stating there were creatures attacking his penis. 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 Prompted  evaluation by psychiatry and found that it is likely related to dementia after getting collateral from family/friends. He was PEC'd the night of admission after wanting to leave and then discharged to a geropsychiatry facility for 2 weeks. Seen in neuro clinic where there was mention of suspected frontotemporal dementia and further work-up ensued. Presented to a urology appt on 10/2 and was advised to present to the ED due to concern for competency and need for patient advocate/POA before a biopsy or form of treatment can be initiated.      Presentation concerning for frontotemporal dementia vs. autoimmune vs  paraneoplastic process in the setting of malignancy   - LP performed by IR 10/5/2020: elevated protein, neutrophils  - Concern that the patient is unable to care for himself and make his own decisions, however the patient feels like he is able to care for himself  - MRI brain with Possible subtle increased FLAIR hyperintensity involving the bilateral mesial temporal lobes which can be seen in the setting of autoimmune limbic encephalitis  - EEG without seizure activity   - Vitamin B1, Vitamin B6, Vitamin B12, HIV, RPR, TSH unremarkable   - afebrile, without leukocytosis -- infectious work-up unremarkable   - Neurology consulted          --please send CSF to [place] for Alzheimer panel (ADEVL test code)           --start empiric IV Solumedrol 1 g qd x 5 days          --formal cognitive assessment with neuropsych            --consider PET scan          --continue strict delirium precautions  - Paraneoplastic panel, serum in place from outpatient neurology visit on 09/28  - Possibility for Autoimmune vs Paraneoplastic process in the setting of active malignancy  - CM/SW following  - Plan for repeat LP on 10/13     DELIRIUM BEHAVIOR MANAGEMENT  - Minimize use of restraints; if physical restraints necessary, please utilize medical/chemical prns for agitation.  - Keep shades open and room lit during day and room dim at night in order to promote healthy circadian rhythms.  - Encourage family at bedside  - Keep whiteboard in patient's room current with the date and name of the members of patient's team for easy patient self re-orientation.  - Avoid benzodiazepines, antihistamines, anticholinergics, hypnotics, and minimize opiates while controlling for pain as these medications may exacerbate delirium.        Impaired decision making  Presented to a urology appt on 10/2 and was advised to present to the ED due to concern for competency and need for patient advocate/POA before a biopsy or form of treatment can be initiated.       [place] R[place] 40:1299.53: (9) Upon the inability of any adult to consent for himself and in the absence of any person listed in Paragraphs (2) through (8) of this Subsection, an adult friend of the patient. For purposes of this Subsection to consent, adult friend means an adult who has exhibited special care and concern for the patient, who is generally familiar with the patient's health care views and desires, and who is willing and able to become involved in the patient's health care decisions and to act in the patient's best interest. The adult friend shall sign and date an acknowledgment form provided by the hospital or other health care facility in which the patient is located for placement in the patient's records certifying that he or she meets such criteria.     -  Juice Martinez ([phone]) will make decisions on his behalf  - supervisor Mei Bell ([phone]) have been trying to help him manage things with his work and personal matters as they have observed his deficits and know he has no one to help him - these individuals may be used for consent   - PEC'd for grave disability during last admission and discharged to [facility] however no formal eval by in-patient psychiatry  - psychiatry has consulted    Penile cancer  - Urology requests involvement of ethics committee to determine inpatient w/u and treatment appropriateness    - Patient does have friend that can consent for procedures; this has been cleared with legal per CM/SW  - D/w Dr. Gibson, head of ethics committee, no indication for consultation as patient does have the ability to have parties consent    Anticipated discharge date and disposition:   Pending legal guardianship establishment.  Brayan Castillo MD  [facility] Medicine

## 2020-10-13 LAB
ALBUMIN SERPL BCP-MCNC: 2.7 G/DL (ref 3.5–5.2)
ALP SERPL-CCNC: 63 U/L (ref 55–135)
ALT SERPL W/O P-5'-P-CCNC: 145 U/L (ref 10–44)
ANION GAP SERPL CALC-SCNC: 5 MMOL/L (ref 8–16)
AST SERPL-CCNC: 39 U/L (ref 10–40)
BASOPHILS # BLD AUTO: 0.02 K/UL (ref 0–0.2)
BASOPHILS NFR BLD: 0.2 % (ref 0–1.9)
BILIRUB SERPL-MCNC: 0.5 MG/DL (ref 0.1–1)
BUN SERPL-MCNC: 26 MG/DL (ref 8–23)
CALCIUM SERPL-MCNC: 8 MG/DL (ref 8.7–10.5)
CHLORIDE SERPL-SCNC: 102 MMOL/L (ref 95–110)
CLARITY CSF: CLEAR
CLARITY CSF: CLEAR
CO2 SERPL-SCNC: 31 MMOL/L (ref 23–29)
COLOR CSF: COLORLESS
COLOR CSF: COLORLESS
CREAT SERPL-MCNC: 0.7 MG/DL (ref 0.5–1.4)
DIFFERENTIAL METHOD: ABNORMAL
EOSINOPHIL # BLD AUTO: 0 K/UL (ref 0–0.5)
EOSINOPHIL NFR BLD: 0.1 % (ref 0–8)
ERYTHROCYTE [DISTWIDTH] IN BLOOD BY AUTOMATED COUNT: 13.2 % (ref 11.5–14.5)
EST. GFR  (AFRICAN AMERICAN): >60 ML/MIN/1.73 M^2
EST. GFR  (NON AFRICAN AMERICAN): >60 ML/MIN/1.73 M^2
GLUCOSE CSF-MCNC: 56 MG/DL (ref 40–70)
GLUCOSE SERPL-MCNC: 103 MG/DL (ref 70–110)
HCT VFR BLD AUTO: 40.5 % (ref 40–54)
HGB BLD-MCNC: 13 G/DL (ref 14–18)
IMM GRANULOCYTES # BLD AUTO: 0.09 K/UL (ref 0–0.04)
IMM GRANULOCYTES NFR BLD AUTO: 0.8 % (ref 0–0.5)
LYMPHOCYTES # BLD AUTO: 0.9 K/UL (ref 1–4.8)
LYMPHOCYTES NFR BLD: 7.3 % (ref 18–48)
LYMPHOCYTES NFR CSF MANUAL: 20 % (ref 40–80)
LYMPHOCYTES NFR CSF MANUAL: 50 % (ref 40–80)
MAGNESIUM SERPL-MCNC: 2.4 MG/DL (ref 1.6–2.6)
MCH RBC QN AUTO: 31.6 PG (ref 27–31)
MCHC RBC AUTO-ENTMCNC: 32.1 G/DL (ref 32–36)
MCV RBC AUTO: 98 FL (ref 82–98)
MONOCYTES # BLD AUTO: 1 K/UL (ref 0.3–1)
MONOCYTES NFR BLD: 8.3 % (ref 4–15)
MONOS+MACROS NFR CSF MANUAL: 30 % (ref 15–45)
MONOS+MACROS NFR CSF MANUAL: 75 % (ref 15–45)
NEUTROPHILS # BLD AUTO: 9.7 K/UL (ref 1.8–7.7)
NEUTROPHILS NFR BLD: 83.3 % (ref 38–73)
NEUTROPHILS NFR CSF MANUAL: 20 % (ref 0–6)
NEUTROPHILS NFR CSF MANUAL: 5 % (ref 0–6)
NRBC BLD-RTO: 0 /100 WBC
PHOSPHATE SERPL-MCNC: 1.8 MG/DL (ref 2.7–4.5)
PLATELET # BLD AUTO: 267 K/UL (ref 150–350)
PMV BLD AUTO: 11 FL (ref 9.2–12.9)
POTASSIUM SERPL-SCNC: 4.5 MMOL/L (ref 3.5–5.1)
PROT CSF-MCNC: 71 MG/DL (ref 15–40)
PROT SERPL-MCNC: 5.1 G/DL (ref 6–8.4)
RBC # BLD AUTO: 4.12 M/UL (ref 4.6–6.2)
RBC # CSF: 30 /CU MM
RBC # CSF: 5 /CU MM
SODIUM SERPL-SCNC: 138 MMOL/L (ref 136–145)
SPECIMEN VOL CSF: 2 ML
SPECIMEN VOL CSF: 6 ML
WBC # BLD AUTO: 11.62 K/UL (ref 3.9–12.7)
WBC # CSF: 1 /CU MM (ref 0–5)
WBC # CSF: 1 /CU MM (ref 0–5)

## 2020-10-13 PROCEDURE — 99232 PR SUBSEQUENT HOSPITAL CARE,LEVL II: ICD-10-PCS | Mod: ,,, | Performed by: UROLOGY

## 2020-10-13 PROCEDURE — 25000003 PHARM REV CODE 250: Performed by: HOSPITALIST

## 2020-10-13 PROCEDURE — 11000001 HC ACUTE MED/SURG PRIVATE ROOM

## 2020-10-13 PROCEDURE — 84100 ASSAY OF PHOSPHORUS: CPT

## 2020-10-13 PROCEDURE — 30000890 MAYO MISCELLANEOUS TEST (REFLEX)

## 2020-10-13 PROCEDURE — 87205 SMEAR GRAM STAIN: CPT

## 2020-10-13 PROCEDURE — 99232 PR SUBSEQUENT HOSPITAL CARE,LEVL II: ICD-10-PCS | Mod: ,,, | Performed by: HOSPITALIST

## 2020-10-13 PROCEDURE — 99232 SBSQ HOSP IP/OBS MODERATE 35: CPT | Mod: ,,, | Performed by: HOSPITALIST

## 2020-10-13 PROCEDURE — 84157 ASSAY OF PROTEIN OTHER: CPT

## 2020-10-13 PROCEDURE — 82945 GLUCOSE OTHER FLUID: CPT

## 2020-10-13 PROCEDURE — 87070 CULTURE OTHR SPECIMN AEROBIC: CPT

## 2020-10-13 PROCEDURE — 83735 ASSAY OF MAGNESIUM: CPT

## 2020-10-13 PROCEDURE — 85025 COMPLETE CBC W/AUTO DIFF WBC: CPT

## 2020-10-13 PROCEDURE — 80053 COMPREHEN METABOLIC PANEL: CPT

## 2020-10-13 PROCEDURE — 36415 COLL VENOUS BLD VENIPUNCTURE: CPT

## 2020-10-13 PROCEDURE — 99000 SPECIMEN HANDLING OFFICE-LAB: CPT

## 2020-10-13 PROCEDURE — 89051 BODY FLUID CELL COUNT: CPT | Mod: 91

## 2020-10-13 PROCEDURE — 83520 IMMUNOASSAY QUANT NOS NONAB: CPT

## 2020-10-13 PROCEDURE — 25000003 PHARM REV CODE 250: Performed by: STUDENT IN AN ORGANIZED HEALTH CARE EDUCATION/TRAINING PROGRAM

## 2020-10-13 PROCEDURE — 99232 SBSQ HOSP IP/OBS MODERATE 35: CPT | Mod: ,,, | Performed by: UROLOGY

## 2020-10-13 RX ORDER — LIDOCAINE HYDROCHLORIDE 10 MG/ML
3 INJECTION INFILTRATION; PERINEURAL ONCE
Status: COMPLETED | OUTPATIENT
Start: 2020-10-13 | End: 2020-10-13

## 2020-10-13 RX ADMIN — PANTOPRAZOLE SODIUM 40 MG: 40 TABLET, DELAYED RELEASE ORAL at 10:10

## 2020-10-13 RX ADMIN — LIDOCAINE HYDROCHLORIDE 3 ML: 10 INJECTION, SOLUTION INFILTRATION; PERINEURAL at 02:10

## 2020-10-13 NOTE — H&P
Radiology History & Physical      SUBJECTIVE:     Chief Complaint: Cognitive Impairment    History of Present Illness:  Juan Hobson is a 69 y.o. male with penile carcinoma and progressive cognitive impairment who presents for lumbar puncture.  Past Medical History:   Diagnosis Date    Basal cell carcinoma     right helix    Hematuria     Seborrheic dermatitis     Skin cancer of arm      Past Surgical History:   Procedure Laterality Date    INCISION AND DRAINAGE INTRA ORAL ABSCESS         Home Meds:   Prior to Admission medications    Not on File     Anticoagulants/Antiplatelets: no anticoagulation    Allergies: Review of patient's allergies indicates:  No Known Allergies  Sedation History:  no adverse reactions    Review of Systems:   Hematological: no known coagulopathies  Respiratory: no shortness of breath  Cardiovascular: no chest pain  Gastrointestinal: no abdominal pain  Genito-Urinary: no dysuria  Musculoskeletal: negative  Neurological: no TIA or stroke symptoms         OBJECTIVE:     Vital Signs (Most Recent)  Temp: 97.6 °F (36.4 °C) (10/13/20 1129)  Pulse: 60 (10/13/20 1129)  Resp: 18 (10/13/20 1129)  BP: 137/68 (10/13/20 1129)  SpO2: (!) 94 % (10/13/20 1129)    Physical Exam:  ASA: II  Mallampati: II    General: no acute distress  Mental Status: alert and oriented to person, place and time  HEENT: normocephalic, atraumatic  Chest: unlabored breathing  Heart: regular heart rate  Abdomen: nondistended  Extremity: moves all extremities    Laboratory  No results found for: INR    Lab Results   Component Value Date    WBC 11.62 10/13/2020    HGB 13.0 (L) 10/13/2020    HCT 40.5 10/13/2020    MCV 98 10/13/2020     10/13/2020      Lab Results   Component Value Date     10/13/2020     10/13/2020    K 4.5 10/13/2020     10/13/2020    CO2 31 (H) 10/13/2020    BUN 26 (H) 10/13/2020    CREATININE 0.7 10/13/2020    CALCIUM 8.0 (L) 10/13/2020    MG 2.4 10/13/2020     (H)  10/13/2020    AST 39 10/13/2020    ALBUMIN 2.7 (L) 10/13/2020    BILITOT 0.5 10/13/2020       ASSESSMENT/PLAN:     Sedation Plan: local  Patient will undergo lumbar puncture.    Jay Ring, DO  PGY-II  Diagnostic and Interventional Radiology  [facility]

## 2020-10-13 NOTE — PROGRESS NOTES
Spoke to Juice Martinez, who is Mr. Hobson's coworker who has been assisting him with appointments, medical decision making, after discussing Mr. Hobson's situation with Dr. Jocelyn Gibson.    Apparently, the patient complained of issues with his penis about 1 year ago.  He commented that he couldn't get the bleeding to stop, but this was never witnessed.  Mrs. Martinez stated that she encouraged him to seek care and that he later presented to the ER.  He has moments of lucidity when he sounds like he knows what is going on. However, today, when she spoke to him on the phone, he could not recall her name, he did know that she works at the [employer] facility.  She is not sure that he ever new her name, because he would always approach her and to speak to her without saying her name.      Juice states that they have been encouraging the patient to look into a nursing home with a memory care unit.  They went so far as to visit facilities on his behalf, gather brochures and provide them for the patient but he did not wish to pursue this at the time.     The patient was aware that he was undergoing a lumbar puncture today.  She believes that if he was presented with the information regarding diagnosis and treatment of penile cancer, that he might be able to comprehend what is going on.    The plan at this point is to obtain a CT of the abdomen and pelvis and also chest x-ray to stage the patient, which has been ordered.  He is not a candidate for lymph node dissection as the morbidity of this procedure is too great.  However, depending on the results of the above imaging, we would consider local control, depending on the patient's reaction to counseling.

## 2020-10-13 NOTE — PROGRESS NOTES
"Progress Note  Hospital Medicine    Provider team:    [facility] GENERAL NEUROLOGY  [facility] HOSP MED O  Admit Date: 10/2/2020  Encounter Date: 10/13/2020     SUBJECTIVE:     Follow-up Visit for: Cognitive impairment    HPI (See H&P for complete P,F,SHx):   Per Adriana Badillo MD:   Mr. Juan Hobson is a 69 y.o. male with recently diagnosed penile cancer, who presents to the ER from Urology clinic with concern for self neglect, and need for Neurology evaluation of dementia and possible placement.  The patient mentions that he has been having some issues with his memory.  He mostly orders takeout for meals because it is easier, and still drives to work.  He feels like he is able to take care of himself, and does not need to go to a facility."     Per note from Lianne German LCSW on 10/2:  Received consult from Dr. Monterroso re: assessing patient in clinic this afternoon and care recommendations as patient did not seem competent to make decisions and give consent. He has no family and no legal next of kin (POA or guardian).  Met individually with patient, and with his co-worker Janna Go ([phone], ext. [phone]) who brought him to his clinic appointment today. Patient unable to relate basic demographic information or clearly explain other information. For example, when asked his birth date he gave the year [birth year], then said 9, and then said the second to last, it begins with N; he said his street name but couldn't give the house number where he has lived for years; unable to say what type of work or where he worked prior to the [employer] in [facility] for the past 12 years; he described being taken in a truck to that other place (referring to the recent [facility] stay). He has awareness that he is having memory/cognitive issues but stated he can manage for himself at home and that he knows there is a lot of work that needs to be done in his home that he hasn't gotten to. He inherited the home after " his mother .      Mr. Go and another coworker Juice Martinez ([phone]) and their supervisor Meiirma Meanso ([phone]) have been trying to help him manage things with his work and personal matters as they have observed his deficits and know he has no one to help him. They have helped him keep up with recent medical appointments and accompanied and transported him.  Mr. Go reports concern for his safety and ability to care for himself after seeing his home with clutter throughout and things piled up, mold and old food in the refrigerator, mold in the toilet, clothes all over, etc. Patient has been driving himself to and from work and to get food at places near his home, but unable to navigate to unfamiliar places. They have observed his cognitive deficits at work and he has made errors, so they have modified his job and his supervisor has been working with him to try to get FMLA and his leave/USP in place so he doesn't lose his benefits. They have had to help him with paperwork, bill paying, etc., because he can't complete these on his own.        Upon arrival to the ER, vitals were temp 97.8F, HR 59 and /84.  Labs were unremarkable  He was admitted to Hospital Medicine for Neurology and SW evaluation.     Overview/Hospital Course:  69 y.o. who was admitted to hospital medicine for worsening cognitive decline and inability to care for self. Labs/infectious work-up grossly unremarkable. Neurology consulted and MRI obtained on admission with concern for encephalitis (increased FLAIR hyperintensity involving the bilateral mesial temporal lobes which can be seen in the setting of autoimmune limbic encephalitis in the appropriate clinical setting). LP recommended and attempted at bedside however not successful 2/ to KEMI. Interventional radiology consulted, LP performed 10/5; CSF studies ordered including paraneoplastic panel and inflammatory markers, results pending. EEG revealed mild,  non-specific regional dysfunction in bilateral temporal lobes without seizure activity. Neurology suspects presentation may be more chronic than acute raising suspicion of frontotemporal dementia.  He will require further outpatient testing with a neuropsychologist eventually.  Psychiatry consulted for formal capacity evaluation and concluded that he does not have capacity to make decisions regarding his long term care disposition however does not require PEC at this time as his stay has been non-contested. He has no family, children and have relied on coworkers for assistance. The case was brought to the attention of EPS who have no plans to intervene at this time and recommend since the patient presented to the hospital it is a safe discharge planning issue that would involve [facility] Legal department. The Legal department intend to pursue [place] Guardianship to assist with managing affairs.     Interval history:  Patient doing well and offers no complaints. Neurology has been following with presentation c/f  dementia vs. autoimmune vs paraneoplastic process in the setting of malignancy. Empiric solumedrol continues with day 4.The patient has not improved. Plan is for LP on 10/13/2020 with special send-out for ADEVL; d/w radiology fluoro. Patient without complaints. His thought process is tangential and inappropriate. I do not sense much improvement with solumedrol.     Review of Systems:  Review of Systems   Constitutional: Positive for activity change, appetite change and unexpected weight change. Negative for fever.   HENT: Negative for trouble swallowing.    Eyes: Negative for photophobia and visual disturbance.   Respiratory: Negative for cough and shortness of breath.    Gastrointestinal: Negative for nausea and vomiting.   Genitourinary: Negative for difficulty urinating and dysuria.   Neurological: Positive for speech difficulty. Negative for weakness.   Psychiatric/Behavioral: Positive for behavioral  "problems, confusion and decreased concentration. Negative for hallucinations and sleep disturbance. The patient is not hyperactive.      OBJECTIVE:       Intake/Output Summary (Last 24 hours) at 10/13/2020 0916  Last data filed at 10/12/2020 1730  Gross per 24 hour   Intake 660 ml   Output --   Net 660 ml     Vital Signs Range (Last 24H):  Temp:  [97.7 °F (36.5 °C)-98.4 °F (36.9 °C)]   Pulse:  [50-63]   Resp:  [16-18]   BP: (121-146)/(64-76)   SpO2:  [87 %-96 %]   Body mass index is 22.78 kg/m².    Objective:  Vital signs: (most recent): Blood pressure (!) 147/72, pulse (!) 52, temperature 98.1 °F (36.7 °C), temperature source Oral, resp. rate 18, height 5' 10" (1.778 m), weight 72 kg (158 lb 11.7 oz), SpO2 99 %.      Physical Exam  Vitals signs and nursing note reviewed.   Constitutional:       General: He is not in acute distress.     Appearance: He is not ill-appearing.   HENT:      Head: Normocephalic and atraumatic.      Nose: Nose normal.      Mouth/Throat:      Mouth: Mucous membranes are moist.   Eyes:      Extraocular Movements: Extraocular movements intact.      Pupils: Pupils are equal, round, and reactive to light.   Cardiovascular:      Rate and Rhythm: Normal rate and regular rhythm.   Pulmonary:      Effort: Pulmonary effort is normal. No respiratory distress.   Abdominal:      General: Abdomen is flat. There is no distension.      Palpations: Abdomen is soft.   Musculoskeletal: Normal range of motion.         General: No swelling.   Skin:     General: Skin is warm and dry.      Coloration: Skin is not jaundiced.   Neurological:      General: No focal deficit present.      Mental Status: He is alert. He is disoriented.      Sensory: No sensory deficit.      Motor: No weakness.   Psychiatric:         Mood and Affect: Mood normal.         Speech: Speech normal.         Behavior: Behavior is cooperative.         Cognition and Memory: Cognition is impaired. Memory is impaired. He exhibits impaired recent " memory.     Medications:  Medication list was reviewed in EPIC and changes noted under Assessment/Plan and MAR.    Laboratory:  Recent Labs     10/13/20  0254   WBC 11.62   RBC 4.12*   HGB 13.0*   HCT 40.5      MCV 98   MCH 31.6*   MCHC 32.1   GRAN 83.3*  9.7*   LYMPH 7.3*  0.9*   MONO 8.3  1.0   EOS 0.0      Recent Labs     10/13/20  0254         K 4.5      CO2 31*   BUN 26*   CREATININE 0.7   CALCIUM 8.0*   ANIONGAP 5*   MG 2.4   PHOS 1.8*       ASSESSMENT/PLAN:     Active Hospital Problems    Diagnosis  POA    *Cognitive impairment [R41.89]  Yes    Acute encephalopathy [G93.40]  Yes    Cognitive decline [R41.89]  Yes    Impaired decision making [Z78.9]  Yes    Confusion [R41.0]  Unknown    Debility [R53.81]  Yes    Penile carcinoma [C60.9]  Yes    Altered mental status [R41.82]  Unknown      Resolved Hospital Problems    Diagnosis Date Resolved POA    Encephalopathy [G93.40] 10/04/2020 Unknown      * Cognitive impairment  69 y.o. with progressive cognitive decline and inability to adequately care for self. Presented in September 2020, to the ED stating there were creatures attacking his penis. 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 Prompted  evaluation by psychiatry and found that it is likely related to dementia after getting collateral from family/friends. He was PEC'd the night of admission after wanting to leave and then discharged to a geropsychiatry facility for 2 weeks. Seen in neuro clinic where there was mention of suspected frontotemporal dementia and further work-up ensued. Presented to a urology appt on 10/2 and was advised to present to the ED due to concern for competency and need for patient advocate/POA before a biopsy or form of treatment can be initiated.      Presentation concerning for frontotemporal dementia vs.  autoimmune vs paraneoplastic process in the setting of malignancy   - LP performed by IR 10/5/2020: elevated protein, neutrophils  - Concern that the patient is unable to care for himself and make his own decisions, however the patient feels like he is able to care for himself  - MRI brain with Possible subtle increased FLAIR hyperintensity involving the bilateral mesial temporal lobes which can be seen in the setting of autoimmune limbic encephalitis  - EEG without seizure activity   - Vitamin B1, Vitamin B6, Vitamin B12, HIV, RPR, TSH unremarkable   - afebrile, without leukocytosis -- infectious work-up unremarkable   - Neurology consulted          --please send CSF to [place] for Alzheimer panel (ADEVL test code)           --start empiric IV Solumedrol 1 g qd x 5 days          --formal cognitive assessment with neuropsych            --consider PET scan          --continue strict delirium precautions  - Paraneoplastic panel, serum in place from outpatient neurology visit on 09/28  - Possibility for Autoimmune vs Paraneoplastic process in the setting of active malignancy  - CM/SW following  - Plan for repeat LP on 10/13     DELIRIUM BEHAVIOR MANAGEMENT  - Minimize use of restraints; if physical restraints necessary, please utilize medical/chemical prns for agitation.  - Keep shades open and room lit during day and room dim at night in order to promote healthy circadian rhythms.  - Encourage family at bedside  - Keep whiteboard in patient's room current with the date and name of the members of patient's team for easy patient self re-orientation.  - Avoid benzodiazepines, antihistamines, anticholinergics, hypnotics, and minimize opiates while controlling for pain as these medications may exacerbate delirium.        Impaired decision making  Presented to a urology appt on 10/2 and was advised to present to the ED due to concern for competency and need for patient advocate/POA before a biopsy or form of treatment can be  initiated.      [place] R[place] 40:1299.53: (9) Upon the inability of any adult to consent for himself and in the absence of any person listed in Paragraphs (2) through (8) of this Subsection, an adult friend of the patient. For purposes of this Subsection to consent, adult friend means an adult who has exhibited special care and concern for the patient, who is generally familiar with the patient's health care views and desires, and who is willing and able to become involved in the patient's health care decisions and to act in the patient's best interest. The adult friend shall sign and date an acknowledgment form provided by the hospital or other health care facility in which the patient is located for placement in the patient's records certifying that he or she meets such criteria.     -  Omarjony Michelle ([phone]) will make decisions on his behalf  - supervisor Mei Bell ([phone]) have been trying to help him manage things with his work and personal matters as they have observed his deficits and know he has no one to help him - these individuals may be used for consent   - PEC'd for grave disability during last admission and discharged to [facility] however no formal eval by in-patient psychiatry  - psychiatry has consulted    Penile cancer  - Urology requests involvement of ethics committee to determine inpatient w/u and treatment appropriateness    - Patient does have friend that can consent for procedures; this has been cleared with legal per CM/SW  - D/w Dr. Gibson, head of ethics committee, no indication for consultation as patient does have the ability to have parties consent    Anticipated discharge date and disposition:   Pending legal guardianship establishment.  Brayan Castillo MD  [facility]

## 2020-10-13 NOTE — ASSESSMENT & PLAN NOTE
70 yo male with history of BCC s/p Mohs' procedure and new diagnosis of penile malignancy was sent to ED from Urology clinic for cognitive impairment and inability to care for himself. Pt lives alone and does not have next of kin. Coworkers noted cognitive decline around early September and he was seen in clinic by Dr. Ernandez 9/28 for memory issues felt to be due to FTD. This admission, workup has been remarkable for MRI brain W WO contrast showing increased FLAIR hyperintensity involving the bilateral mesial temporal lobes. LP 10/5 with CSF (1 WBC, 190 RBC, 71 protein, 52 glucose). CSF HSV, ACE negative. EEG 10/3 (1 hr 40 min) with mild regional dysfunction in bilateral temporal lobes, but no electrographic seizures. Serum paraneoplastic panel from 9/28 has resulted negative. Completed 5 days of empiric IV Solumedrol 1 g qd without notable clinical improvement.    Presentation concerning for dementia vs. autoimmune vs paraneoplastic process in the setting of malignancy     Recommendations:  --repeat LP with IR completed today  Please collect 2 mL of CSF directly into a CSF AD Biomarker Tube  [facility] lab sendout to [place] (ADEVL) Alzheimer panel   Also send CSF for cell ct with diff, glu, protein, Cx, paraneoplastic, autoimmune panel and freeze hold  -- unable to obtain amyloid PET scan while inpatient   -- outpatient cognitive assessment with neuropsych   -- continue strict delirium precautions

## 2020-10-13 NOTE — PROCEDURES
Radiology Post-Procedure Note    Pre Op Diagnosis: cognitive impairment    Post Op Diagnosis: Same    Procedure: lumbar puncture    Procedure performed by: Destiny BARON, Lavell,; Jay Ring DO    Written Informed Consent Obtained: Verbal consent of friend/POEMILY Martinez    Specimen Removed: 15 mL CSF    Estimated Blood Loss: None    Findings: Following written informed consent and sterile prep and drape, a 22 gauge spinal needle was inserted at L4 - L5 intralaminar space under fluoroscopic surveillance.  15 mL clear CSF removed and sent to the lab for further analysis. Approximately 2cc of the clear CSF collected placed in separate ADEVL tube for [facility] send out. There were no complications.    Patient tolerated procedure well.    Jay Ring DO  PGY-II  Diagnostic and Interventional Radiology  [facility]

## 2020-10-13 NOTE — PLAN OF CARE
Problem: Fall Injury Risk  Goal: Absence of Fall and Fall-Related Injury  Outcome: Ongoing, Progressing     Problem: Thought Process Alteration  Goal: Optimal Thought Clarity  Outcome: Ongoing, Progressing     Problem: Adult Inpatient Plan of Care  Goal: Optimal Comfort and Wellbeing  Outcome: Ongoing, Progressing  Pt has been kept free from, falls and injury, he is up in the room ambulating to the BR. He had a lumbar puncture completed earlier encouraged to rest. He is currently without complaints.

## 2020-10-13 NOTE — SUBJECTIVE & OBJECTIVE
Subjective:     Interval History:   Slept well overnight. Awaiting repeat LP today for Alzheimer panel   Says he feels well other than frustration from memory issues    Current Facility-Administered Medications   Medication Dose Route Frequency Provider Last Rate Last Dose    acetaminophen tablet 650 mg  650 mg Oral Q4H PRN Adriana Badillo MD        melatonin tablet 6 mg  6 mg Oral Nightly PRN Adriana Badillo MD        ondansetron injection 8 mg  8 mg Intravenous Q8H PRN Adriana Badillo MD        pantoprazole EC tablet 40 mg  40 mg Oral Daily Rosemary Ball MD   40 mg at 10/13/20 1000    promethazine (PHENERGAN) 25 mg in dextrose 5 % 50 mL IVPB  25 mg Intravenous Q6H PRN Adriana Badillo MD        senna-docusate 8.6-50 mg per tablet 1 tablet  1 tablet Oral BID PRN Adriana Badillo MD        sodium chloride 0.9% flush 5 mL  5 mL Intravenous PRN Adriana Badillo MD         Review of Systems   Constitutional: Positive for activity change and fatigue. Negative for fever.   HENT: Negative for trouble swallowing and voice change.    Eyes: Negative for visual disturbance.   Respiratory: Negative for shortness of breath.    Cardiovascular: Negative for chest pain.   Gastrointestinal: Negative for nausea and vomiting.   Musculoskeletal: Negative for gait problem and neck stiffness.   Neurological: Negative for dizziness, tremors, seizures, syncope, facial asymmetry, speech difficulty, weakness, light-headedness, numbness and headaches.   Psychiatric/Behavioral: Positive for confusion and decreased concentration. Negative for agitation, behavioral problems, hallucinations and sleep disturbance.     Objective:     Vital Signs (Most Recent):  Temp: 98.1 °F (36.7 °C) (10/13/20 1549)  Pulse: (!) 52 (10/13/20 1549)  Resp: 18 (10/13/20 1549)  BP: (!) 147/72 (10/13/20 1549)  SpO2: 99 % (10/13/20 1549) Vital Signs (24h Range):  Temp:  [97.6 °F (36.4 °C)-98.1 °F (36.7 °C)] 98.1 °F (36.7 °C)  Pulse:  [50-63] 52  Resp:   [16-18] 18  SpO2:  [87 %-99 %] 99 %  BP: (127-147)/(64-76) 147/72     Weight: 72 kg (158 lb 11.7 oz)  Body mass index is 22.78 kg/m².    Physical Exam  Eyes:      Extraocular Movements: EOM normal.      Pupils: Pupils are equal, round, and reactive to light.   Neurological:      Coordination: Finger-Nose-Finger Test normal.      Gait: Gait is intact.   Psychiatric:         Speech: Speech normal.         NEUROLOGICAL EXAMINATION:     MENTAL STATUS   Recall of objects at 5 minutes: 0/3. Follows 2 step commands.   Attention: decreased. Concentration: decreased.   Speech: speech is normal   Level of consciousness: alert       Oriented to self  Unable to name hospital without prompting  Then easily names [facility]    Knows we are in October, but has to count on his fingers to 10    Knows year is 2020     Says president's name is YOSEF    Knows he used to work as a , but unable to name [facility] [employer] [facility] facility     Speaks in generalities        CRANIAL NERVES     CN III, IV, VI   Pupils are equal, round, and reactive to light.  Extraocular motions are normal.   Nystagmus: none   Ophthalmoparesis: none    CN V   Facial sensation intact.     CN VII   Facial expression full, symmetric.     CN VIII   Hearing: intact    CN IX, X   Palate: symmetric    CN XI   CN XI normal.     CN XII   CN XII normal.     MOTOR EXAM   Muscle bulk: normal  Right arm pronator drift: absent  Left arm pronator drift: absent    Strength   Right deltoid: 5/5  Left deltoid: 5/5  Right biceps: 5/5  Left biceps: 5/5  Right triceps: 5/5  Left triceps: 5/5  Right interossei: 5/5  Left interossei: 5/5  Right anterior tibial: 5/5  Left anterior tibial: 5/5  Right posterior tibial: 5/5  Left posterior tibial: 5/5  Right peroneal: 5/5  Left peroneal: 5/5    SENSORY EXAM   Right arm light touch: normal  Left arm light touch: normal  Right leg light touch: normal  Left leg light touch: normal    GAIT AND COORDINATION     Gait  Gait: normal      Coordination   Finger to nose coordination: normal    Tremor   Resting tremor: absent    Significant Labs:   CBC:   Recent Labs   Lab 10/12/20  0235 10/13/20  0254   WBC 10.50 11.62   HGB 12.6* 13.0*   HCT 38.8* 40.5    267     CMP:   Recent Labs   Lab 10/12/20  0235 10/13/20  0254   * 103    138   K 4.1 4.5    102   CO2 25 31*   BUN 25* 26*   CREATININE 0.7 0.7   CALCIUM 8.1* 8.0*   MG 2.1 2.4   PROT 5.2* 5.1*   ALBUMIN 2.8* 2.7*   BILITOT 0.5 0.5   ALKPHOS 62 63   AST 68* 39   * 145*   ANIONGAP 9 5*   EGFRNONAA >60.0 >60.0     Inflammatory Markers: No results for input(s): SEDRATE, CRP, PROCAL in the last 48 hours.  Urine Culture: No results for input(s): LABURIN in the last 48 hours.  Urine Studies: No results for input(s): COLORU, APPEARANCEUA, PHUR, SPECGRAV, PROTEINUA, GLUCUA, KETONESU, BILIRUBINUA, OCCULTUA, NITRITE, UROBILINOGEN, LEUKOCYTESUR, RBCUA, WBCUA, BACTERIA, SQUAMEPITHEL, HYALINECASTS in the last 48 hours.    Invalid input(s): WRIGHTSUR     CSF (10/5/20):    WBC 1  Glucose 52  Protein 71  HSV negative  ACE negative  Paraneoplastic negative     Unremarkable serum studies:  --TSH  --HIV   --RPR  --Ammonia  --Folate  --B6 (20)  --B12 (540)  --B1 (49)  --Serum paraneoplastic panel (9/28) negative    All pertinent lab results from the past 24 hours have been reviewed.    EEG (10/3/20) 1 hr 40 min    Abnormal EEG due to mild, non-specific regional dysfunction in bilateral temporal lobes.  There are no electrographic seizures   or indications of seizure tendency    Significant Imaging: I have reviewed and interpreted all pertinent imaging results/findings within the past 24 hours.     MRI Brain W WO contrast (10/2/20):  Possible subtle increased FLAIR hyperintensity involving the bilateral mesial temporal lobes which can be seen in the setting of autoimmune limbic encephalitis in the appropriate clinical setting. No evidence of acute intracranial hemorrhage, acute  infarct or abnormal enhancing lesion.

## 2020-10-13 NOTE — PLAN OF CARE
This pt dc planning is pending leg al guardianship . Lumbar puncture today, will cont to follow pt is not medically ready for dc.     10/13/20 3835   Discharge Reassessment   Assessment Type Discharge Planning Reassessment   Provided patient/caregiver education on the expected discharge date and the discharge plan Yes   Do you have any problems affording any of your prescribed medications? No   Discharge Plan A Other   Discharge Plan B Other   DME Needed Upon Discharge  none   Anticipated Discharge Disposition   (Awaiting legal guardianship)   Can the patient/caregiver answer the patient profile reliably? No, cognitively impaired   How does the patient rate their overall health at the present time? Fair   Describe the patient's ability to walk at the present time. Minor restrictions or changes   How often would a person be available to care for the patient? Occasionally   During the past month, has the patient often been bothered by feeling down, depressed or hopeless? No   Post-Acute Status   Post-Acute Authorization   (placement pending legal guardianship)   Discharge Delays None known at this time   Modesta Blum, MSN  Case Management  Ext [phone]

## 2020-10-13 NOTE — PROGRESS NOTES
"[facility]  Neurology  Progress Note    Patient Name: Juan Hobson  MRN: [id number]  Admission Date: 10/2/2020  Hospital Length of Stay: 7 days  Code Status: Full Code   Attending Provider: Brayan Castillo MD  Primary Care Physician: Mehnaz Barillas MD   Principal Problem:Cognitive impairment    HPI:   68 yo male w/ past medical history significant for BCC s/p Mohs procedure, recent Dx of penile carcinoma who presented to [facility] on 10/02 from Urology clinic w/ concern for progressive dementia and inability to care for self. Patient was seen in clinic by Dr. Ernandez on 09/28 for memory complaints and at the time the diagnosis of fronto-temporal dementia was brought up. Per Dr. 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  Coworkers note that he has always been a very punctual and reliable employee but did not show up to work on Sept 2 and instead presented to [facility] reporting  "creatures attacked his penis".  He was noted to have enlarged, ulcerated penis with MRI and urology consult showing concern for penile cancer but not biopsy confirmed at this time.   He was treated for infection and discharged to geriatric psych facility where he was held for two weeks prior to discharge home with home health orders for an agency that does not accept his insurance.When asked about memory difficulties, he cites difficulty connecting objects to words for them.  Co worker retrospectively note his clocking in/out has been inconsistent over the past several months.  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  Co workers note that over the past several months he has been making some errors in recording measurements. He continues to drive and live in his own home. Patient has recently had a 2 week stay in a geriatric psychiatric facility. " "Per chart review, patient is a poor historian and some information was provided by the co-worker that accompanied the patient to the ED - patient lives at home alone in an unsafe environment.  Social work evaluation was conducted in the clinic setting and was deemed that his home was unsafe and was unsafe for him to return home alone.  Patient lives alone and without any family members.  Based on reports, patient has not been eating, has a 70 lb weight loss, living in an unsafe and unclean environment, and a mold growing on his toilets, sink and refrigerator.  Spoiled food in the refrigerator and no clean water. Risk for falls with multiple obstacles in living spaces. 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 Awaiting repeat LP today for Alzheimer panel   Says he feels well other than frustration from memory issues    Current Facility-Administered Medications   Medication Dose Route Frequency Provider Last Rate Last Dose    acetaminophen tablet 650 mg  650 mg Oral Q4H PRN Adriana Badillo MD        melatonin tablet 6 mg  6 mg Oral Nightly PRN Adriana Badillo MD        ondansetron injection 8 mg  8 mg Intravenous Q8H PRN Adriana Badillo MD        pantoprazole EC tablet 40 mg  40 mg Oral Daily Rosemary Ball MD   40 mg at 10/13/20 1000    promethazine (PHENERGAN) 25 mg in dextrose 5 % 50 mL IVPB  25 mg Intravenous Q6H PRN Adriana aBdillo MD        senna-docusate 8.6-50 mg per tablet 1 tablet  1 tablet Oral BID PRN Adriana Badillo MD        sodium chloride 0.9% flush 5 mL  5 mL Intravenous PRN Adriana Badillo MD         Review of Systems   Constitutional: Positive for activity change and fatigue. Negative for fever.   HENT: Negative for trouble swallowing and voice change.    Eyes: Negative for visual disturbance.   Respiratory: Negative for shortness of breath.  "   Cardiovascular: Negative for chest pain.   Gastrointestinal: Negative for nausea and vomiting.   Musculoskeletal: Negative for gait problem and neck stiffness.   Neurological: Negative for dizziness, tremors, seizures, syncope, facial asymmetry, speech difficulty, weakness, light-headedness, numbness and headaches.   Psychiatric/Behavioral: Positive for confusion and decreased concentration. Negative for agitation, behavioral problems, hallucinations and sleep disturbance.     Objective:     Vital Signs (Most Recent):  Temp: 98.1 °F (36.7 °C) (10/13/20 1549)  Pulse: (!) 52 (10/13/20 1549)  Resp: 18 (10/13/20 1549)  BP: (!) 147/72 (10/13/20 1549)  SpO2: 99 % (10/13/20 1549) Vital Signs (24h Range):  Temp:  [97.6 °F (36.4 °C)-98.1 °F (36.7 °C)] 98.1 °F (36.7 °C)  Pulse:  [50-63] 52  Resp:  [16-18] 18  SpO2:  [87 %-99 %] 99 %  BP: (127-147)/(64-76) 147/72     Weight: 72 kg (158 lb 11.7 oz)  Body mass index is 22.78 kg/m².    Physical Exam  Eyes:      Extraocular Movements: EOM normal.      Pupils: Pupils are equal, round, and reactive to light.   Neurological:      Coordination: Finger-Nose-Finger Test normal.      Gait: Gait is intact.   Psychiatric:         Speech: Speech normal.     NEUROLOGICAL EXAMINATION:     MENTAL STATUS   Recall of objects at 5 minutes: 0/3. Follows 2 step commands.   Attention: decreased. Concentration: decreased.   Speech: speech is normal   Level of consciousness: alert       Oriented to self  Unable to name hospital without prompting  Then easily names [facility]    Knows we are in October, but has to count on his fingers to 10    Knows year is 2020     Says president's name is YOSEF    Knows he used to work as a , but unable to name [employer] facility     Speaks in generalities      CRANIAL NERVES     CN III, IV, VI   Pupils are equal, round, and reactive to light.  Extraocular motions are normal.   Nystagmus: none   Ophthalmoparesis: none    CN V   Facial  sensation intact.     CN VII   Facial expression full, symmetric.     CN VIII   Hearing: intact    CN IX, X   Palate: symmetric    CN XI   CN XI normal.     CN XII   CN XII normal.     MOTOR EXAM   Muscle bulk: diminished  Right arm pronator drift: absent  Left arm pronator drift: absent    Strength   Right deltoid: 5/5  Left deltoid: 5/5  Right biceps: 5/5  Left biceps: 5/5  Right triceps: 5/5  Left triceps: 5/5  Right interossei: 5/5  Left interossei: 5/5  Right anterior tibial: 5/5  Left anterior tibial: 5/5  Right posterior tibial: 5/5  Left posterior tibial: 5/5  Right peroneal: 5/5  Left peroneal: 5/5    SENSORY EXAM   Right arm light touch: normal  Left arm light touch: normal  Right leg light touch: normal  Left leg light touch: normal    GAIT AND COORDINATION     Gait  Gait: normal     Coordination   Finger to nose coordination: normal    Tremor   Resting tremor: absent    Significant Labs:   CBC:   Recent Labs   Lab 10/12/20  0235 10/13/20  0254   WBC 10.50 11.62   HGB 12.6* 13.0*   HCT 38.8* 40.5    267     CMP:   Recent Labs   Lab 10/12/20  0235 10/13/20  0254   * 103    138   K 4.1 4.5    102   CO2 25 31*   BUN 25* 26*   CREATININE 0.7 0.7   CALCIUM 8.1* 8.0*   MG 2.1 2.4   PROT 5.2* 5.1*   ALBUMIN 2.8* 2.7*   BILITOT 0.5 0.5   ALKPHOS 62 63   AST 68* 39   * 145*   ANIONGAP 9 5*   EGFRNONAA >60.0 >60.0     Inflammatory Markers: No results for input(s): SEDRATE, CRP, PROCAL in the last 48 hours.  Urine Culture: No results for input(s): LABURIN in the last 48 hours.  Urine Studies: No results for input(s): COLORU, APPEARANCEUA, PHUR, SPECGRAV, PROTEINUA, GLUCUA, KETONESU, BILIRUBINUA, OCCULTUA, NITRITE, UROBILINOGEN, LEUKOCYTESUR, RBCUA, WBCUA, BACTERIA, SQUAMEPITHEL, HYALINECASTS in the last 48 hours.    Invalid input(s): WRIGHTSUR     CSF (10/5/20):    WBC 1  Glucose 52  Protein 71  HSV negative  ACE negative  Paraneoplastic negative     Unremarkable serum  studies:  --TSH  --HIV   --RPR  --Ammonia  --Folate  --B6 (20)  --B12 (540)  --B1 (49)  --Serum paraneoplastic panel (9/28) negative    All pertinent lab results from the past 24 hours have been reviewed.    EEG (10/3/20) 1 hr 40 min    Abnormal EEG due to mild, non-specific regional dysfunction in bilateral temporal lobes.  There are no electrographic seizures   or indications of seizure tendency    Significant Imaging: I have reviewed and interpreted all pertinent imaging results/findings within the past 24 hours.     MRI Brain W WO contrast (10/2/20):  Possible subtle increased FLAIR hyperintensity involving the bilateral mesial temporal lobes which can be seen in the setting of autoimmune limbic encephalitis in the appropriate clinical setting. No evidence of acute intracranial hemorrhage, acute infarct or abnormal enhancing lesion.    Assessment and Plan:     * Cognitive impairment  70 yo male with history of BCC s/p Mohs' procedure and new diagnosis of penile malignancy was sent to ED from Urology clinic for cognitive impairment and inability to care for himself. Pt lives alone and does not have next of kin. Coworkers noted cognitive decline around early September and he was seen in clinic by Dr. Ernandez 9/28 for memory issues felt to be due to FTD. This admission, workup has been remarkable for MRI brain W WO contrast showing increased FLAIR hyperintensity involving the bilateral mesial temporal lobes. LP 10/5 with CSF (1 WBC, 190 RBC, 71 protein, 52 glucose). CSF HSV, ACE negative. EEG 10/3 (1 hr 40 min) with mild regional dysfunction in bilateral temporal lobes, but no electrographic seizures. Serum paraneoplastic panel from 9/28 has resulted negative. Completed 5 days of empiric IV Solumedrol 1 g qd without notable clinical improvement.   >>Presentation concerning for dementia vs. autoimmune vs paraneoplastic process in the setting of malignancy     Recommendations:  -- repeat LP with IR completed  "today  Please collect 2 mL of CSF directly into a CSF AD Biomarker Tube  Misc lab sendout to [place] (ADEV) Alzheimer panel   Also send CSF for cell ct with diff, glu, protein, Cx, paraneoplastic, autoimmune panel and freeze hold  -- unable to obtain amyloid PET scan while inpatient   -- outpatient cognitive assessment with neuropsych   -- f/u in clinic with Dr. Ernandez (will arrange)  -- continue strict delirium precautions    Impaired decision making  Sent to ED 10/2 from Urology clinic to have mental capacity evaluation   Pt deemed unsafe to return home and Psych team feels pt does not have full medical decision making capacity at this time     --legal guardianship underway (no next of kin)  Coworkers have been consulted to consent for procedures  --case management working on placement    Penile carcinoma  Urology visit 10/2, "penile swelling coupled with erythema for the past 2 years. MRI was done which showed a necrotic penile tumor within the right distal corpus cavernosa with invasion through the right lateral fascial planes and fistulous communication with the skin.  Urethral involvement not excluded. Suspected invasion of the right dorsolateral corpus spongiosum. Based on this imaging, he is stage [id number]. However, we do not have a biopsy specimen."      --continue management per Urology team     VTE Risk Mitigation (From admission, onward)         Ordered     IP VTE HIGH RISK PATIENT  Once      10/02/20 2042              Bri Pelaez PA-C  General Neurology Consult  Neuro Consult [employer] # [id number]  "

## 2020-10-13 NOTE — PLAN OF CARE
Problem: Adult Inpatient Plan of Care  Goal: Plan of Care Review  Outcome: Ongoing, Progressing  Goal: Patient-Specific Goal (Individualization)  Outcome: Ongoing, Progressing  Goal: Absence of Hospital-Acquired Illness or Injury  Outcome: Ongoing, Progressing  Goal: Optimal Comfort and Wellbeing  Outcome: Ongoing, Progressing  Goal: Readiness for Transition of Care  Outcome: Ongoing, Progressing  Goal: Rounds/Family Conference  Outcome: Ongoing, Progressing     Problem: Fall Injury Risk  Goal: Absence of Fall and Fall-Related Injury  Outcome: Ongoing, Progressing     Problem: Thought Process Alteration  Goal: Optimal Thought Clarity  Outcome: Ongoing, Progressing    Patient aaox3, with periods of confusion. Remained NPO starting @ midnight. Free from falls and injuries during shift. No concerns or requests voiced. Bed in low position with side rails up x2 and call light within reach. Will continue plan of care.

## 2020-10-14 LAB
ALBUMIN SERPL BCP-MCNC: 3 G/DL (ref 3.5–5.2)
ALP SERPL-CCNC: 56 U/L (ref 55–135)
ALT SERPL W/O P-5'-P-CCNC: 104 U/L (ref 10–44)
ANION GAP SERPL CALC-SCNC: 10 MMOL/L (ref 8–16)
AST SERPL-CCNC: 27 U/L (ref 10–40)
BASOPHILS # BLD AUTO: 0.02 K/UL (ref 0–0.2)
BASOPHILS NFR BLD: 0.3 % (ref 0–1.9)
BILIRUB SERPL-MCNC: 0.8 MG/DL (ref 0.1–1)
BUN SERPL-MCNC: 18 MG/DL (ref 8–23)
CALCIUM SERPL-MCNC: 8.4 MG/DL (ref 8.7–10.5)
CHLORIDE SERPL-SCNC: 103 MMOL/L (ref 95–110)
CO2 SERPL-SCNC: 25 MMOL/L (ref 23–29)
CREAT SERPL-MCNC: 0.7 MG/DL (ref 0.5–1.4)
DIFFERENTIAL METHOD: ABNORMAL
EOSINOPHIL # BLD AUTO: 0.2 K/UL (ref 0–0.5)
EOSINOPHIL NFR BLD: 2.2 % (ref 0–8)
ERYTHROCYTE [DISTWIDTH] IN BLOOD BY AUTOMATED COUNT: 13.1 % (ref 11.5–14.5)
EST. GFR  (AFRICAN AMERICAN): >60 ML/MIN/1.73 M^2
EST. GFR  (NON AFRICAN AMERICAN): >60 ML/MIN/1.73 M^2
GLUCOSE SERPL-MCNC: 72 MG/DL (ref 70–110)
HCT VFR BLD AUTO: 44.5 % (ref 40–54)
HGB BLD-MCNC: 14.5 G/DL (ref 14–18)
IMM GRANULOCYTES # BLD AUTO: 0.11 K/UL (ref 0–0.04)
IMM GRANULOCYTES NFR BLD AUTO: 1.5 % (ref 0–0.5)
LYMPHOCYTES # BLD AUTO: 1 K/UL (ref 1–4.8)
LYMPHOCYTES NFR BLD: 13.7 % (ref 18–48)
MAGNESIUM SERPL-MCNC: 2.2 MG/DL (ref 1.6–2.6)
MCH RBC QN AUTO: 31.3 PG (ref 27–31)
MCHC RBC AUTO-ENTMCNC: 32.6 G/DL (ref 32–36)
MCV RBC AUTO: 96 FL (ref 82–98)
MONOCYTES # BLD AUTO: 0.7 K/UL (ref 0.3–1)
MONOCYTES NFR BLD: 9.1 % (ref 4–15)
NEUTROPHILS # BLD AUTO: 5.3 K/UL (ref 1.8–7.7)
NEUTROPHILS NFR BLD: 73.2 % (ref 38–73)
NRBC BLD-RTO: 0 /100 WBC
PHOSPHATE SERPL-MCNC: 3.6 MG/DL (ref 2.7–4.5)
PLATELET # BLD AUTO: 167 K/UL (ref 150–350)
PMV BLD AUTO: 11.9 FL (ref 9.2–12.9)
POTASSIUM SERPL-SCNC: 4.6 MMOL/L (ref 3.5–5.1)
PROT SERPL-MCNC: 5.8 G/DL (ref 6–8.4)
RBC # BLD AUTO: 4.64 M/UL (ref 4.6–6.2)
SODIUM SERPL-SCNC: 138 MMOL/L (ref 136–145)
WBC # BLD AUTO: 7.28 K/UL (ref 3.9–12.7)

## 2020-10-14 PROCEDURE — 83735 ASSAY OF MAGNESIUM: CPT

## 2020-10-14 PROCEDURE — 11000001 HC ACUTE MED/SURG PRIVATE ROOM

## 2020-10-14 PROCEDURE — 84100 ASSAY OF PHOSPHORUS: CPT

## 2020-10-14 PROCEDURE — 25500020 PHARM REV CODE 255: Performed by: HOSPITALIST

## 2020-10-14 PROCEDURE — 99233 SBSQ HOSP IP/OBS HIGH 50: CPT | Mod: ,,, | Performed by: PSYCHIATRY & NEUROLOGY

## 2020-10-14 PROCEDURE — 36415 COLL VENOUS BLD VENIPUNCTURE: CPT

## 2020-10-14 PROCEDURE — 99233 PR SUBSEQUENT HOSPITAL CARE,LEVL III: ICD-10-PCS | Mod: ,,, | Performed by: PSYCHIATRY & NEUROLOGY

## 2020-10-14 PROCEDURE — 80053 COMPREHEN METABOLIC PANEL: CPT

## 2020-10-14 PROCEDURE — 63600175 PHARM REV CODE 636 W HCPCS: Performed by: HOSPITALIST

## 2020-10-14 PROCEDURE — 99232 PR SUBSEQUENT HOSPITAL CARE,LEVL II: ICD-10-PCS | Mod: ,,, | Performed by: HOSPITALIST

## 2020-10-14 PROCEDURE — 99232 SBSQ HOSP IP/OBS MODERATE 35: CPT | Mod: ,,, | Performed by: HOSPITALIST

## 2020-10-14 PROCEDURE — 25000003 PHARM REV CODE 250: Performed by: STUDENT IN AN ORGANIZED HEALTH CARE EDUCATION/TRAINING PROGRAM

## 2020-10-14 PROCEDURE — 85025 COMPLETE CBC W/AUTO DIFF WBC: CPT

## 2020-10-14 RX ORDER — ENOXAPARIN SODIUM 100 MG/ML
40 INJECTION SUBCUTANEOUS EVERY 24 HOURS
Status: DISCONTINUED | OUTPATIENT
Start: 2020-10-14 | End: 2020-11-23 | Stop reason: HOSPADM

## 2020-10-14 RX ADMIN — ENOXAPARIN SODIUM 40 MG: 40 INJECTION SUBCUTANEOUS at 05:10

## 2020-10-14 RX ADMIN — PANTOPRAZOLE SODIUM 40 MG: 40 TABLET, DELAYED RELEASE ORAL at 08:10

## 2020-10-14 RX ADMIN — IOHEXOL 75 ML: 350 INJECTION, SOLUTION INTRAVENOUS at 12:10

## 2020-10-14 NOTE — NURSING
Pt returned to floor from CT scan and ambulated from stretcher into room. Stable and no complaints at the moment.

## 2020-10-14 NOTE — PLAN OF CARE
Problem: Adult Inpatient Plan of Care  Goal: Plan of Care Review  Outcome: Ongoing, Progressing  Goal: Patient-Specific Goal (Individualization)  Outcome: Ongoing, Progressing  Goal: Absence of Hospital-Acquired Illness or Injury  Outcome: Ongoing, Progressing  Goal: Optimal Comfort and Wellbeing  Outcome: Ongoing, Progressing  Goal: Readiness for Transition of Care  Outcome: Ongoing, Progressing  Goal: Rounds/Family Conference  Outcome: Ongoing, Progressing     Problem: Fall Injury Risk  Goal: Absence of Fall and Fall-Related Injury  Outcome: Ongoing, Progressing     Problem: Thought Process Alteration  Goal: Optimal Thought Clarity  Outcome: Ongoing, Progressing       Patient aaox1, vitals stable. 0.45%NS &KCL 20meq infusing @ 75mL/hr continuously. Breathing tx administered per respiratory r/t wheezing. Figueredo catheter patent and intact with adria colored urine draining to drainage bag. Free from falls and injuries during shift. No concerns or requests voiced. Bed in low position with side rails up x3 and call light within reach. Will continue plan of care.

## 2020-10-14 NOTE — PLAN OF CARE
Problem: Adult Inpatient Plan of Care  Goal: Plan of Care Review  10/14/2020 0409 by Myriam Neely RN  Outcome: Ongoing, Progressing  10/14/2020 0405 by Myriam Neely RN  Outcome: Ongoing, Progressing  Goal: Patient-Specific Goal (Individualization)  10/14/2020 0409 by Myriam Neely RN  Outcome: Ongoing, Progressing  10/14/2020 0405 by Myriam Neely RN  Outcome: Ongoing, Progressing  Goal: Absence of Hospital-Acquired Illness or Injury  10/14/2020 0409 by Myriam Neely RN  Outcome: Ongoing, Progressing  10/14/2020 0405 by Myriam Neely RN  Outcome: Ongoing, Progressing  Goal: Optimal Comfort and Wellbeing  10/14/2020 0409 by Myriam Neely RN  Outcome: Ongoing, Progressing  10/14/2020 0405 by Myriam Neely RN  Outcome: Ongoing, Progressing  Goal: Readiness for Transition of Care  10/14/2020 0409 by Myriam Neely RN  Outcome: Ongoing, Progressing  10/14/2020 0405 by Myriam Neely RN  Outcome: Ongoing, Progressing  Goal: Rounds/Family Conference  10/14/2020 0409 by Myriam Neely RN  Outcome: Ongoing, Progressing  10/14/2020 0405 by Myriam Neely RN  Outcome: Ongoing, Progressing     Problem: Fall Injury Risk  Goal: Absence of Fall and Fall-Related Injury  10/14/2020 0409 by Myriam Neely RN  Outcome: Ongoing, Progressing  10/14/2020 0405 by Myriam Neely RN  Outcome: Ongoing, Progressing     Problem: Thought Process Alteration  Goal: Optimal Thought Clarity  10/14/2020 0409 by Myiram Neely RN  Outcome: Ongoing, Progressing  10/14/2020 0405 by Myriam Neely RN  Outcome: Ongoing, Progressing       Patient aaox3, vitals stable. CT scan performed during the night. Free from falls and injuries during shift. No concerns or requests voiced. Bed in low position with side rails up x2 and call light within reach. Will continue plan of care.

## 2020-10-14 NOTE — PLAN OF CARE
CM spoke to Sadia Poole assigned to pts interdiction. Will update me on pts case tomorrow after 4pm meeting with the legal department her at [facility].    Modesta Blum, MSN  Case Management  Ext [phone]

## 2020-10-14 NOTE — PROGRESS NOTES
"[facility]  Neurology  Progress Note    Patient Name: Juan Hobson  MRN: [id number]  Admission Date: 10/2/2020  Hospital Length of Stay: 8 days  Code Status: Full Code   Attending Provider: Brayan Castillo MD  Primary Care Physician: Mehnaz Barillas MD   Principal Problem:Cognitive impairment    HPI:   Patient is a 70 yo male w/ past medical history significant for BCC s/p Mohs procedure, recent Dx of penile carcinoma who presented to [facility] on 10/02 from Urology clinic w/ concern for progressive dementia and inability to care for self.Patient was seen in clinic by Dr. Ernanedz on 09/28 for memory complaints and at the time the diagnosis of fronto-temporal dementia was brought up. Per Dr. 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  Coworkers note that he has always been a very punctual and reliable employee but did not show up to work on Sept 2 and instead presented to [facility] reporting  "creatures attacked his penis".  He was noted to have enlarged, ulcerated penis with MRI and urology consult showing concern for penile cancer but not biopsy confirmed at this time.   He was treated for infection and discharged to geriatric psych facility where he was held for two weeks prior to discharge home with home health orders for an agency that does not accept his insurance.  When asked about memory difficulties, he cites difficulty connecting objects to words for them.  Co worker retrospectively note his clocking in/out has been inconsistent over the past several months.  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  Co workers note that over the past several months he has been making some errors in recording measurements. He continues to drive and live in his own home. Patient has recently had a 2 week stay in a geriatric " "psychiatric facility.   Per chart review, patient is a poor historian and some information was provided by the co-worker that accompanied the patient to the ED - patient lives at home alone in an unsafe environment.  Social work evaluation was conducted in the clinic setting and was deemed that his home was unsafe and was unsafe for him to return home alone.  Patient lives alone and without any family members.  Based on reports, patient has not been eating, has a 70 lb weight loss, living in an unsafe and unclean environment, and a mold growing on his toilets, sink and refrigerator.  Spoiled food in the refrigerator and no clean water. Risk for falls with multiple obstacles in living spaces.  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; management recs/further diagnostic work-up"      Subjective:     Interval History:   Eating and sleeping well  No complaints today  LP successfully completed with IR yesterday  Awaiting placement    Current Facility-Administered Medications   Medication Dose Route Frequency Provider Last Rate Last Dose    acetaminophen tablet 650 mg  650 mg Oral Q4H PRN Adriana Badillo MD        enoxaparin injection 40 mg  40 mg Subcutaneous Q24H Brayan Castillo MD   40 mg at 10/14/20 1708    melatonin tablet 6 mg  6 mg Oral Nightly PRN Adriana Badillo MD        ondansetron injection 8 mg  8 mg Intravenous Q8H PRN Adriana Badillo MD        pantoprazole EC tablet 40 mg  40 mg Oral Daily Rosemary Ball MD   40 mg at 10/14/20 0857    promethazine (PHENERGAN) 25 mg in dextrose 5 % 50 mL IVPB  25 mg Intravenous Q6H PRN Adriana Badillo MD        senna-docusate 8.6-50 mg per tablet 1 tablet  1 tablet Oral BID PRN Adriana Badillo MD        sodium chloride 0.9% flush 5 mL  5 mL Intravenous PRN Adriana Badillo MD         Review of Systems   Constitutional: Positive for fatigue. Negative for fever.   HENT: Negative for trouble swallowing.    Eyes: Negative " for visual disturbance.   Respiratory: Negative for shortness of breath.    Cardiovascular: Negative for chest pain.   Gastrointestinal: Negative for nausea and vomiting.   Musculoskeletal: Negative for gait problem and neck stiffness.   Neurological: Negative for dizziness, seizures, facial asymmetry, speech difficulty and headaches.   Psychiatric/Behavioral: Positive for confusion, decreased concentration and sleep disturbance. Negative for agitation, behavioral problems, dysphoric mood and hallucinations.     Objective:     Vital Signs (Most Recent):  Temp: 98.2 °F (36.8 °C) (10/14/20 1618)  Pulse: 72 (10/14/20 1618)  Resp: 16 (10/14/20 1618)  BP: (!) 108/58 (10/14/20 1618)  SpO2: 96 % (10/14/20 1618) Vital Signs (24h Range):  Temp:  [97.4 °F (36.3 °C)-98.2 °F (36.8 °C)] 98.2 °F (36.8 °C)  Pulse:  [50-82] 72  Resp:  [16-18] 16  SpO2:  [94 %-99 %] 96 %  BP: (108-147)/(58-80) 108/58     Weight: 72 kg (158 lb 11.7 oz)  Body mass index is 22.78 kg/m².    Physical Exam  Eyes:      Extraocular Movements: EOM normal.      Pupils: Pupils are equal, round, and reactive to light.   Neurological:      Coordination: Finger-Nose-Finger Test normal.      Gait: Gait is intact.   Psychiatric:         Speech: Speech normal.         NEUROLOGICAL EXAMINATION:     MENTAL STATUS   Oriented to person.   Disoriented to year and day. Oriented to month.   Recall of objects at 5 minutes: 0/3  Follows 1 step commands.   Attention: decreased. Concentration: decreased.   Speech: speech is normal   Level of consciousness: alert  Unable to name object: unable to name lower frequency words.        Able to name [facility]  Counts on hands to name month  Says he has been in the hospital for ~2 weeks  Talks about 3 female coworkers coming to visit him today       CRANIAL NERVES     CN III, IV, VI   Pupils are equal, round, and reactive to light.  Extraocular motions are normal.   Nystagmus: none   Ophthalmoparesis: none    CN V   Facial sensation  intact.     CN VII   Facial expression full, symmetric.     CN VIII   Hearing: intact    CN IX, X   Palate: symmetric    CN XI   CN XI normal.     CN XII   CN XII normal.     MOTOR EXAM   Muscle bulk: decreased  Overall muscle tone: normal    Strength   Right deltoid: 5/5  Left deltoid: 5/5  Right biceps: 5/5  Left biceps: 5/5  Right triceps: 5/5  Left triceps: 5/5  Right interossei: 5/5  Left interossei: 5/5  Right iliopsoas: 5/5  Left iliopsoas: 5/5  Right anterior tibial: 5/5  Left anterior tibial: 5/5  Right posterior tibial: 5/5  Left posterior tibial: 5/5    SENSORY EXAM   Right arm light touch: normal  Left arm light touch: normal  Right leg light touch: normal  Left leg light touch: normal    GAIT AND COORDINATION     Gait  Gait: normal     Coordination   Finger to nose coordination: normal    Tremor   Resting tremor: absent    Significant Labs:   CBC:   Recent Labs   Lab 10/13/20  0254 10/14/20  0354   WBC 11.62 7.28   HGB 13.0* 14.5   HCT 40.5 44.5    167     CMP:   Recent Labs   Lab 10/13/20  0254 10/14/20  0354    72    138   K 4.5 4.6    103   CO2 31* 25   BUN 26* 18   CREATININE 0.7 0.7   CALCIUM 8.0* 8.4*   MG 2.4 2.2   PROT 5.1* 5.8*   ALBUMIN 2.7* 3.0*   BILITOT 0.5 0.8   ALKPHOS 63 56   AST 39 27   * 104*   ANIONGAP 5* 10   EGFRNONAA >60.0 >60.0     Inflammatory Markers: No results for input(s): SEDRATE, CRP, PROCAL in the last 48 hours.  Urine Culture: No results for input(s): LABURIN in the last 48 hours.  Urine Studies: No results for input(s): COLORU, APPEARANCEUA, PHUR, SPECGRAV, PROTEINUA, GLUCUA, KETONESU, BILIRUBINUA, OCCULTUA, NITRITE, UROBILINOGEN, LEUKOCYTESUR, RBCUA, WBCUA, BACTERIA, SQUAMEPITHEL, HYALINECASTS in the last 48 hours.    Invalid input(s): WRIGHTSUR     CSF (10/5/20):    WBC 1  Glucose 52  Protein 71  HSV negative  ACE negative  Paraneoplastic negative     CSF (10/13/20):  RBC 30/5  WBC 1  Glucose 56  Protein 71  ADEVL  pending     Unremarkable serum studies:  --TSH  --HIV   --RPR  --Ammonia  --Folate  --B6 (20)  --B12 (540)  --B1 (49)  --Serum paraneoplastic panel (9/28) negative    All pertinent lab results from the past 24 hours have been reviewed.    Significant Imaging: I have reviewed and interpreted all pertinent imaging results/findings within the past 24 hours.    Assessment and Plan:     * Cognitive impairment  68 yo male with history of BCC s/p Mohs' procedure and new diagnosis of penile malignancy was sent to ED from Urology clinic for cognitive impairment and inability to care for himself. Pt lives alone and does not have next of kin. Coworkers noted cognitive decline around early September and he was seen in clinic by Dr. Ernandez 9/28 for memory issues felt to be due to FTD. This admission, workup has been remarkable for MRI brain W WO contrast showing increased FLAIR hyperintensity involving the bilateral mesial temporal lobes. LP 10/5 with CSF (1 WBC, 190 RBC, 71 protein, 52 glucose). CSF HSV, ACE negative. EEG 10/3 (1 hr 40 min) with mild regional dysfunction in bilateral temporal lobes, but no electrographic seizures. Serum paraneoplastic panel from 9/28 has resulted negative. Completed 5 days of empiric IV Solumedrol 1 g qd without notable clinical improvement. Presentation most concerning for dementia     Recommendations:  --LP repeated 10/13, sendout to [place] to Alzheimer panel   -- unable to obtain amyloid PET scan while inpatient   -- outpatient cognitive assessment with neuropsych   -- f/u with Dr. Ernandez for further dementia management    Impaired decision making  Sent to ED 10/2 from Urology clinic to have mental capacity evaluation   Pt deemed unsafe to return home and Psych team feels pt does not have full medical decision making capacity at this time   Pt does not have next of kin. Omarjony Michelle ([phone]) will make decisions on his behalf    -- Primary team working on placement     Penile  "carcinoma  Urology visit 10/2, "penile swelling coupled with erythema for the past 2 years. MRI was done which showed a necrotic penile tumor within the right distal corpus cavernosa with invasion through the right lateral fascial planes and fistulous communication with the skin.  Urethral involvement not excluded. Suspected invasion of the right dorsolateral corpus spongiosum. Based on this imaging, he is stage [id number]. However, we do not have a biopsy specimen."      --continue management per Urology team     VTE Risk Mitigation (From admission, onward)         Ordered     enoxaparin injection 40 mg  Every 24 hours      10/14/20 0959     IP VTE HIGH RISK PATIENT  Once      10/02/20 2042              Neurology to sign off. Please call with any questions or clarifications    Bri Pelaez PA-C  General Neurology Consult  Neuro Consult [phone]  "

## 2020-10-14 NOTE — ASSESSMENT & PLAN NOTE
68 yo male with history of BCC s/p Mohs' procedure and new diagnosis of penile malignancy was sent to ED from Urology clinic for cognitive impairment and inability to care for himself. Pt lives alone and does not have next of kin. Coworkers noted cognitive decline around early September and he was seen in clinic by Dr. Ernandez 9/28 for memory issues felt to be due to FTD. This admission, workup has been remarkable for MRI brain W WO contrast showing increased FLAIR hyperintensity involving the bilateral mesial temporal lobes. LP 10/5 with CSF (1 WBC, 190 RBC, 71 protein, 52 glucose). CSF HSV, ACE negative. EEG 10/3 (1 hr 40 min) with mild regional dysfunction in bilateral temporal lobes, but no electrographic seizures. Serum paraneoplastic panel from 9/28 has resulted negative. Completed 5 days of empiric IV Solumedrol 1 g qd without notable clinical improvement.    >>Presentation most concerning for dementia     Recommendations:  --LP repeated 10/13, sendout to [place] to Alzheimer panel   -- unable to obtain amyloid PET scan while inpatient   -- outpatient cognitive assessment with neuropsych   -- f/u with Dr. Ernandez for further dementia management

## 2020-10-14 NOTE — SUBJECTIVE & OBJECTIVE
Subjective:     Interval History:   Eating and sleeping well  No complaints today  LP successfully completed with IR yesterday  Awaiting placement    Current Facility-Administered Medications   Medication Dose Route Frequency Provider Last Rate Last Dose    acetaminophen tablet 650 mg  650 mg Oral Q4H PRN Adriana Badillo MD        enoxaparin injection 40 mg  40 mg Subcutaneous Q24H Brayan LUIS EAlessandra Castillo MD   40 mg at 10/14/20 1708    melatonin tablet 6 mg  6 mg Oral Nightly PRN Adriana Badillo MD        ondansetron injection 8 mg  8 mg Intravenous Q8H PRN Adriana Badillo MD        pantoprazole EC tablet 40 mg  40 mg Oral Daily Rosemary Ball MD   40 mg at 10/14/20 0857    promethazine (PHENERGAN) 25 mg in dextrose 5 % 50 mL IVPB  25 mg Intravenous Q6H PRN Adriana Badillo MD        senna-docusate 8.6-50 mg per tablet 1 tablet  1 tablet Oral BID PRN Adriana Badillo MD        sodium chloride 0.9% flush 5 mL  5 mL Intravenous PRN Adriana Badillo MD         Review of Systems   Constitutional: Positive for fatigue. Negative for fever.   HENT: Negative for trouble swallowing.    Eyes: Negative for visual disturbance.   Respiratory: Negative for shortness of breath.    Cardiovascular: Negative for chest pain.   Gastrointestinal: Negative for nausea and vomiting.   Musculoskeletal: Negative for gait problem and neck stiffness.   Neurological: Negative for dizziness, seizures, facial asymmetry, speech difficulty and headaches.   Psychiatric/Behavioral: Positive for confusion, decreased concentration and sleep disturbance. Negative for agitation, behavioral problems, dysphoric mood and hallucinations.     Objective:     Vital Signs (Most Recent):  Temp: 98.2 °F (36.8 °C) (10/14/20 1618)  Pulse: 72 (10/14/20 1618)  Resp: 16 (10/14/20 1618)  BP: (!) 108/58 (10/14/20 1618)  SpO2: 96 % (10/14/20 1618) Vital Signs (24h Range):  Temp:  [97.4 °F (36.3 °C)-98.2 °F (36.8 °C)] 98.2 °F (36.8 °C)  Pulse:  [50-82] 72  Resp:   [16-18] 16  SpO2:  [94 %-99 %] 96 %  BP: (108-147)/(58-80) 108/58     Weight: 72 kg (158 lb 11.7 oz)  Body mass index is 22.78 kg/m².    Physical Exam  Eyes:      Extraocular Movements: EOM normal.      Pupils: Pupils are equal, round, and reactive to light.   Neurological:      Coordination: Finger-Nose-Finger Test normal.      Gait: Gait is intact.   Psychiatric:         Speech: Speech normal.         NEUROLOGICAL EXAMINATION:     MENTAL STATUS   Oriented to person.   Disoriented to year and day. Oriented to month.   Recall of objects at 5 minutes: 0/3  Follows 1 step commands.   Attention: decreased. Concentration: decreased.   Speech: speech is normal   Level of consciousness: alert  Unable to name object: unable to name lower frequency words.        Able to name [facility]  Counts on hands to name month  Says he has been in the hospital for ~2 weeks  Talks about 3 female coworkers coming to visit him today       CRANIAL NERVES     CN III, IV, VI   Pupils are equal, round, and reactive to light.  Extraocular motions are normal.   Nystagmus: none   Ophthalmoparesis: none    CN V   Facial sensation intact.     CN VII   Facial expression full, symmetric.     CN VIII   Hearing: intact    CN IX, X   Palate: symmetric    CN XI   CN XI normal.     CN XII   CN XII normal.     MOTOR EXAM   Muscle bulk: decreased  Overall muscle tone: normal    Strength   Right deltoid: 5/5  Left deltoid: 5/5  Right biceps: 5/5  Left biceps: 5/5  Right triceps: 5/5  Left triceps: 5/5  Right interossei: 5/5  Left interossei: 5/5  Right iliopsoas: 5/5  Left iliopsoas: 5/5  Right anterior tibial: 5/5  Left anterior tibial: 5/5  Right posterior tibial: 5/5  Left posterior tibial: 5/5    SENSORY EXAM   Right arm light touch: normal  Left arm light touch: normal  Right leg light touch: normal  Left leg light touch: normal    GAIT AND COORDINATION     Gait  Gait: normal     Coordination   Finger to nose coordination: normal    Tremor   Resting  tremor: absent    Significant Labs:   CBC:   Recent Labs   Lab 10/13/20  0254 10/14/20  0354   WBC 11.62 7.28   HGB 13.0* 14.5   HCT 40.5 44.5    167     CMP:   Recent Labs   Lab 10/13/20  0254 10/14/20  0354    72    138   K 4.5 4.6    103   CO2 31* 25   BUN 26* 18   CREATININE 0.7 0.7   CALCIUM 8.0* 8.4*   MG 2.4 2.2   PROT 5.1* 5.8*   ALBUMIN 2.7* 3.0*   BILITOT 0.5 0.8   ALKPHOS 63 56   AST 39 27   * 104*   ANIONGAP 5* 10   EGFRNONAA >60.0 >60.0     Inflammatory Markers: No results for input(s): SEDRATE, CRP, PROCAL in the last 48 hours.  Urine Culture: No results for input(s): LABURIN in the last 48 hours.  Urine Studies: No results for input(s): COLORU, APPEARANCEUA, PHUR, SPECGRAV, PROTEINUA, GLUCUA, KETONESU, BILIRUBINUA, OCCULTUA, NITRITE, UROBILINOGEN, LEUKOCYTESUR, RBCUA, WBCUA, BACTERIA, SQUAMEPITHEL, HYALINECASTS in the last 48 hours.    Invalid input(s): WRIGHTSUR     CSF (10/5/20):    WBC 1  Glucose 52  Protein 71  HSV negative  ACE negative  Paraneoplastic negative     CSF (10/13/20):  RBC 30/5  WBC 1  Glucose 56  Protein 71  ADEVL pending     Unremarkable serum studies:  --TSH  --HIV   --RPR  --Ammonia  --Folate  --B6 (20)  --B12 (540)  --B1 (49)  --Serum paraneoplastic panel (9/28) negative    All pertinent lab results from the past 24 hours have been reviewed.    Significant Imaging: I have reviewed and interpreted all pertinent imaging results/findings within the past 24 hours.

## 2020-10-14 NOTE — PROGRESS NOTES
Urology Note    Had a lengthy discussion with patient about his likely diagnosis of penile cancer. We discussed his treatment options that include partial vs total penectomy and perineal urethrostomy. He seemed to understand that a surgery is needed in order to remove a tumor from his penis and that it would be a life-altering change to his body and his lifestyle in that he may need to sit to urinate afterwards. He did not express much shock or sadness, but did seem to trust that this procedure will hopefully be of greater benefit than harm to him.     We will proceed with penectomy this Friday 10/16.     Marisol Goff MD     Update: will post-pone surgery to next week. Date TBD

## 2020-10-14 NOTE — PROGRESS NOTES
"Progress Note  Hospital Medicine    Provider team:    [facility]OLOGY  [facility] HOSP MED O  Admit Date: 10/2/2020  Encounter Date: 10/14/2020     SUBJECTIVE:     Follow-up Visit for: Cognitive impairment    HPI (See H&P for complete P,F,SHx):   Per Adriana Badillo MD:   Mr. Juan Hobson is a 69 y.o. male with recently diagnosed penile cancer, who presents to the ER from Urology clinic with concern for self neglect, and need for Neurology evaluation of dementia and possible placement.  The patient mentions that he has been having some issues with his memory.  He mostly orders takeout for meals because it is easier, and still drives to work. He feels like he is able to take care of himself, and does not need to go to a facility."     Per note from Lianne German LCSW on 10/2:  Received consult from Dr. Monterroso re: assessing patient in clinic this afternoon and care recommendations as patient did not seem competent to make decisions and give consent. He has no family and no legal next of kin (POA or guardian).  Met individually with patient, and with his co-worker Janna Go ([phone], ext. [phone]) who brought him to his clinic appointment today. Patient unable to relate basic demographic information or clearly explain other information. For example, when asked his birth date he gave the year [birth year], then said 9, and then said the second to last, it begins with N; he said his street name but couldn't give the house number where he has lived for years; unable to say what type of work or where he worked prior to the [employer] in [facility] for the past 12 years; he described being taken in a truck to that other place (referring to the recent [facility] stay). He has awareness that he is having memory/cognitive issues but stated he can manage for himself at home and that he knows there is a lot of work that needs to be done in his home that he hasn't gotten to. He inherited the home after his " mother .        Mr. Go and another coworker Juice Martinez ([phone]) and their supervisor Emiirma Meanso ([phone]) have been trying to help him manage things with his work and personal matters as they have observed his deficits and know he has no one to help him. They have helped him keep up with recent medical appointments and accompanied and transported him. Mr. Go reports concern for his safety and ability to care for himself after seeing his home with clutter throughout and things piled up, mold and old food in the refrigerator, mold in the toilet, clothes all over, etc. Patient has been driving himself to and from work and to get food at places near his home, but unable to navigate to unfamiliar places. They have observed his cognitive deficits at work and he has made errors, so they have modified his job and his supervisor has been working with him to try to get FMLA and his leave/group home in place so he doesn't lose his benefits. They have had to help him with paperwork, bill paying, etc., because he can't complete these on his own.        Upon arrival to the ER, vitals were temp 97.8F, HR 59 and /84.  Labs were unremarkable He was admitted to Hospital Medicine for Neurology and SW evaluation.     Overview/Hospital Course:  69 y.o. who was admitted to hospital medicine for worsening cognitive decline and inability to care for self. Labs/infectious work-up grossly unremarkable. Neurology consulted and MRI obtained on admission with concern for encephalitis (increased FLAIR hyperintensity involving the bilateral mesial temporal lobes which can be seen in the setting of autoimmune limbic encephalitis in the appropriate clinical setting). LP recommended and attempted at bedside however not successful 2/2 to KEMI. Interventional radiology consulted, LP performed 10/5; CSF studies ordered including paraneoplastic panel and inflammatory markers, results pending. EEG revealed mild,  non-specific regional dysfunction in bilateral temporal lobes without seizure activity. Neurology suspects presentation may be more chronic than acute raising suspicion of frontotemporal dementia.  He will require further outpatient testing with a neuropsychologist eventually.  Psychiatry consulted for formal capacity evaluation and concluded that he does not have capacity to make decisions regarding his long term care disposition however does not require PEC at this time as his stay has been non-contested. He has no family, children and have relied on coworkers for assistance. The case was brought to the attention of EPS who have no plans to intervene at this time and recommend since the patient presented to the hospital it is a safe discharge planning issue that would involve [facility] Legal department. The Legal department intend to pursue [place] Guardianship to assist with managing affairs. Neurology has been following with presentation c/f dementia vs. autoimmune vs paraneoplastic process in the setting of malignancy. Empiric solumedrol was completed without improvement. LP was repeated on 10/13 so a send-out dementia panel could be completed (ADEVL), with results now pending. Now that patient has parties able to consent for his care, urology was re-involved to determine if further w/u and management of suspected penile cancer can take place. Staging imaging completed 10/14 and await further recommendations. CM/SW working with legal to establish guardianship.    Interval history:  Patient doing well and offers no complaints.     Review of Systems:  Review of Systems   Constitutional: Positive for activity change, appetite change and unexpected weight change. Negative for fever.   HENT: Negative for trouble swallowing.    Eyes: Negative for photophobia and visual disturbance.   Respiratory: Negative for cough and shortness of breath.    Gastrointestinal: Negative for nausea and vomiting.   Genitourinary:  "Negative for difficulty urinating and dysuria.   Neurological: Positive for speech difficulty. Negative for weakness.   Psychiatric/Behavioral: Positive for behavioral problems, confusion and decreased concentration. Negative for hallucinations and sleep disturbance. The patient is not hyperactive.      OBJECTIVE:       Intake/Output Summary (Last 24 hours) at 10/14/2020 0939  Last data filed at 10/13/2020 1230  Gross per 24 hour   Intake 0 ml   Output --   Net 0 ml     Vital Signs Range (Last 24H):  Temp:  [97.4 °F (36.3 °C)-98.1 °F (36.7 °C)]   Pulse:  [50-70]   Resp:  [16-18]   BP: (136-147)/(67-80)   SpO2:  [94 %-99 %]   Body mass index is 22.78 kg/m².    Objective:  Vital signs: (most recent): Blood pressure 138/75, pulse 70, temperature 98.1 °F (36.7 °C), temperature source Oral, resp. rate 18, height 5' 10" (1.778 m), weight 72 kg (158 lb 11.7 oz), SpO2 96 %.      Physical Exam  Vitals signs and nursing note reviewed.   Constitutional:       General: He is not in acute distress.     Appearance: He is not ill-appearing.   HENT:      Head: Normocephalic and atraumatic.      Nose: Nose normal.      Mouth/Throat:      Mouth: Mucous membranes are moist.   Eyes:      Extraocular Movements: Extraocular movements intact.      Pupils: Pupils are equal, round, and reactive to light.   Cardiovascular:      Rate and Rhythm: Normal rate and regular rhythm.   Pulmonary:      Effort: Pulmonary effort is normal. No respiratory distress.   Abdominal:      General: Abdomen is flat. There is no distension.      Palpations: Abdomen is soft.   Musculoskeletal: Normal range of motion.         General: No swelling.   Skin:     General: Skin is warm and dry.      Coloration: Skin is not jaundiced.   Neurological:      General: No focal deficit present.      Mental Status: He is alert. He is disoriented.      Sensory: No sensory deficit.      Motor: No weakness.   Psychiatric:         Mood and Affect: Mood normal.         Speech: " Speech normal.         Behavior: Behavior is cooperative.         Cognition and Memory: Cognition is impaired. Memory is impaired. He exhibits impaired recent memory.     Medications:  Medication list was reviewed in EPIC and changes noted under Assessment/Plan and MAR.    Laboratory:  Recent Labs     10/14/20  0354   WBC 7.28   RBC 4.64   HGB 14.5   HCT 44.5      MCV 96   MCH 31.3*   MCHC 32.6   GRAN 73.2*  5.3   LYMPH 13.7*  1.0   MONO 9.1  0.7   EOS 0.2      Recent Labs     10/14/20  0354   GLU 72      K 4.6      CO2 25   BUN 18   CREATININE 0.7   CALCIUM 8.4*   ANIONGAP 10   MG 2.2   PHOS 3.6       ASSESSMENT/PLAN:     Active Hospital Problems    Diagnosis  POA    *Cognitive impairment [R41.89]  Yes    Acute encephalopathy [G93.40]  Yes    Cognitive decline [R41.89]  Yes    Impaired decision making [Z78.9]  Yes    Confusion [R41.0]  Unknown    Debility [R53.81]  Yes    Penile carcinoma [C60.9]  Yes    Altered mental status [R41.82]  Unknown      Resolved Hospital Problems    Diagnosis Date Resolved POA    Encephalopathy [G93.40] 10/04/2020 Unknown      * Cognitive impairment  69 y.o. with progressive cognitive decline and inability to adequately care for self. Presented in September 2020, to the ED stating there were creatures attacking his penis. 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 Prompted  evaluation by psychiatry and found that it is likely related to dementia after getting collateral from family/friends. He was PEC'd the night of admission after wanting to leave and then discharged to a geropsychiatry facility for 2 weeks. Seen in neuro clinic where there was mention of suspected frontotemporal dementia and further work-up ensued. Presented to a urology appt on 10/2 and was advised to present to the ED due to concern for competency and  need for patient advocate/POA before a biopsy or form of treatment can be initiated.      Presentation concerning for frontotemporal dementia vs. autoimmune vs paraneoplastic process in the setting of malignancy    - LP performed by IR 10/5/2020: elevated protein, neutrophils  - MRI brain with Possible subtle increased FLAIR hyperintensity involving the bilateral mesial temporal lobes which can be seen in the setting of autoimmune limbic encephalitis  - EEG without seizure activity   - Vitamin B1, Vitamin B6, Vitamin B12, HIV, RPR, TSH unremarkable   - afebrile, without leukocytosis -- infectious work-up unremarkable   - Neurology consulted          --please send CSF to [facility] for Alzheimer panel (ADEVL test code)           --start empiric IV Solumedrol 1 g qd x 5 days          --formal cognitive assessment with neuropsych            --consider PET scan          --continue strict delirium precautions  - Paraneoplastic panel, serum in place from outpatient neurology visit on 09/28  - Possibility for Autoimmune vs Paraneoplastic process in the setting of active malignancy  - Repeat LP completed 10/13 for ADEVL panel     DELIRIUM BEHAVIOR MANAGEMENT  - Minimize use of restraints; if physical restraints necessary, please utilize medical/chemical prns for agitation.  - Keep shades open and room lit during day and room dim at night in order to promote healthy circadian rhythms.  - Encourage family at bedside  - Keep whiteboard in patient's room current with the date and name of the members of patient's team for easy patient self re-orientation.  - Avoid benzodiazepines, antihistamines, anticholinergics, hypnotics, and minimize opiates while controlling for pain as these medications may exacerbate delirium.     Impaired decision making  Presented to a urology appt on 10/2 and was advised to present to the ED due to concern for competency and need for patient advocate/POA before a biopsy or form of treatment can be initiated.       [place] R[place] 40:1299.53: (9) Upon the inability of any adult to consent for himself and in the absence of any person listed in Paragraphs (2) through (8) of this Subsection, an adult friend of the patient. For purposes of this Subsection to consent, adult friend means an adult who has exhibited special care and concern for the patient, who is generally familiar with the patient's health care views and desires, and who is willing and able to become involved in the patient's health care decisions and to act in the patient's best interest. The adult friend shall sign and date an acknowledgment form provided by the hospital or other health care facility in which the patient is located for placement in the patient's records certifying that he or she meets such criteria.     -  Juice Martinez ([phone]) will make decisions on his behalf  - supervisor Mei Bell ([phone]) have been trying to help him manage things with his work and personal matters as they have observed his deficits and know he has no one to help him - these individuals may be used for consent   - PEC'd for grave disability during last admission and discharged to [facility] however no formal eval by in-patient psychiatry  - psychiatry has consulted, with no indication for PEC/CEC at this time    Penile cancer  - Urology requests involvement of ethics committee to determine inpatient w/u and treatment appropriateness; Dr. Jocelyn Gibson has been involved  - Patient does have friend that can consent for procedures; this has been cleared with legal per CM/SW  - Staging w/u underway by urology  - Will f/u for further recommendations    Anticipated discharge date and disposition:   Pending legal guardianship establishment and further urologic w/u.  Brayan Castillo MD  [facility]

## 2020-10-15 LAB
ALBUMIN SERPL BCP-MCNC: 2.7 G/DL (ref 3.5–5.2)
ALP SERPL-CCNC: 68 U/L (ref 55–135)
ALT SERPL W/O P-5'-P-CCNC: 74 U/L (ref 10–44)
ANION GAP SERPL CALC-SCNC: 8 MMOL/L (ref 8–16)
AST SERPL-CCNC: 17 U/L (ref 10–40)
BASOPHILS # BLD AUTO: 0.02 K/UL (ref 0–0.2)
BASOPHILS NFR BLD: 0.3 % (ref 0–1.9)
BILIRUB SERPL-MCNC: 0.4 MG/DL (ref 0.1–1)
BUN SERPL-MCNC: 33 MG/DL (ref 8–23)
CALCIUM SERPL-MCNC: 8.3 MG/DL (ref 8.7–10.5)
CHLORIDE SERPL-SCNC: 105 MMOL/L (ref 95–110)
CO2 SERPL-SCNC: 29 MMOL/L (ref 23–29)
CREAT SERPL-MCNC: 1 MG/DL (ref 0.5–1.4)
DIFFERENTIAL METHOD: ABNORMAL
EOSINOPHIL # BLD AUTO: 0.3 K/UL (ref 0–0.5)
EOSINOPHIL NFR BLD: 4.2 % (ref 0–8)
ERYTHROCYTE [DISTWIDTH] IN BLOOD BY AUTOMATED COUNT: 13.1 % (ref 11.5–14.5)
EST. GFR  (AFRICAN AMERICAN): >60 ML/MIN/1.73 M^2
EST. GFR  (NON AFRICAN AMERICAN): >60 ML/MIN/1.73 M^2
GLUCOSE SERPL-MCNC: 95 MG/DL (ref 70–110)
HCT VFR BLD AUTO: 42.2 % (ref 40–54)
HGB BLD-MCNC: 13.9 G/DL (ref 14–18)
IMM GRANULOCYTES # BLD AUTO: 0.22 K/UL (ref 0–0.04)
IMM GRANULOCYTES NFR BLD AUTO: 3.4 % (ref 0–0.5)
LYMPHOCYTES # BLD AUTO: 1.6 K/UL (ref 1–4.8)
LYMPHOCYTES NFR BLD: 24 % (ref 18–48)
MAGNESIUM SERPL-MCNC: 2.2 MG/DL (ref 1.6–2.6)
MCH RBC QN AUTO: 31.7 PG (ref 27–31)
MCHC RBC AUTO-ENTMCNC: 32.9 G/DL (ref 32–36)
MCV RBC AUTO: 96 FL (ref 82–98)
MONOCYTES # BLD AUTO: 0.7 K/UL (ref 0.3–1)
MONOCYTES NFR BLD: 10.2 % (ref 4–15)
NEUTROPHILS # BLD AUTO: 3.8 K/UL (ref 1.8–7.7)
NEUTROPHILS NFR BLD: 57.9 % (ref 38–73)
NRBC BLD-RTO: 0 /100 WBC
PHOSPHATE SERPL-MCNC: 4.7 MG/DL (ref 2.7–4.5)
PLATELET # BLD AUTO: 260 K/UL (ref 150–350)
PMV BLD AUTO: 10.2 FL (ref 9.2–12.9)
POTASSIUM SERPL-SCNC: 5 MMOL/L (ref 3.5–5.1)
PROT SERPL-MCNC: 5.3 G/DL (ref 6–8.4)
RBC # BLD AUTO: 4.38 M/UL (ref 4.6–6.2)
SODIUM SERPL-SCNC: 142 MMOL/L (ref 136–145)
WBC # BLD AUTO: 6.47 K/UL (ref 3.9–12.7)

## 2020-10-15 PROCEDURE — 63600175 PHARM REV CODE 636 W HCPCS: Performed by: HOSPITALIST

## 2020-10-15 PROCEDURE — 11000001 HC ACUTE MED/SURG PRIVATE ROOM

## 2020-10-15 PROCEDURE — 99233 SBSQ HOSP IP/OBS HIGH 50: CPT | Mod: ,,, | Performed by: INTERNAL MEDICINE

## 2020-10-15 PROCEDURE — 83735 ASSAY OF MAGNESIUM: CPT

## 2020-10-15 PROCEDURE — 85025 COMPLETE CBC W/AUTO DIFF WBC: CPT

## 2020-10-15 PROCEDURE — 99233 PR SUBSEQUENT HOSPITAL CARE,LEVL III: ICD-10-PCS | Mod: ,,, | Performed by: INTERNAL MEDICINE

## 2020-10-15 PROCEDURE — 84100 ASSAY OF PHOSPHORUS: CPT

## 2020-10-15 PROCEDURE — 36415 COLL VENOUS BLD VENIPUNCTURE: CPT

## 2020-10-15 PROCEDURE — 80053 COMPREHEN METABOLIC PANEL: CPT

## 2020-10-15 PROCEDURE — 25000003 PHARM REV CODE 250: Performed by: STUDENT IN AN ORGANIZED HEALTH CARE EDUCATION/TRAINING PROGRAM

## 2020-10-15 RX ADMIN — PANTOPRAZOLE SODIUM 40 MG: 40 TABLET, DELAYED RELEASE ORAL at 09:10

## 2020-10-15 RX ADMIN — ENOXAPARIN SODIUM 40 MG: 40 INJECTION SUBCUTANEOUS at 04:10

## 2020-10-15 NOTE — PLAN OF CARE
SW assigned to case today 10/15/20. SW will assist team with DC needs. MAURICIO in communication with CM.    Sadia Mabry LMSW   - Case Management

## 2020-10-15 NOTE — PLAN OF CARE
No complains thru out the night. Will continue to monitor patient. All safety measures have been implemented.    Problem: Adult Inpatient Plan of Care  Goal: Plan of Care Review  Outcome: Ongoing, Progressing  Goal: Patient-Specific Goal (Individualization)  Outcome: Ongoing, Progressing  Goal: Absence of Hospital-Acquired Illness or Injury  Outcome: Ongoing, Progressing  Goal: Optimal Comfort and Wellbeing  Outcome: Ongoing, Progressing  Goal: Readiness for Transition of Care  Outcome: Ongoing, Progressing  Goal: Rounds/Family Conference  Outcome: Ongoing, Progressing     Problem: Fall Injury Risk  Goal: Absence of Fall and Fall-Related Injury  Outcome: Ongoing, Progressing     Problem: Thought Process Alteration  Goal: Optimal Thought Clarity  Outcome: Ongoing, Progressing

## 2020-10-16 LAB
ALBUMIN SERPL BCP-MCNC: 2.8 G/DL (ref 3.5–5.2)
ALP SERPL-CCNC: 58 U/L (ref 55–135)
ALT SERPL W/O P-5'-P-CCNC: 57 U/L (ref 10–44)
ANION GAP SERPL CALC-SCNC: 8 MMOL/L (ref 8–16)
AST SERPL-CCNC: 15 U/L (ref 10–40)
BACTERIA CSF CULT: NO GROWTH
BASOPHILS # BLD AUTO: 0.02 K/UL (ref 0–0.2)
BASOPHILS NFR BLD: 0.4 % (ref 0–1.9)
BILIRUB SERPL-MCNC: 0.6 MG/DL (ref 0.1–1)
BUN SERPL-MCNC: 22 MG/DL (ref 8–23)
CALCIUM SERPL-MCNC: 8.3 MG/DL (ref 8.7–10.5)
CHLORIDE SERPL-SCNC: 103 MMOL/L (ref 95–110)
CO2 SERPL-SCNC: 29 MMOL/L (ref 23–29)
CREAT SERPL-MCNC: 0.8 MG/DL (ref 0.5–1.4)
DIFFERENTIAL METHOD: ABNORMAL
EOSINOPHIL # BLD AUTO: 0.2 K/UL (ref 0–0.5)
EOSINOPHIL NFR BLD: 4.3 % (ref 0–8)
ERYTHROCYTE [DISTWIDTH] IN BLOOD BY AUTOMATED COUNT: 13.2 % (ref 11.5–14.5)
EST. GFR  (AFRICAN AMERICAN): >60 ML/MIN/1.73 M^2
EST. GFR  (NON AFRICAN AMERICAN): >60 ML/MIN/1.73 M^2
GLUCOSE SERPL-MCNC: 86 MG/DL (ref 70–110)
GRAM STN SPEC: NORMAL
HCT VFR BLD AUTO: 39.9 % (ref 40–54)
HGB BLD-MCNC: 13.1 G/DL (ref 14–18)
IMM GRANULOCYTES # BLD AUTO: 0.11 K/UL (ref 0–0.04)
IMM GRANULOCYTES NFR BLD AUTO: 2 % (ref 0–0.5)
LYMPHOCYTES # BLD AUTO: 1.8 K/UL (ref 1–4.8)
LYMPHOCYTES NFR BLD: 31.6 % (ref 18–48)
MAGNESIUM SERPL-MCNC: 2.1 MG/DL (ref 1.6–2.6)
MCH RBC QN AUTO: 31.2 PG (ref 27–31)
MCHC RBC AUTO-ENTMCNC: 32.8 G/DL (ref 32–36)
MCV RBC AUTO: 95 FL (ref 82–98)
MONOCYTES # BLD AUTO: 0.5 K/UL (ref 0.3–1)
MONOCYTES NFR BLD: 9.5 % (ref 4–15)
NEUTROPHILS # BLD AUTO: 2.9 K/UL (ref 1.8–7.7)
NEUTROPHILS NFR BLD: 52.2 % (ref 38–73)
NRBC BLD-RTO: 0 /100 WBC
PHOSPHATE SERPL-MCNC: 3.6 MG/DL (ref 2.7–4.5)
PLATELET # BLD AUTO: 270 K/UL (ref 150–350)
PMV BLD AUTO: 10.1 FL (ref 9.2–12.9)
POTASSIUM SERPL-SCNC: 4.5 MMOL/L (ref 3.5–5.1)
PROT SERPL-MCNC: 5.2 G/DL (ref 6–8.4)
RBC # BLD AUTO: 4.2 M/UL (ref 4.6–6.2)
SODIUM SERPL-SCNC: 140 MMOL/L (ref 136–145)
WBC # BLD AUTO: 5.57 K/UL (ref 3.9–12.7)

## 2020-10-16 PROCEDURE — 99233 SBSQ HOSP IP/OBS HIGH 50: CPT | Mod: ,,, | Performed by: INTERNAL MEDICINE

## 2020-10-16 PROCEDURE — 36415 COLL VENOUS BLD VENIPUNCTURE: CPT

## 2020-10-16 PROCEDURE — 25000003 PHARM REV CODE 250: Performed by: STUDENT IN AN ORGANIZED HEALTH CARE EDUCATION/TRAINING PROGRAM

## 2020-10-16 PROCEDURE — 80053 COMPREHEN METABOLIC PANEL: CPT

## 2020-10-16 PROCEDURE — 99233 PR SUBSEQUENT HOSPITAL CARE,LEVL III: ICD-10-PCS | Mod: ,,, | Performed by: INTERNAL MEDICINE

## 2020-10-16 PROCEDURE — 83735 ASSAY OF MAGNESIUM: CPT

## 2020-10-16 PROCEDURE — 11000001 HC ACUTE MED/SURG PRIVATE ROOM

## 2020-10-16 PROCEDURE — 85025 COMPLETE CBC W/AUTO DIFF WBC: CPT

## 2020-10-16 PROCEDURE — 97802 MEDICAL NUTRITION INDIV IN: CPT

## 2020-10-16 PROCEDURE — 84100 ASSAY OF PHOSPHORUS: CPT

## 2020-10-16 RX ADMIN — PANTOPRAZOLE SODIUM 40 MG: 40 TABLET, DELAYED RELEASE ORAL at 08:10

## 2020-10-16 NOTE — ASSESSMENT & PLAN NOTE
Patient with limited insight into his condition but is trusting the discretion of providers and friends. He appears to understand after several conversations that a surgery for removal for tumor on his penis is in his best interest and is deemed medically necessary.     - will plan for surgery 10/19  - obtaining consent proves to be a challenging endeavor, however the most likely scenario will be a 2-physician consent or consent from family friend   - with progressive dementia, his mental status is unlikely to change or improve significantly enough that he will understand more thoroughly and be able to consent for himself

## 2020-10-16 NOTE — PLAN OF CARE
Recommendations    1. Continue regular diet as tolerated.     2. If po intake 50%, recommend adding Boost Plus BID to aid in caloric intake.     3. RD following.     Goals: continue to consume >75% of meals by RD follow-up  Nutrition Goal Status: new

## 2020-10-16 NOTE — HPI
Juan Hobson is a 69 y.o. male with progressive dementia and a penile tumor. He was admitted to the hospital 10/2 due to failure to thrive and self neglect as witnessed by his supervisor from work. He had presented to urology clinic that afternoon to discuss penile malignancy. Social work was brought to the clinic and due to his declining mental status, it was recommended he be admitted to hospital as it was unsafe for him to return home where he lives alone.      He initially presented to the ED on 9/2/20 due to a draining penile lesion. 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  Urethral involvement was not excluded. Suspected invasion of the right dorsolateral corpus spongiosum. Based on this imaging, he is stage [id number]. However, we do not have a biopsy specimen.     During this admission, he has undergone a work up by neurology for progressive dementia. Currently there is a send out Alzheimer's panel in process. He is currently in the process of being assigned a legal guardian.

## 2020-10-16 NOTE — SUBJECTIVE & OBJECTIVE
Interval History:     NAEON.  Partial or total penectomy among options with conduit for urinary output. Care plan discussed with medical ethics - this care plan seems reasonable with notable limitations for withholding LN dissection also in best interest of patient for c/f likely resultant lymphadema.  Med ethics also indicates that patient friends can consent for procedure.  Attempted phone call with voicemail left to patient friend on sticky note.  Will discuss these developments with urology.  Patient with limited insight into his condition but is trusting of discretion of providers, friends.  He acknowledges ongoing issues with progressive memory loss.       Review of Systems   Constitutional: Negative for activity change, appetite change, chills, diaphoresis, fatigue and fever.   HENT: Negative for congestion, facial swelling, hearing loss, nosebleeds, sinus pressure, sinus pain, sneezing, sore throat, tinnitus, trouble swallowing and voice change.    Eyes: Negative for photophobia, pain, discharge and visual disturbance.   Respiratory: Negative for cough, chest tightness, shortness of breath and wheezing.    Cardiovascular: Negative for chest pain, palpitations and leg swelling.   Gastrointestinal: Negative for abdominal distention, abdominal pain, blood in stool, constipation, diarrhea, nausea and vomiting.   Endocrine: Negative for cold intolerance, heat intolerance and polyuria.   Genitourinary: Negative for decreased urine volume, difficulty urinating, dysuria, flank pain, frequency and urgency.   Musculoskeletal: Negative for arthralgias, gait problem, myalgias, neck pain and neck stiffness.   Skin: Negative for rash and wound.   Neurological: Negative for dizziness, speech difficulty, weakness, light-headedness and headaches.   Hematological: Negative for adenopathy. Does not bruise/bleed easily.   Psychiatric/Behavioral: Negative for agitation, confusion, self-injury, sleep disturbance and suicidal  ideas. The patient is not nervous/anxious.      Objective:     Vital Signs (Most Recent):  Temp: 98.3 °F (36.8 °C) (10/15/20 1919)  Pulse: 86 (10/15/20 1919)  Resp: 16 (10/15/20 1919)  BP: 120/70 (10/15/20 1919)  SpO2: 98 % (10/15/20 1919) Vital Signs (24h Range):  Temp:  [96.8 °F (36 °C)-98.4 °F (36.9 °C)] 98.3 °F (36.8 °C)  Pulse:  [70-89] 86  Resp:  [16-20] 16  SpO2:  [93 %-98 %] 98 %  BP: (103-136)/(58-77) 120/70     Weight: 72 kg (158 lb 11.7 oz)  Body mass index is 22.78 kg/m².  No intake or output data in the 24 hours ending 10/15/20 2113   Physical Exam  Constitutional:       General: He is not in acute distress.     Appearance: He is well-developed. He is not ill-appearing, toxic-appearing or diaphoretic.   HENT:      Head: Atraumatic. No abrasion, contusion or laceration.      Nose: Nose normal.      Mouth/Throat:      Pharynx: No oropharyngeal exudate.   Eyes:      General: No scleral icterus.        Right eye: No discharge.         Left eye: No discharge.      Conjunctiva/sclera: Conjunctivae normal.      Pupils: Pupils are equal, round, and reactive to light.   Neck:      Musculoskeletal: Normal range of motion and neck supple. No neck rigidity.      Vascular: No JVD.   Cardiovascular:      Rate and Rhythm: Normal rate and regular rhythm.      Heart sounds: Normal heart sounds. No murmur. No friction rub. No gallop.    Pulmonary:      Effort: Pulmonary effort is normal. No accessory muscle usage or respiratory distress.      Breath sounds: Normal breath sounds. No wheezing.   Abdominal:      General: Bowel sounds are normal. There is no distension.      Palpations: Abdomen is soft. There is no mass.      Tenderness: There is no abdominal tenderness. There is no guarding or rebound.   Genitourinary:     Comments: Firm palpable mass on right side of shaft of penis  Musculoskeletal: Normal range of motion.         General: No tenderness or deformity.   Lymphadenopathy:      Cervical: No cervical  adenopathy.   Skin:     General: Skin is warm and dry.      Capillary Refill: Capillary refill takes less than 2 seconds.      Findings: No bruising, ecchymosis, erythema, petechiae or rash.      Nails: There is no clubbing.     Neurological:      Mental Status: He is alert. Mental status is at baseline.      GCS: GCS eye subscore is 4. GCS verbal subscore is 5. GCS motor subscore is 6.      Cranial Nerves: No cranial nerve deficit.      Sensory: No sensory deficit.      Motor: No abnormal muscle tone.      Coordination: Coordination normal.      Deep Tendon Reflexes: Reflexes normal.      Comments: Oriented to place and time. Unclear to context with frequent redirection, orientation.  Tangential thinking and occasional disorganized thought content    Psychiatric:         Speech: Speech normal.         Behavior: Behavior normal.         Thought Content: Thought content normal.         Judgment: Judgment normal.         Significant Labs:   BMP:   Recent Labs   Lab 10/15/20  0404   GLU 95      K 5.0      CO2 29   BUN 33*   CREATININE 1.0   CALCIUM 8.3*   MG 2.2     CBC:   Recent Labs   Lab 10/14/20  0354 10/15/20  0404   WBC 7.28 6.47   HGB 14.5 13.9*   HCT 44.5 42.2    260       Significant Imaging: I have reviewed all pertinent imaging results/findings within the past 24 hours.

## 2020-10-16 NOTE — PLAN OF CARE
Pt still awaiting legal guardianship will cont to follow, dc plan pending     10/16/20 1500   Discharge Reassessment   Assessment Type Discharge Planning Reassessment   Provided patient/caregiver education on the expected discharge date and the discharge plan Yes   Do you have any problems affording any of your prescribed medications? No   Discharge Plan A Home   Discharge Plan B Home   DME Needed Upon Discharge  none   Anticipated Discharge Disposition Home   Can the patient/caregiver answer the patient profile reliably? No, cognitively impaired   How does the patient rate their overall health at the present time? Fair   Describe the patient's ability to walk at the present time. Walks with the help of equipment   How often would a person be available to care for the patient? Occasionally   During the past month, has the patient often been bothered by feeling down, depressed or hopeless? No   Post-Acute Status   Post-Acute Authorization [facility]   Discharge Delays None known at this time   Modesta Blum, MSN  Case Management  Ext [phone] ,

## 2020-10-16 NOTE — SUBJECTIVE & OBJECTIVE
Past Medical History:   Diagnosis Date    Basal cell carcinoma     right helix    Hematuria     Seborrheic dermatitis     Skin cancer of arm        Past Surgical History:   Procedure Laterality Date    INCISION AND DRAINAGE INTRA ORAL ABSCESS         Review of patient's allergies indicates:  No Known Allergies    Family History     None          Tobacco Use    Smoking status: Never Smoker    Smokeless tobacco: Never Used   Substance and Sexual Activity    Alcohol use: Yes     Frequency: Monthly or less     Drinks per session: 1 or 2     Comment: sometimes    Drug use: Never    Sexual activity: Not Currently     Partners: Female       Review of Systems   Unable to perform ROS: Dementia       Objective:     Temp:  [96.8 °F (36 °C)-98.4 °F (36.9 °C)] 96.8 °F (36 °C)  Pulse:  [56-89] 56  Resp:  [16-20] 20  SpO2:  [94 %-98 %] 94 %  BP: (114-125)/(64-77) 125/68     Body mass index is 22.78 kg/m².           Drains     None                 Physical Exam   Constitutional: He appears well-developed. No distress.   HENT:   Head: Normocephalic and atraumatic.   Eyes: No scleral icterus.   Neck: No tracheal deviation present.   Cardiovascular: Normal rate.    Pulmonary/Chest: Effort normal. No respiratory distress.   Abdominal: Soft. He exhibits no distension. There is no abdominal tenderness.   Genitourinary:    Genitourinary Comments: Penis uncircumcised and foreskin unable to retract. Firm nodule 2-3cm palpated just proximal to the glans. No drainage apparent. Bilateral inguinal nodes palpated - 1 node on the right and 2 on the left - shoddy, not enlarged.   Scrotal/testicular exam unremarkable.      Musculoskeletal: Normal range of motion.   Neurological: He is alert.   Skin: Skin is warm and dry.         Significant Labs:    BMP:  Recent Labs   Lab 10/14/20  0354 10/15/20  0404 10/16/20  0332    142 140   K 4.6 5.0 4.5    105 103   CO2 25 29 29   BUN 18 33* 22   CREATININE 0.7 1.0 0.8   CALCIUM 8.4*  8.3* 8.3*       CBC:  Recent Labs   Lab 10/14/20  0354 10/15/20  0404 10/16/20  0332   WBC 7.28 6.47 5.57   HGB 14.5 13.9* 13.1*   HCT 44.5 42.2 39.9*    260 270       All pertinent labs results from the past 24 hours have been reviewed.    Significant Imaging:  All pertinent imaging results/findings from the past 24 hours have been reviewed. as per HPI

## 2020-10-16 NOTE — ASSESSMENT & PLAN NOTE
Impaired decision making  Presentation concerning for progressive dementia   -LP performed by IR 10/5/2020: elevated protein, neutrophils  -CSF HSV, ACE negative.   -Serum paraneoplastic panel from 9/28 has resulted negative.  -MRI brain with Possible subtle increased FLAIR hyperintensity involving the bilateral mesial temporal lobes which can be seen in the setting of autoimmune limbic encephalitis  -EEG without seizure activity   -Vitamin B1, Vitamin B6, Vitamin B12, HIV, RPR, TSH unremarkable   - Neurology consulted   --please send CSF to [facility] for Alzheimer panel (ADEVL test code) - repeat LP completed   --start empiric IV Solumedrol 1 g qd x 5 days - completed    --formal cognitive assessment with neuropsych     --consider PET scan - not available inpatient    --continue strict delirium precautions  -Psych assess lack of competency   -Formal guardianship being pursued - court date early November  -Adult friends can assist with decision making   -Gina KEVIN [id number]: (9) Upon the inability of any adult to consent for himself and in the absence of any person listed in Paragraphs (2) through (8) of this Subsection, an adult friend of the patient. For purposes of this Subsection to consent, adult friend means an adult who has exhibited special care and concern for the patient, who is generally familiar with the patient's health care views and desires, and who is willing and able to become involved in the patient's health care decisions and to act in the patient's best interest. The adult friend shall sign and date an acknowledgment form provided by the hospital or other health care facility in which the patient is located for placement in the patient's records certifying that he or she meets such criteria.     - Juice Martinez ([phone]) will make decisions on his behalf  - supervisor Mei Bell ([phone]) have been trying to help him manage things with his work and personal matters as they have  observed his deficits and know he has no one to help him - these individuals may be used for consent     DELIRIUM BEHAVIOR MANAGEMENT  - Minimize use of restraints; if physical restraints necessary, please utilize medical/chemical prns for agitation.  - Keep shades open and room lit during day and room dim at night in order to promote healthy circadian rhythms.  - Encourage family at bedside  - Keep whiteboard in patient's room current with the date and name of the members of patient's team for easy patient self re-orientation.  - Avoid benzodiazepines, antihistamines, anticholinergics, hypnotics, and minimize opiates while controlling for pain as these medications may exacerbate delirium.

## 2020-10-16 NOTE — PROGRESS NOTES
[facility]  Urology  Progress Note    Patient Name: Juan Hobson  MRN: [id number]  Admission Date: 10/2/2020  Hospital Length of Stay: 10 days  Code Status: Full Code   Attending Provider: Audi Arcos MD   Primary Care Physician: Mehnaz Bairllas MD    Subjective:     HPI:  Juan Hosbon is a 69 y.o. male with progressive dementia and a penile tumor. He was admitted to the hospital 10/2 due to failure to thrive and self neglect as witnessed by his supervisor from work. He had presented to urology clinic that afternoon to discuss penile malignancy. Social work was brought to the clinic and due to his declining mental status, it was recommended he be admitted to hospital as it was unsafe for him to return home where he lives alone.      He initially presented to the ED on 9/2/20 due to a draining penile lesion. 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  Urethral involvement was not excluded. Suspected invasion of the right dorsolateral corpus spongiosum. Based on this imaging, he is stage [id number]. However, we do not have a biopsy specimen.     During this admission, he has undergone a work up by neurology for progressive dementia. Currently there is a send out Alzheimer's panel in process. He is currently in the process of being assigned a legal guardian.     Past Medical History:   Diagnosis Date    Basal cell carcinoma     right helix    Hematuria     Seborrheic dermatitis     Skin cancer of arm        Past Surgical History:   Procedure Laterality Date    INCISION AND DRAINAGE INTRA ORAL ABSCESS         Review of patient's allergies indicates:  No Known Allergies    Family History     None          Tobacco Use    Smoking status: Never Smoker    Smokeless tobacco: Never Used   Substance and Sexual  Activity    Alcohol use: Yes     Frequency: Monthly or less     Drinks per session: 1 or 2     Comment: sometimes    Drug use: Never    Sexual activity: Not Currently     Partners: Female       Review of Systems   Unable to perform ROS: Dementia       Objective:     Temp:  [96.8 °F (36 °C)-98.4 °F (36.9 °C)] 96.8 °F (36 °C)  Pulse:  [56-89] 56  Resp:  [16-20] 20  SpO2:  [94 %-98 %] 94 %  BP: (114-125)/(64-77) 125/68     Body mass index is 22.78 kg/m².           Drains     None                 Physical Exam   Constitutional: He appears well-developed. No distress.   HENT:   Head: Normocephalic and atraumatic.   Eyes: No scleral icterus.   Neck: No tracheal deviation present.   Cardiovascular: Normal rate.    Pulmonary/Chest: Effort normal. No respiratory distress.   Abdominal: Soft. He exhibits no distension. There is no abdominal tenderness.   Genitourinary:    Genitourinary Comments: Penis uncircumcised and foreskin unable to retract. Firm nodule 2-3cm palpated just proximal to the glans. No drainage apparent. Bilateral inguinal nodes palpated - 1 node on the right and 2 on the left - shoddy, not enlarged.   Scrotal/testicular exam unremarkable.      Musculoskeletal: Normal range of motion.   Neurological: He is alert.   Skin: Skin is warm and dry.         Significant Labs:    BMP:  Recent Labs   Lab 10/14/20  0354 10/15/20  0404 10/16/20  0332    142 140   K 4.6 5.0 4.5    105 103   CO2 25 29 29   BUN 18 33* 22   CREATININE 0.7 1.0 0.8   CALCIUM 8.4* 8.3* 8.3*       CBC:  Recent Labs   Lab 10/14/20  0354 10/15/20  0404 10/16/20  0332   WBC 7.28 6.47 5.57   HGB 14.5 13.9* 13.1*   HCT 44.5 42.2 39.9*    260 270       All pertinent labs results from the past 24 hours have been reviewed.    Significant Imaging:  All pertinent imaging results/findings from the past 24 hours have been reviewed. as per HPI    CT chest/abd/pelvis images and reports were reviewed from 10/14/2020 was reviewed.  He has  subcutaneous air in the penis.  micronodules in the lungs, (3 mm) does not appear to be metastatic.      One lymph node that was seen on the MRI on the inguinal area.     Assessment/Plan:     Penile carcinoma  Patient with limited insight into his condition but is trusting the discretion of providers and friends. He appears to understand after several conversations that a surgery for removal for tumor on his penis is in his best interest and is deemed medically necessary.     - will plan for surgery 10/19  - obtaining consent proves to be a challenging endeavor, however the most likely scenario will be a 2-physician consent or consent from family friend   - with progressive dementia, his mental status is unlikely to change or improve significantly enough that he will understand more thoroughly and be able to consent for himself         VTE Risk Mitigation (From admission, onward)         Ordered     enoxaparin injection 40 mg  Every 24 hours      10/14/20 0959     IP VTE HIGH RISK PATIENT  Once      10/02/20 2042                Marisol Goff MD  Urology  [facility]      Patient seen at the bedside.  Agree with the above note.

## 2020-10-16 NOTE — PROGRESS NOTES
"[facility]  Adult Nutrition  Progress Note    SUMMARY       Recommendations    1. Continue regular diet as tolerated.     2. If po intake 50%, recommend adding Boost Plus BID to aid in caloric intake.     3. RD following.     Goals: continue to consume >75% of meals by RD follow-up  Nutrition Goal Status: new  Communication of RD Recs: (POC)    Reason for Assessment    Reason For Assessment: length of stay  Diagnosis: (Cognitive impairment)  Relevant Medical History: Penile carcinoma, dementia   Interdisciplinary Rounds: did not attend  General Information Comments: Pt resting in bed with no family members at bedside. Pt reports good appetite at this time. Noted pt consumed 100% of breakfast, verfied po intake in chart review. Denies N/V/D/C. Pt reports good appetite with no wt changes PTA. NFPE not warranted. Pt appears thin, but nourished.   Nutrition Discharge Planning: regular diet    Nutrition Risk Screen    Nutrition Risk Screen: no indicators present    Nutrition/Diet History    Factors Affecting Nutritional Intake: None identified at this time    Anthropometrics    Temp: 98.3 °F (36.8 °C)  Height Method: Stated  Height: 5' 10" (177.8 cm)  Height (inches): 70 in  Weight Method: Bed Scale  Weight: 72 kg (158 lb 11.7 oz)  Weight (lb): 158.73 lb  Ideal Body Weight (IBW), Male: 166 lb  BMI (Calculated): 22.8  BMI Grade: (P) 18.5-24.9 - normal     Lab/Procedures/Meds    Pertinent Labs Reviewed: reviewed  Pertinent Labs Comments: ALT 57  Pertinent Medications Reviewed: reviewed  Pertinent Medications Comments: noted     Estimated/Assessed Needs    Weight Used For Calorie Calculations: 72 kg (158 lb 11.7 oz)  Energy Calorie Requirements (kcal): 2768-8329 kcal/day  Energy Need Method: Kcal/kg(25-30)  Protein Requirements: 72-87 gm/day(1.0-1.2 gm/kg)  Weight Used For Protein Calculations: 72 kg (158 lb 11.7 oz)  Fluid Requirements (mL): 1 mL/kcal or per MD     RDA Method (mL): 1800     Nutrition " Prescription Ordered    Current Diet Order: Regular    Evaluation of Received Nutrient/Fluid Intake    Tolerance: tolerating  % Intake of Estimated Energy Needs: 75 - 100 %  % Meal Intake: 75 - 100 %    Nutrition Risk    Level of Risk/Frequency of Follow-up: (f/u 1 x wk)     Assessment and Plan    Nutrition Problem  Increased Nutrient Needs     Related to (etiology):   Physiological demands     Signs and Symptoms (as evidenced by):   Penile carcinoma    Interventions (treatment strategy):  Collaboration of nutrition care with other providers    Nutrition Diagnosis Status:   New     Monitor and Evaluation    Food and Nutrient Intake: energy intake, food and beverage intake  Food and Nutrient Adminstration: diet order  Physical Activity and Function: nutrition-related ADLs and IADLs  Anthropometric Measurements: weight, weight change, body mass index  Biochemical Data, Medical Tests and Procedures: electrolyte and renal panel, gastrointestinal profile, glucose/endocrine profile, inflammatory profile, lipid profile  Nutrition-Focused Physical Findings: overall appearance     Nutrition Follow-Up    RD Follow-up?: Yes

## 2020-10-16 NOTE — PLAN OF CARE
SW assigned to case today 10/16/20. SW will assist team with DC needs. MAURICIO in communication with CM.    Sadia Mabry LMSW   - Case Management

## 2020-10-16 NOTE — PROGRESS NOTES
[facility] Hospital Medicine  Progress Note    Patient Name: Juan Hobson  MRN: [id number]  Patient Class: IP- Inpatient   Admission Date: 10/2/2020  Length of Stay: 9 days  Attending Physician: Audi Arcos MD  Primary Care Provider: Mehnaz Barillas MD    [facility] Medicine Team: [facility] O Audi Arcos MD    Subjective:     Principal Problem:Cognitive impairment        HPI:  Per Adriana Badillo MD:   Mr. Juan Hobson is a 69 y.o. male with recently diagnosed penile cancer, who presents to the ER from Urology clinic with concern for self neglect, and need for Neurology evaluation of dementia and possible placement.  The patient mentions that he has been having some issues with his memory.  He mostly orders takeout for meals because it is easier, and still drives to work.  He feels like he is able to take care of himself, and does not need to go to a facility.      Per note from Lianne German LCSW on 10/2:  Received consult from Dr. Monterroso re: assessing patient in clinic this afternoon and care recommendations as patient did not seem competent to make decisions and give consent. He has no family and no legal next of kin (POA or guardian).   Met individually with patient, and with his co-worker Janna Donavan ([phone], ext. [phone]) who brought him to his clinic appointment today. Patient unable to relate basic demographic information or clearly explain other information. For example, when asked his birth date he gave the year [birth year], then said 9, and then said the second to last, it begins with N; he said his street name but couldn't give the house number where he has lived for years; unable to say what type of work or where he worked prior to the [employer] in [facility] for the past 12 years; he described being taken in a truck to that other place (referring to the recent [facility] stay). He has awareness that he is having memory/cognitive issues but stated  he can manage for himself at home and that he knows there is a lot of work that needs to be done in his home that he hasn't gotten to. He inherited the home after his mother .      Mr. Go and another coworker Juice Martinez ([phone]) and their supervisor Mei Bell ([phone]) have been trying to help him manage things with his work and personal matters as they have observed his deficits and know he has no one to help him. They have helped him keep up with recent medical appointments and accompanied and transported him.  Mr. Go reports concern for his safety and ability to care for himself after seeing his home with clutter throughout and things piled up, mold and old food in the refrigerator, mold in the toilet, clothes all over, etc. Patient has been driving himself to and from work and to get food at places near his home, but unable to navigate to unfamiliar places. They have observed his cognitive deficits at work and he has made errors, so they have modified his job and his supervisor has been working with him to try to get FMLA and his leave/FCI in place so he doesn't lose his benefits. They have had to help him with paperwork, bill paying, etc., because he can't complete these on his own.         Upon arrival to the ER, vitals were temp 97.8F, HR 59 and /84.  Labs were unremarkable  He was admitted to Hospital Medicine for Neurology and SW evaluation.    Overview/Hospital Course:  Admitted to hospital medicine for worsening cognitive decline and inability to care for self in setting of concern for penile cancer.  In pursuit of workup for altered mental status - labs/infectious work-up grossly unremarkable. Neurology consulted and MRI obtained on admission with concern for encephalitis (increased FLAIR hyperintensity involving the bilateral mesial temporal lobes which can be seen in the setting of autoimmune limbic encephalitis in the appropriate clinical setting). LP  recommended and attempted at bedside however not successful 2/2 to DJD. Interventional radiology consulted, LP performed 10/5 with CSF (1 WBC, 190 RBC, 71 protein, 52 glucose). CSF HSV, ACE negative. EEG 10/3 (1 hr 40 min) with mild regional dysfunction in bilateral temporal lobes, but no electrographic seizures. Serum paraneoplastic panel from 9/28 has resulted negative. MRI brain W WO contrast showing increased FLAIR hyperintensity involving the bilateral mesial temporal lobes. Completed 5 days of empiric IV Solumedrol 1 g qd without notable clinical improvement. Presentation most concerning for dementia   Psychiatry consulted for formal capacity evaluation and concluded that he does not have capacity to make decisions regarding his long term care disposition however does not require PEC at this time as his stay has been non-contested. He has no family, children and have relied on coworkers for assistance. The case was brought to the attention of EPS who have no plans to intervene at this time and recommend since the patient presented to the hospital it is a safe discharge planning issue that would involve [facility] department. The Legal department intend to pursue [place] Guardianship to assist with managing affairs. Patient re-engaged with care for likely penile carcinoma with urology.     Interval History:     NAEON.  Partial or total penectomy among options with conduit for urinary output. Care plan discussed with medical ethics - this care plan seems reasonable with notable limitations for withholding LN dissection also in best interest of patient for c/f likely resultant lymphadema.  Med ethics also indicates that patient friends can consent for procedure.  Attempted phone call with voicemail left to patient friend on sticky note.  Will discuss these developments with urology.  Patient with limited insight into his condition but is trusting of discretion of providers, friends.  He acknowledges ongoing  issues with progressive memory loss.       Review of Systems   Constitutional: Negative for activity change, appetite change, chills, diaphoresis, fatigue and fever.   HENT: Negative for congestion, facial swelling, hearing loss, nosebleeds, sinus pressure, sinus pain, sneezing, sore throat, tinnitus, trouble swallowing and voice change.    Eyes: Negative for photophobia, pain, discharge and visual disturbance.   Respiratory: Negative for cough, chest tightness, shortness of breath and wheezing.    Cardiovascular: Negative for chest pain, palpitations and leg swelling.   Gastrointestinal: Negative for abdominal distention, abdominal pain, blood in stool, constipation, diarrhea, nausea and vomiting.   Endocrine: Negative for cold intolerance, heat intolerance and polyuria.   Genitourinary: Negative for decreased urine volume, difficulty urinating, dysuria, flank pain, frequency and urgency.   Musculoskeletal: Negative for arthralgias, gait problem, myalgias, neck pain and neck stiffness.   Skin: Negative for rash and wound.   Neurological: Negative for dizziness, speech difficulty, weakness, light-headedness and headaches.   Hematological: Negative for adenopathy. Does not bruise/bleed easily.   Psychiatric/Behavioral: Negative for agitation, confusion, self-injury, sleep disturbance and suicidal ideas. The patient is not nervous/anxious.      Objective:     Vital Signs (Most Recent):  Temp: 98.3 °F (36.8 °C) (10/15/20 1919)  Pulse: 86 (10/15/20 1919)  Resp: 16 (10/15/20 1919)  BP: 120/70 (10/15/20 1919)  SpO2: 98 % (10/15/20 1919) Vital Signs (24h Range):  Temp:  [96.8 °F (36 °C)-98.4 °F (36.9 °C)] 98.3 °F (36.8 °C)  Pulse:  [70-89] 86  Resp:  [16-20] 16  SpO2:  [93 %-98 %] 98 %  BP: (103-136)/(58-77) 120/70     Weight: 72 kg (158 lb 11.7 oz)  Body mass index is 22.78 kg/m².  No intake or output data in the 24 hours ending 10/15/20 2113   Physical Exam  Constitutional:       General: He is not in acute distress.      Appearance: He is well-developed. He is not ill-appearing, toxic-appearing or diaphoretic.   HENT:      Head: Atraumatic. No abrasion, contusion or laceration.      Nose: Nose normal.      Mouth/Throat:      Pharynx: No oropharyngeal exudate.   Eyes:      General: No scleral icterus.        Right eye: No discharge.         Left eye: No discharge.      Conjunctiva/sclera: Conjunctivae normal.      Pupils: Pupils are equal, round, and reactive to light.   Neck:      Musculoskeletal: Normal range of motion and neck supple. No neck rigidity.      Vascular: No JVD.   Cardiovascular:      Rate and Rhythm: Normal rate and regular rhythm.      Heart sounds: Normal heart sounds. No murmur. No friction rub. No gallop.    Pulmonary:      Effort: Pulmonary effort is normal. No accessory muscle usage or respiratory distress.      Breath sounds: Normal breath sounds. No wheezing.   Abdominal:      General: Bowel sounds are normal. There is no distension.      Palpations: Abdomen is soft. There is no mass.      Tenderness: There is no abdominal tenderness. There is no guarding or rebound.   Genitourinary:     Comments: Firm palpable mass on right side of shaft of penis  Musculoskeletal: Normal range of motion.         General: No tenderness or deformity.   Lymphadenopathy:      Cervical: No cervical adenopathy.   Skin:     General: Skin is warm and dry.      Capillary Refill: Capillary refill takes less than 2 seconds.      Findings: No bruising, ecchymosis, erythema, petechiae or rash.      Nails: There is no clubbing.     Neurological:      Mental Status: He is alert. Mental status is at baseline.      GCS: GCS eye subscore is 4. GCS verbal subscore is 5. GCS motor subscore is 6.      Cranial Nerves: No cranial nerve deficit.      Sensory: No sensory deficit.      Motor: No abnormal muscle tone.      Coordination: Coordination normal.      Deep Tendon Reflexes: Reflexes normal.      Comments: Oriented to place and time. Unclear  to context with frequent redirection, orientation.  Tangential thinking and occasional disorganized thought content    Psychiatric:         Speech: Speech normal.         Behavior: Behavior normal.         Thought Content: Thought content normal.         Judgment: Judgment normal.         Significant Labs:   BMP:   Recent Labs   Lab 10/15/20  0404   GLU 95      K 5.0      CO2 29   BUN 33*   CREATININE 1.0   CALCIUM 8.3*   MG 2.2     CBC:   Recent Labs   Lab 10/14/20  0354 10/15/20  0404   WBC 7.28 6.47   HGB 14.5 13.9*   HCT 44.5 42.2    260       Significant Imaging: I have reviewed all pertinent imaging results/findings within the past 24 hours.      Assessment/Plan:      * Cognitive impairment  Impaired decision making  Presentation concerning for progressive dementia   -LP performed by IR 10/5/2020: elevated protein, neutrophils  -CSF HSV, ACE negative.   -Serum paraneoplastic panel from 9/28 has resulted negative.  -MRI brain with Possible subtle increased FLAIR hyperintensity involving the bilateral mesial temporal lobes which can be seen in the setting of autoimmune limbic encephalitis  -EEG without seizure activity   -Vitamin B1, Vitamin B6, Vitamin B12, HIV, RPR, TSH unremarkable   - Neurology consulted   --please send CSF to [place] for Alzheimer panel (ADEVL test code) - repeat LP completed   --start empiric IV Solumedrol 1 g qd x 5 days - completed    --formal cognitive assessment with neuropsych     --consider PET scan - not available inpatient    --continue strict delirium precautions  -Psych assess lack of competency   -Formal guardianship being pursued - court date early November  -Adult friends can assist with decision making   -[place] 40:1299.53: (9) Upon the inability of any adult to consent for himself and in the absence of any person listed in Paragraphs (2) through (8) of this Subsection, an adult friend of the patient. For purposes of this Subsection to consent, adult  friend means an adult who has exhibited special care and concern for the patient, who is generally familiar with the patient's health care views and desires, and who is willing and able to become involved in the patient's health care decisions and to act in the patient's best interest. The adult friend shall sign and date an acknowledgment form provided by the hospital or other health care facility in which the patient is located for placement in the patient's records certifying that he or she meets such criteria.     - Omarjony Martinez ([phone]) will make decisions on his behalf  - supervisor Mei Bell ([phone]) have been trying to help him manage things with his work and personal matters as they have observed his deficits and know he has no one to help him - these individuals may be used for consent     DELIRIUM BEHAVIOR MANAGEMENT  - Minimize use of restraints; if physical restraints necessary, please utilize medical/chemical prns for agitation.  - Keep shades open and room lit during day and room dim at night in order to promote healthy circadian rhythms.  - Encourage family at bedside  - Keep whiteboard in patient's room current with the date and name of the members of patient's team for easy patient self re-orientation.  - Avoid benzodiazepines, antihistamines, anticholinergics, hypnotics, and minimize opiates while controlling for pain as these medications may exacerbate delirium.      Impaired decision making  Presented to a urology appt on 10/2 and was advised to present to the ED due to concern for competency and need for patient advocate/POA before a biopsy or form of treatment can be initiated.     [place] 40:1299.53: (9) Upon the inability of any adult to consent for himself and in the absence of any person listed in Paragraphs (2) through (8) of this Subsection, an adult friend of the patient. For purposes of this Subsection to consent, adult friend means an adult who has exhibited  special care and concern for the patient, who is generally familiar with the patient's health care views and desires, and who is willing and able to become involved in the patient's health care decisions and to act in the patient's best interest. The adult friend shall sign and date an acknowledgment form provided by the hospital or other health care facility in which the patient is located for placement in the patient's records certifying that he or she meets such criteria.    -  Juice Martinez ([phone]) does not wish to make decisions on his behalf  - supervisor Mei Arabella ([phone]) have been trying to help him manage things with his work and personal matters as they have observed his deficits and know he has no one to help him - these individuals may be used for consent   - PEC'd for grave disability during last admission and discharged to [facility] however no formal eval by in-patient psychiatry  - will need formal evaluation       Penile carcinoma  - follows with urology   - Assessed inpatient - plan for partial / total penectomy         VTE Risk Mitigation (From admission, onward)         Ordered     enoxaparin injection 40 mg  Every 24 hours      10/14/20 0959     IP VTE HIGH RISK PATIENT  Once      10/02/20 2042                Discharge Planning   ALFRED: 11/6/2020     Code Status: Full Code   Is the patient medically ready for discharge?: No    Reason for patient still in hospital (select all that apply): Patient trending condition, Treatment, Consult recommendations and Pending disposition  Discharge Plan A: Other   Discharge Delays: None known at this time        Total visit time >35 minutes or greater with greater than 50% of time spent in counseling and coordination of care.        Audi Arcos MD  Department of Hospital Medicine   [facility]

## 2020-10-17 LAB
ALBUMIN SERPL BCP-MCNC: 2.9 G/DL (ref 3.5–5.2)
ALP SERPL-CCNC: 59 U/L (ref 55–135)
ALT SERPL W/O P-5'-P-CCNC: 51 U/L (ref 10–44)
ANION GAP SERPL CALC-SCNC: 7 MMOL/L (ref 8–16)
AST SERPL-CCNC: 17 U/L (ref 10–40)
BASOPHILS # BLD AUTO: 0.01 K/UL (ref 0–0.2)
BASOPHILS NFR BLD: 0.2 % (ref 0–1.9)
BILIRUB SERPL-MCNC: 0.6 MG/DL (ref 0.1–1)
BUN SERPL-MCNC: 19 MG/DL (ref 8–23)
CALCIUM SERPL-MCNC: 8.3 MG/DL (ref 8.7–10.5)
CHLORIDE SERPL-SCNC: 105 MMOL/L (ref 95–110)
CO2 SERPL-SCNC: 27 MMOL/L (ref 23–29)
CREAT SERPL-MCNC: 0.7 MG/DL (ref 0.5–1.4)
DIFFERENTIAL METHOD: ABNORMAL
EOSINOPHIL # BLD AUTO: 0.2 K/UL (ref 0–0.5)
EOSINOPHIL NFR BLD: 3.6 % (ref 0–8)
ERYTHROCYTE [DISTWIDTH] IN BLOOD BY AUTOMATED COUNT: 13.2 % (ref 11.5–14.5)
EST. GFR  (AFRICAN AMERICAN): >60 ML/MIN/1.73 M^2
EST. GFR  (NON AFRICAN AMERICAN): >60 ML/MIN/1.73 M^2
GLUCOSE SERPL-MCNC: 87 MG/DL (ref 70–110)
HCT VFR BLD AUTO: 40.1 % (ref 40–54)
HGB BLD-MCNC: 12.9 G/DL (ref 14–18)
IMM GRANULOCYTES # BLD AUTO: 0.07 K/UL (ref 0–0.04)
IMM GRANULOCYTES NFR BLD AUTO: 1.3 % (ref 0–0.5)
LYMPHOCYTES # BLD AUTO: 1.8 K/UL (ref 1–4.8)
LYMPHOCYTES NFR BLD: 33.5 % (ref 18–48)
MAGNESIUM SERPL-MCNC: 2.1 MG/DL (ref 1.6–2.6)
MCH RBC QN AUTO: 31.6 PG (ref 27–31)
MCHC RBC AUTO-ENTMCNC: 32.2 G/DL (ref 32–36)
MCV RBC AUTO: 98 FL (ref 82–98)
MONOCYTES # BLD AUTO: 0.5 K/UL (ref 0.3–1)
MONOCYTES NFR BLD: 9.3 % (ref 4–15)
NEUTROPHILS # BLD AUTO: 2.8 K/UL (ref 1.8–7.7)
NEUTROPHILS NFR BLD: 52.1 % (ref 38–73)
NRBC BLD-RTO: 0 /100 WBC
PHOSPHATE SERPL-MCNC: 3.5 MG/DL (ref 2.7–4.5)
PLATELET # BLD AUTO: 263 K/UL (ref 150–350)
PMV BLD AUTO: 9.7 FL (ref 9.2–12.9)
POTASSIUM SERPL-SCNC: 4.4 MMOL/L (ref 3.5–5.1)
PROT SERPL-MCNC: 5.4 G/DL (ref 6–8.4)
RBC # BLD AUTO: 4.08 M/UL (ref 4.6–6.2)
SODIUM SERPL-SCNC: 139 MMOL/L (ref 136–145)
WBC # BLD AUTO: 5.35 K/UL (ref 3.9–12.7)

## 2020-10-17 PROCEDURE — 99231 PR SUBSEQUENT HOSPITAL CARE,LEVL I: ICD-10-PCS | Mod: ,,, | Performed by: INTERNAL MEDICINE

## 2020-10-17 PROCEDURE — 25000003 PHARM REV CODE 250: Performed by: STUDENT IN AN ORGANIZED HEALTH CARE EDUCATION/TRAINING PROGRAM

## 2020-10-17 PROCEDURE — 11000001 HC ACUTE MED/SURG PRIVATE ROOM

## 2020-10-17 PROCEDURE — 80053 COMPREHEN METABOLIC PANEL: CPT

## 2020-10-17 PROCEDURE — 99231 SBSQ HOSP IP/OBS SF/LOW 25: CPT | Mod: ,,, | Performed by: INTERNAL MEDICINE

## 2020-10-17 PROCEDURE — 83735 ASSAY OF MAGNESIUM: CPT

## 2020-10-17 PROCEDURE — 84100 ASSAY OF PHOSPHORUS: CPT

## 2020-10-17 PROCEDURE — 36415 COLL VENOUS BLD VENIPUNCTURE: CPT

## 2020-10-17 PROCEDURE — 85025 COMPLETE CBC W/AUTO DIFF WBC: CPT

## 2020-10-17 RX ADMIN — PANTOPRAZOLE SODIUM 40 MG: 40 TABLET, DELAYED RELEASE ORAL at 08:10

## 2020-10-17 NOTE — SUBJECTIVE & OBJECTIVE
Interval History:     NAEON.  Plans for urologic procedure Monday - 10/19.  25 minute discussion with family friend in regards to surgery, dispo planning, and prognosis.  Will continue to update.     Review of Systems   Constitutional: Negative for activity change, appetite change, chills, diaphoresis, fatigue and fever.   HENT: Negative for congestion, facial swelling, hearing loss, nosebleeds, sinus pressure, sinus pain, sneezing, sore throat, tinnitus, trouble swallowing and voice change.    Eyes: Negative for photophobia, pain, discharge and visual disturbance.   Respiratory: Negative for cough, chest tightness, shortness of breath and wheezing.    Cardiovascular: Negative for chest pain, palpitations and leg swelling.   Gastrointestinal: Negative for abdominal distention, abdominal pain, blood in stool, constipation, diarrhea, nausea and vomiting.   Endocrine: Negative for cold intolerance, heat intolerance and polyuria.   Genitourinary: Negative for decreased urine volume, difficulty urinating, dysuria, flank pain, frequency and urgency.   Musculoskeletal: Negative for arthralgias, gait problem, myalgias, neck pain and neck stiffness.   Skin: Negative for rash and wound.   Neurological: Negative for dizziness, speech difficulty, weakness, light-headedness and headaches.   Hematological: Negative for adenopathy. Does not bruise/bleed easily.   Psychiatric/Behavioral: Negative for agitation, confusion, self-injury, sleep disturbance and suicidal ideas. The patient is not nervous/anxious.      Objective:     Vital Signs (Most Recent):  Temp: 98.5 °F (36.9 °C) (10/16/20 1913)  Pulse: 78 (10/16/20 1913)  Resp: 18 (10/16/20 1913)  BP: 129/65 (10/16/20 1913)  SpO2: 98 % (10/16/20 1913) Vital Signs (24h Range):  Temp:  [96.8 °F (36 °C)-98.6 °F (37 °C)] 98.5 °F (36.9 °C)  Pulse:  [56-98] 78  Resp:  [18-20] 18  SpO2:  [94 %-98 %] 98 %  BP: (115-138)/(61-68) 129/65     Weight: 72 kg (158 lb 11.7 oz)  Body mass index is  22.78 kg/m².  No intake or output data in the 24 hours ending 10/16/20 2101   Physical Exam  Constitutional:       General: He is not in acute distress.     Appearance: He is well-developed. He is not ill-appearing, toxic-appearing or diaphoretic.   HENT:      Head: Atraumatic. No abrasion, contusion or laceration.      Nose: Nose normal.      Mouth/Throat:      Pharynx: No oropharyngeal exudate.   Eyes:      General: No scleral icterus.        Right eye: No discharge.         Left eye: No discharge.      Conjunctiva/sclera: Conjunctivae normal.      Pupils: Pupils are equal, round, and reactive to light.   Neck:      Musculoskeletal: Normal range of motion and neck supple. No neck rigidity.      Vascular: No JVD.   Cardiovascular:      Rate and Rhythm: Normal rate and regular rhythm.      Heart sounds: Normal heart sounds. No murmur. No friction rub. No gallop.    Pulmonary:      Effort: Pulmonary effort is normal. No accessory muscle usage or respiratory distress.      Breath sounds: Normal breath sounds. No wheezing.   Abdominal:      General: Bowel sounds are normal. There is no distension.      Palpations: Abdomen is soft. There is no mass.      Tenderness: There is no abdominal tenderness. There is no guarding or rebound.   Genitourinary:     Comments: Firm palpable mass on right side of shaft of penis  Musculoskeletal: Normal range of motion.         General: No tenderness or deformity.   Lymphadenopathy:      Cervical: No cervical adenopathy.   Skin:     General: Skin is warm and dry.      Capillary Refill: Capillary refill takes less than 2 seconds.      Findings: No bruising, ecchymosis, erythema, petechiae or rash.      Nails: There is no clubbing.     Neurological:      Mental Status: He is alert. Mental status is at baseline.      GCS: GCS eye subscore is 4. GCS verbal subscore is 5. GCS motor subscore is 6.      Cranial Nerves: No cranial nerve deficit.      Sensory: No sensory deficit.      Motor: No  abnormal muscle tone.      Coordination: Coordination normal.      Deep Tendon Reflexes: Reflexes normal.      Comments: Oriented to place and time. Unclear to context with frequent redirection, orientation.  Tangential thinking and occasional disorganized thought content    Psychiatric:         Speech: Speech normal.         Behavior: Behavior normal.         Thought Content: Thought content normal.         Judgment: Judgment normal.         Significant Labs:   BMP:   Recent Labs   Lab 10/16/20  0332   GLU 86      K 4.5      CO2 29   BUN 22   CREATININE 0.8   CALCIUM 8.3*   MG 2.1     CBC:   Recent Labs   Lab 10/15/20  0404 10/16/20  0332   WBC 6.47 5.57   HGB 13.9* 13.1*   HCT 42.2 39.9*    270       Significant Imaging: I have reviewed all pertinent imaging results/findings within the past 24 hours.

## 2020-10-17 NOTE — PLAN OF CARE
Problem: Adult Inpatient Plan of Care  Goal: Plan of Care Review  Outcome: Ongoing, Progressing     Problem: Adult Inpatient Plan of Care  Goal: Patient-Specific Goal (Individualization)  Outcome: Ongoing, Progressing     Problem: Adult Inpatient Plan of Care  Goal: Absence of Hospital-Acquired Illness or Injury  Outcome: Ongoing, Progressing     Problem: Adult Inpatient Plan of Care  Goal: Optimal Comfort and Wellbeing  Outcome: Ongoing, Progressing     Problem: Adult Inpatient Plan of Care  Goal: Readiness for Transition of Care  Outcome: Ongoing, Progressing     Problem: Adult Inpatient Plan of Care  Goal: Rounds/Family Conference  Outcome: Ongoing, Progressing     Problem: Fall Injury Risk  Goal: Absence of Fall and Fall-Related Injury  Outcome: Ongoing, Progressing     Problem: Thought Process Alteration  Goal: Optimal Thought Clarity  Outcome: Ongoing, Progressing     Pt is AAO x 3, denies any c/o of pain or discomfort. V/S stable. Pt remained free of falls or injuries throughout shift. Safety Precautions maintained at all times. Bed in low position, bed wheels locked, bed rails raised x 3. Personal belongings and call light within reach. Will cont. To monitor.

## 2020-10-17 NOTE — PROGRESS NOTES
[facility] Hospital Medicine  Progress Note    Patient Name: Juan Hobson  MRN: [id number]  Patient Class: IP- Inpatient   Admission Date: 10/2/2020  Length of Stay: 10 days  Attending Physician: Audi Arcos MD  Primary Care Provider: Mehnaz Barillas MD    [facility] Medicine Team: [facility] HOSP MED O Audi Arcos MD    Subjective:     Principal Problem:Cognitive impairment        HPI:  Per Adriana Badillo MD:   Mr. Juan Hobson is a 69 y.o. male with recently diagnosed penile cancer, who presents to the ER from Urology clinic with concern for self neglect, and need for Neurology evaluation of dementia and possible placement.  The patient mentions that he has been having some issues with his memory.  He mostly orders takeout for meals because it is easier, and still drives to work.  He feels like he is able to take care of himself, and does not need to go to a facility.      Per note from Lianne German LCSW on 10/2:  Received consult from Dr. Monterroso re: assessing patient in clinic this afternoon and care recommendations as patient did not seem competent to make decisions and give consent. He has no family and no legal next of kin (POA or guardian).   Met individually with patient, and with his co-worker Janna Donavan ([phone], ext. [phone]) who brought him to his clinic appointment today. Patient unable to relate basic demographic information or clearly explain other information. For example, when asked his birth date he gave the year [birth year], then said 9, and then said the second to last, it begins with N; he said his street name but couldn't give the house number where he has lived for years; unable to say what type of work or where he worked prior to the [email or web address] in [facility] for the past 12 years; he described being taken in a truck to that other place (referring to the recent [facility] stay). He has awareness that he is having memory/cognitive issues but  stated he can manage for himself at home and that he knows there is a lot of work that needs to be done in his home that he hasn't gotten to. He inherited the home after his mother .      Mr. Go and another coworker Juice Martinez ([phone]) and their supervisor Mei Bell ([phone]) have been trying to help him manage things with his work and personal matters as they have observed his deficits and know he has no one to help him. They have helped him keep up with recent medical appointments and accompanied and transported him.  Mr. Go reports concern for his safety and ability to care for himself after seeing his home with clutter throughout and things piled up, mold and old food in the refrigerator, mold in the toilet, clothes all over, etc. Patient has been driving himself to and from work and to get food at places near his home, but unable to navigate to unfamiliar places. They have observed his cognitive deficits at work and he has made errors, so they have modified his job and his supervisor has been working with him to try to get FMLA and his leave/shelter in place so he doesn't lose his benefits. They have had to help him with paperwork, bill paying, etc., because he can't complete these on his own.         Upon arrival to the ER, vitals were temp 97.8F, HR 59 and /84.  Labs were unremarkable  He was admitted to Hospital Medicine for Neurology and SW evaluation.    Overview/Hospital Course:  Admitted to hospital medicine for worsening cognitive decline and inability to care for self in setting of concern for penile cancer.  In pursuit of workup for altered mental status - labs/infectious work-up grossly unremarkable. Neurology consulted and MRI obtained on admission with concern for encephalitis (increased FLAIR hyperintensity involving the bilateral mesial temporal lobes which can be seen in the setting of autoimmune limbic encephalitis in the appropriate clinical setting). LP  recommended and attempted at bedside however not successful 2/2 to D. Interventional radiology consulted, LP performed 10/5 with CSF (1 WBC, 190 RBC, 71 protein, 52 glucose). CSF HSV, ACE negative. EEG 10/3 (1 hr 40 min) with mild regional dysfunction in bilateral temporal lobes, but no electrographic seizures. Serum paraneoplastic panel from 9/28 has resulted negative. MRI brain W WO contrast showing increased FLAIR hyperintensity involving the bilateral mesial temporal lobes. Completed 5 days of empiric IV Solumedrol 1 g qd without notable clinical improvement. Presentation most concerning for dementia   Psychiatry consulted for formal capacity evaluation and concluded that he does not have capacity to make decisions regarding his long term care disposition however does not require PEC at this time as his stay has been non-contested. He has no family, children and have relied on coworkers for assistance. The case was brought to the attention of EPS who have no plans to intervene at this time and recommend since the patient presented to the hospital it is a safe discharge planning issue that would involve [facility] department. The Legal department intend to pursue [place] Guardianship to assist with managing affairs. Patient re-engaged with care for likely penile carcinoma with urology.     Interval History:     NAEON.  Plans for urologic procedure Monday - 10/19.  25 minute discussion with family friend in regards to surgery, dispo planning, and prognosis.  Will continue to update.     Review of Systems   Constitutional: Negative for activity change, appetite change, chills, diaphoresis, fatigue and fever.   HENT: Negative for congestion, facial swelling, hearing loss, nosebleeds, sinus pressure, sinus pain, sneezing, sore throat, tinnitus, trouble swallowing and voice change.    Eyes: Negative for photophobia, pain, discharge and visual disturbance.   Respiratory: Negative for cough, chest tightness,  shortness of breath and wheezing.    Cardiovascular: Negative for chest pain, palpitations and leg swelling.   Gastrointestinal: Negative for abdominal distention, abdominal pain, blood in stool, constipation, diarrhea, nausea and vomiting.   Endocrine: Negative for cold intolerance, heat intolerance and polyuria.   Genitourinary: Negative for decreased urine volume, difficulty urinating, dysuria, flank pain, frequency and urgency.   Musculoskeletal: Negative for arthralgias, gait problem, myalgias, neck pain and neck stiffness.   Skin: Negative for rash and wound.   Neurological: Negative for dizziness, speech difficulty, weakness, light-headedness and headaches.   Hematological: Negative for adenopathy. Does not bruise/bleed easily.   Psychiatric/Behavioral: Negative for agitation, confusion, self-injury, sleep disturbance and suicidal ideas. The patient is not nervous/anxious.      Objective:     Vital Signs (Most Recent):  Temp: 98.5 °F (36.9 °C) (10/16/20 1913)  Pulse: 78 (10/16/20 1913)  Resp: 18 (10/16/20 1913)  BP: 129/65 (10/16/20 1913)  SpO2: 98 % (10/16/20 1913) Vital Signs (24h Range):  Temp:  [96.8 °F (36 °C)-98.6 °F (37 °C)] 98.5 °F (36.9 °C)  Pulse:  [56-98] 78  Resp:  [18-20] 18  SpO2:  [94 %-98 %] 98 %  BP: (115-138)/(61-68) 129/65     Weight: 72 kg (158 lb 11.7 oz)  Body mass index is 22.78 kg/m².  No intake or output data in the 24 hours ending 10/16/20 2105   Physical Exam  Constitutional:       General: He is not in acute distress.     Appearance: He is well-developed. He is not ill-appearing, toxic-appearing or diaphoretic.   HENT:      Head: Atraumatic. No abrasion, contusion or laceration.      Nose: Nose normal.      Mouth/Throat:      Pharynx: No oropharyngeal exudate.   Eyes:      General: No scleral icterus.        Right eye: No discharge.         Left eye: No discharge.      Conjunctiva/sclera: Conjunctivae normal.      Pupils: Pupils are equal, round, and reactive to light.   Neck:       Musculoskeletal: Normal range of motion and neck supple. No neck rigidity.      Vascular: No JVD.   Cardiovascular:      Rate and Rhythm: Normal rate and regular rhythm.      Heart sounds: Normal heart sounds. No murmur. No friction rub. No gallop.    Pulmonary:      Effort: Pulmonary effort is normal. No accessory muscle usage or respiratory distress.      Breath sounds: Normal breath sounds. No wheezing.   Abdominal:      General: Bowel sounds are normal. There is no distension.      Palpations: Abdomen is soft. There is no mass.      Tenderness: There is no abdominal tenderness. There is no guarding or rebound.   Genitourinary:     Comments: Firm palpable mass on right side of shaft of penis  Musculoskeletal: Normal range of motion.         General: No tenderness or deformity.   Lymphadenopathy:      Cervical: No cervical adenopathy.   Skin:     General: Skin is warm and dry.      Capillary Refill: Capillary refill takes less than 2 seconds.      Findings: No bruising, ecchymosis, erythema, petechiae or rash.      Nails: There is no clubbing.     Neurological:      Mental Status: He is alert. Mental status is at baseline.      GCS: GCS eye subscore is 4. GCS verbal subscore is 5. GCS motor subscore is 6.      Cranial Nerves: No cranial nerve deficit.      Sensory: No sensory deficit.      Motor: No abnormal muscle tone.      Coordination: Coordination normal.      Deep Tendon Reflexes: Reflexes normal.      Comments: Oriented to place and time. Unclear to context with frequent redirection, orientation.  Tangential thinking and occasional disorganized thought content    Psychiatric:         Speech: Speech normal.         Behavior: Behavior normal.         Thought Content: Thought content normal.         Judgment: Judgment normal.         Significant Labs:   BMP:   Recent Labs   Lab 10/16/20  0332   GLU 86      K 4.5      CO2 29   BUN 22   CREATININE 0.8   CALCIUM 8.3*   MG 2.1     CBC:   Recent Labs    Lab 10/15/20  0404 10/16/20  0332   WBC 6.47 5.57   HGB 13.9* 13.1*   HCT 42.2 39.9*    270       Significant Imaging: I have reviewed all pertinent imaging results/findings within the past 24 hours.      Assessment/Plan:      * Cognitive impairment  Impaired decision making  Presentation concerning for progressive dementia   -LP performed by IR 10/5/2020: elevated protein, neutrophils  -CSF HSV, ACE negative.   -Serum paraneoplastic panel from 9/28 has resulted negative.  -MRI brain with Possible subtle increased FLAIR hyperintensity involving the bilateral mesial temporal lobes which can be seen in the setting of autoimmune limbic encephalitis  -EEG without seizure activity   -Vitamin B1, Vitamin B6, Vitamin B12, HIV, RPR, TSH unremarkable   - Neurology consulted   --please send CSF to [facility] for Alzheimer panel (ADEVL test code) - repeat LP completed   --start empiric IV Solumedrol 1 g qd x 5 days - completed    --formal cognitive assessment with neuropsych     --consider PET scan - not available inpatient    --continue strict delirium precautions  -Psych assess lack of competency   -Formal guardianship being pursued - court date early November  -Adult friends can assist with decision making   -[place]:1299.53: (9) Upon the inability of any adult to consent for himself and in the absence of any person listed in Paragraphs (2) through (8) of this Subsection, an adult friend of the patient. For purposes of this Subsection to consent, adult friend means an adult who has exhibited special care and concern for the patient, who is generally familiar with the patient's health care views and desires, and who is willing and able to become involved in the patient's health care decisions and to act in the patient's best interest. The adult friend shall sign and date an acknowledgment form provided by the hospital or other health care facility in which the patient is located for placement in the patient's  records certifying that he or she meets such criteria.     - Juice aMrtinez ([phone]) will make decisions on his behalf  - supervisor Mei Bell ([phone]) have been trying to help him manage things with his work and personal matters as they have observed his deficits and know he has no one to help him - these individuals may be used for consent     DELIRIUM BEHAVIOR MANAGEMENT  - Minimize use of restraints; if physical restraints necessary, please utilize medical/chemical prns for agitation.  - Keep shades open and room lit during day and room dim at night in order to promote healthy circadian rhythms.  - Encourage family at bedside  - Keep whiteboard in patient's room current with the date and name of the members of patient's team for easy patient self re-orientation.  - Avoid benzodiazepines, antihistamines, anticholinergics, hypnotics, and minimize opiates while controlling for pain as these medications may exacerbate delirium.      Impaired decision making  Presented to a urology appt on 10/2 and was advised to present to the ED due to concern for competency and need for patient advocate/POA before a biopsy or form of treatment can be initiated.     [place] R[place] 40:1299.53: (9) Upon the inability of any adult to consent for himself and in the absence of any person listed in Paragraphs (2) through (8) of this Subsection, an adult friend of the patient. For purposes of this Subsection to consent, adult friend means an adult who has exhibited special care and concern for the patient, who is generally familiar with the patient's health care views and desires, and who is willing and able to become involved in the patient's health care decisions and to act in the patient's best interest. The adult friend shall sign and date an acknowledgment form provided by the hospital or other health care facility in which the patient is located for placement in the patient's records certifying that he or she meets  such criteria.    -  Juice Martinez ([phone]) does not wish to make decisions on his behalf  - supervisor Mei Arabella ([phone]) have been trying to help him manage things with his work and personal matters as they have observed his deficits and know he has no one to help him - these individuals may be used for consent   - PEC'd for grave disability during last admission and discharged to [facility] however no formal eval by in-patient psychiatry  - will need formal evaluation       Penile carcinoma  - follows with urology   - Assessed inpatient - plan for partial / total penectomy         VTE Risk Mitigation (From admission, onward)         Ordered     enoxaparin injection 40 mg  Every 24 hours      10/14/20 0959     IP VTE HIGH RISK PATIENT  Once      10/02/20 2042                Discharge Planning   ALFRED: 11/6/2020     Code Status: Full Code   Is the patient medically ready for discharge?: No    Reason for patient still in hospital (select all that apply): Treatment and Pending disposition  Discharge Plan A: Home   Discharge Delays: None known at this time    Total visit time >35 minutes or greater with greater than 50% of time spent in counseling and coordination of care.        Audi Arcos MD  Department of Hospital Medicine   [facility]

## 2020-10-18 ENCOUNTER — ANESTHESIA EVENT (OUTPATIENT)
Dept: SURGERY | Facility: HOSPITAL | Age: 70
DRG: 989 | End: 2020-10-18
Payer: COMMERCIAL

## 2020-10-18 LAB
ALBUMIN SERPL BCP-MCNC: 3.2 G/DL (ref 3.5–5.2)
ALP SERPL-CCNC: 67 U/L (ref 55–135)
ALT SERPL W/O P-5'-P-CCNC: 75 U/L (ref 10–44)
ANION GAP SERPL CALC-SCNC: 7 MMOL/L (ref 8–16)
AST SERPL-CCNC: 35 U/L (ref 10–40)
BASOPHILS # BLD AUTO: 0.02 K/UL (ref 0–0.2)
BASOPHILS NFR BLD: 0.4 % (ref 0–1.9)
BILIRUB SERPL-MCNC: 0.6 MG/DL (ref 0.1–1)
BUN SERPL-MCNC: 22 MG/DL (ref 8–23)
CALCIUM SERPL-MCNC: 8.5 MG/DL (ref 8.7–10.5)
CHLORIDE SERPL-SCNC: 103 MMOL/L (ref 95–110)
CO2 SERPL-SCNC: 27 MMOL/L (ref 23–29)
CREAT SERPL-MCNC: 0.7 MG/DL (ref 0.5–1.4)
DIFFERENTIAL METHOD: ABNORMAL
EOSINOPHIL # BLD AUTO: 0.2 K/UL (ref 0–0.5)
EOSINOPHIL NFR BLD: 3.8 % (ref 0–8)
ERYTHROCYTE [DISTWIDTH] IN BLOOD BY AUTOMATED COUNT: 13.2 % (ref 11.5–14.5)
EST. GFR  (AFRICAN AMERICAN): >60 ML/MIN/1.73 M^2
EST. GFR  (NON AFRICAN AMERICAN): >60 ML/MIN/1.73 M^2
GLUCOSE SERPL-MCNC: 84 MG/DL (ref 70–110)
HCT VFR BLD AUTO: 40.6 % (ref 40–54)
HGB BLD-MCNC: 13.2 G/DL (ref 14–18)
IMM GRANULOCYTES # BLD AUTO: 0.07 K/UL (ref 0–0.04)
IMM GRANULOCYTES NFR BLD AUTO: 1.3 % (ref 0–0.5)
LYMPHOCYTES # BLD AUTO: 1.9 K/UL (ref 1–4.8)
LYMPHOCYTES NFR BLD: 35.3 % (ref 18–48)
MAGNESIUM SERPL-MCNC: 2.1 MG/DL (ref 1.6–2.6)
MCH RBC QN AUTO: 31.5 PG (ref 27–31)
MCHC RBC AUTO-ENTMCNC: 32.5 G/DL (ref 32–36)
MCV RBC AUTO: 97 FL (ref 82–98)
MONOCYTES # BLD AUTO: 0.5 K/UL (ref 0.3–1)
MONOCYTES NFR BLD: 8.6 % (ref 4–15)
NEUTROPHILS # BLD AUTO: 2.8 K/UL (ref 1.8–7.7)
NEUTROPHILS NFR BLD: 50.6 % (ref 38–73)
NRBC BLD-RTO: 0 /100 WBC
PHOSPHATE SERPL-MCNC: 3.8 MG/DL (ref 2.7–4.5)
PLATELET # BLD AUTO: 248 K/UL (ref 150–350)
PMV BLD AUTO: 9.4 FL (ref 9.2–12.9)
POTASSIUM SERPL-SCNC: 4.4 MMOL/L (ref 3.5–5.1)
PROT SERPL-MCNC: 5.8 G/DL (ref 6–8.4)
RBC # BLD AUTO: 4.19 M/UL (ref 4.6–6.2)
SODIUM SERPL-SCNC: 137 MMOL/L (ref 136–145)
WBC # BLD AUTO: 5.46 K/UL (ref 3.9–12.7)

## 2020-10-18 PROCEDURE — 84100 ASSAY OF PHOSPHORUS: CPT

## 2020-10-18 PROCEDURE — 11000001 HC ACUTE MED/SURG PRIVATE ROOM

## 2020-10-18 PROCEDURE — 83735 ASSAY OF MAGNESIUM: CPT

## 2020-10-18 PROCEDURE — 36415 COLL VENOUS BLD VENIPUNCTURE: CPT

## 2020-10-18 PROCEDURE — 63600175 PHARM REV CODE 636 W HCPCS: Performed by: HOSPITALIST

## 2020-10-18 PROCEDURE — 99233 PR SUBSEQUENT HOSPITAL CARE,LEVL III: ICD-10-PCS | Mod: ,,, | Performed by: INTERNAL MEDICINE

## 2020-10-18 PROCEDURE — 25000003 PHARM REV CODE 250: Performed by: STUDENT IN AN ORGANIZED HEALTH CARE EDUCATION/TRAINING PROGRAM

## 2020-10-18 PROCEDURE — 99233 SBSQ HOSP IP/OBS HIGH 50: CPT | Mod: ,,, | Performed by: INTERNAL MEDICINE

## 2020-10-18 PROCEDURE — 80053 COMPREHEN METABOLIC PANEL: CPT

## 2020-10-18 PROCEDURE — 85025 COMPLETE CBC W/AUTO DIFF WBC: CPT

## 2020-10-18 RX ADMIN — ENOXAPARIN SODIUM 40 MG: 40 INJECTION SUBCUTANEOUS at 06:10

## 2020-10-18 RX ADMIN — PANTOPRAZOLE SODIUM 40 MG: 40 TABLET, DELAYED RELEASE ORAL at 09:10

## 2020-10-18 NOTE — ANESTHESIA PREPROCEDURE EVALUATION
[facility]  Anesthesia Pre-Operative Evaluation         Patient Name: Juan Hobson  YOB: 1950  MRN: [id number]    SUBJECTIVE:     Pre-operative evaluation for Procedure(s) (LRB):  PENECTOMY  Surgeon would like to follow his cases in OR 24. (N/A)  PERINEAL URETHROSTOMY (N/A)  BIOPSY, PENIS (N/A)     10/18/2020    Juan Hobson is a 69 y.o. male w/ a significant PMHx of dementia and penile tumor. Admitted for FTT.    Patient now presents for the above procedure(s).      LDA:        Peripheral IV - Single Lumen 10/16/20 1651 20 G Anterior;Proximal;Right Forearm (Active)   Site Assessment Clean;Dry;Intact;No redness;No swelling 10/18/20 0801   Line Status Saline locked;Flushed 10/18/20 0801   Dressing Status Clean;Dry;Intact 10/18/20 0801   Dressing Intervention Integrity maintained 10/18/20 0801   Dressing Change Due 10/20/20 10/18/20 0801   Site Change Due 10/20/20 10/18/20 0801   Reason Not Rotated Not due 10/18/20 0801   Number of days: 1       Prev airway: Present Prior to Hospital Arrival?: No; Placement Date: 06/27/17; Placement Time: 1449; Method of Intubation: Direct laryngoscopy; Inserted by: Other (Bev RANGEL); Airway Device: Endotracheal Tube; Mask Ventilation: Easy - oral; Intubated: Postinduction; Blade: Carmen #4; Airway Device Size: 7.5; Style: Cuffed; Cuff Inflation: Minimal occlusive pressure; Placement Verified By: Auscultation, Capnometry; Grade: Grade I; Complicating Factors: None; Intubation Findings: Positive EtCO2, Bilateral breath sounds, Atraumatic/Condition of teeth unchanged;  Depth of Insertion: 22; Securment: Lips; Complications: None; Breath Sounds: Equal Bilateral; Insertion Attempts: 1; Removal Date: 06/27/17;  Removal Time: 1635    Drips: None documented.      Patient Active Problem List   Diagnosis    Basal cell carcinoma    Status post Mohs surgery    Mohs defect of helix of right ear    Mohs defect of auricle of right ear     Altered mental status    Penile carcinoma    Dementia without behavioral disturbance    Debility    Cognitive decline    Cognitive impairment    Impaired decision making    Confusion    Acute encephalopathy       Review of patient's allergies indicates:  No Known Allergies    Current Inpatient Medications:   enoxaparin  40 mg Subcutaneous Q24H    pantoprazole  40 mg Oral Daily       No current facility-administered medications on file prior to encounter.      No current outpatient medications on file prior to encounter.       Past Surgical History:   Procedure Laterality Date    INCISION AND DRAINAGE INTRA ORAL ABSCESS         Social History     Socioeconomic History    Marital status: Single     Spouse name: Not on file    Number of children: Not on file    Years of education: Not on file    Highest education level: Not on file   Occupational History    Occupation:      Comment: [employer]   Social Needs    Financial resource strain: Not on file    Food insecurity     Worry: Not on file     Inability: Not on file    Transportation needs     Medical: Not on file     Non-medical: Not on file   Tobacco Use    Smoking status: Never Smoker    Smokeless tobacco: Never Used   Substance and Sexual Activity    Alcohol use: Yes     Frequency: Monthly or less     Drinks per session: 1 or 2     Comment: sometimes    Drug use: Never    Sexual activity: Not Currently     Partners: Female   Lifestyle    Physical activity     Days per week: Not on file     Minutes per session: Not on file    Stress: Not on file   Relationships    Social connections     Talks on phone: Not on file     Gets together: Not on file     Attends Oriental orthodox service: Not on file     Active member of club or organization: Not on file     Attends meetings of clubs or organizations: Not on file     Relationship status: Not on file   Other Topics Concern    Not on file   Social History Narrative    Not on file       OBJECTIVE:      Vital Signs Range (Last 24H):  Temp:  [36.2 °C (97.2 °F)-36.9 °C (98.5 °F)]   Pulse:  [64-72]   Resp:  [18]   BP: (115-130)/(59-68)   SpO2:  [95 %-99 %]       Significant Labs:  Lab Results   Component Value Date    WBC 5.46 10/18/2020    HGB 13.2 (L) 10/18/2020    HCT 40.6 10/18/2020     10/18/2020    CHOL 192 01/31/2020    TRIG 49 01/31/2020    HDL 74 01/31/2020    ALT 75 (H) 10/18/2020    AST 35 10/18/2020     10/18/2020    K 4.4 10/18/2020     10/18/2020    CREATININE 0.7 10/18/2020    BUN 22 10/18/2020    CO2 27 10/18/2020    TSH 0.914 10/02/2020    HGBA1C 4.9 01/31/2020       Diagnostic Studies: No relevant studies.    EKG:   Results for orders placed or performed during the hospital encounter of 10/02/20   EKG 12-lead    Collection Time: 10/02/20  6:24 PM    Narrative    Test Reason : R41.82,    Vent. Rate : 054 BPM     Atrial Rate : 054 BPM     P-R Int : 184 ms          QRS Dur : 142 ms      QT Int : 412 ms       P-R-T Axes : 071 067 051 degrees     QTc Int : 390 ms    Sinus bradycardia  Right bundle branch block  Abnormal ECG  When compared with ECG of 02-SEP-2020 20:36,  Vent. rate has decreased BY  34 BPM  QT has shortened  Confirmed by Ash SNOW MD ([id number]) on 10/3/2020 11:01:36 AM    Referred By: MAGNOLIA DAVID           Confirmed By:Ash SNOW MD       ECHOCARDIOGRAM:  TTE:  No results found for this or any previous visit.      ASSESSMENT/PLAN:         Anesthesia Evaluation    I have reviewed the Patient Summary Reports.      I have reviewed the Medications.     Review of Systems  Anesthesia Hx:  History of prior surgery of interest to airway management or planning:  Denies Personal Hx of Anesthesia complications.   Social:  Non-Smoker, No Alcohol Use    Hematology/Oncology:  Hematology Normal   Oncology Normal     EENT/Dental:EENT/Dental Normal   Cardiovascular:  Cardiovascular Normal Exercise tolerance: good     Pulmonary:  Pulmonary Normal    Renal/:  Renal/ Normal      Hepatic/GI:  Hepatic/GI Normal    Musculoskeletal:  Musculoskeletal Normal    Neurological:  Neurology Normal    Dermatological:   R ear defect, s/p Moh's   Psych:  Psychiatric Normal           Physical Exam  General:  Well nourished    Airway/Jaw/Neck:  Airway Findings: Mouth Opening: Normal Tongue: Normal  General Airway Assessment: Adult, Good  Mallampati: III  TM Distance: Normal, at least 6 cm     Eyes/Ears/Nose:  EYES/EARS/NOSE FINDINGS: Normal   Dental:  Dental Findings: In tact   Chest/Lungs:  Chest/Lungs Findings: Clear to auscultation, Normal Respiratory Rate     Heart/Vascular:  Heart Findings: Rate: Normal  Rhythm: Regular Rhythm  Sounds: Normal  Heart murmur: negative       Mental Status:  Mental Status Findings:  Cooperative, Confusion         Anesthesia Plan  Type of Anesthesia, risks & benefits discussed:  Anesthesia Type:  general  Patient's Preference:   Intra-op Monitoring Plan: standard ASA monitors  Intra-op Monitoring Plan Comments:   Post Op Pain Control Plan: multimodal analgesia and per primary service following discharge from PACU  Post Op Pain Control Plan Comments:   Induction:   IV  Beta Blocker:         Informed Consent: Patient representative understands risks and agrees with Anesthesia plan.  Questions answered.   ASA Score: 3     Day of Surgery Review of History & Physical:    H&P update referred to the surgeon.         Ready For Surgery From Anesthesia Perspective.

## 2020-10-18 NOTE — SUBJECTIVE & OBJECTIVE
Interval History:     NAEON.  Plans for urologic procedure Monday - 10/19. Patient stable.     Review of Systems   Constitutional: Negative for activity change, appetite change, chills, diaphoresis, fatigue and fever.   HENT: Negative for congestion, facial swelling, hearing loss, nosebleeds, sinus pressure, sinus pain, sneezing, sore throat, tinnitus, trouble swallowing and voice change.    Eyes: Negative for photophobia, pain, discharge and visual disturbance.   Respiratory: Negative for cough, chest tightness, shortness of breath and wheezing.    Cardiovascular: Negative for chest pain, palpitations and leg swelling.   Gastrointestinal: Negative for abdominal distention, abdominal pain, blood in stool, constipation, diarrhea, nausea and vomiting.   Endocrine: Negative for cold intolerance, heat intolerance and polyuria.   Genitourinary: Negative for decreased urine volume, difficulty urinating, dysuria, flank pain, frequency and urgency.   Musculoskeletal: Negative for arthralgias, gait problem, myalgias, neck pain and neck stiffness.   Skin: Negative for rash and wound.   Neurological: Negative for dizziness, speech difficulty, weakness, light-headedness and headaches.   Hematological: Negative for adenopathy. Does not bruise/bleed easily.   Psychiatric/Behavioral: Negative for agitation, confusion, self-injury, sleep disturbance and suicidal ideas. The patient is not nervous/anxious.      Objective:     Vital Signs (Most Recent):  Temp: 98.5 °F (36.9 °C) (10/17/20 1937)  Pulse: 72 (10/17/20 1937)  Resp: 18 (10/17/20 1937)  BP: 130/68 (10/17/20 1937)  SpO2: 98 % (10/17/20 1937) Vital Signs (24h Range):  Temp:  [97.3 °F (36.3 °C)-98.5 °F (36.9 °C)] 98.5 °F (36.9 °C)  Pulse:  [57-72] 72  Resp:  [16-18] 18  SpO2:  [94 %-98 %] 98 %  BP: (121-133)/(59-69) 130/68     Weight: 72 kg (158 lb 11.7 oz)  Body mass index is 22.78 kg/m².  No intake or output data in the 24 hours ending 10/17/20 2137   Physical  Exam  Constitutional:       General: He is not in acute distress.     Appearance: He is well-developed. He is not ill-appearing, toxic-appearing or diaphoretic.   HENT:      Head: Atraumatic. No abrasion, contusion or laceration.      Nose: Nose normal.      Mouth/Throat:      Pharynx: No oropharyngeal exudate.   Eyes:      General: No scleral icterus.        Right eye: No discharge.         Left eye: No discharge.      Conjunctiva/sclera: Conjunctivae normal.      Pupils: Pupils are equal, round, and reactive to light.   Neck:      Musculoskeletal: Normal range of motion and neck supple. No neck rigidity.      Vascular: No JVD.   Cardiovascular:      Rate and Rhythm: Normal rate and regular rhythm.      Heart sounds: Normal heart sounds. No murmur. No friction rub. No gallop.    Pulmonary:      Effort: Pulmonary effort is normal. No accessory muscle usage or respiratory distress.      Breath sounds: Normal breath sounds. No wheezing.   Abdominal:      General: Bowel sounds are normal. There is no distension.      Palpations: Abdomen is soft. There is no mass.      Tenderness: There is no abdominal tenderness. There is no guarding or rebound.   Genitourinary:     Comments: Firm palpable mass on right side of shaft of penis  Musculoskeletal: Normal range of motion.         General: No tenderness or deformity.   Lymphadenopathy:      Cervical: No cervical adenopathy.   Skin:     General: Skin is warm and dry.      Capillary Refill: Capillary refill takes less than 2 seconds.      Findings: No bruising, ecchymosis, erythema, petechiae or rash.      Nails: There is no clubbing.     Neurological:      Mental Status: He is alert. Mental status is at baseline.      GCS: GCS eye subscore is 4. GCS verbal subscore is 5. GCS motor subscore is 6.      Cranial Nerves: No cranial nerve deficit.      Sensory: No sensory deficit.      Motor: No abnormal muscle tone.      Coordination: Coordination normal.      Deep Tendon  Reflexes: Reflexes normal.      Comments: Oriented to place and time. Unclear to context with frequent redirection, orientation.  Tangential thinking and occasional disorganized thought content    Psychiatric:         Speech: Speech normal.         Behavior: Behavior normal.         Thought Content: Thought content normal.         Judgment: Judgment normal.         Significant Labs:   BMP:   Recent Labs   Lab 10/17/20  0420   GLU 87      K 4.4      CO2 27   BUN 19   CREATININE 0.7   CALCIUM 8.3*   MG 2.1     CBC:   Recent Labs   Lab 10/16/20  0332 10/17/20  0420   WBC 5.57 5.35   HGB 13.1* 12.9*   HCT 39.9* 40.1    263       Significant Imaging: I have reviewed all pertinent imaging results/findings within the past 24 hours.

## 2020-10-18 NOTE — ASSESSMENT & PLAN NOTE
Patient with limited insight into his condition but is trusting the discretion of providers and friends. He appears to understand after several conversations that a surgery for removal for tumor on his penis is in his best interest and is deemed medically necessary     - will plan for surgery 10/19, case request placed  - consent obtained from friend Cheikh. Discussed risks, benefits, alternatives explained. She was given a chance to ask questions and all questions were answered to her satisfaction

## 2020-10-18 NOTE — PROGRESS NOTES
[facility] Hospital Medicine  Progress Note    Patient Name: Juan Hobson  MRN: [id number]  Patient Class: IP- Inpatient   Admission Date: 10/2/2020  Length of Stay: 11 days  Attending Physician: Audi Arcos MD  Primary Care Provider: Mehnaz Barillas MD    [facility] Medicine Team: [facility] MED O Audi Arcos MD    Subjective:     Principal Problem:Cognitive impairment        HPI:  Per Adriana Badillo MD:   Mr. Juan Hobson is a 69 y.o. male with recently diagnosed penile cancer, who presents to the ER from Urology clinic with concern for self neglect, and need for Neurology evaluation of dementia and possible placement.  The patient mentions that he has been having some issues with his memory.  He mostly orders takeout for meals because it is easier, and still drives to work.  He feels like he is able to take care of himself, and does not need to go to a facility.      Per note from Lianne German LCSW on 10/2:  Received consult from Dr. Monterroso re: assessing patient in clinic this afternoon and care recommendations as patient did not seem competent to make decisions and give consent. He has no family and no legal next of kin (POA or guardian).   Met individually with patient, and with his co-worker Janna Donavan ([phone], ext. [phone]) who brought him to his clinic appointment today. Patient unable to relate basic demographic information or clearly explain other information. For example, when asked his birth date he gave the year [birth year], then said 9, and then said the second to last, it begins with N; he said his street name but couldn't give the house number where he has lived for years; unable to say what type of work or where he worked prior to the [employer] in [facility] for the past 12 years; he described being taken in a truck to that other place (referring to the recent [facility] stay). He has awareness that he is having memory/cognitive issues but  stated he can manage for himself at home and that he knows there is a lot of work that needs to be done in his home that he hasn't gotten to. He inherited the home after his mother .      Mr. Go and another coworker Juice Martinez ([phone]) and their supervisor Mei Bell ([phone]) have been trying to help him manage things with his work and personal matters as they have observed his deficits and know he has no one to help him. They have helped him keep up with recent medical appointments and accompanied and transported him.  Mr. Go reports concern for his safety and ability to care for himself after seeing his home with clutter throughout and things piled up, mold and old food in the refrigerator, mold in the toilet, clothes all over, etc. Patient has been driving himself to and from work and to get food at places near his home, but unable to navigate to unfamiliar places. They have observed his cognitive deficits at work and he has made errors, so they have modified his job and his supervisor has been working with him to try to get FMLA and his leave/long term in place so he doesn't lose his benefits. They have had to help him with paperwork, bill paying, etc., because he can't complete these on his own.         Upon arrival to the ER, vitals were temp 97.8F, HR 59 and /84.  Labs were unremarkable  He was admitted to Hospital Medicine for Neurology and SW evaluation.    Overview/Hospital Course:  Admitted to hospital medicine for worsening cognitive decline and inability to care for self in setting of concern for penile cancer.  In pursuit of workup for altered mental status - labs/infectious work-up grossly unremarkable. Neurology consulted and MRI obtained on admission with concern for encephalitis (increased FLAIR hyperintensity involving the bilateral mesial temporal lobes which can be seen in the setting of autoimmune limbic encephalitis in the appropriate clinical setting). LP  recommended and attempted at bedside however not successful 2/2 to D. Interventional radiology consulted, LP performed 10/5 with CSF (1 WBC, 190 RBC, 71 protein, 52 glucose). CSF HSV, ACE negative. EEG 10/3 (1 hr 40 min) with mild regional dysfunction in bilateral temporal lobes, but no electrographic seizures. Serum paraneoplastic panel from 9/28 has resulted negative. MRI brain W WO contrast showing increased FLAIR hyperintensity involving the bilateral mesial temporal lobes. Completed 5 days of empiric IV Solumedrol 1 g qd without notable clinical improvement. Presentation most concerning for dementia   Psychiatry consulted for formal capacity evaluation and concluded that he does not have capacity to make decisions regarding his long term care disposition however does not require PEC at this time as his stay has been non-contested. He has no family, children and have relied on coworkers for assistance. The case was brought to the attention of EPS who have no plans to intervene at this time and recommend since the patient presented to the hospital it is a safe discharge planning issue that would involve [facility] department. The Legal department intend to pursue [place] Guardianship to assist with managing affairs. Patient re-engaged with care for likely penile carcinoma with urology.     Interval History:     NAEON.  Plans for urologic procedure Monday - 10/19. Patient stable.     Review of Systems   Constitutional: Negative for activity change, appetite change, chills, diaphoresis, fatigue and fever.   HENT: Negative for congestion, facial swelling, hearing loss, nosebleeds, sinus pressure, sinus pain, sneezing, sore throat, tinnitus, trouble swallowing and voice change.    Eyes: Negative for photophobia, pain, discharge and visual disturbance.   Respiratory: Negative for cough, chest tightness, shortness of breath and wheezing.    Cardiovascular: Negative for chest pain, palpitations and leg swelling.    Gastrointestinal: Negative for abdominal distention, abdominal pain, blood in stool, constipation, diarrhea, nausea and vomiting.   Endocrine: Negative for cold intolerance, heat intolerance and polyuria.   Genitourinary: Negative for decreased urine volume, difficulty urinating, dysuria, flank pain, frequency and urgency.   Musculoskeletal: Negative for arthralgias, gait problem, myalgias, neck pain and neck stiffness.   Skin: Negative for rash and wound.   Neurological: Negative for dizziness, speech difficulty, weakness, light-headedness and headaches.   Hematological: Negative for adenopathy. Does not bruise/bleed easily.   Psychiatric/Behavioral: Negative for agitation, confusion, self-injury, sleep disturbance and suicidal ideas. The patient is not nervous/anxious.      Objective:     Vital Signs (Most Recent):  Temp: 98.5 °F (36.9 °C) (10/17/20 1937)  Pulse: 72 (10/17/20 1937)  Resp: 18 (10/17/20 1937)  BP: 130/68 (10/17/20 1937)  SpO2: 98 % (10/17/20 1937) Vital Signs (24h Range):  Temp:  [97.3 °F (36.3 °C)-98.5 °F (36.9 °C)] 98.5 °F (36.9 °C)  Pulse:  [57-72] 72  Resp:  [16-18] 18  SpO2:  [94 %-98 %] 98 %  BP: (121-133)/(59-69) 130/68     Weight: 72 kg (158 lb 11.7 oz)  Body mass index is 22.78 kg/m².  No intake or output data in the 24 hours ending 10/17/20 2137   Physical Exam  Constitutional:       General: He is not in acute distress.     Appearance: He is well-developed. He is not ill-appearing, toxic-appearing or diaphoretic.   HENT:      Head: Atraumatic. No abrasion, contusion or laceration.      Nose: Nose normal.      Mouth/Throat:      Pharynx: No oropharyngeal exudate.   Eyes:      General: No scleral icterus.        Right eye: No discharge.         Left eye: No discharge.      Conjunctiva/sclera: Conjunctivae normal.      Pupils: Pupils are equal, round, and reactive to light.   Neck:      Musculoskeletal: Normal range of motion and neck supple. No neck rigidity.      Vascular: No JVD.    Cardiovascular:      Rate and Rhythm: Normal rate and regular rhythm.      Heart sounds: Normal heart sounds. No murmur. No friction rub. No gallop.    Pulmonary:      Effort: Pulmonary effort is normal. No accessory muscle usage or respiratory distress.      Breath sounds: Normal breath sounds. No wheezing.   Abdominal:      General: Bowel sounds are normal. There is no distension.      Palpations: Abdomen is soft. There is no mass.      Tenderness: There is no abdominal tenderness. There is no guarding or rebound.   Genitourinary:     Comments: Firm palpable mass on right side of shaft of penis  Musculoskeletal: Normal range of motion.         General: No tenderness or deformity.   Lymphadenopathy:      Cervical: No cervical adenopathy.   Skin:     General: Skin is warm and dry.      Capillary Refill: Capillary refill takes less than 2 seconds.      Findings: No bruising, ecchymosis, erythema, petechiae or rash.      Nails: There is no clubbing.     Neurological:      Mental Status: He is alert. Mental status is at baseline.      GCS: GCS eye subscore is 4. GCS verbal subscore is 5. GCS motor subscore is 6.      Cranial Nerves: No cranial nerve deficit.      Sensory: No sensory deficit.      Motor: No abnormal muscle tone.      Coordination: Coordination normal.      Deep Tendon Reflexes: Reflexes normal.      Comments: Oriented to place and time. Unclear to context with frequent redirection, orientation.  Tangential thinking and occasional disorganized thought content    Psychiatric:         Speech: Speech normal.         Behavior: Behavior normal.         Thought Content: Thought content normal.         Judgment: Judgment normal.         Significant Labs:   BMP:   Recent Labs   Lab 10/17/20  0420   GLU 87      K 4.4      CO2 27   BUN 19   CREATININE 0.7   CALCIUM 8.3*   MG 2.1     CBC:   Recent Labs   Lab 10/16/20  0332 10/17/20  0420   WBC 5.57 5.35   HGB 13.1* 12.9*   HCT 39.9* 40.1    263        Significant Imaging: I have reviewed all pertinent imaging results/findings within the past 24 hours.      Assessment/Plan:      * Cognitive impairment  Impaired decision making  Presentation concerning for progressive dementia   -LP performed by IR 10/5/2020: elevated protein, neutrophils  -CSF HSV, ACE negative.   -Serum paraneoplastic panel from 9/28 has resulted negative.  -MRI brain with Possible subtle increased FLAIR hyperintensity involving the bilateral mesial temporal lobes which can be seen in the setting of autoimmune limbic encephalitis  -EEG without seizure activity   -Vitamin B1, Vitamin B6, Vitamin B12, HIV, RPR, TSH unremarkable   - Neurology consulted   --please send CSF to [place] for Alzheimer panel (ADEVL test code) - repeat LP completed   --start empiric IV Solumedrol 1 g qd x 5 days - completed    --formal cognitive assessment with neuropsych     --consider PET scan - not available inpatient    --continue strict delirium precautions  -Psych assess lack of competency   -Formal guardianship being pursued - court date early November  -Adult friends can assist with decision making   -[place] 40:1299.53: (9) Upon the inability of any adult to consent for himself and in the absence of any person listed in Paragraphs (2) through (8) of this Subsection, an adult friend of the patient. For purposes of this Subsection to consent, adult friend means an adult who has exhibited special care and concern for the patient, who is generally familiar with the patient's health care views and desires, and who is willing and able to become involved in the patient's health care decisions and to act in the patient's best interest. The adult friend shall sign and date an acknowledgment form provided by the hospital or other health care facility in which the patient is located for placement in the patient's records certifying that he or she meets such criteria.     - Juice Martinez ([phone]) will make  decisions on his behalf  - supervisor Mei Meanso ([phone]) have been trying to help him manage things with his work and personal matters as they have observed his deficits and know he has no one to help him - these individuals may be used for consent     DELIRIUM BEHAVIOR MANAGEMENT  - Minimize use of restraints; if physical restraints necessary, please utilize medical/chemical prns for agitation.  - Keep shades open and room lit during day and room dim at night in order to promote healthy circadian rhythms.  - Encourage family at bedside  - Keep whiteboard in patient's room current with the date and name of the members of patient's team for easy patient self re-orientation.  - Avoid benzodiazepines, antihistamines, anticholinergics, hypnotics, and minimize opiates while controlling for pain as these medications may exacerbate delirium.      Impaired decision making  Presented to a urology appt on 10/2 and was advised to present to the ED due to concern for competency and need for patient advocate/POA before a biopsy or form of treatment can be initiated.     [place] R[place] 40:1299.53: (9) Upon the inability of any adult to consent for himself and in the absence of any person listed in Paragraphs (2) through (8) of this Subsection, an adult friend of the patient. For purposes of this Subsection to consent, adult friend means an adult who has exhibited special care and concern for the patient, who is generally familiar with the patient's health care views and desires, and who is willing and able to become involved in the patient's health care decisions and to act in the patient's best interest. The adult friend shall sign and date an acknowledgment form provided by the hospital or other health care facility in which the patient is located for placement in the patient's records certifying that he or she meets such criteria.    -  Juice Martinez ([phone]) does not wish to make decisions on his behalf  -  supervisor Mei Arabella ([phone]) have been trying to help him manage things with his work and personal matters as they have observed his deficits and know he has no one to help him - these individuals may be used for consent   - PEC'd for grave disability during last admission and discharged to [facility] however no formal eval by in-patient psychiatry  - will need formal evaluation       Penile carcinoma  - follows with urology   - Assessed inpatient - plan for partial / total penectomy         VTE Risk Mitigation (From admission, onward)         Ordered     enoxaparin injection 40 mg  Every 24 hours      10/14/20 0959     IP VTE HIGH RISK PATIENT  Once      10/02/20 2042                Discharge Planning   ALFRED: 11/6/2020     Code Status: Full Code   Is the patient medically ready for discharge?: No    Reason for patient still in hospital (select all that apply): Treatment and Pending disposition  Discharge Plan A: Home   Discharge Delays: None known at this time      Total visit time >15 minutes or greater with greater than 50% of time spent in counseling and coordination of care.          Audi Arcos MD  Department of Hospital Medicine   [facility]

## 2020-10-18 NOTE — PROGRESS NOTES
[facility]  Urology  Progress Note    Patient Name: Juan Hobson  MRN: [id number]  Admission Date: 10/2/2020  Hospital Length of Stay: 12 days  Code Status: Full Code   Attending Provider: Audi Arcos MD   Primary Care Physician: Mehnaz Barillas MD    Subjective:     HPI:  Juan Hobson is a 69 y.o. male with progressive dementia and a penile tumor. He was admitted to the hospital 10/2 due to failure to thrive and self neglect as witnessed by his supervisor from work. He had presented to urology clinic that afternoon to discuss penile malignancy. Social work was brought to the clinic and due to his declining mental status, it was recommended he be admitted to hospital as it was unsafe for him to return home where he lives alone.      He initially presented to the ED on 9/2/20 due to a draining penile lesion. 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  Urethral involvement was not excluded. Suspected invasion of the right dorsolateral corpus spongiosum. Based on this imaging, he is stage [id number]. However, we do not have a biopsy specimen.     During this admission, he has undergone a work up by neurology for progressive dementia. Currently there is a send out Alzheimer's panel in process. He is currently in the process of being assigned a legal guardian.     Interval History: No acute events. Doing well. Occasionally hematuria and frequency.    Review of Systems  Objective:     Temp:  [97.6 °F (36.4 °C)-98.5 °F (36.9 °C)] 97.6 °F (36.4 °C)  Pulse:  [64-72] 64  Resp:  [18] 18  SpO2:  [95 %-98 %] 97 %  BP: (119-130)/(59-68) 119/59     Body mass index is 22.78 kg/m².           Drains     None               Physical Exam   Constitutional: He appears well-developed. No distress.   HENT:   Head: Normocephalic and  atraumatic.   Eyes: No scleral icterus.   Neck: No tracheal deviation present.   Cardiovascular: Normal rate.    Pulmonary/Chest: Effort normal. No respiratory distress.   Abdominal: Soft. He exhibits no distension. There is no abdominal tenderness.   Genitourinary:    Genitourinary Comments: Penis uncircumcised and foreskin unable to retract. Firm nodule 2-3cm palpated just proximal to the glans. No drainage apparent. Bilateral inguinal nodes palpated - 1 node on the right and 2 on the left - shoddy, not enlarged.   Scrotal/testicular exam unremarkable.      Musculoskeletal: Normal range of motion.   Neurological: He is alert.   Skin: Skin is warm and dry.         Significant Labs:    BMP:  Recent Labs   Lab 10/16/20  0332 10/17/20  0420 10/18/20  0405    139 137   K 4.5 4.4 4.4    105 103   CO2 29 27 27   BUN 22 19 22   CREATININE 0.8 0.7 0.7   CALCIUM 8.3* 8.3* 8.5*       CBC:   Recent Labs   Lab 10/16/20  0332 10/17/20  0420 10/18/20  0405   WBC 5.57 5.35 5.46   HGB 13.1* 12.9* 13.2*   HCT 39.9* 40.1 40.6    263 248       All pertinent labs results from the past 24 hours have been reviewed.    Significant Imaging:  All pertinent imaging results/findings from the past 24 hours have been reviewed.                  Assessment/Plan:     Penile carcinoma  Patient with limited insight into his condition but is trusting the discretion of providers and friends. He appears to understand after several conversations that a surgery for removal for tumor on his penis is in his best interest and is deemed medically necessary     - will plan for surgery 10/19, case request placed  - consent obtained from friend Cheikh. Discussed risks, benefits, alternatives explained. She was given a chance to ask questions and all questions were answered to her satisfaction        VTE Risk Mitigation (From admission, onward)         Ordered     enoxaparin injection 40 mg  Every 24 hours      10/14/20 09     IP VTE HIGH  RISK PATIENT  Once      10/02/20 2042                Preston Rubalcava MD  Urology  [facility]

## 2020-10-18 NOTE — SUBJECTIVE & OBJECTIVE
Interval History: No acute events. Doing well. Occasionally hematuria and frequency.    Review of Systems  Objective:     Temp:  [97.6 °F (36.4 °C)-98.5 °F (36.9 °C)] 97.6 °F (36.4 °C)  Pulse:  [64-72] 64  Resp:  [18] 18  SpO2:  [95 %-98 %] 97 %  BP: (119-130)/(59-68) 119/59     Body mass index is 22.78 kg/m².           Drains     None               Physical Exam   Constitutional: He appears well-developed. No distress.   HENT:   Head: Normocephalic and atraumatic.   Eyes: No scleral icterus.   Neck: No tracheal deviation present.   Cardiovascular: Normal rate.    Pulmonary/Chest: Effort normal. No respiratory distress.   Abdominal: Soft. He exhibits no distension. There is no abdominal tenderness.   Genitourinary:    Genitourinary Comments: Penis uncircumcised and foreskin unable to retract. Firm nodule 2-3cm palpated just proximal to the glans. No drainage apparent. Bilateral inguinal nodes palpated - 1 node on the right and 2 on the left - shoddy, not enlarged.   Scrotal/testicular exam unremarkable.      Musculoskeletal: Normal range of motion.   Neurological: He is alert.   Skin: Skin is warm and dry.         Significant Labs:    BMP:  Recent Labs   Lab 10/16/20  0332 10/17/20  0420 10/18/20  0405    139 137   K 4.5 4.4 4.4    105 103   CO2 29 27 27   BUN 22 19 22   CREATININE 0.8 0.7 0.7   CALCIUM 8.3* 8.3* 8.5*       CBC:   Recent Labs   Lab 10/16/20  0332 10/17/20  0420 10/18/20  0405   WBC 5.57 5.35 5.46   HGB 13.1* 12.9* 13.2*   HCT 39.9* 40.1 40.6    263 248       All pertinent labs results from the past 24 hours have been reviewed.    Significant Imaging:  All pertinent imaging results/findings from the past 24 hours have been reviewed.

## 2020-10-19 ENCOUNTER — ANESTHESIA (OUTPATIENT)
Dept: SURGERY | Facility: HOSPITAL | Age: 70
DRG: 989 | End: 2020-10-19
Payer: COMMERCIAL

## 2020-10-19 LAB
ALBUMIN SERPL BCP-MCNC: 4.1 G/DL (ref 3.5–5.2)
ALP SERPL-CCNC: 81 U/L (ref 55–135)
ALT SERPL W/O P-5'-P-CCNC: 121 U/L (ref 10–44)
ANION GAP SERPL CALC-SCNC: 9 MMOL/L (ref 8–16)
AST SERPL-CCNC: 55 U/L (ref 10–40)
BASOPHILS # BLD AUTO: 0.01 K/UL (ref 0–0.2)
BASOPHILS NFR BLD: 0.1 % (ref 0–1.9)
BILIRUB SERPL-MCNC: 0.8 MG/DL (ref 0.1–1)
BUN SERPL-MCNC: 22 MG/DL (ref 8–23)
CALCIUM SERPL-MCNC: 9.3 MG/DL (ref 8.7–10.5)
CHLORIDE SERPL-SCNC: 101 MMOL/L (ref 95–110)
CO2 SERPL-SCNC: 28 MMOL/L (ref 23–29)
CREAT SERPL-MCNC: 0.9 MG/DL (ref 0.5–1.4)
DIFFERENTIAL METHOD: ABNORMAL
EOSINOPHIL # BLD AUTO: 0.1 K/UL (ref 0–0.5)
EOSINOPHIL NFR BLD: 1.6 % (ref 0–8)
ERYTHROCYTE [DISTWIDTH] IN BLOOD BY AUTOMATED COUNT: 13.6 % (ref 11.5–14.5)
EST. GFR  (AFRICAN AMERICAN): >60 ML/MIN/1.73 M^2
EST. GFR  (NON AFRICAN AMERICAN): >60 ML/MIN/1.73 M^2
GLUCOSE SERPL-MCNC: 101 MG/DL (ref 70–110)
HCT VFR BLD AUTO: 48.3 % (ref 40–54)
HGB BLD-MCNC: 15.6 G/DL (ref 14–18)
IMM GRANULOCYTES # BLD AUTO: 0.07 K/UL (ref 0–0.04)
IMM GRANULOCYTES NFR BLD AUTO: 0.9 % (ref 0–0.5)
LYMPHOCYTES # BLD AUTO: 1.8 K/UL (ref 1–4.8)
LYMPHOCYTES NFR BLD: 22.8 % (ref 18–48)
MAGNESIUM SERPL-MCNC: 2.3 MG/DL (ref 1.6–2.6)
MCH RBC QN AUTO: 31.8 PG (ref 27–31)
MCHC RBC AUTO-ENTMCNC: 32.3 G/DL (ref 32–36)
MCV RBC AUTO: 98 FL (ref 82–98)
MONOCYTES # BLD AUTO: 0.6 K/UL (ref 0.3–1)
MONOCYTES NFR BLD: 6.9 % (ref 4–15)
NEUTROPHILS # BLD AUTO: 5.5 K/UL (ref 1.8–7.7)
NEUTROPHILS NFR BLD: 67.7 % (ref 38–73)
NRBC BLD-RTO: 0 /100 WBC
PHOSPHATE SERPL-MCNC: 3.7 MG/DL (ref 2.7–4.5)
PLATELET # BLD AUTO: 320 K/UL (ref 150–350)
PMV BLD AUTO: 11 FL (ref 9.2–12.9)
POTASSIUM SERPL-SCNC: 4.2 MMOL/L (ref 3.5–5.1)
PROT SERPL-MCNC: 7.4 G/DL (ref 6–8.4)
RBC # BLD AUTO: 4.91 M/UL (ref 4.6–6.2)
SARS-COV-2 RDRP RESP QL NAA+PROBE: NEGATIVE
SARS-COV-2 RDRP RESP QL NAA+PROBE: NEGATIVE
SODIUM SERPL-SCNC: 138 MMOL/L (ref 136–145)
WBC # BLD AUTO: 8.06 K/UL (ref 3.9–12.7)

## 2020-10-19 PROCEDURE — D9220A PRA ANESTHESIA: Mod: ANES,,, | Performed by: ANESTHESIOLOGY

## 2020-10-19 PROCEDURE — 94761 N-INVAS EAR/PLS OXIMETRY MLT: CPT

## 2020-10-19 PROCEDURE — 25000003 PHARM REV CODE 250: Performed by: NURSE ANESTHETIST, CERTIFIED REGISTERED

## 2020-10-19 PROCEDURE — 63600175 PHARM REV CODE 636 W HCPCS: Performed by: STUDENT IN AN ORGANIZED HEALTH CARE EDUCATION/TRAINING PROGRAM

## 2020-10-19 PROCEDURE — 27201423 OPTIME MED/SURG SUP & DEVICES STERILE SUPPLY: Performed by: UROLOGY

## 2020-10-19 PROCEDURE — 36415 COLL VENOUS BLD VENIPUNCTURE: CPT

## 2020-10-19 PROCEDURE — 88307 PR  SURG PATH,LEVEL V: ICD-10-PCS | Mod: 26,,, | Performed by: PATHOLOGY

## 2020-10-19 PROCEDURE — 25000003 PHARM REV CODE 250: Performed by: STUDENT IN AN ORGANIZED HEALTH CARE EDUCATION/TRAINING PROGRAM

## 2020-10-19 PROCEDURE — 11000001 HC ACUTE MED/SURG PRIVATE ROOM

## 2020-10-19 PROCEDURE — 63600175 PHARM REV CODE 636 W HCPCS: Performed by: HOSPITALIST

## 2020-10-19 PROCEDURE — 99232 PR SUBSEQUENT HOSPITAL CARE,LEVL II: ICD-10-PCS | Mod: ,,, | Performed by: INTERNAL MEDICINE

## 2020-10-19 PROCEDURE — 84100 ASSAY OF PHOSPHORUS: CPT

## 2020-10-19 PROCEDURE — 54120 PR REMOVAL PENIS,PARTIAL: ICD-10-PCS | Mod: ,,, | Performed by: UROLOGY

## 2020-10-19 PROCEDURE — 99232 SBSQ HOSP IP/OBS MODERATE 35: CPT | Mod: ,,, | Performed by: INTERNAL MEDICINE

## 2020-10-19 PROCEDURE — 63600175 PHARM REV CODE 636 W HCPCS: Performed by: NURSE ANESTHETIST, CERTIFIED REGISTERED

## 2020-10-19 PROCEDURE — 54120 PARTIAL REMOVAL OF PENIS: CPT | Mod: ,,, | Performed by: UROLOGY

## 2020-10-19 PROCEDURE — 36000706: Performed by: UROLOGY

## 2020-10-19 PROCEDURE — 88307 TISSUE EXAM BY PATHOLOGIST: CPT | Performed by: PATHOLOGY

## 2020-10-19 PROCEDURE — 88309 TISSUE EXAM BY PATHOLOGIST: CPT | Performed by: PATHOLOGY

## 2020-10-19 PROCEDURE — D9220A PRA ANESTHESIA: Mod: CRNA,,, | Performed by: NURSE ANESTHETIST, CERTIFIED REGISTERED

## 2020-10-19 PROCEDURE — 83735 ASSAY OF MAGNESIUM: CPT

## 2020-10-19 PROCEDURE — U0002 COVID-19 LAB TEST NON-CDC: HCPCS | Mod: 91

## 2020-10-19 PROCEDURE — 80053 COMPREHEN METABOLIC PANEL: CPT

## 2020-10-19 PROCEDURE — 37000009 HC ANESTHESIA EA ADD 15 MINS: Performed by: UROLOGY

## 2020-10-19 PROCEDURE — 88307 TISSUE EXAM BY PATHOLOGIST: CPT | Mod: 26,,, | Performed by: PATHOLOGY

## 2020-10-19 PROCEDURE — 37000008 HC ANESTHESIA 1ST 15 MINUTES: Performed by: UROLOGY

## 2020-10-19 PROCEDURE — 36000707: Performed by: UROLOGY

## 2020-10-19 PROCEDURE — 71000016 HC POSTOP RECOV ADDL HR: Performed by: UROLOGY

## 2020-10-19 PROCEDURE — D9220A PRA ANESTHESIA: ICD-10-PCS | Mod: ANES,,, | Performed by: ANESTHESIOLOGY

## 2020-10-19 PROCEDURE — D9220A PRA ANESTHESIA: ICD-10-PCS | Mod: CRNA,,, | Performed by: NURSE ANESTHETIST, CERTIFIED REGISTERED

## 2020-10-19 PROCEDURE — U0002 COVID-19 LAB TEST NON-CDC: HCPCS

## 2020-10-19 PROCEDURE — 71000015 HC POSTOP RECOV 1ST HR: Performed by: UROLOGY

## 2020-10-19 PROCEDURE — 71000033 HC RECOVERY, INTIAL HOUR: Performed by: UROLOGY

## 2020-10-19 PROCEDURE — 85025 COMPLETE CBC W/AUTO DIFF WBC: CPT

## 2020-10-19 RX ORDER — LIDOCAINE HYDROCHLORIDE 20 MG/ML
INJECTION INTRAVENOUS
Status: DISCONTINUED | OUTPATIENT
Start: 2020-10-19 | End: 2020-10-19

## 2020-10-19 RX ORDER — OXYCODONE HYDROCHLORIDE 10 MG/1
10 TABLET ORAL EVERY 4 HOURS PRN
Status: DISCONTINUED | OUTPATIENT
Start: 2020-10-19 | End: 2020-11-23 | Stop reason: HOSPADM

## 2020-10-19 RX ORDER — SODIUM CHLORIDE 9 MG/ML
INJECTION, SOLUTION INTRAVENOUS CONTINUOUS
Status: ACTIVE | OUTPATIENT
Start: 2020-10-19 | End: 2020-10-19

## 2020-10-19 RX ORDER — PROPOFOL 10 MG/ML
VIAL (ML) INTRAVENOUS
Status: DISCONTINUED | OUTPATIENT
Start: 2020-10-19 | End: 2020-10-19

## 2020-10-19 RX ORDER — OXYCODONE HYDROCHLORIDE 5 MG/1
5 TABLET ORAL EVERY 4 HOURS PRN
Status: DISCONTINUED | OUTPATIENT
Start: 2020-10-19 | End: 2020-11-23 | Stop reason: HOSPADM

## 2020-10-19 RX ORDER — PHENYLEPHRINE HYDROCHLORIDE 10 MG/ML
INJECTION INTRAVENOUS
Status: DISCONTINUED | OUTPATIENT
Start: 2020-10-19 | End: 2020-10-19

## 2020-10-19 RX ORDER — CEFAZOLIN SODIUM 1 G/3ML
2 INJECTION, POWDER, FOR SOLUTION INTRAMUSCULAR; INTRAVENOUS
Status: COMPLETED | OUTPATIENT
Start: 2020-10-19 | End: 2020-10-19

## 2020-10-19 RX ORDER — SODIUM CHLORIDE 0.9 % (FLUSH) 0.9 %
10 SYRINGE (ML) INJECTION
Status: DISCONTINUED | OUTPATIENT
Start: 2020-10-19 | End: 2020-10-19 | Stop reason: HOSPADM

## 2020-10-19 RX ORDER — ROCURONIUM BROMIDE 10 MG/ML
INJECTION, SOLUTION INTRAVENOUS
Status: DISCONTINUED | OUTPATIENT
Start: 2020-10-19 | End: 2020-10-19

## 2020-10-19 RX ORDER — HYDROMORPHONE HYDROCHLORIDE 1 MG/ML
0.2 INJECTION, SOLUTION INTRAMUSCULAR; INTRAVENOUS; SUBCUTANEOUS EVERY 5 MIN PRN
Status: DISCONTINUED | OUTPATIENT
Start: 2020-10-19 | End: 2020-10-19 | Stop reason: HOSPADM

## 2020-10-19 RX ORDER — FENTANYL CITRATE 50 UG/ML
INJECTION, SOLUTION INTRAMUSCULAR; INTRAVENOUS
Status: DISCONTINUED | OUTPATIENT
Start: 2020-10-19 | End: 2020-10-19

## 2020-10-19 RX ADMIN — PROPOFOL 30 MG: 10 INJECTION, EMULSION INTRAVENOUS at 04:10

## 2020-10-19 RX ADMIN — ROCURONIUM BROMIDE 10 MG: 10 INJECTION, SOLUTION INTRAVENOUS at 04:10

## 2020-10-19 RX ADMIN — ROCURONIUM BROMIDE 50 MG: 10 INJECTION, SOLUTION INTRAVENOUS at 04:10

## 2020-10-19 RX ADMIN — SUGAMMADEX 200 MG: 100 INJECTION, SOLUTION INTRAVENOUS at 06:10

## 2020-10-19 RX ADMIN — LIDOCAINE HYDROCHLORIDE 100 MG: 20 INJECTION, SOLUTION INTRAVENOUS at 04:10

## 2020-10-19 RX ADMIN — ENOXAPARIN SODIUM 40 MG: 40 INJECTION SUBCUTANEOUS at 07:10

## 2020-10-19 RX ADMIN — OXYCODONE 5 MG: 5 TABLET ORAL at 07:10

## 2020-10-19 RX ADMIN — FENTANYL CITRATE 100 MCG: 50 INJECTION, SOLUTION INTRAMUSCULAR; INTRAVENOUS at 04:10

## 2020-10-19 RX ADMIN — PHENYLEPHRINE HYDROCHLORIDE 50 MCG: 10 INJECTION INTRAVENOUS at 05:10

## 2020-10-19 RX ADMIN — SODIUM CHLORIDE: 0.9 INJECTION, SOLUTION INTRAVENOUS at 08:10

## 2020-10-19 RX ADMIN — CEFAZOLIN 2 G: 330 INJECTION, POWDER, FOR SOLUTION INTRAMUSCULAR; INTRAVENOUS at 04:10

## 2020-10-19 RX ADMIN — PROPOFOL 120 MG: 10 INJECTION, EMULSION INTRAVENOUS at 04:10

## 2020-10-19 NOTE — PROGRESS NOTES
[facility]  Urology  Progress Note    Patient Name: Juan Hobson  MRN: [id number]  Admission Date: 10/2/2020  Hospital Length of Stay: 13 days  Code Status: Full Code   Attending Provider: Audi Arcos MD   Primary Care Physician: Mehnaz Barillas MD    Subjective:     HPI:  Juan Hobson is a 69 y.o. male with progressive dementia and a penile tumor. He was admitted to the hospital 10/2 due to failure to thrive and self neglect as witnessed by his supervisor from work. He had presented to urology clinic that afternoon to discuss penile malignancy. Social work was brought to the clinic and due to his declining mental status, it was recommended he be admitted to hospital as it was unsafe for him to return home where he lives alone.      He initially presented to the ED on 9/2/20 due to a draining penile lesion. 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  Urethral involvement was not excluded. Suspected invasion of the right dorsolateral corpus spongiosum. Based on this imaging, he is stage [id number]. However, we do not have a biopsy specimen.     During this admission, he has undergone a work up by neurology for progressive dementia. Currently there is a send out Alzheimer's panel in process. He is currently in the process of being assigned a legal guardian.     Interval History: No acute events. Doing well. Occasionally hematuria and frequency.    Review of Systems    Objective:     Temp:  [97.2 °F (36.2 °C)-98.4 °F (36.9 °C)] 97.6 °F (36.4 °C)  Pulse:  [64-75] 75  Resp:  [18] 18  SpO2:  [95 %-99 %] 99 %  BP: (115-129)/(59-65) 116/61     Body mass index is 22.78 kg/m².           Drains     None               Physical Exam   Constitutional: He appears well-developed. No distress.   HENT:   Head: Normocephalic and  atraumatic.   Eyes: No scleral icterus.   Neck: No tracheal deviation present.   Cardiovascular: Normal rate.    Pulmonary/Chest: Effort normal. No respiratory distress.   Abdominal: Soft. He exhibits no distension. There is no abdominal tenderness.   Genitourinary:    Genitourinary Comments: Penis uncircumcised and foreskin unable to retract. Firm nodule 2-3cm palpated just proximal to the glans. No drainage apparent. Bilateral inguinal nodes palpated - 1 node on the right and 2 on the left - shoddy, not enlarged.   Scrotal/testicular exam unremarkable.      Musculoskeletal: Normal range of motion.   Neurological: He is alert.   Skin: Skin is warm and dry.         Significant Labs:    BMP:  Recent Labs   Lab 10/16/20  0332 10/17/20  0420 10/18/20  0405    139 137   K 4.5 4.4 4.4    105 103   CO2 29 27 27   BUN 22 19 22   CREATININE 0.8 0.7 0.7   CALCIUM 8.3* 8.3* 8.5*       CBC:   Recent Labs   Lab 10/16/20  0332 10/17/20  0420 10/18/20  0405   WBC 5.57 5.35 5.46   HGB 13.1* 12.9* 13.2*   HCT 39.9* 40.1 40.6    263 248       All pertinent labs results from the past 24 hours have been reviewed.    Significant Imaging:  All pertinent imaging results/findings from the past 24 hours have been reviewed.                  Assessment/Plan:     Penile carcinoma  Patient with limited insight into his condition but is trusting the discretion of providers and friends. He appears to understand after several conversations that a surgery for removal for tumor on his penis is in his best interest and is deemed medically necessary     - will plan for surgery today  - consent obtained from friend Mohanmarissa. Discussed risks, benefits, alternatives explained. She was given a chance to ask questions and all questions were answered to her satisfaction  - Will give fluids while NPO        VTE Risk Mitigation (From admission, onward)         Ordered     enoxaparin injection 40 mg  Every 24 hours      10/14/20 0959     IP  VTE HIGH RISK PATIENT  Once      10/02/20 2042                Marisol Goff MD  Urology  [facility]

## 2020-10-19 NOTE — PROGRESS NOTES
[facility] Hospital Medicine  Progress Note    Patient Name: Juan Hobson  MRN: [id number]  Patient Class: IP- Inpatient   Admission Date: 10/2/2020  Length of Stay: 12 days  Attending Physician: Audi Arcos MD  Primary Care Provider: Mehnaz Barillas MD    [facility] Medicine Team: [facility] MED O Audi Arcos MD    Subjective:     Principal Problem:Cognitive impairment        HPI:  Per Adriana Badillo MD:   Mr. Juan Hobson is a 69 y.o. male with recently diagnosed penile cancer, who presents to the ER from Urology clinic with concern for self neglect, and need for Neurology evaluation of dementia and possible placement.  The patient mentions that he has been having some issues with his memory.  He mostly orders takeout for meals because it is easier, and still drives to work.  He feels like he is able to take care of himself, and does not need to go to a facility.      Per note from Lianne German LCSW on 10/2:  Received consult from Dr. Monterroso re: assessing patient in clinic this afternoon and care recommendations as patient did not seem competent to make decisions and give consent. He has no family and no legal next of kin (POA or guardian).   Met individually with patient, and with his co-worker Janna Donavan ([phone], ext. [phone]) who brought him to his clinic appointment today. Patient unable to relate basic demographic information or clearly explain other information. For example, when asked his birth date he gave the year [birth year], then said 9, and then said the second to last, it begins with N; he said his street name but couldn't give the house number where he has lived for years; unable to say what type of work or where he worked prior to the [employer] in [place] for the past 12 years; he described being taken in a truck to that other place (referring to the recent [facility] stay). He has awareness that he is having memory/cognitive issues but  stated he can manage for himself at home and that he knows there is a lot of work that needs to be done in his home that he hasn't gotten to. He inherited the home after his mother .      Mr. Go and another coworker Juice Martinez ([phone]) and their supervisor Mei Bell ([phone]) have been trying to help him manage things with his work and personal matters as they have observed his deficits and know he has no one to help him. They have helped him keep up with recent medical appointments and accompanied and transported him.  Mr. Go reports concern for his safety and ability to care for himself after seeing his home with clutter throughout and things piled up, mold and old food in the refrigerator, mold in the toilet, clothes all over, etc. Patient has been driving himself to and from work and to get food at places near his home, but unable to navigate to unfamiliar places. They have observed his cognitive deficits at work and he has made errors, so they have modified his job and his supervisor has been working with him to try to get FMLA and his leave/senior care in place so he doesn't lose his benefits. They have had to help him with paperwork, bill paying, etc., because he can't complete these on his own.         Upon arrival to the ER, vitals were temp 97.8F, HR 59 and /84.  Labs were unremarkable  He was admitted to Hospital Medicine for Neurology and SW evaluation.    Overview/Hospital Course:  Admitted to hospital medicine for worsening cognitive decline and inability to care for self in setting of concern for penile cancer.  In pursuit of workup for altered mental status - labs/infectious work-up grossly unremarkable. Neurology consulted and MRI obtained on admission with concern for encephalitis (increased FLAIR hyperintensity involving the bilateral mesial temporal lobes which can be seen in the setting of autoimmune limbic encephalitis in the appropriate clinical setting). LP  recommended and attempted at bedside however not successful 2/2 to D. Interventional radiology consulted, LP performed 10/5 with CSF (1 WBC, 190 RBC, 71 protein, 52 glucose). CSF HSV, ACE negative. EEG 10/3 (1 hr 40 min) with mild regional dysfunction in bilateral temporal lobes, but no electrographic seizures. Serum paraneoplastic panel from 9/28 has resulted negative. MRI brain W WO contrast showing increased FLAIR hyperintensity involving the bilateral mesial temporal lobes. Completed 5 days of empiric IV Solumedrol 1 g qd without notable clinical improvement. Presentation most concerning for dementia   Psychiatry consulted for formal capacity evaluation and concluded that he does not have capacity to make decisions regarding his long term care disposition however does not require PEC at this time as his stay has been non-contested. He has no family, children and have relied on coworkers for assistance. The case was brought to the attention of EPS who have no plans to intervene at this time and recommend since the patient presented to the hospital it is a safe discharge planning issue that would involve [facility] department. The Legal department intend to pursue [place] Guardianship to assist with managing affairs. Patient re-engaged with care for likely penile carcinoma with urology.     Interval History:     NAEON.  Plans for urologic procedure tomorrow- 10/19. Patient stable.  NPO at [facility].     Review of Systems   Constitutional: Negative for activity change, appetite change, chills, diaphoresis, fatigue and fever.   HENT: Negative for congestion, facial swelling, hearing loss, nosebleeds, sinus pressure, sinus pain, sneezing, sore throat, tinnitus, trouble swallowing and voice change.    Eyes: Negative for photophobia, pain, discharge and visual disturbance.   Respiratory: Negative for cough, chest tightness, shortness of breath and wheezing.    Cardiovascular: Negative for chest pain, palpitations and  leg swelling.   Gastrointestinal: Negative for abdominal distention, abdominal pain, blood in stool, constipation, diarrhea, nausea and vomiting.   Endocrine: Negative for cold intolerance, heat intolerance and polyuria.   Genitourinary: Negative for decreased urine volume, difficulty urinating, dysuria, flank pain, frequency and urgency.   Musculoskeletal: Negative for arthralgias, gait problem, myalgias, neck pain and neck stiffness.   Skin: Negative for rash and wound.   Neurological: Negative for dizziness, speech difficulty, weakness, light-headedness and headaches.   Hematological: Negative for adenopathy. Does not bruise/bleed easily.   Psychiatric/Behavioral: Negative for agitation, confusion, self-injury, sleep disturbance and suicidal ideas. The patient is not nervous/anxious.      Objective:     Vital Signs (Most Recent):  Temp: 97.2 °F (36.2 °C) (10/18/20 1517)  Pulse: 68 (10/18/20 1517)  Resp: 18 (10/18/20 1517)  BP: 129/61 (10/18/20 1517)  SpO2: 99 % (10/18/20 1517) Vital Signs (24h Range):  Temp:  [97.2 °F (36.2 °C)-97.8 °F (36.6 °C)] 97.2 °F (36.2 °C)  Pulse:  [64-68] 68  Resp:  [18] 18  SpO2:  [95 %-99 %] 99 %  BP: (115-129)/(59-65) 129/61     Weight: 72 kg (158 lb 11.7 oz)  Body mass index is 22.78 kg/m².    Intake/Output Summary (Last 24 hours) at 10/18/2020 2022  Last data filed at 10/17/2020 2030  Gross per 24 hour   Intake 200 ml   Output --   Net 200 ml      Physical Exam  Constitutional:       General: He is not in acute distress.     Appearance: He is well-developed. He is not ill-appearing, toxic-appearing or diaphoretic.   HENT:      Head: Atraumatic. No abrasion, contusion or laceration.      Nose: Nose normal.      Mouth/Throat:      Pharynx: No oropharyngeal exudate.   Eyes:      General: No scleral icterus.        Right eye: No discharge.         Left eye: No discharge.      Conjunctiva/sclera: Conjunctivae normal.      Pupils: Pupils are equal, round, and reactive to light.   Neck:       Musculoskeletal: Normal range of motion and neck supple. No neck rigidity.      Vascular: No JVD.   Cardiovascular:      Rate and Rhythm: Normal rate and regular rhythm.      Heart sounds: Normal heart sounds. No murmur. No friction rub. No gallop.    Pulmonary:      Effort: Pulmonary effort is normal. No accessory muscle usage or respiratory distress.      Breath sounds: Normal breath sounds. No wheezing.   Abdominal:      General: Bowel sounds are normal. There is no distension.      Palpations: Abdomen is soft. There is no mass.      Tenderness: There is no abdominal tenderness. There is no guarding or rebound.   Genitourinary:     Comments: Firm palpable mass on right side of shaft of penis  Musculoskeletal: Normal range of motion.         General: No tenderness or deformity.   Lymphadenopathy:      Cervical: No cervical adenopathy.   Skin:     General: Skin is warm and dry.      Capillary Refill: Capillary refill takes less than 2 seconds.      Findings: No bruising, ecchymosis, erythema, petechiae or rash.      Nails: There is no clubbing.     Neurological:      Mental Status: He is alert. Mental status is at baseline.      GCS: GCS eye subscore is 4. GCS verbal subscore is 5. GCS motor subscore is 6.      Cranial Nerves: No cranial nerve deficit.      Sensory: No sensory deficit.      Motor: No abnormal muscle tone.      Coordination: Coordination normal.      Deep Tendon Reflexes: Reflexes normal.      Comments: Oriented to place and time. Unclear to context with frequent redirection, orientation.  Tangential thinking and occasional disorganized thought content    Psychiatric:         Speech: Speech normal.         Behavior: Behavior normal.         Thought Content: Thought content normal.         Judgment: Judgment normal.         Significant Labs:   BMP:   Recent Labs   Lab 10/18/20  0405   GLU 84      K 4.4      CO2 27   BUN 22   CREATININE 0.7   CALCIUM 8.5*   MG 2.1     CBC:   Recent Labs    Lab 10/17/20  0420 10/18/20  0405   WBC 5.35 5.46   HGB 12.9* 13.2*   HCT 40.1 40.6    248       Significant Imaging: I have reviewed all pertinent imaging results/findings within the past 24 hours.      Assessment/Plan:      * Cognitive impairment  Impaired decision making  Presentation concerning for progressive dementia   -LP performed by IR 10/5/2020: elevated protein, neutrophils  -CSF HSV, ACE negative.   -Serum paraneoplastic panel from 9/28 has resulted negative.  -MRI brain with Possible subtle increased FLAIR hyperintensity involving the bilateral mesial temporal lobes which can be seen in the setting of autoimmune limbic encephalitis  -EEG without seizure activity   -Vitamin B1, Vitamin B6, Vitamin B12, HIV, RPR, TSH unremarkable   - Neurology consulted   --please send CSF to [facility] for Alzheimer panel (ADEVL test code) - repeat LP completed   --start empiric IV Solumedrol 1 g qd x 5 days - completed    --formal cognitive assessment with neuropsych     --consider PET scan - not available inpatient    --continue strict delirium precautions  -Psych assess lack of competency   -Formal guardianship being pursued - court date early November  -Adult friends can assist with decision making   -[place]:1299.53: (9) Upon the inability of any adult to consent for himself and in the absence of any person listed in Paragraphs (2) through (8) of this Subsection, an adult friend of the patient. For purposes of this Subsection to consent, adult friend means an adult who has exhibited special care and concern for the patient, who is generally familiar with the patient's health care views and desires, and who is willing and able to become involved in the patient's health care decisions and to act in the patient's best interest. The adult friend shall sign and date an acknowledgment form provided by the hospital or other health care facility in which the patient is located for placement in the patient's records  certifying that he or she meets such criteria.     - Juice Martinez ([phone]) will make decisions on his behalf  - supervisor Mei Bell ([phone]) have been trying to help him manage things with his work and personal matters as they have observed his deficits and know he has no one to help him - these individuals may be used for consent     DELIRIUM BEHAVIOR MANAGEMENT  - Minimize use of restraints; if physical restraints necessary, please utilize medical/chemical prns for agitation.  - Keep shades open and room lit during day and room dim at night in order to promote healthy circadian rhythms.  - Encourage family at bedside  - Keep whiteboard in patient's room current with the date and name of the members of patient's team for easy patient self re-orientation.  - Avoid benzodiazepines, antihistamines, anticholinergics, hypnotics, and minimize opiates while controlling for pain as these medications may exacerbate delirium.      Impaired decision making  Presented to a urology appt on 10/2 and was advised to present to the ED due to concern for competency and need for patient advocate/POA before a biopsy or form of treatment can be initiated.     [place] R[place] 40:1299.53: (9) Upon the inability of any adult to consent for himself and in the absence of any person listed in Paragraphs (2) through (8) of this Subsection, an adult friend of the patient. For purposes of this Subsection to consent, adult friend means an adult who has exhibited special care and concern for the patient, who is generally familiar with the patient's health care views and desires, and who is willing and able to become involved in the patient's health care decisions and to act in the patient's best interest. The adult friend shall sign and date an acknowledgment form provided by the hospital or other health care facility in which the patient is located for placement in the patient's records certifying that he or she meets such  criteria.    -  Juice Martinez ([phone]) does not wish to make decisions on his behalf  - supervisor Mei Arabella ([phone]) have been trying to help him manage things with his work and personal matters as they have observed his deficits and know he has no one to help him - these individuals may be used for consent   - PEC'd for grave disability during last admission and discharged to [facility] however no formal eval by in-patient psychiatry  - will need formal evaluation       Penile carcinoma  - follows with urology   - Assessed inpatient - plan for partial / total penectomy         VTE Risk Mitigation (From admission, onward)         Ordered     enoxaparin injection 40 mg  Every 24 hours      10/14/20 0959     IP VTE HIGH RISK PATIENT  Once      10/02/20 2042                Discharge Planning   ALFRED: 11/6/2020     Code Status: Full Code   Is the patient medically ready for discharge?: No    Reason for patient still in hospital (select all that apply): Treatment and Pending disposition  Discharge Plan A: Home   Discharge Delays: None known at this time      Total visit time >15 minutes or greater with greater than 50% of time spent in counseling and coordination of care.        Audi Arcos MD  Department of Hospital Medicine   [facility]

## 2020-10-19 NOTE — OP NOTE
[facility]  Operative Note    Date: 10/19/2020    Pre-Op Diagnosis: penile mass concerning for malignancy, dementia, confusion    Patient Active Problem List   Diagnosis    Basal cell carcinoma    Status post Mohs surgery    Mohs defect of helix of right ear    Mohs defect of auricle of right ear    Altered mental status    Penile carcinoma    Dementia without behavioral disturbance    Debility    Cognitive decline    Cognitive impairment    Impaired decision making    Confusion    Acute encephalopathy       Post-Op Diagnosis: same    Procedure(s) Performed:   - partial penectomy    Specimen(s): distal penis (glans, corpora, urethra), penile shaft skin    Staff Surgeon: Chance Higgins MD    Assistant Surgeon: Dale Rehman MD, Dale Wiley MD    Anesthesia:  General endotracheal anesthesia    Indications: Juan Hobson is a 69 y.o. male with history of non-healing penile wound with discharge. This has been present for over a year. 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 Mr. Hobson has dementia and has limited insight into his condition. The tumor and its treatment have been discussed extensively with Mr. Hobson, but it is unclear how much he comprehends. A personal friend has been appointed his power of  and has been making medical decisions for Mr. Hobson. His procedure was deemed necessary by his physicians and surgeon and his case was discussed with the hospital legal team. It was determined that proceeding with surgery was in the patient's best interest.     Findings:    - Penile mass palpated distally involving corpora and penile shaft skin  - Partial penectomy performed without complication, no gross evidence of disease at level of corporal transection.    Estimated Blood Loss: 25 mL    Drains:   16 fr kaye catheter    Procedure in detail:  After informed consent was obtained and all questions were answered, the patient  was brought to the operating suite and placed on the operating table  General anesthesia was administered.  TEDs and SCDs were applied and working prior to induction of anesthesia.  The patient was in the supine position and prepped and draped in the usual sterile fashion.  Time out was performed and preoperative antibiotics were confirmed. A 16fr kaye catheter was placed into the bladder.     There was a penile mass palpated distally involving corpora and penile shaft skin. A penrose drain was used at the base of the penis as a tourniquet. A circumferential incision was made around the penile shaft skin proximal to the mass. The penis was degloved to the base using a combination of sharp and electrosurgical dissection. The urethra was dissected free from the corporal bodies starting at a distance 2 cm from the most proximal portion of the mass and extending proximally. The kaye catheter was removed. A vessel loop was placed around the urethra. The urethra was transected with Metzenbaum scissors. A 3-0 Vicryl stay suture was placed in the urethra at the 6 o'clock position. The neurovascular bundle on the penile shaft was isolate and suture ligated using 2-0 Vicryl suture. The corporal bodies were then transected using Metzenbaum scissors at a level approximately 1.5cm proximal to the transection of the urethra, leaving the urethral stump slightly longer. There was no gross evidence of disease at this level. The corpora were closed using interrupted 2-0 Vicryl suture. The tourniquet was released and hemostasis was achieved using bovie electrocautery. The urethra was then spatulated at the 12 o'clock position for roughly 1.5cm. The penile shaft skin was tailored adequately to cover the penile stump and urethra. The penile skin was closed over the cut corporal bodies using running and interrupted 3-0 Vicryl suture. The skin was then sutured to the spatulated edges of the urethra using 3-0 Monocryl in a running  fashion to complete the closure. The kaye catheter was then replaced into the bladder. Hemostasis was excellent. Xeroform, fluffs, and scrotal support were applied.      The patient tolerated the procedure well and was transferred to the PACU in stable condition.         Disposition:  The patient will return to his bed on the floor after a short stay in recovery.     Dale Rehman MD

## 2020-10-19 NOTE — SUBJECTIVE & OBJECTIVE
Interval History:     NAEON.  Plans for urologic procedure tomorrow- 10/19. Patient stable.  NPO at MN.     Review of Systems   Constitutional: Negative for activity change, appetite change, chills, diaphoresis, fatigue and fever.   HENT: Negative for congestion, facial swelling, hearing loss, nosebleeds, sinus pressure, sinus pain, sneezing, sore throat, tinnitus, trouble swallowing and voice change.    Eyes: Negative for photophobia, pain, discharge and visual disturbance.   Respiratory: Negative for cough, chest tightness, shortness of breath and wheezing.    Cardiovascular: Negative for chest pain, palpitations and leg swelling.   Gastrointestinal: Negative for abdominal distention, abdominal pain, blood in stool, constipation, diarrhea, nausea and vomiting.   Endocrine: Negative for cold intolerance, heat intolerance and polyuria.   Genitourinary: Negative for decreased urine volume, difficulty urinating, dysuria, flank pain, frequency and urgency.   Musculoskeletal: Negative for arthralgias, gait problem, myalgias, neck pain and neck stiffness.   Skin: Negative for rash and wound.   Neurological: Negative for dizziness, speech difficulty, weakness, light-headedness and headaches.   Hematological: Negative for adenopathy. Does not bruise/bleed easily.   Psychiatric/Behavioral: Negative for agitation, confusion, self-injury, sleep disturbance and suicidal ideas. The patient is not nervous/anxious.      Objective:     Vital Signs (Most Recent):  Temp: 97.2 °F (36.2 °C) (10/18/20 1517)  Pulse: 68 (10/18/20 1517)  Resp: 18 (10/18/20 1517)  BP: 129/61 (10/18/20 1517)  SpO2: 99 % (10/18/20 1517) Vital Signs (24h Range):  Temp:  [97.2 °F (36.2 °C)-97.8 °F (36.6 °C)] 97.2 °F (36.2 °C)  Pulse:  [64-68] 68  Resp:  [18] 18  SpO2:  [95 %-99 %] 99 %  BP: (115-129)/(59-65) 129/61     Weight: 72 kg (158 lb 11.7 oz)  Body mass index is 22.78 kg/m².    Intake/Output Summary (Last 24 hours) at 10/18/2020 2022  Last data filed at  10/17/2020 2030  Gross per 24 hour   Intake 200 ml   Output --   Net 200 ml      Physical Exam  Constitutional:       General: He is not in acute distress.     Appearance: He is well-developed. He is not ill-appearing, toxic-appearing or diaphoretic.   HENT:      Head: Atraumatic. No abrasion, contusion or laceration.      Nose: Nose normal.      Mouth/Throat:      Pharynx: No oropharyngeal exudate.   Eyes:      General: No scleral icterus.        Right eye: No discharge.         Left eye: No discharge.      Conjunctiva/sclera: Conjunctivae normal.      Pupils: Pupils are equal, round, and reactive to light.   Neck:      Musculoskeletal: Normal range of motion and neck supple. No neck rigidity.      Vascular: No JVD.   Cardiovascular:      Rate and Rhythm: Normal rate and regular rhythm.      Heart sounds: Normal heart sounds. No murmur. No friction rub. No gallop.    Pulmonary:      Effort: Pulmonary effort is normal. No accessory muscle usage or respiratory distress.      Breath sounds: Normal breath sounds. No wheezing.   Abdominal:      General: Bowel sounds are normal. There is no distension.      Palpations: Abdomen is soft. There is no mass.      Tenderness: There is no abdominal tenderness. There is no guarding or rebound.   Genitourinary:     Comments: Firm palpable mass on right side of shaft of penis  Musculoskeletal: Normal range of motion.         General: No tenderness or deformity.   Lymphadenopathy:      Cervical: No cervical adenopathy.   Skin:     General: Skin is warm and dry.      Capillary Refill: Capillary refill takes less than 2 seconds.      Findings: No bruising, ecchymosis, erythema, petechiae or rash.      Nails: There is no clubbing.     Neurological:      Mental Status: He is alert. Mental status is at baseline.      GCS: GCS eye subscore is 4. GCS verbal subscore is 5. GCS motor subscore is 6.      Cranial Nerves: No cranial nerve deficit.      Sensory: No sensory deficit.      Motor:  No abnormal muscle tone.      Coordination: Coordination normal.      Deep Tendon Reflexes: Reflexes normal.      Comments: Oriented to place and time. Unclear to context with frequent redirection, orientation.  Tangential thinking and occasional disorganized thought content    Psychiatric:         Speech: Speech normal.         Behavior: Behavior normal.         Thought Content: Thought content normal.         Judgment: Judgment normal.         Significant Labs:   BMP:   Recent Labs   Lab 10/18/20  0405   GLU 84      K 4.4      CO2 27   BUN 22   CREATININE 0.7   CALCIUM 8.5*   MG 2.1     CBC:   Recent Labs   Lab 10/17/20  0420 10/18/20  0405   WBC 5.35 5.46   HGB 12.9* 13.2*   HCT 40.1 40.6    248       Significant Imaging: I have reviewed all pertinent imaging results/findings within the past 24 hours.

## 2020-10-19 NOTE — SUBJECTIVE & OBJECTIVE
Interval History: No acute events. Doing well. Occasionally hematuria and frequency.    Review of Systems    Objective:     Temp:  [97.2 °F (36.2 °C)-98.4 °F (36.9 °C)] 97.6 °F (36.4 °C)  Pulse:  [64-75] 75  Resp:  [18] 18  SpO2:  [95 %-99 %] 99 %  BP: (115-129)/(59-65) 116/61     Body mass index is 22.78 kg/m².           Drains     None               Physical Exam   Constitutional: He appears well-developed. No distress.   HENT:   Head: Normocephalic and atraumatic.   Eyes: No scleral icterus.   Neck: No tracheal deviation present.   Cardiovascular: Normal rate.    Pulmonary/Chest: Effort normal. No respiratory distress.   Abdominal: Soft. He exhibits no distension. There is no abdominal tenderness.   Genitourinary:    Genitourinary Comments: Penis uncircumcised and foreskin unable to retract. Firm nodule 2-3cm palpated just proximal to the glans. No drainage apparent. Bilateral inguinal nodes palpated - 1 node on the right and 2 on the left - shoddy, not enlarged.   Scrotal/testicular exam unremarkable.      Musculoskeletal: Normal range of motion.   Neurological: He is alert.   Skin: Skin is warm and dry.         Significant Labs:    BMP:  Recent Labs   Lab 10/16/20  0332 10/17/20  0420 10/18/20  0405    139 137   K 4.5 4.4 4.4    105 103   CO2 29 27 27   BUN 22 19 22   CREATININE 0.8 0.7 0.7   CALCIUM 8.3* 8.3* 8.5*       CBC:   Recent Labs   Lab 10/16/20  0332 10/17/20  0420 10/18/20  0405   WBC 5.57 5.35 5.46   HGB 13.1* 12.9* 13.2*   HCT 39.9* 40.1 40.6    263 248       All pertinent labs results from the past 24 hours have been reviewed.    Significant Imaging:  All pertinent imaging results/findings from the past 24 hours have been reviewed.

## 2020-10-19 NOTE — TRANSFER OF CARE
"Anesthesia Transfer of Care Note    Patient: Juan Hobson    Procedure(s) Performed: Procedure(s) (LRB):  PENECTOMY  Surgeon would like to follow his cases in OR 24. (N/A)    Patient location: PACU    Anesthesia Type: general    Transport from OR: Transported from OR on 6-10 L/min O2 by face mask with adequate spontaneous ventilation    Post pain: adequate analgesia    Post assessment: tolerated procedure well and no apparent anesthetic complications    Post vital signs: stable    Level of consciousness: sedated    Nausea/Vomiting: no nausea/vomiting    Complications: none    Transfer of care protocol was followed      Last vitals:   Visit Vitals  BP (!) 161/80   Pulse 69   Temp 36.7 °C (98.1 °F)   Resp 18   Ht 5' 10" (1.778 m)   Wt 72 kg (158 lb 11.7 oz)   SpO2 100%   BMI 22.78 kg/m²     "

## 2020-10-19 NOTE — ASSESSMENT & PLAN NOTE
Patient with limited insight into his condition but is trusting the discretion of providers and friends. He appears to understand after several conversations that a surgery for removal for tumor on his penis is in his best interest and is deemed medically necessary     - will plan for surgery today  - consent obtained from friend Cheikh. Discussed risks, benefits, alternatives explained. She was given a chance to ask questions and all questions were answered to her satisfaction  - Will give fluids while NPO

## 2020-10-19 NOTE — PLAN OF CARE
CM received email from GILMA Gilbert regarding pt.  The interdiction hearing for Mr. Hobson has been set for November 9 at 9:00 a.m.  An  named Cuca Aviles has been appointed to represent him.  CM awaiting contact info to document and update pt's plan of care.    Modesta Blum, MSN  Case Management  Ext [phone]

## 2020-10-19 NOTE — ANESTHESIA PROCEDURE NOTES
Intubation  Performed by: Ngoc Dempsey CRNA  Authorized by: Esmer Oconnell MD     Intubation:     Induction:  Intravenous    Intubated:  Postinduction    Mask Ventilation:  Easy with oral airway    Attempts:  1    Attempted By:  CRNA    Method of Intubation:  Direct    Blade:  Pedroza 2    Laryngeal View Grade: Grade I - full view of chords      Difficult Airway Encountered?: No      Complications:  None    Airway Device:  Oral endotracheal tube    Airway Device Size:  7.5    Style/Cuff Inflation:  Cuffed (inflated to minimal occlusive pressure)    Tube secured:  23    Secured at:  The lips    Placement Verified By:  Capnometry    Complicating Factors:  None    Findings Post-Intubation:  BS equal bilateral

## 2020-10-20 PROBLEM — D72.829 LEUKOCYTOSIS: Status: ACTIVE | Noted: 2020-10-20

## 2020-10-20 PROBLEM — E83.51 HYPOCALCEMIA: Status: ACTIVE | Noted: 2020-10-20

## 2020-10-20 LAB
ALBUMIN SERPL BCP-MCNC: 3.2 G/DL (ref 3.5–5.2)
ALP SERPL-CCNC: 56 U/L (ref 55–135)
ALT SERPL W/O P-5'-P-CCNC: 95 U/L (ref 10–44)
ANION GAP SERPL CALC-SCNC: 7 MMOL/L (ref 8–16)
AST SERPL-CCNC: 44 U/L (ref 10–40)
BASOPHILS # BLD AUTO: 0.01 K/UL (ref 0–0.2)
BASOPHILS NFR BLD: 0.1 % (ref 0–1.9)
BILIRUB SERPL-MCNC: 1 MG/DL (ref 0.1–1)
BUN SERPL-MCNC: 21 MG/DL (ref 8–23)
CALCIUM SERPL-MCNC: 8.4 MG/DL (ref 8.7–10.5)
CHLORIDE SERPL-SCNC: 104 MMOL/L (ref 95–110)
CO2 SERPL-SCNC: 26 MMOL/L (ref 23–29)
CREAT SERPL-MCNC: 0.7 MG/DL (ref 0.5–1.4)
DIFFERENTIAL METHOD: ABNORMAL
EOSINOPHIL # BLD AUTO: 0 K/UL (ref 0–0.5)
EOSINOPHIL NFR BLD: 0.1 % (ref 0–8)
ERYTHROCYTE [DISTWIDTH] IN BLOOD BY AUTOMATED COUNT: 13.5 % (ref 11.5–14.5)
EST. GFR  (AFRICAN AMERICAN): >60 ML/MIN/1.73 M^2
EST. GFR  (NON AFRICAN AMERICAN): >60 ML/MIN/1.73 M^2
GLUCOSE SERPL-MCNC: 102 MG/DL (ref 70–110)
HCT VFR BLD AUTO: 38.9 % (ref 40–54)
HGB BLD-MCNC: 12.8 G/DL (ref 14–18)
IMM GRANULOCYTES # BLD AUTO: 0.07 K/UL (ref 0–0.04)
IMM GRANULOCYTES NFR BLD AUTO: 0.5 % (ref 0–0.5)
LYMPHOCYTES # BLD AUTO: 0.9 K/UL (ref 1–4.8)
LYMPHOCYTES NFR BLD: 6.5 % (ref 18–48)
MAGNESIUM SERPL-MCNC: 2 MG/DL (ref 1.6–2.6)
MAYO MISCELLANEOUS RESULT (REF): NORMAL
MCH RBC QN AUTO: 31.8 PG (ref 27–31)
MCHC RBC AUTO-ENTMCNC: 32.9 G/DL (ref 32–36)
MCV RBC AUTO: 97 FL (ref 82–98)
MONOCYTES # BLD AUTO: 0.8 K/UL (ref 0.3–1)
MONOCYTES NFR BLD: 5.3 % (ref 4–15)
NEUTROPHILS # BLD AUTO: 12.7 K/UL (ref 1.8–7.7)
NEUTROPHILS NFR BLD: 87.5 % (ref 38–73)
NRBC BLD-RTO: 0 /100 WBC
PHOSPHATE SERPL-MCNC: 3.4 MG/DL (ref 2.7–4.5)
PLATELET # BLD AUTO: 250 K/UL (ref 150–350)
PMV BLD AUTO: 9.7 FL (ref 9.2–12.9)
POTASSIUM SERPL-SCNC: 4.5 MMOL/L (ref 3.5–5.1)
PROT SERPL-MCNC: 5.6 G/DL (ref 6–8.4)
RBC # BLD AUTO: 4.02 M/UL (ref 4.6–6.2)
SODIUM SERPL-SCNC: 137 MMOL/L (ref 136–145)
WBC # BLD AUTO: 14.51 K/UL (ref 3.9–12.7)

## 2020-10-20 PROCEDURE — 99233 SBSQ HOSP IP/OBS HIGH 50: CPT | Mod: ,,, | Performed by: INTERNAL MEDICINE

## 2020-10-20 PROCEDURE — 63600175 PHARM REV CODE 636 W HCPCS: Performed by: HOSPITALIST

## 2020-10-20 PROCEDURE — 83735 ASSAY OF MAGNESIUM: CPT

## 2020-10-20 PROCEDURE — 80053 COMPREHEN METABOLIC PANEL: CPT

## 2020-10-20 PROCEDURE — 84100 ASSAY OF PHOSPHORUS: CPT

## 2020-10-20 PROCEDURE — 25000003 PHARM REV CODE 250: Performed by: STUDENT IN AN ORGANIZED HEALTH CARE EDUCATION/TRAINING PROGRAM

## 2020-10-20 PROCEDURE — 11000001 HC ACUTE MED/SURG PRIVATE ROOM

## 2020-10-20 PROCEDURE — 99233 PR SUBSEQUENT HOSPITAL CARE,LEVL III: ICD-10-PCS | Mod: ,,, | Performed by: INTERNAL MEDICINE

## 2020-10-20 PROCEDURE — 85025 COMPLETE CBC W/AUTO DIFF WBC: CPT

## 2020-10-20 PROCEDURE — 36415 COLL VENOUS BLD VENIPUNCTURE: CPT

## 2020-10-20 RX ORDER — SULFAMETHOXAZOLE AND TRIMETHOPRIM 800; 160 MG/1; MG/1
1 TABLET ORAL 2 TIMES DAILY
Status: COMPLETED | OUTPATIENT
Start: 2020-10-20 | End: 2020-10-22

## 2020-10-20 RX ORDER — SULFAMETHOXAZOLE AND TRIMETHOPRIM 800; 160 MG/1; MG/1
1 TABLET ORAL 2 TIMES DAILY
Status: DISCONTINUED | OUTPATIENT
Start: 2020-10-20 | End: 2020-10-20

## 2020-10-20 RX ORDER — BACITRACIN 500 [USP'U]/G
OINTMENT TOPICAL DAILY
Status: DISCONTINUED | OUTPATIENT
Start: 2020-10-20 | End: 2020-11-23 | Stop reason: HOSPADM

## 2020-10-20 RX ADMIN — ENOXAPARIN SODIUM 40 MG: 40 INJECTION SUBCUTANEOUS at 05:10

## 2020-10-20 RX ADMIN — PANTOPRAZOLE SODIUM 40 MG: 40 TABLET, DELAYED RELEASE ORAL at 08:10

## 2020-10-20 RX ADMIN — OXYCODONE HYDROCHLORIDE 10 MG: 10 TABLET ORAL at 10:10

## 2020-10-20 RX ADMIN — SULFAMETHOXAZOLE AND TRIMETHOPRIM 1 TABLET: 800; 160 TABLET ORAL at 08:10

## 2020-10-20 RX ADMIN — BACITRACIN: 500 OINTMENT TOPICAL at 10:10

## 2020-10-20 RX ADMIN — OXYCODONE 5 MG: 5 TABLET ORAL at 03:10

## 2020-10-20 NOTE — PLAN OF CARE
AAOx1. Pt only knows self but is aware that he shouldn't get out of the bed because he might fall. VS stable. Output 650 mL overnight via kaye. No complaints of pain. Will continue to monitor.

## 2020-10-20 NOTE — PROGRESS NOTES
[facility]  Urology  Progress Note    Patient Name: Juan Hobson  MRN: [id number]  Admission Date: 10/2/2020  Hospital Length of Stay: 14 days  Code Status: Full Code   Attending Provider: Audi Arcos MD   Primary Care Physician: Mehnaz Barillas MD    Subjective:     HPI:  Juan Hobson is a 69 y.o. male with progressive dementia and a penile tumor. He was admitted to the hospital 10/2 due to failure to thrive and self neglect as witnessed by his supervisor from work. He had presented to urology clinic that afternoon to discuss penile malignancy. Social work was brought to the clinic and due to his declining mental status, it was recommended he be admitted to hospital as it was unsafe for him to return home where he lives alone.      He initially presented to the ED on 9/2/20 due to a draining penile lesion. 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  Urethral involvement was not excluded. Suspected invasion of the right dorsolateral corpus spongiosum. Based on this imaging, he is stage [id number]. However, we do not have a biopsy specimen.     During this admission, he has undergone a work up by neurology for progressive dementia. Currently there is a send out Alzheimer's panel in process. He is currently in the process of being assigned a legal guardian.     Interval History:   Patient had a good night's rest. He is alert and oriented to his situation. POD1 from partial penectomy.   Tolerating diet w/o nausea and vomiting.   He has not walked. Figueredo clear with good UOP. Pain controlled.       Objective:     Temp:  [96.8 °F (36 °C)-98.9 °F (37.2 °C)] 98.4 °F (36.9 °C)  Pulse:  [58-73] 66  Resp:  [12-22] 18  SpO2:  [93 %-100 %] 93 %  BP: (105-161)/(59-80) 108/62     Body mass index is 22.78 kg/m².            Drains     Drain                 Urethral Catheter 10/19/20 1652 16 Fr. less than 1 day                Physical Exam   Constitutional: He is oriented to person, place, and time.   HENT:   Head: Atraumatic.   Neck: Neck supple.   Cardiovascular: Normal rate.    Pulmonary/Chest: Effort normal. No respiratory distress.   Abdominal: Soft.   Genitourinary:    Genitourinary Comments: 16 Fr kaye draining c/y/u. Penile incision c/d/i with overlying dressing intact and dry.        Musculoskeletal: Normal range of motion.   Neurological: He is alert and oriented to person, place, and time.   Skin: Skin is warm and dry.         Significant Labs:    BMP:  Recent Labs   Lab 10/18/20  0405 10/19/20  0724 10/20/20  0124    138 137   K 4.4 4.2 4.5    101 104   CO2 27 28 26   BUN 22 22 21   CREATININE 0.7 0.9 0.7   CALCIUM 8.5* 9.3 8.4*       CBC:   Recent Labs   Lab 10/18/20  0405 10/19/20  0725 10/20/20  0124   WBC 5.46 8.06 14.51*   HGB 13.2* 15.6 12.8*   HCT 40.6 48.3 38.9*    320 250       All pertinent labs results from the past 24 hours have been reviewed.    Significant Imaging:  All pertinent imaging results/findings from the past 24 hours have been reviewed.                  Assessment/Plan:     Penile carcinoma  70 yo M with a significant psych history of penile cancer s/p partial penectomy 10/19/2020. He is progressing well.      - Morning labs stable  - Kaye to stay in for now  - OOBTC  - Ambulation in halls.   - Lovenox for DVT PPx  - PO pain control.   - Regular diet.   - Bacitracin ointment to wound site.  - PO bactrim x3 days   - Daily dressing changes per nursing.           VTE Risk Mitigation (From admission, onward)         Ordered     enoxaparin injection 40 mg  Every 24 hours      10/14/20 0959     IP VTE HIGH RISK PATIENT  Once      10/02/20 2042                Isela Alexander MD  Urology  [facility]

## 2020-10-20 NOTE — ANESTHESIA POSTPROCEDURE EVALUATION
Anesthesia Post Evaluation    Patient: Juan Hobson    Procedure(s) Performed: Procedure(s) (LRB):  PENECTOMY  Surgeon would like to follow his cases in OR 24. (N/A)    Final Anesthesia Type: general    Patient location during evaluation: PACU  Patient participation: Yes- Able to Participate  Level of consciousness: awake and alert  Post-procedure vital signs: reviewed and stable  Pain management: adequate  Airway patency: patent    PONV status at discharge: No PONV  Anesthetic complications: no      Cardiovascular status: blood pressure returned to baseline  Respiratory status: unassisted  Hydration status: euvolemic  Follow-up not needed.          Vitals Value Taken Time   /62 10/20/20 0541   Temp 36.9 °C (98.4 °F) 10/20/20 0541   Pulse 66 10/20/20 0541   Resp 18 10/20/20 0541   SpO2 93 % 10/20/20 0541         Event Time   Out of Recovery 10/19/2020 18:15:00         Pain/Margarito Score: Pain Rating Prior to Med Admin: 7 (10/19/2020  7:05 PM)  Margarito Score: 10 (10/19/2020  6:30 PM)

## 2020-10-20 NOTE — NURSING
Patient voided 200ml of urine and less than 14ml of post residual per bladder scan. Urine has yellow color consistency.Patient stated he didn't feel any pain. Call light in reach. Safety measures maintained.

## 2020-10-20 NOTE — SUBJECTIVE & OBJECTIVE
Interval History:  Stable today ahead of procedure.     Review of Systems   Constitutional: Negative for activity change, appetite change, chills, diaphoresis, fatigue and fever.   HENT: Negative for congestion, facial swelling, hearing loss, nosebleeds, sinus pressure, sinus pain, sneezing, sore throat, tinnitus, trouble swallowing and voice change.    Eyes: Negative for photophobia, pain, discharge and visual disturbance.   Respiratory: Negative for cough, chest tightness, shortness of breath and wheezing.    Cardiovascular: Negative for chest pain, palpitations and leg swelling.   Gastrointestinal: Negative for abdominal distention, abdominal pain, blood in stool, constipation, diarrhea, nausea and vomiting.   Endocrine: Negative for cold intolerance, heat intolerance and polyuria.   Genitourinary: Negative for decreased urine volume, difficulty urinating, dysuria, flank pain, frequency and urgency.   Musculoskeletal: Negative for arthralgias, gait problem, myalgias, neck pain and neck stiffness.   Skin: Negative for rash and wound.   Neurological: Negative for dizziness, speech difficulty, weakness, light-headedness and headaches.   Hematological: Negative for adenopathy. Does not bruise/bleed easily.   Psychiatric/Behavioral: Negative for agitation, confusion, self-injury, sleep disturbance and suicidal ideas. The patient is not nervous/anxious.      Objective:     Vital Signs (Most Recent):  Temp: 97.8 °F (36.6 °C) (10/19/20 1945)  Pulse: (!) 58 (10/19/20 1945)  Resp: 18 (10/19/20 1945)  BP: 120/62 (10/19/20 1945)  SpO2: 98 % (10/19/20 1945) Vital Signs (24h Range):  Temp:  [96.8 °F (36 °C)-98.9 °F (37.2 °C)] 97.8 °F (36.6 °C)  Pulse:  [58-75] 58  Resp:  [12-22] 18  SpO2:  [96 %-100 %] 98 %  BP: (115-161)/(61-80) 120/62     Weight: 72 kg (158 lb 11.7 oz)  Body mass index is 22.78 kg/m².    Intake/Output Summary (Last 24 hours) at 10/19/2020 2103  Last data filed at 10/19/2020 1900  Gross per 24 hour   Intake  680 ml   Output --   Net 680 ml      Physical Exam  Constitutional:       General: He is not in acute distress.     Appearance: He is well-developed. He is not ill-appearing, toxic-appearing or diaphoretic.   HENT:      Head: Atraumatic. No abrasion, contusion or laceration.      Nose: Nose normal.      Mouth/Throat:      Pharynx: No oropharyngeal exudate.   Eyes:      General: No scleral icterus.        Right eye: No discharge.         Left eye: No discharge.      Conjunctiva/sclera: Conjunctivae normal.      Pupils: Pupils are equal, round, and reactive to light.   Neck:      Musculoskeletal: Normal range of motion and neck supple. No neck rigidity.      Vascular: No JVD.   Cardiovascular:      Rate and Rhythm: Normal rate and regular rhythm.      Heart sounds: Normal heart sounds. No murmur. No friction rub. No gallop.    Pulmonary:      Effort: Pulmonary effort is normal. No accessory muscle usage or respiratory distress.      Breath sounds: Normal breath sounds. No wheezing.   Abdominal:      General: Bowel sounds are normal. There is no distension.      Palpations: Abdomen is soft. There is no mass.      Tenderness: There is no abdominal tenderness. There is no guarding or rebound.   Genitourinary:     Comments: Firm palpable mass on right side of shaft of penis  Musculoskeletal: Normal range of motion.         General: No tenderness or deformity.   Lymphadenopathy:      Cervical: No cervical adenopathy.   Skin:     General: Skin is warm and dry.      Capillary Refill: Capillary refill takes less than 2 seconds.      Findings: No bruising, ecchymosis, erythema, petechiae or rash.      Nails: There is no clubbing.     Neurological:      Mental Status: He is alert. Mental status is at baseline.      GCS: GCS eye subscore is 4. GCS verbal subscore is 5. GCS motor subscore is 6.      Cranial Nerves: No cranial nerve deficit.      Sensory: No sensory deficit.      Motor: No abnormal muscle tone.      Coordination:  Coordination normal.      Deep Tendon Reflexes: Reflexes normal.      Comments: Oriented to place and time. Unclear to context with frequent redirection, orientation.  Tangential thinking and occasional disorganized thought content    Psychiatric:         Speech: Speech normal.         Behavior: Behavior normal.         Thought Content: Thought content normal.         Judgment: Judgment normal.         Significant Labs:   BMP:   Recent Labs   Lab 10/19/20  0724         K 4.2      CO2 28   BUN 22   CREATININE 0.9   CALCIUM 9.3   MG 2.3       Significant Imaging: I have reviewed and interpreted all pertinent imaging results/findings within the past 24 hours.

## 2020-10-20 NOTE — PROGRESS NOTES
[facility] Hospital Medicine  Progress Note    Patient Name: Juan Hobson  MRN: [id number]  Patient Class: IP- Inpatient   Admission Date: 10/2/2020  Length of Stay: 13 days  Attending Physician: Audi Arcos MD  Primary Care Provider: Mehnaz Barillas MD    [facility] Medicine Team: [facility] HOSP MED O Audi Arcos MD    Subjective:     Principal Problem:Cognitive impairment        HPI:  Per Adriana Badillo MD:   Mr. Juan Hobson is a 69 y.o. male with recently diagnosed penile cancer, who presents to the ER from Urology clinic with concern for self neglect, and need for Neurology evaluation of dementia and possible placement.  The patient mentions that he has been having some issues with his memory.  He mostly orders takeout for meals because it is easier, and still drives to work.  He feels like he is able to take care of himself, and does not need to go to a facility.      Per note from Lianne German LCSW on 10/2:  Received consult from Dr. Monterroso re: assessing patient in clinic this afternoon and care recommendations as patient did not seem competent to make decisions and give consent. He has no family and no legal next of kin (POA or guardian).   Met individually with patient, and with his co-worker Janna Donavan ([phone], ext. [phone]) who brought him to his clinic appointment today. Patient unable to relate basic demographic information or clearly explain other information. For example, when asked his birth date he gave the year [birth year], then said 9, and then said the second to last, it begins with N; he said his street name but couldn't give the house number where he has lived for years; unable to say what type of work or where he worked prior to the [employer] in [facility] for the past 12 years; he described being taken in a truck to that other place (referring to the recent [facility] stay). He has awareness that he is having memory/cognitive issues but  stated he can manage for himself at home and that he knows there is a lot of work that needs to be done in his home that he hasn't gotten to. He inherited the home after his mother .      Mr. Go and another coworker Juice Martinez ([phone]) and their supervisor Mei Bell ([phone]) have been trying to help him manage things with his work and personal matters as they have observed his deficits and know he has no one to help him. They have helped him keep up with recent medical appointments and accompanied and transported him.  Mr. Go reports concern for his safety and ability to care for himself after seeing his home with clutter throughout and things piled up, mold and old food in the refrigerator, mold in the toilet, clothes all over, etc. Patient has been driving himself to and from work and to get food at places near his home, but unable to navigate to unfamiliar places. They have observed his cognitive deficits at work and he has made errors, so they have modified his job and his supervisor has been working with him to try to get FMLA and his leave/senior living in place so he doesn't lose his benefits. They have had to help him with paperwork, bill paying, etc., because he can't complete these on his own.         Upon arrival to the ER, vitals were temp 97.8F, HR 59 and /84.  Labs were unremarkable  He was admitted to Hospital Medicine for Neurology and SW evaluation.    Overview/Hospital Course:  Admitted to hospital medicine for worsening cognitive decline and inability to care for self in setting of concern for penile cancer.  In pursuit of workup for altered mental status - labs/infectious work-up grossly unremarkable. Neurology consulted and MRI obtained on admission with concern for encephalitis (increased FLAIR hyperintensity involving the bilateral mesial temporal lobes which can be seen in the setting of autoimmune limbic encephalitis in the appropriate clinical setting). LP  recommended and attempted at bedside however not successful 2/2 to D. Interventional radiology consulted, LP performed 10/5 with CSF (1 WBC, 190 RBC, 71 protein, 52 glucose). CSF HSV, ACE negative. EEG 10/3 (1 hr 40 min) with mild regional dysfunction in bilateral temporal lobes, but no electrographic seizures. Serum paraneoplastic panel from 9/28 has resulted negative. MRI brain W WO contrast showing increased FLAIR hyperintensity involving the bilateral mesial temporal lobes. Completed 5 days of empiric IV Solumedrol 1 g qd without notable clinical improvement. Presentation most concerning for dementia   Psychiatry consulted for formal capacity evaluation and concluded that he does not have capacity to make decisions regarding his long term care disposition however does not require PEC at this time as his stay has been non-contested. He has no family, children and have relied on coworkers for assistance. The case was brought to the attention of EPS who have no plans to intervene at this time and recommend since the patient presented to the hospital it is a safe discharge planning issue that would involve [facility] department. The Legal department intend to pursue [place] Guardianship to assist with managing affairs. Patient re-engaged with care for likely penile carcinoma with urology.     Interval History:  Stable today ahead of procedure.     Review of Systems   Constitutional: Negative for activity change, appetite change, chills, diaphoresis, fatigue and fever.   HENT: Negative for congestion, facial swelling, hearing loss, nosebleeds, sinus pressure, sinus pain, sneezing, sore throat, tinnitus, trouble swallowing and voice change.    Eyes: Negative for photophobia, pain, discharge and visual disturbance.   Respiratory: Negative for cough, chest tightness, shortness of breath and wheezing.    Cardiovascular: Negative for chest pain, palpitations and leg swelling.   Gastrointestinal: Negative for  abdominal distention, abdominal pain, blood in stool, constipation, diarrhea, nausea and vomiting.   Endocrine: Negative for cold intolerance, heat intolerance and polyuria.   Genitourinary: Negative for decreased urine volume, difficulty urinating, dysuria, flank pain, frequency and urgency.   Musculoskeletal: Negative for arthralgias, gait problem, myalgias, neck pain and neck stiffness.   Skin: Negative for rash and wound.   Neurological: Negative for dizziness, speech difficulty, weakness, light-headedness and headaches.   Hematological: Negative for adenopathy. Does not bruise/bleed easily.   Psychiatric/Behavioral: Negative for agitation, confusion, self-injury, sleep disturbance and suicidal ideas. The patient is not nervous/anxious.      Objective:     Vital Signs (Most Recent):  Temp: 97.8 °F (36.6 °C) (10/19/20 1945)  Pulse: (!) 58 (10/19/20 1945)  Resp: 18 (10/19/20 1945)  BP: 120/62 (10/19/20 1945)  SpO2: 98 % (10/19/20 1945) Vital Signs (24h Range):  Temp:  [96.8 °F (36 °C)-98.9 °F (37.2 °C)] 97.8 °F (36.6 °C)  Pulse:  [58-75] 58  Resp:  [12-22] 18  SpO2:  [96 %-100 %] 98 %  BP: (115-161)/(61-80) 120/62     Weight: 72 kg (158 lb 11.7 oz)  Body mass index is 22.78 kg/m².    Intake/Output Summary (Last 24 hours) at 10/19/2020 2103  Last data filed at 10/19/2020 1900  Gross per 24 hour   Intake 680 ml   Output --   Net 680 ml      Physical Exam  Constitutional:       General: He is not in acute distress.     Appearance: He is well-developed. He is not ill-appearing, toxic-appearing or diaphoretic.   HENT:      Head: Atraumatic. No abrasion, contusion or laceration.      Nose: Nose normal.      Mouth/Throat:      Pharynx: No oropharyngeal exudate.   Eyes:      General: No scleral icterus.        Right eye: No discharge.         Left eye: No discharge.      Conjunctiva/sclera: Conjunctivae normal.      Pupils: Pupils are equal, round, and reactive to light.   Neck:      Musculoskeletal: Normal range of  motion and neck supple. No neck rigidity.      Vascular: No JVD.   Cardiovascular:      Rate and Rhythm: Normal rate and regular rhythm.      Heart sounds: Normal heart sounds. No murmur. No friction rub. No gallop.    Pulmonary:      Effort: Pulmonary effort is normal. No accessory muscle usage or respiratory distress.      Breath sounds: Normal breath sounds. No wheezing.   Abdominal:      General: Bowel sounds are normal. There is no distension.      Palpations: Abdomen is soft. There is no mass.      Tenderness: There is no abdominal tenderness. There is no guarding or rebound.   Genitourinary:     Comments: Firm palpable mass on right side of shaft of penis  Musculoskeletal: Normal range of motion.         General: No tenderness or deformity.   Lymphadenopathy:      Cervical: No cervical adenopathy.   Skin:     General: Skin is warm and dry.      Capillary Refill: Capillary refill takes less than 2 seconds.      Findings: No bruising, ecchymosis, erythema, petechiae or rash.      Nails: There is no clubbing.     Neurological:      Mental Status: He is alert. Mental status is at baseline.      GCS: GCS eye subscore is 4. GCS verbal subscore is 5. GCS motor subscore is 6.      Cranial Nerves: No cranial nerve deficit.      Sensory: No sensory deficit.      Motor: No abnormal muscle tone.      Coordination: Coordination normal.      Deep Tendon Reflexes: Reflexes normal.      Comments: Oriented to place and time. Unclear to context with frequent redirection, orientation.  Tangential thinking and occasional disorganized thought content    Psychiatric:         Speech: Speech normal.         Behavior: Behavior normal.         Thought Content: Thought content normal.         Judgment: Judgment normal.         Significant Labs:   BMP:   Recent Labs   Lab 10/19/20  0724         K 4.2      CO2 28   BUN 22   CREATININE 0.9   CALCIUM 9.3   MG 2.3       Significant Imaging: I have reviewed and interpreted  all pertinent imaging results/findings within the past 24 hours.      Assessment/Plan:      * Cognitive impairment  Impaired decision making  Presentation concerning for progressive dementia   -LP performed by IR 10/5/2020: elevated protein, neutrophils  -CSF HSV, ACE negative.   -Serum paraneoplastic panel from 9/28 has resulted negative.  -MRI brain with Possible subtle increased FLAIR hyperintensity involving the bilateral mesial temporal lobes which can be seen in the setting of autoimmune limbic encephalitis  -EEG without seizure activity   -Vitamin B1, Vitamin B6, Vitamin B12, HIV, RPR, TSH unremarkable   - Neurology consulted   --please send CSF to [place] for Alzheimer panel (ADEVL test code) - repeat LP completed   --start empiric IV Solumedrol 1 g qd x 5 days - completed    --formal cognitive assessment with neuropsych     --consider PET scan - not available inpatient    --continue strict delirium precautions  -Psych assess lack of competency   -Formal guardianship being pursued - court date early November  -Adult friends can assist with decision making   -[place] R[place] 40:[id number]: (9) Upon the inability of any adult to consent for himself and in the absence of any person listed in Paragraphs (2) through (8) of this Subsection, an adult friend of the patient. For purposes of this Subsection to consent, adult friend means an adult who has exhibited special care and concern for the patient, who is generally familiar with the patient's health care views and desires, and who is willing and able to become involved in the patient's health care decisions and to act in the patient's best interest. The adult friend shall sign and date an acknowledgment form provided by the hospital or other health care facility in which the patient is located for placement in the patient's records certifying that he or she meets such criteria.     - Juice Martinez ([phone]) will make decisions on his behalf  - supervisor Mei  Arabella ([phone]) have been trying to help him manage things with his work and personal matters as they have observed his deficits and know he has no one to help him - these individuals may be used for consent     [facility]  - Minimize use of restraints; if physical restraints necessary, please utilize medical/chemical prns for agitation.  - Keep shades open and room lit during day and room dim at night in order to promote healthy circadian rhythms.  - Encourage family at bedside  - Keep whiteboard in patient's room current with the date and name of the members of patient's team for easy patient self re-orientation.  - Avoid benzodiazepines, antihistamines, anticholinergics, hypnotics, and minimize opiates while controlling for pain as these medications may exacerbate delirium.      Penile carcinoma  - follows with urology   - Assessed inpatient - plan for partial / total penectomy         VTE Risk Mitigation (From admission, onward)         Ordered     enoxaparin injection 40 mg  Every 24 hours      10/14/20 0959     IP VTE HIGH RISK PATIENT  Once      10/02/20 2042                Discharge Planning   ALFRED: 11/6/2020     Code Status: Full Code   Is the patient medically ready for discharge?: No    Reason for patient still in hospital (select all that apply): Treatment and Pending disposition  Discharge Plan A: Home   Discharge Delays: None known at this time      Total visit time was 25 minutes or greater with greater than 50% of time spent in counseling and coordination of care.           Audi Arcos MD  Department of Hospital Medicine   [facility]

## 2020-10-20 NOTE — SUBJECTIVE & OBJECTIVE
Interval History:   Patient had a good night's rest. He is alert and oriented to his situation. POD1 from partial penectomy.   Tolerating diet w/o nausea and vomiting.   He has not walked. Kaye clear with good UOP. Pain controlled.       Objective:     Temp:  [96.8 °F (36 °C)-98.9 °F (37.2 °C)] 98.4 °F (36.9 °C)  Pulse:  [58-73] 66  Resp:  [12-22] 18  SpO2:  [93 %-100 %] 93 %  BP: (105-161)/(59-80) 108/62     Body mass index is 22.78 kg/m².           Drains     Drain                 Urethral Catheter 10/19/20 1652 16 Fr. less than 1 day                Physical Exam   Constitutional: He is oriented to person, place, and time.   HENT:   Head: Atraumatic.   Neck: Neck supple.   Cardiovascular: Normal rate.    Pulmonary/Chest: Effort normal. No respiratory distress.   Abdominal: Soft.   Genitourinary:    Genitourinary Comments: 16 Fr kaye draining c/y/u. Penile incision c/d/i with overlying dressing intact and dry.        Musculoskeletal: Normal range of motion.   Neurological: He is alert and oriented to person, place, and time.   Skin: Skin is warm and dry.         Significant Labs:    BMP:  Recent Labs   Lab 10/18/20  0405 10/19/20  0724 10/20/20  0124    138 137   K 4.4 4.2 4.5    101 104   CO2 27 28 26   BUN 22 22 21   CREATININE 0.7 0.9 0.7   CALCIUM 8.5* 9.3 8.4*       CBC:   Recent Labs   Lab 10/18/20  0405 10/19/20  0725 10/20/20  0124   WBC 5.46 8.06 14.51*   HGB 13.2* 15.6 12.8*   HCT 40.6 48.3 38.9*    320 250       All pertinent labs results from the past 24 hours have been reviewed.    Significant Imaging:  All pertinent imaging results/findings from the past 24 hours have been reviewed.

## 2020-10-20 NOTE — NURSING TRANSFER
Nursing Transfer Note      10/19/2020     Transfer To: 625    Transfer via stretcher    Transfer with n/a    Transported by tech    Medicines sent: none    Chart send with patient: Yes    Notified: friend via text messaging system    Patient reassessed at: 10/19/2020 1907

## 2020-10-20 NOTE — PLAN OF CARE
10/20/20 0854   Post-Acute Status   Post-Acute Authorization Placement   Discharge Delays (!) Patient and Family Barriers

## 2020-10-20 NOTE — ASSESSMENT & PLAN NOTE
70 yo M with a significant psych history of penile cancer s/p partial penectomy 10/19/2020. He is progressing well.      - Morning labs stable  - Figueredo to stay in for now  - OOBTC  - Ambulation in halls.   - Lovenox for DVT PPx  - PO pain control.   - Regular diet.   - Bacitracin ointment to wound site.  - Daily dressing changes per nursing.

## 2020-10-21 LAB
ALBUMIN SERPL BCP-MCNC: 2.8 G/DL (ref 3.5–5.2)
ALP SERPL-CCNC: 54 U/L (ref 55–135)
ALT SERPL W/O P-5'-P-CCNC: 55 U/L (ref 10–44)
ANION GAP SERPL CALC-SCNC: 7 MMOL/L (ref 8–16)
AST SERPL-CCNC: 22 U/L (ref 10–40)
BASOPHILS # BLD AUTO: 0.01 K/UL (ref 0–0.2)
BASOPHILS NFR BLD: 0.1 % (ref 0–1.9)
BILIRUB SERPL-MCNC: 0.4 MG/DL (ref 0.1–1)
BUN SERPL-MCNC: 18 MG/DL (ref 8–23)
CALCIUM SERPL-MCNC: 8 MG/DL (ref 8.7–10.5)
CHLORIDE SERPL-SCNC: 105 MMOL/L (ref 95–110)
CO2 SERPL-SCNC: 25 MMOL/L (ref 23–29)
CREAT SERPL-MCNC: 0.9 MG/DL (ref 0.5–1.4)
DIFFERENTIAL METHOD: ABNORMAL
EOSINOPHIL # BLD AUTO: 0.1 K/UL (ref 0–0.5)
EOSINOPHIL NFR BLD: 1 % (ref 0–8)
ERYTHROCYTE [DISTWIDTH] IN BLOOD BY AUTOMATED COUNT: 13.3 % (ref 11.5–14.5)
EST. GFR  (AFRICAN AMERICAN): >60 ML/MIN/1.73 M^2
EST. GFR  (NON AFRICAN AMERICAN): >60 ML/MIN/1.73 M^2
GLUCOSE SERPL-MCNC: 100 MG/DL (ref 70–110)
HCT VFR BLD AUTO: 36 % (ref 40–54)
HGB BLD-MCNC: 11.5 G/DL (ref 14–18)
IMM GRANULOCYTES # BLD AUTO: 0.02 K/UL (ref 0–0.04)
IMM GRANULOCYTES NFR BLD AUTO: 0.3 % (ref 0–0.5)
LYMPHOCYTES # BLD AUTO: 1.4 K/UL (ref 1–4.8)
LYMPHOCYTES NFR BLD: 20.4 % (ref 18–48)
MAGNESIUM SERPL-MCNC: 2.1 MG/DL (ref 1.6–2.6)
MCH RBC QN AUTO: 31.4 PG (ref 27–31)
MCHC RBC AUTO-ENTMCNC: 31.9 G/DL (ref 32–36)
MCV RBC AUTO: 98 FL (ref 82–98)
MONOCYTES # BLD AUTO: 0.8 K/UL (ref 0.3–1)
MONOCYTES NFR BLD: 11.6 % (ref 4–15)
NEUTROPHILS # BLD AUTO: 4.7 K/UL (ref 1.8–7.7)
NEUTROPHILS NFR BLD: 66.6 % (ref 38–73)
NRBC BLD-RTO: 0 /100 WBC
PHOSPHATE SERPL-MCNC: 2.8 MG/DL (ref 2.7–4.5)
PLATELET # BLD AUTO: 207 K/UL (ref 150–350)
PMV BLD AUTO: 9.7 FL (ref 9.2–12.9)
POTASSIUM SERPL-SCNC: 3.8 MMOL/L (ref 3.5–5.1)
PROT SERPL-MCNC: 5.3 G/DL (ref 6–8.4)
RBC # BLD AUTO: 3.66 M/UL (ref 4.6–6.2)
SODIUM SERPL-SCNC: 137 MMOL/L (ref 136–145)
WBC # BLD AUTO: 7.07 K/UL (ref 3.9–12.7)

## 2020-10-21 PROCEDURE — 99232 PR SUBSEQUENT HOSPITAL CARE,LEVL II: ICD-10-PCS | Mod: ,,, | Performed by: INTERNAL MEDICINE

## 2020-10-21 PROCEDURE — 25000003 PHARM REV CODE 250: Performed by: STUDENT IN AN ORGANIZED HEALTH CARE EDUCATION/TRAINING PROGRAM

## 2020-10-21 PROCEDURE — 99232 SBSQ HOSP IP/OBS MODERATE 35: CPT | Mod: ,,, | Performed by: INTERNAL MEDICINE

## 2020-10-21 PROCEDURE — 83735 ASSAY OF MAGNESIUM: CPT

## 2020-10-21 PROCEDURE — 36415 COLL VENOUS BLD VENIPUNCTURE: CPT

## 2020-10-21 PROCEDURE — 11000001 HC ACUTE MED/SURG PRIVATE ROOM

## 2020-10-21 PROCEDURE — 63600175 PHARM REV CODE 636 W HCPCS: Performed by: HOSPITALIST

## 2020-10-21 PROCEDURE — 84100 ASSAY OF PHOSPHORUS: CPT

## 2020-10-21 PROCEDURE — 80053 COMPREHEN METABOLIC PANEL: CPT

## 2020-10-21 PROCEDURE — 85025 COMPLETE CBC W/AUTO DIFF WBC: CPT

## 2020-10-21 RX ORDER — TALC
6 POWDER (GRAM) TOPICAL NIGHTLY PRN
Refills: 0
Start: 2020-10-21

## 2020-10-21 RX ORDER — BACITRACIN 500 [USP'U]/G
OINTMENT TOPICAL DAILY
Refills: 0
Start: 2020-10-22

## 2020-10-21 RX ORDER — AMOXICILLIN 250 MG
1 CAPSULE ORAL 2 TIMES DAILY PRN
Start: 2020-10-21

## 2020-10-21 RX ORDER — ACETAMINOPHEN 325 MG/1
650 TABLET ORAL EVERY 4 HOURS PRN
Refills: 0
Start: 2020-10-21

## 2020-10-21 RX ADMIN — SULFAMETHOXAZOLE AND TRIMETHOPRIM 1 TABLET: 800; 160 TABLET ORAL at 08:10

## 2020-10-21 RX ADMIN — BACITRACIN: 500 OINTMENT TOPICAL at 09:10

## 2020-10-21 RX ADMIN — PANTOPRAZOLE SODIUM 40 MG: 40 TABLET, DELAYED RELEASE ORAL at 09:10

## 2020-10-21 RX ADMIN — SULFAMETHOXAZOLE AND TRIMETHOPRIM 1 TABLET: 800; 160 TABLET ORAL at 09:10

## 2020-10-21 RX ADMIN — ENOXAPARIN SODIUM 40 MG: 40 INJECTION SUBCUTANEOUS at 04:10

## 2020-10-21 NOTE — PROGRESS NOTES
[facility] Hospital Medicine  Progress Note    Patient Name: Juan Hobson  MRN: [id number]  Patient Class: IP- Inpatient   Admission Date: 10/2/2020  Length of Stay: 14 days  Attending Physician: Audi Arcos MD  Primary Care Provider: Mehnaz Barillas MD    [facility] Medicine Team: [facility] HOSP MED O Audi Arcos MD    Subjective:     Principal Problem:Cognitive impairment        HPI:  Per Adriana Badillo MD:   Mr. Juan Hobson is a 69 y.o. male with recently diagnosed penile cancer, who presents to the ER from Urology clinic with concern for self neglect, and need for Neurology evaluation of dementia and possible placement.  The patient mentions that he has been having some issues with his memory.  He mostly orders takeout for meals because it is easier, and still drives to work.  He feels like he is able to take care of himself, and does not need to go to a facility.      Per note from Lianne German LCSW on 10/2:  Received consult from Dr. Monterroso re: assessing patient in clinic this afternoon and care recommendations as patient did not seem competent to make decisions and give consent. He has no family and no legal next of kin (POA or guardian).   Met individually with patient, and with his co-worker Janna Donavan ([phone], ext. [phone]) who brought him to his clinic appointment today. Patient unable to relate basic demographic information or clearly explain other information. For example, when asked his birth date he gave the year [birth year], then said 9, and then said the second to last, it begins with N; he said his street name but couldn't give the house number where he has lived for years; unable to say what type of work or where he worked prior to the [employer] in [facility] for the past 12 years; he described being taken in a truck to that other place (referring to the recent [facility] stay). He has awareness that he is having memory/cognitive issues but  stated he can manage for himself at home and that he knows there is a lot of work that needs to be done in his home that he hasn't gotten to. He inherited the home after his mother .      Mr. Go and another coworker Juice Martinez ([phone]) and their supervisor Mei Bell ([phone]) have been trying to help him manage things with his work and personal matters as they have observed his deficits and know he has no one to help him. They have helped him keep up with recent medical appointments and accompanied and transported him.  Mr. Go reports concern for his safety and ability to care for himself after seeing his home with clutter throughout and things piled up, mold and old food in the refrigerator, mold in the toilet, clothes all over, etc. Patient has been driving himself to and from work and to get food at places near his home, but unable to navigate to unfamiliar places. They have observed his cognitive deficits at work and he has made errors, so they have modified his job and his supervisor has been working with him to try to get FMLA and his leave/alf in place so he doesn't lose his benefits. They have had to help him with paperwork, bill paying, etc., because he can't complete these on his own.         Upon arrival to the ER, vitals were temp 97.8F, HR 59 and /84.  Labs were unremarkable  He was admitted to Hospital Medicine for Neurology and SW evaluation.    Overview/Hospital Course:  Admitted to hospital medicine for worsening cognitive decline and inability to care for self in setting of concern for penile cancer.  In pursuit of workup for altered mental status - labs/infectious work-up grossly unremarkable. Neurology consulted and MRI obtained on admission with concern for encephalitis (increased FLAIR hyperintensity involving the bilateral mesial temporal lobes which can be seen in the setting of autoimmune limbic encephalitis in the appropriate clinical setting). LP  recommended and attempted at bedside however not successful 2/2 to DJD. Interventional radiology consulted, LP performed 10/5 with CSF (1 WBC, 190 RBC, 71 protein, 52 glucose). CSF HSV, ACE negative. EEG 10/3 (1 hr 40 min) with mild regional dysfunction in bilateral temporal lobes, but no electrographic seizures. Serum paraneoplastic panel from 9/28 has resulted negative. MRI brain W WO contrast showing increased FLAIR hyperintensity involving the bilateral mesial temporal lobes. Completed 5 days of empiric IV Solumedrol 1 g qd without notable clinical improvement. Presentation most concerning for dementia   Psychiatry consulted for formal capacity evaluation and concluded that he does not have capacity to make decisions regarding his long term care disposition however does not require PEC at this time as his stay has been non-contested. He has no family, children and have relied on coworkers for assistance. The case was brought to the attention of EPS who have no plans to intervene at this time and recommend since the patient presented to the hospital it is a safe discharge planning issue that would involve [facility] department. The Legal department intend to pursue [place] Guardianship to assist with managing affairs. Patient re-engaged with care for likely penile carcinoma with urology.  A determination was made that appropriate treatment for penile tumor was penectomy - patient proceeded to OR on 10/19 with partial penectomy completed.  Figueredo catheter placed and traumatically self discontinued by patient.  Urology is following.      Interval History:     POD 1 from OR on 10/19 with partial penectomy completed.  Figueredo catheter placed and traumatically self discontinued by patient.  Voiding independently without issue x2 aside from blood tinged urine. Blood on exam but no overt bleeding. Urology is following and will assess if FC is needed to be replaced.  Awaiting recs.       Review of Systems    Constitutional: Negative for activity change, appetite change, chills, diaphoresis, fatigue and fever.   HENT: Negative for congestion, facial swelling, hearing loss, nosebleeds, sinus pressure, sinus pain, sneezing, sore throat, tinnitus, trouble swallowing and voice change.    Eyes: Negative for photophobia, pain, discharge and visual disturbance.   Respiratory: Negative for cough, chest tightness, shortness of breath and wheezing.    Cardiovascular: Negative for chest pain, palpitations and leg swelling.   Gastrointestinal: Negative for abdominal distention, abdominal pain, blood in stool, constipation, diarrhea, nausea and vomiting.   Endocrine: Negative for cold intolerance, heat intolerance and polyuria.   Genitourinary: Negative for decreased urine volume, difficulty urinating, dysuria, flank pain, frequency and urgency.   Musculoskeletal: Negative for arthralgias, gait problem, myalgias, neck pain and neck stiffness.   Skin: Negative for rash and wound.   Neurological: Negative for dizziness, speech difficulty, weakness, light-headedness and headaches.   Hematological: Negative for adenopathy. Does not bruise/bleed easily.   Psychiatric/Behavioral: Negative for agitation, confusion, self-injury, sleep disturbance and suicidal ideas. The patient is not nervous/anxious.      Objective:     Vital Signs (Most Recent):  Temp: 98.3 °F (36.8 °C) (10/20/20 1932)  Pulse: 72 (10/20/20 1932)  Resp: 18 (10/20/20 1932)  BP: 120/63 (10/20/20 1932)  SpO2: 96 % (10/20/20 1932) Vital Signs (24h Range):  Temp:  [97.3 °F (36.3 °C)-98.8 °F (37.1 °C)] 98.3 °F (36.8 °C)  Pulse:  [66-76] 72  Resp:  [16-18] 18  SpO2:  [93 %-97 %] 96 %  BP: (105-120)/(55-63) 120/63     Weight: 72 kg (158 lb 11.7 oz)  Body mass index is 22.78 kg/m².    Intake/Output Summary (Last 24 hours) at 10/20/2020 2234  Last data filed at 10/20/2020 2100  Gross per 24 hour   Intake 620 ml   Output 850 ml   Net -230 ml      Physical Exam  Constitutional:        General: He is not in acute distress.     Appearance: He is well-developed. He is not ill-appearing, toxic-appearing or diaphoretic.   HENT:      Head: Atraumatic. No abrasion, contusion or laceration.      Nose: Nose normal.      Mouth/Throat:      Pharynx: No oropharyngeal exudate.   Eyes:      General: No scleral icterus.        Right eye: No discharge.         Left eye: No discharge.      Conjunctiva/sclera: Conjunctivae normal.      Pupils: Pupils are equal, round, and reactive to light.   Neck:      Musculoskeletal: Normal range of motion and neck supple. No neck rigidity.      Vascular: No JVD.   Cardiovascular:      Rate and Rhythm: Normal rate and regular rhythm.      Heart sounds: Normal heart sounds. No murmur. No friction rub. No gallop.    Pulmonary:      Effort: Pulmonary effort is normal. No accessory muscle usage or respiratory distress.      Breath sounds: Normal breath sounds. No wheezing.   Abdominal:      General: Bowel sounds are normal. There is no distension.      Palpations: Abdomen is soft. There is no mass.      Tenderness: There is no abdominal tenderness. There is no guarding or rebound.   Genitourinary:     Comments: S/p partial penectomy with loose dressing in place; scant blood no overt bleeding   Musculoskeletal: Normal range of motion.         General: No tenderness or deformity.   Lymphadenopathy:      Cervical: No cervical adenopathy.   Skin:     General: Skin is warm and dry.      Capillary Refill: Capillary refill takes less than 2 seconds.      Findings: No bruising, ecchymosis, erythema, petechiae or rash.      Nails: There is no clubbing.     Neurological:      Mental Status: He is alert. Mental status is at baseline.      GCS: GCS eye subscore is 4. GCS verbal subscore is 5. GCS motor subscore is 6.      Cranial Nerves: No cranial nerve deficit.      Sensory: No sensory deficit.      Motor: No abnormal muscle tone.      Coordination: Coordination normal.      Deep Tendon  Reflexes: Reflexes normal.      Comments: Oriented to place and time. Unclear to context with frequent redirection, orientation.  Tangential thinking and occasional disorganized thought content    Psychiatric:         Speech: Speech normal.         Behavior: Behavior normal.         Thought Content: Thought content normal.         Judgment: Judgment normal.         Significant Labs:   BMP:   Recent Labs   Lab 10/20/20  0124         K 4.5      CO2 26   BUN 21   CREATININE 0.7   CALCIUM 8.4*   MG 2.0     CBC:   Recent Labs   Lab 10/19/20  0725 10/20/20  0124   WBC 8.06 14.51*   HGB 15.6 12.8*   HCT 48.3 38.9*    250       Significant Imaging: I have reviewed and interpreted all pertinent imaging results/findings within the past 24 hours.      Assessment/Plan:      * Cognitive impairment  Impaired decision making  Presentation concerning for progressive dementia   -LP performed by IR 10/5/2020: elevated protein, neutrophils  -CSF HSV, ACE negative.   -Serum paraneoplastic panel from 9/28 has resulted negative.  -MRI brain with Possible subtle increased FLAIR hyperintensity involving the bilateral mesial temporal lobes which can be seen in the setting of autoimmune limbic encephalitis  -EEG without seizure activity   -Vitamin B1, Vitamin B6, Vitamin B12, HIV, RPR, TSH unremarkable   - Neurology consulted   --please send CSF to [facility] for Alzheimer panel (ADEVL test code) - repeat LP completed   --start empiric IV Solumedrol 1 g qd x 5 days - completed    --formal cognitive assessment with neuropsych     --consider PET scan - not available inpatient    --continue strict delirium precautions  -Psych assess lack of competency   -Formal guardianship being pursued - court date early November  -Adult friends can assist with decision making   -[place] R[place] [id number]: (9) Upon the inability of any adult to consent for himself and in the absence of any person listed in Paragraphs (2) through (8) of this  Subsection, an adult friend of the patient. For purposes of this Subsection to consent, adult friend means an adult who has exhibited special care and concern for the patient, who is generally familiar with the patient's health care views and desires, and who is willing and able to become involved in the patient's health care decisions and to act in the patient's best interest. The adult friend shall sign and date an acknowledgment form provided by the hospital or other health care facility in which the patient is located for placement in the patient's records certifying that he or she meets such criteria.     - Juice Martinez ([phone]) will make decisions on his behalf  - supervisor Mei Bell ([phone]) have been trying to help him manage things with his work and personal matters as they have observed his deficits and know he has no one to help him - these individuals may be used for consent     DELIRIUM BEHAVIOR MANAGEMENT  - Minimize use of restraints; if physical restraints necessary, please utilize medical/chemical prns for agitation.  - Keep shades open and room lit during day and room dim at night in order to promote healthy circadian rhythms.  - Encourage family at bedside  - Keep whiteboard in patient's room current with the date and name of the members of patient's team for easy patient self re-orientation.  - Avoid benzodiazepines, antihistamines, anticholinergics, hypnotics, and minimize opiates while controlling for pain as these medications may exacerbate delirium.      Penile carcinoma  - follows with urology   - Assessed inpatient - plan for partial / total penectomy   - Partial penectomy 10/19 - pathology pending   - FC placed post op - self discontinued       VTE Risk Mitigation (From admission, onward)         Ordered     enoxaparin injection 40 mg  Every 24 hours      10/14/20 0959     IP VTE HIGH RISK PATIENT  Once      10/02/20 2042                Discharge Planning   ALFRED:  11/6/2020     Code Status: Full Code   Is the patient medically ready for discharge?: No    Reason for patient still in hospital (select all that apply): Patient trending condition, Consult recommendations and Pending disposition  Discharge Plan A: Home   Discharge Delays: (!) Patient and Family Barriers        Total visit time >35 minutes or greater with greater than 50% of time spent in counseling and coordination of care.        Audi Arcos MD  Department of Hospital Medicine   [facility]

## 2020-10-21 NOTE — ASSESSMENT & PLAN NOTE
- follows with urology   - Assessed inpatient - plan for partial / total penectomy   - Partial penectomy 10/19 - pathology pending   - FC placed post op - self discontinued

## 2020-10-21 NOTE — PLAN OF CARE
10/21/20 0826   Post-Acute Status   Post-Acute Authorization Placement   Discharge Delays (!) Patient and Family Barriers

## 2020-10-21 NOTE — PLAN OF CARE
Problem: Adult Inpatient Plan of Care  Goal: Plan of Care Review  Outcome: Ongoing, Progressing  Goal: Patient-Specific Goal (Individualization)  Outcome: Ongoing, Progressing  Goal: Absence of Hospital-Acquired Illness or Injury  Outcome: Ongoing, Progressing  Goal: Optimal Comfort and Wellbeing  Outcome: Ongoing, Progressing  Goal: Readiness for Transition of Care  Outcome: Ongoing, Progressing  Goal: Rounds/Family Conference  Outcome: Ongoing, Progressing   Patient is oriented to self and surroundings. Ambulates around room well without assist. Urinating well, no blood noted. No c/o pain. Wound care provided. Very small amount of drainage noted. Safety measures maintained. Call light within reach.

## 2020-10-21 NOTE — ASSESSMENT & PLAN NOTE
68 yo M with a significant psych history of penile cancer s/p partial penectomy 10/19/2020. He is progressing well.      - Morning labs stable  - OOBTC  - Ambulation in halls.   - Lovenox for DVT PPx  - PO pain control.   - Regular diet.   - Bacitracin ointment to wound site.  - Bactrim PO x2 days for SSI prophylaxis.   - Daily dressing changes per nursing.     Dispo: complex social work situation. Awaiting placement options.

## 2020-10-21 NOTE — SUBJECTIVE & OBJECTIVE
Interval History:     POD 1 from OR on 10/19 with partial penectomy completed.  Figueredo catheter placed and traumatically self discontinued by patient.  Voiding independently without issue x2 aside from blood tinged urine. Blood on exam but no overt bleeding. Urology is following and will assess if FC is needed to be replaced.  Awaiting recs.       Review of Systems   Constitutional: Negative for activity change, appetite change, chills, diaphoresis, fatigue and fever.   HENT: Negative for congestion, facial swelling, hearing loss, nosebleeds, sinus pressure, sinus pain, sneezing, sore throat, tinnitus, trouble swallowing and voice change.    Eyes: Negative for photophobia, pain, discharge and visual disturbance.   Respiratory: Negative for cough, chest tightness, shortness of breath and wheezing.    Cardiovascular: Negative for chest pain, palpitations and leg swelling.   Gastrointestinal: Negative for abdominal distention, abdominal pain, blood in stool, constipation, diarrhea, nausea and vomiting.   Endocrine: Negative for cold intolerance, heat intolerance and polyuria.   Genitourinary: Negative for decreased urine volume, difficulty urinating, dysuria, flank pain, frequency and urgency.   Musculoskeletal: Negative for arthralgias, gait problem, myalgias, neck pain and neck stiffness.   Skin: Negative for rash and wound.   Neurological: Negative for dizziness, speech difficulty, weakness, light-headedness and headaches.   Hematological: Negative for adenopathy. Does not bruise/bleed easily.   Psychiatric/Behavioral: Negative for agitation, confusion, self-injury, sleep disturbance and suicidal ideas. The patient is not nervous/anxious.      Objective:     Vital Signs (Most Recent):  Temp: 98.3 °F (36.8 °C) (10/20/20 1932)  Pulse: 72 (10/20/20 1932)  Resp: 18 (10/20/20 1932)  BP: 120/63 (10/20/20 1932)  SpO2: 96 % (10/20/20 1932) Vital Signs (24h Range):  Temp:  [97.3 °F (36.3 °C)-98.8 °F (37.1 °C)] 98.3 °F (36.8  °C)  Pulse:  [66-76] 72  Resp:  [16-18] 18  SpO2:  [93 %-97 %] 96 %  BP: (105-120)/(55-63) 120/63     Weight: 72 kg (158 lb 11.7 oz)  Body mass index is 22.78 kg/m².    Intake/Output Summary (Last 24 hours) at 10/20/2020 2238  Last data filed at 10/20/2020 2100  Gross per 24 hour   Intake 620 ml   Output 850 ml   Net -230 ml      Physical Exam  Constitutional:       General: He is not in acute distress.     Appearance: He is well-developed. He is not ill-appearing, toxic-appearing or diaphoretic.   HENT:      Head: Atraumatic. No abrasion, contusion or laceration.      Nose: Nose normal.      Mouth/Throat:      Pharynx: No oropharyngeal exudate.   Eyes:      General: No scleral icterus.        Right eye: No discharge.         Left eye: No discharge.      Conjunctiva/sclera: Conjunctivae normal.      Pupils: Pupils are equal, round, and reactive to light.   Neck:      Musculoskeletal: Normal range of motion and neck supple. No neck rigidity.      Vascular: No JVD.   Cardiovascular:      Rate and Rhythm: Normal rate and regular rhythm.      Heart sounds: Normal heart sounds. No murmur. No friction rub. No gallop.    Pulmonary:      Effort: Pulmonary effort is normal. No accessory muscle usage or respiratory distress.      Breath sounds: Normal breath sounds. No wheezing.   Abdominal:      General: Bowel sounds are normal. There is no distension.      Palpations: Abdomen is soft. There is no mass.      Tenderness: There is no abdominal tenderness. There is no guarding or rebound.   Genitourinary:     Comments: S/p partial penectomy with loose dressing in place; scant blood no overt bleeding   Musculoskeletal: Normal range of motion.         General: No tenderness or deformity.   Lymphadenopathy:      Cervical: No cervical adenopathy.   Skin:     General: Skin is warm and dry.      Capillary Refill: Capillary refill takes less than 2 seconds.      Findings: No bruising, ecchymosis, erythema, petechiae or rash.       Nails: There is no clubbing.     Neurological:      Mental Status: He is alert. Mental status is at baseline.      GCS: GCS eye subscore is 4. GCS verbal subscore is 5. GCS motor subscore is 6.      Cranial Nerves: No cranial nerve deficit.      Sensory: No sensory deficit.      Motor: No abnormal muscle tone.      Coordination: Coordination normal.      Deep Tendon Reflexes: Reflexes normal.      Comments: Oriented to place and time. Unclear to context with frequent redirection, orientation.  Tangential thinking and occasional disorganized thought content    Psychiatric:         Speech: Speech normal.         Behavior: Behavior normal.         Thought Content: Thought content normal.         Judgment: Judgment normal.         Significant Labs:   BMP:   Recent Labs   Lab 10/20/20  0124         K 4.5      CO2 26   BUN 21   CREATININE 0.7   CALCIUM 8.4*   MG 2.0     CBC:   Recent Labs   Lab 10/19/20  0725 10/20/20  0124   WBC 8.06 14.51*   HGB 15.6 12.8*   HCT 48.3 38.9*    250       Significant Imaging: I have reviewed and interpreted all pertinent imaging results/findings within the past 24 hours.

## 2020-10-21 NOTE — PLAN OF CARE
Pt pending medical stability, status post penectomy 10/19. Awaiting Ms. Stefan , court appointed overseer.  Email sent for update will cont to follow.    Modesta Blum, MSN  Case Management  Ext [phone]

## 2020-10-21 NOTE — PROGRESS NOTES
[facility]  Urology  Progress Note    Patient Name: Juan Hobson  MRN: [id number]  Admission Date: 10/2/2020  Hospital Length of Stay: 15 days  Code Status: Full Code   Attending Provider: Audi Arcos MD   Primary Care Physician: Mehnaz Barillas MD    Subjective:     HPI:  Juan Hobson is a 69 y.o. male with progressive dementia and a penile tumor. He was admitted to the hospital 10/2 due to failure to thrive and self neglect as witnessed by his supervisor from work. He had presented to urology clinic that afternoon to discuss penile malignancy. Social work was brought to the clinic and due to his declining mental status, it was recommended he be admitted to hospital as it was unsafe for him to return home where he lives alone.      He initially presented to the ED on 9/2/20 due to a draining penile lesion. 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  Urethral involvement was not excluded. Suspected invasion of the right dorsolateral corpus spongiosum. Based on this imaging, he is stage [id number]. However, we do not have a biopsy specimen.     During this admission, he has undergone a work up by neurology for progressive dementia. Currently there is a send out Alzheimer's panel in process. He is currently in the process of being assigned a legal guardian.     Interval History:   Patient had a good night's rest. He is alert and oriented to his situation. POD2 from partial penectomy.   Tolerating diet w/o nausea and vomiting.   He has walked. Fiugeredo accidentally removed last night. Voiding well. Pain controlled.       Objective:     Temp:  [97.3 °F (36.3 °C)-98.8 °F (37.1 °C)] 98.2 °F (36.8 °C)  Pulse:  [64-76] 64  Resp:  [16-18] 18  SpO2:  [94 %-96 %] 96 %  BP: (101-120)/(55-63) 101/59     Body mass index is 22.78  kg/m².           Drains     Drain                 Urethral Catheter 10/19/20 1652 16 Fr. less than 1 day                Physical Exam  HENT:      Head: Atraumatic.   Neck:      Musculoskeletal: Neck supple.   Cardiovascular:      Rate and Rhythm: Normal rate.   Pulmonary:      Effort: Pulmonary effort is normal. No respiratory distress.   Abdominal:      Palpations: Abdomen is soft.   Genitourinary:     Comments: Penile incision c/d/i with overlying dressing intact and dry.     Musculoskeletal: Normal range of motion.   Skin:     General: Skin is warm and dry.   Neurological:      Mental Status: He is alert and oriented to person, place, and time.         Significant Labs:    BMP:  Recent Labs   Lab 10/19/20  0724 10/20/20  0124 10/21/20  0352    137 137   K 4.2 4.5 3.8    104 105   CO2 28 26 25   BUN 22 21 18   CREATININE 0.9 0.7 0.9   CALCIUM 9.3 8.4* 8.0*       CBC:   Recent Labs   Lab 10/19/20  0725 10/20/20  0124 10/21/20  0352   WBC 8.06 14.51* 7.07   HGB 15.6 12.8* 11.5*   HCT 48.3 38.9* 36.0*    250 207       All pertinent labs results from the past 24 hours have been reviewed.    Significant Imaging:  All pertinent imaging results/findings from the past 24 hours have been reviewed.                  Assessment/Plan:     Penile carcinoma  70 yo M with a significant psych history of penile cancer s/p partial penectomy 10/19/2020. He is progressing well.      - Morning labs stable  - OOBTC  - Ambulation in halls.   - Lovenox for DVT PPx  - PO pain control.   - Regular diet.   - Bacitracin ointment to wound site.  - Bactrim PO x2 days for SSI prophylaxis.   - Daily dressing changes per nursing.     Dispo: complex social work situation. Awaiting placement options.           VTE Risk Mitigation (From admission, onward)         Ordered     enoxaparin injection 40 mg  Every 24 hours      10/14/20 0959     IP VTE HIGH RISK PATIENT  Once      10/02/20 2042                Isela Alexander,  MD  Urology  [facility]

## 2020-10-21 NOTE — SUBJECTIVE & OBJECTIVE
Interval History:   Patient had a good night's rest. He is alert and oriented to his situation. POD2 from partial penectomy.   Tolerating diet w/o nausea and vomiting.   He has walked. Figueredo accidentally removed last night. Voiding well. Pain controlled.       Objective:     Temp:  [97.3 °F (36.3 °C)-98.8 °F (37.1 °C)] 98.2 °F (36.8 °C)  Pulse:  [64-76] 64  Resp:  [16-18] 18  SpO2:  [94 %-96 %] 96 %  BP: (101-120)/(55-63) 101/59     Body mass index is 22.78 kg/m².           Drains     Drain                 Urethral Catheter 10/19/20 1652 16 Fr. less than 1 day                Physical Exam  HENT:      Head: Atraumatic.   Neck:      Musculoskeletal: Neck supple.   Cardiovascular:      Rate and Rhythm: Normal rate.   Pulmonary:      Effort: Pulmonary effort is normal. No respiratory distress.   Abdominal:      Palpations: Abdomen is soft.   Genitourinary:     Comments: Penile incision c/d/i with overlying dressing intact and dry.     Musculoskeletal: Normal range of motion.   Skin:     General: Skin is warm and dry.   Neurological:      Mental Status: He is alert and oriented to person, place, and time.         Significant Labs:    BMP:  Recent Labs   Lab 10/19/20  0724 10/20/20  0124 10/21/20  0352    137 137   K 4.2 4.5 3.8    104 105   CO2 28 26 25   BUN 22 21 18   CREATININE 0.9 0.7 0.9   CALCIUM 9.3 8.4* 8.0*       CBC:   Recent Labs   Lab 10/19/20  0725 10/20/20  0124 10/21/20  0352   WBC 8.06 14.51* 7.07   HGB 15.6 12.8* 11.5*   HCT 48.3 38.9* 36.0*    250 207       All pertinent labs results from the past 24 hours have been reviewed.    Significant Imaging:  All pertinent imaging results/findings from the past 24 hours have been reviewed.

## 2020-10-21 NOTE — PLAN OF CARE
AAOx4. No falls today. Pt got up to go to the bathroom. No complaints of pain. VS stable. Wound care given. Will continue to monitor.

## 2020-10-22 LAB
ALBUMIN SERPL BCP-MCNC: 2.9 G/DL (ref 3.5–5.2)
ALP SERPL-CCNC: 50 U/L (ref 55–135)
ALT SERPL W/O P-5'-P-CCNC: 45 U/L (ref 10–44)
ANION GAP SERPL CALC-SCNC: 6 MMOL/L (ref 8–16)
AST SERPL-CCNC: 21 U/L (ref 10–40)
BASOPHILS # BLD AUTO: 0.02 K/UL (ref 0–0.2)
BASOPHILS NFR BLD: 0.3 % (ref 0–1.9)
BILIRUB SERPL-MCNC: 0.4 MG/DL (ref 0.1–1)
BUN SERPL-MCNC: 17 MG/DL (ref 8–23)
CALCIUM SERPL-MCNC: 8.5 MG/DL (ref 8.7–10.5)
CHLORIDE SERPL-SCNC: 105 MMOL/L (ref 95–110)
CO2 SERPL-SCNC: 26 MMOL/L (ref 23–29)
CREAT SERPL-MCNC: 0.7 MG/DL (ref 0.5–1.4)
DIFFERENTIAL METHOD: ABNORMAL
EOSINOPHIL # BLD AUTO: 0.1 K/UL (ref 0–0.5)
EOSINOPHIL NFR BLD: 1.5 % (ref 0–8)
ERYTHROCYTE [DISTWIDTH] IN BLOOD BY AUTOMATED COUNT: 13.3 % (ref 11.5–14.5)
EST. GFR  (AFRICAN AMERICAN): >60 ML/MIN/1.73 M^2
EST. GFR  (NON AFRICAN AMERICAN): >60 ML/MIN/1.73 M^2
GLUCOSE SERPL-MCNC: 83 MG/DL (ref 70–110)
HCT VFR BLD AUTO: 35.4 % (ref 40–54)
HGB BLD-MCNC: 11.3 G/DL (ref 14–18)
IMM GRANULOCYTES # BLD AUTO: 0.04 K/UL (ref 0–0.04)
IMM GRANULOCYTES NFR BLD AUTO: 0.7 % (ref 0–0.5)
LYMPHOCYTES # BLD AUTO: 1.2 K/UL (ref 1–4.8)
LYMPHOCYTES NFR BLD: 19.8 % (ref 18–48)
MAGNESIUM SERPL-MCNC: 2 MG/DL (ref 1.6–2.6)
MCH RBC QN AUTO: 31.1 PG (ref 27–31)
MCHC RBC AUTO-ENTMCNC: 31.9 G/DL (ref 32–36)
MCV RBC AUTO: 98 FL (ref 82–98)
MONOCYTES # BLD AUTO: 0.7 K/UL (ref 0.3–1)
MONOCYTES NFR BLD: 11.5 % (ref 4–15)
NEUTROPHILS # BLD AUTO: 3.8 K/UL (ref 1.8–7.7)
NEUTROPHILS NFR BLD: 66.2 % (ref 38–73)
NRBC BLD-RTO: 0 /100 WBC
PHOSPHATE SERPL-MCNC: 2.7 MG/DL (ref 2.7–4.5)
PLATELET # BLD AUTO: 215 K/UL (ref 150–350)
PMV BLD AUTO: 10.1 FL (ref 9.2–12.9)
POTASSIUM SERPL-SCNC: 4 MMOL/L (ref 3.5–5.1)
PROT SERPL-MCNC: 5.6 G/DL (ref 6–8.4)
RBC # BLD AUTO: 3.63 M/UL (ref 4.6–6.2)
SODIUM SERPL-SCNC: 137 MMOL/L (ref 136–145)
WBC # BLD AUTO: 5.81 K/UL (ref 3.9–12.7)

## 2020-10-22 PROCEDURE — 97165 OT EVAL LOW COMPLEX 30 MIN: CPT

## 2020-10-22 PROCEDURE — 11000001 HC ACUTE MED/SURG PRIVATE ROOM

## 2020-10-22 PROCEDURE — 25000003 PHARM REV CODE 250: Performed by: STUDENT IN AN ORGANIZED HEALTH CARE EDUCATION/TRAINING PROGRAM

## 2020-10-22 PROCEDURE — 99233 PR SUBSEQUENT HOSPITAL CARE,LEVL III: ICD-10-PCS | Mod: ,,, | Performed by: HOSPITALIST

## 2020-10-22 PROCEDURE — 36415 COLL VENOUS BLD VENIPUNCTURE: CPT

## 2020-10-22 PROCEDURE — 63600175 PHARM REV CODE 636 W HCPCS: Performed by: HOSPITALIST

## 2020-10-22 PROCEDURE — 99233 SBSQ HOSP IP/OBS HIGH 50: CPT | Mod: ,,, | Performed by: HOSPITALIST

## 2020-10-22 PROCEDURE — 97161 PT EVAL LOW COMPLEX 20 MIN: CPT

## 2020-10-22 PROCEDURE — 83735 ASSAY OF MAGNESIUM: CPT

## 2020-10-22 PROCEDURE — 80053 COMPREHEN METABOLIC PANEL: CPT

## 2020-10-22 PROCEDURE — 97803 MED NUTRITION INDIV SUBSEQ: CPT

## 2020-10-22 PROCEDURE — 84100 ASSAY OF PHOSPHORUS: CPT

## 2020-10-22 PROCEDURE — 85025 COMPLETE CBC W/AUTO DIFF WBC: CPT

## 2020-10-22 RX ADMIN — SULFAMETHOXAZOLE AND TRIMETHOPRIM 1 TABLET: 800; 160 TABLET ORAL at 08:10

## 2020-10-22 RX ADMIN — ENOXAPARIN SODIUM 40 MG: 40 INJECTION SUBCUTANEOUS at 05:10

## 2020-10-22 RX ADMIN — PANTOPRAZOLE SODIUM 40 MG: 40 TABLET, DELAYED RELEASE ORAL at 08:10

## 2020-10-22 RX ADMIN — BACITRACIN: 500 OINTMENT TOPICAL at 08:10

## 2020-10-22 NOTE — PLAN OF CARE
[facility]     Department of Hospital Medicine     [address] [place] [address]     [phone] after hours  [phone] fax       NURSING HOME ORDERS    10/21/2020    Admit to Nursing Home:  Skilled Bed      Diagnoses:  Active Hospital Problems    Diagnosis  POA    *Cognitive impairment [R41.89]  Yes     Priority: 2     Penile carcinoma [C60.9]  Yes     Priority: 1 - High    Leukocytosis [D72.829]  No    Hypocalcemia [E83.51]  Yes    Acute encephalopathy [G93.40]  Yes    Cognitive decline [R41.89]  Yes    Confusion [R41.0]  Unknown    Debility [R53.81]  Yes    Altered mental status [R41.82]  Yes      Resolved Hospital Problems    Diagnosis Date Resolved POA    Encephalopathy [G93.40] 10/04/2020 Unknown       Patient is homebound due to:  Cognitive impairment    Allergies:Review of patient's allergies indicates:  No Known Allergies    Vitals:  Every shift (Skilled Nursing patients)    Diet: Regular Supplement:  1 can every three times a day w meals - Boost Plus         Acitivities:     - May ambulate independently      LABS:  Per facility protocol - CBC, BMP daily for 5 days    Nursing Precautions:     - Fall precautions per nursing home protocol   - Decubitus precautions:        -  for positioning   - Pressure reducing foam mattress   - Turn patient every two hours. Use wedge pillows to anchor patient    CONSULTS:         Nutrition to evaluate and recommend diet     Psychiatry to evaluate and follow patients for delirium    MISCELLANEOUS CARE:              Wound Care - apply bacitracin ointment to penis and apply 4x4 until surgical wound improved     Medications: Discontinue all previous medication orders, if any. See new list below.     Juan Hobson   Home Medication Instructions SUDHA:[id number]    Printed on:10/21/20 7739   Medication Information                      acetaminophen (TYLENOL) 325 MG tablet  Take 2 tablets (650 mg total)  by mouth every 4 (four) hours as needed.             bacitracin 500 unit/gram ointment  Apply topically once daily.             melatonin (MELATIN) 3 mg tablet  Take 2 tablets (6 mg total) by mouth nightly as needed for Insomnia.             senna-docusate 8.6-50 mg (PERICOLACE) 8.6-50 mg per tablet  Take 1 tablet by mouth 2 (two) times daily as needed for Constipation.                       _________________________________  Audi Arcos MD  10/21/2020

## 2020-10-22 NOTE — PT/OT/SLP EVAL
Occupational Therapy   Evaluation and Discharge Note    Name: Juan Hobson  MRN: [id number]  Admitting Diagnosis:  Cognitive impairment 3 Days Post-Op    Recommendations:     Discharge Recommendations: home, nursing facility, basic(Home vs NH basic)  Discharge Equipment Recommendations:  none  Barriers to discharge:  None    Assessment:     Juan Hobson is a 69 y.o. male with a medical diagnosis of Cognitive impairment. At this time, patient is functioning at their prior level of function and does not require further acute OT services. Pt D/C recs are Home vs NH basic pending family and medical team decision. Pt is is functionally indep for ADLs and mobility, requiring no assist. Pt does appear confused and have an overall lack of insight into declining mental status. These deficits do make the pt a safety risk. Pt requires increased supv for safety, but does not require physical assistance at this time.      Plan:     During this hospitalization, patient does not require further acute OT services.  Please re-consult if situation changes.    · Plan of Care Reviewed with: patient    Subjective     Chief Complaint: c/o declining mental status.   Patient/Family Comments/goals: Return home    Occupational Profile:  Living Environment: Pt lives alone in a [facility].   Previous level of function: indep  Roles and Routines: N/A  Equipment Used at home:  none  Assistance upon Discharge: Pt has assistance upon D/C, but pt is a questionable historian.     Pain/Comfort:  · Pain Rating 1: 0/10  · Pain Rating Post-Intervention 1: 0/10    Patients cultural, spiritual, Adventism conflicts given the current situation:      Objective:     Communicated with: RN prior to session.  Patient found HOB elevated with   upon OT entry to room.    General Precautions: Standard, fall   Orthopedic Precautions:N/A   Braces: N/A     Occupational Performance:    Bed Mobility:    · Patient completed Scooting/Bridging with independence  · Patient  completed Supine to Sit with independence  · Patient completed Sit to Supine with independence    Functional Mobility/Transfers:  · Patient completed Sit <> Stand Transfer with independence  with  no assistive device   · Functional Mobility: Pt ambulated >200 ft indep.     Activities of Daily Living:  · Lower Body Dressing: independence adjusted pants    Cognitive/Visual Perceptual:  Cognitive/Psychosocial Skills:     -       Oriented to: Person and Time   -       Follows Commands/attention:Follows multistep  commands  -       Communication: clear/fluent  -       Memory: appears confused  -       Safety awareness/insight to disability: impaired   -       Mood/Affect/Coping skills/emotional control: Appropriate to situation  Visual/Perceptual:      -Intact      Physical Exam:  Balance:    -       indep  Postural examination/scapula alignment:    -       Rounded shoulders  Skin integrity: Visible skin intact  Upper Extremity Range of Motion:     -       Right Upper Extremity: WFL  -       Left Upper Extremity: WFL  Upper Extremity Strength:    -       Right Upper Extremity: WFL  -       Left Upper Extremity: WFL   Strength:    -       Right Upper Extremity: WFL  -       Left Upper Extremity: WFL  Fine Motor Coordination:    -       Intact  Gross motor coordination:   WFL    AMPAC 6 Click ADL:  AMPAC Total Score: 24    Treatment & Education:  Pt educated on POC.   Education:    Patient left HOB elevated with all lines intact and call button in reach    GOALS:   Multidisciplinary Problems     Occupational Therapy Goals     Not on file          Multidisciplinary Problems (Resolved)        Problem: Occupational Therapy Goal    Goal Priority Disciplines Outcome Interventions   Occupational Therapy Goal   (Resolved)     OT, PT/OT Met    Description: Pt is not currently displaying a need for acute OT services. D/C acute OT services and recommend pt D/C home.                   History:     Past Medical History:    Diagnosis Date    Basal cell carcinoma     right helix    Hematuria     Seborrheic dermatitis     Skin cancer of arm        Past Surgical History:   Procedure Laterality Date    AMPUTATION OF PENIS N/A 10/19/2020    Procedure: PENECTOMY  Surgeon would like to follow his cases in OR 24.;  Surgeon: Chance Higgins MD;  Location: [facility] OR [address] [place] [address];  Service: Urology;  Laterality: N/A;    INCISION AND DRAINAGE INTRA ORAL ABSCESS         Time Tracking:     OT Date of Treatment: 10/22/20  OT Start Time: 1113  OT Stop Time: 1125  OT Total Time (min): 12 min    Billable Minutes:Evaluation 12 minutes    Ramirez Rowell, OT  10/22/2020

## 2020-10-22 NOTE — PROGRESS NOTES
[facility] Hospital Medicine  Progress Note    Patient Name: Juan Hobson  MRN: [id number]  Patient Class: IP- Inpatient   Admission Date: 10/2/2020  Length of Stay: 15 days  Attending Physician: Audi Arcos MD  Primary Care Provider: Mehnaz Barillas MD    [facility] Medicine Team: [facility] HOSP MED O Audi Arcos MD    Subjective:     Principal Problem:Cognitive impairment        HPI:  Per Adriana Badillo MD:   Mr. Juan Hobson is a 69 y.o. male with recently diagnosed penile cancer, who presents to the ER from Urology clinic with concern for self neglect, and need for Neurology evaluation of dementia and possible placement.  The patient mentions that he has been having some issues with his memory.  He mostly orders takeout for meals because it is easier, and still drives to work.  He feels like he is able to take care of himself, and does not need to go to a facility.      Per note from Lianne German LCSW on 10/2:  Received consult from Dr. Monterroso re: assessing patient in clinic this afternoon and care recommendations as patient did not seem competent to make decisions and give consent. He has no family and no legal next of kin (POA or guardian).   Met individually with patient, and with his co-worker Janna Donavan ([phone], ext. [phone]) who brought him to his clinic appointment today. Patient unable to relate basic demographic information or clearly explain other information. For example, when asked his birth date he gave the year [birth year], then said 9, and then said the second to last, it begins with N; he said his street name but couldn't give the house number where he has lived for years; unable to say what type of work or where he worked prior to the [employer] in [facility] for the past 12 years; he described being taken in a truck to that other place (referring to the recent [facility] stay). He has awareness that he is having memory/cognitive issues but  stated he can manage for himself at home and that he knows there is a lot of work that needs to be done in his home that he hasn't gotten to. He inherited the home after his mother .      Mr. Go and another coworker Juice Martinez ([phone]) and their supervisor Mei Bell ([phone]) have been trying to help him manage things with his work and personal matters as they have observed his deficits and know he has no one to help him. They have helped him keep up with recent medical appointments and accompanied and transported him.  Mr. Go reports concern for his safety and ability to care for himself after seeing his home with clutter throughout and things piled up, mold and old food in the refrigerator, mold in the toilet, clothes all over, etc. Patient has been driving himself to and from work and to get food at places near his home, but unable to navigate to unfamiliar places. They have observed his cognitive deficits at work and he has made errors, so they have modified his job and his supervisor has been working with him to try to get FMLA and his leave/nursing home in place so he doesn't lose his benefits. They have had to help him with paperwork, bill paying, etc., because he can't complete these on his own.         Upon arrival to the ER, vitals were temp 97.8F, HR 59 and /84.  Labs were unremarkable  He was admitted to Hospital Medicine for Neurology and SW evaluation.    Overview/Hospital Course:  Admitted to hospital medicine for worsening cognitive decline and inability to care for self in setting of concern for penile cancer.  In pursuit of workup for altered mental status - labs/infectious work-up grossly unremarkable. Neurology consulted and MRI obtained on admission with concern for encephalitis (increased FLAIR hyperintensity involving the bilateral mesial temporal lobes which can be seen in the setting of autoimmune limbic encephalitis in the appropriate clinical setting). LP  recommended and attempted at bedside however not successful 2/2 to D. Interventional radiology consulted, LP performed 10/5 with CSF (1 WBC, 190 RBC, 71 protein, 52 glucose). CSF HSV, ACE negative. EEG 10/3 (1 hr 40 min) with mild regional dysfunction in bilateral temporal lobes, but no electrographic seizures. Serum paraneoplastic panel from 9/28 has resulted negative. MRI brain W WO contrast showing increased FLAIR hyperintensity involving the bilateral mesial temporal lobes. Completed 5 days of empiric IV Solumedrol 1 g qd without notable clinical improvement. Presentation most concerning for dementia   Psychiatry consulted for formal capacity evaluation and concluded that he does not have capacity to make decisions regarding his long term care disposition however does not require PEC at this time as his stay has been non-contested. He has no family, children and have relied on coworkers for assistance. The case was brought to the attention of EPS who have no plans to intervene at this time and recommend since the patient presented to the hospital it is a safe discharge planning issue that would involve [facility] department. The Legal department intend to pursue [place] Guardianship to assist with managing affairs. Patient re-engaged with care for likely penile carcinoma with urology.  A determination was made that appropriate treatment for penile tumor was penectomy - patient proceeded to OR on 10/19 with partial penectomy completed.  Figueredo catheter placed and traumatically self discontinued by patient.  Urology is following.      Interval History:     POD 2 from OR on 10/19 with partial penectomy completed. Voiding independently without issue. Scant blood in surgical site on exam but no overt bleeding. Urology is following, conts Bactrim SSI ppx and bacitracin ointment.  SNF orders for CM placement submitted.     Review of Systems   Constitutional: Negative for activity change, appetite change, chills,  diaphoresis, fatigue and fever.   HENT: Negative for congestion, facial swelling, hearing loss, nosebleeds, sinus pressure, sinus pain, sneezing, sore throat, tinnitus, trouble swallowing and voice change.    Eyes: Negative for photophobia, pain, discharge and visual disturbance.   Respiratory: Negative for cough, chest tightness, shortness of breath and wheezing.    Cardiovascular: Negative for chest pain, palpitations and leg swelling.   Gastrointestinal: Negative for abdominal distention, abdominal pain, blood in stool, constipation, diarrhea, nausea and vomiting.   Endocrine: Negative for cold intolerance, heat intolerance and polyuria.   Genitourinary: Negative for decreased urine volume, difficulty urinating, dysuria, flank pain, frequency and urgency.   Musculoskeletal: Negative for arthralgias, gait problem, myalgias, neck pain and neck stiffness.   Skin: Negative for rash and wound.   Neurological: Negative for dizziness, speech difficulty, weakness, light-headedness and headaches.   Hematological: Negative for adenopathy. Does not bruise/bleed easily.   Psychiatric/Behavioral: Negative for agitation, confusion, self-injury, sleep disturbance and suicidal ideas. The patient is not nervous/anxious.      Objective:     Vital Signs (Most Recent):  Temp: 98.5 °F (36.9 °C) (10/21/20 2030)  Pulse: 72 (10/21/20 2030)  Resp: 16 (10/21/20 2030)  BP: (!) 109/58 (10/21/20 2030)  SpO2: 95 % (10/21/20 2030) Vital Signs (24h Range):  Temp:  [97 °F (36.1 °C)-98.5 °F (36.9 °C)] 98.5 °F (36.9 °C)  Pulse:  [64-72] 72  Resp:  [16-19] 16  SpO2:  [94 %-97 %] 95 %  BP: (101-125)/(55-67) 109/58     Weight: 72 kg (158 lb 11.7 oz)  Body mass index is 22.78 kg/m².    Intake/Output Summary (Last 24 hours) at 10/21/2020 9214  Last data filed at 10/21/2020 1557  Gross per 24 hour   Intake 930 ml   Output 1100 ml   Net -170 ml      Physical Exam  Constitutional:       General: He is not in acute distress.     Appearance: He is  well-developed. He is not ill-appearing, toxic-appearing or diaphoretic.   HENT:      Head: Atraumatic. No abrasion, contusion or laceration.      Nose: Nose normal.      Mouth/Throat:      Pharynx: No oropharyngeal exudate.   Eyes:      General: No scleral icterus.        Right eye: No discharge.         Left eye: No discharge.      Conjunctiva/sclera: Conjunctivae normal.      Pupils: Pupils are equal, round, and reactive to light.   Neck:      Musculoskeletal: Normal range of motion and neck supple. No neck rigidity.      Vascular: No JVD.   Cardiovascular:      Rate and Rhythm: Normal rate and regular rhythm.      Heart sounds: Normal heart sounds. No murmur. No friction rub. No gallop.    Pulmonary:      Effort: Pulmonary effort is normal. No accessory muscle usage or respiratory distress.      Breath sounds: Normal breath sounds. No wheezing.   Abdominal:      General: Bowel sounds are normal. There is no distension.      Palpations: Abdomen is soft. There is no mass.      Tenderness: There is no abdominal tenderness. There is no guarding or rebound.   Genitourinary:     Comments: S/p partial penectomy with loose dressing in place; scant blood no overt bleeding   Musculoskeletal: Normal range of motion.         General: No tenderness or deformity.   Lymphadenopathy:      Cervical: No cervical adenopathy.   Skin:     General: Skin is warm and dry.      Capillary Refill: Capillary refill takes less than 2 seconds.      Findings: No bruising, ecchymosis, erythema, petechiae or rash.      Nails: There is no clubbing.     Neurological:      Mental Status: He is alert. Mental status is at baseline.      GCS: GCS eye subscore is 4. GCS verbal subscore is 5. GCS motor subscore is 6.      Cranial Nerves: No cranial nerve deficit.      Sensory: No sensory deficit.      Motor: No abnormal muscle tone.      Coordination: Coordination normal.      Deep Tendon Reflexes: Reflexes normal.      Comments: Oriented to place and  time. Unclear to context with frequent redirection, orientation.  Tangential thinking and occasional disorganized thought content    Psychiatric:         Speech: Speech normal.         Behavior: Behavior normal.         Thought Content: Thought content normal.         Judgment: Judgment normal.         Significant Labs:   BMP:   Recent Labs   Lab 10/21/20  0352         K 3.8      CO2 25   BUN 18   CREATININE 0.9   CALCIUM 8.0*   MG 2.1     CBC:   Recent Labs   Lab 10/20/20  0124 10/21/20  0352   WBC 14.51* 7.07   HGB 12.8* 11.5*   HCT 38.9* 36.0*    207       Significant Imaging: I have reviewed and interpreted all pertinent imaging results/findings within the past 24 hours.      Assessment/Plan:      * Cognitive impairment  Impaired decision making  Presentation concerning for progressive dementia   -LP performed by IR 10/5/2020: elevated protein, neutrophils  -CSF HSV, ACE negative.   -Serum paraneoplastic panel from 9/28 has resulted negative.  -MRI brain with Possible subtle increased FLAIR hyperintensity involving the bilateral mesial temporal lobes which can be seen in the setting of autoimmune limbic encephalitis  -EEG without seizure activity   -Vitamin B1, Vitamin B6, Vitamin B12, HIV, RPR, TSH unremarkable   - Neurology consulted   --please send CSF to [facility] for Alzheimer panel (ADEVL test code) - repeat LP completed   --start empiric IV Solumedrol 1 g qd x 5 days - completed    --formal cognitive assessment with neuropsych     --consider PET scan - not available inpatient    --continue strict delirium precautions  -Psych assess lack of competency   -Formal guardianship being pursued - court date early November  -Adult friends can assist with decision making   -[place] R[place] 40:[id number]: (9) Upon the inability of any adult to consent for himself and in the absence of any person listed in Paragraphs (2) through (8) of this Subsection, an adult friend of the patient. For purposes of this  Subsection to consent, adult friend means an adult who has exhibited special care and concern for the patient, who is generally familiar with the patient's health care views and desires, and who is willing and able to become involved in the patient's health care decisions and to act in the patient's best interest. The adult friend shall sign and date an acknowledgment form provided by the hospital or other health care facility in which the patient is located for placement in the patient's records certifying that he or she meets such criteria.     - Omarjony Michelle ([phone]) will make decisions on his behalf  - supervisor Mei Bell ([phone]) have been trying to help him manage things with his work and personal matters as they have observed his deficits and know he has no one to help him - these individuals may be used for consent     DELIRIUM BEHAVIOR MANAGEMENT  - Minimize use of restraints; if physical restraints necessary, please utilize medical/chemical prns for agitation.  - Keep shades open and room lit during day and room dim at night in order to promote healthy circadian rhythms.  - Encourage family at bedside  - Keep whiteboard in patient's room current with the date and name of the members of patient's team for easy patient self re-orientation.  - Avoid benzodiazepines, antihistamines, anticholinergics, hypnotics, and minimize opiates while controlling for pain as these medications may exacerbate delirium.      Penile carcinoma  - follows with urology   - Assessed inpatient - plan for partial / total penectomy   - Partial penectomy 10/19 - pathology pending   - FC placed post op - self discontinued       VTE Risk Mitigation (From admission, onward)         Ordered     enoxaparin injection 40 mg  Every 24 hours      10/14/20 0959     IP VTE HIGH RISK PATIENT  Once      10/02/20 2042                Discharge Planning   ALFRED: 11/6/2020     Code Status: Full Code   Is the patient medically ready  for discharge?: No    Reason for patient still in hospital (select all that apply): Patient trending condition, Consult recommendations and Pending disposition  Discharge Plan A: Home   Discharge Delays: (!) Patient and Family Barriers      Total visit time was 25 minutes or greater with greater than 50% of time spent in counseling and coordination of care.           Audi Arcos MD  Department of Hospital Medicine   [facility]

## 2020-10-22 NOTE — PLAN OF CARE
Problem: Adult Inpatient Plan of Care  Goal: Plan of Care Review  Outcome: Ongoing, Progressing  Goal: Patient-Specific Goal (Individualization)  Outcome: Ongoing, Progressing  Goal: Absence of Hospital-Acquired Illness or Injury  Outcome: Ongoing, Progressing  Goal: Optimal Comfort and Wellbeing  Outcome: Ongoing, Progressing  Goal: Readiness for Transition of Care  Outcome: Ongoing, Progressing  Goal: Rounds/Family Conference  Outcome: Ongoing, Progressing     Problem: Thought Process Alteration  Goal: Optimal Thought Clarity  Outcome: Ongoing, Progressing  No significant health changes to report during shift. Pt remain afebrile, VS within normal range.  Wound care to penis completed per shift orders.  Patient is very pleasant. Pt is alert but has moments of  confusion.  No falls or injuries to report during shift.

## 2020-10-22 NOTE — PLAN OF CARE
Problem: Adult Inpatient Plan of Care  Goal: Plan of Care Review  Outcome: Ongoing, Progressing  Goal: Patient-Specific Goal (Individualization)  Outcome: Ongoing, Progressing  Goal: Absence of Hospital-Acquired Illness or Injury  Outcome: Ongoing, Progressing  Goal: Optimal Comfort and Wellbeing  Outcome: Ongoing, Progressing  Goal: Readiness for Transition of Care  Outcome: Ongoing, Progressing  Goal: Rounds/Family Conference  Outcome: Ongoing, Progressing     Problem: Fall Injury Risk  Goal: Absence of Fall and Fall-Related Injury  Outcome: Ongoing, Progressing     Problem: Thought Process Alteration  Goal: Optimal Thought Clarity  Outcome: Ongoing, Progressing     Problem: Infection  Goal: Infection Symptom Resolution  Outcome: Ongoing, Progressing

## 2020-10-22 NOTE — PLAN OF CARE
Problem: Occupational Therapy Goal  Goal: Occupational Therapy Goal  Description: Pt is not currently displaying a need for acute OT services. D/C acute OT services and recommend pt D/C home.  Outcome: Met Ramirez Rowell OTR/L  10/22/2020

## 2020-10-22 NOTE — PLAN OF CARE
10/22/20 0839   Post-Acute Status   Post-Acute Authorization Placement   Discharge Delays (!) Patient and Family Barriers

## 2020-10-22 NOTE — PLAN OF CARE
Juan Hobson is a 69 y.o. male admitted to [facility] on 10/2/2020 for Cognitive impairment. Juan Hobson tolerated evaluation well today. Upon entering room, patient supine in bed; agreeable to treatment. Patient is poor historian. Patient reported he lives alone in a SSH with no steps to enter. Patient was oriented only to person and time. Reports he is able to take care of himself and still drives. Patient completed all transfers independently. Patient ambulated 250 feet in the hallway (wearing a mask) independently with no AD. Able to follow all commands. Patient is ready to d/c from a mobility standpoint as he is completely independent; home vs. Basic nursing home placement pending medical team decision. Discussed PT role, continued mobility and recommendations (Home vs basic nursing home) with patient; verbalized understanding. At this time, Juan Hobson has no acute PT needs, will now d/c from acute PT services.      Problem: Physical Therapy Goal  Goal: Physical Therapy Goal  Description: No goals  Outcome: Met     LUANN Gilman  10/22/2020

## 2020-10-22 NOTE — PROGRESS NOTES
"[facility]  Adult Nutrition  Progress Note    SUMMARY       Recommendations    1. Continue with a regular diet with Boost Plus   2. RD to continue to follow    Goals: continue to consume >75% of meals by RD follow-up  Nutrition Goal Status: goal met  Communication of RD Recs: other (comment)    Reason for Assessment    Reason For Assessment: RD follow-up  Diagnosis: (Cognitive impairment)  Relevant Medical History: Penile carcinoma, dementia   Interdisciplinary Rounds: did not attend  General Information Comments: Pt was lying in the bed. Pt states that he is eating 100% of his meals and drinking both his Boost Plus supplements. Pt reports no n/v/d/c. Pt's wt remains stable. NFPE is not warranted at this time.  Nutrition Discharge Planning: regular diet    Nutrition Risk Screen    Nutrition Risk Screen: no indicators present    Nutrition/Diet History    Spiritual, Cultural Beliefs, Ep[employer]opalian Practices, Values that Affect Care: no  Factors Affecting Nutritional Intake: None identified at this time    Anthropometrics    Temp: 98.2 °F (36.8 °C)  Height Method: Stated  Height: 5' 10" (177.8 cm)  Height (inches): 70 in  Weight Method: Bed Scale  Weight: 72 kg (158 lb 11.7 oz)  Weight (lb): 158.73 lb  Ideal Body Weight (IBW), Male: 166 lb  BMI (Calculated): 22.8  BMI Grade: (P) 18.5-24.9 - normal       Lab/Procedures/Meds    Pertinent Labs Reviewed: reviewed  Pertinent Labs Comments: Alb2.9, RBC 3.63  Pertinent Medications Reviewed: reviewed  Pertinent Medications Comments: noted    Physical Findings/Assessment         Estimated/Assessed Needs    Weight Used For Calorie Calculations: 72 kg (158 lb 11.7 oz)  Energy Calorie Requirements (kcal): 1544-3376 kcal/day  Energy Need Method: Kcal/kg(25-30)  Protein Requirements: 72-87 gm/day(1.0-1.2 gm/kg)  Weight Used For Protein Calculations: 72 kg (158 lb 11.7 oz)  Fluid Requirements (mL): 1 mL/kcal or per MD  Estimated Fluid Requirement Method: RDA " Method  RDA Method (mL): 1800         Nutrition Prescription Ordered    Current Diet Order: Regular  Oral Nutrition Supplement: Boost Plus BID    Evaluation of Received Nutrient/Fluid Intake    I/O: 1.8 L since admit  Comments: LBCANDICE 10/17  Tolerance: tolerating  % Intake of Estimated Energy Needs: 75 - 100 %  % Meal Intake: 75 - 100 %    Nutrition Risk    Level of Risk/Frequency of Follow-up: low(1xweek)     Assessment and Plan    Nutrition Problem  Increased Nutrient Needs      Related to (etiology):   Physiological demands      Signs and Symptoms (as evidenced by):   Penile carcinoma     Interventions (treatment strategy):  Collaboration of nutrition care with other providers     Nutrition Diagnosis Status:   New       Monitor and Evaluation    Food and Nutrient Intake: energy intake, food and beverage intake  Food and Nutrient Adminstration: diet order  Physical Activity and Function: nutrition-related ADLs and IADLs  Anthropometric Measurements: weight, weight change, body mass index  Biochemical Data, Medical Tests and Procedures: electrolyte and renal panel, gastrointestinal profile, glucose/endocrine profile, inflammatory profile, lipid profile  Nutrition-Focused Physical Findings: overall appearance     Malnutrition Assessment            Nutrition Follow-Up    RD Follow-up?: Yes

## 2020-10-22 NOTE — SUBJECTIVE & OBJECTIVE
Interval History:     POD 2 from OR on 10/19 with partial penectomy completed. Voiding independently without issue. Scant blood in surgical site on exam but no overt bleeding. Urology is following, conts Bactrim SSI ppx and bacitracin ointment.  SNF orders for CM placement submitted.     Review of Systems   Constitutional: Negative for activity change, appetite change, chills, diaphoresis, fatigue and fever.   HENT: Negative for congestion, facial swelling, hearing loss, nosebleeds, sinus pressure, sinus pain, sneezing, sore throat, tinnitus, trouble swallowing and voice change.    Eyes: Negative for photophobia, pain, discharge and visual disturbance.   Respiratory: Negative for cough, chest tightness, shortness of breath and wheezing.    Cardiovascular: Negative for chest pain, palpitations and leg swelling.   Gastrointestinal: Negative for abdominal distention, abdominal pain, blood in stool, constipation, diarrhea, nausea and vomiting.   Endocrine: Negative for cold intolerance, heat intolerance and polyuria.   Genitourinary: Negative for decreased urine volume, difficulty urinating, dysuria, flank pain, frequency and urgency.   Musculoskeletal: Negative for arthralgias, gait problem, myalgias, neck pain and neck stiffness.   Skin: Negative for rash and wound.   Neurological: Negative for dizziness, speech difficulty, weakness, light-headedness and headaches.   Hematological: Negative for adenopathy. Does not bruise/bleed easily.   Psychiatric/Behavioral: Negative for agitation, confusion, self-injury, sleep disturbance and suicidal ideas. The patient is not nervous/anxious.      Objective:     Vital Signs (Most Recent):  Temp: 98.5 °F (36.9 °C) (10/21/20 2030)  Pulse: 72 (10/21/20 2030)  Resp: 16 (10/21/20 2030)  BP: (!) 109/58 (10/21/20 2030)  SpO2: 95 % (10/21/20 2030) Vital Signs (24h Range):  Temp:  [97 °F (36.1 °C)-98.5 °F (36.9 °C)] 98.5 °F (36.9 °C)  Pulse:  [64-72] 72  Resp:  [16-19] 16  SpO2:  [94 %-97  %] 95 %  BP: (101-125)/(55-67) 109/58     Weight: 72 kg (158 lb 11.7 oz)  Body mass index is 22.78 kg/m².    Intake/Output Summary (Last 24 hours) at 10/21/2020 2157  Last data filed at 10/21/2020 1557  Gross per 24 hour   Intake 930 ml   Output 1100 ml   Net -170 ml      Physical Exam  Constitutional:       General: He is not in acute distress.     Appearance: He is well-developed. He is not ill-appearing, toxic-appearing or diaphoretic.   HENT:      Head: Atraumatic. No abrasion, contusion or laceration.      Nose: Nose normal.      Mouth/Throat:      Pharynx: No oropharyngeal exudate.   Eyes:      General: No scleral icterus.        Right eye: No discharge.         Left eye: No discharge.      Conjunctiva/sclera: Conjunctivae normal.      Pupils: Pupils are equal, round, and reactive to light.   Neck:      Musculoskeletal: Normal range of motion and neck supple. No neck rigidity.      Vascular: No JVD.   Cardiovascular:      Rate and Rhythm: Normal rate and regular rhythm.      Heart sounds: Normal heart sounds. No murmur. No friction rub. No gallop.    Pulmonary:      Effort: Pulmonary effort is normal. No accessory muscle usage or respiratory distress.      Breath sounds: Normal breath sounds. No wheezing.   Abdominal:      General: Bowel sounds are normal. There is no distension.      Palpations: Abdomen is soft. There is no mass.      Tenderness: There is no abdominal tenderness. There is no guarding or rebound.   Genitourinary:     Comments: S/p partial penectomy with loose dressing in place; scant blood no overt bleeding   Musculoskeletal: Normal range of motion.         General: No tenderness or deformity.   Lymphadenopathy:      Cervical: No cervical adenopathy.   Skin:     General: Skin is warm and dry.      Capillary Refill: Capillary refill takes less than 2 seconds.      Findings: No bruising, ecchymosis, erythema, petechiae or rash.      Nails: There is no clubbing.     Neurological:      Mental  Status: He is alert. Mental status is at baseline.      GCS: GCS eye subscore is 4. GCS verbal subscore is 5. GCS motor subscore is 6.      Cranial Nerves: No cranial nerve deficit.      Sensory: No sensory deficit.      Motor: No abnormal muscle tone.      Coordination: Coordination normal.      Deep Tendon Reflexes: Reflexes normal.      Comments: Oriented to place and time. Unclear to context with frequent redirection, orientation.  Tangential thinking and occasional disorganized thought content    Psychiatric:         Speech: Speech normal.         Behavior: Behavior normal.         Thought Content: Thought content normal.         Judgment: Judgment normal.         Significant Labs:   BMP:   Recent Labs   Lab 10/21/20  0352         K 3.8      CO2 25   BUN 18   CREATININE 0.9   CALCIUM 8.0*   MG 2.1     CBC:   Recent Labs   Lab 10/20/20  0124 10/21/20  0352   WBC 14.51* 7.07   HGB 12.8* 11.5*   HCT 38.9* 36.0*    207       Significant Imaging: I have reviewed and interpreted all pertinent imaging results/findings within the past 24 hours.

## 2020-10-22 NOTE — PROGRESS NOTES
Progress Note  Hospital Medicine    Provider team: [facility] [place] [place] HOSP MED O  Admit Date: 10/2/2020  Encounter Date: 10/22/2020     SUBJECTIVE:     Follow-up Visit for: Cognitive impairment    HPI (See H&P for complete P,F,SHx):  Per Adriana Badillo MD:   Mr. Juan Hobson is a 69 y.o. male with recently diagnosed penile cancer, who presents to the ER from Urology clinic with concern for self neglect, and need for Neurology evaluation of dementia and possible placement.  The patient mentions that he has been having some issues with his memory.  He mostly orders takeout for meals because it is easier, and still drives to work.  He feels like he is able to take care of himself, and does not need to go to a facility.      Per note from Lianne German LCSW on 10/2:  Received consult from Dr. Monterroso re: assessing patient in clinic this afternoon and care recommendations as patient did not seem competent to make decisions and give consent. He has no family and no legal next of kin (POA or guardian).   Met individually with patient, and with his co-worker Janna Go ([phone], ext. [phone]) who brought him to his clinic appointment today. Patient unable to relate basic demographic information or clearly explain other information. For example, when asked his birth date he gave the year [birth year], then said 9, and then said the second to last, it begins with N; he said his street name but couldn't give the house number where he has lived for years; unable to say what type of work or where he worked prior to the [employer] in [facility] for the past 12 years; he described being taken in a truck to that other place (referring to the recent [facility] stay). He has awareness that he is having memory/cognitive issues but stated he can manage for himself at home and that he knows there is a lot of work that needs to be done in his home that he hasn't gotten to. He inherited the home after his mother .        [place] and another coworker Juice Martinez ([phone]) and their supervisor Meiirma Meanso ([phone]) have been trying to help him manage things with his work and personal matters as they have observed his deficits and know he has no one to help him. They have helped him keep up with recent medical appointments and accompanied and transported him.  Mr. Go reports concern for his safety and ability to care for himself after seeing his home with clutter throughout and things piled up, mold and old food in the refrigerator, mold in the toilet, clothes all over, etc. Patient has been driving himself to and from work and to get food at places near his home, but unable to navigate to unfamiliar places. They have observed his cognitive deficits at work and he has made errors, so they have modified his job and his supervisor has been working with him to try to get FMLA and his leave/long-term in place so he doesn't lose his benefits. They have had to help him with paperwork, bill paying, etc., because he can't complete these on his own.         Upon arrival to the ER, vitals were temp 97.8F, HR 59 and /84.  Labs were unremarkable  He was admitted to Hospital Medicine for Neurology and SW evaluation.     Overview/Hospital Course:  Admitted to hospital medicine for worsening cognitive decline and inability to care for self in setting of concern for penile cancer.  In pursuit of workup for altered mental status - labs/infectious work-up grossly unremarkable. Neurology consulted and MRI obtained on admission with concern for encephalitis (increased FLAIR hyperintensity involving the bilateral mesial temporal lobes which can be seen in the setting of autoimmune limbic encephalitis in the appropriate clinical setting). LP recommended and attempted at bedside however not successful 2/2 to JEAN-PIERRED. Interventional radiology consulted, LP performed 10/5 with CSF (1 WBC, 190 RBC, 71 protein, 52 glucose). CSF HSV, ACE  negative. EEG 10/3 (1 hr 40 min) with mild regional dysfunction in bilateral temporal lobes, but no electrographic seizures. Serum paraneoplastic panel from 9/28 has resulted negative. MRI brain W WO contrast showing increased FLAIR hyperintensity involving the bilateral mesial temporal lobes. Completed 5 days of empiric IV Solumedrol 1 g qd without notable clinical improvement. Presentation most concerning for dementia   Psychiatry consulted for formal capacity evaluation and concluded that he does not have capacity to make decisions regarding his long term care disposition however does not require PEC at this time as his stay has been non-contested. He has no family, children and have relied on coworkers for assistance. The case was brought to the attention of EPS who have no plans to intervene at this time and recommend since the patient presented to the hospital it is a safe discharge planning issue that would involve [facility] Legal department. The Legal department intend to pursue [place] Guardianship to assist with managing affairs. Patient re-engaged with care for likely penile carcinoma with urology.  A determination was made that appropriate treatment for penile tumor was penectomy - patient proceeded to OR on 10/19 with partial penectomy completed.   Figueredo catheter placed and traumatically self discontinued by patient.  Urology is following.      Interval History:   POD3 from OR on 10/19 with partial penectomy completed. Voiding independently without issue. Scant blood in surgical site on exam but no overt bleeding. Urology is following, conts Bactrim SSI ppx and bacitracin ointment.  SNF orders for CM placement submitted.     Review of Systems:  Review of Systems   Constitutional: Negative for chills and fever.   HENT: Negative for congestion and sore throat.    Eyes: Negative for photophobia, pain and discharge.   Respiratory: Negative for cough, hemoptysis, sputum production and shortness of breath.   "  Cardiovascular: Negative for chest pain, palpitations and leg swelling.   Gastrointestinal: Negative for abdominal pain, diarrhea, nausea and vomiting.   Genitourinary: Negative for dysuria and urgency.   Musculoskeletal: Negative for myalgias and neck pain.   Skin: Negative for itching and rash.   Neurological: Negative for sensory change, focal weakness and headaches.   Endo/Heme/Allergies: Negative for polydipsia. Does not bruise/bleed easily.   Psychiatric/Behavioral: Negative for depression and suicidal ideas.     OBJECTIVE:       Intake/Output Summary (Last 24 hours) at 10/22/2020 1304  Last data filed at 10/22/2020 1000  Gross per 24 hour   Intake 520 ml   Output 1700 ml   Net -1180 ml     Vital Signs Range (Last 24H):  Temp:  [97 °F (36.1 °C)-99 °F (37.2 °C)]   Pulse:  [67-74]   Resp:  [16-18]   BP: (109-133)/(58-67)   SpO2:  [95 %-99 %]   Body mass index is 22.78 kg/m².    Objective:  Vital signs: (most recent): Blood pressure 121/63, pulse 70, temperature 98.2 °F (36.8 °C), temperature source Oral, resp. rate 16, height 5' 10" (1.778 m), weight 72 kg (158 lb 11.7 oz), SpO2 98 %.  No fever.    Physical Exam  Constitutional:       General: He is not in acute distress.     Appearance: He is well-developed. He is not ill-appearing, toxic-appearing or diaphoretic.   HENT:      Head: Atraumatic. No abrasion, contusion or laceration.      Nose: Nose normal.      Mouth/Throat:      Pharynx: No oropharyngeal exudate.   Eyes:      General: No scleral icterus.        Right eye: No discharge.         Left eye: No discharge.      Conjunctiva/sclera: Conjunctivae normal.      Pupils: Pupils are equal, round, and reactive to light.   Neck:      Musculoskeletal: Normal range of motion and neck supple. No neck rigidity.      Vascular: No JVD.   Cardiovascular:      Rate and Rhythm: Normal rate and regular rhythm.      Heart sounds: Normal heart sounds. No murmur. No friction rub. No gallop.    Pulmonary:      Effort: " Pulmonary effort is normal. No accessory muscle usage or respiratory distress.      Breath sounds: Normal breath sounds. No wheezing.   Abdominal:      General: Bowel sounds are normal. There is no distension.      Palpations: Abdomen is soft. There is no mass.      Tenderness: There is no abdominal tenderness. There is no guarding or rebound.   Genitourinary:     Comments: S/p partial penectomy with loose dressing in place; scant blood no overt bleeding   Musculoskeletal: Normal range of motion.         General: No tenderness or deformity.   Lymphadenopathy:      Cervical: No cervical adenopathy.   Skin:     General: Skin is warm and dry.      Capillary Refill: Capillary refill takes less than 2 seconds.      Findings: No bruising, ecchymosis, erythema, petechiae or rash.      Nails: There is no clubbing.   Neurological:      Mental Status: He is alert. Mental status is at baseline.      GCS: GCS eye subscore is 4. GCS verbal subscore is 5. GCS motor subscore is 6.      Cranial Nerves: No cranial nerve deficit.      Sensory: No sensory deficit.      Motor: No abnormal muscle tone.      Coordination: Coordination normal.      Deep Tendon Reflexes: Reflexes normal.      Comments: Oriented to place and time. Unclear to context with frequent redirection, orientation.  Tangential thinking and occasional disorganized thought content    Psychiatric:         Speech: Speech normal.         Behavior: Behavior normal.         Thought Content: Thought content normal.         Judgment: Judgment normal.     Medications:  Medication list was reviewed in EPIC and changes noted under Assessment/Plan and MAR.    Laboratory:  Recent Labs     10/22/20  0404   WBC 5.81   RBC 3.63*   HGB 11.3*   HCT 35.4*      MCV 98   MCH 31.1*   MCHC 31.9*   GRAN 66.2  3.8   LYMPH 19.8  1.2   MONO 11.5  0.7   EOS 0.1      Recent Labs     10/22/20  0404   GLU 83      K 4.0      CO2 26   BUN 17   CREATININE 0.7   CALCIUM 8.5*    ANIONGAP 6*   MG 2.0   PHOS 2.7       ASSESSMENT/PLAN:     Active Hospital Problems    Diagnosis  POA    *Cognitive impairment [R41.89]  Yes    Leukocytosis [D72.829]  No    Hypocalcemia [E83.51]  Yes    Acute encephalopathy [G93.40]  Yes    Cognitive decline [R41.89]  Yes    Confusion [R41.0]  Unknown    Debility [R53.81]  Yes    Penile carcinoma [C60.9]  Yes    Altered mental status [R41.82]  Yes      Resolved Hospital Problems    Diagnosis Date Resolved POA    Encephalopathy [G93.40] 10/04/2020 Unknown         * Cognitive impairment  Impaired decision making  Presentation concerning for progressive dementia   -LP performed by IR 10/5/2020: elevated protein, neutrophils  -CSF HSV, ACE negative.   -Serum paraneoplastic panel from 9/28 has resulted negative.  -MRI brain with Possible subtle increased FLAIR hyperintensity involving the bilateral mesial temporal lobes which can be seen in the setting of autoimmune limbic encephalitis  -EEG without seizure activity   -Vitamin B1, Vitamin B6, Vitamin B12, HIV, RPR, TSH unremarkable   - Neurology consulted          --please send CSF to [facility] for Alzheimer panel (ADEVL test code) - repeat LP completed          --start empiric IV Solumedrol 1 g qd x 5 days - completed           --formal cognitive assessment with neuropsych            --consider PET scan - not available inpatient           --continue strict delirium precautions  -Psych assess lack of competency   -Formal guardianship being pursued - court date early November  -Adult friends can assist with decision making   -Gina KEVIN 40:[id number]: (9) Upon the inability of any adult to consent for himself and in the absence of any person listed in Paragraphs (2) through (8) of this Subsection, an adult friend of the patient. For purposes of this Subsection to consent, adult friend means an adult who has exhibited special care and concern for the patient, who is generally familiar with the patient's health care  views and desires, and who is willing and able to become involved in the patient's health care decisions and to act in the patient's best interest. The adult friend shall sign and date an acknowledgment form provided by the hospital or other health care facility in which the patient is located for placement in the patient's records certifying that he or she meets such criteria.     - Juice Martinez ([phone]) will make decisions on his behalf  - supervisor Mei Arabella ([phone]) have been trying to help him manage things with his work and personal matters as they have observed his deficits and know he has no one to help him - these individuals may be used for consent      [facility]  - Minimize use of restraints; if physical restraints necessary, please utilize medical/chemical prns for agitation.  - Keep shades open and room lit during day and room dim at night in order to promote healthy circadian rhythms.  - Encourage family at bedside  - Keep whiteboard in patient's room current with the date and name of the members of patient's team for easy patient self re-orientation.  - Avoid benzodiazepines, antihistamines, anticholinergics, hypnotics, and minimize opiates while controlling for pain as these medications may exacerbate delirium.     Penile carcinoma  - follows with urology   - Assessed inpatient - plan for partial / total penectomy   - Partial penectomy 10/19 - pathology pending   - FC placed post op - self discontinued     Anticipated discharge date and disposition:   Will need placement. CM/SW closely involved.  Brayan Castillo MD  [facility]

## 2020-10-22 NOTE — PT/OT/SLP EVAL
Physical Therapy  Evaluation and Discharge    Juan Hobson   [id number]    Time Tracking:     PT Received On: 10/22/20   PT Start Time: 1451   PT Stop Time: 1500   PT Total Time (min): 9 min    Billable Minutes: Evaluation 1 procedure and Therapeutic Exercise 5 minutes    Recommendations:     Discharge recommendations: Patient is ready to d/c from a mobility standpoint as he is completely independent; home vs. Basic nursing home placement pending medical team decision     Equipment recommendations: None    Barriers to Discharge: Decreased cognition    Patient Information:     Recent Surgery: Procedure(s) (LRB):  PENECTOMY  Surgeon would like to follow his cases in OR 24. (N/A) 3 Days Post-Op    Diagnosis: Cognitive impairment    Length of Stay: 16 days    General Precautions: Standard, fall  Orthopedic Precautions: None    Assessment:     Juan Hobson is a 69 y.o. male admitted to [facility] [place]2/2020 for Cognitive impairment. Juan Hobson tolerated evaluation well today. Upon entering room, patient supine in bed; agreeable to treatment. Patient is poor historian. Patient reported he lives alone in a H with no steps to enter. Patient was oriented only to person and time. Reports he is able to take care of himself and still drives. Patient completed all transfers independently. Patient ambulated 250 feet in the hallway (wearing a mask) independently with no AD. Able to follow all commands. Patient is ready to d/c from a mobility standpoint as he is completely independent; home vs. Basic nursing home placement pending medical team decision. Discussed PT role, continued mobility and recommendations (Home vs basic nursing home) with patient; verbalized understanding. At this time, Juan Hobson has no acute PT needs, will now d/c from acute PT services.    Problem List: decreased cognition    Plan:     Discharge from acute PT services.    Plan of Care reviewed with: patient    Subjective:     Communicated with RN  prior to evaluation, appropriate to see for evaluation.    Pt found supine in bed (HOB elevated) upon PT entry to room, agreeable to evaluation.    Does this patient have any cultural, spiritual, Taoism conflicts given the current situation? Patient has no barriers to learning. Patient verbalizes understanding of his/her program and goals and demonstrates them correctly. No cultural, spiritual, or educational needs identified.    Past Medical History:   Diagnosis Date    Basal cell carcinoma     right helix    Hematuria     Seborrheic dermatitis     Skin cancer of arm      Past Surgical History:   Procedure Laterality Date    AMPUTATION OF PENIS N/A 10/19/2020    Procedure: PENECTOMY  Surgeon would like to follow his cases in OR 24.;  Surgeon: Chance Higgins MD;  Location: [facility] OR [address] [place] [address];  Service: Urology;  Laterality: N/A;    INCISION AND DRAINAGE INTRA ORAL ABSCESS         Living Environment:  Patient lives alone in a H with no stairs to enter.     PLOF:  Prior to admission, patient independent in ADLs and ambulation. Reports he is able to take care of himself and still drives.     DME:  Patient owns or has access to the following DME: None    Upon discharge, patient reported he will have assistance from friends, but patient is poor historian.    Objective:     Patient found with: peripheral IV    Pain:  Pain Rating 1: 0/10  Pain Rating Post-Intervention 1: 0/10    Cognitive Exam:  Patient is oriented to Person and Time.  Patient follows 100% of single-step commands.    Lower Extremity Range of Motion:  Right Lower Extremity: WFL actively  Left Lower Extremity: WFL actively    Lower Extremity Strength:  Right Lower Extremity: WFL  Left Lower Extremity: WFL    Functional Mobility:    · Bed Mobility:  · Supine to Sitting: Independent    · Transfers:  · Sit to Stand: Independent from EOB with no AD x 1 trial(s)    · Gait:  · 250 feet    · Assist level: Independent  · Device: no  AD    · Balance:  · Static Sit: Independent at EOB    · Static Stand: Independent with no AD    Additional Therapeutic Activity/Exercises:     1. Discussed PT role, continued mobility, recommendations (Home vs basic nursing home), and plan for d/c from acute PT services with patient; verbalized understanding.    2. Whiteboard was updated.    AM-PAC 6 CLICK MOBILITY  Turning over in bed (including adjusting bedclothes, sheets and blankets)?: 4  Sitting down on and standing up from a chair with arms (e.g., wheelchair, bedside commode, etc.): 4  Moving from lying on back to sitting on the side of the bed?: 4  Moving to and from a bed to a chair (including a wheelchair)?: 4  Need to walk in hospital room?: 4  Climbing 3-5 steps with a railing?: 4  Basic Mobility Total Score: 24    Patient was left supine in bed (HOB elevated) with all lines intact and call button in reach.    Clinical Decision Making for Evaluation Complexity:  1. Body System(s) Examination: 1-2  2. Clinical Presentation: Stable  3. Evaluation Complexity: Low    GOALS:   Multidisciplinary Problems     Physical Therapy Goals        Problem: Physical Therapy Goal    Goal Priority Disciplines Outcome Goal Variances Interventions   Physical Therapy Goal     PT, PT/OT      Description: No goals                   LUANN Gilman  10/22/2020

## 2020-10-23 LAB
ALBUMIN SERPL BCP-MCNC: 3.1 G/DL (ref 3.5–5.2)
ALP SERPL-CCNC: 57 U/L (ref 55–135)
ALT SERPL W/O P-5'-P-CCNC: 42 U/L (ref 10–44)
ANION GAP SERPL CALC-SCNC: 10 MMOL/L (ref 8–16)
AST SERPL-CCNC: 23 U/L (ref 10–40)
BASOPHILS # BLD AUTO: 0.03 K/UL (ref 0–0.2)
BASOPHILS NFR BLD: 0.7 % (ref 0–1.9)
BILIRUB SERPL-MCNC: 0.4 MG/DL (ref 0.1–1)
BUN SERPL-MCNC: 16 MG/DL (ref 8–23)
CALCIUM SERPL-MCNC: 8.5 MG/DL (ref 8.7–10.5)
CHLORIDE SERPL-SCNC: 105 MMOL/L (ref 95–110)
CO2 SERPL-SCNC: 24 MMOL/L (ref 23–29)
CREAT SERPL-MCNC: 0.8 MG/DL (ref 0.5–1.4)
DIFFERENTIAL METHOD: ABNORMAL
EOSINOPHIL # BLD AUTO: 0.2 K/UL (ref 0–0.5)
EOSINOPHIL NFR BLD: 3.4 % (ref 0–8)
ERYTHROCYTE [DISTWIDTH] IN BLOOD BY AUTOMATED COUNT: 13.4 % (ref 11.5–14.5)
EST. GFR  (AFRICAN AMERICAN): >60 ML/MIN/1.73 M^2
EST. GFR  (NON AFRICAN AMERICAN): >60 ML/MIN/1.73 M^2
GLUCOSE SERPL-MCNC: 85 MG/DL (ref 70–110)
HCT VFR BLD AUTO: 35 % (ref 40–54)
HGB BLD-MCNC: 11.4 G/DL (ref 14–18)
IMM GRANULOCYTES # BLD AUTO: 0.02 K/UL (ref 0–0.04)
IMM GRANULOCYTES NFR BLD AUTO: 0.4 % (ref 0–0.5)
LYMPHOCYTES # BLD AUTO: 0.9 K/UL (ref 1–4.8)
LYMPHOCYTES NFR BLD: 20.4 % (ref 18–48)
MAGNESIUM SERPL-MCNC: 2.1 MG/DL (ref 1.6–2.6)
MCH RBC QN AUTO: 32.3 PG (ref 27–31)
MCHC RBC AUTO-ENTMCNC: 32.6 G/DL (ref 32–36)
MCV RBC AUTO: 99 FL (ref 82–98)
MONOCYTES # BLD AUTO: 0.5 K/UL (ref 0.3–1)
MONOCYTES NFR BLD: 10.5 % (ref 4–15)
NEUTROPHILS # BLD AUTO: 2.9 K/UL (ref 1.8–7.7)
NEUTROPHILS NFR BLD: 64.6 % (ref 38–73)
NRBC BLD-RTO: 0 /100 WBC
PHOSPHATE SERPL-MCNC: 3.4 MG/DL (ref 2.7–4.5)
PLATELET # BLD AUTO: 219 K/UL (ref 150–350)
PMV BLD AUTO: 9.9 FL (ref 9.2–12.9)
POTASSIUM SERPL-SCNC: 4.5 MMOL/L (ref 3.5–5.1)
PROT SERPL-MCNC: 5.8 G/DL (ref 6–8.4)
RBC # BLD AUTO: 3.53 M/UL (ref 4.6–6.2)
SODIUM SERPL-SCNC: 139 MMOL/L (ref 136–145)
WBC # BLD AUTO: 4.47 K/UL (ref 3.9–12.7)

## 2020-10-23 PROCEDURE — 80053 COMPREHEN METABOLIC PANEL: CPT

## 2020-10-23 PROCEDURE — 85025 COMPLETE CBC W/AUTO DIFF WBC: CPT

## 2020-10-23 PROCEDURE — 11000001 HC ACUTE MED/SURG PRIVATE ROOM

## 2020-10-23 PROCEDURE — 36415 COLL VENOUS BLD VENIPUNCTURE: CPT

## 2020-10-23 PROCEDURE — 83735 ASSAY OF MAGNESIUM: CPT

## 2020-10-23 PROCEDURE — U0003 INFECTIOUS AGENT DETECTION BY NUCLEIC ACID (DNA OR RNA); SEVERE ACUTE RESPIRATORY SYNDROME CORONAVIRUS 2 (SARS-COV-2) (CORONAVIRUS DISEASE [COVID-19]), AMPLIFIED PROBE TECHNIQUE, MAKING USE OF HIGH THROUGHPUT TECHNOLOGIES AS DESCRIBED BY CMS-2020-01-R: HCPCS

## 2020-10-23 PROCEDURE — 63600175 PHARM REV CODE 636 W HCPCS: Performed by: HOSPITALIST

## 2020-10-23 PROCEDURE — 84100 ASSAY OF PHOSPHORUS: CPT

## 2020-10-23 PROCEDURE — 99232 SBSQ HOSP IP/OBS MODERATE 35: CPT | Mod: ,,, | Performed by: HOSPITALIST

## 2020-10-23 PROCEDURE — 99232 PR SUBSEQUENT HOSPITAL CARE,LEVL II: ICD-10-PCS | Mod: ,,, | Performed by: HOSPITALIST

## 2020-10-23 PROCEDURE — 25000003 PHARM REV CODE 250: Performed by: STUDENT IN AN ORGANIZED HEALTH CARE EDUCATION/TRAINING PROGRAM

## 2020-10-23 RX ADMIN — ENOXAPARIN SODIUM 40 MG: 40 INJECTION SUBCUTANEOUS at 05:10

## 2020-10-23 RX ADMIN — PANTOPRAZOLE SODIUM 40 MG: 40 TABLET, DELAYED RELEASE ORAL at 08:10

## 2020-10-23 RX ADMIN — BACITRACIN: 500 OINTMENT TOPICAL at 08:10

## 2020-10-23 NOTE — PROGRESS NOTES
Progress Note  Hospital Medicine    Provider team: [facility] HOSP MED O  Admit Date: 10/2/2020  Encounter Date: 10/23/2020     SUBJECTIVE:     Follow-up Visit for: Cognitive impairment    HPI (See H&P for complete P,F,SHx):  Per Adriana Badillo MD:   Mr. Juan Hobson is a 69 y.o. male with recently diagnosed penile cancer, who presents to the ER from Urology clinic with concern for self neglect, and need for Neurology evaluation of dementia and possible placement.  The patient mentions that he has been having some issues with his memory.  He mostly orders takeout for meals because it is easier, and still drives to work.  He feels like he is able to take care of himself, and does not need to go to a facility.      Per note from Lianne German LCSW on 10/2:  Received consult from Dr. Monterroso re: assessing patient in clinic this afternoon and care recommendations as patient did not seem competent to make decisions and give consent. He has no family and no legal next of kin (POA or guardian).   Met individually with patient, and with his co-worker Janna Go ([phone], ext. [phone]) who brought him to his clinic appointment today. Patient unable to relate basic demographic information or clearly explain other information. For example, when asked his birth date he gave the year [birth year], then said 9, and then said the second to last, it begins with N; he said his street name but couldn't give the house number where he has lived for years; unable to say what type of work or where he worked prior to the [employer] in [facility] for the past 12 years; he described being taken in a truck to that other place (referring to the recent [facility] stay). He has awareness that he is having memory/cognitive issues but stated he can manage for himself at home and that he knows there is a lot of work that needs to be done in his home that he hasn't gotten to. He inherited the home after his mother .        [place] and another coworker Juice Martinez ([phone]) and their supervisor Meiirma Meanso ([phone]) have been trying to help him manage things with his work and personal matters as they have observed his deficits and know he has no one to help him. They have helped him keep up with recent medical appointments and accompanied and transported him.  Mr. Go reports concern for his safety and ability to care for himself after seeing his home with clutter throughout and things piled up, mold and old food in the refrigerator, mold in the toilet, clothes all over, etc. Patient has been driving himself to and from work and to get food at places near his home, but unable to navigate to unfamiliar places. They have observed his cognitive deficits at work and he has made errors, so they have modified his job and his supervisor has been working with him to try to get FMLA and his leave/[facility] in place so he doesn't lose his benefits. They have had to help him with paperwork, bill paying, etc., because he can't complete these on his own.         Upon arrival to the ER, vitals were temp 97.8F, HR 59 and /84.  Labs were unremarkable  He was admitted to Hospital Medicine for Neurology and SW evaluation.     Overview/Hospital Course:  Admitted to hospital medicine for worsening cognitive decline and inability to care for self in setting of concern for penile cancer.  In pursuit of workup for altered mental status - labs/infectious work-up grossly unremarkable. Neurology consulted and MRI obtained on admission with concern for encephalitis (increased FLAIR hyperintensity involving the bilateral mesial temporal lobes which can be seen in the setting of autoimmune limbic encephalitis in the appropriate clinical setting). LP recommended and attempted at bedside however not successful 2/2 to JEAN-PIERRED. Interventional radiology consulted, LP performed 10/5 with CSF (1 WBC, 190 RBC, 71 protein, 52 glucose). CSF HSV, ACE  negative. EEG 10/3 (1 hr 40 min) with mild regional dysfunction in bilateral temporal lobes, but no electrographic seizures. Serum paraneoplastic panel from 9/28 has resulted negative. MRI brain W WO contrast showing increased FLAIR hyperintensity involving the bilateral mesial temporal lobes. Completed 5 days of empiric IV Solumedrol 1 g qd without notable clinical improvement. Presentation most concerning for dementia   Psychiatry consulted for formal capacity evaluation and concluded that he does not have capacity to make decisions regarding his long term care disposition however does not require PEC at this time as his stay has been non-contested. He has no family, children and have relied on coworkers for assistance. The case was brought to the attention of EPS who have no plans to intervene at this time and recommend since the patient presented to the hospital it is a safe discharge planning issue that would involve [facility] Legal department. The Legal department intend to pursue [place] Guardianship to assist with managing affairs. Patient re-engaged with care for likely penile carcinoma with urology.  A determination was made that appropriate treatment for penile tumor was penectomy - patient proceeded to OR on 10/19 with partial penectomy completed.   Figueredo catheter placed and traumatically self discontinued by patient.  Urology is following.      Interval History:   POD3 from OR on 10/19 with partial penectomy completed. Voiding independently without issue. Scant blood in surgical site on exam but no overt bleeding. Urology is following, conts Bactrim SSI ppx and bacitracin ointment.  SNF orders for CM placement submitted.     Review of Systems:  Review of Systems   Constitutional: Negative for chills and fever.   HENT: Negative for congestion and sore throat.    Eyes: Negative for photophobia, pain and discharge.   Respiratory: Negative for cough, hemoptysis, sputum production and shortness of breath.   "  Cardiovascular: Negative for chest pain, palpitations and leg swelling.   Gastrointestinal: Negative for abdominal pain, diarrhea, nausea and vomiting.   Genitourinary: Negative for dysuria and urgency.   Musculoskeletal: Negative for myalgias and neck pain.   Skin: Negative for itching and rash.   Neurological: Negative for sensory change, focal weakness and headaches.   Endo/Heme/Allergies: Negative for polydipsia. Does not bruise/bleed easily.   Psychiatric/Behavioral: Negative for depression and suicidal ideas.     OBJECTIVE:       Intake/Output Summary (Last 24 hours) at 10/23/2020 1147  Last data filed at 10/23/2020 0800  Gross per 24 hour   Intake 480 ml   Output 1200 ml   Net -720 ml     Vital Signs Range (Last 24H):  Temp:  [98.1 °F (36.7 °C)-98.7 °F (37.1 °C)]   Pulse:  [64-76]   Resp:  [16-18]   BP: (107-135)/(59-71)   SpO2:  [94 %-99 %]   Body mass index is 22.78 kg/m².    Objective:  Vital signs: (most recent): Blood pressure 122/64, pulse 68, temperature 98.5 °F (36.9 °C), temperature source Oral, resp. rate 16, height 5' 10" (1.778 m), weight 72 kg (158 lb 11.7 oz), SpO2 99 %.  No fever.    Physical Exam  Constitutional:       General: He is not in acute distress.     Appearance: He is well-developed. He is not ill-appearing, toxic-appearing or diaphoretic.   HENT:      Head: Atraumatic. No abrasion, contusion or laceration.      Nose: Nose normal.      Mouth/Throat:      Pharynx: No oropharyngeal exudate.   Eyes:      General: No scleral icterus.        Right eye: No discharge.         Left eye: No discharge.      Conjunctiva/sclera: Conjunctivae normal.      Pupils: Pupils are equal, round, and reactive to light.   Neck:      Musculoskeletal: Normal range of motion and neck supple. No neck rigidity.      Vascular: No JVD.   Cardiovascular:      Rate and Rhythm: Normal rate and regular rhythm.      Heart sounds: Normal heart sounds. No murmur. No friction rub. No gallop.    Pulmonary:      Effort: " Pulmonary effort is normal. No accessory muscle usage or respiratory distress.      Breath sounds: Normal breath sounds. No wheezing.   Abdominal:      General: Bowel sounds are normal. There is no distension.      Palpations: Abdomen is soft. There is no mass.      Tenderness: There is no abdominal tenderness. There is no guarding or rebound.   Genitourinary:     Comments: S/p partial penectomy with loose dressing in place; scant blood no overt bleeding   Musculoskeletal: Normal range of motion.         General: No tenderness or deformity.   Lymphadenopathy:      Cervical: No cervical adenopathy.   Skin:     General: Skin is warm and dry.      Capillary Refill: Capillary refill takes less than 2 seconds.      Findings: No bruising, ecchymosis, erythema, petechiae or rash.      Nails: There is no clubbing.   Neurological:      Mental Status: He is alert. Mental status is at baseline.      GCS: GCS eye subscore is 4. GCS verbal subscore is 5. GCS motor subscore is 6.      Cranial Nerves: No cranial nerve deficit.      Sensory: No sensory deficit.      Motor: No abnormal muscle tone.      Coordination: Coordination normal.      Deep Tendon Reflexes: Reflexes normal.      Comments: Oriented to place and time. Unclear to context with frequent redirection, orientation.  Tangential thinking and occasional disorganized thought content    Psychiatric:         Speech: Speech normal.         Behavior: Behavior normal.         Thought Content: Thought content normal.         Judgment: Judgment normal.     Medications:  Medication list was reviewed in EPIC and changes noted under Assessment/Plan and MAR.    Laboratory:  Recent Labs     10/23/20  0443   WBC 4.47   RBC 3.53*   HGB 11.4*   HCT 35.0*      MCV 99*   MCH 32.3*   MCHC 32.6   GRAN 64.6  2.9   LYMPH 20.4  0.9*   MONO 10.5  0.5   EOS 0.2      Recent Labs     10/23/20  0443   GLU 85      K 4.5      CO2 24   BUN 16   CREATININE 0.8   CALCIUM 8.5*    ANIONGAP 10   MG 2.1   PHOS 3.4       ASSESSMENT/PLAN:     Active Hospital Problems    Diagnosis  POA    *Cognitive impairment [R41.89]  Yes    Leukocytosis [D72.829]  No    Hypocalcemia [E83.51]  Yes    Acute encephalopathy [G93.40]  Yes    Cognitive decline [R41.89]  Yes    Confusion [R41.0]  Unknown    Debility [R53.81]  Yes    Penile carcinoma [C60.9]  Yes    Altered mental status [R41.82]  Yes      Resolved Hospital Problems    Diagnosis Date Resolved POA    Encephalopathy [G93.40] 10/04/2020 Unknown         * Cognitive impairment  Impaired decision making  Presentation concerning for progressive dementia   -LP performed by IR 10/5/2020: elevated protein, neutrophils  -CSF HSV, ACE negative.   -Serum paraneoplastic panel from 9/28 has resulted negative.  -MRI brain with Possible subtle increased FLAIR hyperintensity involving the bilateral mesial temporal lobes which can be seen in the setting of autoimmune limbic encephalitis  -EEG without seizure activity   -Vitamin B1, Vitamin B6, Vitamin B12, HIV, RPR, TSH unremarkable   - Neurology consulted          --please send CSF to [place] for Alzheimer panel (ADEVL test code) - repeat LP completed          --start empiric IV Solumedrol 1 g qd x 5 days - completed           --formal cognitive assessment with neuropsych            --consider PET scan - not available inpatient           --continue strict delirium precautions  -Psych assess lack of competency   -Formal guardianship being pursued - court date early November  -Adult friends can assist with decision making   -Gina KEVIN [id number]: (9) Upon the inability of any adult to consent for himself and in the absence of any person listed in Paragraphs (2) through (8) of this Subsection, an adult friend of the patient. For purposes of this Subsection to consent, adult friend means an adult who has exhibited special care and concern for the patient, who is generally familiar with the patient's health care  views and desires, and who is willing and able to become involved in the patient's health care decisions and to act in the patient's best interest. The adult friend shall sign and date an acknowledgment form provided by the hospital or other health care facility in which the patient is located for placement in the patient's records certifying that he or she meets such criteria.     - Juice Martinez ([phone]) will make decisions on his behalf  - supervisor Mei Arabella ([phone]) have been trying to help him manage things with his work and personal matters as they have observed his deficits and know he has no one to help him - these individuals may be used for consent      [facility] BEHAVIOR MANAGEMENT  - Minimize use of restraints; if physical restraints necessary, please utilize medical/chemical prns for agitation.  - Keep shades open and room lit during day and room dim at night in order to promote healthy circadian rhythms.  - Encourage family at bedside  - Keep whiteboard in patient's room current with the date and name of the members of patient's team for easy patient self re-orientation.  - Avoid benzodiazepines, antihistamines, anticholinergics, hypnotics, and minimize opiates while controlling for pain as these medications may exacerbate delirium.     Penile carcinoma  - follows with urology   - Assessed inpatient - plan for partial / total penectomy   - Partial penectomy 10/19 - pathology pending   - FC placed post op - self discontinued     Anticipated discharge date and disposition:   Will need placement. CM/SW closely involved.  Brayan Castillo MD  [facility]

## 2020-10-23 NOTE — SUBJECTIVE & OBJECTIVE
Interval History:   Feeling well, some soreness in the penile stump/scrotal area which is to be expected. Urinating without issue. Tolerating diet.         Objective:     Temp:  [98.1 °F (36.7 °C)-99 °F (37.2 °C)] 98.2 °F (36.8 °C)  Pulse:  [64-76] 64  Resp:  [16-18] 16  SpO2:  [94 %-99 %] 94 %  BP: (107-133)/(59-67) 129/61     Body mass index is 22.78 kg/m².           Drains     Drain                 Urethral Catheter 10/19/20 1652 16 Fr. less than 1 day                Physical Exam  HENT:      Head: Atraumatic.   Neck:      Musculoskeletal: Neck supple.   Cardiovascular:      Rate and Rhythm: Normal rate.   Pulmonary:      Effort: Pulmonary effort is normal. No respiratory distress.   Abdominal:      Palpations: Abdomen is soft.   Genitourinary:     Comments: Penile incision with some mucosal sloughing present, dependent bruising in the scrotum    Musculoskeletal: Normal range of motion.   Skin:     General: Skin is warm and dry.   Neurological:      Mental Status: He is alert and oriented to person, place, and time.         Significant Labs:    BMP:  Recent Labs   Lab 10/20/20  0124 10/21/20  0352 10/22/20  0404    137 137   K 4.5 3.8 4.0    105 105   CO2 26 25 26   BUN 21 18 17   CREATININE 0.7 0.9 0.7   CALCIUM 8.4* 8.0* 8.5*       CBC:   Recent Labs   Lab 10/20/20  0124 10/21/20  0352 10/22/20  0404   WBC 14.51* 7.07 5.81   HGB 12.8* 11.5* 11.3*   HCT 38.9* 36.0* 35.4*    207 215       All pertinent labs results from the past 24 hours have been reviewed.    Significant Imaging:  All pertinent imaging results/findings from the past 24 hours have been reviewed.              Review of Systems

## 2020-10-23 NOTE — ASSESSMENT & PLAN NOTE
70 yo M with a significant psych history of penile cancer s/p partial penectomy 10/19/2020. He is progressing well.      - Morning labs stable  - OOBTC  - Ambulation in halls.   - Lovenox for DVT PPx  - PO pain control.   - Regular diet.   - Bacitracin ointment to wound site.  - Bactrim PO x2 days for SSI prophylaxis.   - Daily dressing changes per nursing.     Dispo: complex social work situation. Awaiting placement options.

## 2020-10-23 NOTE — PLAN OF CARE
10/23/20 0833   Post-Acute Status   Post-Acute Authorization Placement   Discharge Delays (!) Patient and Family Barriers     Sw completed locet and faxed pasrr.

## 2020-10-23 NOTE — PROGRESS NOTES
[facility]  Urology  Progress Note    Patient Name: Juan Hobson  MRN: [id number]  Admission Date: 10/2/2020  Hospital Length of Stay: 17 days  Code Status: Full Code   Attending Provider: Brayan Castillo MD   Primary Care Physician: Mehnaz Barillas MD    Subjective:     HPI:  Juan Hobson is a 69 y.o. male with progressive dementia and a penile tumor. He was admitted to the hospital 10/2 due to failure to thrive and self neglect as witnessed by his supervisor from work. He had presented to urology clinic that afternoon to discuss penile malignancy. Social work was brought to the clinic and due to his declining mental status, it was recommended he be admitted to hospital as it was unsafe for him to return home where he lives alone.      He initially presented to the ED on 9/2/20 due to a draining penile lesion. 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  Urethral involvement was not excluded. Suspected invasion of the right dorsolateral corpus spongiosum. Based on this imaging, he is stage [id number]. However, we do not have a biopsy specimen.     During this admission, he has undergone a work up by neurology for progressive dementia. Currently there is a send out Alzheimer's panel in process. He is currently in the process of being assigned a legal guardian.     Interval History:   Feeling well, some soreness in the penile stump/scrotal area which is to be expected. Urinating without issue. Tolerating diet.         Objective:     Temp:  [98.1 °F (36.7 °C)-99 °F (37.2 °C)] 98.2 °F (36.8 °C)  Pulse:  [64-76] 64  Resp:  [16-18] 16  SpO2:  [94 %-99 %] 94 %  BP: (107-133)/(59-67) 129/61     Body mass index is 22.78 kg/m².           Drains     Drain                 Urethral Catheter 10/19/20 1652 16 Fr. less than 1 day                 Physical Exam  HENT:      Head: Atraumatic.   Neck:      Musculoskeletal: Neck supple.   Cardiovascular:      Rate and Rhythm: Normal rate.   Pulmonary:      Effort: Pulmonary effort is normal. No respiratory distress.   Abdominal:      Palpations: Abdomen is soft.   Genitourinary:     Comments: Penile incision with some mucosal sloughing present, dependent bruising in the scrotum    Musculoskeletal: Normal range of motion.   Skin:     General: Skin is warm and dry.   Neurological:      Mental Status: He is alert and oriented to person, place, and time.         Significant Labs:    BMP:  Recent Labs   Lab 10/20/20  0124 10/21/20  0352 10/22/20  0404    137 137   K 4.5 3.8 4.0    105 105   CO2 26 25 26   BUN 21 18 17   CREATININE 0.7 0.9 0.7   CALCIUM 8.4* 8.0* 8.5*       CBC:   Recent Labs   Lab 10/20/20  0124 10/21/20  0352 10/22/20  0404   WBC 14.51* 7.07 5.81   HGB 12.8* 11.5* 11.3*   HCT 38.9* 36.0* 35.4*    207 215       All pertinent labs results from the past 24 hours have been reviewed.    Significant Imaging:  All pertinent imaging results/findings from the past 24 hours have been reviewed.              Review of Systems          Assessment/Plan:     Penile carcinoma  68 yo M with a significant psych history of penile cancer s/p partial penectomy 10/19/2020. He is progressing well.      - Morning labs stable  - OOBTC  - Ambulation in halls.   - PO pain control.   - Regular diet.   - Bacitracin ointment to wound site.  - Bactrim PO x2 days for SSI prophylaxis, completed  - Daily dressing changes per nursing  - urology will sign off, please call with questions  - please alert urology as to when patient is discharged to arrange follow up, ideally would like to see him in clinic 4 weeks from surgery    Dispo: complex social work situation. Awaiting placement options.           VTE Risk Mitigation (From admission, onward)         Ordered     enoxaparin injection 40 mg  Every 24  hours      10/14/20 0959     IP VTE HIGH RISK PATIENT  Once      10/02/20 2042                Marisol Goff MD  Urology  [facility]

## 2020-10-24 LAB
ALBUMIN SERPL BCP-MCNC: 3.1 G/DL (ref 3.5–5.2)
ALP SERPL-CCNC: 62 U/L (ref 55–135)
ALT SERPL W/O P-5'-P-CCNC: 35 U/L (ref 10–44)
ANION GAP SERPL CALC-SCNC: 8 MMOL/L (ref 8–16)
AST SERPL-CCNC: 19 U/L (ref 10–40)
BASOPHILS # BLD AUTO: 0.04 K/UL (ref 0–0.2)
BASOPHILS NFR BLD: 0.9 % (ref 0–1.9)
BILIRUB SERPL-MCNC: 0.5 MG/DL (ref 0.1–1)
BUN SERPL-MCNC: 16 MG/DL (ref 8–23)
CALCIUM SERPL-MCNC: 8.5 MG/DL (ref 8.7–10.5)
CHLORIDE SERPL-SCNC: 105 MMOL/L (ref 95–110)
CO2 SERPL-SCNC: 26 MMOL/L (ref 23–29)
CREAT SERPL-MCNC: 0.7 MG/DL (ref 0.5–1.4)
DIFFERENTIAL METHOD: ABNORMAL
EOSINOPHIL # BLD AUTO: 0.2 K/UL (ref 0–0.5)
EOSINOPHIL NFR BLD: 4.4 % (ref 0–8)
ERYTHROCYTE [DISTWIDTH] IN BLOOD BY AUTOMATED COUNT: 13.3 % (ref 11.5–14.5)
EST. GFR  (AFRICAN AMERICAN): >60 ML/MIN/1.73 M^2
EST. GFR  (NON AFRICAN AMERICAN): >60 ML/MIN/1.73 M^2
GLUCOSE SERPL-MCNC: 91 MG/DL (ref 70–110)
HCT VFR BLD AUTO: 36.1 % (ref 40–54)
HGB BLD-MCNC: 11.6 G/DL (ref 14–18)
IMM GRANULOCYTES # BLD AUTO: 0.02 K/UL (ref 0–0.04)
IMM GRANULOCYTES NFR BLD AUTO: 0.4 % (ref 0–0.5)
LYMPHOCYTES # BLD AUTO: 0.9 K/UL (ref 1–4.8)
LYMPHOCYTES NFR BLD: 20.1 % (ref 18–48)
MAGNESIUM SERPL-MCNC: 2.2 MG/DL (ref 1.6–2.6)
MCH RBC QN AUTO: 31.1 PG (ref 27–31)
MCHC RBC AUTO-ENTMCNC: 32.1 G/DL (ref 32–36)
MCV RBC AUTO: 97 FL (ref 82–98)
MONOCYTES # BLD AUTO: 0.5 K/UL (ref 0.3–1)
MONOCYTES NFR BLD: 10.4 % (ref 4–15)
NEUTROPHILS # BLD AUTO: 2.9 K/UL (ref 1.8–7.7)
NEUTROPHILS NFR BLD: 63.8 % (ref 38–73)
NRBC BLD-RTO: 0 /100 WBC
PHOSPHATE SERPL-MCNC: 3.5 MG/DL (ref 2.7–4.5)
PLATELET # BLD AUTO: 227 K/UL (ref 150–350)
PMV BLD AUTO: 9.8 FL (ref 9.2–12.9)
POTASSIUM SERPL-SCNC: 4.2 MMOL/L (ref 3.5–5.1)
PROT SERPL-MCNC: 5.9 G/DL (ref 6–8.4)
RBC # BLD AUTO: 3.73 M/UL (ref 4.6–6.2)
SARS-COV-2 RNA RESP QL NAA+PROBE: NOT DETECTED
SODIUM SERPL-SCNC: 139 MMOL/L (ref 136–145)
WBC # BLD AUTO: 4.52 K/UL (ref 3.9–12.7)

## 2020-10-24 PROCEDURE — 85025 COMPLETE CBC W/AUTO DIFF WBC: CPT

## 2020-10-24 PROCEDURE — 99232 PR SUBSEQUENT HOSPITAL CARE,LEVL II: ICD-10-PCS | Mod: ,,, | Performed by: HOSPITALIST

## 2020-10-24 PROCEDURE — 84100 ASSAY OF PHOSPHORUS: CPT

## 2020-10-24 PROCEDURE — 36415 COLL VENOUS BLD VENIPUNCTURE: CPT

## 2020-10-24 PROCEDURE — 25000003 PHARM REV CODE 250: Performed by: STUDENT IN AN ORGANIZED HEALTH CARE EDUCATION/TRAINING PROGRAM

## 2020-10-24 PROCEDURE — 99232 SBSQ HOSP IP/OBS MODERATE 35: CPT | Mod: ,,, | Performed by: HOSPITALIST

## 2020-10-24 PROCEDURE — 11000001 HC ACUTE MED/SURG PRIVATE ROOM

## 2020-10-24 PROCEDURE — 80053 COMPREHEN METABOLIC PANEL: CPT

## 2020-10-24 PROCEDURE — 83735 ASSAY OF MAGNESIUM: CPT

## 2020-10-24 RX ADMIN — PANTOPRAZOLE SODIUM 40 MG: 40 TABLET, DELAYED RELEASE ORAL at 10:10

## 2020-10-24 RX ADMIN — BACITRACIN: 500 OINTMENT TOPICAL at 10:10

## 2020-10-24 NOTE — PLAN OF CARE
Pt alert. V/s stable. Rested quietly most of shift. No complaints of pain or discomfort. Will continue to monitor.     Problem: Adult Inpatient Plan of Care  Goal: Plan of Care Review  Outcome: Ongoing, Progressing     Problem: Adult Inpatient Plan of Care  Goal: Patient-Specific Goal (Individualization)  Outcome: Ongoing, Progressing     Problem: Adult Inpatient Plan of Care  Goal: Absence of Hospital-Acquired Illness or Injury  Outcome: Ongoing, Progressing     Problem: Fall Injury Risk  Goal: Absence of Fall and Fall-Related Injury  Outcome: Ongoing, Progressing     Problem: Thought Process Alteration  Goal: Optimal Thought Clarity  Outcome: Ongoing, Progressing

## 2020-10-24 NOTE — PLAN OF CARE
Problem: Adult Inpatient Plan of Care  Goal: Plan of Care Review  Outcome: Ongoing, Progressing  Goal: Patient-Specific Goal (Individualization)  Outcome: Ongoing, Progressing  Goal: Absence of Hospital-Acquired Illness or Injury  Outcome: Ongoing, Progressing  Goal: Optimal Comfort and Wellbeing  Outcome: Ongoing, Progressing  Goal: Readiness for Transition of Care  Outcome: Ongoing, Progressing  Goal: Rounds/Family Conference  Outcome: Ongoing, Progressing     Problem: Fall Injury Risk  Goal: Absence of Fall and Fall-Related Injury  Outcome: Ongoing, Progressing     Problem: Thought Process Alteration  Goal: Optimal Thought Clarity  Outcome: Ongoing, Progressing   Patient VS remain within his normal range. No significant health changes to report. Pt is alert with moment of confusion. Wound care completed per order. Ordered interventions completed.  No falls or injuries during shift.

## 2020-10-24 NOTE — PROGRESS NOTES
Progress Note  Hospital Medicine    Provider team: [facility] [place]  Admit Date: 10/2/2020  Encounter Date: 10/24/2020     SUBJECTIVE:     Follow-up Visit for: Cognitive impairment    HPI (See H&P for complete P,F,SHx):  Per Adriana Badillo MD:   Mr. Juan Hobson is a 69 y.o. male with recently diagnosed penile cancer, who presents to the ER from Urology clinic with concern for self neglect, and need for Neurology evaluation of dementia and possible placement.  The patient mentions that he has been having some issues with his memory.  He mostly orders takeout for meals because it is easier, and still drives to work.  He feels like he is able to take care of himself, and does not need to go to a facility.      Per note from Lianne German LCSW on 10/2:  Received consult from Dr. Monterroso re: assessing patient in clinic this afternoon and care recommendations as patient did not seem competent to make decisions and give consent. He has no family and no legal next of kin (POA or guardian).   Met individually with patient, and with his co-worker Janna Go ([phone], ext. [phone]) who brought him to his clinic appointment today. Patient unable to relate basic demographic information or clearly explain other information. For example, when asked his birth date he gave the year [birth year], then said 9, and then said the second to last, it begins with N; he said his street name but couldn't give the house number where he has lived for years; unable to say what type of work or where he worked prior to the [employer] in [facility] [address] for the past 12 years; he described being taken in a truck to that other place (referring to the recent [facility] stay). He has awareness that he is having memory/cognitive issues but stated he can manage for himself at home and that he knows there is a lot of work that needs to be done in his home that he hasn't gotten to. He inherited the home after his mother .        [place] and another coworker Juice Martinez ([phone]) and their supervisor Meiirma Meanso ([phone]) have been trying to help him manage things with his work and personal matters as they have observed his deficits and know he has no one to help him. They have helped him keep up with recent medical appointments and accompanied and transported him.  Mr. Go reports concern for his safety and ability to care for himself after seeing his home with clutter throughout and things piled up, mold and old food in the refrigerator, mold in the toilet, clothes all over, etc. Patient has been driving himself to and from work and to get food at places near his home, but unable to navigate to unfamiliar places. They have observed his cognitive deficits at work and he has made errors, so they have modified his job and his supervisor has been working with him to try to get FMLA and his leave/residential in place so he doesn't lose his benefits. They have had to help him with paperwork, bill paying, etc., because he can't complete these on his own.         Upon arrival to the ER, vitals were temp 97.8F, HR 59 and /84.  Labs were unremarkable  He was admitted to Hospital Medicine for Neurology and SW evaluation.     Overview/Hospital Course:  Admitted to hospital medicine for worsening cognitive decline and inability to care for self in setting of concern for penile cancer.  In pursuit of workup for altered mental status - labs/infectious work-up grossly unremarkable. Neurology consulted and MRI obtained on admission with concern for encephalitis (increased FLAIR hyperintensity involving the bilateral mesial temporal lobes which can be seen in the setting of autoimmune limbic encephalitis in the appropriate clinical setting). LP recommended and attempted at bedside however not successful 2/2 to JEAN-PIERRED. Interventional radiology consulted, LP performed 10/5 with CSF (1 WBC, 190 RBC, 71 protein, 52 glucose). CSF HSV, ACE  negative. EEG 10/3 (1 hr 40 min) with mild regional dysfunction in bilateral temporal lobes, but no electrographic seizures. Serum paraneoplastic panel from 9/28 has resulted negative. MRI brain W WO contrast showing increased FLAIR hyperintensity involving the bilateral mesial temporal lobes. Completed 5 days of empiric IV Solumedrol 1 g qd without notable clinical improvement. Presentation most concerning for dementia   Psychiatry consulted for formal capacity evaluation and concluded that he does not have capacity to make decisions regarding his long term care disposition however does not require PEC at this time as his stay has been non-contested. He has no family, children and have relied on coworkers for assistance. The case was brought to the attention of EPS who have no plans to intervene at this time and recommend since the patient presented to the hospital it is a safe discharge planning issue that would involve [facility] Legal department. The Legal department intend to pursue [place] Guardianship to assist with managing affairs. Patient re-engaged with care for likely penile carcinoma with urology.  A determination was made that appropriate treatment for penile tumor was penectomy - patient proceeded to OR on 10/19 with partial penectomy completed.   Figueredo catheter placed and traumatically self discontinued by patient.  Urology is following.      Interval History:   POD5 from OR on 10/19 with partial penectomy completed. Voiding independently without issue. Scant blood in surgical site on exam but no overt bleeding. Urology is following, conts  bacitracin ointment.  [facility] orders for CM placement submitted.     Review of Systems:  Review of Systems   Constitutional: Negative for chills and fever.   HENT: Negative for congestion and sore throat.    Eyes: Negative for photophobia, pain and discharge.   Respiratory: Negative for cough, hemoptysis, sputum production and shortness of breath.    Cardiovascular:  "Negative for chest pain, palpitations and leg swelling.   Gastrointestinal: Negative for abdominal pain, diarrhea, nausea and vomiting.   Genitourinary: Negative for dysuria and urgency.   Musculoskeletal: Negative for myalgias and neck pain.   Skin: Negative for itching and rash.   Neurological: Negative for sensory change, focal weakness and headaches.   Endo/Heme/Allergies: Negative for polydipsia. Does not bruise/bleed easily.   Psychiatric/Behavioral: Negative for depression and suicidal ideas.     OBJECTIVE:       Intake/Output Summary (Last 24 hours) at 10/24/2020 1124  Last data filed at 10/23/2020 1800  Gross per 24 hour   Intake 400 ml   Output 1580 ml   Net -1180 ml     Vital Signs Range (Last 24H):  Temp:  [97.2 °F (36.2 °C)-98.7 °F (37.1 °C)]   Pulse:  [66-78]   Resp:  [16-18]   BP: (118-122)/(61-69)   SpO2:  [93 %-99 %]   Body mass index is 22.78 kg/m².    Objective:  Vital signs: (most recent): Blood pressure 122/69, pulse 66, temperature 97.2 °F (36.2 °C), temperature source Oral, resp. rate 18, height 5' 10" (1.778 m), weight 72 kg (158 lb 11.7 oz), SpO2 98 %.  No fever.    Physical Exam  Constitutional:       General: He is not in acute distress.     Appearance: He is well-developed. He is not ill-appearing, toxic-appearing or diaphoretic.   HENT:      Head: Atraumatic. No abrasion, contusion or laceration.      Nose: Nose normal.      Mouth/Throat:      Pharynx: No oropharyngeal exudate.   Eyes:      General: No scleral icterus.        Right eye: No discharge.         Left eye: No discharge.      Conjunctiva/sclera: Conjunctivae normal.      Pupils: Pupils are equal, round, and reactive to light.   Neck:      Musculoskeletal: Normal range of motion and neck supple. No neck rigidity.      Vascular: No JVD.   Cardiovascular:      Rate and Rhythm: Normal rate and regular rhythm.      Heart sounds: Normal heart sounds. No murmur. No friction rub. No gallop.    Pulmonary:      Effort: Pulmonary effort " is normal. No accessory muscle usage or respiratory distress.      Breath sounds: Normal breath sounds. No wheezing.   Abdominal:      General: Bowel sounds are normal. There is no distension.      Palpations: Abdomen is soft. There is no mass.      Tenderness: There is no abdominal tenderness. There is no guarding or rebound.   Genitourinary:     Comments: S/p partial penectomy with loose dressing in place; scant blood no overt bleeding   Musculoskeletal: Normal range of motion.         General: No tenderness or deformity.   Lymphadenopathy:      Cervical: No cervical adenopathy.   Skin:     General: Skin is warm and dry.      Capillary Refill: Capillary refill takes less than 2 seconds.      Findings: No bruising, ecchymosis, erythema, petechiae or rash.      Nails: There is no clubbing.   Neurological:      Mental Status: He is alert. Mental status is at baseline.      GCS: GCS eye subscore is 4. GCS verbal subscore is 5. GCS motor subscore is 6.      Cranial Nerves: No cranial nerve deficit.      Sensory: No sensory deficit.      Motor: No abnormal muscle tone.      Coordination: Coordination normal.      Deep Tendon Reflexes: Reflexes normal.      Comments: Oriented to place and time. Unclear to context with frequent redirection, orientation.  Tangential thinking and occasional disorganized thought content    Psychiatric:         Speech: Speech normal.         Behavior: Behavior normal.         Thought Content: Thought content normal.         Judgment: Judgment normal.     Medications:  Medication list was reviewed in EPIC and changes noted under Assessment/Plan and MAR.    Laboratory:  Recent Labs     10/24/20  0508   WBC 4.52   RBC 3.73*   HGB 11.6*   HCT 36.1*      MCV 97   MCH 31.1*   MCHC 32.1   GRAN 63.8  2.9   LYMPH 20.1  0.9*   MONO 10.4  0.5   EOS 0.2      Recent Labs     10/24/20  0508   GLU 91      K 4.2      CO2 26   BUN 16   CREATININE 0.7   CALCIUM 8.5*   ANIONGAP 8   MG 2.2    PHOS 3.5       ASSESSMENT/PLAN:     Active Hospital Problems    Diagnosis  POA    *Cognitive impairment [R41.89]  Yes    Leukocytosis [D72.829]  No    Hypocalcemia [E83.51]  Yes    Acute encephalopathy [G93.40]  Yes    Cognitive decline [R41.89]  Yes    Confusion [R41.0]  Unknown    Debility [R53.81]  Yes    Penile carcinoma [C60.9]  Yes    Altered mental status [R41.82]  Yes      Resolved Hospital Problems    Diagnosis Date Resolved POA    Encephalopathy [G93.40] 10/04/2020 Unknown         * Cognitive impairment  Impaired decision making  Presentation concerning for progressive dementia   -LP performed by IR 10/5/2020: elevated protein, neutrophils  -CSF HSV, ACE negative.   -Serum paraneoplastic panel from 9/28 has resulted negative.  -MRI brain with Possible subtle increased FLAIR hyperintensity involving the bilateral mesial temporal lobes which can be seen in the setting of autoimmune limbic encephalitis  -EEG without seizure activity   -Vitamin B1, Vitamin B6, Vitamin B12, HIV, RPR, TSH unremarkable   - Neurology consulted          --please send CSF to [place] for Alzheimer panel (ADEVL test code) - repeat LP completed          --start empiric IV Solumedrol 1 g qd x 5 days - completed           --formal cognitive assessment with neuropsych            --consider PET scan - not available inpatient           --continue strict delirium precautions  -Psych assess lack of competency   -Formal guardianship being pursued - court date early November  -Adult friends can assist with decision making   -Gina KEVIN [id number]: (9) Upon the inability of any adult to consent for himself and in the absence of any person listed in Paragraphs (2) through (8) of this Subsection, an adult friend of the patient. For purposes of this Subsection to consent, adult friend means an adult who has exhibited special care and concern for the patient, who is generally familiar with the patient's health care views and desires, and  who is willing and able to become involved in the patient's health care decisions and to act in the patient's best interest. The adult friend shall sign and date an acknowledgment form provided by the hospital or other health care facility in which the patient is located for placement in the patient's records certifying that he or she meets such criteria.     - Juice Martinez ([phone]) will make decisions on his behalf  - supervisor Mei Arabella ([phone]) have been trying to help him manage things with his work and personal matters as they have observed his deficits and know he has no one to help him - these individuals may be used for consent      [facility]  - Minimize use of restraints; if physical restraints necessary, please utilize medical/chemical prns for agitation.  - Keep shades open and room lit during day and room dim at night in order to promote healthy circadian rhythms.  - Encourage family at bedside  - Keep whiteboard in patient's room current with the date and name of the members of patient's team for easy patient self re-orientation.  - Avoid benzodiazepines, antihistamines, anticholinergics, hypnotics, and minimize opiates while controlling for pain as these medications may exacerbate delirium.     Penile carcinoma  - follows with urology   - Assessed inpatient - plan for partial / total penectomy   - Partial penectomy 10/19 - pathology pending   - FC placed post op - self discontinued     Anticipated discharge date and disposition:   Will need placement. CM/SW closely involved.  Brayan Castillo MD  [facility]

## 2020-10-25 LAB
ALBUMIN SERPL BCP-MCNC: 3.2 G/DL (ref 3.5–5.2)
ALP SERPL-CCNC: 73 U/L (ref 55–135)
ALT SERPL W/O P-5'-P-CCNC: 32 U/L (ref 10–44)
ANION GAP SERPL CALC-SCNC: 6 MMOL/L (ref 8–16)
AST SERPL-CCNC: 17 U/L (ref 10–40)
BASOPHILS # BLD AUTO: 0.05 K/UL (ref 0–0.2)
BASOPHILS NFR BLD: 1.1 % (ref 0–1.9)
BILIRUB SERPL-MCNC: 0.5 MG/DL (ref 0.1–1)
BUN SERPL-MCNC: 21 MG/DL (ref 8–23)
CALCIUM SERPL-MCNC: 8.5 MG/DL (ref 8.7–10.5)
CHLORIDE SERPL-SCNC: 105 MMOL/L (ref 95–110)
CO2 SERPL-SCNC: 28 MMOL/L (ref 23–29)
CREAT SERPL-MCNC: 0.7 MG/DL (ref 0.5–1.4)
DIFFERENTIAL METHOD: ABNORMAL
EOSINOPHIL # BLD AUTO: 0.2 K/UL (ref 0–0.5)
EOSINOPHIL NFR BLD: 4.3 % (ref 0–8)
ERYTHROCYTE [DISTWIDTH] IN BLOOD BY AUTOMATED COUNT: 13.4 % (ref 11.5–14.5)
EST. GFR  (AFRICAN AMERICAN): >60 ML/MIN/1.73 M^2
EST. GFR  (NON AFRICAN AMERICAN): >60 ML/MIN/1.73 M^2
GLUCOSE SERPL-MCNC: 89 MG/DL (ref 70–110)
HCT VFR BLD AUTO: 36.7 % (ref 40–54)
HGB BLD-MCNC: 11.9 G/DL (ref 14–18)
IMM GRANULOCYTES # BLD AUTO: 0.03 K/UL (ref 0–0.04)
IMM GRANULOCYTES NFR BLD AUTO: 0.6 % (ref 0–0.5)
LYMPHOCYTES # BLD AUTO: 1 K/UL (ref 1–4.8)
LYMPHOCYTES NFR BLD: 22.3 % (ref 18–48)
MAGNESIUM SERPL-MCNC: 2 MG/DL (ref 1.6–2.6)
MCH RBC QN AUTO: 31.9 PG (ref 27–31)
MCHC RBC AUTO-ENTMCNC: 32.4 G/DL (ref 32–36)
MCV RBC AUTO: 98 FL (ref 82–98)
MONOCYTES # BLD AUTO: 0.4 K/UL (ref 0.3–1)
MONOCYTES NFR BLD: 8.4 % (ref 4–15)
NEUTROPHILS # BLD AUTO: 3 K/UL (ref 1.8–7.7)
NEUTROPHILS NFR BLD: 63.3 % (ref 38–73)
NRBC BLD-RTO: 0 /100 WBC
PHOSPHATE SERPL-MCNC: 3.5 MG/DL (ref 2.7–4.5)
PLATELET # BLD AUTO: 233 K/UL (ref 150–350)
PMV BLD AUTO: 9.5 FL (ref 9.2–12.9)
POTASSIUM SERPL-SCNC: 4.2 MMOL/L (ref 3.5–5.1)
PROT SERPL-MCNC: 6 G/DL (ref 6–8.4)
RBC # BLD AUTO: 3.73 M/UL (ref 4.6–6.2)
SODIUM SERPL-SCNC: 139 MMOL/L (ref 136–145)
WBC # BLD AUTO: 4.66 K/UL (ref 3.9–12.7)

## 2020-10-25 PROCEDURE — 11000001 HC ACUTE MED/SURG PRIVATE ROOM

## 2020-10-25 PROCEDURE — 36415 COLL VENOUS BLD VENIPUNCTURE: CPT

## 2020-10-25 PROCEDURE — 80053 COMPREHEN METABOLIC PANEL: CPT

## 2020-10-25 PROCEDURE — 85025 COMPLETE CBC W/AUTO DIFF WBC: CPT

## 2020-10-25 PROCEDURE — 99233 PR SUBSEQUENT HOSPITAL CARE,LEVL III: ICD-10-PCS | Mod: ,,, | Performed by: HOSPITALIST

## 2020-10-25 PROCEDURE — 84100 ASSAY OF PHOSPHORUS: CPT

## 2020-10-25 PROCEDURE — 83735 ASSAY OF MAGNESIUM: CPT

## 2020-10-25 PROCEDURE — 99233 SBSQ HOSP IP/OBS HIGH 50: CPT | Mod: ,,, | Performed by: HOSPITALIST

## 2020-10-25 PROCEDURE — 25000003 PHARM REV CODE 250: Performed by: STUDENT IN AN ORGANIZED HEALTH CARE EDUCATION/TRAINING PROGRAM

## 2020-10-25 RX ADMIN — PANTOPRAZOLE SODIUM 40 MG: 40 TABLET, DELAYED RELEASE ORAL at 08:10

## 2020-10-25 RX ADMIN — BACITRACIN: 500 OINTMENT TOPICAL at 07:10

## 2020-10-25 NOTE — PLAN OF CARE
Patient condition unchanged this shift, vital all within expected parameters.  Patient denies any pain or discomfort.  Patient still confused and response delayed.  Aox2-3. Easily redirected.  Patient lying quiet in bed that is locked and low.

## 2020-10-25 NOTE — PROGRESS NOTES
Progress Note  Hospital Medicine    Provider team: [facility] HOSP MED O  Admit Date: 10/2/2020  Encounter Date: 10/25/2020     SUBJECTIVE:     Follow-up Visit for: Cognitive impairment    HPI (See H&P for complete P,F,SHx):  Per Adriana Badillo MD:   Mr. Juan Hobson is a 69 y.o. male with recently diagnosed penile cancer, who presents to the ER from Urology clinic with concern for self neglect, and need for Neurology evaluation of dementia and possible placement.  The patient mentions that he has been having some issues with his memory.  He mostly orders takeout for meals because it is easier, and still drives to work.  He feels like he is able to take care of himself, and does not need to go to a facility.      Per note from Lianne German LCSW on 10/2:  Received consult from Dr. Monterroso re: assessing patient in clinic this afternoon and care recommendations as patient did not seem competent to make decisions and give consent. He has no family and no legal next of kin (POA or guardian).   Met individually with patient, and with his co-worker Janna Go ([phone], ext. [phone]) who brought him to his clinic appointment today. Patient unable to relate basic demographic information or clearly explain other information. For example, when asked his birth date he gave the year [birth year], then said 9, and then said the second to last, it begins with N; he said his street name but couldn't give the house number where he has lived for years; unable to say what type of work or where he worked prior to the [employer] in [facility] for the past 12 years; he described being taken in a truck to that other place (referring to the recent [facility] stay). He has awareness that he is having memory/cognitive issues but stated he can manage for himself at home and that he knows there is a lot of work that needs to be done in his home that he hasn't gotten to. He inherited the home after his mother .        [place] and another coworker Juice Martinez ([phone]) and their supervisor Meiirma Meanso ([phone]) have been trying to help him manage things with his work and personal matters as they have observed his deficits and know he has no one to help him. They have helped him keep up with recent medical appointments and accompanied and transported him.  Mr. Go reports concern for his safety and ability to care for himself after seeing his home with clutter throughout and things piled up, mold and old food in the refrigerator, mold in the toilet, clothes all over, etc. Patient has been driving himself to and from work and to get food at places near his home, but unable to navigate to unfamiliar places. They have observed his cognitive deficits at work and he has made errors, so they have modified his job and his supervisor has been working with him to try to get FMLA and his leave/senior living in place so he doesn't lose his benefits. They have had to help him with paperwork, bill paying, etc., because he can't complete these on his own.         Upon arrival to the ER, vitals were temp 97.8F, HR 59 and /84.  Labs were unremarkable  He was admitted to Hospital Medicine for Neurology and SW evaluation.     Overview/Hospital Course:  Admitted to hospital medicine for worsening cognitive decline and inability to care for self in setting of concern for penile cancer.  In pursuit of workup for altered mental status - labs/infectious work-up grossly unremarkable. Neurology consulted and MRI obtained on admission with concern for encephalitis (increased FLAIR hyperintensity involving the bilateral mesial temporal lobes which can be seen in the setting of autoimmune limbic encephalitis in the appropriate clinical setting). LP recommended and attempted at bedside however not successful 2/2 to JEAN-PIERRED. Interventional radiology consulted, LP performed 10/5 with CSF (1 WBC, 190 RBC, 71 protein, 52 glucose). CSF HSV, ACE  negative. EEG 10/3 (1 hr 40 min) with mild regional dysfunction in bilateral temporal lobes, but no electrographic seizures. Serum paraneoplastic panel from 9/28 has resulted negative. MRI brain W WO contrast showing increased FLAIR hyperintensity involving the bilateral mesial temporal lobes. Completed 5 days of empiric IV Solumedrol 1 g qd without notable clinical improvement. Presentation most concerning for dementia   Psychiatry consulted for formal capacity evaluation and concluded that he does not have capacity to make decisions regarding his long term care disposition however does not require PEC at this time as his stay has been non-contested. He has no family, children and have relied on coworkers for assistance. The case was brought to the attention of EPS who have no plans to intervene at this time and recommend since the patient presented to the hospital it is a safe discharge planning issue that would involve [facility] Legal department. The Legal department intend to pursue [place] Guardianship to assist with managing affairs. Patient re-engaged with care for likely penile carcinoma with urology.  A determination was made that appropriate treatment for penile tumor was penectomy - patient proceeded to OR on 10/19 with partial penectomy completed.   Figueredo catheter placed and traumatically self discontinued by patient.  Urology is following.      Interval History:   POD5 from OR on 10/19 with partial penectomy completed. Voiding independently without issue. Scant blood in surgical site on exam but no overt bleeding. Urology is following, conts  bacitracin ointment.  [facility] orders for CM placement submitted.     Review of Systems:  Review of Systems   Constitutional: Negative for chills and fever.   HENT: Negative for congestion and sore throat.    Eyes: Negative for photophobia, pain and discharge.   Respiratory: Negative for cough, hemoptysis, sputum production and shortness of breath.    Cardiovascular:  "Negative for chest pain, palpitations and leg swelling.   Gastrointestinal: Negative for abdominal pain, diarrhea, nausea and vomiting.   Genitourinary: Negative for dysuria and urgency.   Musculoskeletal: Negative for myalgias and neck pain.   Skin: Negative for itching and rash.   Neurological: Negative for sensory change, focal weakness and headaches.   Endo/Heme/Allergies: Negative for polydipsia. Does not bruise/bleed easily.   Psychiatric/Behavioral: Negative for depression and suicidal ideas.     OBJECTIVE:       Intake/Output Summary (Last 24 hours) at 10/25/2020 1544  Last data filed at 10/25/2020 1300  Gross per 24 hour   Intake 720 ml   Output 2200 ml   Net -1480 ml     Vital Signs Range (Last 24H):  Temp:  [97.4 °F (36.3 °C)-98.8 °F (37.1 °C)]   Pulse:  [71-88]   Resp:  [18-20]   BP: (108-133)/(60-78)   SpO2:  [96 %-98 %]   Body mass index is 22.78 kg/m².    Objective:  Vital signs: (most recent): Blood pressure 133/74, pulse 81, temperature 97.4 °F (36.3 °C), temperature source Oral, resp. rate 20, height 5' 10" (1.778 m), weight 72 kg (158 lb 11.7 oz), SpO2 98 %.  No fever.    Physical Exam  Constitutional:       General: He is not in acute distress.     Appearance: He is well-developed. He is not ill-appearing, toxic-appearing or diaphoretic.   HENT:      Head: Atraumatic. No abrasion, contusion or laceration.      Nose: Nose normal.      Mouth/Throat:      Pharynx: No oropharyngeal exudate.   Eyes:      General: No scleral icterus.        Right eye: No discharge.         Left eye: No discharge.      Conjunctiva/sclera: Conjunctivae normal.      Pupils: Pupils are equal, round, and reactive to light.   Neck:      Musculoskeletal: Normal range of motion and neck supple. No neck rigidity.      Vascular: No JVD.   Cardiovascular:      Rate and Rhythm: Normal rate and regular rhythm.      Heart sounds: Normal heart sounds. No murmur. No friction rub. No gallop.    Pulmonary:      Effort: Pulmonary effort " is normal. No accessory muscle usage or respiratory distress.      Breath sounds: Normal breath sounds. No wheezing.   Abdominal:      General: Bowel sounds are normal. There is no distension.      Palpations: Abdomen is soft. There is no mass.      Tenderness: There is no abdominal tenderness. There is no guarding or rebound.   Genitourinary:     Comments: S/p partial penectomy with loose dressing in place; scant blood no overt bleeding   Musculoskeletal: Normal range of motion.         General: No tenderness or deformity.   Lymphadenopathy:      Cervical: No cervical adenopathy.   Skin:     General: Skin is warm and dry.      Capillary Refill: Capillary refill takes less than 2 seconds.      Findings: No bruising, ecchymosis, erythema, petechiae or rash.      Nails: There is no clubbing.   Neurological:      Mental Status: He is alert. Mental status is at baseline.      GCS: GCS eye subscore is 4. GCS verbal subscore is 5. GCS motor subscore is 6.      Cranial Nerves: No cranial nerve deficit.      Sensory: No sensory deficit.      Motor: No abnormal muscle tone.      Coordination: Coordination normal.      Deep Tendon Reflexes: Reflexes normal.      Comments: Oriented to place and time. Unclear to context with frequent redirection, orientation.  Tangential thinking and occasional disorganized thought content    Psychiatric:         Speech: Speech normal.         Behavior: Behavior normal.         Thought Content: Thought content normal.         Judgment: Judgment normal.     Medications:  Medication list was reviewed in EPIC and changes noted under Assessment/Plan and MAR.    Laboratory:  Recent Labs     10/25/20  0451   WBC 4.66   RBC 3.73*   HGB 11.9*   HCT 36.7*      MCV 98   MCH 31.9*   MCHC 32.4   GRAN 63.3  3.0   LYMPH 22.3  1.0   MONO 8.4  0.4   EOS 0.2      Recent Labs     10/25/20  0451   GLU 89      K 4.2      CO2 28   BUN 21   CREATININE 0.7   CALCIUM 8.5*   ANIONGAP 6*   MG 2.0    PHOS 3.5       ASSESSMENT/PLAN:     Active Hospital Problems    Diagnosis  POA    *Cognitive impairment [R41.89]  Yes    Leukocytosis [D72.829]  No    Hypocalcemia [E83.51]  Yes    Acute encephalopathy [G93.40]  Yes    Cognitive decline [R41.89]  Yes    Confusion [R41.0]  Unknown    Debility [R53.81]  Yes    Penile carcinoma [C60.9]  Yes    Altered mental status [R41.82]  Yes      Resolved Hospital Problems    Diagnosis Date Resolved POA    Encephalopathy [G93.40] 10/04/2020 Unknown         * Cognitive impairment  Impaired decision making  Presentation concerning for progressive dementia   -LP performed by IR 10/5/2020: elevated protein, neutrophils  -CSF HSV, ACE negative.   -Serum paraneoplastic panel from 9/28 has resulted negative.  -MRI brain with Possible subtle increased FLAIR hyperintensity involving the bilateral mesial temporal lobes which can be seen in the setting of autoimmune limbic encephalitis  -EEG without seizure activity   -Vitamin B1, Vitamin B6, Vitamin B12, HIV, RPR, TSH unremarkable   - Neurology consulted          --please send CSF to [place] for Alzheimer panel (ADEVL test code) - repeat LP completed          --start empiric IV Solumedrol 1 g qd x 5 days - completed           --formal cognitive assessment with neuropsych            --consider PET scan - not available inpatient           --continue strict delirium precautions  -Psych assess lack of competency   -Formal guardianship being pursued - court date early November  -Adult friends can assist with decision making   -Gina KEVIN 40:[id number]: (9) Upon the inability of any adult to consent for himself and in the absence of any person listed in Paragraphs (2) through (8) of this Subsection, an adult friend of the patient. For purposes of this Subsection to consent, adult friend means an adult who has exhibited special care and concern for the patient, who is generally familiar with the patient's health care views and desires, and  who is willing and able to become involved in the patient's health care decisions and to act in the patient's best interest. The adult friend shall sign and date an acknowledgment form provided by the hospital or other health care facility in which the patient is located for placement in the patient's records certifying that he or she meets such criteria.     - Juice Martinez ([phone]) will make decisions on his behalf  - supervisor Mei Arabella ([phone]) have been trying to help him manage things with his work and personal matters as they have observed his deficits and know he has no one to help him - these individuals may be used for consent      [facility]NAG[facility]  - Minimize use of restraints; if physical restraints necessary, please utilize medical/chemical prns for agitation.  - Keep shades open and room lit during day and room dim at night in order to promote healthy circadian rhythms.  - Encourage family at bedside  - Keep whiteboard in patient's room current with the date and name of the members of patient's team for easy patient self re-orientation.  - Avoid benzodiazepines, antihistamines, anticholinergics, hypnotics, and minimize opiates while controlling for pain as these medications may exacerbate delirium.     Penile carcinoma  - follows with urology   - Assessed inpatient - plan for partial / total penectomy   - Partial penectomy 10/19 - pathology pending   - FC placed post op - self discontinued     Anticipated discharge date and disposition:   Will need placement. CM/SW closely involved.  Brayan Castillo MD  [facility]

## 2020-10-26 LAB
ALBUMIN SERPL BCP-MCNC: 3.1 G/DL (ref 3.5–5.2)
ALP SERPL-CCNC: 72 U/L (ref 55–135)
ALT SERPL W/O P-5'-P-CCNC: 34 U/L (ref 10–44)
ANION GAP SERPL CALC-SCNC: 7 MMOL/L (ref 8–16)
AST SERPL-CCNC: 23 U/L (ref 10–40)
BASOPHILS # BLD AUTO: 0.05 K/UL (ref 0–0.2)
BASOPHILS NFR BLD: 1.1 % (ref 0–1.9)
BILIRUB SERPL-MCNC: 0.4 MG/DL (ref 0.1–1)
BUN SERPL-MCNC: 17 MG/DL (ref 8–23)
CALCIUM SERPL-MCNC: 8.6 MG/DL (ref 8.7–10.5)
CHLORIDE SERPL-SCNC: 105 MMOL/L (ref 95–110)
CO2 SERPL-SCNC: 28 MMOL/L (ref 23–29)
CREAT SERPL-MCNC: 0.7 MG/DL (ref 0.5–1.4)
DIFFERENTIAL METHOD: ABNORMAL
EOSINOPHIL # BLD AUTO: 0.2 K/UL (ref 0–0.5)
EOSINOPHIL NFR BLD: 5 % (ref 0–8)
ERYTHROCYTE [DISTWIDTH] IN BLOOD BY AUTOMATED COUNT: 13.4 % (ref 11.5–14.5)
EST. GFR  (AFRICAN AMERICAN): >60 ML/MIN/1.73 M^2
EST. GFR  (NON AFRICAN AMERICAN): >60 ML/MIN/1.73 M^2
GLUCOSE SERPL-MCNC: 88 MG/DL (ref 70–110)
HCT VFR BLD AUTO: 35.4 % (ref 40–54)
HGB BLD-MCNC: 11.5 G/DL (ref 14–18)
IMM GRANULOCYTES # BLD AUTO: 0.02 K/UL (ref 0–0.04)
IMM GRANULOCYTES NFR BLD AUTO: 0.5 % (ref 0–0.5)
LYMPHOCYTES # BLD AUTO: 1.2 K/UL (ref 1–4.8)
LYMPHOCYTES NFR BLD: 27.3 % (ref 18–48)
MAGNESIUM SERPL-MCNC: 2 MG/DL (ref 1.6–2.6)
MCH RBC QN AUTO: 31.9 PG (ref 27–31)
MCHC RBC AUTO-ENTMCNC: 32.5 G/DL (ref 32–36)
MCV RBC AUTO: 98 FL (ref 82–98)
MONOCYTES # BLD AUTO: 0.4 K/UL (ref 0.3–1)
MONOCYTES NFR BLD: 8.4 % (ref 4–15)
NEUTROPHILS # BLD AUTO: 2.5 K/UL (ref 1.8–7.7)
NEUTROPHILS NFR BLD: 57.7 % (ref 38–73)
NRBC BLD-RTO: 0 /100 WBC
PHOSPHATE SERPL-MCNC: 3.5 MG/DL (ref 2.7–4.5)
PLATELET # BLD AUTO: 236 K/UL (ref 150–350)
PMV BLD AUTO: 9.9 FL (ref 9.2–12.9)
POTASSIUM SERPL-SCNC: 4 MMOL/L (ref 3.5–5.1)
PROT SERPL-MCNC: 5.8 G/DL (ref 6–8.4)
RBC # BLD AUTO: 3.6 M/UL (ref 4.6–6.2)
SODIUM SERPL-SCNC: 140 MMOL/L (ref 136–145)
WBC # BLD AUTO: 4.4 K/UL (ref 3.9–12.7)

## 2020-10-26 PROCEDURE — 11000001 HC ACUTE MED/SURG PRIVATE ROOM

## 2020-10-26 PROCEDURE — 80053 COMPREHEN METABOLIC PANEL: CPT

## 2020-10-26 PROCEDURE — 84100 ASSAY OF PHOSPHORUS: CPT

## 2020-10-26 PROCEDURE — 85025 COMPLETE CBC W/AUTO DIFF WBC: CPT

## 2020-10-26 PROCEDURE — 99232 PR SUBSEQUENT HOSPITAL CARE,LEVL II: ICD-10-PCS | Mod: ,,, | Performed by: HOSPITALIST

## 2020-10-26 PROCEDURE — 63600175 PHARM REV CODE 636 W HCPCS: Performed by: HOSPITALIST

## 2020-10-26 PROCEDURE — 99232 SBSQ HOSP IP/OBS MODERATE 35: CPT | Mod: ,,, | Performed by: HOSPITALIST

## 2020-10-26 PROCEDURE — 83735 ASSAY OF MAGNESIUM: CPT

## 2020-10-26 PROCEDURE — 25000003 PHARM REV CODE 250: Performed by: STUDENT IN AN ORGANIZED HEALTH CARE EDUCATION/TRAINING PROGRAM

## 2020-10-26 PROCEDURE — 36415 COLL VENOUS BLD VENIPUNCTURE: CPT

## 2020-10-26 RX ADMIN — ENOXAPARIN SODIUM 40 MG: 40 INJECTION SUBCUTANEOUS at 05:10

## 2020-10-26 RX ADMIN — BACITRACIN: 500 OINTMENT TOPICAL at 09:10

## 2020-10-26 RX ADMIN — PANTOPRAZOLE SODIUM 40 MG: 40 TABLET, DELAYED RELEASE ORAL at 09:10

## 2020-10-26 NOTE — PLAN OF CARE
Pt is awaiting legal guardianship with court date set for  Nov 9, 2020. Pt will remain here at [facility] until such date appointed guardianship is complete.     10/26/20 1400   Discharge Reassessment   Assessment Type Discharge Planning Reassessment   Provided patient/caregiver education on the expected discharge date and the discharge plan Yes   Do you have any problems affording any of your prescribed medications? No   Discharge Plan A Other   Discharge Plan B Other   DME Needed Upon Discharge  none   Anticipated Discharge Disposition   (Awaiting legal guardianship for placement)   Can the patient/caregiver answer the patient profile reliably? No, cognitively impaired   How does the patient rate their overall health at the present time? Good   Describe the patient's ability to walk at the present time. No restrictions   How often would a person be available to care for the patient? Occasionally   Number of comorbid conditions (as recorded on the chart) Two   During the past month, has the patient often been bothered by little interest or pleasure in doing things? No   Post-Acute Status   Post-Acute Authorization Placement   Post-Acute Placement Status Pending State Direction   Discharge Delays None known at this time   Modesta Blum, MSN  Case Management  Ext [phone]

## 2020-10-26 NOTE — PLAN OF CARE
Problem: Adult Inpatient Plan of Care  Goal: Plan of Care Review  Outcome: Ongoing, Progressing  Goal: Patient-Specific Goal (Individualization)  Outcome: Ongoing, Progressing  Goal: Absence of Hospital-Acquired Illness or Injury  Outcome: Ongoing, Progressing  Goal: Optimal Comfort and Wellbeing  Outcome: Ongoing, Progressing  Goal: Readiness for Transition of Care  Outcome: Ongoing, Progressing     Problem: Fall Injury Risk  Goal: Absence of Fall and Fall-Related Injury  Outcome: Ongoing, Progressing     Problem: Thought Process Alteration  Goal: Optimal Thought Clarity  Outcome: Ongoing, Progressing     Problem: Infection  Goal: Infection Symptom Resolution  Outcome: Ongoing, Progressing

## 2020-10-26 NOTE — PLAN OF CARE
"ANNIKA received call from Rajan Wagner ([phone]) stating he is  Mr. Hobson's caregiver and "want to take him home". I informed Mr. Wagner that he is under  and the decision regarding his d/cd disposition is completley  Out of my control. He then stated, he would like to speak to his ,  I explained when has legal guardianship completed, he could contact them regarding Mr. Hobson and his dc plans. CM will cont to follow.    Modesta Blum, MSN  Case Management  Ext [phone]  "

## 2020-10-26 NOTE — PLAN OF CARE
Plan of care discussed with patient. Pt AAOx4 and able to make needs known. Ad nico. Safety maintained. Patient is free of fall/trauma/injury. Denies CP, SOB, or pain/discomfort. All questions addressed. Will continue to monitor

## 2020-10-26 NOTE — PLAN OF CARE
"   10/26/20 0836   Post-Acute Status   Post-Acute Authorization Placement   Discharge Delays (!) Patient and Family Barriers     Jose and [facility] Natty met with [place] who had court appointed paperwork for Pt who will have a hearing for judicial commitment. Pt very frustrated and sad stating "I may as well have a gun to my head." Jose to inform CM leadership of the legal paperwork and [facility] aware of Pt self harm statements.   "

## 2020-10-26 NOTE — PROGRESS NOTES
Progress Note  Hospital Medicine    Provider team: [facility]  Admit Date: 10/2/2020  Encounter Date: 10/26/2020     SUBJECTIVE:     Follow-up Visit for: Cognitive impairment    HPI (See H&P for complete P,F,SHx):  Per Adriana Badillo MD:   Mr. Juan Hobson is a 69 y.o. male with recently diagnosed penile cancer, who presents to the ER from Urology clinic with concern for self neglect, and need for Neurology evaluation of dementia and possible placement.  The patient mentions that he has been having some issues with his memory.  He mostly orders takeout for meals because it is easier, and still drives to work.  He feels like he is able to take care of himself, and does not need to go to a facility.      Per note from Lianne German LCSW on 10/2:  Received consult from Dr. Monterroso re: assessing patient in clinic this afternoon and care recommendations as patient did not seem competent to make decisions and give consent. He has no family and no legal next of kin (POA or guardian).   Met individually with patient, and with his co-worker Janna Go ([phone], ext. [phone]) who brought him to his clinic appointment today. Patient unable to relate basic demographic information or clearly explain other information. For example, when asked his birth date he gave the year [birth year], then said 9, and then said the second to last, it begins with N; he said his street name but couldn't give the house number where he has lived for years; unable to say what type of work or where he worked prior to the [employer] in [facility] for the past 12 years; he described being taken in a truck to that other place (referring to the recent [facility] stay). He has awareness that he is having memory/cognitive issues but stated he can manage for himself at home and that he knows there is a lot of work that needs to be done in his home that he hasn't gotten to. He inherited the home after his mother .        [place] and another coworker Juice Martinez ([phone]) and their supervisor Meiirma Meanso ([phone]) have been trying to help him manage things with his work and personal matters as they have observed his deficits and know he has no one to help him. They have helped him keep up with recent medical appointments and accompanied and transported him.  Mr. Go reports concern for his safety and ability to care for himself after seeing his home with clutter throughout and things piled up, mold and old food in the refrigerator, mold in the toilet, clothes all over, etc. Patient has been driving himself to and from work and to get food at places near his home, but unable to navigate to unfamiliar places. They have observed his cognitive deficits at work and he has made errors, so they have modified his job and his supervisor has been working with him to try to get FMLA and his leave/prison in place so he doesn't lose his benefits. They have had to help him with paperwork, bill paying, etc., because he can't complete these on his own.         Upon arrival to the ER, vitals were temp 97.8F, HR 59 and /84.  Labs were unremarkable  He was admitted to Hospital Medicine for Neurology and SW evaluation.     Overview/Hospital Course:  Admitted to hospital medicine for worsening cognitive decline and inability to care for self in setting of concern for penile cancer.  In pursuit of workup for altered mental status - labs/infectious work-up grossly unremarkable. Neurology consulted and MRI obtained on admission with concern for encephalitis (increased FLAIR hyperintensity involving the bilateral mesial temporal lobes which can be seen in the setting of autoimmune limbic encephalitis in the appropriate clinical setting). LP recommended and attempted at bedside however not successful 2/2 to JEAN-PIERRED. Interventional radiology consulted, LP performed 10/5 with CSF (1 WBC, 190 RBC, 71 protein, 52 glucose). CSF HSV, ACE  negative. EEG 10/3 (1 hr 40 min) with mild regional dysfunction in bilateral temporal lobes, but no electrographic seizures. Serum paraneoplastic panel from 9/28 has resulted negative. MRI brain W WO contrast showing increased FLAIR hyperintensity involving the bilateral mesial temporal lobes. Completed 5 days of empiric IV Solumedrol 1 g qd without notable clinical improvement. Presentation most concerning for dementia   Psychiatry consulted for formal capacity evaluation and concluded that he does not have capacity to make decisions regarding his long term care disposition however does not require PEC at this time as his stay has been non-contested. He has no family, children and have relied on coworkers for assistance. The case was brought to the attention of EPS who have no plans to intervene at this time and recommend since the patient presented to the hospital it is a safe discharge planning issue that would involve [facility] Legal department. The Legal department intend to pursue [place] Guardianship to assist with managing affairs. Patient re-engaged with care for likely penile carcinoma with urology.  A determination was made that appropriate treatment for penile tumor was penectomy - patient proceeded to OR on 10/19 with partial penectomy completed.   Figueredo catheter placed and traumatically self discontinued by patient.  Urology is following.      Interval History:   POD5 from OR on 10/19 with partial penectomy completed. Voiding independently without issue. Scant blood in surgical site on exam but no overt bleeding. Urology is following, conts  bacitracin ointment.  [facility] orders for CM placement submitted.     Review of Systems:  Review of Systems   Constitutional: Negative for chills and fever.   HENT: Negative for congestion and sore throat.    Eyes: Negative for photophobia, pain and discharge.   Respiratory: Negative for cough, hemoptysis, sputum production and shortness of breath.    Cardiovascular:  "Negative for chest pain, palpitations and leg swelling.   Gastrointestinal: Negative for abdominal pain, diarrhea, nausea and vomiting.   Genitourinary: Negative for dysuria and urgency.   Musculoskeletal: Negative for myalgias and neck pain.   Skin: Negative for itching and rash.   Neurological: Negative for sensory change, focal weakness and headaches.   Endo/Heme/Allergies: Negative for polydipsia. Does not bruise/bleed easily.   Psychiatric/Behavioral: Negative for depression and suicidal ideas.     OBJECTIVE:       Intake/Output Summary (Last 24 hours) at 10/26/2020 0858  Last data filed at 10/25/2020 1400  Gross per 24 hour   Intake 1200 ml   Output 4 ml   Net 1196 ml     Vital Signs Range (Last 24H):  Temp:  [97.2 °F (36.2 °C)-98.3 °F (36.8 °C)]   Pulse:  [68-98]   Resp:  [18-20]   BP: (123-139)/(64-74)   SpO2:  [94 %-98 %]   Body mass index is 22.78 kg/m².    Objective:  Vital signs: (most recent): Blood pressure 123/66, pulse 68, temperature 97.8 °F (36.6 °C), temperature source Oral, resp. rate 20, height 5' 10" (1.778 m), weight 72 kg (158 lb 11.7 oz), SpO2 95 %.  No fever.    Physical Exam  Constitutional:       General: He is not in acute distress.     Appearance: He is well-developed. He is not ill-appearing, toxic-appearing or diaphoretic.   HENT:      Head: Atraumatic. No abrasion, contusion or laceration.      Nose: Nose normal.      Mouth/Throat:      Pharynx: No oropharyngeal exudate.   Eyes:      General: No scleral icterus.        Right eye: No discharge.         Left eye: No discharge.      Conjunctiva/sclera: Conjunctivae normal.      Pupils: Pupils are equal, round, and reactive to light.   Neck:      Musculoskeletal: Normal range of motion and neck supple. No neck rigidity.      Vascular: No JVD.   Cardiovascular:      Rate and Rhythm: Normal rate and regular rhythm.      Heart sounds: Normal heart sounds. No murmur. No friction rub. No gallop.    Pulmonary:      Effort: Pulmonary effort is " normal. No accessory muscle usage or respiratory distress.      Breath sounds: Normal breath sounds. No wheezing.   Abdominal:      General: Bowel sounds are normal. There is no distension.      Palpations: Abdomen is soft. There is no mass.      Tenderness: There is no abdominal tenderness. There is no guarding or rebound.   Genitourinary:     Comments: S/p partial penectomy with loose dressing in place; scant blood no overt bleeding   Musculoskeletal: Normal range of motion.         General: No tenderness or deformity.   Lymphadenopathy:      Cervical: No cervical adenopathy.   Skin:     General: Skin is warm and dry.      Capillary Refill: Capillary refill takes less than 2 seconds.      Findings: No bruising, ecchymosis, erythema, petechiae or rash.      Nails: There is no clubbing.   Neurological:      Mental Status: He is alert. Mental status is at baseline.      GCS: GCS eye subscore is 4. GCS verbal subscore is 5. GCS motor subscore is 6.      Cranial Nerves: No cranial nerve deficit.      Sensory: No sensory deficit.      Motor: No abnormal muscle tone.      Coordination: Coordination normal.      Deep Tendon Reflexes: Reflexes normal.      Comments: Oriented to place and time. Unclear to context with frequent redirection, orientation.  Tangential thinking and occasional disorganized thought content    Psychiatric:         Speech: Speech normal.         Behavior: Behavior normal.         Thought Content: Thought content normal.         Judgment: Judgment normal.     Medications:  Medication list was reviewed in EPIC and changes noted under Assessment/Plan and MAR.    Laboratory:  Recent Labs     10/26/20  0351   WBC 4.40   RBC 3.60*   HGB 11.5*   HCT 35.4*      MCV 98   MCH 31.9*   MCHC 32.5   GRAN 57.7  2.5   LYMPH 27.3  1.2   MONO 8.4  0.4   EOS 0.2      Recent Labs     10/26/20  0351   GLU 88      K 4.0      CO2 28   BUN 17   CREATININE 0.7   CALCIUM 8.6*   ANIONGAP 7*   MG 2.0    PHOS 3.5       ASSESSMENT/PLAN:     Active Hospital Problems    Diagnosis  POA    *Cognitive impairment [R41.89]  Yes    Leukocytosis [D72.829]  No    Hypocalcemia [E83.51]  Yes    Acute encephalopathy [G93.40]  Yes    Cognitive decline [R41.89]  Yes    Confusion [R41.0]  Unknown    Debility [R53.81]  Yes    Penile carcinoma [C60.9]  Yes    Altered mental status [R41.82]  Yes      Resolved Hospital Problems    Diagnosis Date Resolved POA    Encephalopathy [G93.40] 10/04/2020 Unknown         * Cognitive impairment  Impaired decision making  Presentation concerning for progressive dementia   -LP performed by IR 10/5/2020: elevated protein, neutrophils  -CSF HSV, ACE negative.   -Serum paraneoplastic panel from 9/28 has resulted negative.  -MRI brain with Possible subtle increased FLAIR hyperintensity involving the bilateral mesial temporal lobes which can be seen in the setting of autoimmune limbic encephalitis  -EEG without seizure activity   -Vitamin B1, Vitamin B6, Vitamin B12, HIV, RPR, TSH unremarkable   - Neurology consulted          --please send CSF to [facility] for Alzheimer panel (ADEVL test code) - repeat LP completed          --start empiric IV Solumedrol 1 g qd x 5 days - completed           --formal cognitive assessment with neuropsych            --consider PET scan - not available inpatient           --continue strict delirium precautions  -Psych assess lack of competency   -Formal guardianship being pursued - court date early November  -Adult friends can assist with decision making   -Gina KEVIN 40:[id number]: (9) Upon the inability of any adult to consent for himself and in the absence of any person listed in Paragraphs (2) through (8) of this Subsection, an adult friend of the patient. For purposes of this Subsection to consent, adult friend means an adult who has exhibited special care and concern for the patient, who is generally familiar with the patient's health care views and desires, and  who is willing and able to become involved in the patient's health care decisions and to act in the patient's best interest. The adult friend shall sign and date an acknowledgment form provided by the hospital or other health care facility in which the patient is located for placement in the patient's records certifying that he or she meets such criteria.     - Juice Martinez ([phone]) will make decisions on his behalf  - supervisor Mei Arabella ([phone]) have been trying to help him manage things with his work and personal matters as they have observed his deficits and know he has no one to help him - these individuals may be used for consent      [facility]  - Minimize use of restraints; if physical restraints necessary, please utilize medical/chemical prns for agitation.  - Keep shades open and room lit during day and room dim at night in order to promote healthy circadian rhythms.  - Encourage family at bedside  - Keep whiteboard in patient's room current with the date and name of the members of patient's team for easy patient self re-orientation.  - Avoid benzodiazepines, antihistamines, anticholinergics, hypnotics, and minimize opiates while controlling for pain as these medications may exacerbate delirium.     Penile carcinoma  - follows with urology   - Assessed inpatient - plan for partial / total penectomy   - Partial penectomy 10/19 - pathology pending   - FC placed post op - self discontinued     Anticipated discharge date and disposition:   Will need placement. CM/SW closely involved.  Brayan Castillo MD  [facility]

## 2020-10-27 LAB
ALBUMIN SERPL BCP-MCNC: 3.1 G/DL (ref 3.5–5.2)
ALP SERPL-CCNC: 71 U/L (ref 55–135)
ALT SERPL W/O P-5'-P-CCNC: 39 U/L (ref 10–44)
ANION GAP SERPL CALC-SCNC: 9 MMOL/L (ref 8–16)
AST SERPL-CCNC: 29 U/L (ref 10–40)
BASOPHILS # BLD AUTO: 0.06 K/UL (ref 0–0.2)
BASOPHILS NFR BLD: 1.3 % (ref 0–1.9)
BILIRUB SERPL-MCNC: 0.4 MG/DL (ref 0.1–1)
BUN SERPL-MCNC: 19 MG/DL (ref 8–23)
CALCIUM SERPL-MCNC: 9 MG/DL (ref 8.7–10.5)
CHLORIDE SERPL-SCNC: 106 MMOL/L (ref 95–110)
CO2 SERPL-SCNC: 25 MMOL/L (ref 23–29)
CREAT SERPL-MCNC: 0.8 MG/DL (ref 0.5–1.4)
DIFFERENTIAL METHOD: ABNORMAL
EOSINOPHIL # BLD AUTO: 0.3 K/UL (ref 0–0.5)
EOSINOPHIL NFR BLD: 7 % (ref 0–8)
ERYTHROCYTE [DISTWIDTH] IN BLOOD BY AUTOMATED COUNT: 13.3 % (ref 11.5–14.5)
EST. GFR  (AFRICAN AMERICAN): >60 ML/MIN/1.73 M^2
EST. GFR  (NON AFRICAN AMERICAN): >60 ML/MIN/1.73 M^2
GLUCOSE SERPL-MCNC: 90 MG/DL (ref 70–110)
HCT VFR BLD AUTO: 37 % (ref 40–54)
HGB BLD-MCNC: 11.7 G/DL (ref 14–18)
IMM GRANULOCYTES # BLD AUTO: 0.01 K/UL (ref 0–0.04)
IMM GRANULOCYTES NFR BLD AUTO: 0.2 % (ref 0–0.5)
LYMPHOCYTES # BLD AUTO: 1.3 K/UL (ref 1–4.8)
LYMPHOCYTES NFR BLD: 27.7 % (ref 18–48)
MAGNESIUM SERPL-MCNC: 2.1 MG/DL (ref 1.6–2.6)
MCH RBC QN AUTO: 31.5 PG (ref 27–31)
MCHC RBC AUTO-ENTMCNC: 31.6 G/DL (ref 32–36)
MCV RBC AUTO: 100 FL (ref 82–98)
MONOCYTES # BLD AUTO: 0.4 K/UL (ref 0.3–1)
MONOCYTES NFR BLD: 8 % (ref 4–15)
NEUTROPHILS # BLD AUTO: 2.6 K/UL (ref 1.8–7.7)
NEUTROPHILS NFR BLD: 55.8 % (ref 38–73)
NRBC BLD-RTO: 0 /100 WBC
PHOSPHATE SERPL-MCNC: 3.6 MG/DL (ref 2.7–4.5)
PLATELET # BLD AUTO: 239 K/UL (ref 150–350)
PMV BLD AUTO: 9.6 FL (ref 9.2–12.9)
POTASSIUM SERPL-SCNC: 4.8 MMOL/L (ref 3.5–5.1)
PROT SERPL-MCNC: 5.9 G/DL (ref 6–8.4)
RBC # BLD AUTO: 3.72 M/UL (ref 4.6–6.2)
SODIUM SERPL-SCNC: 140 MMOL/L (ref 136–145)
WBC # BLD AUTO: 4.73 K/UL (ref 3.9–12.7)

## 2020-10-27 PROCEDURE — 11000001 HC ACUTE MED/SURG PRIVATE ROOM

## 2020-10-27 PROCEDURE — 85025 COMPLETE CBC W/AUTO DIFF WBC: CPT

## 2020-10-27 PROCEDURE — 99232 SBSQ HOSP IP/OBS MODERATE 35: CPT | Mod: ,,, | Performed by: HOSPITALIST

## 2020-10-27 PROCEDURE — 99232 PR SUBSEQUENT HOSPITAL CARE,LEVL II: ICD-10-PCS | Mod: ,,, | Performed by: HOSPITALIST

## 2020-10-27 PROCEDURE — 83735 ASSAY OF MAGNESIUM: CPT

## 2020-10-27 PROCEDURE — 36415 COLL VENOUS BLD VENIPUNCTURE: CPT

## 2020-10-27 PROCEDURE — 84100 ASSAY OF PHOSPHORUS: CPT

## 2020-10-27 PROCEDURE — 25000003 PHARM REV CODE 250: Performed by: STUDENT IN AN ORGANIZED HEALTH CARE EDUCATION/TRAINING PROGRAM

## 2020-10-27 PROCEDURE — 80053 COMPREHEN METABOLIC PANEL: CPT

## 2020-10-27 RX ADMIN — PANTOPRAZOLE SODIUM 40 MG: 40 TABLET, DELAYED RELEASE ORAL at 09:10

## 2020-10-27 RX ADMIN — BACITRACIN: 500 OINTMENT TOPICAL at 09:10

## 2020-10-27 NOTE — PLAN OF CARE
Pt . Made aware that Cuca Aviles , appointed guardianship, will call him on his cell phone at [phone]. Pi is in agreement with this arrangement. Will cont to follow.  Modesta Blum, DEVYN  Case Management  Ext [phone]

## 2020-10-27 NOTE — PLAN OF CARE
10/27/20 0857   Post-Acute Status   Post-Acute Authorization Placement   Discharge Delays (!) Patient and Family Barriers     Psych may re eval Pt to assess for capacity as Pt is more alert.

## 2020-10-27 NOTE — PROGRESS NOTES
Progress Note  Hospital Medicine    Provider team: [facility]OSP MED O  Admit Date: 10/2/2020  Encounter Date: 10/27/2020     SUBJECTIVE:     Follow-up Visit for: Cognitive impairment    HPI (See H&P for complete P,F,SHx):  Per Adriana Badillo MD:   Mr. Juan Hobson is a 69 y.o. male with recently diagnosed penile cancer, who presents to the ER from Urology clinic with concern for self neglect, and need for Neurology evaluation of dementia and possible placement.  The patient mentions that he has been having some issues with his memory.  He mostly orders takeout for meals because it is easier, and still drives to work.  He feels like he is able to take care of himself, and does not need to go to a facility.      Per note from Lianne German LCSW on 10/2:  Received consult from Dr. Monterroso re: assessing patient in clinic this afternoon and care recommendations as patient did not seem competent to make decisions and give consent. He has no family and no legal next of kin (POA or guardian).   Met individually with patient, and with his co-worker Janna Go ([phone], ext. [phone]) who brought him to his clinic appointment today. Patient unable to relate basic demographic information or clearly explain other information. For example, when asked his birth date he gave the year [birth year], then said 9, and then said the second to last, it begins with N; he said his street name but couldn't give the house number where he has lived for years; unable to say what type of work or where he worked prior to the [employer] in [facility] for the past 12 years; he described being taken in a truck to that other place (referring to the recent [facility] stay). He has awareness that he is having memory/cognitive issues but stated he can manage for himself at home and that he knows there is a lot of work that needs to be done in his home that he hasn't gotten to. He inherited the home after his mother .        [place] and another coworker Juice Martinez ([phone]) and their supervisor Meiirma Meanso ([phone]) have been trying to help him manage things with his work and personal matters as they have observed his deficits and know he has no one to help him. They have helped him keep up with recent medical appointments and accompanied and transported him.  Mr. Go reports concern for his safety and ability to care for himself after seeing his home with clutter throughout and things piled up, mold and old food in the refrigerator, mold in the toilet, clothes all over, etc. Patient has been driving himself to and from work and to get food at places near his home, but unable to navigate to unfamiliar places. They have observed his cognitive deficits at work and he has made errors, so they have modified his job and his supervisor has been working with him to try to get FMLA and his leave/jail in place so he doesn't lose his benefits. They have had to help him with paperwork, bill paying, etc., because he can't complete these on his own.         Upon arrival to the ER, vitals were temp 97.8F, HR 59 and /84.  Labs were unremarkable  He was admitted to Hospital Medicine for Neurology and SW evaluation.     Overview/Hospital Course:  Admitted to hospital medicine for worsening cognitive decline and inability to care for self in setting of concern for penile cancer.  In pursuit of workup for altered mental status - labs/infectious work-up grossly unremarkable. Neurology consulted and MRI obtained on admission with concern for encephalitis (increased FLAIR hyperintensity involving the bilateral mesial temporal lobes which can be seen in the setting of autoimmune limbic encephalitis in the appropriate clinical setting). LP recommended and attempted at bedside however not successful 2/2 to JEAN-PIERRED. Interventional radiology consulted, LP performed 10/5 with CSF (1 WBC, 190 RBC, 71 protein, 52 glucose). CSF HSV, ACE  negative. EEG 10/3 (1 hr 40 min) with mild regional dysfunction in bilateral temporal lobes, but no electrographic seizures. Serum paraneoplastic panel from 9/28 has resulted negative. MRI brain W WO contrast showing increased FLAIR hyperintensity involving the bilateral mesial temporal lobes. Completed 5 days of empiric IV Solumedrol 1 g qd without notable clinical improvement. Presentation most concerning for dementia   Psychiatry consulted for formal capacity evaluation and concluded that he does not have capacity to make decisions regarding his long term care disposition however does not require PEC at this time as his stay has been non-contested. He has no family, children and have relied on coworkers for assistance. The case was brought to the attention of EPS who have no plans to intervene at this time and recommend since the patient presented to the hospital it is a safe discharge planning issue that would involve [facility] Legal department. The Legal department intend to pursue [place] Guardianship to assist with managing affairs. Patient re-engaged with care for likely penile carcinoma with urology.  A determination was made that appropriate treatment for penile tumor was penectomy - patient proceeded to OR on 10/19 with partial penectomy completed.   Figueredo catheter placed and traumatically self discontinued by patient.  Urology is following.      Interval History:   POD8 from OR on 10/19 with partial penectomy completed. Voiding independently without issue. Scant blood in surgical site on exam but no overt bleeding. Urology is following, conts bacitracin ointment.  [facility] orders for CM placement submitted.     Review of Systems:  Review of Systems   Constitutional: Negative for chills and fever.   HENT: Negative for congestion and sore throat.    Eyes: Negative for photophobia, pain and discharge.   Respiratory: Negative for cough, hemoptysis, sputum production and shortness of breath.    Cardiovascular:  "Negative for chest pain, palpitations and leg swelling.   Gastrointestinal: Negative for abdominal pain, diarrhea, nausea and vomiting.   Genitourinary: Negative for dysuria and urgency.   Musculoskeletal: Negative for myalgias and neck pain.   Skin: Negative for itching and rash.   Neurological: Negative for sensory change, focal weakness and headaches.   Endo/Heme/Allergies: Negative for polydipsia. Does not bruise/bleed easily.   Psychiatric/Behavioral: Negative for depression and suicidal ideas.     OBJECTIVE:       Intake/Output Summary (Last 24 hours) at 10/27/2020 0909  Last data filed at 10/27/2020 0400  Gross per 24 hour   Intake 960 ml   Output 1100 ml   Net -140 ml     Vital Signs Range (Last 24H):  Temp:  [97.7 °F (36.5 °C)-98.7 °F (37.1 °C)]   Pulse:  [69-84]   Resp:  [16-20]   BP: (113-132)/(61-70)   SpO2:  [92 %-97 %]   Body mass index is 22.78 kg/m².    Objective:  Vital signs: (most recent): Blood pressure 113/66, pulse 69, temperature 97.7 °F (36.5 °C), temperature source Oral, resp. rate 17, height 5' 10" (1.778 m), weight 72 kg (158 lb 11.7 oz), SpO2 96 %.  No fever.    Physical Exam  Constitutional:       General: He is not in acute distress.     Appearance: He is well-developed. He is not ill-appearing, toxic-appearing or diaphoretic.   HENT:      Head: Atraumatic. No abrasion, contusion or laceration.      Nose: Nose normal.      Mouth/Throat:      Pharynx: No oropharyngeal exudate.   Eyes:      General: No scleral icterus.        Right eye: No discharge.         Left eye: No discharge.      Conjunctiva/sclera: Conjunctivae normal.      Pupils: Pupils are equal, round, and reactive to light.   Neck:      Musculoskeletal: Normal range of motion and neck supple. No neck rigidity.      Vascular: No JVD.   Cardiovascular:      Rate and Rhythm: Normal rate and regular rhythm.      Heart sounds: Normal heart sounds. No murmur. No friction rub. No gallop.    Pulmonary:      Effort: Pulmonary effort " is normal. No accessory muscle usage or respiratory distress.      Breath sounds: Normal breath sounds. No wheezing.   Abdominal:      General: Bowel sounds are normal. There is no distension.      Palpations: Abdomen is soft. There is no mass.      Tenderness: There is no abdominal tenderness. There is no guarding or rebound.   Genitourinary:     Comments: S/p partial penectomy with loose dressing in place; scant blood no overt bleeding   Musculoskeletal: Normal range of motion.         General: No tenderness or deformity.   Lymphadenopathy:      Cervical: No cervical adenopathy.   Skin:     General: Skin is warm and dry.      Capillary Refill: Capillary refill takes less than 2 seconds.      Findings: No bruising, ecchymosis, erythema, petechiae or rash.      Nails: There is no clubbing.   Neurological:      Mental Status: He is alert. Mental status is at baseline.      GCS: GCS eye subscore is 4. GCS verbal subscore is 5. GCS motor subscore is 6.      Cranial Nerves: No cranial nerve deficit.      Sensory: No sensory deficit.      Motor: No abnormal muscle tone.      Coordination: Coordination normal.      Deep Tendon Reflexes: Reflexes normal.      Comments: Oriented to place and time. Unclear to context with frequent redirection, orientation.  Tangential thinking and occasional disorganized thought content    Psychiatric:         Speech: Speech normal.         Behavior: Behavior normal.         Thought Content: Thought content normal.         Judgment: Judgment normal.     Medications:  Medication list was reviewed in EPIC and changes noted under Assessment/Plan and MAR.    Laboratory:  Recent Labs     10/27/20  0440   WBC 4.73   RBC 3.72*   HGB 11.7*   HCT 37.0*      *   MCH 31.5*   MCHC 31.6*   GRAN 55.8  2.6   LYMPH 27.7  1.3   MONO 8.0  0.4   EOS 0.3      Recent Labs     10/27/20  0440   GLU 90      K 4.8      CO2 25   BUN 19   CREATININE 0.8   CALCIUM 9.0   ANIONGAP 9   MG 2.1    PHOS 3.6       ASSESSMENT/PLAN:     Active Hospital Problems    Diagnosis  POA    *Cognitive impairment [R41.89]  Yes    Leukocytosis [D72.829]  No    Hypocalcemia [E83.51]  Yes    Acute encephalopathy [G93.40]  Yes    Cognitive decline [R41.89]  Yes    Confusion [R41.0]  Unknown    Debility [R53.81]  Yes    Penile carcinoma [C60.9]  Yes    Altered mental status [R41.82]  Yes      Resolved Hospital Problems    Diagnosis Date Resolved POA    Encephalopathy [G93.40] 10/04/2020 Unknown         * Cognitive impairment  Impaired decision making  Presentation concerning for progressive dementia   -LP performed by IR 10/5/2020: elevated protein, neutrophils  -CSF HSV, ACE negative.   -Serum paraneoplastic panel from 9/28 has resulted negative.  -MRI brain with Possible subtle increased FLAIR hyperintensity involving the bilateral mesial temporal lobes which can be seen in the setting of autoimmune limbic encephalitis  -EEG without seizure activity   -Vitamin B1, Vitamin B6, Vitamin B12, HIV, RPR, TSH unremarkable   - Neurology consulted          --please send CSF to [place] for Alzheimer panel (ADEVL test code) - repeat LP completed          --start empiric IV Solumedrol 1 g qd x 5 days - completed           --formal cognitive assessment with neuropsych            --consider PET scan - not available inpatient           --continue strict delirium precautions  -Psych assess lack of competency   -Formal guardianship being pursued - court date early November  -Adult friends can assist with decision making   -Gina KEVIN [id number]: (9) Upon the inability of any adult to consent for himself and in the absence of any person listed in Paragraphs (2) through (8) of this Subsection, an adult friend of the patient. For purposes of this Subsection to consent, adult friend means an adult who has exhibited special care and concern for the patient, who is generally familiar with the patient's health care views and desires, and  who is willing and able to become involved in the patient's health care decisions and to act in the patient's best interest. The adult friend shall sign and date an acknowledgment form provided by the hospital or other health care facility in which the patient is located for placement in the patient's records certifying that he or she meets such criteria.     - Juice Martinez ([phone]) will make decisions on his behalf  - supervisor Mei Arabella ([phone]) have been trying to help him manage things with his work and personal matters as they have observed his deficits and know he has no one to help him - these individuals may be used for consent      [facility]  - Minimize use of restraints; if physical restraints necessary, please utilize medical/chemical prns for agitation.  - Keep shades open and room lit during day and room dim at night in order to promote healthy circadian rhythms.  - Encourage family at bedside  - Keep whiteboard in patient's room current with the date and name of the members of patient's team for easy patient self re-orientation.  - Avoid benzodiazepines, antihistamines, anticholinergics, hypnotics, and minimize opiates while controlling for pain as these medications may exacerbate delirium.     Penile carcinoma  - follows with urology   - Assessed inpatient - plan for partial / total penectomy   - Partial penectomy 10/19 - pathology pending   - FC placed post op - self discontinued     Anticipated discharge date and disposition:   Will need placement. CM/SW closely involved.  Brayan Castillo MD  [facility]

## 2020-10-28 LAB
ALBUMIN SERPL BCP-MCNC: 3.1 G/DL (ref 3.5–5.2)
ALP SERPL-CCNC: 67 U/L (ref 55–135)
ALT SERPL W/O P-5'-P-CCNC: 37 U/L (ref 10–44)
ANION GAP SERPL CALC-SCNC: 9 MMOL/L (ref 8–16)
AST SERPL-CCNC: 23 U/L (ref 10–40)
BASOPHILS # BLD AUTO: 0.06 K/UL (ref 0–0.2)
BASOPHILS NFR BLD: 1.3 % (ref 0–1.9)
BILIRUB SERPL-MCNC: 0.4 MG/DL (ref 0.1–1)
BUN SERPL-MCNC: 20 MG/DL (ref 8–23)
CALCIUM SERPL-MCNC: 8.9 MG/DL (ref 8.7–10.5)
CHLORIDE SERPL-SCNC: 105 MMOL/L (ref 95–110)
CO2 SERPL-SCNC: 26 MMOL/L (ref 23–29)
CREAT SERPL-MCNC: 0.8 MG/DL (ref 0.5–1.4)
DIFFERENTIAL METHOD: ABNORMAL
EOSINOPHIL # BLD AUTO: 0.3 K/UL (ref 0–0.5)
EOSINOPHIL NFR BLD: 5.4 % (ref 0–8)
ERYTHROCYTE [DISTWIDTH] IN BLOOD BY AUTOMATED COUNT: 13.4 % (ref 11.5–14.5)
EST. GFR  (AFRICAN AMERICAN): >60 ML/MIN/1.73 M^2
EST. GFR  (NON AFRICAN AMERICAN): >60 ML/MIN/1.73 M^2
GLUCOSE SERPL-MCNC: 93 MG/DL (ref 70–110)
HCT VFR BLD AUTO: 35.3 % (ref 40–54)
HGB BLD-MCNC: 11.4 G/DL (ref 14–18)
IMM GRANULOCYTES # BLD AUTO: 0.02 K/UL (ref 0–0.04)
IMM GRANULOCYTES NFR BLD AUTO: 0.4 % (ref 0–0.5)
LYMPHOCYTES # BLD AUTO: 1.4 K/UL (ref 1–4.8)
LYMPHOCYTES NFR BLD: 29.1 % (ref 18–48)
MAGNESIUM SERPL-MCNC: 2.1 MG/DL (ref 1.6–2.6)
MCH RBC QN AUTO: 32.1 PG (ref 27–31)
MCHC RBC AUTO-ENTMCNC: 32.3 G/DL (ref 32–36)
MCV RBC AUTO: 99 FL (ref 82–98)
MONOCYTES # BLD AUTO: 0.4 K/UL (ref 0.3–1)
MONOCYTES NFR BLD: 8.6 % (ref 4–15)
NEUTROPHILS # BLD AUTO: 2.6 K/UL (ref 1.8–7.7)
NEUTROPHILS NFR BLD: 55.2 % (ref 38–73)
NRBC BLD-RTO: 0 /100 WBC
PHOSPHATE SERPL-MCNC: 3.9 MG/DL (ref 2.7–4.5)
PLATELET # BLD AUTO: 240 K/UL (ref 150–350)
PMV BLD AUTO: 9.6 FL (ref 9.2–12.9)
POTASSIUM SERPL-SCNC: 4.3 MMOL/L (ref 3.5–5.1)
PROT SERPL-MCNC: 5.7 G/DL (ref 6–8.4)
RBC # BLD AUTO: 3.55 M/UL (ref 4.6–6.2)
SODIUM SERPL-SCNC: 140 MMOL/L (ref 136–145)
WBC # BLD AUTO: 4.78 K/UL (ref 3.9–12.7)

## 2020-10-28 PROCEDURE — 63600175 PHARM REV CODE 636 W HCPCS: Performed by: HOSPITALIST

## 2020-10-28 PROCEDURE — 11000001 HC ACUTE MED/SURG PRIVATE ROOM

## 2020-10-28 PROCEDURE — 80053 COMPREHEN METABOLIC PANEL: CPT

## 2020-10-28 PROCEDURE — 36415 COLL VENOUS BLD VENIPUNCTURE: CPT

## 2020-10-28 PROCEDURE — 99232 SBSQ HOSP IP/OBS MODERATE 35: CPT | Mod: ,,, | Performed by: HOSPITALIST

## 2020-10-28 PROCEDURE — 85025 COMPLETE CBC W/AUTO DIFF WBC: CPT

## 2020-10-28 PROCEDURE — 25000003 PHARM REV CODE 250: Performed by: STUDENT IN AN ORGANIZED HEALTH CARE EDUCATION/TRAINING PROGRAM

## 2020-10-28 PROCEDURE — 99232 PR SUBSEQUENT HOSPITAL CARE,LEVL II: ICD-10-PCS | Mod: ,,, | Performed by: HOSPITALIST

## 2020-10-28 PROCEDURE — 83735 ASSAY OF MAGNESIUM: CPT

## 2020-10-28 PROCEDURE — 84100 ASSAY OF PHOSPHORUS: CPT

## 2020-10-28 RX ADMIN — ENOXAPARIN SODIUM 40 MG: 40 INJECTION SUBCUTANEOUS at 05:10

## 2020-10-28 RX ADMIN — BACITRACIN: 500 OINTMENT TOPICAL at 08:10

## 2020-10-28 RX ADMIN — PANTOPRAZOLE SODIUM 40 MG: 40 TABLET, DELAYED RELEASE ORAL at 09:10

## 2020-10-28 NOTE — PROGRESS NOTES
Progress Note  Hospital Medicine    Provider team: [facility]OSP MED O  Admit Date: 10/2/2020  Encounter Date: 10/28/2020     SUBJECTIVE:     Follow-up Visit for: Cognitive impairment    HPI (See H&P for complete P,F,SHx):  Per Adriana Badillo MD:   Mr. Juan Hobson is a 69 y.o. male with recently diagnosed penile cancer, who presents to the ER from Urology clinic with concern for self neglect, and need for Neurology evaluation of dementia and possible placement.  The patient mentions that he has been having some issues with his memory.  He mostly orders takeout for meals because it is easier, and still drives to work.  He feels like he is able to take care of himself, and does not need to go to a facility.      Per note from Lianne German LCSW on 10/2:  Received consult from Dr. Monterroso re: assessing patient in clinic this afternoon and care recommendations as patient did not seem competent to make decisions and give consent. He has no family and no legal next of kin (POA or guardian).   Met individually with patient, and with his co-worker Janna Go ([phone], ext. [phone]) who brought him to his clinic appointment today. Patient unable to relate basic demographic information or clearly explain other information. For example, when asked his birth date he gave the year [birth year], then said 9, and then said the second to last, it begins with N; he said his street name but couldn't give the house number where he has lived for years; unable to say what type of work or where he worked prior to the [employer] in [facility] for the past 12 years; he described being taken in a truck to that other place (referring to the recent [facility] stay). He has awareness that he is having memory/cognitive issues but stated he can manage for himself at home and that he knows there is a lot of work that needs to be done in his home that he hasn't gotten to. He inherited the home after his mother .        [place] and another coworker Juice Martinez ([phone]) and their supervisor Meiirma Meanso ([phone]) have been trying to help him manage things with his work and personal matters as they have observed his deficits and know he has no one to help him. They have helped him keep up with recent medical appointments and accompanied and transported him.  Mr. Go reports concern for his safety and ability to care for himself after seeing his home with clutter throughout and things piled up, mold and old food in the refrigerator, mold in the toilet, clothes all over, etc. Patient has been driving himself to and from work and to get food at places near his home, but unable to navigate to unfamiliar places. They have observed his cognitive deficits at work and he has made errors, so they have modified his job and his supervisor has been working with him to try to get FMLA and his leave/FDC in place so he doesn't lose his benefits. They have had to help him with paperwork, bill paying, etc., because he can't complete these on his own.         Upon arrival to the ER, vitals were temp 97.8F, HR 59 and /84.  Labs were unremarkable  He was admitted to Hospital Medicine for Neurology and SW evaluation.     Overview/Hospital Course:  Admitted to hospital medicine for worsening cognitive decline and inability to care for self in setting of concern for penile cancer.  In pursuit of workup for altered mental status - labs/infectious work-up grossly unremarkable. Neurology consulted and MRI obtained on admission with concern for encephalitis (increased FLAIR hyperintensity involving the bilateral mesial temporal lobes which can be seen in the setting of autoimmune limbic encephalitis in the appropriate clinical setting). LP recommended and attempted at bedside however not successful 2/2 to JEAN-PIERRED. Interventional radiology consulted, LP performed 10/5 with CSF (1 WBC, 190 RBC, 71 protein, 52 glucose). CSF HSV, ACE  negative. EEG 10/3 (1 hr 40 min) with mild regional dysfunction in bilateral temporal lobes, but no electrographic seizures. Serum paraneoplastic panel from 9/28 has resulted negative. MRI brain W WO contrast showing increased FLAIR hyperintensity involving the bilateral mesial temporal lobes. Completed 5 days of empiric IV Solumedrol 1 g qd without notable clinical improvement. Presentation most concerning for dementia   Psychiatry consulted for formal capacity evaluation and concluded that he does not have capacity to make decisions regarding his long term care disposition however does not require PEC at this time as his stay has been non-contested. He has no family, children and have relied on coworkers for assistance. The case was brought to the attention of EPS who have no plans to intervene at this time and recommend since the patient presented to the hospital it is a safe discharge planning issue that would involve [facility] Legal department. The Legal department intend to pursue [place] Guardianship to assist with managing affairs. Patient re-engaged with care for likely penile carcinoma with urology.  A determination was made that appropriate treatment for penile tumor was penectomy - patient proceeded to OR on 10/19 with partial penectomy completed. Patient has been awaiting placement, with pathology pending and LP c/w likely EOAD.     Interval History:   Voiding independently without issue. Patient awaits placement.    Review of Systems:  Review of Systems   Constitutional: Negative for chills and fever.   HENT: Negative for congestion and sore throat.    Eyes: Negative for photophobia, pain and discharge.   Respiratory: Negative for cough, hemoptysis, sputum production and shortness of breath.    Cardiovascular: Negative for chest pain, palpitations and leg swelling.   Gastrointestinal: Negative for abdominal pain, diarrhea, nausea and vomiting.   Genitourinary: Negative for dysuria and urgency.  "  Musculoskeletal: Negative for myalgias and neck pain.   Skin: Negative for itching and rash.   Neurological: Negative for sensory change, focal weakness and headaches.   Endo/Heme/Allergies: Negative for polydipsia. Does not bruise/bleed easily.   Psychiatric/Behavioral: Negative for depression and suicidal ideas.     OBJECTIVE:       Intake/Output Summary (Last 24 hours) at 10/28/2020 1003  Last data filed at 10/28/2020 0400  Gross per 24 hour   Intake 1314 ml   Output 1000 ml   Net 314 ml     Vital Signs Range (Last 24H):  Temp:  [97.1 °F (36.2 °C)-98.4 °F (36.9 °C)]   Pulse:  [64-85]   Resp:  [16-18]   BP: (118-123)/(56-67)   SpO2:  [93 %-99 %]   Body mass index is 22.78 kg/m².    Objective:  Vital signs: (most recent): Blood pressure 120/66, pulse 85, temperature 97.6 °F (36.4 °C), temperature source Oral, resp. rate 16, height 5' 10" (1.778 m), weight 72 kg (158 lb 11.7 oz), SpO2 96 %.  No fever.    Physical Exam  Constitutional:       General: He is not in acute distress.     Appearance: He is well-developed. He is not ill-appearing, toxic-appearing or diaphoretic.   HENT:      Head: Atraumatic. No abrasion, contusion or laceration.      Nose: Nose normal.      Mouth/Throat:      Pharynx: No oropharyngeal exudate.   Eyes:      General: No scleral icterus.        Right eye: No discharge.         Left eye: No discharge.      Conjunctiva/sclera: Conjunctivae normal.      Pupils: Pupils are equal, round, and reactive to light.   Neck:      Musculoskeletal: Normal range of motion and neck supple. No neck rigidity.      Vascular: No JVD.   Cardiovascular:      Rate and Rhythm: Normal rate and regular rhythm.      Heart sounds: Normal heart sounds. No murmur. No friction rub. No gallop.    Pulmonary:      Effort: Pulmonary effort is normal. No accessory muscle usage or respiratory distress.      Breath sounds: Normal breath sounds. No wheezing.   Abdominal:      General: Bowel sounds are normal. There is no " distension.      Palpations: Abdomen is soft. There is no mass.      Tenderness: There is no abdominal tenderness. There is no guarding or rebound.   Genitourinary:     Comments: S/p partial penectomy with loose dressing in place; scant blood no overt bleeding   Musculoskeletal: Normal range of motion.         General: No tenderness or deformity.   Lymphadenopathy:      Cervical: No cervical adenopathy.   Skin:     General: Skin is warm and dry.      Capillary Refill: Capillary refill takes less than 2 seconds.      Findings: No bruising, ecchymosis, erythema, petechiae or rash.      Nails: There is no clubbing.   Neurological:      Mental Status: He is alert. Mental status is at baseline.      GCS: GCS eye subscore is 4. GCS verbal subscore is 5. GCS motor subscore is 6.      Cranial Nerves: No cranial nerve deficit.      Sensory: No sensory deficit.      Motor: No abnormal muscle tone.      Coordination: Coordination normal.      Deep Tendon Reflexes: Reflexes normal.      Comments: Oriented to place and time. Unclear to context with frequent redirection, orientation.  Tangential thinking and occasional disorganized thought content    Psychiatric:         Speech: Speech normal.         Behavior: Behavior normal.         Thought Content: Thought content normal.         Judgment: Judgment normal.     Medications:  Medication list was reviewed in EPIC and changes noted under Assessment/Plan and MAR.    Laboratory:  Recent Labs     10/28/20  0318   WBC 4.78   RBC 3.55*   HGB 11.4*   HCT 35.3*      MCV 99*   MCH 32.1*   MCHC 32.3   GRAN 55.2  2.6   LYMPH 29.1  1.4   MONO 8.6  0.4   EOS 0.3      Recent Labs     10/28/20  0318   GLU 93      K 4.3      CO2 26   BUN 20   CREATININE 0.8   CALCIUM 8.9   ANIONGAP 9   MG 2.1   PHOS 3.9       ASSESSMENT/PLAN:     Active Hospital Problems    Diagnosis  POA    *Cognitive impairment [R41.89]  Yes    Leukocytosis [D72.829]  No    Hypocalcemia [E83.51]  Yes     Acute encephalopathy [G93.40]  Yes    Cognitive decline [R41.89]  Yes    Confusion [R41.0]  Unknown    Debility [R53.81]  Yes    Penile carcinoma [C60.9]  Yes    Altered mental status [R41.82]  Yes      Resolved Hospital Problems    Diagnosis Date Resolved POA    Encephalopathy [G93.40] 10/04/2020 Unknown         * Cognitive impairment  Impaired decision making  Presentation concerning for progressive dementia   -LP performed by IR 10/5/2020: elevated protein, neutrophils  -CSF HSV, ACE negative.   -Serum paraneoplastic panel from 9/28 has resulted negative.  -MRI brain with Possible subtle increased FLAIR hyperintensity involving the bilateral mesial temporal lobes which can be seen in the setting of autoimmune limbic encephalitis  -EEG without seizure activity   -Vitamin B1, Vitamin B6, Vitamin B12, HIV, RPR, TSH unremarkable   - Neurology consulted          --please send CSF to [place] for Alzheimer panel (ADEVL test code) - repeat LP completed c/w early-onset AD          --completed empiric IV Solumedrol 1 g qd x 5 days without improvement          --formal cognitive assessment with neuropsych            --consider PET scan - not available inpatient           --continue strict delirium precautions  -Psych assess lack of competency   -Formal guardianship being pursued - court date early November  -Adult friends can assist with decision making   -[place] R[place] 40:1299.53: (9) Upon the inability of any adult to consent for himself and in the absence of any person listed in Paragraphs (2) through (8) of this Subsection, an adult friend of the patient. For purposes of this Subsection to consent, adult friend means an adult who has exhibited special care and concern for the patient, who is generally familiar with the patient's health care views and desires, and who is willing and able to become involved in the patient's health care decisions and to act in the patient's best interest. The adult friend shall sign  and date an acknowledgment form provided by the hospital or other health care facility in which the patient is located for placement in the patient's records certifying that he or she meets such criteria.     - Juice Martinez ([phone]) will make decisions on his behalf  - supervisor Meiirma Meanso ([phone]) have been trying to help him manage things with his work and personal matters as they have observed his deficits and know he has no one to help him - these individuals may be used for consent      [facility] BEHAVIOR MANAGEMENT  - Minimize use of restraints; if physical restraints necessary, please utilize medical/chemical prns for agitation.  - Keep shades open and room lit during day and room dim at night in order to promote healthy circadian rhythms.  - Encourage family at bedside  - Keep whiteboard in patient's room current with the date and name of the members of patient's team for easy patient self re-orientation.  - Avoid benzodiazepines, antihistamines, anticholinergics, hypnotics, and minimize opiates while controlling for pain as these medications may exacerbate delirium.     Penile carcinoma  - follows with urology   - Assessed inpatient - plan for partial / total penectomy   - Partial penectomy 10/19 - pathology pending   - FC placed post op - self discontinued     Anticipated discharge date and disposition:   Will need placement. CM/SW closely involved.  Brayan Castillo MD  [facility] Medicine

## 2020-10-28 NOTE — PLAN OF CARE
Pt remains free from falls and injuries during shift. Awake, alert, and oriented x 4 with intermittent confusion easily redirected. Vital signs stable. Denied pain. No signs of acute distress noted at this time. Bed in lowest position, wheels colin. Call light in reach. Will continue to monitor, safety maintained.

## 2020-10-28 NOTE — PLAN OF CARE
10/28/20 0851   Post-Acute Status   Post-Acute Authorization Placement;Home Health   Discharge Delays (!) Patient and Family Barriers

## 2020-10-29 LAB
ALBUMIN SERPL BCP-MCNC: 3 G/DL (ref 3.5–5.2)
ALP SERPL-CCNC: 62 U/L (ref 55–135)
ALT SERPL W/O P-5'-P-CCNC: 35 U/L (ref 10–44)
ANION GAP SERPL CALC-SCNC: 6 MMOL/L (ref 8–16)
AST SERPL-CCNC: 23 U/L (ref 10–40)
BASOPHILS # BLD AUTO: 0.05 K/UL (ref 0–0.2)
BASOPHILS NFR BLD: 1.1 % (ref 0–1.9)
BILIRUB SERPL-MCNC: 0.6 MG/DL (ref 0.1–1)
BUN SERPL-MCNC: 18 MG/DL (ref 8–23)
CALCIUM SERPL-MCNC: 8.8 MG/DL (ref 8.7–10.5)
CHLORIDE SERPL-SCNC: 106 MMOL/L (ref 95–110)
CO2 SERPL-SCNC: 28 MMOL/L (ref 23–29)
CREAT SERPL-MCNC: 0.7 MG/DL (ref 0.5–1.4)
DIFFERENTIAL METHOD: ABNORMAL
EOSINOPHIL # BLD AUTO: 0.2 K/UL (ref 0–0.5)
EOSINOPHIL NFR BLD: 5.1 % (ref 0–8)
ERYTHROCYTE [DISTWIDTH] IN BLOOD BY AUTOMATED COUNT: 13.4 % (ref 11.5–14.5)
EST. GFR  (AFRICAN AMERICAN): >60 ML/MIN/1.73 M^2
EST. GFR  (NON AFRICAN AMERICAN): >60 ML/MIN/1.73 M^2
FINAL PATHOLOGIC DIAGNOSIS: NORMAL
GLUCOSE SERPL-MCNC: 83 MG/DL (ref 70–110)
GROSS: NORMAL
HCT VFR BLD AUTO: 36.6 % (ref 40–54)
HGB BLD-MCNC: 11.7 G/DL (ref 14–18)
IMM GRANULOCYTES # BLD AUTO: 0.02 K/UL (ref 0–0.04)
IMM GRANULOCYTES NFR BLD AUTO: 0.4 % (ref 0–0.5)
LYMPHOCYTES # BLD AUTO: 1.4 K/UL (ref 1–4.8)
LYMPHOCYTES NFR BLD: 31.1 % (ref 18–48)
Lab: NORMAL
MAGNESIUM SERPL-MCNC: 2 MG/DL (ref 1.6–2.6)
MCH RBC QN AUTO: 31.9 PG (ref 27–31)
MCHC RBC AUTO-ENTMCNC: 32 G/DL (ref 32–36)
MCV RBC AUTO: 100 FL (ref 82–98)
MONOCYTES # BLD AUTO: 0.4 K/UL (ref 0.3–1)
MONOCYTES NFR BLD: 9.1 % (ref 4–15)
NEUTROPHILS # BLD AUTO: 2.4 K/UL (ref 1.8–7.7)
NEUTROPHILS NFR BLD: 53.2 % (ref 38–73)
NRBC BLD-RTO: 0 /100 WBC
PHOSPHATE SERPL-MCNC: 3.7 MG/DL (ref 2.7–4.5)
PLATELET # BLD AUTO: 253 K/UL (ref 150–350)
PMV BLD AUTO: 9.8 FL (ref 9.2–12.9)
POTASSIUM SERPL-SCNC: 4 MMOL/L (ref 3.5–5.1)
PROT SERPL-MCNC: 5.8 G/DL (ref 6–8.4)
RBC # BLD AUTO: 3.67 M/UL (ref 4.6–6.2)
SODIUM SERPL-SCNC: 140 MMOL/L (ref 136–145)
WBC # BLD AUTO: 4.53 K/UL (ref 3.9–12.7)

## 2020-10-29 PROCEDURE — 11000001 HC ACUTE MED/SURG PRIVATE ROOM

## 2020-10-29 PROCEDURE — 63600175 PHARM REV CODE 636 W HCPCS: Performed by: HOSPITALIST

## 2020-10-29 PROCEDURE — 36415 COLL VENOUS BLD VENIPUNCTURE: CPT

## 2020-10-29 PROCEDURE — 99232 SBSQ HOSP IP/OBS MODERATE 35: CPT | Mod: ,,, | Performed by: INTERNAL MEDICINE

## 2020-10-29 PROCEDURE — 25000003 PHARM REV CODE 250: Performed by: STUDENT IN AN ORGANIZED HEALTH CARE EDUCATION/TRAINING PROGRAM

## 2020-10-29 PROCEDURE — 84100 ASSAY OF PHOSPHORUS: CPT

## 2020-10-29 PROCEDURE — 83735 ASSAY OF MAGNESIUM: CPT

## 2020-10-29 PROCEDURE — 99232 PR SUBSEQUENT HOSPITAL CARE,LEVL II: ICD-10-PCS | Mod: ,,, | Performed by: INTERNAL MEDICINE

## 2020-10-29 PROCEDURE — 80053 COMPREHEN METABOLIC PANEL: CPT

## 2020-10-29 PROCEDURE — 85025 COMPLETE CBC W/AUTO DIFF WBC: CPT

## 2020-10-29 RX ADMIN — ENOXAPARIN SODIUM 40 MG: 40 INJECTION SUBCUTANEOUS at 04:10

## 2020-10-29 RX ADMIN — PANTOPRAZOLE SODIUM 40 MG: 40 TABLET, DELAYED RELEASE ORAL at 08:10

## 2020-10-29 RX ADMIN — BACITRACIN: 500 OINTMENT TOPICAL at 08:10

## 2020-10-29 NOTE — PLAN OF CARE
10/29/20 0954   Post-Acute Status   Post-Acute Authorization Placement;Home Health   Discharge Delays (!) Patient and Family Barriers

## 2020-10-29 NOTE — PROGRESS NOTES
"[facility]  Adult Nutrition  Progress Note    SUMMARY       Recommendations    Recommendations:   1. Continue Regular Diet   --Boost Plus - 2 times daily   2. RD to monitor and follow up    Goals: Continue to consume >75% of estimated kcal and protein needs via PO and ONS by RD follow-up  Nutrition Goal Status: goal met, ongoing   Communication of RD Recs: other (comment)(POC)    Reason for Assessment    Reason For Assessment: RD follow-up  Diagnosis: (Cognitive impairment)  Relevant Medical History: Penile carcinoma, dementia   Interdisciplinary Rounds: did not attend  General Information Comments: Followed up with pt at bedside. States he is eating all his meals (100% per flowsheets). Wt stable.  Nutrition Discharge Planning: Regular Diet    Nutrition Risk Screen    Nutrition Risk Screen: no indicators present    Nutrition/Diet History    Spiritual, Cultural Beliefs, Synagogue Practices, Values that Affect Care: no  Factors Affecting Nutritional Intake: None identified at this time    Anthropometrics    Temp: 98.3 °F (36.8 °C)  Height Method: Stated  Height: 5' 10"  Height (inches): 70 in  Weight Method: Bed Scale  Weight: 72 kg (158 lb 11.7 oz)  Weight (lb): 158.73 lb  Ideal Body Weight (IBW), Male: 166 lb  BMI (Calculated): 22.8  BMI Grade: (P) 18.5-24.9 - normal       Lab/Procedures/Meds    Pertinent Labs Reviewed: reviewed  Pertinent Labs Comments: Reviewed.  Pertinent Medications Reviewed: reviewed  Pertinent Medications Comments: pantoprazole, senna, zofran    Estimated/Assessed Needs    Weight Used For Calorie Calculations: 72 kg (158 lb 11.7 oz)  Energy Calorie Requirements (kcal): 2898-0601 kcal/day  Energy Need Method: Kcal/kg(25-30)  Protein Requirements: 72-87 gm/day(1.0-1.2 gm/kg)  Weight Used For Protein Calculations: 72 kg (158 lb 11.7 oz)  Fluid Requirements (mL): 1 mL/kcal or per MD  Estimated Fluid Requirement Method: RDA Method  RDA Method (mL): 1800     Nutrition " Prescription Ordered    Current Diet Order: Regular Diet  Oral Nutrition Supplement: Boost Plus - 2 times daily    Evaluation of Received Nutrient/Fluid Intake    I/O: -2.3L since 10/15  Comments: LBM 10.25  Tolerance: tolerating  % Intake of Estimated Energy Needs: 75 - 100 %  % Meal Intake: 75 - 100 %    Nutrition Risk    Level of Risk/Frequency of Follow-up: low     Assessment and Plan    Nutrition Problem  Increased Nutrient Needs      Related to (etiology):   Physiological demands      Signs and Symptoms (as evidenced by):   Penile carcinoma     Interventions (treatment strategy):  Collaboration of nutrition care with other providers     Nutrition Diagnosis Status:   Continues     Monitor and Evaluation    Food and Nutrient Intake: energy intake, food and beverage intake  Food and Nutrient Adminstration: diet order  Physical Activity and Function: nutrition-related ADLs and IADLs  Anthropometric Measurements: weight, weight change, body mass index  Biochemical Data, Medical Tests and Procedures: electrolyte and renal panel, gastrointestinal profile, glucose/endocrine profile, inflammatory profile, lipid profile  Nutrition-Focused Physical Findings: overall appearance     Nutrition Follow-Up    RD Follow-up?: Yes

## 2020-10-29 NOTE — PLAN OF CARE
Recommendations:   1. Continue Regular Diet   --Boost Plus - 2 times daily   2. RD to monitor and follow up     Goals: Continue to consume >75% of estimated kcal and protein needs via PO and ONS by RD follow-up  Nutrition Goal Status: goal met, ongoing   Communication of RD Recs: other (comment)(POC)

## 2020-10-30 LAB
ALBUMIN SERPL BCP-MCNC: 3.1 G/DL (ref 3.5–5.2)
ALP SERPL-CCNC: 68 U/L (ref 55–135)
ALT SERPL W/O P-5'-P-CCNC: 34 U/L (ref 10–44)
ANION GAP SERPL CALC-SCNC: 7 MMOL/L (ref 8–16)
AST SERPL-CCNC: 23 U/L (ref 10–40)
BASOPHILS # BLD AUTO: 0.06 K/UL (ref 0–0.2)
BASOPHILS NFR BLD: 1.4 % (ref 0–1.9)
BILIRUB SERPL-MCNC: 0.3 MG/DL (ref 0.1–1)
BUN SERPL-MCNC: 17 MG/DL (ref 8–23)
CALCIUM SERPL-MCNC: 8.3 MG/DL (ref 8.7–10.5)
CHLORIDE SERPL-SCNC: 106 MMOL/L (ref 95–110)
CO2 SERPL-SCNC: 27 MMOL/L (ref 23–29)
CREAT SERPL-MCNC: 0.7 MG/DL (ref 0.5–1.4)
DIFFERENTIAL METHOD: ABNORMAL
EOSINOPHIL # BLD AUTO: 0.3 K/UL (ref 0–0.5)
EOSINOPHIL NFR BLD: 6.6 % (ref 0–8)
ERYTHROCYTE [DISTWIDTH] IN BLOOD BY AUTOMATED COUNT: 13.3 % (ref 11.5–14.5)
EST. GFR  (AFRICAN AMERICAN): >60 ML/MIN/1.73 M^2
EST. GFR  (NON AFRICAN AMERICAN): >60 ML/MIN/1.73 M^2
GLUCOSE SERPL-MCNC: 83 MG/DL (ref 70–110)
HCT VFR BLD AUTO: 35.4 % (ref 40–54)
HGB BLD-MCNC: 11.3 G/DL (ref 14–18)
IMM GRANULOCYTES # BLD AUTO: 0.02 K/UL (ref 0–0.04)
IMM GRANULOCYTES NFR BLD AUTO: 0.5 % (ref 0–0.5)
LYMPHOCYTES # BLD AUTO: 1.4 K/UL (ref 1–4.8)
LYMPHOCYTES NFR BLD: 33.7 % (ref 18–48)
MAGNESIUM SERPL-MCNC: 2.1 MG/DL (ref 1.6–2.6)
MCH RBC QN AUTO: 31.7 PG (ref 27–31)
MCHC RBC AUTO-ENTMCNC: 31.9 G/DL (ref 32–36)
MCV RBC AUTO: 99 FL (ref 82–98)
MONOCYTES # BLD AUTO: 0.4 K/UL (ref 0.3–1)
MONOCYTES NFR BLD: 10.1 % (ref 4–15)
NEUTROPHILS # BLD AUTO: 2 K/UL (ref 1.8–7.7)
NEUTROPHILS NFR BLD: 47.7 % (ref 38–73)
NRBC BLD-RTO: 0 /100 WBC
PHOSPHATE SERPL-MCNC: 3.8 MG/DL (ref 2.7–4.5)
PLATELET # BLD AUTO: 249 K/UL (ref 150–350)
PMV BLD AUTO: 9.9 FL (ref 9.2–12.9)
POTASSIUM SERPL-SCNC: 4.2 MMOL/L (ref 3.5–5.1)
PROT SERPL-MCNC: 5.6 G/DL (ref 6–8.4)
RBC # BLD AUTO: 3.57 M/UL (ref 4.6–6.2)
SODIUM SERPL-SCNC: 140 MMOL/L (ref 136–145)
WBC # BLD AUTO: 4.27 K/UL (ref 3.9–12.7)

## 2020-10-30 PROCEDURE — 36415 COLL VENOUS BLD VENIPUNCTURE: CPT

## 2020-10-30 PROCEDURE — 25000003 PHARM REV CODE 250: Performed by: STUDENT IN AN ORGANIZED HEALTH CARE EDUCATION/TRAINING PROGRAM

## 2020-10-30 PROCEDURE — 99231 SBSQ HOSP IP/OBS SF/LOW 25: CPT | Mod: ,,, | Performed by: INTERNAL MEDICINE

## 2020-10-30 PROCEDURE — 80053 COMPREHEN METABOLIC PANEL: CPT

## 2020-10-30 PROCEDURE — 99231 PR SUBSEQUENT HOSPITAL CARE,LEVL I: ICD-10-PCS | Mod: ,,, | Performed by: INTERNAL MEDICINE

## 2020-10-30 PROCEDURE — 11000001 HC ACUTE MED/SURG PRIVATE ROOM

## 2020-10-30 PROCEDURE — 85025 COMPLETE CBC W/AUTO DIFF WBC: CPT

## 2020-10-30 PROCEDURE — 84100 ASSAY OF PHOSPHORUS: CPT

## 2020-10-30 PROCEDURE — 63600175 PHARM REV CODE 636 W HCPCS: Performed by: HOSPITALIST

## 2020-10-30 PROCEDURE — 83735 ASSAY OF MAGNESIUM: CPT

## 2020-10-30 RX ADMIN — ENOXAPARIN SODIUM 40 MG: 40 INJECTION SUBCUTANEOUS at 05:10

## 2020-10-30 RX ADMIN — BACITRACIN: 500 OINTMENT TOPICAL at 10:10

## 2020-10-30 RX ADMIN — PANTOPRAZOLE SODIUM 40 MG: 40 TABLET, DELAYED RELEASE ORAL at 10:10

## 2020-10-30 NOTE — PLAN OF CARE
Pt is medically stable, awaiting interdiction on November 9th for court appointed guardianship. Will cont to follow.     10/30/20 1541   Discharge Reassessment   Assessment Type Discharge Planning Reassessment   Provided patient/caregiver education on the expected discharge date and the discharge plan Yes   Do you have any problems affording any of your prescribed medications? No   Discharge Plan A Home   Discharge Plan B Home   DME Needed Upon Discharge  none   Anticipated Discharge Disposition Home   Can the patient/caregiver answer the patient profile reliably? No, cognitively impaired   How does the patient rate their overall health at the present time? Fair   Describe the patient's ability to walk at the present time. No restrictions   How often would a person be available to care for the patient? Occasionally   Number of comorbid conditions (as recorded on the chart) Two   During the past month, has the patient often been bothered by feeling down, depressed or hopeless? No   During the past month, has the patient often been bothered by little interest or pleasure in doing things? No   Post-Acute Status   Discharge Delays None known at this time   Modesta Blum, MSN  Case Management  Ext [phone]

## 2020-10-30 NOTE — PLAN OF CARE
10/30/20 0837   Post-Acute Status   Post-Acute Authorization Placement;Home Health   Discharge Delays (!) Patient and Family Barriers

## 2020-10-30 NOTE — PROGRESS NOTES
Progress Note  Hospital Medicine    Provider team: [facility] HOSP MED O  Admit Date: 10/2/2020  Encounter Date: 10/30/2020     SUBJECTIVE:     Follow-up Visit for: Cognitive impairment    HPI (See H&P for complete P,F,SHx):  Per Adriana Badillo MD:   Mr. Juan Hobson is a 69 y.o. male with recently diagnosed penile cancer, who presents to the ER from Urology clinic with concern for self neglect, and need for Neurology evaluation of dementia and possible placement.  The patient mentions that he has been having some issues with his memory.  He mostly orders takeout for meals because it is easier, and still drives to work.  He feels like he is able to take care of himself, and does not need to go to a facility.      Per note from Lianne German LCSW on 10/2:  Received consult from Dr. Monterroso re: assessing patient in clinic this afternoon and care recommendations as patient did not seem competent to make decisions and give consent. He has no family and no legal next of kin (POA or guardian).   Met individually with patient, and with his co-worker Janna Go ([phone], ext. [phone]) who brought him to his clinic appointment today. Patient unable to relate basic demographic information or clearly explain other information. For example, when asked his birth date he gave the year [birth year], then said 9, and then said the second to last, it begins with N; he said his street name but couldn't give the house number where he has lived for years; unable to say what type of work or where he worked prior to the [employer] in [facility] for the past 12 years; he described being taken in a truck to that other place (referring to the recent [facility] stay). He has awareness that he is having memory/cognitive issues but stated he can manage for himself at home and that he knows there is a lot of work that needs to be done in his home that he hasn't gotten to. He inherited the home after his mother .        [place] and another coworker Juice Martinez ([phone]) and their supervisor Meiirma Meanso ([phone]) have been trying to help him manage things with his work and personal matters as they have observed his deficits and know he has no one to help him. They have helped him keep up with recent medical appointments and accompanied and transported him.  Mr. Go reports concern for his safety and ability to care for himself after seeing his home with clutter throughout and things piled up, mold and old food in the refrigerator, mold in the toilet, clothes all over, etc. Patient has been driving himself to and from work and to get food at places near his home, but unable to navigate to unfamiliar places. They have observed his cognitive deficits at work and he has made errors, so they have modified his job and his supervisor has been working with him to try to get FMLA and his leave/halfway in place so he doesn't lose his benefits. They have had to help him with paperwork, bill paying, etc., because he can't complete these on his own.         Upon arrival to the ER, vitals were temp 97.8F, HR 59 and /84.  Labs were unremarkable  He was admitted to Hospital Medicine for Neurology and SW evaluation.     Overview/Hospital Course:  Admitted to hospital medicine for worsening cognitive decline and inability to care for self in setting of concern for penile cancer.  In pursuit of workup for altered mental status - labs/infectious work-up grossly unremarkable. Neurology consulted and MRI obtained on admission with concern for encephalitis (increased FLAIR hyperintensity involving the bilateral mesial temporal lobes which can be seen in the setting of autoimmune limbic encephalitis in the appropriate clinical setting). LP recommended and attempted at bedside however not successful 2/2 to JEAN-PIERRED. Interventional radiology consulted, LP performed 10/5 with CSF (1 WBC, 190 RBC, 71 protein, 52 glucose). CSF HSV, ACE  negative. EEG 10/3 (1 hr 40 min) with mild regional dysfunction in bilateral temporal lobes, but no electrographic seizures. Serum paraneoplastic panel from 9/28 has resulted negative. MRI brain W WO contrast showing increased FLAIR hyperintensity involving the bilateral mesial temporal lobes. Completed 5 days of empiric IV Solumedrol 1 g qd without notable clinical improvement. Presentation most concerning for dementia   Psychiatry consulted for formal capacity evaluation and concluded that he does not have capacity to make decisions regarding his long term care disposition however does not require PEC at this time as his stay has been non-contested. He has no family, children and have relied on coworkers for assistance. The case was brought to the attention of EPS who have no plans to intervene at this time and recommend since the patient presented to the hospital it is a safe discharge planning issue that would involve [facility] Legal department. The Legal department intend to pursue [place] Guardianship to assist with managing affairs. Patient re-engaged with care for likely penile carcinoma with urology.  A determination was made that appropriate treatment for penile tumor was penectomy - patient proceeded to OR on 10/19 with partial penectomy completed. Patient has been awaiting placement, with pathology pending and LP c/w likely EOAD.     Interval History:   Voiding independently without issue. Pathology of penis without e/o malignancy - will d/w  results.  Examined surgical area, dried blood on brief, well healed.  Patient awaits placement, court date 11/9.      Review of Systems:  Review of Systems   Constitutional: Negative for chills and fever.   HENT: Negative for congestion and sore throat.    Eyes: Negative for photophobia, pain and discharge.   Respiratory: Negative for cough, hemoptysis, sputum production and shortness of breath.    Cardiovascular: Negative for chest pain, palpitations and leg  "swelling.   Gastrointestinal: Negative for abdominal pain, diarrhea, nausea and vomiting.   Genitourinary: Negative for dysuria and urgency.   Musculoskeletal: Negative for myalgias and neck pain.   Skin: Negative for itching and rash.   Neurological: Negative for sensory change, focal weakness and headaches.   Endo/Heme/Allergies: Negative for polydipsia. Does not bruise/bleed easily.   Psychiatric/Behavioral: Negative for depression and suicidal ideas.     OBJECTIVE:       Intake/Output Summary (Last 24 hours) at 10/30/2020 1844  Last data filed at 10/30/2020 1721  Gross per 24 hour   Intake 1671 ml   Output 850 ml   Net 821 ml     Vital Signs Range (Last 24H):  Temp:  [97.4 °F (36.3 °C)-98.8 °F (37.1 °C)]   Pulse:  [61-76]   Resp:  [12-20]   BP: (103-121)/(58-67)   SpO2:  [94 %-98 %]   Body mass index is 22.78 kg/m².    Objective:  Vital signs: (most recent): Blood pressure 119/63, pulse 76, temperature 98.8 °F (37.1 °C), temperature source Oral, resp. rate 16, height 5' 10" (1.778 m), weight 72 kg (158 lb 11.7 oz), SpO2 95 %.  No fever.    Physical Exam  Constitutional:       General: He is not in acute distress.     Appearance: He is well-developed. He is not ill-appearing, toxic-appearing or diaphoretic.   HENT:      Head: Atraumatic. No abrasion, contusion or laceration.      Nose: Nose normal.      Mouth/Throat:      Pharynx: No oropharyngeal exudate.   Eyes:      General: No scleral icterus.        Right eye: No discharge.         Left eye: No discharge.      Conjunctiva/sclera: Conjunctivae normal.      Pupils: Pupils are equal, round, and reactive to light.   Neck:      Musculoskeletal: Normal range of motion and neck supple. No neck rigidity.      Vascular: No JVD.   Cardiovascular:      Rate and Rhythm: Normal rate and regular rhythm.      Heart sounds: Normal heart sounds. No murmur. No friction rub. No gallop.    Pulmonary:      Effort: Pulmonary effort is normal. No accessory muscle usage or " respiratory distress.      Breath sounds: Normal breath sounds. No wheezing.   Abdominal:      General: Bowel sounds are normal. There is no distension.      Palpations: Abdomen is soft. There is no mass.      Tenderness: There is no abdominal tenderness. There is no guarding or rebound.   Genitourinary:     Comments: S/p partial penectomy with loose dressing in place; scant blood no overt bleeding   Musculoskeletal: Normal range of motion.         General: No tenderness or deformity.   Lymphadenopathy:      Cervical: No cervical adenopathy.   Skin:     General: Skin is warm and dry.      Capillary Refill: Capillary refill takes less than 2 seconds.      Findings: No bruising, ecchymosis, erythema, petechiae or rash.      Nails: There is no clubbing.   Neurological:      Mental Status: He is alert. Mental status is at baseline.      GCS: GCS eye subscore is 4. GCS verbal subscore is 5. GCS motor subscore is 6.      Cranial Nerves: No cranial nerve deficit.      Sensory: No sensory deficit.      Motor: No abnormal muscle tone.      Coordination: Coordination normal.      Deep Tendon Reflexes: Reflexes normal.      Comments: Oriented to place and time. Unclear to context with frequent redirection, orientation.  Tangential thinking and occasional disorganized thought content    Psychiatric:         Speech: Speech normal.         Behavior: Behavior normal.         Thought Content: Thought content normal.         Judgment: Judgment normal.     Medications:  Medication list was reviewed in EPIC and changes noted under Assessment/Plan and MAR.    Laboratory:  Recent Labs     10/30/20  0334   WBC 4.27   RBC 3.57*   HGB 11.3*   HCT 35.4*      MCV 99*   MCH 31.7*   MCHC 31.9*   GRAN 47.7  2.0   LYMPH 33.7  1.4   MONO 10.1  0.4   EOS 0.3      Recent Labs     10/30/20  0334   GLU 83      K 4.2      CO2 27   BUN 17   CREATININE 0.7   CALCIUM 8.3*   ANIONGAP 7*   MG 2.1   PHOS 3.8       ASSESSMENT/PLAN:      Active Hospital Problems    Diagnosis  POA    *Cognitive impairment [R41.89]  Yes     Priority: 2     Penile carcinoma [C60.9]  Yes     Priority: 1 - High    Leukocytosis [D72.829]  No    Hypocalcemia [E83.51]  Yes    Acute encephalopathy [G93.40]  Yes    Cognitive decline [R41.89]  Yes    Confusion [R41.0]  Unknown    Debility [R53.81]  Yes    Altered mental status [R41.82]  Yes      Resolved Hospital Problems    Diagnosis Date Resolved POA    Encephalopathy [G93.40] 10/04/2020 Unknown         * Cognitive impairment  Impaired decision making  Presentation concerning for progressive dementia   -LP performed by IR 10/5/2020: elevated protein, neutrophils  -CSF HSV, ACE negative.   -Serum paraneoplastic panel from 9/28 has resulted negative.  -MRI brain with Possible subtle increased FLAIR hyperintensity involving the bilateral mesial temporal lobes which can be seen in the setting of autoimmune limbic encephalitis  -EEG without seizure activity   -Vitamin B1, Vitamin B6, Vitamin B12, HIV, RPR, TSH unremarkable   - Neurology consulted          --please send CSF to [facility] for Alzheimer panel (ADEVL test code) - repeat LP completed c/w early-onset AD          --completed empiric IV Solumedrol 1 g qd x 5 days without improvement          --formal cognitive assessment with neuropsych            --consider PET scan - not available inpatient           --continue strict delirium precautions  -Psych assess lack of competency   -Formal guardianship being pursued - court date early November  -Adult friends can assist with decision making   -Gina KEVIN [id number]: (9) Upon the inability of any adult to consent for himself and in the absence of any person listed in Paragraphs (2) through (8) of this Subsection, an adult friend of the patient. For purposes of this Subsection to consent, adult friend means an adult who has exhibited special care and concern for the patient, who is generally familiar with the patient's  health care views and desires, and who is willing and able to become involved in the patient's health care decisions and to act in the patient's best interest. The adult friend shall sign and date an acknowledgment form provided by the hospital or other health care facility in which the patient is located for placement in the patient's records certifying that he or she meets such criteria.     - Juice Martinez ([phone]) will make decisions on his behalf  - supervisor Meiirma Meanso ([phone]) have been trying to help him manage things with his work and personal matters as they have observed his deficits and know he has no one to help him - these individuals may be used for consent      [facility]  - Minimize use of restraints; if physical restraints necessary, please utilize medical/chemical prns for agitation.  - Keep shades open and room lit during day and room dim at night in order to promote healthy circadian rhythms.  - Encourage family at bedside  - Keep whiteboard in patient's room current with the date and name of the members of patient's team for easy patient self re-orientation.  - Avoid benzodiazepines, antihistamines, anticholinergics, hypnotics, and minimize opiates while controlling for pain as these medications may exacerbate delirium.     Penile carcinoma  - follows with urology   - Assessed inpatient - plan for partial / total penectomy   - Partial penectomy 10/19 - pathology w/o e/o of malignancy   - FC placed post op - self discontinued     Anticipated discharge date and disposition: Will need placement. CM/SW closely involved.  Guardianship being pursued. 11/9 court date.     Audi Arcos MD  [facility] Medicine    Total visit time was 15 minutes or greater with greater than 50% of time spent in counseling and coordination of care.

## 2020-10-30 NOTE — PROGRESS NOTES
Progress Note  Hospital Medicine    [place] [facility] [place] [facility] [place]  Admit Date: 10/2/2020  Encounter Date: 10/29/2020     SUBJECTIVE:     Follow-up Visit for: Cognitive impairment    HPI (See H&P for complete P,F,SHx):  Per Adriana Badillo MD:   Mr. Juan Hobson is a 69 y.o. male with recently diagnosed penile cancer, who presents to the ER from Urology clinic with concern for self neglect, and need for Neurology evaluation of dementia and possible placement.  The patient mentions that he has been having some issues with his memory.  He mostly orders takeout for meals because it is easier, and still drives to work.  He feels like he is able to take care of himself, and does not need to go to a facility.      Per note from Lianne German LCSW on 10/2:  Received consult from Dr. Monterroso re: assessing patient in clinic this afternoon and care recommendations as patient did not seem competent to make decisions and give consent. He has no family and no legal next of kin (POA or guardian).   Met individually with patient, and with his co-worker Janna Go ([phone], ext. [phone]) who brought him to his clinic appointment today. Patient unable to relate basic demographic information or clearly explain other information. For example, when asked his birth date he gave the year [birth year], then said 9, and then said the second to last, it begins with N; he said his street name but couldn't give the house number where he has lived for years; unable to say what type of work or where he worked prior to the [employer] in [facility] for the past 12 years; he described being taken in a truck to that other place (referring to the recent [facility] stay). He has awareness that he is having memory/cognitive issues but stated he can manage for himself at home and that he knows there is a lot of work that needs to be done in his home that he hasn't gotten to. He inherited the home after his mother .        [place] and another coworker Juice Martinez ([phone]) and their supervisor Meiirma Meanso ([phone]) have been trying to help him manage things with his work and personal matters as they have observed his deficits and know he has no one to help him. They have helped him keep up with recent medical appointments and accompanied and transported him.  Mr. Go reports concern for his safety and ability to care for himself after seeing his home with clutter throughout and things piled up, mold and old food in the refrigerator, mold in the toilet, clothes all over, etc. Patient has been driving himself to and from work and to get food at places near his home, but unable to navigate to unfamiliar places. They have observed his cognitive deficits at work and he has made errors, so they have modified his job and his supervisor has been working with him to try to get FMLA and his leave/prison in place so he doesn't lose his benefits. They have had to help him with paperwork, bill paying, etc., because he can't complete these on his own.         Upon arrival to the ER, vitals were temp 97.8F, HR 59 and /84.  Labs were unremarkable  He was admitted to Hospital Medicine for Neurology and SW evaluation.     Overview/Hospital Course:  Admitted to hospital medicine for worsening cognitive decline and inability to care for self in setting of concern for penile cancer.  In pursuit of workup for altered mental status - labs/infectious work-up grossly unremarkable. Neurology consulted and MRI obtained on admission with concern for encephalitis (increased FLAIR hyperintensity involving the bilateral mesial temporal lobes which can be seen in the setting of autoimmune limbic encephalitis in the appropriate clinical setting). LP recommended and attempted at bedside however not successful 2/2 to JEAN-PIERRED. Interventional radiology consulted, LP performed 10/5 with CSF (1 WBC, 190 RBC, 71 protein, 52 glucose). CSF HSV, ACE  negative. EEG 10/3 (1 hr 40 min) with mild regional dysfunction in bilateral temporal lobes, but no electrographic seizures. Serum paraneoplastic panel from 9/28 has resulted negative. MRI brain W WO contrast showing increased FLAIR hyperintensity involving the bilateral mesial temporal lobes. Completed 5 days of empiric IV Solumedrol 1 g qd without notable clinical improvement. Presentation most concerning for dementia   Psychiatry consulted for formal capacity evaluation and concluded that he does not have capacity to make decisions regarding his long term care disposition however does not require PEC at this time as his stay has been non-contested. He has no family, children and have relied on coworkers for assistance. The case was brought to the attention of EPS who have no plans to intervene at this time and recommend since the patient presented to the hospital it is a safe discharge planning issue that would involve [facility] Legal department. The Legal department intend to pursue [place] Guardianship to assist with managing affairs. Patient re-engaged with care for likely penile carcinoma with urology.  A determination was made that appropriate treatment for penile tumor was penectomy - patient proceeded to OR on 10/19 with partial penectomy completed. Patient has been awaiting placement, with pathology pending and LP c/w likely EOAD.     Interval History:   Voiding independently without issue. Pathology of penis without e/o malignancy - d/w  results.  Patient awaits placement.    Review of Systems:  Review of Systems   Constitutional: Negative for chills and fever.   HENT: Negative for congestion and sore throat.    Eyes: Negative for photophobia, pain and discharge.   Respiratory: Negative for cough, hemoptysis, sputum production and shortness of breath.    Cardiovascular: Negative for chest pain, palpitations and leg swelling.   Gastrointestinal: Negative for abdominal pain, diarrhea, nausea and  "vomiting.   Genitourinary: Negative for dysuria and urgency.   Musculoskeletal: Negative for myalgias and neck pain.   Skin: Negative for itching and rash.   Neurological: Negative for sensory change, focal weakness and headaches.   Endo/Heme/Allergies: Negative for polydipsia. Does not bruise/bleed easily.   Psychiatric/Behavioral: Negative for depression and suicidal ideas.     OBJECTIVE:     No intake or output data in the 24 hours ending 10/29/20 9844  Vital Signs Range (Last 24H):  Temp:  [96.6 °F (35.9 °C)-98.3 °F (36.8 °C)]   Pulse:  [63-68]   Resp:  [13-16]   BP: (123-143)/(62-93)   SpO2:  [96 %]   Body mass index is 22.78 kg/m².    Objective:  Vital signs: (most recent): Blood pressure (!) 143/93, pulse 63, temperature 98.3 °F (36.8 °C), temperature source Oral, resp. rate 13, height 5' 10" (1.778 m), weight 72 kg (158 lb 11.7 oz), SpO2 96 %.  No fever.    Physical Exam  Constitutional:       General: He is not in acute distress.     Appearance: He is well-developed. He is not ill-appearing, toxic-appearing or diaphoretic.   HENT:      Head: Atraumatic. No abrasion, contusion or laceration.      Nose: Nose normal.      Mouth/Throat:      Pharynx: No oropharyngeal exudate.   Eyes:      General: No scleral icterus.        Right eye: No discharge.         Left eye: No discharge.      Conjunctiva/sclera: Conjunctivae normal.      Pupils: Pupils are equal, round, and reactive to light.   Neck:      Musculoskeletal: Normal range of motion and neck supple. No neck rigidity.      Vascular: No JVD.   Cardiovascular:      Rate and Rhythm: Normal rate and regular rhythm.      Heart sounds: Normal heart sounds. No murmur. No friction rub. No gallop.    Pulmonary:      Effort: Pulmonary effort is normal. No accessory muscle usage or respiratory distress.      Breath sounds: Normal breath sounds. No wheezing.   Abdominal:      General: Bowel sounds are normal. There is no distension.      Palpations: Abdomen is soft. " There is no mass.      Tenderness: There is no abdominal tenderness. There is no guarding or rebound.   Genitourinary:     Comments: S/p partial penectomy with loose dressing in place; scant blood no overt bleeding   Musculoskeletal: Normal range of motion.         General: No tenderness or deformity.   Lymphadenopathy:      Cervical: No cervical adenopathy.   Skin:     General: Skin is warm and dry.      Capillary Refill: Capillary refill takes less than 2 seconds.      Findings: No bruising, ecchymosis, erythema, petechiae or rash.      Nails: There is no clubbing.   Neurological:      Mental Status: He is alert. Mental status is at baseline.      GCS: GCS eye subscore is 4. GCS verbal subscore is 5. GCS motor subscore is 6.      Cranial Nerves: No cranial nerve deficit.      Sensory: No sensory deficit.      Motor: No abnormal muscle tone.      Coordination: Coordination normal.      Deep Tendon Reflexes: Reflexes normal.      Comments: Oriented to place and time. Unclear to context with frequent redirection, orientation.  Tangential thinking and occasional disorganized thought content    Psychiatric:         Speech: Speech normal.         Behavior: Behavior normal.         Thought Content: Thought content normal.         Judgment: Judgment normal.     Medications:  Medication list was reviewed in EPIC and changes noted under Assessment/Plan and MAR.    Laboratory:  Recent Labs     10/29/20  0439   WBC 4.53   RBC 3.67*   HGB 11.7*   HCT 36.6*      *   MCH 31.9*   MCHC 32.0   GRAN 53.2  2.4   LYMPH 31.1  1.4   MONO 9.1  0.4   EOS 0.2      Recent Labs     10/29/20  0439   GLU 83      K 4.0      CO2 28   BUN 18   CREATININE 0.7   CALCIUM 8.8   ANIONGAP 6*   MG 2.0   PHOS 3.7       ASSESSMENT/PLAN:     Active Hospital Problems    Diagnosis  POA    *Cognitive impairment [R41.89]  Yes     Priority: 2     Penile carcinoma [C60.9]  Yes     Priority: 1 - High    Leukocytosis [D72.829]  No     Hypocalcemia [E83.51]  Yes    Acute encephalopathy [G93.40]  Yes    Cognitive decline [R41.89]  Yes    Confusion [R41.0]  Unknown    Debility [R53.81]  Yes    Altered mental status [R41.82]  Yes      Resolved Hospital Problems    Diagnosis Date Resolved POA    Encephalopathy [G93.40] 10/04/2020 Unknown         * Cognitive impairment  Impaired decision making  Presentation concerning for progressive dementia   -LP performed by IR 10/5/2020: elevated protein, neutrophils  -CSF HSV, ACE negative.   -Serum paraneoplastic panel from 9/28 has resulted negative.  -MRI brain with Possible subtle increased FLAIR hyperintensity involving the bilateral mesial temporal lobes which can be seen in the setting of autoimmune limbic encephalitis  -EEG without seizure activity   -Vitamin B1, Vitamin B6, Vitamin B12, HIV, RPR, TSH unremarkable   - Neurology consulted          --please send CSF to [place] for Alzheimer panel (ADEVL test code) - repeat LP completed c/w early-onset AD          --completed empiric IV Solumedrol 1 g qd x 5 days without improvement          --formal cognitive assessment with neuropsych            --consider PET scan - not available inpatient           --continue strict delirium precautions  -Psych assess lack of competency   -Formal guardianship being pursued - court date early November  -Adult friends can assist with decision making   -LaAlessandra BERKOWITZ. [id number]: (9) Upon the inability of any adult to consent for himself and in the absence of any person listed in Paragraphs (2) through (8) of this Subsection, an adult friend of the patient. For purposes of this Subsection to consent, adult friend means an adult who has exhibited special care and concern for the patient, who is generally familiar with the patient's health care views and desires, and who is willing and able to become involved in the patient's health care decisions and to act in the patient's best interest. The adult friend shall sign and  date an acknowledgment form provided by the hospital or other health care facility in which the patient is located for placement in the patient's records certifying that he or she meets such criteria.     - Juice Martinez ([phone]) will make decisions on his behalf  - supervisor Mei Bell ([phone]) have been trying to help him manage things with his work and personal matters as they have observed his deficits and know he has no one to help him - these individuals may be used for consent      DELIRIUM BEHAVIOR MANAGEMENT  - Minimize use of restraints; if physical restraints necessary, please utilize medical/chemical prns for agitation.  - Keep shades open and room lit during day and room dim at night in order to promote healthy circadian rhythms.  - Encourage family at bedside  - Keep whiteboard in patient's room current with the date and name of the members of patient's team for easy patient self re-orientation.  - Avoid benzodiazepines, antihistamines, anticholinergics, hypnotics, and minimize opiates while controlling for pain as these medications may exacerbate delirium.     Penile carcinoma  - follows with urology   - Assessed inpatient - plan for partial / total penectomy   - Partial penectomy 10/19 - pathology w/o e/o of malignancy   - FC placed post op - self discontinued     Anticipated discharge date and disposition: Will need placement. CM/SW closely involved.  Guardianship being pursued.     Audi Arcos MD  [facility]    Total visit time was 25 minutes or greater with greater than 50% of time spent in counseling and coordination of care.

## 2020-10-30 NOTE — PLAN OF CARE
PT is aao x 3. HE is very pleasant and is cooperative. He is able to voice all wants and needs. He has had no c/o pain or discomfort. He has remained free of falls and injuries. Bed is low and locked with call light within easy reach.       Problem: Adult Inpatient Plan of Care  Goal: Absence of Hospital-Acquired Illness or Injury  Outcome: Ongoing, Progressing  Intervention: Identify and Manage Fall Risk  Flowsheets (Taken 10/30/2020 0501)  Safety Promotion/Fall Prevention:   assistive device/personal item within reach   bed alarm set   side rails raised x 2   nonskid shoes/socks when out of bed   Fall Risk reviewed with patient/family  Intervention: Prevent VTE (venous thromboembolism)  Flowsheets (Taken 10/30/2020 0501)  VTE Prevention/Management:   remove, assess skin and reapply sequential compression device   ambulation promoted   dorsiflexion/plantar flexion performed   ROM (active) performed  Goal: Optimal Comfort and Wellbeing  Outcome: Ongoing, Progressing     Problem: Fall Injury Risk  Goal: Absence of Fall and Fall-Related Injury  Outcome: Ongoing, Progressing  Intervention: Identify and Manage Contributors to Fall Injury Risk  Flowsheets (Taken 10/30/2020 0501)  Self-Care Promotion:   independence encouraged   BADL personal objects within reach  Intervention: Promote Injury-Free Environment  Flowsheets (Taken 10/30/2020 0501)  Safety Promotion/Fall Prevention:   assistive device/personal item within reach   bed alarm set   side rails raised x 2   nonskid shoes/socks when out of bed   Fall Risk reviewed with patient/family

## 2020-10-30 NOTE — PLAN OF CARE
Plan of care reviewed with patient. Pt AAOx4. Pt ad nico and able to shift weight independently. Patient is free of fall/trauma/injury. Denies CP, SOB, or pain/discomfort. All questions addressed. Will continue to monitor

## 2020-10-31 LAB
ALBUMIN SERPL BCP-MCNC: 3.4 G/DL (ref 3.5–5.2)
ALP SERPL-CCNC: 68 U/L (ref 55–135)
ALT SERPL W/O P-5'-P-CCNC: 30 U/L (ref 10–44)
ANION GAP SERPL CALC-SCNC: 6 MMOL/L (ref 8–16)
ANISOCYTOSIS BLD QL SMEAR: SLIGHT
AST SERPL-CCNC: 20 U/L (ref 10–40)
BASOPHILS # BLD AUTO: 0.03 K/UL (ref 0–0.2)
BASOPHILS NFR BLD: 0.7 % (ref 0–1.9)
BILIRUB SERPL-MCNC: 0.6 MG/DL (ref 0.1–1)
BUN SERPL-MCNC: 19 MG/DL (ref 8–23)
CALCIUM SERPL-MCNC: 8.6 MG/DL (ref 8.7–10.5)
CHLORIDE SERPL-SCNC: 105 MMOL/L (ref 95–110)
CO2 SERPL-SCNC: 27 MMOL/L (ref 23–29)
CREAT SERPL-MCNC: 0.8 MG/DL (ref 0.5–1.4)
DIFFERENTIAL METHOD: ABNORMAL
EOSINOPHIL # BLD AUTO: 0.3 K/UL (ref 0–0.5)
EOSINOPHIL NFR BLD: 6.7 % (ref 0–8)
ERYTHROCYTE [DISTWIDTH] IN BLOOD BY AUTOMATED COUNT: 13.2 % (ref 11.5–14.5)
EST. GFR  (AFRICAN AMERICAN): >60 ML/MIN/1.73 M^2
EST. GFR  (NON AFRICAN AMERICAN): >60 ML/MIN/1.73 M^2
GLUCOSE SERPL-MCNC: 92 MG/DL (ref 70–110)
HCT VFR BLD AUTO: 37.2 % (ref 40–54)
HGB BLD-MCNC: 11.9 G/DL (ref 14–18)
HYPOCHROMIA BLD QL SMEAR: ABNORMAL
IMM GRANULOCYTES # BLD AUTO: 0.01 K/UL (ref 0–0.04)
IMM GRANULOCYTES NFR BLD AUTO: 0.2 % (ref 0–0.5)
LYMPHOCYTES # BLD AUTO: 1.3 K/UL (ref 1–4.8)
LYMPHOCYTES NFR BLD: 31.7 % (ref 18–48)
MAGNESIUM SERPL-MCNC: 2.1 MG/DL (ref 1.6–2.6)
MCH RBC QN AUTO: 31.8 PG (ref 27–31)
MCHC RBC AUTO-ENTMCNC: 32 G/DL (ref 32–36)
MCV RBC AUTO: 100 FL (ref 82–98)
MONOCYTES # BLD AUTO: 0.4 K/UL (ref 0.3–1)
MONOCYTES NFR BLD: 8.9 % (ref 4–15)
NEUTROPHILS # BLD AUTO: 2.2 K/UL (ref 1.8–7.7)
NEUTROPHILS NFR BLD: 51.8 % (ref 38–73)
NRBC BLD-RTO: 0 /100 WBC
PHOSPHATE SERPL-MCNC: 3.7 MG/DL (ref 2.7–4.5)
PLATELET # BLD AUTO: 257 K/UL (ref 150–350)
PLATELET BLD QL SMEAR: ABNORMAL
PMV BLD AUTO: 9.3 FL (ref 9.2–12.9)
POTASSIUM SERPL-SCNC: 3.9 MMOL/L (ref 3.5–5.1)
PROT SERPL-MCNC: 6 G/DL (ref 6–8.4)
RBC # BLD AUTO: 3.74 M/UL (ref 4.6–6.2)
SODIUM SERPL-SCNC: 138 MMOL/L (ref 136–145)
WBC # BLD AUTO: 4.17 K/UL (ref 3.9–12.7)

## 2020-10-31 PROCEDURE — 99231 PR SUBSEQUENT HOSPITAL CARE,LEVL I: ICD-10-PCS | Mod: ,,, | Performed by: INTERNAL MEDICINE

## 2020-10-31 PROCEDURE — 99231 SBSQ HOSP IP/OBS SF/LOW 25: CPT | Mod: ,,, | Performed by: INTERNAL MEDICINE

## 2020-10-31 PROCEDURE — 83735 ASSAY OF MAGNESIUM: CPT

## 2020-10-31 PROCEDURE — 80053 COMPREHEN METABOLIC PANEL: CPT

## 2020-10-31 PROCEDURE — 11000001 HC ACUTE MED/SURG PRIVATE ROOM

## 2020-10-31 PROCEDURE — 25000003 PHARM REV CODE 250: Performed by: STUDENT IN AN ORGANIZED HEALTH CARE EDUCATION/TRAINING PROGRAM

## 2020-10-31 PROCEDURE — 36415 COLL VENOUS BLD VENIPUNCTURE: CPT

## 2020-10-31 PROCEDURE — 84100 ASSAY OF PHOSPHORUS: CPT

## 2020-10-31 PROCEDURE — 85025 COMPLETE CBC W/AUTO DIFF WBC: CPT

## 2020-10-31 RX ADMIN — BACITRACIN: 500 OINTMENT TOPICAL at 09:10

## 2020-10-31 RX ADMIN — PANTOPRAZOLE SODIUM 40 MG: 40 TABLET, DELAYED RELEASE ORAL at 09:10

## 2020-10-31 NOTE — PLAN OF CARE
PT is aao x 4. No nad noted. No c/o pain or discomfort voiced. PT is able to voice all wants and needs. He has remained free of falls and injuries. Bed is low and locked with call light with in easy reach.       Problem: Adult Inpatient Plan of Care  Goal: Plan of Care Review  Outcome: Ongoing, Progressing  Goal: Absence of Hospital-Acquired Illness or Injury  Outcome: Ongoing, Progressing  Goal: Optimal Comfort and Wellbeing  Outcome: Ongoing, Progressing  Goal: Rounds/Family Conference  Outcome: Ongoing, Progressing     Problem: Fall Injury Risk  Goal: Absence of Fall and Fall-Related Injury  Outcome: Ongoing, Progressing

## 2020-11-01 LAB
ALBUMIN SERPL BCP-MCNC: 3.2 G/DL (ref 3.5–5.2)
ALP SERPL-CCNC: 65 U/L (ref 55–135)
ALT SERPL W/O P-5'-P-CCNC: 31 U/L (ref 10–44)
ANION GAP SERPL CALC-SCNC: 7 MMOL/L (ref 8–16)
AST SERPL-CCNC: 21 U/L (ref 10–40)
BASOPHILS # BLD AUTO: 0.04 K/UL (ref 0–0.2)
BASOPHILS NFR BLD: 1 % (ref 0–1.9)
BILIRUB SERPL-MCNC: 0.5 MG/DL (ref 0.1–1)
BUN SERPL-MCNC: 18 MG/DL (ref 8–23)
CALCIUM SERPL-MCNC: 8.8 MG/DL (ref 8.7–10.5)
CHLORIDE SERPL-SCNC: 106 MMOL/L (ref 95–110)
CO2 SERPL-SCNC: 27 MMOL/L (ref 23–29)
CREAT SERPL-MCNC: 0.7 MG/DL (ref 0.5–1.4)
DIFFERENTIAL METHOD: ABNORMAL
EOSINOPHIL # BLD AUTO: 0.3 K/UL (ref 0–0.5)
EOSINOPHIL NFR BLD: 6.6 % (ref 0–8)
ERYTHROCYTE [DISTWIDTH] IN BLOOD BY AUTOMATED COUNT: 13.2 % (ref 11.5–14.5)
EST. GFR  (AFRICAN AMERICAN): >60 ML/MIN/1.73 M^2
EST. GFR  (NON AFRICAN AMERICAN): >60 ML/MIN/1.73 M^2
GLUCOSE SERPL-MCNC: 87 MG/DL (ref 70–110)
HCT VFR BLD AUTO: 36.6 % (ref 40–54)
HGB BLD-MCNC: 11.8 G/DL (ref 14–18)
IMM GRANULOCYTES # BLD AUTO: 0.01 K/UL (ref 0–0.04)
IMM GRANULOCYTES NFR BLD AUTO: 0.2 % (ref 0–0.5)
LYMPHOCYTES # BLD AUTO: 1 K/UL (ref 1–4.8)
LYMPHOCYTES NFR BLD: 25 % (ref 18–48)
MAGNESIUM SERPL-MCNC: 2 MG/DL (ref 1.6–2.6)
MCH RBC QN AUTO: 32 PG (ref 27–31)
MCHC RBC AUTO-ENTMCNC: 32.2 G/DL (ref 32–36)
MCV RBC AUTO: 99 FL (ref 82–98)
MONOCYTES # BLD AUTO: 0.4 K/UL (ref 0.3–1)
MONOCYTES NFR BLD: 10 % (ref 4–15)
NEUTROPHILS # BLD AUTO: 2.3 K/UL (ref 1.8–7.7)
NEUTROPHILS NFR BLD: 57.2 % (ref 38–73)
NRBC BLD-RTO: 0 /100 WBC
PHOSPHATE SERPL-MCNC: 3.7 MG/DL (ref 2.7–4.5)
PLATELET # BLD AUTO: 270 K/UL (ref 150–350)
PMV BLD AUTO: 9.6 FL (ref 9.2–12.9)
POTASSIUM SERPL-SCNC: 3.9 MMOL/L (ref 3.5–5.1)
PROT SERPL-MCNC: 5.9 G/DL (ref 6–8.4)
RBC # BLD AUTO: 3.69 M/UL (ref 4.6–6.2)
SODIUM SERPL-SCNC: 140 MMOL/L (ref 136–145)
WBC # BLD AUTO: 4.08 K/UL (ref 3.9–12.7)

## 2020-11-01 PROCEDURE — 36415 COLL VENOUS BLD VENIPUNCTURE: CPT

## 2020-11-01 PROCEDURE — 84100 ASSAY OF PHOSPHORUS: CPT

## 2020-11-01 PROCEDURE — 25000003 PHARM REV CODE 250: Performed by: STUDENT IN AN ORGANIZED HEALTH CARE EDUCATION/TRAINING PROGRAM

## 2020-11-01 PROCEDURE — 11000001 HC ACUTE MED/SURG PRIVATE ROOM

## 2020-11-01 PROCEDURE — 99231 PR SUBSEQUENT HOSPITAL CARE,LEVL I: ICD-10-PCS | Mod: ,,, | Performed by: INTERNAL MEDICINE

## 2020-11-01 PROCEDURE — 80053 COMPREHEN METABOLIC PANEL: CPT

## 2020-11-01 PROCEDURE — 99231 SBSQ HOSP IP/OBS SF/LOW 25: CPT | Mod: ,,, | Performed by: INTERNAL MEDICINE

## 2020-11-01 PROCEDURE — 83735 ASSAY OF MAGNESIUM: CPT

## 2020-11-01 PROCEDURE — 85025 COMPLETE CBC W/AUTO DIFF WBC: CPT

## 2020-11-01 RX ADMIN — PANTOPRAZOLE SODIUM 40 MG: 40 TABLET, DELAYED RELEASE ORAL at 10:11

## 2020-11-01 NOTE — PROGRESS NOTES
Progress Note  Hospital Medicine    [place] [facility] [place] [facility] [place]  Admit Date: 10/2/2020  Encounter Date: 10/31/2020     SUBJECTIVE:     Follow-up Visit for: Cognitive impairment    HPI (See H&P for complete P,F,SHx):  Per Adriana Badillo MD:   Mr. Juan Hobson is a 69 y.o. male with recently diagnosed penile cancer, who presents to the ER from Urology clinic with concern for self neglect, and need for Neurology evaluation of dementia and possible placement.  The patient mentions that he has been having some issues with his memory.  He mostly orders takeout for meals because it is easier, and still drives to work.  He feels like he is able to take care of himself, and does not need to go to a facility.      Per note from Lianne German LCSW on 10/2:  Received consult from Dr. Monterroso re: assessing patient in clinic this afternoon and care recommendations as patient did not seem competent to make decisions and give consent. He has no family and no legal next of kin (POA or guardian).   Met individually with patient, and with his co-worker Janna Go ([phone], ext. [phone]) who brought him to his clinic appointment today. Patient unable to relate basic demographic information or clearly explain other information. For example, when asked his birth date he gave the year [birth year], then said 9, and then said the second to last, it begins with N; he said his street name but couldn't give the house number where he has lived for years; unable to say what type of work or where he worked prior to the [employer] in [facility] for the past 12 years; he described being taken in a truck to that other place (referring to the recent [facility] stay). He has awareness that he is having memory/cognitive issues but stated he can manage for himself at home and that he knows there is a lot of work that needs to be done in his home that he hasn't gotten to. He inherited the home after his mother .        [place] and another coworker Juice Martinez ([phone]) and their supervisor Meiirma Meanso ([phone]) have been trying to help him manage things with his work and personal matters as they have observed his deficits and know he has no one to help him. They have helped him keep up with recent medical appointments and accompanied and transported him.  Mr. Go reports concern for his safety and ability to care for himself after seeing his home with clutter throughout and things piled up, mold and old food in the refrigerator, mold in the toilet, clothes all over, etc. Patient has been driving himself to and from work and to get food at places near his home, but unable to navigate to unfamiliar places. They have observed his cognitive deficits at work and he has made errors, so they have modified his job and his supervisor has been working with him to try to get FMLA and his leave/MCC in place so he doesn't lose his benefits. They have had to help him with paperwork, bill paying, etc., because he can't complete these on his own.         Upon arrival to the ER, vitals were temp 97.8F, HR 59 and /84.  Labs were unremarkable  He was admitted to Hospital Medicine for Neurology and SW evaluation.     Overview/Hospital Course:  Admitted to hospital medicine for worsening cognitive decline and inability to care for self in setting of concern for penile cancer.  In pursuit of workup for altered mental status - labs/infectious work-up grossly unremarkable. Neurology consulted and MRI obtained on admission with concern for encephalitis (increased FLAIR hyperintensity involving the bilateral mesial temporal lobes which can be seen in the setting of autoimmune limbic encephalitis in the appropriate clinical setting). LP recommended and attempted at bedside however not successful 2/2 to JEAN-PIERRED. Interventional radiology consulted, LP performed 10/5 with CSF (1 WBC, 190 RBC, 71 protein, 52 glucose). CSF HSV, ACE  negative. EEG 10/3 (1 hr 40 min) with mild regional dysfunction in bilateral temporal lobes, but no electrographic seizures. Serum paraneoplastic panel from 9/28 has resulted negative. MRI brain W WO contrast showing increased FLAIR hyperintensity involving the bilateral mesial temporal lobes. Completed 5 days of empiric IV Solumedrol 1 g qd without notable clinical improvement. Presentation most concerning for dementia   Psychiatry consulted for formal capacity evaluation and concluded that he does not have capacity to make decisions regarding his long term care disposition however does not require PEC at this time as his stay has been non-contested. He has no family, children and have relied on coworkers for assistance. The case was brought to the attention of EPS who have no plans to intervene at this time and recommend since the patient presented to the hospital it is a safe discharge planning issue that would involve [facility] Legal department. The Legal department intend to pursue [place] Guardianship to assist with managing affairs. Patient re-engaged with care for likely penile carcinoma with urology.  A determination was made that appropriate treatment for penile tumor was penectomy - patient proceeded to OR on 10/19 with partial penectomy completed. Patient has been awaiting placement, with pathology without e/o malignancy and LP c/w likely EOAD.     Interval History:   Patient stable.  D/W urology pathology findings - patient to be discussed at tumor board. Still high suspicion for malignancy given findings in surgery, MRI.  Discussed ongoing, episodic bleeding experienced in post surgical area.  Will provide bacitracin ointment and dressings as likely scab.     Review of Systems:  Review of Systems   Constitutional: Negative for chills and fever.   HENT: Negative for congestion and sore throat.    Eyes: Negative for photophobia, pain and discharge.   Respiratory: Negative for cough, hemoptysis, sputum  "production and shortness of breath.    Cardiovascular: Negative for chest pain, palpitations and leg swelling.   Gastrointestinal: Negative for abdominal pain, diarrhea, nausea and vomiting.   Genitourinary: Negative for dysuria and urgency.   Musculoskeletal: Negative for myalgias and neck pain.   Skin: Negative for itching and rash.   Neurological: Negative for sensory change, focal weakness and headaches.   Endo/Heme/Allergies: Negative for polydipsia. Does not bruise/bleed easily.   Psychiatric/Behavioral: Negative for depression and suicidal ideas.     OBJECTIVE:       Intake/Output Summary (Last 24 hours) at 10/31/2020 2248  Last data filed at 10/31/2020 1800  Gross per 24 hour   Intake 1157 ml   Output 800 ml   Net 357 ml     Vital Signs Range (Last 24H):  Temp:  [98.2 °F (36.8 °C)]   Pulse:  [60-71]   Resp:  [13-16]   BP: (115-131)/(61-66)   SpO2:  [97 %-98 %]   Body mass index is 22.78 kg/m².    Objective:  Vital signs: (most recent): Blood pressure 115/61, pulse 71, temperature 98.2 °F (36.8 °C), temperature source Oral, resp. rate 13, height 5' 10" (1.778 m), weight 72 kg (158 lb 11.7 oz), SpO2 97 %.  No fever.    Physical Exam  Constitutional:       General: He is not in acute distress.     Appearance: He is well-developed. He is not ill-appearing, toxic-appearing or diaphoretic.   HENT:      Head: Atraumatic. No abrasion, contusion or laceration.      Nose: Nose normal.      Mouth/Throat:      Pharynx: No oropharyngeal exudate.   Eyes:      General: No scleral icterus.        Right eye: No discharge.         Left eye: No discharge.      Conjunctiva/sclera: Conjunctivae normal.      Pupils: Pupils are equal, round, and reactive to light.   Neck:      Musculoskeletal: Normal range of motion and neck supple. No neck rigidity.      Vascular: No JVD.   Cardiovascular:      Rate and Rhythm: Normal rate and regular rhythm.      Heart sounds: Normal heart sounds. No murmur. No friction rub. No gallop.  "   Pulmonary:      Effort: Pulmonary effort is normal. No accessory muscle usage or respiratory distress.      Breath sounds: Normal breath sounds. No wheezing.   Abdominal:      General: Bowel sounds are normal. There is no distension.      Palpations: Abdomen is soft. There is no mass.      Tenderness: There is no abdominal tenderness. There is no guarding or rebound.   Genitourinary:     Comments: S/p partial penectomy with loose dressing in place; scant blood no overt bleeding   Musculoskeletal: Normal range of motion.         General: No tenderness or deformity.   Lymphadenopathy:      Cervical: No cervical adenopathy.   Skin:     General: Skin is warm and dry.      Capillary Refill: Capillary refill takes less than 2 seconds.      Findings: No bruising, ecchymosis, erythema, petechiae or rash.      Nails: There is no clubbing.   Neurological:      Mental Status: He is alert. Mental status is at baseline.      GCS: GCS eye subscore is 4. GCS verbal subscore is 5. GCS motor subscore is 6.      Cranial Nerves: No cranial nerve deficit.      Sensory: No sensory deficit.      Motor: No abnormal muscle tone.      Coordination: Coordination normal.      Deep Tendon Reflexes: Reflexes normal.      Comments: Oriented to place and time. Unclear to context with frequent redirection, orientation.  Tangential thinking and occasional disorganized thought content    Psychiatric:         Speech: Speech normal.         Behavior: Behavior normal.         Thought Content: Thought content normal.         Judgment: Judgment normal.     Medications:  Medication list was reviewed in EPIC and changes noted under Assessment/Plan and MAR.    Laboratory:  Recent Labs     10/31/20  0400   WBC 4.17   RBC 3.74*   HGB 11.9*   HCT 37.2*      *   MCH 31.8*   MCHC 32.0   GRAN 51.8  2.2   LYMPH 31.7  1.3   MONO 8.9  0.4   EOS 0.3      Recent Labs     10/31/20  0400   GLU 92      K 3.9      CO2 27   BUN 19    CREATININE 0.8   CALCIUM 8.6*   ANIONGAP 6*   MG 2.1   PHOS 3.7       ASSESSMENT/PLAN:     Active Hospital Problems    Diagnosis  POA    *Cognitive impairment [R41.89]  Yes     Priority: 2     Penile carcinoma [C60.9]  Yes     Priority: 1 - High    Leukocytosis [D72.829]  No    Hypocalcemia [E83.51]  Yes    Acute encephalopathy [G93.40]  Yes    Cognitive decline [R41.89]  Yes    Confusion [R41.0]  Unknown    Debility [R53.81]  Yes    Altered mental status [R41.82]  Yes      Resolved Hospital Problems    Diagnosis Date Resolved POA    Encephalopathy [G93.40] 10/04/2020 Unknown         * Cognitive impairment  Impaired decision making  Presentation concerning for progressive dementia   -LP performed by IR 10/5/2020: elevated protein, neutrophils  -CSF HSV, ACE negative.   -Serum paraneoplastic panel from 9/28 has resulted negative.  -MRI brain with Possible subtle increased FLAIR hyperintensity involving the bilateral mesial temporal lobes which can be seen in the setting of autoimmune limbic encephalitis  -EEG without seizure activity   -Vitamin B1, Vitamin B6, Vitamin B12, HIV, RPR, TSH unremarkable   - Neurology consulted          --please send CSF to [facility] for Alzheimer panel (ADEVL test code) - repeat LP completed c/w early-onset AD          --completed empiric IV Solumedrol 1 g qd x 5 days without improvement          --formal cognitive assessment with neuropsych            --consider PET scan - not available inpatient           --continue strict delirium precautions  -Psych assess lack of competency   -Formal guardianship being pursued - court date early November  -Adult friends can assist with decision making   -[place] R[place] 40:1299.53: (9) Upon the inability of any adult to consent for himself and in the absence of any person listed in Paragraphs (2) through (8) of this Subsection, an adult friend of the patient. For purposes of this Subsection to consent, adult friend means an adult who has exhibited  special care and concern for the patient, who is generally familiar with the patient's health care views and desires, and who is willing and able to become involved in the patient's health care decisions and to act in the patient's best interest. The adult friend shall sign and date an acknowledgment form provided by the hospital or other health care facility in which the patient is located for placement in the patient's records certifying that he or she meets such criteria.     - Juice Martinez ([phone]) will make decisions on his behalf  - supervisor Mei Bell ([phone]) have been trying to help him manage things with his work and personal matters as they have observed his deficits and know he has no one to help him - these individuals may be used for consent      [facility]NAG[facility]  - Minimize use of restraints; if physical restraints necessary, please utilize medical/chemical prns for agitation.  - Keep shades open and room lit during day and room dim at night in order to promote healthy circadian rhythms.  - Encourage family at bedside  - Keep whiteboard in patient's room current with the date and name of the members of patient's team for easy patient self re-orientation.  - Avoid benzodiazepines, antihistamines, anticholinergics, hypnotics, and minimize opiates while controlling for pain as these medications may exacerbate delirium.     Penile carcinoma  - follows with urology   - Assessed inpatient - plan for partial / total penectomy   - Partial penectomy 10/19 - pathology w/o e/o of malignancy - to be discussed at tumor board  - Bacitracin ointment and 4x4 dressings to post surgical site   - FC placed post op - self discontinued     Anticipated discharge date and disposition: Will need placement. CM/SW closely involved.  Guardianship being pursued. 11/9 court date.     Audi rAcos MD  [facility]    Total visit time was 15 minutes or greater with greater than 50% of time  spent in counseling and coordination of care.

## 2020-11-01 NOTE — PLAN OF CARE
PT resting in bed AAOx4 able to voice concern none at this time ambulate independently sitter in room no c/o pain no acute distress noted    Problem: Adult Inpatient Plan of Care  Goal: Plan of Care Review  Outcome: Ongoing, Progressing  Goal: Patient-Specific Goal (Individualization)  Outcome: Ongoing, Progressing  Goal: Absence of Hospital-Acquired Illness or Injury  Outcome: Ongoing, Progressing  Goal: Optimal Comfort and Wellbeing  Outcome: Ongoing, Progressing  Goal: Readiness for Transition of Care  Outcome: Ongoing, Progressing  Goal: Rounds/Family Conference  Outcome: Ongoing, Progressing

## 2020-11-01 NOTE — PLAN OF CARE
11/01/20 0748   Post-Acute Status   Post-Acute Authorization Placement;Home Health   Post-Acute Placement Status Referrals Sent   Discharge Delays (!) Patient and Family Barriers

## 2020-11-02 LAB
ALBUMIN SERPL BCP-MCNC: 3.3 G/DL (ref 3.5–5.2)
ALP SERPL-CCNC: 68 U/L (ref 55–135)
ALT SERPL W/O P-5'-P-CCNC: 31 U/L (ref 10–44)
ANION GAP SERPL CALC-SCNC: 7 MMOL/L (ref 8–16)
AST SERPL-CCNC: 20 U/L (ref 10–40)
BASOPHILS # BLD AUTO: 0.04 K/UL (ref 0–0.2)
BASOPHILS NFR BLD: 0.9 % (ref 0–1.9)
BILIRUB SERPL-MCNC: 0.4 MG/DL (ref 0.1–1)
BUN SERPL-MCNC: 22 MG/DL (ref 8–23)
CALCIUM SERPL-MCNC: 8.6 MG/DL (ref 8.7–10.5)
CHLORIDE SERPL-SCNC: 106 MMOL/L (ref 95–110)
CO2 SERPL-SCNC: 27 MMOL/L (ref 23–29)
CREAT SERPL-MCNC: 0.7 MG/DL (ref 0.5–1.4)
DIFFERENTIAL METHOD: ABNORMAL
EOSINOPHIL # BLD AUTO: 0.3 K/UL (ref 0–0.5)
EOSINOPHIL NFR BLD: 7 % (ref 0–8)
ERYTHROCYTE [DISTWIDTH] IN BLOOD BY AUTOMATED COUNT: 13.2 % (ref 11.5–14.5)
EST. GFR  (AFRICAN AMERICAN): >60 ML/MIN/1.73 M^2
EST. GFR  (NON AFRICAN AMERICAN): >60 ML/MIN/1.73 M^2
GLUCOSE SERPL-MCNC: 90 MG/DL (ref 70–110)
HCT VFR BLD AUTO: 36.4 % (ref 40–54)
HGB BLD-MCNC: 11.8 G/DL (ref 14–18)
IMM GRANULOCYTES # BLD AUTO: 0.02 K/UL (ref 0–0.04)
IMM GRANULOCYTES NFR BLD AUTO: 0.5 % (ref 0–0.5)
LYMPHOCYTES # BLD AUTO: 1.2 K/UL (ref 1–4.8)
LYMPHOCYTES NFR BLD: 29.1 % (ref 18–48)
MAGNESIUM SERPL-MCNC: 1.9 MG/DL (ref 1.6–2.6)
MCH RBC QN AUTO: 31.5 PG (ref 27–31)
MCHC RBC AUTO-ENTMCNC: 32.4 G/DL (ref 32–36)
MCV RBC AUTO: 97 FL (ref 82–98)
MONOCYTES # BLD AUTO: 0.5 K/UL (ref 0.3–1)
MONOCYTES NFR BLD: 10.6 % (ref 4–15)
NEUTROPHILS # BLD AUTO: 2.2 K/UL (ref 1.8–7.7)
NEUTROPHILS NFR BLD: 51.9 % (ref 38–73)
NRBC BLD-RTO: 0 /100 WBC
PHOSPHATE SERPL-MCNC: 4 MG/DL (ref 2.7–4.5)
PLATELET # BLD AUTO: 285 K/UL (ref 150–350)
PMV BLD AUTO: 9.2 FL (ref 9.2–12.9)
POTASSIUM SERPL-SCNC: 4 MMOL/L (ref 3.5–5.1)
PROT SERPL-MCNC: 5.9 G/DL (ref 6–8.4)
RBC # BLD AUTO: 3.75 M/UL (ref 4.6–6.2)
SODIUM SERPL-SCNC: 140 MMOL/L (ref 136–145)
WBC # BLD AUTO: 4.26 K/UL (ref 3.9–12.7)

## 2020-11-02 PROCEDURE — 99231 PR SUBSEQUENT HOSPITAL CARE,LEVL I: ICD-10-PCS | Mod: ,,, | Performed by: INTERNAL MEDICINE

## 2020-11-02 PROCEDURE — 84100 ASSAY OF PHOSPHORUS: CPT

## 2020-11-02 PROCEDURE — 11000001 HC ACUTE MED/SURG PRIVATE ROOM

## 2020-11-02 PROCEDURE — 36415 COLL VENOUS BLD VENIPUNCTURE: CPT

## 2020-11-02 PROCEDURE — 25000003 PHARM REV CODE 250: Performed by: STUDENT IN AN ORGANIZED HEALTH CARE EDUCATION/TRAINING PROGRAM

## 2020-11-02 PROCEDURE — 80053 COMPREHEN METABOLIC PANEL: CPT

## 2020-11-02 PROCEDURE — 99231 SBSQ HOSP IP/OBS SF/LOW 25: CPT | Mod: ,,, | Performed by: INTERNAL MEDICINE

## 2020-11-02 PROCEDURE — 85025 COMPLETE CBC W/AUTO DIFF WBC: CPT

## 2020-11-02 PROCEDURE — 83735 ASSAY OF MAGNESIUM: CPT

## 2020-11-02 RX ADMIN — BACITRACIN: 500 OINTMENT TOPICAL at 08:11

## 2020-11-02 RX ADMIN — PANTOPRAZOLE SODIUM 40 MG: 40 TABLET, DELAYED RELEASE ORAL at 08:11

## 2020-11-02 NOTE — PROGRESS NOTES
Progress Note  Hospital Medicine    [place] [facility]  Admit Date: 10/2/2020  Encounter Date: 2020     SUBJECTIVE:     Follow-up Visit for: Cognitive impairment    HPI (See H&P for complete P,F,SHx):  Per Adriana Badillo MD:   Mr. Juan Hobson is a 69 y.o. male with recently diagnosed penile cancer, who presents to the ER from Urology clinic with concern for self neglect, and need for Neurology evaluation of dementia and possible placement.  The patient mentions that he has been having some issues with his memory.  He mostly orders takeout for meals because it is easier, and still drives to work.  He feels like he is able to take care of himself, and does not need to go to a facility.      Per note from Lianne German LCSW on 10/2:  Received consult from Dr. Monterroso re: assessing patient in clinic this afternoon and care recommendations as patient did not seem competent to make decisions and give consent. He has no family and no legal next of kin (POA or guardian).   Met individually with patient, and with his co-worker Janna Go ([phone], ext. [phone]) who brought him to his clinic appointment today. Patient unable to relate basic demographic information or clearly explain other information. For example, when asked his birth date he gave the year [birth year], then said 9, and then said the second to last, it begins with N; he said his street name but couldn't give the house number where he has lived for years; unable to say what type of work or where he worked prior to the [employer] in [facility] for the past 12 years; he described being taken in a truck to that other place (referring to the recent [facility] stay). He has awareness that he is having memory/cognitive issues but stated he can manage for himself at home and that he knows there is a lot of work that needs to be done in his home that he hasn't gotten to. He inherited the home after his mother .        [place] and another coworker Juice Martinez ([phone]) and their supervisor Meiirma Meanso ([phone]) have been trying to help him manage things with his work and personal matters as they have observed his deficits and know he has no one to help him. They have helped him keep up with recent medical appointments and accompanied and transported him.  Mr. Go reports concern for his safety and ability to care for himself after seeing his home with clutter throughout and things piled up, mold and old food in the refrigerator, mold in the toilet, clothes all over, etc. Patient has been driving himself to and from work and to get food at places near his home, but unable to navigate to unfamiliar places. They have observed his cognitive deficits at work and he has made errors, so they have modified his job and his supervisor has been working with him to try to get FMLA and his leave/shelter in place so he doesn't lose his benefits. They have had to help him with paperwork, bill paying, etc., because he can't complete these on his own.         Upon arrival to the ER, vitals were temp 97.8F, HR 59 and /84.  Labs were unremarkable  He was admitted to Hospital Medicine for Neurology and SW evaluation.     Overview/Hospital Course:  Admitted to hospital medicine for worsening cognitive decline and inability to care for self in setting of concern for penile cancer.  In pursuit of workup for altered mental status - labs/infectious work-up grossly unremarkable. Neurology consulted and MRI obtained on admission with concern for encephalitis (increased FLAIR hyperintensity involving the bilateral mesial temporal lobes which can be seen in the setting of autoimmune limbic encephalitis in the appropriate clinical setting). LP recommended and attempted at bedside however not successful 2/2 to JEAN-PIERRED. Interventional radiology consulted, LP performed 10/5 with CSF (1 WBC, 190 RBC, 71 protein, 52 glucose). CSF HSV, ACE  negative. EEG 10/3 (1 hr 40 min) with mild regional dysfunction in bilateral temporal lobes, but no electrographic seizures. Serum paraneoplastic panel from 9/28 has resulted negative. MRI brain W WO contrast showing increased FLAIR hyperintensity involving the bilateral mesial temporal lobes. Completed 5 days of empiric IV Solumedrol 1 g qd without notable clinical improvement. Presentation most concerning for dementia   Psychiatry consulted for formal capacity evaluation and concluded that he does not have capacity to make decisions regarding his long term care disposition however does not require PEC at this time as his stay has been non-contested. He has no family, children and have relied on coworkers for assistance. The case was brought to the attention of EPS who have no plans to intervene at this time and recommend since the patient presented to the hospital it is a safe discharge planning issue that would involve [facility] Legal department. The Legal department intend to pursue [place] Guardianship to assist with managing affairs. Patient re-engaged with care for likely penile carcinoma with urology.  A determination was made that appropriate treatment for penile tumor was penectomy - patient proceeded to OR on 10/19 with partial penectomy completed. Patient has been awaiting placement, with pathology without e/o malignancy and LP c/w likely EOAD.     Interval History:   Patient stable.  Awaiting placement.  No issues, concerns.  Bactracin ointment provider to assist with wound healing s/p partial penectomy.     Review of Systems:  Review of Systems   Constitutional: Negative for chills and fever.   HENT: Negative for congestion and sore throat.    Eyes: Negative for photophobia, pain and discharge.   Respiratory: Negative for cough, hemoptysis, sputum production and shortness of breath.    Cardiovascular: Negative for chest pain, palpitations and leg swelling.   Gastrointestinal: Negative for abdominal  "pain, diarrhea, nausea and vomiting.   Genitourinary: Negative for dysuria and urgency.   Musculoskeletal: Negative for myalgias and neck pain.   Skin: Negative for itching and rash.   Neurological: Negative for sensory change, focal weakness and headaches.   Endo/Heme/Allergies: Negative for polydipsia. Does not bruise/bleed easily.   Psychiatric/Behavioral: Negative for depression and suicidal ideas.     OBJECTIVE:       Intake/Output Summary (Last 24 hours) at 11/1/2020 2201  Last data filed at 11/1/2020 1900  Gross per 24 hour   Intake 1287 ml   Output 4 ml   Net 1283 ml     Vital Signs Range (Last 24H):  Temp:  [97.2 °F (36.2 °C)-98.6 °F (37 °C)]   Pulse:  [60-77]   Resp:  [16-18]   BP: (110-127)/(60-71)   SpO2:  [95 %-99 %]   Body mass index is 22.78 kg/m².    Objective:  Vital signs: (most recent): Blood pressure 110/61, pulse 77, temperature 98 °F (36.7 °C), temperature source Oral, resp. rate 17, height 5' 10" (1.778 m), weight 72 kg (158 lb 11.7 oz), SpO2 95 %.  No fever.    Physical Exam  Constitutional:       General: He is not in acute distress.     Appearance: He is well-developed. He is not ill-appearing, toxic-appearing or diaphoretic.   HENT:      Head: Atraumatic. No abrasion, contusion or laceration.      Nose: Nose normal.      Mouth/Throat:      Pharynx: No oropharyngeal exudate.   Eyes:      General: No scleral icterus.        Right eye: No discharge.         Left eye: No discharge.      Conjunctiva/sclera: Conjunctivae normal.      Pupils: Pupils are equal, round, and reactive to light.   Neck:      Musculoskeletal: Normal range of motion and neck supple. No neck rigidity.      Vascular: No JVD.   Cardiovascular:      Rate and Rhythm: Normal rate and regular rhythm.      Heart sounds: Normal heart sounds. No murmur. No friction rub. No gallop.    Pulmonary:      Effort: Pulmonary effort is normal. No accessory muscle usage or respiratory distress.      Breath sounds: Normal breath sounds. No " wheezing.   Abdominal:      General: Bowel sounds are normal. There is no distension.      Palpations: Abdomen is soft. There is no mass.      Tenderness: There is no abdominal tenderness. There is no guarding or rebound.   Genitourinary:     Comments: S/p partial penectomy with loose dressing in place; scant blood no overt bleeding   Musculoskeletal: Normal range of motion.         General: No tenderness or deformity.   Lymphadenopathy:      Cervical: No cervical adenopathy.   Skin:     General: Skin is warm and dry.      Capillary Refill: Capillary refill takes less than 2 seconds.      Findings: No bruising, ecchymosis, erythema, petechiae or rash.      Nails: There is no clubbing.   Neurological:      Mental Status: He is alert. Mental status is at baseline.      GCS: GCS eye subscore is 4. GCS verbal subscore is 5. GCS motor subscore is 6.      Cranial Nerves: No cranial nerve deficit.      Sensory: No sensory deficit.      Motor: No abnormal muscle tone.      Coordination: Coordination normal.      Deep Tendon Reflexes: Reflexes normal.      Comments: Oriented to place and time. Unclear to context with frequent redirection, orientation.  Tangential thinking and occasional disorganized thought content    Psychiatric:         Speech: Speech normal.         Behavior: Behavior normal.         Thought Content: Thought content normal.         Judgment: Judgment normal.     Medications:  Medication list was reviewed in EPIC and changes noted under Assessment/Plan and MAR.    Laboratory:  Recent Labs     11/01/20  0617   WBC 4.08   RBC 3.69*   HGB 11.8*   HCT 36.6*      MCV 99*   MCH 32.0*   MCHC 32.2   GRAN 57.2  2.3   LYMPH 25.0  1.0   MONO 10.0  0.4   EOS 0.3      Recent Labs     11/01/20  0617   GLU 87      K 3.9      CO2 27   BUN 18   CREATININE 0.7   CALCIUM 8.8   ANIONGAP 7*   MG 2.0   PHOS 3.7       ASSESSMENT/PLAN:     Active Hospital Problems    Diagnosis  POA    *Cognitive impairment  [R41.89]  Yes     Priority: 2     Penile carcinoma [C60.9]  Yes     Priority: 1 - High    Leukocytosis [D72.829]  No    Hypocalcemia [E83.51]  Yes    Acute encephalopathy [G93.40]  Yes    Cognitive decline [R41.89]  Yes    Confusion [R41.0]  Unknown    Debility [R53.81]  Yes    Altered mental status [R41.82]  Yes      Resolved Hospital Problems    Diagnosis Date Resolved POA    Encephalopathy [G93.40] 10/04/2020 Unknown         * Cognitive impairment  Impaired decision making  Presentation concerning for progressive dementia   -LP performed by IR 10/5/2020: elevated protein, neutrophils  -CSF HSV, ACE negative.   -Serum paraneoplastic panel from 9/28 has resulted negative.  -MRI brain with Possible subtle increased FLAIR hyperintensity involving the bilateral mesial temporal lobes which can be seen in the setting of autoimmune limbic encephalitis  -EEG without seizure activity   -Vitamin B1, Vitamin B6, Vitamin B12, HIV, RPR, TSH unremarkable   - Neurology consulted          --please send CSF to [place] for Alzheimer panel (ADEVL test code) - repeat LP completed c/w early-onset AD          --completed empiric IV Solumedrol 1 g qd x 5 days without improvement          --formal cognitive assessment with neuropsych            --consider PET scan - not available inpatient           --continue strict delirium precautions  -Psych assess lack of competency   -Formal guardianship being pursued - court date early November  -Adult friends can assist with decision making   -Gina KEVIN 40:[id number]: (9) Upon the inability of any adult to consent for himself and in the absence of any person listed in Paragraphs (2) through (8) of this Subsection, an adult friend of the patient. For purposes of this Subsection to consent, adult friend means an adult who has exhibited special care and concern for the patient, who is generally familiar with the patient's health care views and desires, and who is willing and able to become  involved in the patient's health care decisions and to act in the patient's best interest. The adult friend shall sign and date an acknowledgment form provided by the hospital or other health care facility in which the patient is located for placement in the patient's records certifying that he or she meets such criteria.     - Juice Martinez ([phone]) will make decisions on his behalf  - supervisor Mei Bell ([phone]) have been trying to help him manage things with his work and personal matters as they have observed his deficits and know he has no one to help him - these individuals may be used for consent      [facility]HAVIOR [facility]NAGEMENT  - Minimize use of restraints; if physical restraints necessary, please utilize medical/chemical prns for agitation.  - Keep shades open and room lit during day and room dim at night in order to promote healthy circadian rhythms.  - Encourage family at bedside  - Keep whiteboard in patient's room current with the date and name of the members of patient's team for easy patient self re-orientation.  - Avoid benzodiazepines, antihistamines, anticholinergics, hypnotics, and minimize opiates while controlling for pain as these medications may exacerbate delirium.     Penile carcinoma  - follows with urology   - Assessed inpatient - plan for partial / total penectomy   - Partial penectomy 10/19 - pathology w/o e/o of malignancy - to be discussed at tumor board  - Bacitracin ointment and 4x4 dressings to post surgical site   - FC placed post op - self discontinued     Anticipated discharge date and disposition: Will need placement. CM/SW closely involved.  Guardianship being pursued. 11/9 court date.     Audi Arcos MD  [facility]    Total visit time was 15 minutes or greater with greater than 50% of time spent in counseling and coordination of care.

## 2020-11-02 NOTE — PROGRESS NOTES
Progress Note  Hospital Medicine    [place] [facility] [place] [facility]  Admit Date: 10/2/2020  Encounter Date: 2020     SUBJECTIVE:     Follow-up Visit for: Cognitive impairment    HPI (See H&P for complete P,F,SHx):  Per Adriana Badillo MD:   Mr. Juan Hobson is a 69 y.o. male with recently diagnosed penile cancer, who presents to the ER from Urology clinic with concern for self neglect, and need for Neurology evaluation of dementia and possible placement.  The patient mentions that he has been having some issues with his memory.  He mostly orders takeout for meals because it is easier, and still drives to work.  He feels like he is able to take care of himself, and does not need to go to a facility.      Per note from Lianne German LCSW on 10/2:  Received consult from Dr. Monterroso re: assessing patient in clinic this afternoon and care recommendations as patient did not seem competent to make decisions and give consent. He has no family and no legal next of kin (POA or guardian).   Met individually with patient, and with his co-worker Janna Go ([phone], ext. [phone]) who brought him to his clinic appointment today. Patient unable to relate basic demographic information or clearly explain other information. For example, when asked his birth date he gave the year [birth year], then said 9, and then said the second to last, it begins with N; he said his street name but couldn't give the house number where he has lived for years; unable to say what type of work or where he worked prior to the [employer] in [facility] for the past 12 years; he described being taken in a truck to that other place (referring to the recent [facility] stay). He has awareness that he is having memory/cognitive issues but stated he can manage for himself at home and that he knows there is a lot of work that needs to be done in his home that he hasn't gotten to. He inherited the home after his mother .        Donavan and another coworker Juice Martinez ([phone]) and their supervisor Meiirma Meanso ([phone]) have been trying to help him manage things with his work and personal matters as they have observed his deficits and know he has no one to help him. They have helped him keep up with recent medical appointments and accompanied and transported him.  Mr. Go reports concern for his safety and ability to care for himself after seeing his home with clutter throughout and things piled up, mold and old food in the refrigerator, mold in the toilet, clothes all over, etc. Patient has been driving himself to and from work and to get food at places near his home, but unable to navigate to unfamiliar places. They have observed his cognitive deficits at work and he has made errors, so they have modified his job and his supervisor has been working with him to try to get FMLA and his leave/FDC in place so he doesn't lose his benefits. They have had to help him with paperwork, bill paying, etc., because he can't complete these on his own.         Upon arrival to the ER, vitals were temp 97.8F, HR 59 and /84.  Labs were unremarkable  He was admitted to Hospital Medicine for Neurology and SW evaluation.     Overview/Hospital Course:  Admitted to hospital medicine for worsening cognitive decline and inability to care for self in setting of concern for penile cancer.  In pursuit of workup for altered mental status - labs/infectious work-up grossly unremarkable. Neurology consulted and MRI obtained on admission with concern for encephalitis (increased FLAIR hyperintensity involving the bilateral mesial temporal lobes which can be seen in the setting of autoimmune limbic encephalitis in the appropriate clinical setting). LP recommended and attempted at bedside however not successful 2/2 to JEAN-PIERRED. Interventional radiology consulted, LP performed 10/5 with CSF (1 WBC, 190 RBC, 71 protein, 52 glucose). CSF HSV, ACE  negative. EEG 10/3 (1 hr 40 min) with mild regional dysfunction in bilateral temporal lobes, but no electrographic seizures. Serum paraneoplastic panel from 9/28 has resulted negative. MRI brain W WO contrast showing increased FLAIR hyperintensity involving the bilateral mesial temporal lobes. Completed 5 days of empiric IV Solumedrol 1 g qd without notable clinical improvement. Presentation most concerning for dementia   Psychiatry consulted for formal capacity evaluation and concluded that he does not have capacity to make decisions regarding his long term care disposition however does not require PEC at this time as his stay has been non-contested. He has no family, children and have relied on coworkers for assistance. The case was brought to the attention of EPS who have no plans to intervene at this time and recommend since the patient presented to the hospital it is a safe discharge planning issue that would involve [facility] Legal department. The Legal department intend to pursue [place] Guardianship to assist with managing affairs. Patient re-engaged with care for likely penile carcinoma with urology.  A determination was made that appropriate treatment for penile tumor was penectomy - patient proceeded to OR on 10/19 with partial penectomy completed. Patient has been awaiting placement, with pathology without e/o malignancy and LP c/w likely EOAD.     Interval History:   Patient stable.  Awaiting placement.  No issues, concerns.  Bactracin ointment improving the healing of post surgical area. Bleeding decreased today.     Review of Systems:  Review of Systems   Constitutional: Negative for chills and fever.   HENT: Negative for congestion and sore throat.    Eyes: Negative for photophobia, pain and discharge.   Respiratory: Negative for cough, hemoptysis, sputum production and shortness of breath.    Cardiovascular: Negative for chest pain, palpitations and leg swelling.   Gastrointestinal: Negative for  "abdominal pain, diarrhea, nausea and vomiting.   Genitourinary: Negative for dysuria and urgency.   Musculoskeletal: Negative for myalgias and neck pain.   Skin: Negative for itching and rash.   Neurological: Negative for sensory change, focal weakness and headaches.   Endo/Heme/Allergies: Negative for polydipsia. Does not bruise/bleed easily.   Psychiatric/Behavioral: Negative for depression and suicidal ideas.     OBJECTIVE:       Intake/Output Summary (Last 24 hours) at 11/2/2020 1713  Last data filed at 11/2/2020 0900  Gross per 24 hour   Intake 700 ml   Output 4 ml   Net 696 ml     Vital Signs Range (Last 24H):  Temp:  [98 °F (36.7 °C)-98.3 °F (36.8 °C)]   Pulse:  [77-79]   Resp:  [16-18]   BP: (110-126)/(61-82)   SpO2:  [95 %-100 %]   Body mass index is 22.78 kg/m².    Objective:  Vital signs: (most recent): Blood pressure 121/64, pulse 77, temperature 98 °F (36.7 °C), temperature source Oral, resp. rate 16, height 5' 10" (1.778 m), weight 72 kg (158 lb 11.7 oz), SpO2 98 %.  No fever.    Physical Exam  Constitutional:       General: He is not in acute distress.     Appearance: He is well-developed. He is not ill-appearing, toxic-appearing or diaphoretic.   HENT:      Head: Atraumatic. No abrasion, contusion or laceration.      Nose: Nose normal.      Mouth/Throat:      Pharynx: No oropharyngeal exudate.   Eyes:      General: No scleral icterus.        Right eye: No discharge.         Left eye: No discharge.      Conjunctiva/sclera: Conjunctivae normal.      Pupils: Pupils are equal, round, and reactive to light.   Neck:      Musculoskeletal: Normal range of motion and neck supple. No neck rigidity.      Vascular: No JVD.   Cardiovascular:      Rate and Rhythm: Normal rate and regular rhythm.      Heart sounds: Normal heart sounds. No murmur. No friction rub. No gallop.    Pulmonary:      Effort: Pulmonary effort is normal. No accessory muscle usage or respiratory distress.      Breath sounds: Normal breath " sounds. No wheezing.   Abdominal:      General: Bowel sounds are normal. There is no distension.      Palpations: Abdomen is soft. There is no mass.      Tenderness: There is no abdominal tenderness. There is no guarding or rebound.   Genitourinary:     Comments: S/p partial penectomy with loose dressing in place; scant blood no overt bleeding   Musculoskeletal: Normal range of motion.         General: No tenderness or deformity.   Lymphadenopathy:      Cervical: No cervical adenopathy.   Skin:     General: Skin is warm and dry.      Capillary Refill: Capillary refill takes less than 2 seconds.      Findings: No bruising, ecchymosis, erythema, petechiae or rash.      Nails: There is no clubbing.   Neurological:      Mental Status: He is alert. Mental status is at baseline.      GCS: GCS eye subscore is 4. GCS verbal subscore is 5. GCS motor subscore is 6.      Cranial Nerves: No cranial nerve deficit.      Sensory: No sensory deficit.      Motor: No abnormal muscle tone.      Coordination: Coordination normal.      Deep Tendon Reflexes: Reflexes normal.      Comments: Oriented to place and time. Unclear to context with frequent redirection, orientation.  Tangential thinking and occasional disorganized thought content    Psychiatric:         Speech: Speech normal.         Behavior: Behavior normal.         Thought Content: Thought content normal.         Judgment: Judgment normal.     Medications:  Medication list was reviewed in EPIC and changes noted under Assessment/Plan and MAR.    Laboratory:  Recent Labs     11/02/20  0325   WBC 4.26   RBC 3.75*   HGB 11.8*   HCT 36.4*      MCV 97   MCH 31.5*   MCHC 32.4   GRAN 51.9  2.2   LYMPH 29.1  1.2   MONO 10.6  0.5   EOS 0.3      Recent Labs     11/02/20  0325   GLU 90      K 4.0      CO2 27   BUN 22   CREATININE 0.7   CALCIUM 8.6*   ANIONGAP 7*   MG 1.9   PHOS 4.0       ASSESSMENT/PLAN:     Active Hospital Problems    Diagnosis  POA    *Cognitive  impairment [R41.89]  Yes     Priority: 2     Penile carcinoma [C60.9]  Yes     Priority: 1 - High    Leukocytosis [D72.829]  No    Hypocalcemia [E83.51]  Yes    Acute encephalopathy [G93.40]  Yes    Cognitive decline [R41.89]  Yes    Confusion [R41.0]  Unknown    Debility [R53.81]  Yes    Altered mental status [R41.82]  Yes      Resolved Hospital Problems    Diagnosis Date Resolved POA    Encephalopathy [G93.40] 10/04/2020 Unknown         * Cognitive impairment  Impaired decision making  Presentation concerning for progressive dementia   -LP performed by IR 10/5/2020: elevated protein, neutrophils  -CSF HSV, ACE negative.   -Serum paraneoplastic panel from 9/28 has resulted negative.  -MRI brain with Possible subtle increased FLAIR hyperintensity involving the bilateral mesial temporal lobes which can be seen in the setting of autoimmune limbic encephalitis  -EEG without seizure activity   -Vitamin B1, Vitamin B6, Vitamin B12, HIV, RPR, TSH unremarkable   - Neurology consulted          --please send CSF to [facility] for Alzheimer panel (ADEVL test code) - repeat LP completed c/w early-onset AD          --completed empiric IV Solumedrol 1 g qd x 5 days without improvement          --formal cognitive assessment with neuropsych            --consider PET scan - not available inpatient           --continue strict delirium precautions  -Psych assess lack of competency   -Formal guardianship being pursued - court date early November  -Adult friends can assist with decision making   -Gina KEVIN 40:[id number]: (9) Upon the inability of any adult to consent for himself and in the absence of any person listed in Paragraphs (2) through (8) of this Subsection, an adult friend of the patient. For purposes of this Subsection to consent, adult friend means an adult who has exhibited special care and concern for the patient, who is generally familiar with the patient's health care views and desires, and who is willing and able to  become involved in the patient's health care decisions and to act in the patient's best interest. The adult friend shall sign and date an acknowledgment form provided by the hospital or other health care facility in which the patient is located for placement in the patient's records certifying that he or she meets such criteria.     - Juice Martinez ([phone]) will make decisions on his behalf  - supervisor Mei Bell ([phone]) have been trying to help him manage things with his work and personal matters as they have observed his deficits and know he has no one to help him - these individuals may be used for consent      DELIRIUM BEHAVIOR MANAGEMENT  - Minimize use of restraints; if physical restraints necessary, please utilize medical/chemical prns for agitation.  - Keep shades open and room lit during day and room dim at night in order to promote healthy circadian rhythms.  - Encourage family at bedside  - Keep whiteboard in patient's room current with the date and name of the members of patient's team for easy patient self re-orientation.  - Avoid benzodiazepines, antihistamines, anticholinergics, hypnotics, and minimize opiates while controlling for pain as these medications may exacerbate delirium.     Penile carcinoma  - follows with urology   - Assessed inpatient - plan for partial / total penectomy   - Partial penectomy 10/19 - pathology w/o e/o of malignancy - to be discussed at tumor board  - Bacitracin ointment and 4x4 dressings to post surgical site   - FC placed post op - self discontinued     Anticipated discharge date and disposition: Will need placement. CM/SW closely involved.  Guardianship being pursued. 11/9 court date.     Audi Arcos MD  [facility] Medicine    Total visit time was 15 minutes or greater with greater than 50% of time spent in counseling and coordination of care.

## 2020-11-02 NOTE — PLAN OF CARE
11/02/20 0834   Post-Acute Status   Post-Acute Authorization Placement;Home Health   Home Health Status Awaiting Internal Medical Clearance     Pt accepted by Jose Chao to follow with legal guardianship and d/c plans.

## 2020-11-02 NOTE — PLAN OF CARE
[facility]  [address]  [facility] [address]  Phone: [phone]  Fax: [phone]    Patient Name: Juan Hobson  Patient : 1950  Admission Date: 10/2/2020  Today's Date: 2020    To Whom It May Concern:    Juan Hobson  was admitted to [facility] on 10/2/2020 and is expected to remain hospitalized beyond 2020. This patient is under my direct care.      Audi Arcos MD

## 2020-11-02 NOTE — PLAN OF CARE
Pt will have interdiction hearing on Nov 9, 2020 and appointed a guardianship at that time. Will cont to follow.       11/02/20 1528   Discharge Reassessment   Assessment Type Discharge Planning Reassessment   Provided patient/caregiver education on the expected discharge date and the discharge plan Yes   Do you have any problems affording any of your prescribed medications? No   Discharge Plan A Home   Discharge Plan B Home   DME Needed Upon Discharge  none   Anticipated Discharge Disposition Home   Can the patient/caregiver answer the patient profile reliably? No, cognitively impaired   How does the patient rate their overall health at the present time? Good   Describe the patient's ability to walk at the present time. No restrictions   How often would a person be available to care for the patient? Occasionally   Number of comorbid conditions (as recorded on the chart) Two   During the past month, has the patient often been bothered by feeling down, depressed or hopeless? No   During the past month, has the patient often been bothered by little interest or pleasure in doing things? No   Post-Acute Status   Post-Acute Authorization E   Discharge Delays (!) Other   Modesta Blum, MSN  Case Management  Ext [phone]

## 2020-11-03 LAB
ALBUMIN SERPL BCP-MCNC: 3.3 G/DL (ref 3.5–5.2)
ALP SERPL-CCNC: 65 U/L (ref 55–135)
ALT SERPL W/O P-5'-P-CCNC: 28 U/L (ref 10–44)
ANION GAP SERPL CALC-SCNC: 10 MMOL/L (ref 8–16)
ANISOCYTOSIS BLD QL SMEAR: SLIGHT
AST SERPL-CCNC: 19 U/L (ref 10–40)
BASOPHILS # BLD AUTO: 0.02 K/UL (ref 0–0.2)
BASOPHILS NFR BLD: 0.6 % (ref 0–1.9)
BILIRUB SERPL-MCNC: 0.5 MG/DL (ref 0.1–1)
BUN SERPL-MCNC: 16 MG/DL (ref 8–23)
CALCIUM SERPL-MCNC: 8.9 MG/DL (ref 8.7–10.5)
CHLORIDE SERPL-SCNC: 106 MMOL/L (ref 95–110)
CO2 SERPL-SCNC: 25 MMOL/L (ref 23–29)
CREAT SERPL-MCNC: 0.7 MG/DL (ref 0.5–1.4)
DIFFERENTIAL METHOD: ABNORMAL
EOSINOPHIL # BLD AUTO: 0.3 K/UL (ref 0–0.5)
EOSINOPHIL NFR BLD: 7.8 % (ref 0–8)
ERYTHROCYTE [DISTWIDTH] IN BLOOD BY AUTOMATED COUNT: 13.4 % (ref 11.5–14.5)
EST. GFR  (AFRICAN AMERICAN): >60 ML/MIN/1.73 M^2
EST. GFR  (NON AFRICAN AMERICAN): >60 ML/MIN/1.73 M^2
GLUCOSE SERPL-MCNC: 82 MG/DL (ref 70–110)
HCT VFR BLD AUTO: 37.5 % (ref 40–54)
HGB BLD-MCNC: 12.1 G/DL (ref 14–18)
IMM GRANULOCYTES # BLD AUTO: 0.02 K/UL (ref 0–0.04)
IMM GRANULOCYTES NFR BLD AUTO: 0.6 % (ref 0–0.5)
LYMPHOCYTES # BLD AUTO: 1 K/UL (ref 1–4.8)
LYMPHOCYTES NFR BLD: 29.7 % (ref 18–48)
MAGNESIUM SERPL-MCNC: 2 MG/DL (ref 1.6–2.6)
MCH RBC QN AUTO: 31.9 PG (ref 27–31)
MCHC RBC AUTO-ENTMCNC: 32.3 G/DL (ref 32–36)
MCV RBC AUTO: 99 FL (ref 82–98)
MONOCYTES # BLD AUTO: 0.5 K/UL (ref 0.3–1)
MONOCYTES NFR BLD: 14 % (ref 4–15)
NEUTROPHILS # BLD AUTO: 1.6 K/UL (ref 1.8–7.7)
NEUTROPHILS NFR BLD: 47.3 % (ref 38–73)
NRBC BLD-RTO: 0 /100 WBC
PHOSPHATE SERPL-MCNC: 3.5 MG/DL (ref 2.7–4.5)
PLATELET # BLD AUTO: 287 K/UL (ref 150–350)
PLATELET BLD QL SMEAR: ABNORMAL
PMV BLD AUTO: 9.4 FL (ref 9.2–12.9)
POTASSIUM SERPL-SCNC: 4.2 MMOL/L (ref 3.5–5.1)
PROT SERPL-MCNC: 5.9 G/DL (ref 6–8.4)
RBC # BLD AUTO: 3.79 M/UL (ref 4.6–6.2)
SODIUM SERPL-SCNC: 141 MMOL/L (ref 136–145)
WBC # BLD AUTO: 3.44 K/UL (ref 3.9–12.7)

## 2020-11-03 PROCEDURE — 80053 COMPREHEN METABOLIC PANEL: CPT

## 2020-11-03 PROCEDURE — 25000003 PHARM REV CODE 250: Performed by: STUDENT IN AN ORGANIZED HEALTH CARE EDUCATION/TRAINING PROGRAM

## 2020-11-03 PROCEDURE — 25000003 PHARM REV CODE 250: Performed by: HOSPITALIST

## 2020-11-03 PROCEDURE — 99356 PR PROLONGED SERV,INPATIENT,1ST HR: ICD-10-PCS | Mod: ,,, | Performed by: INTERNAL MEDICINE

## 2020-11-03 PROCEDURE — 99356 PR PROLONGED SERV,INPATIENT,1ST HR: CPT | Mod: ,,, | Performed by: INTERNAL MEDICINE

## 2020-11-03 PROCEDURE — 85025 COMPLETE CBC W/AUTO DIFF WBC: CPT

## 2020-11-03 PROCEDURE — 83735 ASSAY OF MAGNESIUM: CPT

## 2020-11-03 PROCEDURE — 99233 PR SUBSEQUENT HOSPITAL CARE,LEVL III: ICD-10-PCS | Mod: ,,, | Performed by: INTERNAL MEDICINE

## 2020-11-03 PROCEDURE — 11000001 HC ACUTE MED/SURG PRIVATE ROOM

## 2020-11-03 PROCEDURE — 99233 SBSQ HOSP IP/OBS HIGH 50: CPT | Mod: ,,, | Performed by: INTERNAL MEDICINE

## 2020-11-03 PROCEDURE — 84100 ASSAY OF PHOSPHORUS: CPT

## 2020-11-03 PROCEDURE — 63600175 PHARM REV CODE 636 W HCPCS: Performed by: HOSPITALIST

## 2020-11-03 PROCEDURE — 36415 COLL VENOUS BLD VENIPUNCTURE: CPT

## 2020-11-03 RX ADMIN — OXYCODONE HYDROCHLORIDE 10 MG: 10 TABLET ORAL at 07:11

## 2020-11-03 RX ADMIN — BACITRACIN: 500 OINTMENT TOPICAL at 07:11

## 2020-11-03 RX ADMIN — OXYCODONE HYDROCHLORIDE 10 MG: 10 TABLET ORAL at 10:11

## 2020-11-03 RX ADMIN — MELATONIN TAB 3 MG 6 MG: 3 TAB at 10:11

## 2020-11-03 RX ADMIN — ACETAMINOPHEN 650 MG: 325 TABLET ORAL at 07:11

## 2020-11-03 RX ADMIN — PANTOPRAZOLE SODIUM 40 MG: 40 TABLET, DELAYED RELEASE ORAL at 09:11

## 2020-11-03 RX ADMIN — ENOXAPARIN SODIUM 40 MG: 40 INJECTION SUBCUTANEOUS at 05:11

## 2020-11-03 RX ADMIN — OXYCODONE HYDROCHLORIDE 10 MG: 10 TABLET ORAL at 05:11

## 2020-11-03 NOTE — PLAN OF CARE
11/03/20 0842   Post-Acute Status   Post-Acute Authorization Placement;Home Health   Post-Acute Placement Status Referrals Sent   Discharge Delays (!) Patient and Family Barriers

## 2020-11-03 NOTE — PLAN OF CARE
was approached by nursing staff regarding two gentlemen, one would be Baker Hock and the other an  Dayton Ross, retrieving pt's belongings from the room and proceeding to leave the unit with Mr. Hobson while sitter (Barbara Pineda) was in the room. Code rodolfo was called, pt sitter followed the pt along with Rajan Edwardsk and  onto the elevator.  immediately notified  leadership as well as Sadia Poole, of [employer] appointed  for Mr. Hobson. Sadia advised me to give the  her contact number and that Mr. Hobson is to remain at the hospital until interdiction is completed  on 11/9.     1050: Pt returns to the follow with sitter assigned only.    1052: Nursing staff and director made aware that pt is not to leave unit due to pen d ing interdiction per .    1055: House Supervisor Wilner Garvin on nursing unit along with Dr. Arcos in route to speak to Rajan and  regarding incident.     1100: All parties were led to supervisor's office     1120: Amara Anders from  arrived to intervene    1124: CM return to unit, observes pt in room with sitter calm but visibly shaken by incident.     retrieved court appointed guardian poonam Aviles contact information , given to Amara Anders. CM will cont to follow, pt is safe in room sitter at bedside.    Modesta Blum, MSN  Case Management  Ext [phone]

## 2020-11-03 NOTE — PLAN OF CARE
AAOx2. Pt has to be redirected. Sitter with pt. VS stable. No complaints of pain. Will continue to monitor.

## 2020-11-03 NOTE — NURSING
Report given to LEYDI Mcintosh on IMTA. Medications will be given before being transferred. Will continue to monitor patient.

## 2020-11-04 LAB
ALBUMIN SERPL BCP-MCNC: 3.3 G/DL (ref 3.5–5.2)
ALP SERPL-CCNC: 63 U/L (ref 55–135)
ALT SERPL W/O P-5'-P-CCNC: 23 U/L (ref 10–44)
ANION GAP SERPL CALC-SCNC: 10 MMOL/L (ref 8–16)
AST SERPL-CCNC: 17 U/L (ref 10–40)
BASOPHILS # BLD AUTO: 0.03 K/UL (ref 0–0.2)
BASOPHILS NFR BLD: 0.8 % (ref 0–1.9)
BILIRUB SERPL-MCNC: 0.4 MG/DL (ref 0.1–1)
BUN SERPL-MCNC: 19 MG/DL (ref 8–23)
CALCIUM SERPL-MCNC: 8.6 MG/DL (ref 8.7–10.5)
CHLORIDE SERPL-SCNC: 107 MMOL/L (ref 95–110)
CO2 SERPL-SCNC: 23 MMOL/L (ref 23–29)
CREAT SERPL-MCNC: 0.8 MG/DL (ref 0.5–1.4)
DIFFERENTIAL METHOD: ABNORMAL
EOSINOPHIL # BLD AUTO: 0.3 K/UL (ref 0–0.5)
EOSINOPHIL NFR BLD: 7.1 % (ref 0–8)
ERYTHROCYTE [DISTWIDTH] IN BLOOD BY AUTOMATED COUNT: 13.4 % (ref 11.5–14.5)
EST. GFR  (AFRICAN AMERICAN): >60 ML/MIN/1.73 M^2
EST. GFR  (NON AFRICAN AMERICAN): >60 ML/MIN/1.73 M^2
GLUCOSE SERPL-MCNC: 88 MG/DL (ref 70–110)
HCT VFR BLD AUTO: 36.3 % (ref 40–54)
HGB BLD-MCNC: 11.7 G/DL (ref 14–18)
IMM GRANULOCYTES # BLD AUTO: 0.02 K/UL (ref 0–0.04)
IMM GRANULOCYTES NFR BLD AUTO: 0.5 % (ref 0–0.5)
LYMPHOCYTES # BLD AUTO: 1.4 K/UL (ref 1–4.8)
LYMPHOCYTES NFR BLD: 36.4 % (ref 18–48)
MAGNESIUM SERPL-MCNC: 2 MG/DL (ref 1.6–2.6)
MCH RBC QN AUTO: 31.5 PG (ref 27–31)
MCHC RBC AUTO-ENTMCNC: 32.2 G/DL (ref 32–36)
MCV RBC AUTO: 98 FL (ref 82–98)
MONOCYTES # BLD AUTO: 0.5 K/UL (ref 0.3–1)
MONOCYTES NFR BLD: 12.7 % (ref 4–15)
NEUTROPHILS # BLD AUTO: 1.6 K/UL (ref 1.8–7.7)
NEUTROPHILS NFR BLD: 42.5 % (ref 38–73)
NRBC BLD-RTO: 0 /100 WBC
PHOSPHATE SERPL-MCNC: 3.5 MG/DL (ref 2.7–4.5)
PLATELET # BLD AUTO: 284 K/UL (ref 150–350)
PMV BLD AUTO: 9.4 FL (ref 9.2–12.9)
POTASSIUM SERPL-SCNC: 4 MMOL/L (ref 3.5–5.1)
PROT SERPL-MCNC: 5.9 G/DL (ref 6–8.4)
RBC # BLD AUTO: 3.71 M/UL (ref 4.6–6.2)
SODIUM SERPL-SCNC: 140 MMOL/L (ref 136–145)
WBC # BLD AUTO: 3.79 K/UL (ref 3.9–12.7)

## 2020-11-04 PROCEDURE — 83735 ASSAY OF MAGNESIUM: CPT

## 2020-11-04 PROCEDURE — 25000003 PHARM REV CODE 250: Performed by: STUDENT IN AN ORGANIZED HEALTH CARE EDUCATION/TRAINING PROGRAM

## 2020-11-04 PROCEDURE — 11000001 HC ACUTE MED/SURG PRIVATE ROOM

## 2020-11-04 PROCEDURE — 99233 SBSQ HOSP IP/OBS HIGH 50: CPT | Mod: ,,, | Performed by: INTERNAL MEDICINE

## 2020-11-04 PROCEDURE — 36415 COLL VENOUS BLD VENIPUNCTURE: CPT

## 2020-11-04 PROCEDURE — 84100 ASSAY OF PHOSPHORUS: CPT

## 2020-11-04 PROCEDURE — 85025 COMPLETE CBC W/AUTO DIFF WBC: CPT

## 2020-11-04 PROCEDURE — 99233 PR SUBSEQUENT HOSPITAL CARE,LEVL III: ICD-10-PCS | Mod: ,,, | Performed by: INTERNAL MEDICINE

## 2020-11-04 PROCEDURE — 63600175 PHARM REV CODE 636 W HCPCS: Performed by: HOSPITALIST

## 2020-11-04 PROCEDURE — 80053 COMPREHEN METABOLIC PANEL: CPT

## 2020-11-04 RX ADMIN — PANTOPRAZOLE SODIUM 40 MG: 40 TABLET, DELAYED RELEASE ORAL at 09:11

## 2020-11-04 RX ADMIN — OXYCODONE 5 MG: 5 TABLET ORAL at 09:11

## 2020-11-04 RX ADMIN — BACITRACIN: 500 OINTMENT TOPICAL at 09:11

## 2020-11-04 RX ADMIN — ENOXAPARIN SODIUM 40 MG: 40 INJECTION SUBCUTANEOUS at 06:11

## 2020-11-04 NOTE — PLAN OF CARE
ANNIKA received phone call from patient's court appointed , Cuca Castellonlan [phone].  Cuca inquired about doctors note from yesterday and any documents that is pertinent to patient's case.  ANNIKA sent all CM documentation and MD note from Dr. Arcos from yesterday via email to Cuca at [email or web address].  ANNIKA also provided phone numbers to Cuca for patient's emergency contacts.  Cuca also inquired about talking to the patient virtually.  ANNIKA called patient's nurse, Kristal, to inquire about how to do a virtual call.  Manisha, charge nurse, came to see ANNIKA and explained the virtual is done by Skype and they would need Cuca's Skype address.  Manisha states that they would call her on the device.  Device needs to be charged as it does not have enough charge to even turn on at this time.      12:40 PM  ANNIKA called Cuca back and inquired about her Skype address.  She stated she would have to download Skype on her device and call back with an address to provide to Manisha for virtual call.      1:00 PM  ANNIKA called Manisha again and she stated she only has 5% charge on the device at this time.      2:30 PM  ANNIKA gave Manisha Cuca's contact information for [employer] (her cell phone number listed above).  Manisha will call Cuca on Skype when there is enough charge on the device.    3:05 PM  ANNIKA received phone call from Fay Go, [phone], patient's supervisor at work.  Janna stated he wanted to know how patient is doing and if he and some of Juan's coworkers could attend the hearing on Monday via Zoom.  ANNIKA informed Fay that I would have to ask Cuca about the court hearing.  ANNIKA then called Cuca and gave her Fay's contact information.  Cuca stated she had no problem with them attending the hearing via zoom but it would be up to the courts.

## 2020-11-04 NOTE — PLAN OF CARE
MAURICIO faxed updated clinicals to Kristofer TONG for review. MAURICIO will continue to follow.     Osiris Leos LMSW   - [facility]  Ext. [phone]

## 2020-11-04 NOTE — NURSING
Rec'd patient as an inhouse transfer. Sitter at bedside. Patient awake and alert. No complaints of pain/discomfort. Will continue to monitor.

## 2020-11-05 LAB
ALBUMIN SERPL BCP-MCNC: 3.2 G/DL (ref 3.5–5.2)
ALP SERPL-CCNC: 64 U/L (ref 55–135)
ALT SERPL W/O P-5'-P-CCNC: 22 U/L (ref 10–44)
ANION GAP SERPL CALC-SCNC: 8 MMOL/L (ref 8–16)
AST SERPL-CCNC: 18 U/L (ref 10–40)
BASOPHILS # BLD AUTO: 0.04 K/UL (ref 0–0.2)
BASOPHILS NFR BLD: 1.1 % (ref 0–1.9)
BILIRUB SERPL-MCNC: 0.3 MG/DL (ref 0.1–1)
BUN SERPL-MCNC: 21 MG/DL (ref 8–23)
CALCIUM SERPL-MCNC: 8.7 MG/DL (ref 8.7–10.5)
CHLORIDE SERPL-SCNC: 106 MMOL/L (ref 95–110)
CO2 SERPL-SCNC: 25 MMOL/L (ref 23–29)
CREAT SERPL-MCNC: 0.7 MG/DL (ref 0.5–1.4)
DIFFERENTIAL METHOD: ABNORMAL
EOSINOPHIL # BLD AUTO: 0.3 K/UL (ref 0–0.5)
EOSINOPHIL NFR BLD: 7 % (ref 0–8)
ERYTHROCYTE [DISTWIDTH] IN BLOOD BY AUTOMATED COUNT: 13.4 % (ref 11.5–14.5)
EST. GFR  (AFRICAN AMERICAN): >60 ML/MIN/1.73 M^2
EST. GFR  (NON AFRICAN AMERICAN): >60 ML/MIN/1.73 M^2
GLUCOSE SERPL-MCNC: 81 MG/DL (ref 70–110)
HCT VFR BLD AUTO: 36.2 % (ref 40–54)
HGB BLD-MCNC: 11.7 G/DL (ref 14–18)
IMM GRANULOCYTES # BLD AUTO: 0.01 K/UL (ref 0–0.04)
IMM GRANULOCYTES NFR BLD AUTO: 0.3 % (ref 0–0.5)
LYMPHOCYTES # BLD AUTO: 1.3 K/UL (ref 1–4.8)
LYMPHOCYTES NFR BLD: 36 % (ref 18–48)
MAGNESIUM SERPL-MCNC: 2 MG/DL (ref 1.6–2.6)
MCH RBC QN AUTO: 31.6 PG (ref 27–31)
MCHC RBC AUTO-ENTMCNC: 32.3 G/DL (ref 32–36)
MCV RBC AUTO: 98 FL (ref 82–98)
MONOCYTES # BLD AUTO: 0.5 K/UL (ref 0.3–1)
MONOCYTES NFR BLD: 13.2 % (ref 4–15)
NEUTROPHILS # BLD AUTO: 1.6 K/UL (ref 1.8–7.7)
NEUTROPHILS NFR BLD: 42.4 % (ref 38–73)
NRBC BLD-RTO: 0 /100 WBC
PHOSPHATE SERPL-MCNC: 3.7 MG/DL (ref 2.7–4.5)
PLATELET # BLD AUTO: 289 K/UL (ref 150–350)
PMV BLD AUTO: 9.6 FL (ref 9.2–12.9)
POTASSIUM SERPL-SCNC: 4 MMOL/L (ref 3.5–5.1)
PROT SERPL-MCNC: 5.7 G/DL (ref 6–8.4)
RBC # BLD AUTO: 3.7 M/UL (ref 4.6–6.2)
SODIUM SERPL-SCNC: 139 MMOL/L (ref 136–145)
WBC # BLD AUTO: 3.72 K/UL (ref 3.9–12.7)

## 2020-11-05 PROCEDURE — 84100 ASSAY OF PHOSPHORUS: CPT

## 2020-11-05 PROCEDURE — 25000003 PHARM REV CODE 250: Performed by: STUDENT IN AN ORGANIZED HEALTH CARE EDUCATION/TRAINING PROGRAM

## 2020-11-05 PROCEDURE — 63600175 PHARM REV CODE 636 W HCPCS: Performed by: HOSPITALIST

## 2020-11-05 PROCEDURE — 97803 MED NUTRITION INDIV SUBSEQ: CPT

## 2020-11-05 PROCEDURE — 80053 COMPREHEN METABOLIC PANEL: CPT

## 2020-11-05 PROCEDURE — 99232 PR SUBSEQUENT HOSPITAL CARE,LEVL II: ICD-10-PCS | Mod: ,,, | Performed by: HOSPITALIST

## 2020-11-05 PROCEDURE — 11000001 HC ACUTE MED/SURG PRIVATE ROOM

## 2020-11-05 PROCEDURE — 36415 COLL VENOUS BLD VENIPUNCTURE: CPT

## 2020-11-05 PROCEDURE — 85025 COMPLETE CBC W/AUTO DIFF WBC: CPT

## 2020-11-05 PROCEDURE — 83735 ASSAY OF MAGNESIUM: CPT

## 2020-11-05 PROCEDURE — 99232 SBSQ HOSP IP/OBS MODERATE 35: CPT | Mod: ,,, | Performed by: HOSPITALIST

## 2020-11-05 RX ADMIN — OXYCODONE 5 MG: 5 TABLET ORAL at 09:11

## 2020-11-05 RX ADMIN — BACITRACIN: 500 OINTMENT TOPICAL at 09:11

## 2020-11-05 RX ADMIN — ENOXAPARIN SODIUM 40 MG: 40 INJECTION SUBCUTANEOUS at 05:11

## 2020-11-05 RX ADMIN — PANTOPRAZOLE SODIUM 40 MG: 40 TABLET, DELAYED RELEASE ORAL at 09:11

## 2020-11-05 NOTE — PLAN OF CARE
Taty Grider called to patient's room for a man trying to visit patient.  Visitor came up the back stairs and trying to go into patient's room.   Manisha, charge nurse, explained that patient cannot have any visitors at this time.  Visitor escorted off the floor by security.  Visitor told security his name is Rajan Wagner.  Rajan visited patient previously and tried to take patient out of hospital with an  per previous MD note.
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
## 2020-11-05 NOTE — PROGRESS NOTES
"[facility]  Adult Nutrition  Progress Note    SUMMARY       Recommendations  1. Continue regular diet + Boost Plus BID as tolerated.   2. Please obtain updated weight.  3. RD to monitor.    Goals: continue to consume >75% of meals by RD follow-up  Nutrition Goal Status: goal met  Communication of RD Recs: other (comment)(POC)    Reason for Assessment    Reason For Assessment: RD follow-up  Diagnosis: (Cognitive impairment)  Relevant Medical History: Penile carcinoma, dementia   Interdisciplinary Rounds: did not attend  General Information Comments: Pt resting in bed with sitter at bedside this AM. He continues to report good appetite and states he has been consuming 100% of meals, confirmed per RN documentation. No updated wt since 10/14. NFPE not warranted, pt continues to appear nourished and does not meet criteria for malnutrition at this time.  Nutrition Discharge Planning: Regular Diet    Nutrition Risk Screen    Nutrition Risk Screen: no indicators present    Nutrition/Diet History    Spiritual, Cultural Beliefs, Mormonism Practices, Values that Affect Care: no  Factors Affecting Nutritional Intake: None identified at this time    Anthropometrics    Temp: 98.2 °F (36.8 °C)  Height Method: Stated  Height: 5' 10" (177.8 cm)  Height (inches): 70 in  Weight Method: Bed Scale  Weight: 72 kg (158 lb 11.7 oz)  Weight (lb): 158.73 lb  Ideal Body Weight (IBW), Male: 166 lb  BMI (Calculated): 22.8  BMI Grade: (P) 18.5-24.9 - normal    Lab/Procedures/Meds    Pertinent Labs Reviewed: reviewed  Pertinent Labs Comments: Alb 3.2  Pertinent Medications Reviewed: reviewed  Pertinent Medications Comments: pantoprazole, senna, zofran    Estimated/Assessed Needs    Weight Used For Calorie Calculations: 72 kg (158 lb 11.7 oz)  Energy Calorie Requirements (kcal): 3616-3099 kcal/day  Energy Need Method: Kcal/kg(25-30)  Protein Requirements: 72-87 gm/day(1.0-1.2 gm/kg)  Weight Used For Protein Calculations: 72 kg " (158 lb 11.7 oz)  Fluid Requirements (mL): 1 mL/kcal or per MD  Estimated Fluid Requirement Method: RDA Method  RDA Method (mL): 1800    Nutrition Prescription Ordered    Current Diet Order: Regular Diet  Oral Nutrition Supplement: Boost Plus - 2 times daily    Evaluation of Received Nutrient/Fluid Intake    I/O: +4L since admit  Comments: LBM 11/1  Tolerance: tolerating  % Intake of Estimated Energy Needs: 75 - 100 %  % Meal Intake: 75 - 100 %    Nutrition Risk    Level of Risk/Frequency of Follow-up: low     Assessment and Plan    Nutrition Problem  Increased Nutrient Needs      Related to (etiology):   Physiological demands      Signs and Symptoms (as evidenced by):   Penile carcinoma     Interventions (treatment strategy):  Collaboration of nutrition care with other providers  Commercial beverage     Nutrition Diagnosis Status:   Continues    Monitor and Evaluation    Food and Nutrient Intake: energy intake, food and beverage intake  Food and Nutrient Adminstration: diet order  Physical Activity and Function: nutrition-related ADLs and IADLs  Anthropometric Measurements: weight, weight change, body mass index  Biochemical Data, Medical Tests and Procedures: electrolyte and renal panel, gastrointestinal profile, glucose/endocrine profile, inflammatory profile, lipid profile  Nutrition-Focused Physical Findings: overall appearance     Nutrition Follow-Up    RD Follow-up?: Yes

## 2020-11-06 LAB
ALBUMIN SERPL BCP-MCNC: 3.3 G/DL (ref 3.5–5.2)
ALP SERPL-CCNC: 60 U/L (ref 55–135)
ALT SERPL W/O P-5'-P-CCNC: 21 U/L (ref 10–44)
ANION GAP SERPL CALC-SCNC: 5 MMOL/L (ref 8–16)
ANISOCYTOSIS BLD QL SMEAR: SLIGHT
AST SERPL-CCNC: 17 U/L (ref 10–40)
BASOPHILS # BLD AUTO: 0.04 K/UL (ref 0–0.2)
BASOPHILS NFR BLD: 1 % (ref 0–1.9)
BILIRUB SERPL-MCNC: 0.4 MG/DL (ref 0.1–1)
BUN SERPL-MCNC: 17 MG/DL (ref 8–23)
CALCIUM SERPL-MCNC: 8.7 MG/DL (ref 8.7–10.5)
CHLORIDE SERPL-SCNC: 107 MMOL/L (ref 95–110)
CO2 SERPL-SCNC: 28 MMOL/L (ref 23–29)
CREAT SERPL-MCNC: 0.7 MG/DL (ref 0.5–1.4)
DIFFERENTIAL METHOD: ABNORMAL
EOSINOPHIL # BLD AUTO: 0.2 K/UL (ref 0–0.5)
EOSINOPHIL NFR BLD: 5.1 % (ref 0–8)
ERYTHROCYTE [DISTWIDTH] IN BLOOD BY AUTOMATED COUNT: 13.2 % (ref 11.5–14.5)
EST. GFR  (AFRICAN AMERICAN): >60 ML/MIN/1.73 M^2
EST. GFR  (NON AFRICAN AMERICAN): >60 ML/MIN/1.73 M^2
GLUCOSE SERPL-MCNC: 87 MG/DL (ref 70–110)
HCT VFR BLD AUTO: 36.3 % (ref 40–54)
HGB BLD-MCNC: 11.8 G/DL (ref 14–18)
IMM GRANULOCYTES # BLD AUTO: 0.02 K/UL (ref 0–0.04)
IMM GRANULOCYTES NFR BLD AUTO: 0.5 % (ref 0–0.5)
LYMPHOCYTES # BLD AUTO: 1.2 K/UL (ref 1–4.8)
LYMPHOCYTES NFR BLD: 30.4 % (ref 18–48)
MAGNESIUM SERPL-MCNC: 2 MG/DL (ref 1.6–2.6)
MCH RBC QN AUTO: 31.9 PG (ref 27–31)
MCHC RBC AUTO-ENTMCNC: 32.5 G/DL (ref 32–36)
MCV RBC AUTO: 98 FL (ref 82–98)
MONOCYTES # BLD AUTO: 0.5 K/UL (ref 0.3–1)
MONOCYTES NFR BLD: 12.5 % (ref 4–15)
NEUTROPHILS # BLD AUTO: 2.1 K/UL (ref 1.8–7.7)
NEUTROPHILS NFR BLD: 50.5 % (ref 38–73)
NRBC BLD-RTO: 0 /100 WBC
PHOSPHATE SERPL-MCNC: 3.7 MG/DL (ref 2.7–4.5)
PLATELET # BLD AUTO: 272 K/UL (ref 150–350)
PLATELET BLD QL SMEAR: ABNORMAL
PMV BLD AUTO: 9.2 FL (ref 9.2–12.9)
POTASSIUM SERPL-SCNC: 3.8 MMOL/L (ref 3.5–5.1)
PROT SERPL-MCNC: 5.8 G/DL (ref 6–8.4)
RBC # BLD AUTO: 3.7 M/UL (ref 4.6–6.2)
SODIUM SERPL-SCNC: 140 MMOL/L (ref 136–145)
WBC # BLD AUTO: 4.08 K/UL (ref 3.9–12.7)

## 2020-11-06 PROCEDURE — 83735 ASSAY OF MAGNESIUM: CPT

## 2020-11-06 PROCEDURE — 63600175 PHARM REV CODE 636 W HCPCS: Performed by: HOSPITALIST

## 2020-11-06 PROCEDURE — 36415 COLL VENOUS BLD VENIPUNCTURE: CPT

## 2020-11-06 PROCEDURE — 99231 PR SUBSEQUENT HOSPITAL CARE,LEVL I: ICD-10-PCS | Mod: ,,, | Performed by: HOSPITALIST

## 2020-11-06 PROCEDURE — 99231 SBSQ HOSP IP/OBS SF/LOW 25: CPT | Mod: ,,, | Performed by: HOSPITALIST

## 2020-11-06 PROCEDURE — 25000003 PHARM REV CODE 250: Performed by: STUDENT IN AN ORGANIZED HEALTH CARE EDUCATION/TRAINING PROGRAM

## 2020-11-06 PROCEDURE — 85025 COMPLETE CBC W/AUTO DIFF WBC: CPT

## 2020-11-06 PROCEDURE — 11000001 HC ACUTE MED/SURG PRIVATE ROOM

## 2020-11-06 PROCEDURE — 84100 ASSAY OF PHOSPHORUS: CPT

## 2020-11-06 PROCEDURE — 80053 COMPREHEN METABOLIC PANEL: CPT

## 2020-11-06 RX ADMIN — PANTOPRAZOLE SODIUM 40 MG: 40 TABLET, DELAYED RELEASE ORAL at 10:11

## 2020-11-06 RX ADMIN — ENOXAPARIN SODIUM 40 MG: 40 INJECTION SUBCUTANEOUS at 06:11

## 2020-11-06 RX ADMIN — BACITRACIN: 500 OINTMENT TOPICAL at 09:11

## 2020-11-06 NOTE — PLAN OF CARE
Patient has interdiction scheduled in court for 11/9/2020 at 9:00 AM.  Plan at this point is to place in nursing home.  Kristofer following patient.       11/06/20 0930   Discharge Reassessment   Assessment Type Discharge Planning Reassessment   Do you have any problems affording any of your prescribed medications? No   Discharge Plan A New Nursing Home placement - [facility]   Discharge Plan B Home   DME Needed Upon Discharge  none   Anticipated Discharge Disposition halfway Nu
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
## 2020-11-07 LAB
ALBUMIN SERPL BCP-MCNC: 3.3 G/DL (ref 3.5–5.2)
ALP SERPL-CCNC: 59 U/L (ref 55–135)
ALT SERPL W/O P-5'-P-CCNC: 22 U/L (ref 10–44)
ANION GAP SERPL CALC-SCNC: 8 MMOL/L (ref 8–16)
AST SERPL-CCNC: 20 U/L (ref 10–40)
BASOPHILS # BLD AUTO: 0.04 K/UL (ref 0–0.2)
BASOPHILS NFR BLD: 1 % (ref 0–1.9)
BILIRUB SERPL-MCNC: 0.5 MG/DL (ref 0.1–1)
BUN SERPL-MCNC: 19 MG/DL (ref 8–23)
CALCIUM SERPL-MCNC: 8.8 MG/DL (ref 8.7–10.5)
CHLORIDE SERPL-SCNC: 107 MMOL/L (ref 95–110)
CO2 SERPL-SCNC: 26 MMOL/L (ref 23–29)
CREAT SERPL-MCNC: 0.7 MG/DL (ref 0.5–1.4)
DIFFERENTIAL METHOD: ABNORMAL
EOSINOPHIL # BLD AUTO: 0.2 K/UL (ref 0–0.5)
EOSINOPHIL NFR BLD: 5.8 % (ref 0–8)
ERYTHROCYTE [DISTWIDTH] IN BLOOD BY AUTOMATED COUNT: 13.2 % (ref 11.5–14.5)
EST. GFR  (AFRICAN AMERICAN): >60 ML/MIN/1.73 M^2
EST. GFR  (NON AFRICAN AMERICAN): >60 ML/MIN/1.73 M^2
GLUCOSE SERPL-MCNC: 79 MG/DL (ref 70–110)
HCT VFR BLD AUTO: 36.5 % (ref 40–54)
HGB BLD-MCNC: 11.9 G/DL (ref 14–18)
IMM GRANULOCYTES # BLD AUTO: 0.02 K/UL (ref 0–0.04)
IMM GRANULOCYTES NFR BLD AUTO: 0.5 % (ref 0–0.5)
LYMPHOCYTES # BLD AUTO: 1.5 K/UL (ref 1–4.8)
LYMPHOCYTES NFR BLD: 38.2 % (ref 18–48)
MAGNESIUM SERPL-MCNC: 2 MG/DL (ref 1.6–2.6)
MCH RBC QN AUTO: 31.5 PG (ref 27–31)
MCHC RBC AUTO-ENTMCNC: 32.6 G/DL (ref 32–36)
MCV RBC AUTO: 97 FL (ref 82–98)
MONOCYTES # BLD AUTO: 0.5 K/UL (ref 0.3–1)
MONOCYTES NFR BLD: 13.6 % (ref 4–15)
NEUTROPHILS # BLD AUTO: 1.6 K/UL (ref 1.8–7.7)
NEUTROPHILS NFR BLD: 40.9 % (ref 38–73)
NRBC BLD-RTO: 0 /100 WBC
PHOSPHATE SERPL-MCNC: 3.9 MG/DL (ref 2.7–4.5)
PLATELET # BLD AUTO: 277 K/UL (ref 150–350)
PMV BLD AUTO: 9.4 FL (ref 9.2–12.9)
POTASSIUM SERPL-SCNC: 3.9 MMOL/L (ref 3.5–5.1)
PROT SERPL-MCNC: 5.8 G/DL (ref 6–8.4)
RBC # BLD AUTO: 3.78 M/UL (ref 4.6–6.2)
SODIUM SERPL-SCNC: 141 MMOL/L (ref 136–145)
WBC # BLD AUTO: 3.82 K/UL (ref 3.9–12.7)

## 2020-11-07 PROCEDURE — 83735 ASSAY OF MAGNESIUM: CPT

## 2020-11-07 PROCEDURE — 99232 PR SUBSEQUENT HOSPITAL CARE,LEVL II: ICD-10-PCS | Mod: ,,, | Performed by: HOSPITALIST

## 2020-11-07 PROCEDURE — 80053 COMPREHEN METABOLIC PANEL: CPT

## 2020-11-07 PROCEDURE — 99232 SBSQ HOSP IP/OBS MODERATE 35: CPT | Mod: ,,, | Performed by: HOSPITALIST

## 2020-11-07 PROCEDURE — 25000003 PHARM REV CODE 250: Performed by: STUDENT IN AN ORGANIZED HEALTH CARE EDUCATION/TRAINING PROGRAM

## 2020-11-07 PROCEDURE — 84100 ASSAY OF PHOSPHORUS: CPT

## 2020-11-07 PROCEDURE — 85025 COMPLETE CBC W/AUTO DIFF WBC: CPT

## 2020-11-07 PROCEDURE — 63600175 PHARM REV CODE 636 W HCPCS: Performed by: HOSPITALIST

## 2020-11-07 PROCEDURE — 36415 COLL VENOUS BLD VENIPUNCTURE: CPT

## 2020-11-07 PROCEDURE — 11000001 HC ACUTE MED/SURG PRIVATE ROOM

## 2020-11-07 RX ADMIN — PANTOPRAZOLE SODIUM 40 MG: 40 TABLET, DELAYED RELEASE ORAL at 08:11

## 2020-11-07 RX ADMIN — BACITRACIN: 500 OINTMENT TOPICAL at 08:11

## 2020-11-07 RX ADMIN — ENOXAPARIN SODIUM 40 MG: 40 INJECTION SUBCUTANEOUS at 04:11
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
## 2020-11-08 LAB
ALBUMIN SERPL BCP-MCNC: 3.4 G/DL (ref 3.5–5.2)
ALP SERPL-CCNC: 61 U/L (ref 55–135)
ALT SERPL W/O P-5'-P-CCNC: 23 U/L (ref 10–44)
ANION GAP SERPL CALC-SCNC: 7 MMOL/L (ref 8–16)
AST SERPL-CCNC: 20 U/L (ref 10–40)
BASOPHILS # BLD AUTO: 0.05 K/UL (ref 0–0.2)
BASOPHILS NFR BLD: 0.9 % (ref 0–1.9)
BILIRUB SERPL-MCNC: 0.4 MG/DL (ref 0.1–1)
BUN SERPL-MCNC: 21 MG/DL (ref 8–23)
CALCIUM SERPL-MCNC: 8.9 MG/DL (ref 8.7–10.5)
CHLORIDE SERPL-SCNC: 109 MMOL/L (ref 95–110)
CO2 SERPL-SCNC: 26 MMOL/L (ref 23–29)
CREAT SERPL-MCNC: 0.7 MG/DL (ref 0.5–1.4)
DIFFERENTIAL METHOD: ABNORMAL
EOSINOPHIL # BLD AUTO: 0.2 K/UL (ref 0–0.5)
EOSINOPHIL NFR BLD: 3.5 % (ref 0–8)
ERYTHROCYTE [DISTWIDTH] IN BLOOD BY AUTOMATED COUNT: 13.2 % (ref 11.5–14.5)
EST. GFR  (AFRICAN AMERICAN): >60 ML/MIN/1.73 M^2
EST. GFR  (NON AFRICAN AMERICAN): >60 ML/MIN/1.73 M^2
GLUCOSE SERPL-MCNC: 86 MG/DL (ref 70–110)
HCT VFR BLD AUTO: 38.1 % (ref 40–54)
HGB BLD-MCNC: 12.4 G/DL (ref 14–18)
IMM GRANULOCYTES # BLD AUTO: 0.02 K/UL (ref 0–0.04)
IMM GRANULOCYTES NFR BLD AUTO: 0.4 % (ref 0–0.5)
LYMPHOCYTES # BLD AUTO: 1.4 K/UL (ref 1–4.8)
LYMPHOCYTES NFR BLD: 25 % (ref 18–48)
MAGNESIUM SERPL-MCNC: 2 MG/DL (ref 1.6–2.6)
MCH RBC QN AUTO: 32 PG (ref 27–31)
MCHC RBC AUTO-ENTMCNC: 32.5 G/DL (ref 32–36)
MCV RBC AUTO: 98 FL (ref 82–98)
MONOCYTES # BLD AUTO: 0.6 K/UL (ref 0.3–1)
MONOCYTES NFR BLD: 10.7 % (ref 4–15)
NEUTROPHILS # BLD AUTO: 3.2 K/UL (ref 1.8–7.7)
NEUTROPHILS NFR BLD: 59.5 % (ref 38–73)
NRBC BLD-RTO: 0 /100 WBC
PHOSPHATE SERPL-MCNC: 3.3 MG/DL (ref 2.7–4.5)
PLATELET # BLD AUTO: 300 K/UL (ref 150–350)
PMV BLD AUTO: 9.8 FL (ref 9.2–12.9)
POTASSIUM SERPL-SCNC: 3.9 MMOL/L (ref 3.5–5.1)
PROT SERPL-MCNC: 6 G/DL (ref 6–8.4)
RBC # BLD AUTO: 3.88 M/UL (ref 4.6–6.2)
SODIUM SERPL-SCNC: 142 MMOL/L (ref 136–145)
WBC # BLD AUTO: 5.4 K/UL (ref 3.9–12.7)

## 2020-11-08 PROCEDURE — 99231 PR SUBSEQUENT HOSPITAL CARE,LEVL I: ICD-10-PCS | Mod: ,,, | Performed by: HOSPITALIST

## 2020-11-08 PROCEDURE — 83735 ASSAY OF MAGNESIUM: CPT

## 2020-11-08 PROCEDURE — 63600175 PHARM REV CODE 636 W HCPCS: Performed by: HOSPITALIST

## 2020-11-08 PROCEDURE — 11000001 HC ACUTE MED/SURG PRIVATE ROOM

## 2020-11-08 PROCEDURE — 80053 COMPREHEN METABOLIC PANEL: CPT

## 2020-11-08 PROCEDURE — 84100 ASSAY OF PHOSPHORUS: CPT

## 2020-11-08 PROCEDURE — 25000003 PHARM REV CODE 250: Performed by: STUDENT IN AN ORGANIZED HEALTH CARE EDUCATION/TRAINING PROGRAM

## 2020-11-08 PROCEDURE — 85025 COMPLETE CBC W/AUTO DIFF WBC: CPT

## 2020-11-08 PROCEDURE — 36415 COLL VENOUS BLD VENIPUNCTURE: CPT

## 2020-11-08 PROCEDURE — 99231 SBSQ HOSP IP/OBS SF/LOW 25: CPT | Mod: ,,, | Performed by: HOSPITALIST

## 2020-11-08 RX ADMIN — BACITRACIN: 500 OINTMENT TOPICAL at 08:11

## 2020-11-08 RX ADMIN — PANTOPRAZOLE SODIUM 40 MG: 40 TABLET, DELAYED RELEASE ORAL at 08:11

## 2020-11-08 RX ADMIN — ENOXAPARIN SODIUM 40 MG: 40 INJECTION SUBCUTANEOUS at 04:11
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
## 2020-11-08 NOTE — PLAN OF CARE
Pt is AAOx2,VS WNL on room air. Pt turns and repositions independently.No skin breakdown noted. Pt pain and safety monitored q 1-2 hrs this shift . Bed locked and in lowest position. Rails elevated x 3. Brakes on. Call light and personal belongings in reach. Sitter in the room. Will continue monitor.

## 2020-11-09 LAB
ALBUMIN SERPL BCP-MCNC: 3.4 G/DL (ref 3.5–5.2)
ALP SERPL-CCNC: 66 U/L (ref 55–135)
ALT SERPL W/O P-5'-P-CCNC: 23 U/L (ref 10–44)
ANION GAP SERPL CALC-SCNC: 9 MMOL/L (ref 8–16)
AST SERPL-CCNC: 20 U/L (ref 10–40)
BASOPHILS # BLD AUTO: 0.07 K/UL (ref 0–0.2)
BASOPHILS NFR BLD: 1.3 % (ref 0–1.9)
BILIRUB SERPL-MCNC: 0.3 MG/DL (ref 0.1–1)
BUN SERPL-MCNC: 23 MG/DL (ref 8–23)
CALCIUM SERPL-MCNC: 8.9 MG/DL (ref 8.7–10.5)
CHLORIDE SERPL-SCNC: 105 MMOL/L (ref 95–110)
CO2 SERPL-SCNC: 26 MMOL/L (ref 23–29)
CREAT SERPL-MCNC: 0.8 MG/DL (ref 0.5–1.4)
DIFFERENTIAL METHOD: ABNORMAL
EOSINOPHIL # BLD AUTO: 0.2 K/UL (ref 0–0.5)
EOSINOPHIL NFR BLD: 3 % (ref 0–8)
ERYTHROCYTE [DISTWIDTH] IN BLOOD BY AUTOMATED COUNT: 13.1 % (ref 11.5–14.5)
EST. GFR  (AFRICAN AMERICAN): >60 ML/MIN/1.73 M^2
EST. GFR  (NON AFRICAN AMERICAN): >60 ML/MIN/1.73 M^2
GLUCOSE SERPL-MCNC: 90 MG/DL (ref 70–110)
HCT VFR BLD AUTO: 38.1 % (ref 40–54)
HGB BLD-MCNC: 12.2 G/DL (ref 14–18)
IMM GRANULOCYTES # BLD AUTO: 0.04 K/UL (ref 0–0.04)
IMM GRANULOCYTES NFR BLD AUTO: 0.8 % (ref 0–0.5)
LYMPHOCYTES # BLD AUTO: 1.5 K/UL (ref 1–4.8)
LYMPHOCYTES NFR BLD: 27.4 % (ref 18–48)
MAGNESIUM SERPL-MCNC: 2 MG/DL (ref 1.6–2.6)
MCH RBC QN AUTO: 31.6 PG (ref 27–31)
MCHC RBC AUTO-ENTMCNC: 32 G/DL (ref 32–36)
MCV RBC AUTO: 99 FL (ref 82–98)
MONOCYTES # BLD AUTO: 0.6 K/UL (ref 0.3–1)
MONOCYTES NFR BLD: 11.5 % (ref 4–15)
NEUTROPHILS # BLD AUTO: 3 K/UL (ref 1.8–7.7)
NEUTROPHILS NFR BLD: 56 % (ref 38–73)
NRBC BLD-RTO: 0 /100 WBC
PHOSPHATE SERPL-MCNC: 3.9 MG/DL (ref 2.7–4.5)
PLATELET # BLD AUTO: 263 K/UL (ref 150–350)
PMV BLD AUTO: 9.9 FL (ref 9.2–12.9)
POTASSIUM SERPL-SCNC: 3.8 MMOL/L (ref 3.5–5.1)
PROT SERPL-MCNC: 5.9 G/DL (ref 6–8.4)
RBC # BLD AUTO: 3.86 M/UL (ref 4.6–6.2)
SODIUM SERPL-SCNC: 140 MMOL/L (ref 136–145)
WBC # BLD AUTO: 5.29 K/UL (ref 3.9–12.7)

## 2020-11-09 PROCEDURE — 25000003 PHARM REV CODE 250: Performed by: STUDENT IN AN ORGANIZED HEALTH CARE EDUCATION/TRAINING PROGRAM

## 2020-11-09 PROCEDURE — 11000001 HC ACUTE MED/SURG PRIVATE ROOM

## 2020-11-09 PROCEDURE — 85025 COMPLETE CBC W/AUTO DIFF WBC: CPT

## 2020-11-09 PROCEDURE — 36415 COLL VENOUS BLD VENIPUNCTURE: CPT

## 2020-11-09 PROCEDURE — 63600175 PHARM REV CODE 636 W HCPCS: Performed by: HOSPITALIST

## 2020-11-09 PROCEDURE — 80053 COMPREHEN METABOLIC PANEL: CPT

## 2020-11-09 PROCEDURE — 83735 ASSAY OF MAGNESIUM: CPT

## 2020-11-09 PROCEDURE — 99231 PR SUBSEQUENT HOSPITAL CARE,LEVL I: ICD-10-PCS | Mod: ,,, | Performed by: HOSPITALIST

## 2020-11-09 PROCEDURE — 84100 ASSAY OF PHOSPHORUS: CPT

## 2020-11-09 PROCEDURE — 99231 SBSQ HOSP IP/OBS SF/LOW 25: CPT | Mod: ,,, | Performed by: HOSPITALIST

## 2020-11-09 RX ADMIN — PANTOPRAZOLE SODIUM 40 MG: 40 TABLET, DELAYED RELEASE ORAL at 11:11

## 2020-11-09 RX ADMIN — ENOXAPARIN SODIUM 40 MG: 40 INJECTION SUBCUTANEOUS at 04:11

## 2020-11-09 NOTE — NURSING
Pt is AAOx4,VS WNL on room air. Pt turns and repositions independently No skin breakdown noted. Pt pain and safety monitored q 1-2 hrs this shift . Bed locked and in lowest position. Rails elevated x 3. Brakes on. Call light and personal belongings in reach. Sitter in the room Will continue monitor.

## 2020-11-09 NOTE — PLAN OF CARE
Problem: Adult Inpatient Plan of Care  Goal: Plan of Care Review  Outcome: Ongoing, Progressing  Goal: Patient-Specific Goal (Individualization)  Outcome: Ongoing, Progressing  Goal: Absence of Hospital-Acquired Illness or Injury  Outcome: Ongoing, Progressing  Goal: Optimal Comfort and Wellbeing  Outcome: Ongoing, Progressing  Goal: Readiness for Transition of Care  Outcome: Ongoing, Progressing   Pt AAO. VSS. No complaints of pain or discomfort. Sitter at bedside. Call light and personal items within reach.   [facility]

## 2020-11-09 NOTE — PLAN OF CARE
MAURICIO faxed updated clinicals to Kristofer TONG for review. MAURICIO will continue to follow.      Osiris Leos LMSW   - [facility]  Ext. [phone]
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
## 2020-11-10 LAB
ALBUMIN SERPL BCP-MCNC: 3.3 G/DL (ref 3.5–5.2)
ALP SERPL-CCNC: 64 U/L (ref 55–135)
ALT SERPL W/O P-5'-P-CCNC: 26 U/L (ref 10–44)
ANION GAP SERPL CALC-SCNC: 9 MMOL/L (ref 8–16)
AST SERPL-CCNC: 24 U/L (ref 10–40)
BASOPHILS # BLD AUTO: 0.05 K/UL (ref 0–0.2)
BASOPHILS NFR BLD: 1.1 % (ref 0–1.9)
BILIRUB SERPL-MCNC: 0.3 MG/DL (ref 0.1–1)
BUN SERPL-MCNC: 19 MG/DL (ref 8–23)
CALCIUM SERPL-MCNC: 8.4 MG/DL (ref 8.7–10.5)
CHLORIDE SERPL-SCNC: 106 MMOL/L (ref 95–110)
CO2 SERPL-SCNC: 26 MMOL/L (ref 23–29)
CREAT SERPL-MCNC: 0.7 MG/DL (ref 0.5–1.4)
DIFFERENTIAL METHOD: ABNORMAL
EOSINOPHIL # BLD AUTO: 0.1 K/UL (ref 0–0.5)
EOSINOPHIL NFR BLD: 2.9 % (ref 0–8)
ERYTHROCYTE [DISTWIDTH] IN BLOOD BY AUTOMATED COUNT: 13.2 % (ref 11.5–14.5)
EST. GFR  (AFRICAN AMERICAN): >60 ML/MIN/1.73 M^2
EST. GFR  (NON AFRICAN AMERICAN): >60 ML/MIN/1.73 M^2
GLUCOSE SERPL-MCNC: 79 MG/DL (ref 70–110)
HCT VFR BLD AUTO: 37.3 % (ref 40–54)
HGB BLD-MCNC: 12 G/DL (ref 14–18)
IMM GRANULOCYTES # BLD AUTO: 0.04 K/UL (ref 0–0.04)
IMM GRANULOCYTES NFR BLD AUTO: 0.9 % (ref 0–0.5)
LYMPHOCYTES # BLD AUTO: 1.6 K/UL (ref 1–4.8)
LYMPHOCYTES NFR BLD: 35.3 % (ref 18–48)
MAGNESIUM SERPL-MCNC: 2 MG/DL (ref 1.6–2.6)
MCH RBC QN AUTO: 31.3 PG (ref 27–31)
MCHC RBC AUTO-ENTMCNC: 32.2 G/DL (ref 32–36)
MCV RBC AUTO: 97 FL (ref 82–98)
MONOCYTES # BLD AUTO: 0.5 K/UL (ref 0.3–1)
MONOCYTES NFR BLD: 11.8 % (ref 4–15)
NEUTROPHILS # BLD AUTO: 2.2 K/UL (ref 1.8–7.7)
NEUTROPHILS NFR BLD: 48 % (ref 38–73)
NRBC BLD-RTO: 0 /100 WBC
PHOSPHATE SERPL-MCNC: 3.8 MG/DL (ref 2.7–4.5)
PLATELET # BLD AUTO: 285 K/UL (ref 150–350)
PMV BLD AUTO: 9.6 FL (ref 9.2–12.9)
POTASSIUM SERPL-SCNC: 4 MMOL/L (ref 3.5–5.1)
PROT SERPL-MCNC: 5.8 G/DL (ref 6–8.4)
RBC # BLD AUTO: 3.84 M/UL (ref 4.6–6.2)
SODIUM SERPL-SCNC: 141 MMOL/L (ref 136–145)
WBC # BLD AUTO: 4.51 K/UL (ref 3.9–12.7)

## 2020-11-10 PROCEDURE — 25000003 PHARM REV CODE 250: Performed by: STUDENT IN AN ORGANIZED HEALTH CARE EDUCATION/TRAINING PROGRAM

## 2020-11-10 PROCEDURE — 99231 SBSQ HOSP IP/OBS SF/LOW 25: CPT | Mod: ,,, | Performed by: HOSPITALIST

## 2020-11-10 PROCEDURE — 36415 COLL VENOUS BLD VENIPUNCTURE: CPT

## 2020-11-10 PROCEDURE — 84100 ASSAY OF PHOSPHORUS: CPT

## 2020-11-10 PROCEDURE — 85025 COMPLETE CBC W/AUTO DIFF WBC: CPT

## 2020-11-10 PROCEDURE — 11000001 HC ACUTE MED/SURG PRIVATE ROOM

## 2020-11-10 PROCEDURE — 63600175 PHARM REV CODE 636 W HCPCS: Performed by: HOSPITALIST

## 2020-11-10 PROCEDURE — 80053 COMPREHEN METABOLIC PANEL: CPT

## 2020-11-10 PROCEDURE — 83735 ASSAY OF MAGNESIUM: CPT

## 2020-11-10 PROCEDURE — 99231 PR SUBSEQUENT HOSPITAL CARE,LEVL I: ICD-10-PCS | Mod: ,,, | Performed by: HOSPITALIST

## 2020-11-10 RX ADMIN — ENOXAPARIN SODIUM 40 MG: 40 INJECTION SUBCUTANEOUS at 04:11

## 2020-11-10 RX ADMIN — BACITRACIN 28 G: 500 OINTMENT TOPICAL at 08:11

## 2020-11-10 RX ADMIN — PANTOPRAZOLE SODIUM 40 MG: 40 TABLET, DELAYED RELEASE ORAL at 08:11

## 2020-11-10 NOTE — PLAN OF CARE
Awake and alert this shift with no complaints of pain. Administered scheduled medications as ordered. Awaiting placement in behavioral healthcare center.

## 2020-11-10 NOTE — PLAN OF CARE
Patient is pleasantly confused.Sitter present at bedside. No complaints or concerns expressed. Denies pain or discomfort. NAD. DARNELL   [phone]

## 2020-11-10 NOTE — PLAN OF CARE
Plan is to discharge to nursing home when curatorship is finalized.  [facility] following patient.       11/10/20 1138   Discharge Reassessment   Assessment Type Discharge Planning Reassessment   Do you have any problems affording any of your prescribed medications? No   Discharge Plan A New Nursing Home placement - group home care facility   Discharge Plan B Home   DME Needed Upon Discharge  none   Anticipated Discharge Disposition longterm Nu

## 2020-11-10 NOTE — PLAN OF CARE
ANNIKA received a message from Sadia Poole requesting the acknowledgement of decision-making form signed by Mei Bell and/or Juice Martinez be sent to her so she can finalized the  paperwork.  ANNIKA called Mei who forwarded the Acknowledgement of an adult friend to provide consent to treat forms signed by her and Juice Martinez to ANNIKA's email.  ANNIKA had documents scanned into patient's record and emailed copies to Sadia.
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
## 2020-11-11 LAB
ALBUMIN SERPL BCP-MCNC: 3.3 G/DL (ref 3.5–5.2)
ALP SERPL-CCNC: 75 U/L (ref 55–135)
ALT SERPL W/O P-5'-P-CCNC: 28 U/L (ref 10–44)
ANION GAP SERPL CALC-SCNC: 7 MMOL/L (ref 8–16)
AST SERPL-CCNC: 24 U/L (ref 10–40)
BASOPHILS # BLD AUTO: 0.06 K/UL (ref 0–0.2)
BASOPHILS NFR BLD: 1.2 % (ref 0–1.9)
BILIRUB SERPL-MCNC: 0.4 MG/DL (ref 0.1–1)
BUN SERPL-MCNC: 23 MG/DL (ref 8–23)
CALCIUM SERPL-MCNC: 8.8 MG/DL (ref 8.7–10.5)
CHLORIDE SERPL-SCNC: 107 MMOL/L (ref 95–110)
CO2 SERPL-SCNC: 27 MMOL/L (ref 23–29)
CREAT SERPL-MCNC: 0.8 MG/DL (ref 0.5–1.4)
DIFFERENTIAL METHOD: ABNORMAL
EOSINOPHIL # BLD AUTO: 0.1 K/UL (ref 0–0.5)
EOSINOPHIL NFR BLD: 2.1 % (ref 0–8)
ERYTHROCYTE [DISTWIDTH] IN BLOOD BY AUTOMATED COUNT: 13 % (ref 11.5–14.5)
EST. GFR  (AFRICAN AMERICAN): >60 ML/MIN/1.73 M^2
EST. GFR  (NON AFRICAN AMERICAN): >60 ML/MIN/1.73 M^2
GLUCOSE SERPL-MCNC: 78 MG/DL (ref 70–110)
HCT VFR BLD AUTO: 38.5 % (ref 40–54)
HGB BLD-MCNC: 12.6 G/DL (ref 14–18)
IMM GRANULOCYTES # BLD AUTO: 0.03 K/UL (ref 0–0.04)
IMM GRANULOCYTES NFR BLD AUTO: 0.6 % (ref 0–0.5)
LYMPHOCYTES # BLD AUTO: 1.6 K/UL (ref 1–4.8)
LYMPHOCYTES NFR BLD: 30.4 % (ref 18–48)
MAGNESIUM SERPL-MCNC: 2.1 MG/DL (ref 1.6–2.6)
MCH RBC QN AUTO: 32.3 PG (ref 27–31)
MCHC RBC AUTO-ENTMCNC: 32.7 G/DL (ref 32–36)
MCV RBC AUTO: 99 FL (ref 82–98)
MONOCYTES # BLD AUTO: 0.5 K/UL (ref 0.3–1)
MONOCYTES NFR BLD: 10.1 % (ref 4–15)
NEUTROPHILS # BLD AUTO: 2.9 K/UL (ref 1.8–7.7)
NEUTROPHILS NFR BLD: 55.6 % (ref 38–73)
NRBC BLD-RTO: 0 /100 WBC
PHOSPHATE SERPL-MCNC: 3.9 MG/DL (ref 2.7–4.5)
PLATELET # BLD AUTO: 280 K/UL (ref 150–350)
PMV BLD AUTO: 10 FL (ref 9.2–12.9)
POTASSIUM SERPL-SCNC: 4.1 MMOL/L (ref 3.5–5.1)
PROT SERPL-MCNC: 5.8 G/DL (ref 6–8.4)
RBC # BLD AUTO: 3.9 M/UL (ref 4.6–6.2)
SODIUM SERPL-SCNC: 141 MMOL/L (ref 136–145)
WBC # BLD AUTO: 5.17 K/UL (ref 3.9–12.7)

## 2020-11-11 PROCEDURE — 63600175 PHARM REV CODE 636 W HCPCS: Performed by: HOSPITALIST

## 2020-11-11 PROCEDURE — 99231 SBSQ HOSP IP/OBS SF/LOW 25: CPT | Mod: ,,, | Performed by: HOSPITALIST

## 2020-11-11 PROCEDURE — 85025 COMPLETE CBC W/AUTO DIFF WBC: CPT

## 2020-11-11 PROCEDURE — 36415 COLL VENOUS BLD VENIPUNCTURE: CPT

## 2020-11-11 PROCEDURE — 99231 PR SUBSEQUENT HOSPITAL CARE,LEVL I: ICD-10-PCS | Mod: ,,, | Performed by: HOSPITALIST

## 2020-11-11 PROCEDURE — 83735 ASSAY OF MAGNESIUM: CPT

## 2020-11-11 PROCEDURE — 11000001 HC ACUTE MED/SURG PRIVATE ROOM

## 2020-11-11 PROCEDURE — 80053 COMPREHEN METABOLIC PANEL: CPT

## 2020-11-11 PROCEDURE — 84100 ASSAY OF PHOSPHORUS: CPT

## 2020-11-11 PROCEDURE — 25000003 PHARM REV CODE 250: Performed by: STUDENT IN AN ORGANIZED HEALTH CARE EDUCATION/TRAINING PROGRAM

## 2020-11-11 RX ADMIN — BACITRACIN: 500 OINTMENT TOPICAL at 08:11

## 2020-11-11 RX ADMIN — PANTOPRAZOLE SODIUM 40 MG: 40 TABLET, DELAYED RELEASE ORAL at 09:11

## 2020-11-11 RX ADMIN — ENOXAPARIN SODIUM 40 MG: 40 INJECTION SUBCUTANEOUS at 04:11

## 2020-11-11 NOTE — PROGRESS NOTES
Progress Note  Hospital Medicine    Provider team: [facility]  Admit Date: 10/2/2020  Encounter Date: 2020     SUBJECTIVE:     Follow-up Visit for: Cognitive impairment    HPI (See H&P for complete P,F,SHx):  Per Adriana Badillo MD:   Mr. Juan Hobson is a 69M with recently diagnosed penile cancer, who presents to the ER from Urology clinic with concern for self neglect, and need for Neurology evaluation of dementia and possible placement.  The patient mentions that he has been having some issues with his memory. He mostly orders takeout for meals because it is easier, and still drives to work.  He feels like he is able to take care of himself, and does not need to go to a facility.      Per note from Lianne German LCSW on 10/2:  Received consult from Dr. Monterroso re: assessing patient in clinic this afternoon and care recommendations as patient did not seem competent to make decisions and give consent. He has no family and no legal next of kin (POA or guardian). Met individually with patient, and with his co-worker Janna Go ([phone], ext. [phone]) who brought him to his clinic appointment today. Patient unable to relate basic demographic information or clearly explain other information. For example, when asked his birth date he gave the year [birth year], then said 9, and then said the second to last, it begins with N; he said his street name but couldn't give the house number where he has lived for years; unable to say what type of work or where he worked prior to the [employer] in [facility] for the past 12 years; he described being taken in a truck to that other place (referring to the recent [facility] stay). He has awareness that he is having memory/cognitive issues but stated he can manage for himself at home and that he knows there is a lot of work that needs to be done in his home that he hasn't gotten to. He inherited the home after his mother .  Mr. Go and another  coworker Juice Martinez ([phone]) and their supervisor Mei Bell ([phone]) have been trying to help him manage things with his work and personal matters as they have observed his deficits and know he has no one to help him. They have helped him keep up with recent medical appointments and accompanied and transported him.  Mr. Go reports concern for his safety and ability to care for himself after seeing his home with clutter throughout and things piled up, mold and old food in the refrigerator, mold in the toilet, clothes all over, etc. Patient has been driving himself to and from work and to get food at places near his home, but unable to navigate to unfamiliar places. They have observed his cognitive deficits at work and he has made errors, so they have modified his job and his supervisor has been working with him to try to get FMLA and his leave/correction in place so he doesn't lose his benefits. They have had to help him with paperwork, bill paying, etc., because he can't complete these on his own.        Upon arrival to the ER, vitals were temp 97.8F, HR 59 and /84.  Labs were unremarkable  He was admitted to Hospital Medicine for Neurology and SW evaluation.     Overview/Hospital Course:  Admitted to hospital medicine for worsening cognitive decline and inability to care for self in setting of concern for penile cancer.  In pursuit of workup for altered mental status - labs/infectious work-up grossly unremarkable. Neurology consulted and MRI obtained on admission with concern for encephalitis (increased FLAIR hyperintensity involving the bilateral mesial temporal lobes which can be seen in the setting of autoimmune limbic encephalitis in the appropriate clinical setting). LP recommended and attempted at bedside however not successful 2/2 to JEAN-PIERRED. Interventional radiology consulted, LP performed 10/5 with CSF (1 WBC, 190 RBC, 71 protein, 52 glucose). CSF HSV, ACE negative. EEG 10/3 (1  hr 40 min) with mild regional dysfunction in bilateral temporal lobes, but no electrographic seizures. Serum paraneoplastic panel from 9/28 has resulted negative. MRI brain W WO contrast showing increased FLAIR hyperintensity involving the bilateral mesial temporal lobes. Completed 5 days of empiric IV Solumedrol 1 g qd without notable clinical improvement. Presentation most concerning for dementia   Psychiatry consulted for formal capacity evaluation and concluded that he does not have capacity to make decisions regarding his long term care disposition however does not require PEC at this time as his stay has been non-contested. He has no family, children and have relied on coworkers for assistance. The case was brought to the attention of EPS who have no plans to intervene at this time and recommend since the patient presented to the hospital it is a safe discharge planning issue that would involve [facility] Legal department. The Legal department intend to pursue [place] Guardianship to assist with managing affairs.     Patient re-engaged with care for likely penile carcinoma with urology.  A determination was made that appropriate treatment for penile tumor was penectomy - patient proceeded to OR on 10/19 with partial penectomy completed. Pathology without e/o malignancy; discussed with  and case to be discussed at tumor board -  team believes this still to be penile carcinoma.  Patient stable post surgically with good wound healing and spontaneous voiding without compication.     Patient has been awaiting placement, court date for guardianship was 11/9.  Court appointed  obtained.  Multiple disruptive events by patient acquaintance and his  including an attempted removal of patient without notification / or discharge on 11/3/20. Code white called and patient recovered by security.  Patient acquaintance returned 11/4/20 and entered patient room without notification entering unit through  an employee entrance.  Patient acquaintance became irate/aggressive necessitating security intervention / code white.   As this is a legal matter, patient and facility  are involved and acquaintance/ are not permitted to be on premises.  Collateral of patient co-workers indicates concern of acquaintance's motives not being in best interest of patient.   Security measures have been enacted for the well being of patient.    Patient court appointed  is in contact with provider team, case management, and facility . Currently we are awaiting finalization of curatorship to allow for transition to NH.  [facility] has been documented to have accepted the patient.    Interval History:   Patient stable.  Awaiting placement. He offers no complaints on thorough ROS.    Review of Systems:  Review of Systems   Constitutional: Negative for chills and fever.   HENT: Negative for congestion and sore throat.    Eyes: Negative for photophobia, pain and discharge.   Respiratory: Negative for cough, hemoptysis, sputum production and shortness of breath.    Cardiovascular: Negative for chest pain, palpitations and leg swelling.   Gastrointestinal: Negative for abdominal pain, diarrhea, nausea and vomiting.   Genitourinary: Negative for dysuria and urgency.   Musculoskeletal: Negative for myalgias and neck pain.   Skin: Negative for itching and rash.   Neurological: Negative for sensory change, focal weakness and headaches.   Endo/Heme/Allergies: Negative for polydipsia. Does not bruise/bleed easily.   Psychiatric/Behavioral: Negative for depression and suicidal ideas.     OBJECTIVE:       Intake/Output Summary (Last 24 hours) at 11/11/2020 0935  Last data filed at 11/10/2020 1100  Gross per 24 hour   Intake 240 ml   Output --   Net 240 ml     Vital Signs Range (Last 24H):  Temp:  [97.3 °F (36.3 °C)-98.6 °F (37 °C)]   Pulse:  [63-79]   Resp:  [13-20]   BP: (113-139)/(61-82)   SpO2:  [94 %-100 %]   Body mass  "index is 22.78 kg/m².    Objective:  Vital signs: (most recent): Blood pressure 127/79, pulse 63, temperature 97.9 °F (36.6 °C), temperature source Oral, resp. rate 13, height 5' 10" (1.778 m), weight 72 kg (158 lb 11.7 oz), SpO2 96 %.  No fever.      Physical Exam  Constitutional:       General: He is not in acute distress.     Appearance: He is well-developed. He is not ill-appearing, toxic-appearing or diaphoretic.   HENT:      Head: Atraumatic. No abrasion, contusion or laceration.      Nose: Nose normal.      Mouth/Throat:      Pharynx: No oropharyngeal exudate.   Eyes:      General: No scleral icterus.        Right eye: No discharge.         Left eye: No discharge.      Conjunctiva/sclera: Conjunctivae normal.      Pupils: Pupils are equal, round, and reactive to light.   Neck:      Musculoskeletal: Normal range of motion and neck supple. No neck rigidity.      Vascular: No JVD.   Cardiovascular:      Rate and Rhythm: Normal rate and regular rhythm.      Heart sounds: Normal heart sounds. No murmur. No friction rub. No gallop.    Pulmonary:      Effort: Pulmonary effort is normal. No accessory muscle usage or respiratory distress.      Breath sounds: Normal breath sounds. No wheezing.   Abdominal:      General: Bowel sounds are normal. There is no distension.      Palpations: Abdomen is soft. There is no mass.      Tenderness: There is no abdominal tenderness. There is no guarding or rebound.   Genitourinary:     Comments: S/p partial penectomy with loose dressing in place; scant blood no overt bleeding   Musculoskeletal: Normal range of motion.         General: No tenderness or deformity.   Lymphadenopathy:      Cervical: No cervical adenopathy.   Skin:     General: Skin is warm and dry.      Capillary Refill: Capillary refill takes less than 2 seconds.      Findings: No bruising, ecchymosis, erythema, petechiae or rash.      Nails: There is no clubbing.   Neurological:      Mental Status: He is alert. Mental " status is at baseline.      GCS: GCS eye subscore is 4. GCS verbal subscore is 5. GCS motor subscore is 6.      Cranial Nerves: No cranial nerve deficit.      Sensory: No sensory deficit.      Motor: No abnormal muscle tone.      Coordination: Coordination normal.      Deep Tendon Reflexes: Reflexes normal.      Comments: Oriented to place and time. Unclear to context with frequent redirection, orientation.  Tangential thinking and occasional disorganized thought content    Psychiatric:         Speech: Speech normal.         Behavior: Behavior normal.         Thought Content: Thought content normal.         Judgment: Judgment normal.     Medications:  Medication list was reviewed in EPIC and changes noted under Assessment/Plan and MAR.    Laboratory:  Recent Labs     11/11/20  0401   WBC 5.17   RBC 3.90*   HGB 12.6*   HCT 38.5*      MCV 99*   MCH 32.3*   MCHC 32.7   GRAN 55.6  2.9   LYMPH 30.4  1.6   MONO 10.1  0.5   EOS 0.1      Recent Labs     11/11/20  0402   GLU 78      K 4.1      CO2 27   BUN 23   CREATININE 0.8   CALCIUM 8.8   ANIONGAP 7*   MG 2.1   PHOS 3.9     ASSESSMENT/PLAN:     Active Hospital Problems    Diagnosis  POA    *Cognitive impairment [R41.89]  Yes    Leukocytosis [D72.829]  No    Hypocalcemia [E83.51]  Yes    Acute encephalopathy [G93.40]  Yes    Cognitive decline [R41.89]  Yes    Confusion [R41.0]  Unknown    Debility [R53.81]  Yes    Penile carcinoma [C60.9]  Yes    Altered mental status [R41.82]  Yes      Resolved Hospital Problems    Diagnosis Date Resolved POA    Encephalopathy [G93.40] 10/04/2020 Unknown      * Cognitive impairment  Impaired decision making  Presentation concerning for progressive dementia   -LP performed by IR 10/5/2020: elevated protein, neutrophils  -CSF HSV, ACE negative.   -Serum paraneoplastic panel from 9/28 has resulted negative.  -MRI brain with Possible subtle increased FLAIR hyperintensity involving the bilateral mesial temporal  lobes which can be seen in the setting of autoimmune limbic encephalitis  -EEG without seizure activity   -Vitamin B1, Vitamin B6, Vitamin B12, HIV, RPR, TSH unremarkable   - Neurology consulted          --CSF to [place] for Alzheimer panel (ADEVL test code)          --repeat LP completed c/w early-onset AD          --completed empiric IV Solumedrol 1 g qd x 5 days without improvement          --formal cognitive assessment with neuropsych            --consider PET scan - not available inpatient           --continue strict delirium precautions  -Psych assess lack of competency   -Formal guardianship established, await finalization of curatorship  -Adult friends can assist with decision making   -[place] R.[place] 40:1299.53: (9) Upon the inability of any adult to consent for himself and in the absence of any person listed in Paragraphs (2) through (8) of this Subsection, an adult friend of the patient. For purposes of this Subsection to consent, adult friend means an adult who has exhibited special care and concern for the patient, who is generally familiar with the patient's health care views and desires, and who is willing and able to become involved in the patient's health care decisions and to act in the patient's best interest. The adult friend shall sign and date an acknowledgment form provided by the hospital or other health care facility in which the patient is located for placement in the patient's records certifying that he or she meets such criteria.     - Juice Martinez ([phone]) will make decisions on his behalf  - supervisor Mei Bell ([phone]) have been trying to help him manage things with his work and personal matters as they have observed his deficits and know he has no one to help him - these individuals may be used for consent      [facility] BEHAVIOR MANAGEMENT  - Minimize use of restraints; if physical restraints necessary, please utilize medical/chemical prns for agitation.  - Keep shades  open and room lit during day and room dim at night in order to promote healthy circadian rhythms.  - Encourage family at bedside  - Keep whiteboard in patient's room current with the date and name of the members of patient's team for easy patient self re-orientation.  - Avoid benzodiazepines, antihistamines, anticholinergics, hypnotics, and minimize opiates while controlling for pain as these medications may exacerbate delirium.     Penile carcinoma  - follows with urology   - Partial penectomy 10/19 - pathology w/o e/o of malignancy - to be discussed at tumor board  - Bacitracin ointment and 4x4 dressings to post surgical site   - FC placed post op - self discontinued     Anticipated discharge date and disposition:   Will need placement. CM/SW closely involved.   Brayan Castillo MD  [facility]    Total visit time was 35 minutes or greater with greater than 50% of time spent in counseling and coordination of care.

## 2020-11-11 NOTE — PLAN OF CARE
MAURICIO faxed updated clinicals to Kristofer TONG, Jeremiah and Pearl MOREAU for review. MAURICIO will continue to follow.     K[facility]ofer TONG - Accepted     Osiris Leos LMSW   - [facility]  Ext. [phone]

## 2020-11-11 NOTE — PLAN OF CARE
Problem: Adult Inpatient Plan of Care  Goal: Plan of Care Review  Outcome: Ongoing, Progressing     Problem: Fall Injury Risk  Goal: Absence of Fall and Fall-Related Injury  Outcome: Ongoing, Progressing     Problem: Thought Process Alteration  Goal: Optimal Thought Clarity  Outcome: Ongoing, Progressing     Patient AAOx2, pleasantly confused. Vitals wnl. No complaints of pain this shift. Patient able to make needs known to staff. Bed in lowest position. Call light within reach. Sitter at bedside. Monitoring.

## 2020-11-12 LAB
ALBUMIN SERPL BCP-MCNC: 3.4 G/DL (ref 3.5–5.2)
ALP SERPL-CCNC: 63 U/L (ref 55–135)
ALT SERPL W/O P-5'-P-CCNC: 30 U/L (ref 10–44)
ANION GAP SERPL CALC-SCNC: 6 MMOL/L (ref 8–16)
AST SERPL-CCNC: 25 U/L (ref 10–40)
BASOPHILS # BLD AUTO: 0.07 K/UL (ref 0–0.2)
BASOPHILS NFR BLD: 1.4 % (ref 0–1.9)
BILIRUB SERPL-MCNC: 0.4 MG/DL (ref 0.1–1)
BUN SERPL-MCNC: 22 MG/DL (ref 8–23)
CALCIUM SERPL-MCNC: 8.8 MG/DL (ref 8.7–10.5)
CHLORIDE SERPL-SCNC: 105 MMOL/L (ref 95–110)
CO2 SERPL-SCNC: 29 MMOL/L (ref 23–29)
CREAT SERPL-MCNC: 0.8 MG/DL (ref 0.5–1.4)
DIFFERENTIAL METHOD: ABNORMAL
EOSINOPHIL # BLD AUTO: 0.1 K/UL (ref 0–0.5)
EOSINOPHIL NFR BLD: 2.2 % (ref 0–8)
ERYTHROCYTE [DISTWIDTH] IN BLOOD BY AUTOMATED COUNT: 13 % (ref 11.5–14.5)
EST. GFR  (AFRICAN AMERICAN): >60 ML/MIN/1.73 M^2
EST. GFR  (NON AFRICAN AMERICAN): >60 ML/MIN/1.73 M^2
GLUCOSE SERPL-MCNC: 81 MG/DL (ref 70–110)
HCT VFR BLD AUTO: 38.8 % (ref 40–54)
HGB BLD-MCNC: 12.4 G/DL (ref 14–18)
IMM GRANULOCYTES # BLD AUTO: 0.03 K/UL (ref 0–0.04)
IMM GRANULOCYTES NFR BLD AUTO: 0.6 % (ref 0–0.5)
LYMPHOCYTES # BLD AUTO: 1.7 K/UL (ref 1–4.8)
LYMPHOCYTES NFR BLD: 33.3 % (ref 18–48)
MAGNESIUM SERPL-MCNC: 2.1 MG/DL (ref 1.6–2.6)
MCH RBC QN AUTO: 31.3 PG (ref 27–31)
MCHC RBC AUTO-ENTMCNC: 32 G/DL (ref 32–36)
MCV RBC AUTO: 98 FL (ref 82–98)
MONOCYTES # BLD AUTO: 0.5 K/UL (ref 0.3–1)
MONOCYTES NFR BLD: 10.7 % (ref 4–15)
NEUTROPHILS # BLD AUTO: 2.6 K/UL (ref 1.8–7.7)
NEUTROPHILS NFR BLD: 51.8 % (ref 38–73)
NRBC BLD-RTO: 0 /100 WBC
PHOSPHATE SERPL-MCNC: 3.9 MG/DL (ref 2.7–4.5)
PLATELET # BLD AUTO: 273 K/UL (ref 150–350)
PMV BLD AUTO: 9.8 FL (ref 9.2–12.9)
POTASSIUM SERPL-SCNC: 4 MMOL/L (ref 3.5–5.1)
PROT SERPL-MCNC: 5.9 G/DL (ref 6–8.4)
RBC # BLD AUTO: 3.96 M/UL (ref 4.6–6.2)
SODIUM SERPL-SCNC: 140 MMOL/L (ref 136–145)
WBC # BLD AUTO: 5.07 K/UL (ref 3.9–12.7)

## 2020-11-12 PROCEDURE — 11000001 HC ACUTE MED/SURG PRIVATE ROOM

## 2020-11-12 PROCEDURE — 83735 ASSAY OF MAGNESIUM: CPT

## 2020-11-12 PROCEDURE — 36415 COLL VENOUS BLD VENIPUNCTURE: CPT

## 2020-11-12 PROCEDURE — 99231 SBSQ HOSP IP/OBS SF/LOW 25: CPT | Mod: ,,, | Performed by: INTERNAL MEDICINE

## 2020-11-12 PROCEDURE — 84100 ASSAY OF PHOSPHORUS: CPT

## 2020-11-12 PROCEDURE — 99231 PR SUBSEQUENT HOSPITAL CARE,LEVL I: ICD-10-PCS | Mod: ,,, | Performed by: INTERNAL MEDICINE

## 2020-11-12 PROCEDURE — 80053 COMPREHEN METABOLIC PANEL: CPT

## 2020-11-12 PROCEDURE — 97803 MED NUTRITION INDIV SUBSEQ: CPT

## 2020-11-12 PROCEDURE — 25000003 PHARM REV CODE 250: Performed by: STUDENT IN AN ORGANIZED HEALTH CARE EDUCATION/TRAINING PROGRAM

## 2020-11-12 PROCEDURE — 63600175 PHARM REV CODE 636 W HCPCS: Performed by: HOSPITALIST

## 2020-11-12 PROCEDURE — 85025 COMPLETE CBC W/AUTO DIFF WBC: CPT

## 2020-11-12 RX ADMIN — BACITRACIN: 500 OINTMENT TOPICAL at 09:11

## 2020-11-12 RX ADMIN — ENOXAPARIN SODIUM 40 MG: 40 INJECTION SUBCUTANEOUS at 04:11

## 2020-11-12 RX ADMIN — PANTOPRAZOLE SODIUM 40 MG: 40 TABLET, DELAYED RELEASE ORAL at 09:11

## 2020-11-12 NOTE — PROGRESS NOTES
"[facility]  Adult Nutrition  Progress Note    SUMMARY       Recommendations  1. Continue regular diet + Boost Plus BID as tolerated.   2. RD to monitor.    Goals: continue to consume >75% of meals by RD follow-up  Nutrition Goal Status: goal met  Communication of RD Recs: other (comment)(POC)    Reason for Assessment    Reason For Assessment: RD follow-up  Diagnosis: (Cognitive impairment)  Relevant Medical History: Penile carcinoma, dementia   Interdisciplinary Rounds: did not attend  General Information Comments: Pt resting in bed with sitter at bedside this AM. He continues to report good appetite and intake of 100% of meals, confirmed per RN documentation and empty breakfast tray at bedside. Pt endorses drinking Boost with meals. No updated wt since 10/14. NFPE not warranted, pt continues to appear nourished with no indicators of malnutrition at this time.  Nutrition Discharge Planning: Regular Diet    Nutrition Risk Screen    Nutrition Risk Screen: no indicators present    Nutrition/Diet History    Spiritual, Cultural Beliefs, Hoahaoism Practices, Values that Affect Care: no  Factors Affecting Nutritional Intake: None identified at this time    Anthropometrics    Temp: 98.3 °F (36.8 °C)  Height Method: Stated  Height: 5' 10" (177.8 cm)  Height (inches): 70 in  Weight Method: Bed Scale  Weight: 72 kg (158 lb 11.7 oz)  Weight (lb): 158.73 lb  Ideal Body Weight (IBW), Male: 166 lb  BMI (Calculated): 22.8  BMI Grade: (P) 18.5-24.9 - normal    Lab/Procedures/Meds    Pertinent Labs Reviewed: reviewed  Pertinent Labs Comments: Alb 3.4  Pertinent Medications Reviewed: reviewed  Pertinent Medications Comments: pantoprazole, senna, zofran    Estimated/Assessed Needs    Weight Used For Calorie Calculations: 72 kg (158 lb 11.7 oz)  Energy Calorie Requirements (kcal): 7521-1315 kcal/day  Energy Need Method: Kcal/kg(25-30)  Protein Requirements: 72-87 gm/day(1.0-1.2 gm/kg)  Weight Used For Protein " Calculations: 72 kg (158 lb 11.7 oz)  Fluid Requirements (mL): 1 mL/kcal or per MD  Estimated Fluid Requirement Method: RDA Method  RDA Method (mL): 1800    Nutrition Prescription Ordered    Current Diet Order: Regular  Oral Nutrition Supplement: Boost Plus - 2 times daily    Evaluation of Received Nutrient/Fluid Intake    I/O: +4.7L since admit  Comments: LBM 11/9  Tolerance: tolerating  % Intake of Estimated Energy Needs: 75 - 100 %  % Meal Intake: 75 - 100 %    Nutrition Risk    Level of Risk/Frequency of Follow-up: low     Assessment and Plan    Nutrition Problem  Increased Nutrient Needs      Related to (etiology):   Physiological demands      Signs and Symptoms (as evidenced by):   Penile carcinoma     Interventions (treatment strategy):  Collaboration of nutrition care with other providers  Commercial beverage     Nutrition Diagnosis Status:   Continues    Monitor and Evaluation    Food and Nutrient Intake: energy intake, food and beverage intake  Food and Nutrient Adminstration: diet order  Physical Activity and Function: nutrition-related ADLs and IADLs  Anthropometric Measurements: weight, weight change, body mass index  Biochemical Data, Medical Tests and Procedures: electrolyte and renal panel, gastrointestinal profile, glucose/endocrine profile, inflammatory profile, lipid profile  Nutrition-Focused Physical Findings: overall appearance     Nutrition Follow-Up    RD Follow-up?: Yes

## 2020-11-12 NOTE — PLAN OF CARE
Patient remains stable.Sitter present at bedside. No complaints or concerns expressed. Free from falls and injuries.Denies pain or discomfort. NAD. TM

## 2020-11-12 NOTE — PLAN OF CARE
MAURICIO faxed updated clinicals to Kristofer TONG, Jeremiah and Pearl MOREAU for review. MAURICIO will continue to follow.      Kristofer TONG - Accepted     Osiris Leos LMSW   - [facility]  Ext. [phone]

## 2020-11-12 NOTE — PLAN OF CARE
Patient remains stable.Sitter present at bedside. No complaints or concerns expressed. Denies pain or discomfort. NAD. WCTM

## 2020-11-13 LAB
ALBUMIN SERPL BCP-MCNC: 3.6 G/DL (ref 3.5–5.2)
ALP SERPL-CCNC: 65 U/L (ref 55–135)
ALT SERPL W/O P-5'-P-CCNC: 35 U/L (ref 10–44)
ANION GAP SERPL CALC-SCNC: 9 MMOL/L (ref 8–16)
AST SERPL-CCNC: 26 U/L (ref 10–40)
BASOPHILS # BLD AUTO: 0.06 K/UL (ref 0–0.2)
BASOPHILS NFR BLD: 1.2 % (ref 0–1.9)
BILIRUB SERPL-MCNC: 0.6 MG/DL (ref 0.1–1)
BUN SERPL-MCNC: 20 MG/DL (ref 8–23)
CALCIUM SERPL-MCNC: 8.7 MG/DL (ref 8.7–10.5)
CHLORIDE SERPL-SCNC: 105 MMOL/L (ref 95–110)
CO2 SERPL-SCNC: 24 MMOL/L (ref 23–29)
CREAT SERPL-MCNC: 0.8 MG/DL (ref 0.5–1.4)
DIFFERENTIAL METHOD: ABNORMAL
EOSINOPHIL # BLD AUTO: 0.1 K/UL (ref 0–0.5)
EOSINOPHIL NFR BLD: 2.3 % (ref 0–8)
ERYTHROCYTE [DISTWIDTH] IN BLOOD BY AUTOMATED COUNT: 12.8 % (ref 11.5–14.5)
EST. GFR  (AFRICAN AMERICAN): >60 ML/MIN/1.73 M^2
EST. GFR  (NON AFRICAN AMERICAN): >60 ML/MIN/1.73 M^2
GLUCOSE SERPL-MCNC: 81 MG/DL (ref 70–110)
HCT VFR BLD AUTO: 39.4 % (ref 40–54)
HGB BLD-MCNC: 12.9 G/DL (ref 14–18)
IMM GRANULOCYTES # BLD AUTO: 0.02 K/UL (ref 0–0.04)
IMM GRANULOCYTES NFR BLD AUTO: 0.4 % (ref 0–0.5)
LYMPHOCYTES # BLD AUTO: 1.6 K/UL (ref 1–4.8)
LYMPHOCYTES NFR BLD: 33.7 % (ref 18–48)
MAGNESIUM SERPL-MCNC: 2 MG/DL (ref 1.6–2.6)
MCH RBC QN AUTO: 31.9 PG (ref 27–31)
MCHC RBC AUTO-ENTMCNC: 32.7 G/DL (ref 32–36)
MCV RBC AUTO: 98 FL (ref 82–98)
MONOCYTES # BLD AUTO: 0.5 K/UL (ref 0.3–1)
MONOCYTES NFR BLD: 10.2 % (ref 4–15)
NEUTROPHILS # BLD AUTO: 2.5 K/UL (ref 1.8–7.7)
NEUTROPHILS NFR BLD: 52.2 % (ref 38–73)
NRBC BLD-RTO: 0 /100 WBC
PHOSPHATE SERPL-MCNC: 3.8 MG/DL (ref 2.7–4.5)
PLATELET # BLD AUTO: 243 K/UL (ref 150–350)
PMV BLD AUTO: 9.7 FL (ref 9.2–12.9)
POTASSIUM SERPL-SCNC: 3.8 MMOL/L (ref 3.5–5.1)
PROT SERPL-MCNC: 6.1 G/DL (ref 6–8.4)
RBC # BLD AUTO: 4.04 M/UL (ref 4.6–6.2)
SARS-COV-2 RDRP RESP QL NAA+PROBE: NEGATIVE
SODIUM SERPL-SCNC: 138 MMOL/L (ref 136–145)
WBC # BLD AUTO: 4.81 K/UL (ref 3.9–12.7)

## 2020-11-13 PROCEDURE — U0002 COVID-19 LAB TEST NON-CDC: HCPCS

## 2020-11-13 PROCEDURE — 63600175 PHARM REV CODE 636 W HCPCS: Performed by: HOSPITALIST

## 2020-11-13 PROCEDURE — 25000003 PHARM REV CODE 250: Performed by: STUDENT IN AN ORGANIZED HEALTH CARE EDUCATION/TRAINING PROGRAM

## 2020-11-13 PROCEDURE — 11000001 HC ACUTE MED/SURG PRIVATE ROOM

## 2020-11-13 PROCEDURE — 84100 ASSAY OF PHOSPHORUS: CPT

## 2020-11-13 PROCEDURE — 83735 ASSAY OF MAGNESIUM: CPT

## 2020-11-13 PROCEDURE — 36415 COLL VENOUS BLD VENIPUNCTURE: CPT

## 2020-11-13 PROCEDURE — 99231 PR SUBSEQUENT HOSPITAL CARE,LEVL I: ICD-10-PCS | Mod: ,,, | Performed by: INTERNAL MEDICINE

## 2020-11-13 PROCEDURE — 80053 COMPREHEN METABOLIC PANEL: CPT

## 2020-11-13 PROCEDURE — 85025 COMPLETE CBC W/AUTO DIFF WBC: CPT

## 2020-11-13 PROCEDURE — 99231 SBSQ HOSP IP/OBS SF/LOW 25: CPT | Mod: ,,, | Performed by: INTERNAL MEDICINE

## 2020-11-13 RX ADMIN — BACITRACIN: 500 OINTMENT TOPICAL at 09:11

## 2020-11-13 RX ADMIN — ENOXAPARIN SODIUM 40 MG: 40 INJECTION SUBCUTANEOUS at 05:11

## 2020-11-13 RX ADMIN — PANTOPRAZOLE SODIUM 40 MG: 40 TABLET, DELAYED RELEASE ORAL at 09:11

## 2020-11-13 NOTE — PLAN OF CARE
Plan is to discharge to shelter nursing home.  K[facility]of[facility] accepted patient.       11/13/20 1547   Discharge Reassessment   Assessment Type Discharge Planning Reassessment   Do you have any problems affording any of your prescribed medications? No   Discharge Plan A New Nursing Home placement - shelter care facility   Discharge Plan B New Nursing Home placement - shelter care facility   DME Needed Upon Discharge  none   Anticipated Discharge Disposition [facility]

## 2020-11-13 NOTE — PLAN OF CARE
"No acute events over night. Sitter remained at bedside. Safety maintained.     /67 (BP Location: Left arm, Patient Position: Lying)   Pulse 70   Temp 97 °F (36.1 °C) (Oral)   Resp 18   Ht 5' 10" (1.778 m)   Wt 72 kg (158 lb 11.7 oz)   SpO2 98%   BMI 22.78 kg/m²     "
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
## 2020-11-14 LAB
ALBUMIN SERPL BCP-MCNC: 3.6 G/DL (ref 3.5–5.2)
ALP SERPL-CCNC: 72 U/L (ref 55–135)
ALT SERPL W/O P-5'-P-CCNC: 34 U/L (ref 10–44)
ANION GAP SERPL CALC-SCNC: 8 MMOL/L (ref 8–16)
AST SERPL-CCNC: 26 U/L (ref 10–40)
BASOPHILS # BLD AUTO: 0.04 K/UL (ref 0–0.2)
BASOPHILS NFR BLD: 0.8 % (ref 0–1.9)
BILIRUB SERPL-MCNC: 0.3 MG/DL (ref 0.1–1)
BUN SERPL-MCNC: 22 MG/DL (ref 8–23)
CALCIUM SERPL-MCNC: 8.9 MG/DL (ref 8.7–10.5)
CHLORIDE SERPL-SCNC: 106 MMOL/L (ref 95–110)
CO2 SERPL-SCNC: 27 MMOL/L (ref 23–29)
CREAT SERPL-MCNC: 0.8 MG/DL (ref 0.5–1.4)
DIFFERENTIAL METHOD: ABNORMAL
EOSINOPHIL # BLD AUTO: 0.1 K/UL (ref 0–0.5)
EOSINOPHIL NFR BLD: 2.6 % (ref 0–8)
ERYTHROCYTE [DISTWIDTH] IN BLOOD BY AUTOMATED COUNT: 13 % (ref 11.5–14.5)
EST. GFR  (AFRICAN AMERICAN): >60 ML/MIN/1.73 M^2
EST. GFR  (NON AFRICAN AMERICAN): >60 ML/MIN/1.73 M^2
GLUCOSE SERPL-MCNC: 88 MG/DL (ref 70–110)
HCT VFR BLD AUTO: 38.8 % (ref 40–54)
HGB BLD-MCNC: 12.8 G/DL (ref 14–18)
IMM GRANULOCYTES # BLD AUTO: 0.02 K/UL (ref 0–0.04)
IMM GRANULOCYTES NFR BLD AUTO: 0.4 % (ref 0–0.5)
LYMPHOCYTES # BLD AUTO: 1.6 K/UL (ref 1–4.8)
LYMPHOCYTES NFR BLD: 30.4 % (ref 18–48)
MAGNESIUM SERPL-MCNC: 2.1 MG/DL (ref 1.6–2.6)
MCH RBC QN AUTO: 31.6 PG (ref 27–31)
MCHC RBC AUTO-ENTMCNC: 33 G/DL (ref 32–36)
MCV RBC AUTO: 96 FL (ref 82–98)
MONOCYTES # BLD AUTO: 0.5 K/UL (ref 0.3–1)
MONOCYTES NFR BLD: 10 % (ref 4–15)
NEUTROPHILS # BLD AUTO: 3 K/UL (ref 1.8–7.7)
NEUTROPHILS NFR BLD: 55.8 % (ref 38–73)
NRBC BLD-RTO: 0 /100 WBC
PHOSPHATE SERPL-MCNC: 3.9 MG/DL (ref 2.7–4.5)
PLATELET # BLD AUTO: 264 K/UL (ref 150–350)
PMV BLD AUTO: 9.9 FL (ref 9.2–12.9)
POTASSIUM SERPL-SCNC: 3.9 MMOL/L (ref 3.5–5.1)
PROT SERPL-MCNC: 6.2 G/DL (ref 6–8.4)
RBC # BLD AUTO: 4.05 M/UL (ref 4.6–6.2)
SODIUM SERPL-SCNC: 141 MMOL/L (ref 136–145)
WBC # BLD AUTO: 5.29 K/UL (ref 3.9–12.7)

## 2020-11-14 PROCEDURE — 99231 PR SUBSEQUENT HOSPITAL CARE,LEVL I: ICD-10-PCS | Mod: ,,, | Performed by: INTERNAL MEDICINE

## 2020-11-14 PROCEDURE — 36415 COLL VENOUS BLD VENIPUNCTURE: CPT

## 2020-11-14 PROCEDURE — 99231 SBSQ HOSP IP/OBS SF/LOW 25: CPT | Mod: ,,, | Performed by: INTERNAL MEDICINE

## 2020-11-14 PROCEDURE — 63600175 PHARM REV CODE 636 W HCPCS: Performed by: HOSPITALIST

## 2020-11-14 PROCEDURE — 11000001 HC ACUTE MED/SURG PRIVATE ROOM

## 2020-11-14 PROCEDURE — 85025 COMPLETE CBC W/AUTO DIFF WBC: CPT

## 2020-11-14 PROCEDURE — 80053 COMPREHEN METABOLIC PANEL: CPT

## 2020-11-14 PROCEDURE — 25000003 PHARM REV CODE 250: Performed by: STUDENT IN AN ORGANIZED HEALTH CARE EDUCATION/TRAINING PROGRAM

## 2020-11-14 PROCEDURE — 83735 ASSAY OF MAGNESIUM: CPT

## 2020-11-14 PROCEDURE — 84100 ASSAY OF PHOSPHORUS: CPT

## 2020-11-14 RX ADMIN — BACITRACIN: 500 OINTMENT TOPICAL at 09:11

## 2020-11-14 RX ADMIN — PANTOPRAZOLE SODIUM 40 MG: 40 TABLET, DELAYED RELEASE ORAL at 09:11

## 2020-11-14 RX ADMIN — ENOXAPARIN SODIUM 40 MG: 40 INJECTION SUBCUTANEOUS at 05:11
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
## 2020-11-15 LAB
ALBUMIN SERPL BCP-MCNC: 3.4 G/DL (ref 3.5–5.2)
ALP SERPL-CCNC: 60 U/L (ref 55–135)
ALT SERPL W/O P-5'-P-CCNC: 32 U/L (ref 10–44)
ANION GAP SERPL CALC-SCNC: 10 MMOL/L (ref 8–16)
AST SERPL-CCNC: 23 U/L (ref 10–40)
BASOPHILS # BLD AUTO: 0.07 K/UL (ref 0–0.2)
BASOPHILS NFR BLD: 1.3 % (ref 0–1.9)
BILIRUB SERPL-MCNC: 0.4 MG/DL (ref 0.1–1)
BUN SERPL-MCNC: 19 MG/DL (ref 8–23)
CALCIUM SERPL-MCNC: 8.9 MG/DL (ref 8.7–10.5)
CHLORIDE SERPL-SCNC: 106 MMOL/L (ref 95–110)
CO2 SERPL-SCNC: 24 MMOL/L (ref 23–29)
CREAT SERPL-MCNC: 0.7 MG/DL (ref 0.5–1.4)
DIFFERENTIAL METHOD: ABNORMAL
EOSINOPHIL # BLD AUTO: 0.2 K/UL (ref 0–0.5)
EOSINOPHIL NFR BLD: 3 % (ref 0–8)
ERYTHROCYTE [DISTWIDTH] IN BLOOD BY AUTOMATED COUNT: 12.8 % (ref 11.5–14.5)
EST. GFR  (AFRICAN AMERICAN): >60 ML/MIN/1.73 M^2
EST. GFR  (NON AFRICAN AMERICAN): >60 ML/MIN/1.73 M^2
GLUCOSE SERPL-MCNC: 81 MG/DL (ref 70–110)
HCT VFR BLD AUTO: 39.4 % (ref 40–54)
HGB BLD-MCNC: 12.9 G/DL (ref 14–18)
IMM GRANULOCYTES # BLD AUTO: 0.04 K/UL (ref 0–0.04)
IMM GRANULOCYTES NFR BLD AUTO: 0.7 % (ref 0–0.5)
LYMPHOCYTES # BLD AUTO: 1.6 K/UL (ref 1–4.8)
LYMPHOCYTES NFR BLD: 30.1 % (ref 18–48)
MAGNESIUM SERPL-MCNC: 2.1 MG/DL (ref 1.6–2.6)
MCH RBC QN AUTO: 31.8 PG (ref 27–31)
MCHC RBC AUTO-ENTMCNC: 32.7 G/DL (ref 32–36)
MCV RBC AUTO: 97 FL (ref 82–98)
MONOCYTES # BLD AUTO: 0.6 K/UL (ref 0.3–1)
MONOCYTES NFR BLD: 10.8 % (ref 4–15)
NEUTROPHILS # BLD AUTO: 2.9 K/UL (ref 1.8–7.7)
NEUTROPHILS NFR BLD: 54.1 % (ref 38–73)
NRBC BLD-RTO: 0 /100 WBC
PHOSPHATE SERPL-MCNC: 3.9 MG/DL (ref 2.7–4.5)
PLATELET # BLD AUTO: 233 K/UL (ref 150–350)
PMV BLD AUTO: 10.1 FL (ref 9.2–12.9)
POTASSIUM SERPL-SCNC: 4 MMOL/L (ref 3.5–5.1)
PROT SERPL-MCNC: 5.8 G/DL (ref 6–8.4)
RBC # BLD AUTO: 4.06 M/UL (ref 4.6–6.2)
SODIUM SERPL-SCNC: 140 MMOL/L (ref 136–145)
WBC # BLD AUTO: 5.38 K/UL (ref 3.9–12.7)

## 2020-11-15 PROCEDURE — 63600175 PHARM REV CODE 636 W HCPCS: Performed by: HOSPITALIST

## 2020-11-15 PROCEDURE — 80053 COMPREHEN METABOLIC PANEL: CPT

## 2020-11-15 PROCEDURE — 11000001 HC ACUTE MED/SURG PRIVATE ROOM

## 2020-11-15 PROCEDURE — 36415 COLL VENOUS BLD VENIPUNCTURE: CPT

## 2020-11-15 PROCEDURE — 83735 ASSAY OF MAGNESIUM: CPT

## 2020-11-15 PROCEDURE — 25000003 PHARM REV CODE 250: Performed by: STUDENT IN AN ORGANIZED HEALTH CARE EDUCATION/TRAINING PROGRAM

## 2020-11-15 PROCEDURE — 99231 SBSQ HOSP IP/OBS SF/LOW 25: CPT | Mod: ,,, | Performed by: INTERNAL MEDICINE

## 2020-11-15 PROCEDURE — 84100 ASSAY OF PHOSPHORUS: CPT

## 2020-11-15 PROCEDURE — 99231 PR SUBSEQUENT HOSPITAL CARE,LEVL I: ICD-10-PCS | Mod: ,,, | Performed by: INTERNAL MEDICINE

## 2020-11-15 PROCEDURE — 85025 COMPLETE CBC W/AUTO DIFF WBC: CPT

## 2020-11-15 RX ADMIN — PANTOPRAZOLE SODIUM 40 MG: 40 TABLET, DELAYED RELEASE ORAL at 09:11

## 2020-11-15 RX ADMIN — ENOXAPARIN SODIUM 40 MG: 40 INJECTION SUBCUTANEOUS at 05:11

## 2020-11-15 RX ADMIN — BACITRACIN: 500 OINTMENT TOPICAL at 09:11
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
## 2020-11-16 PROCEDURE — 25000003 PHARM REV CODE 250: Performed by: STUDENT IN AN ORGANIZED HEALTH CARE EDUCATION/TRAINING PROGRAM

## 2020-11-16 PROCEDURE — 11000001 HC ACUTE MED/SURG PRIVATE ROOM

## 2020-11-16 PROCEDURE — 63600175 PHARM REV CODE 636 W HCPCS: Performed by: HOSPITALIST

## 2020-11-16 PROCEDURE — 99231 PR SUBSEQUENT HOSPITAL CARE,LEVL I: ICD-10-PCS | Mod: ,,, | Performed by: INTERNAL MEDICINE

## 2020-11-16 PROCEDURE — 99231 SBSQ HOSP IP/OBS SF/LOW 25: CPT | Mod: ,,, | Performed by: INTERNAL MEDICINE

## 2020-11-16 RX ADMIN — PANTOPRAZOLE SODIUM 40 MG: 40 TABLET, DELAYED RELEASE ORAL at 08:11

## 2020-11-16 RX ADMIN — BACITRACIN: 500 OINTMENT TOPICAL at 08:11

## 2020-11-16 RX ADMIN — ENOXAPARIN SODIUM 40 MG: 40 INJECTION SUBCUTANEOUS at 05:11

## 2020-11-16 NOTE — PLAN OF CARE
MAURICIO faxed updated clinicals to Kristofer TONG for review. MAURICIO will continue to follow.     Osiris Leos LMSW   - [facility]  Ext. [phone]

## 2020-11-16 NOTE — PLAN OF CARE
Plan is to discharge to longterm nursing home when pennyhip paperwork received by curator Mei.  Kristofer is following patient.       11/16/20 1343   Discharge Reassessment   Assessment Type Discharge Planning Reassessment   Do you have any problems affording any of your prescribed medications? No   Discharge Plan A New Nursing Home placement - longterm care facility   Discharge Plan B New Nursing Home placement - longterm care facility   DME Needed Upon Discharge  none   Anticipated Discharge Disposition alf Nu
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
## 2020-11-17 PROCEDURE — 63600175 PHARM REV CODE 636 W HCPCS: Performed by: HOSPITALIST

## 2020-11-17 PROCEDURE — 99231 PR SUBSEQUENT HOSPITAL CARE,LEVL I: ICD-10-PCS | Mod: ,,, | Performed by: INTERNAL MEDICINE

## 2020-11-17 PROCEDURE — 25000003 PHARM REV CODE 250: Performed by: STUDENT IN AN ORGANIZED HEALTH CARE EDUCATION/TRAINING PROGRAM

## 2020-11-17 PROCEDURE — 99231 SBSQ HOSP IP/OBS SF/LOW 25: CPT | Mod: ,,, | Performed by: INTERNAL MEDICINE

## 2020-11-17 PROCEDURE — 11000001 HC ACUTE MED/SURG PRIVATE ROOM

## 2020-11-17 RX ADMIN — BACITRACIN: 500 OINTMENT TOPICAL at 08:11

## 2020-11-17 RX ADMIN — PANTOPRAZOLE SODIUM 40 MG: 40 TABLET, DELAYED RELEASE ORAL at 08:11

## 2020-11-17 RX ADMIN — ENOXAPARIN SODIUM 40 MG: 40 INJECTION SUBCUTANEOUS at 05:11

## 2020-11-17 NOTE — PLAN OF CARE
MAURICIO faxed updated clinicals to Kristofer TONG for review. MAURICIO will continue to follow.      Osiris Leos LMSW   - [facility]  Ext. [phone]

## 2020-11-17 NOTE — PLAN OF CARE
Patient AxO, free from falls and STEVIE. Denies pain or discomfort, VSS and afebrile. Pain and safety monitored q1-2, sitter at bedside. Will cont to monitor.

## 2020-11-17 NOTE — PLAN OF CARE
Patient medicated per orders. Patient free of falls. Sitter at bedside. Patient voices no needs or concerns at this time. Bed in lowest position. Side rails up x2. Call light in reach.

## 2020-11-18 PROCEDURE — 30200315 PPD INTRADERMAL TEST REV CODE 302: Performed by: INTERNAL MEDICINE

## 2020-11-18 PROCEDURE — 25000003 PHARM REV CODE 250: Performed by: STUDENT IN AN ORGANIZED HEALTH CARE EDUCATION/TRAINING PROGRAM

## 2020-11-18 PROCEDURE — 99231 PR SUBSEQUENT HOSPITAL CARE,LEVL I: ICD-10-PCS | Mod: ,,, | Performed by: INTERNAL MEDICINE

## 2020-11-18 PROCEDURE — U0003 INFECTIOUS AGENT DETECTION BY NUCLEIC ACID (DNA OR RNA); SEVERE ACUTE RESPIRATORY SYNDROME CORONAVIRUS 2 (SARS-COV-2) (CORONAVIRUS DISEASE [COVID-19]), AMPLIFIED PROBE TECHNIQUE, MAKING USE OF HIGH THROUGHPUT TECHNOLOGIES AS DESCRIBED BY CMS-2020-01-R: HCPCS

## 2020-11-18 PROCEDURE — 86580 TB INTRADERMAL TEST: CPT | Performed by: INTERNAL MEDICINE

## 2020-11-18 PROCEDURE — 11000001 HC ACUTE MED/SURG PRIVATE ROOM

## 2020-11-18 PROCEDURE — 63600175 PHARM REV CODE 636 W HCPCS: Performed by: HOSPITALIST

## 2020-11-18 PROCEDURE — 99231 SBSQ HOSP IP/OBS SF/LOW 25: CPT | Mod: ,,, | Performed by: INTERNAL MEDICINE

## 2020-11-18 RX ADMIN — ENOXAPARIN SODIUM 40 MG: 40 INJECTION SUBCUTANEOUS at 06:11

## 2020-11-18 RX ADMIN — TUBERCULIN PURIFIED PROTEIN DERIVATIVE 5 UNITS: 5 INJECTION, SOLUTION INTRADERMAL at 03:11

## 2020-11-18 RX ADMIN — PANTOPRAZOLE SODIUM 40 MG: 40 TABLET, DELAYED RELEASE ORAL at 09:11

## 2020-11-18 RX ADMIN — BACITRACIN: 500 OINTMENT TOPICAL at 09:11

## 2020-11-18 NOTE — PLAN OF CARE
Patient medicated per orders. Patient free of falls. Patient voices no needs or concerns at this time. Bed in lowest position. Side rails up x2. Call light in reach.

## 2020-11-18 NOTE — PLAN OF CARE
MAURICIO faxed updated clinicals to Kristofer TONG for review. MAURICIO will continue to follow.      Osiris Leos LMSW   - [facility]  Ext. [phone]
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
## 2020-11-18 NOTE — PLAN OF CARE
ANNIKA called Mei lopez, concerning discharge planning.  ANNIKA informed Mei that Sadia Poole, , sent a copy of the pennyhip paperwork in email to ANNIKA and Mei.  ANNIKA also informed that Sadia stated it can be used to sign the paperwork at the nursing home while she obtains the certified copy.  ANNIKA also informed Mei she would need to call [facility] to make an appointment to sign.  Mei states she can't sign the papers today but can sign them tomorrow and will call to schedule this for after she gets off work tomorrow at 2:30 PM.

## 2020-11-18 NOTE — PLAN OF CARE
[facility] - Accepted and patient is pending insurance auth from [employer]. SW will continue to follow.      11/18/20 1121   Post-Acute Status   Post-Acute Authorization Placement   Post-Acute Placement Status Pending Payor Review     Osiris Leos LMSW   - [facility]  Ext. [phone]

## 2020-11-19 LAB — SARS-COV-2 RNA RESP QL NAA+PROBE: DETECTED

## 2020-11-19 PROCEDURE — 99231 PR SUBSEQUENT HOSPITAL CARE,LEVL I: ICD-10-PCS | Mod: ,,, | Performed by: HOSPITALIST

## 2020-11-19 PROCEDURE — 11000001 HC ACUTE MED/SURG PRIVATE ROOM

## 2020-11-19 PROCEDURE — 99231 SBSQ HOSP IP/OBS SF/LOW 25: CPT | Mod: ,,, | Performed by: HOSPITALIST

## 2020-11-19 PROCEDURE — 97803 MED NUTRITION INDIV SUBSEQ: CPT

## 2020-11-19 PROCEDURE — 25000003 PHARM REV CODE 250: Performed by: STUDENT IN AN ORGANIZED HEALTH CARE EDUCATION/TRAINING PROGRAM

## 2020-11-19 PROCEDURE — 63600175 PHARM REV CODE 636 W HCPCS: Performed by: HOSPITALIST

## 2020-11-19 PROCEDURE — U0002 COVID-19 LAB TEST NON-CDC: HCPCS

## 2020-11-19 RX ADMIN — PANTOPRAZOLE SODIUM 40 MG: 40 TABLET, DELAYED RELEASE ORAL at 08:11

## 2020-11-19 RX ADMIN — ENOXAPARIN SODIUM 40 MG: 40 INJECTION SUBCUTANEOUS at 05:11

## 2020-11-19 RX ADMIN — BACITRACIN: 500 OINTMENT TOPICAL at 08:11
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
## 2020-11-19 NOTE — PLAN OF CARE
MAURICIO faxed updated clinicals and Updated NH Orders to [facility] for review. Patient is pending a COVID Test Results. MAURICIO will continue to follow.      11/19/20 7424   Post-Acute Status   Post-Acute Authorization Placement   Post-Acute Placement Status Pending Required Testing for Post-Acute Clearance     Osiris Leos LMSW   - [facility]  Ext. [phone]

## 2020-11-19 NOTE — PROGRESS NOTES
"[facility]  Adult Nutrition  Progress Note    SUMMARY       Recommendations  1. Continue regular diet + Boost Plus BID as tolerated.   2. RD to monitor.    Goals: continue to consume >75% of meals by RD follow-up  Nutrition Goal Status: goal met  Communication of RD Recs: other (comment)(POC)    Reason for Assessment    Reason For Assessment: RD follow-up  Diagnosis: (Cognitive impairment)  Relevant Medical History: Penile carcinoma, dementia   Interdisciplinary Rounds: did not attend  General Information Comments: Pt resting in bed with no family at bedside this AM. He continues to report good appetite + drinking Boost with meals, 100% intake confirmed per RN documentation. No updated wt since 10/14. NFPE not warranted, pt continues with no indicators of malnutrition at this time.  Nutrition Discharge Planning: Regular Diet    Nutrition Risk Screen    Nutrition Risk Screen: no indicators present    Nutrition/Diet History    Spiritual, Cultural Beliefs, Zoroastrianism Practices, Values that Affect Care: no  Factors Affecting Nutritional Intake: None identified at this time    Anthropometrics    Temp: 97 °F (36.1 °C)  Height Method: Stated  Height: 5' 10" (177.8 cm)  Height (inches): 70 in  Weight Method: Bed Scale  Weight: 72 kg (158 lb 11.7 oz)  Weight (lb): 158.73 lb  Ideal Body Weight (IBW), Male: 166 lb  BMI (Calculated): 22.8  BMI Grade: (P) 18.5-24.9 - normal    Lab/Procedures/Meds    Pertinent Labs Reviewed: reviewed  Pertinent Labs Comments: Alb 3.4  Pertinent Medications Reviewed: reviewed  Pertinent Medications Comments: pantoprazole, senna-docusate    Estimated/Assessed Needs    Weight Used For Calorie Calculations: 72 kg (158 lb 11.7 oz)  Energy Calorie Requirements (kcal): 8923-7349 kcal/day  Energy Need Method: Kcal/kg(25-30)  Protein Requirements: 72-87 gm/day(1.0-1.2 gm/kg)  Weight Used For Protein Calculations: 72 kg (158 lb 11.7 oz)  Fluid Requirements (mL): 1 mL/kcal or per " MD  Estimated Fluid Requirement Method: RDA Method  RDA Method (mL): 1800    Nutrition Prescription Ordered    Current Diet Order: Regular  Oral Nutrition Supplement: Boost Plus - 2 times daily    Evaluation of Received Nutrient/Fluid Intake    I/O: +7.8L since admit  Comments: BEN 11/18  Tolerance: tolerating  % Intake of Estimated Energy Needs: 75 - 100 %  % Meal Intake: 75 - 100 %    Nutrition Risk    Level of Risk/Frequency of Follow-up: low     Assessment and Plan    Nutrition Problem  Increased Nutrient Needs      Related to (etiology):   Physiological demands      Signs and Symptoms (as evidenced by):   Penile carcinoma     Interventions (treatment strategy):  Collaboration of nutrition care with other providers  Commercial beverage     Nutrition Diagnosis Status:   Continues    Monitor and Evaluation    Food and Nutrient Intake: energy intake, food and beverage intake  Food and Nutrient Adminstration: diet order  Physical Activity and Function: nutrition-related ADLs and IADLs  Anthropometric Measurements: weight, weight change, body mass index  Biochemical Data, Medical Tests and Procedures: electrolyte and renal panel, gastrointestinal profile, glucose/endocrine profile, inflammatory profile, lipid profile  Nutrition-Focused Physical Findings: overall appearance    Nutrition Follow-Up    RD Follow-up?: Yes

## 2020-11-19 NOTE — PLAN OF CARE
[facility]     Department of Hospital Medicine     [address] [place] [address]     [phone] after hours  [phone] fax       NURSING HOME ORDERS    11/23/2020    Admit to Nursing Home:  Skilled Bed    Diagnoses:  Active Hospital Problems    Diagnosis  POA    *Cognitive impairment [R41.89]  Yes    Leukocytosis [D72.829]  No    Hypocalcemia [E83.51]  Yes    Acute encephalopathy [G93.40]  Yes    Cognitive decline [R41.89]  Yes    Confusion [R41.0]  Yes    Debility [R53.81]  Yes    Penile carcinoma [C60.9]  Yes    Altered mental status [R41.82]  Yes      Resolved Hospital Problems    Diagnosis Date Resolved POA    Encephalopathy [G93.40] 10/04/2020 Yes       Patient is homebound due to:  Cognitive impairment    Allergies:Review of patient's allergies indicates:  No Known Allergies    Vitals:  Routine, once monthly       Diet: Regular     Acitivities:    - Up in a chair each morning as tolerated   - Scheduled walks once each shift (every 8 hours)   - May ambulate independently     LABS:  Per facility protocol    Nursing Precautions:   - Aspiration precautions:             - Total assistance with meals            -  Upright 90 degrees befor during and after meals    - Fall precautions per nursing home protocol   - Seizure precaution per custodial protocol   - Decubitus precautions:        -  for positioning   - Pressure reducing foam mattress   - Turn patient every two hours. Use wedge pillows to anchor patient    CONSULTS:       Physical Therapy to evaluate and treat     Occupational Therapy to evaluate and treat    Medications: Discontinue all previous medication orders, if any. See new list below.     Juan Hobson   Home Medication Instructions SUDHA:[id number]    Printed on:11/18/20 9044   Medication Information                      acetaminophen (TYLENOL) 325 MG tablet  Take 2 tablets (650 mg total) by mouth every 4 (four) hours as  needed.             bacitracin 500 unit/gram ointment  Apply topically once daily.             melatonin (MELATIN) 3 mg tablet  Take 2 tablets (6 mg total) by mouth nightly as needed for Insomnia.             senna-docusate 8.6-50 mg (PERICOLACE) 8.6-50 mg per tablet  Take 1 tablet by mouth 2 (two) times daily as needed for Constipation.                       _________________________________  Brayan Castillo MD  11/23/2020

## 2020-11-20 LAB
ALBUMIN SERPL BCP-MCNC: 3.6 G/DL (ref 3.5–5.2)
ALP SERPL-CCNC: 68 U/L (ref 55–135)
ALT SERPL W/O P-5'-P-CCNC: 25 U/L (ref 10–44)
ANION GAP SERPL CALC-SCNC: 7 MMOL/L (ref 8–16)
AST SERPL-CCNC: 19 U/L (ref 10–40)
BASOPHILS # BLD AUTO: 0.05 K/UL (ref 0–0.2)
BASOPHILS NFR BLD: 0.8 % (ref 0–1.9)
BILIRUB SERPL-MCNC: 0.4 MG/DL (ref 0.1–1)
BUN SERPL-MCNC: 20 MG/DL (ref 8–23)
CALCIUM SERPL-MCNC: 8.8 MG/DL (ref 8.7–10.5)
CHLORIDE SERPL-SCNC: 107 MMOL/L (ref 95–110)
CO2 SERPL-SCNC: 27 MMOL/L (ref 23–29)
CREAT SERPL-MCNC: 0.8 MG/DL (ref 0.5–1.4)
CRP SERPL-MCNC: 0.4 MG/L (ref 0–8.2)
DIFFERENTIAL METHOD: ABNORMAL
EOSINOPHIL # BLD AUTO: 0.1 K/UL (ref 0–0.5)
EOSINOPHIL NFR BLD: 2 % (ref 0–8)
ERYTHROCYTE [DISTWIDTH] IN BLOOD BY AUTOMATED COUNT: 12.7 % (ref 11.5–14.5)
EST. GFR  (AFRICAN AMERICAN): >60 ML/MIN/1.73 M^2
EST. GFR  (NON AFRICAN AMERICAN): >60 ML/MIN/1.73 M^2
FERRITIN SERPL-MCNC: 732 NG/ML (ref 20–300)
GLUCOSE SERPL-MCNC: 73 MG/DL (ref 70–110)
HCT VFR BLD AUTO: 38.1 % (ref 40–54)
HGB BLD-MCNC: 12.7 G/DL (ref 14–18)
IMM GRANULOCYTES # BLD AUTO: 0.02 K/UL (ref 0–0.04)
IMM GRANULOCYTES NFR BLD AUTO: 0.3 % (ref 0–0.5)
LDH SERPL L TO P-CCNC: 144 U/L (ref 110–260)
LYMPHOCYTES # BLD AUTO: 1.1 K/UL (ref 1–4.8)
LYMPHOCYTES NFR BLD: 18.7 % (ref 18–48)
MCH RBC QN AUTO: 32.4 PG (ref 27–31)
MCHC RBC AUTO-ENTMCNC: 33.3 G/DL (ref 32–36)
MCV RBC AUTO: 97 FL (ref 82–98)
MONOCYTES # BLD AUTO: 0.6 K/UL (ref 0.3–1)
MONOCYTES NFR BLD: 9.9 % (ref 4–15)
NEUTROPHILS # BLD AUTO: 4 K/UL (ref 1.8–7.7)
NEUTROPHILS NFR BLD: 68.3 % (ref 38–73)
NRBC BLD-RTO: 0 /100 WBC
PLATELET # BLD AUTO: 201 K/UL (ref 150–350)
PMV BLD AUTO: 9.9 FL (ref 9.2–12.9)
POTASSIUM SERPL-SCNC: 4.1 MMOL/L (ref 3.5–5.1)
PROT SERPL-MCNC: 6 G/DL (ref 6–8.4)
RBC # BLD AUTO: 3.92 M/UL (ref 4.6–6.2)
SARS-COV-2 RDRP RESP QL NAA+PROBE: NEGATIVE
SARS-COV-2 RDRP RESP QL NAA+PROBE: NEGATIVE
SODIUM SERPL-SCNC: 141 MMOL/L (ref 136–145)
WBC # BLD AUTO: 5.93 K/UL (ref 3.9–12.7)

## 2020-11-20 PROCEDURE — 25000003 PHARM REV CODE 250: Performed by: STUDENT IN AN ORGANIZED HEALTH CARE EDUCATION/TRAINING PROGRAM

## 2020-11-20 PROCEDURE — 63600175 PHARM REV CODE 636 W HCPCS: Performed by: HOSPITALIST

## 2020-11-20 PROCEDURE — 82728 ASSAY OF FERRITIN: CPT

## 2020-11-20 PROCEDURE — 36415 COLL VENOUS BLD VENIPUNCTURE: CPT

## 2020-11-20 PROCEDURE — 99232 SBSQ HOSP IP/OBS MODERATE 35: CPT | Mod: ,,, | Performed by: HOSPITALIST

## 2020-11-20 PROCEDURE — 86140 C-REACTIVE PROTEIN: CPT

## 2020-11-20 PROCEDURE — 83615 LACTATE (LD) (LDH) ENZYME: CPT

## 2020-11-20 PROCEDURE — 11000001 HC ACUTE MED/SURG PRIVATE ROOM

## 2020-11-20 PROCEDURE — U0003 INFECTIOUS AGENT DETECTION BY NUCLEIC ACID (DNA OR RNA); SEVERE ACUTE RESPIRATORY SYNDROME CORONAVIRUS 2 (SARS-COV-2) (CORONAVIRUS DISEASE [COVID-19]), AMPLIFIED PROBE TECHNIQUE, MAKING USE OF HIGH THROUGHPUT TECHNOLOGIES AS DESCRIBED BY CMS-2020-01-R: HCPCS

## 2020-11-20 PROCEDURE — 99232 PR SUBSEQUENT HOSPITAL CARE,LEVL II: ICD-10-PCS | Mod: ,,, | Performed by: HOSPITALIST

## 2020-11-20 PROCEDURE — U0002 COVID-19 LAB TEST NON-CDC: HCPCS

## 2020-11-20 PROCEDURE — 80053 COMPREHEN METABOLIC PANEL: CPT

## 2020-11-20 PROCEDURE — 85025 COMPLETE CBC W/AUTO DIFF WBC: CPT

## 2020-11-20 RX ADMIN — PANTOPRAZOLE SODIUM 40 MG: 40 TABLET, DELAYED RELEASE ORAL at 08:11

## 2020-11-20 RX ADMIN — ENOXAPARIN SODIUM 40 MG: 40 INJECTION SUBCUTANEOUS at 04:11

## 2020-11-20 RX ADMIN — BACITRACIN: 500 OINTMENT TOPICAL at 08:11
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
## 2020-11-20 NOTE — PLAN OF CARE
Plan is to patient to discharge to alf nursing home.  Patient needs two negative covid tests at least 24 hours apart to go to [facility].       11/20/20 1236   Discharge Reassessment   Assessment Type Discharge Planning Reassessment   Do you have any problems affording any of your prescribed medications? No   Discharge Plan A New Nursing Home placement - alf care facility   Discharge Plan B New Nursing Home placement - alf care facility   DME Needed Upon Discharge  none   Anticipated Discharge Disposition [facility]

## 2020-11-20 NOTE — PLAN OF CARE
Pt stable. Airborne precautions in place. NO sob, difficulty breathing or fever. Denies pain or discomfort. NAD. WCTM

## 2020-11-20 NOTE — PLAN OF CARE
MAURICIO faxed updated clinicals to [facility] for review. Patient is pending another COVID Test Result since the patient tested COVID (+) on 11.18.20.     Per [facility], they will not be able to accept pt in their facility until the patient has 2 consecutive COVID negative testing results within 24 hours apart. MAURICIO updated the patient's Care Team via secure chat. MAURICIO will continue to follow.     11:34 AM  MAURICIO faxed COVID Negative test results 11.19.20 to [facility] via  for review. Patient will need 1 additional Negative COVID test to be admitted. MAURICIO will continue to follow.     2:10 PM  Per [facility], the facility is not accepting the Rapid Screening COVID test. Please have pt rescreened and will 2 the negatives prior to being able to accept....this last Rapid Screening done on 11/19/20, the Admin is not accepting. CM notified the patient's Care Team via secure chat.     Osiris Leos LMSW   - [facility]  Ext. [phone]

## 2020-11-21 LAB
SARS-COV-2 RNA RESP QL NAA+PROBE: NOT DETECTED
TB INDURATION 48 - 72 HR READ: 0 MM

## 2020-11-21 PROCEDURE — 11000001 HC ACUTE MED/SURG PRIVATE ROOM

## 2020-11-21 PROCEDURE — 99231 PR SUBSEQUENT HOSPITAL CARE,LEVL I: ICD-10-PCS | Mod: ,,, | Performed by: HOSPITALIST

## 2020-11-21 PROCEDURE — 63600175 PHARM REV CODE 636 W HCPCS: Performed by: HOSPITALIST

## 2020-11-21 PROCEDURE — 25000003 PHARM REV CODE 250: Performed by: STUDENT IN AN ORGANIZED HEALTH CARE EDUCATION/TRAINING PROGRAM

## 2020-11-21 PROCEDURE — 99231 SBSQ HOSP IP/OBS SF/LOW 25: CPT | Mod: ,,, | Performed by: HOSPITALIST

## 2020-11-21 PROCEDURE — U0003 INFECTIOUS AGENT DETECTION BY NUCLEIC ACID (DNA OR RNA); SEVERE ACUTE RESPIRATORY SYNDROME CORONAVIRUS 2 (SARS-COV-2) (CORONAVIRUS DISEASE [COVID-19]), AMPLIFIED PROBE TECHNIQUE, MAKING USE OF HIGH THROUGHPUT TECHNOLOGIES AS DESCRIBED BY CMS-2020-01-R: HCPCS

## 2020-11-21 RX ADMIN — PANTOPRAZOLE SODIUM 40 MG: 40 TABLET, DELAYED RELEASE ORAL at 08:11

## 2020-11-21 RX ADMIN — BACITRACIN: 500 OINTMENT TOPICAL at 08:11

## 2020-11-21 RX ADMIN — ENOXAPARIN SODIUM 40 MG: 40 INJECTION SUBCUTANEOUS at 05:11
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
## 2020-11-21 NOTE — PLAN OF CARE
Pt remains free from falls and injuries during shift. Awake, alert, and oriented x 4. Vital signs stable. Denied pain/discomfort. No signs of acute distress noted at this time. Bed in lowest position, wheels locked, and call light in reach. Remains in isolation. Will continue to monitor, safety maintained.

## 2020-11-22 LAB — SARS-COV-2 RNA RESP QL NAA+PROBE: NOT DETECTED

## 2020-11-22 PROCEDURE — 11000001 HC ACUTE MED/SURG PRIVATE ROOM

## 2020-11-22 PROCEDURE — 99231 SBSQ HOSP IP/OBS SF/LOW 25: CPT | Mod: ,,, | Performed by: HOSPITALIST

## 2020-11-22 PROCEDURE — 25000003 PHARM REV CODE 250: Performed by: STUDENT IN AN ORGANIZED HEALTH CARE EDUCATION/TRAINING PROGRAM

## 2020-11-22 PROCEDURE — 63600175 PHARM REV CODE 636 W HCPCS: Performed by: HOSPITALIST

## 2020-11-22 PROCEDURE — 99231 PR SUBSEQUENT HOSPITAL CARE,LEVL I: ICD-10-PCS | Mod: ,,, | Performed by: HOSPITALIST

## 2020-11-22 RX ADMIN — ENOXAPARIN SODIUM 40 MG: 40 INJECTION SUBCUTANEOUS at 06:11

## 2020-11-22 RX ADMIN — BACITRACIN: 500 OINTMENT TOPICAL at 08:11

## 2020-11-22 RX ADMIN — PANTOPRAZOLE SODIUM 40 MG: 40 TABLET, DELAYED RELEASE ORAL at 10:11
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
## 2020-11-22 NOTE — PLAN OF CARE
Pt stable. Airborne precautions in place.Free from falls and injuries. Denies pain or discomfort. NAD. WCTM

## 2020-11-22 NOTE — PLAN OF CARE
Problem: Adult Inpatient Plan of Care  Goal: Plan of Care Review  Outcome: Ongoing, Progressing     Problem: Adult Inpatient Plan of Care  Goal: Optimal Comfort and Wellbeing  Outcome: Ongoing, Progressing     Problem: Thought Process Alteration  Goal: Optimal Thought Clarity  Outcome: Ongoing, Progressing       AAOx4. Anxious mood with confused speech, pt redirected.  Denies discomfort . No acute issues present.

## 2020-11-23 VITALS
WEIGHT: 158.75 LBS | SYSTOLIC BLOOD PRESSURE: 111 MMHG | RESPIRATION RATE: 17 BRPM | BODY MASS INDEX: 22.73 KG/M2 | HEIGHT: 70 IN | DIASTOLIC BLOOD PRESSURE: 74 MMHG | OXYGEN SATURATION: 95 % | HEART RATE: 73 BPM | TEMPERATURE: 98 F

## 2020-11-23 PROCEDURE — 25000003 PHARM REV CODE 250: Performed by: STUDENT IN AN ORGANIZED HEALTH CARE EDUCATION/TRAINING PROGRAM

## 2020-11-23 PROCEDURE — 99239 PR HOSPITAL DISCHARGE DAY,>30 MIN: ICD-10-PCS | Mod: ,,, | Performed by: HOSPITALIST

## 2020-11-23 PROCEDURE — 99239 HOSP IP/OBS DSCHRG MGMT >30: CPT | Mod: ,,, | Performed by: HOSPITALIST

## 2020-11-23 RX ADMIN — PANTOPRAZOLE SODIUM 40 MG: 40 TABLET, DELAYED RELEASE ORAL at 08:11

## 2020-11-23 RX ADMIN — BACITRACIN: 500 OINTMENT TOPICAL at 08:11

## 2020-11-23 NOTE — PLAN OF CARE
MAURICIO scheduled d/c transportation to [facility] through [facility]. Patient is scheduled to be picked up at 2:15 pm. MAURICIO provided patient's nurse with number for report [phone]; Room 304B; ask for 300 POD Nurse. MAURICIO is in communication with patient's CM and patient's Care Team.     11/23/20 1311   Post-Acute Status   Post-Acute Authorization Placement   Post-Acute Placement Status Set-up Complete     Osiris Leos LMSW   - [facility]  Ext. [phone]

## 2020-11-23 NOTE — PLAN OF CARE
MAURICIO faxed updated clinicals/ COVID [place] Test results from 11.20.20 and 11.21.20 to [facility] for review. MAURICIO will continue to follow.     12:00 PM  MAURICIO faxed SNF Orders to [facility] via  for review. MAURICIO will continue to follow.     Osiris Leos LMSW   - [facility]  Ext. [phone]

## 2020-11-23 NOTE — PLAN OF CARE
ANNIKA called , Mei, to inform of patient with 2 negative covid tests.  Mei states she spoke to the [facility] who stated they would like Juan to go to the [facility] in SNF instead of [facility] in the beginning.  ANNIKA stated she will inform Dr. Castillo.
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
## 2020-11-24 NOTE — PLAN OF CARE
Patient discharged to [facility].       11/24/20 0712   Final Note   Assessment Type Final Discharge Note   Anticipated Discharge Disposition SNF   Right Care Referral Info   Post Acute Recommendation SNF / Sub-Acute Rehab   Referral Type SNF   Facility Name [facility]

## 2020-12-08 ENCOUNTER — PATIENT MESSAGE (OUTPATIENT)
Dept: UROLOGY | Facility: CLINIC | Age: 70
End: 2020-12-08

## 2020-12-09 ENCOUNTER — PATIENT MESSAGE (OUTPATIENT)
Dept: UROLOGY | Facility: CLINIC | Age: 70
End: 2020-12-09

## 2021-01-25 ENCOUNTER — OFFICE VISIT (OUTPATIENT)
Dept: UROLOGY | Facility: CLINIC | Age: 71
End: 2021-01-25
Payer: COMMERCIAL

## 2021-01-25 VITALS — WEIGHT: 158.75 LBS | HEIGHT: 70 IN | BODY MASS INDEX: 22.73 KG/M2

## 2021-01-25 DIAGNOSIS — Z90.79 HISTORY OF PENECTOMY: ICD-10-CM

## 2021-01-25 DIAGNOSIS — N40.1 BPH WITH URINARY OBSTRUCTION: ICD-10-CM

## 2021-01-25 DIAGNOSIS — N13.8 BPH WITH URINARY OBSTRUCTION: ICD-10-CM

## 2021-01-25 DIAGNOSIS — N48.89 PENILE MASS: Primary | ICD-10-CM

## 2021-01-25 DIAGNOSIS — N43.40 SPERMATOCELE: ICD-10-CM

## 2021-01-25 PROCEDURE — 1126F PR PAIN SEVERITY QUANTIFIED, NO PAIN PRESENT: ICD-10-PCS | Mod: S$GLB,,, | Performed by: NURSE PRACTITIONER

## 2021-01-25 PROCEDURE — 99213 PR OFFICE/OUTPT VISIT, EST, LEVL III, 20-29 MIN: ICD-10-PCS | Mod: S$GLB,,, | Performed by: NURSE PRACTITIONER

## 2021-01-25 PROCEDURE — 3008F PR BODY MASS INDEX (BMI) DOCUMENTED: ICD-10-PCS | Mod: CPTII,S$GLB,, | Performed by: NURSE PRACTITIONER

## 2021-01-25 PROCEDURE — 3008F BODY MASS INDEX DOCD: CPT | Mod: CPTII,S$GLB,, | Performed by: NURSE PRACTITIONER

## 2021-01-25 PROCEDURE — 1159F MED LIST DOCD IN RCRD: CPT | Mod: S$GLB,,, | Performed by: NURSE PRACTITIONER

## 2021-01-25 PROCEDURE — 1101F PT FALLS ASSESS-DOCD LE1/YR: CPT | Mod: CPTII,S$GLB,, | Performed by: NURSE PRACTITIONER

## 2021-01-25 PROCEDURE — 99999 PR PBB SHADOW E&M-EST. PATIENT-LVL III: CPT | Mod: PBBFAC,,, | Performed by: NURSE PRACTITIONER

## 2021-01-25 PROCEDURE — 3288F FALL RISK ASSESSMENT DOCD: CPT | Mod: CPTII,S$GLB,, | Performed by: NURSE PRACTITIONER

## 2021-01-25 PROCEDURE — 1101F PR PT FALLS ASSESS DOC 0-1 FALLS W/OUT INJ PAST YR: ICD-10-PCS | Mod: CPTII,S$GLB,, | Performed by: NURSE PRACTITIONER

## 2021-01-25 PROCEDURE — 99999 PR PBB SHADOW E&M-EST. PATIENT-LVL III: ICD-10-PCS | Mod: PBBFAC,,, | Performed by: NURSE PRACTITIONER

## 2021-01-25 PROCEDURE — 1126F AMNT PAIN NOTED NONE PRSNT: CPT | Mod: S$GLB,,, | Performed by: NURSE PRACTITIONER

## 2021-01-25 PROCEDURE — 3288F PR FALLS RISK ASSESSMENT DOCUMENTED: ICD-10-PCS | Mod: CPTII,S$GLB,, | Performed by: NURSE PRACTITIONER

## 2021-01-25 PROCEDURE — 1159F PR MEDICATION LIST DOCUMENTED IN MEDICAL RECORD: ICD-10-PCS | Mod: S$GLB,,, | Performed by: NURSE PRACTITIONER

## 2021-01-25 PROCEDURE — 99213 OFFICE O/P EST LOW 20 MIN: CPT | Mod: S$GLB,,, | Performed by: NURSE PRACTITIONER

## 2021-02-09 ENCOUNTER — PATIENT MESSAGE (OUTPATIENT)
Dept: NEUROLOGY | Facility: CLINIC | Age: 71
End: 2021-02-09

## 2021-02-09 DIAGNOSIS — F03.90 DEMENTIA WITHOUT BEHAVIORAL DISTURBANCE, UNSPECIFIED DEMENTIA TYPE: Primary | ICD-10-CM

## 2021-02-11 PROCEDURE — G0180 PR HOME HEALTH MD CERTIFICATION: ICD-10-PCS | Mod: ,,, | Performed by: PSYCHIATRY & NEUROLOGY

## 2021-02-11 PROCEDURE — G0180 MD CERTIFICATION HHA PATIENT: HCPCS | Mod: ,,, | Performed by: PSYCHIATRY & NEUROLOGY

## 2021-02-24 ENCOUNTER — PATIENT MESSAGE (OUTPATIENT)
Dept: NEUROLOGY | Facility: CLINIC | Age: 71
End: 2021-02-24

## 2021-02-26 ENCOUNTER — OFFICE VISIT (OUTPATIENT)
Dept: NEUROLOGY | Facility: CLINIC | Age: 71
End: 2021-02-26
Payer: COMMERCIAL

## 2021-02-26 DIAGNOSIS — G30.1 LATE ONSET ALZHEIMER'S DISEASE WITHOUT BEHAVIORAL DISTURBANCE: Primary | ICD-10-CM

## 2021-02-26 DIAGNOSIS — F02.80 LATE ONSET ALZHEIMER'S DISEASE WITHOUT BEHAVIORAL DISTURBANCE: Primary | ICD-10-CM

## 2021-02-26 PROBLEM — G93.40 ACUTE ENCEPHALOPATHY: Status: RESOLVED | Noted: 2020-10-06 | Resolved: 2021-02-26

## 2021-02-26 PROBLEM — R41.89 COGNITIVE DECLINE: Status: RESOLVED | Noted: 2020-10-03 | Resolved: 2021-02-26

## 2021-02-26 PROBLEM — R41.82 ALTERED MENTAL STATUS: Status: RESOLVED | Noted: 2020-09-02 | Resolved: 2021-02-26

## 2021-02-26 PROBLEM — R41.89 COGNITIVE IMPAIRMENT: Status: RESOLVED | Noted: 2020-10-03 | Resolved: 2021-02-26

## 2021-02-26 PROCEDURE — 99214 OFFICE O/P EST MOD 30 MIN: CPT | Mod: 95,,, | Performed by: PSYCHIATRY & NEUROLOGY

## 2021-02-26 PROCEDURE — 99214 PR OFFICE/OUTPT VISIT, EST, LEVL IV, 30-39 MIN: ICD-10-PCS | Mod: 95,,, | Performed by: PSYCHIATRY & NEUROLOGY

## 2021-02-26 PROCEDURE — 1159F MED LIST DOCD IN RCRD: CPT | Mod: 95,,, | Performed by: PSYCHIATRY & NEUROLOGY

## 2021-02-26 PROCEDURE — 1159F PR MEDICATION LIST DOCUMENTED IN MEDICAL RECORD: ICD-10-PCS | Mod: 95,,, | Performed by: PSYCHIATRY & NEUROLOGY

## 2021-02-26 RX ORDER — DONEPEZIL HYDROCHLORIDE 5 MG/1
5 TABLET, FILM COATED ORAL NIGHTLY
Qty: 90 TABLET | Refills: 3 | Status: SHIPPED | OUTPATIENT
Start: 2021-02-26 | End: 2022-02-26

## 2021-03-05 ENCOUNTER — EXTERNAL HOME HEALTH (OUTPATIENT)
Dept: HOME HEALTH SERVICES | Facility: HOSPITAL | Age: 71
End: 2021-03-05
Payer: COMMERCIAL

## 2021-03-15 ENCOUNTER — TELEPHONE (OUTPATIENT)
Dept: NEUROLOGY | Facility: CLINIC | Age: 71
End: 2021-03-15

## 2021-03-16 ENCOUNTER — DOCUMENT SCAN (OUTPATIENT)
Dept: HOME HEALTH SERVICES | Facility: HOSPITAL | Age: 71
End: 2021-03-16
Payer: COMMERCIAL

## 2021-03-26 ENCOUNTER — OFFICE VISIT (OUTPATIENT)
Dept: DERMATOLOGY | Facility: CLINIC | Age: 71
End: 2021-03-26
Payer: COMMERCIAL

## 2021-03-26 DIAGNOSIS — L08.9 INFECTED CYST OF SKIN: Primary | ICD-10-CM

## 2021-03-26 DIAGNOSIS — L72.9 INFECTED CYST OF SKIN: Primary | ICD-10-CM

## 2021-03-26 PROCEDURE — 1126F PR PAIN SEVERITY QUANTIFIED, NO PAIN PRESENT: ICD-10-PCS | Mod: S$GLB,,, | Performed by: DERMATOLOGY

## 2021-03-26 PROCEDURE — 99999 PR PBB SHADOW E&M-EST. PATIENT-LVL III: ICD-10-PCS | Mod: PBBFAC,,, | Performed by: DERMATOLOGY

## 2021-03-26 PROCEDURE — 87070 CULTURE OTHR SPECIMN AEROBIC: CPT | Performed by: DERMATOLOGY

## 2021-03-26 PROCEDURE — 1159F MED LIST DOCD IN RCRD: CPT | Mod: S$GLB,,, | Performed by: DERMATOLOGY

## 2021-03-26 PROCEDURE — 1159F PR MEDICATION LIST DOCUMENTED IN MEDICAL RECORD: ICD-10-PCS | Mod: S$GLB,,, | Performed by: DERMATOLOGY

## 2021-03-26 PROCEDURE — 1126F AMNT PAIN NOTED NONE PRSNT: CPT | Mod: S$GLB,,, | Performed by: DERMATOLOGY

## 2021-03-26 PROCEDURE — 99213 PR OFFICE/OUTPT VISIT, EST, LEVL III, 20-29 MIN: ICD-10-PCS | Mod: S$GLB,,, | Performed by: DERMATOLOGY

## 2021-03-26 PROCEDURE — 99213 OFFICE O/P EST LOW 20 MIN: CPT | Mod: S$GLB,,, | Performed by: DERMATOLOGY

## 2021-03-26 PROCEDURE — 99999 PR PBB SHADOW E&M-EST. PATIENT-LVL III: CPT | Mod: PBBFAC,,, | Performed by: DERMATOLOGY

## 2021-03-26 RX ORDER — DOXYCYCLINE 100 MG/1
100 CAPSULE ORAL 2 TIMES DAILY
Qty: 28 CAPSULE | Refills: 0 | Status: SHIPPED | OUTPATIENT
Start: 2021-03-26 | End: 2021-04-09 | Stop reason: SDUPTHER

## 2021-03-29 LAB — BACTERIA SPEC AEROBE CULT: NORMAL

## 2021-04-09 ENCOUNTER — OFFICE VISIT (OUTPATIENT)
Dept: DERMATOLOGY | Facility: CLINIC | Age: 71
End: 2021-04-09
Payer: MEDICARE

## 2021-04-09 DIAGNOSIS — L72.9 INFECTED CYST OF SKIN: ICD-10-CM

## 2021-04-09 DIAGNOSIS — L08.9 INFECTED CYST OF SKIN: ICD-10-CM

## 2021-04-09 DIAGNOSIS — L72.3 INFLAMED EPIDERMOID CYST OF SKIN: Primary | ICD-10-CM

## 2021-04-09 PROCEDURE — 1101F PT FALLS ASSESS-DOCD LE1/YR: CPT | Mod: CPTII,S$GLB,, | Performed by: DERMATOLOGY

## 2021-04-09 PROCEDURE — 3288F FALL RISK ASSESSMENT DOCD: CPT | Mod: CPTII,S$GLB,, | Performed by: DERMATOLOGY

## 2021-04-09 PROCEDURE — 1126F PR PAIN SEVERITY QUANTIFIED, NO PAIN PRESENT: ICD-10-PCS | Mod: S$GLB,,, | Performed by: DERMATOLOGY

## 2021-04-09 PROCEDURE — 1159F MED LIST DOCD IN RCRD: CPT | Mod: S$GLB,,, | Performed by: DERMATOLOGY

## 2021-04-09 PROCEDURE — 99213 OFFICE O/P EST LOW 20 MIN: CPT | Mod: S$GLB,,, | Performed by: DERMATOLOGY

## 2021-04-09 PROCEDURE — 99999 PR PBB SHADOW E&M-EST. PATIENT-LVL II: ICD-10-PCS | Mod: PBBFAC,,, | Performed by: DERMATOLOGY

## 2021-04-09 PROCEDURE — 1101F PR PT FALLS ASSESS DOC 0-1 FALLS W/OUT INJ PAST YR: ICD-10-PCS | Mod: CPTII,S$GLB,, | Performed by: DERMATOLOGY

## 2021-04-09 PROCEDURE — 1159F PR MEDICATION LIST DOCUMENTED IN MEDICAL RECORD: ICD-10-PCS | Mod: S$GLB,,, | Performed by: DERMATOLOGY

## 2021-04-09 PROCEDURE — 99213 PR OFFICE/OUTPT VISIT, EST, LEVL III, 20-29 MIN: ICD-10-PCS | Mod: S$GLB,,, | Performed by: DERMATOLOGY

## 2021-04-09 PROCEDURE — 3288F PR FALLS RISK ASSESSMENT DOCUMENTED: ICD-10-PCS | Mod: CPTII,S$GLB,, | Performed by: DERMATOLOGY

## 2021-04-09 PROCEDURE — 99999 PR PBB SHADOW E&M-EST. PATIENT-LVL II: CPT | Mod: PBBFAC,,, | Performed by: DERMATOLOGY

## 2021-04-09 PROCEDURE — 1126F AMNT PAIN NOTED NONE PRSNT: CPT | Mod: S$GLB,,, | Performed by: DERMATOLOGY

## 2021-04-09 RX ORDER — DOXYCYCLINE 100 MG/1
100 CAPSULE ORAL 2 TIMES DAILY
Qty: 28 CAPSULE | Refills: 0 | Status: SHIPPED | OUTPATIENT
Start: 2021-04-09

## 2021-09-03 ENCOUNTER — PATIENT MESSAGE (OUTPATIENT)
Dept: NEUROLOGY | Facility: CLINIC | Age: 71
End: 2021-09-03

## 2022-07-19 ENCOUNTER — PATIENT MESSAGE (OUTPATIENT)
Dept: RESEARCH | Facility: CLINIC | Age: 72
End: 2022-07-19
Payer: COMMERCIAL
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
## (undated) DEVICE — SEE MEDLINE ITEM 157117

## (undated) DEVICE — PENCIL ROCKER SWITCH 10FT CORD

## (undated) DEVICE — SUT ETHILON 6-0.PLAIN

## (undated) DEVICE — BRIEF STRTCH MESH XX-LG

## (undated) DEVICE — SEE MEDLINE ITEM 146313

## (undated) DEVICE — SUT 2-0 VICRYL / SH (J417)

## (undated) DEVICE — NDL 18GA X1 1/2 REG BEVEL

## (undated) DEVICE — PACK LAPSCP/PELVSCPY III TIBRN

## (undated) DEVICE — SEE MEDLINE ITEM 146372

## (undated) DEVICE — TRAY ENT 4/CS

## (undated) DEVICE — SUT VICRYL 4-0 RB1 27IN UD

## (undated) DEVICE — SKINMARKER & RULER REGULAR X-F

## (undated) DEVICE — CORD BIPOLAR 12 FOOT

## (undated) DEVICE — SEE MEDLINE ITEM 146417

## (undated) DEVICE — SUT SILK 3-0 BLK BR SH 30IN

## (undated) DEVICE — BLADE SURGICAL 15C

## (undated) DEVICE — GAUZE FLUFF XXLG 36X36 2 PLY

## (undated) DEVICE — APPLICATOR CHLORAPREP ORN 26ML

## (undated) DEVICE — ELECTRODE REM PLYHSV RETURN 9

## (undated) DEVICE — RETRACTOR LONE STAR 28.3X18.3

## (undated) DEVICE — COVER LIGHT HANDLE 80/CA

## (undated) DEVICE — NDL HYPO A BEVEL 30X1/2

## (undated) DEVICE — TRAY MINOR GEN SURG

## (undated) DEVICE — FORCEP STRAIGHT DISP

## (undated) DEVICE — SEE MEDLINE ITEM 157194

## (undated) DEVICE — GOWN SURGICAL X-LARGE

## (undated) DEVICE — SUT SILK 6-0 BLK BR P-1 P-1

## (undated) DEVICE — SEE MEDLINE ITEM 152487

## (undated) DEVICE — GAUZE FLUFF 2 PLY

## (undated) DEVICE — HOOK STAY ELAS 5MM 8EA/PK

## (undated) DEVICE — SHEET EENT SPLIT

## (undated) DEVICE — SUT VICRYL 3-0 27 SH

## (undated) DEVICE — DRESSING XEROFORM FOIL PK 1X8

## (undated) DEVICE — SEE MEDLINE ITEM 152622

## (undated) DEVICE — SUT CHROMIC 2-0 SH 27IN BRN

## (undated) DEVICE — SEE MEDLINE ITEM 157128

## (undated) DEVICE — DRESSING TELFA STRL 4X3 LF

## (undated) DEVICE — SEE MEDLINE ITEM 146373

## (undated) DEVICE — GAUZE SPONGE 4X4 12PLY

## (undated) DEVICE — SUT CHROMIC 3-0 SH 27IN GUT

## (undated) DEVICE — ELECTRODE NEEDLE 2.8IN

## (undated) DEVICE — TRAY FOLEY 16FR INFECTION CONT

## (undated) DEVICE — DRAPE STERI-DRAPE 1000 17X11IN

## (undated) DEVICE — SUT MCRYL PLUS 4-0 PS2 27IN